# Patient Record
Sex: FEMALE | Race: WHITE | NOT HISPANIC OR LATINO | URBAN - METROPOLITAN AREA
[De-identification: names, ages, dates, MRNs, and addresses within clinical notes are randomized per-mention and may not be internally consistent; named-entity substitution may affect disease eponyms.]

---

## 2017-03-06 ENCOUNTER — APPOINTMENT (RX ONLY)
Dept: URBAN - METROPOLITAN AREA CLINIC 24 | Facility: CLINIC | Age: 67
Setting detail: DERMATOLOGY
End: 2017-03-06

## 2017-03-06 DIAGNOSIS — L91.8 OTHER HYPERTROPHIC DISORDERS OF THE SKIN: ICD-10-CM

## 2017-03-06 DIAGNOSIS — L82.1 OTHER SEBORRHEIC KERATOSIS: ICD-10-CM

## 2017-03-06 DIAGNOSIS — L73.8 OTHER SPECIFIED FOLLICULAR DISORDERS: ICD-10-CM

## 2017-03-06 DIAGNOSIS — L57.8 OTHER SKIN CHANGES DUE TO CHRONIC EXPOSURE TO NONIONIZING RADIATION: ICD-10-CM

## 2017-03-06 DIAGNOSIS — L82.0 INFLAMED SEBORRHEIC KERATOSIS: ICD-10-CM

## 2017-03-06 PROBLEM — J30.1 ALLERGIC RHINITIS DUE TO POLLEN: Status: ACTIVE | Noted: 2017-03-06

## 2017-03-06 PROCEDURE — ? SKIN TAG REMOVAL

## 2017-03-06 PROCEDURE — 17110 DESTRUCTION B9 LES UP TO 14: CPT

## 2017-03-06 PROCEDURE — 99213 OFFICE O/P EST LOW 20 MIN: CPT | Mod: 25

## 2017-03-06 PROCEDURE — ? LIQUID NITROGEN

## 2017-03-06 PROCEDURE — ? COUNSELING

## 2017-03-06 PROCEDURE — 11200 RMVL SKIN TAGS UP TO&INC 15: CPT | Mod: 59

## 2017-03-06 ASSESSMENT — LOCATION SIMPLE DESCRIPTION DERM
LOCATION SIMPLE: LEFT FOREARM
LOCATION SIMPLE: RIGHT FOREARM
LOCATION SIMPLE: RIGHT UPPER LIP
LOCATION SIMPLE: LEFT CHEEK
LOCATION SIMPLE: LEFT LOWER BACK
LOCATION SIMPLE: RIGHT EYELID
LOCATION SIMPLE: RIGHT CHEEK

## 2017-03-06 ASSESSMENT — LOCATION DETAILED DESCRIPTION DERM
LOCATION DETAILED: RIGHT DISTAL DORSAL FOREARM
LOCATION DETAILED: RIGHT SUPERIOR VERMILION BORDER
LOCATION DETAILED: LEFT DISTAL DORSAL FOREARM
LOCATION DETAILED: LEFT MEDIAL MALAR CHEEK
LOCATION DETAILED: RIGHT LATERAL CANTHUS
LOCATION DETAILED: RIGHT INFERIOR CENTRAL MALAR CHEEK
LOCATION DETAILED: LEFT SUPERIOR MEDIAL LOWER BACK

## 2017-03-06 ASSESSMENT — LOCATION ZONE DERM
LOCATION ZONE: ARM
LOCATION ZONE: EYELID
LOCATION ZONE: LIP
LOCATION ZONE: FACE
LOCATION ZONE: TRUNK

## 2017-03-06 NOTE — PROCEDURE: LIQUID NITROGEN
Detail Level: Detailed
Include Z78.9 (Other Specified Conditions Influencing Health Status) As An Associated Diagnosis?: No
Medical Necessity Clause: Treatment was medically necessary because the spot was getting
Consent: The patient's verbal consent was obtained including but not limited to risks of crusting, scabbing, blistering, scarring, darker or lighter pigmentary change, recurrence, incomplete removal and infection.
Medical Necessity Information: It is in your best interest to select a reason for this procedure from the list below. All of these items fulfill various CMS LCD requirements except the new and changing color options.
Post-Care Instructions: I reviewed with the patient in detail post-care instructions. Patient is to wear sunprotection, and avoid picking at any of the treated lesions. Pt may apply Vaseline to crusted or scabbing areas.
Number Of Freeze-Thaw Cycles: 1 freeze-thaw cycle

## 2017-03-06 NOTE — PROCEDURE: SKIN TAG REMOVAL
Consent: Written consent obtained and the risks of skin tag removal was reviewed with the patient including but not limited to bleeding, pigmentary change, infection, pain, and remote possibility of scarring.
Anesthesia Type: 1% lidocaine with 1:500,000 epinephrine and a 1:10 solution of 8.4% sodium bicarbonate
Detail Level: Detailed
Include Z78.9 (Other Specified Conditions Influencing Health Status) As An Associated Diagnosis?: No
Medical Necessity Clause: This procedure was medically necessary because the lesion that was treated was
Medical Necessity Information: It is in your best interest to select a reason for this procedure from the list below. All of these items fulfill various CMS LCD requirements except the new and changing color options.
Hemostasis: Aluminum Chloride
Anesthesia Volume In Cc: 0.3

## 2018-01-03 RX ORDER — CEFAZOLIN SODIUM/WATER 2 G/20 ML
2 SYRINGE (ML) INTRAVENOUS ONCE
Status: CANCELLED | OUTPATIENT
Start: 2018-01-03 | End: 2018-01-03

## 2018-01-08 ENCOUNTER — HOSPITAL ENCOUNTER (OUTPATIENT)
Dept: SURGERY | Age: 68
Discharge: HOME | End: 2018-01-08
Payer: MEDICARE

## 2018-01-08 ENCOUNTER — HOSPITAL ENCOUNTER (OUTPATIENT)
Dept: PHYSICAL THERAPY | Age: 68
Discharge: HOME | End: 2018-01-08
Payer: MEDICARE

## 2018-01-08 VITALS
OXYGEN SATURATION: 96 % | SYSTOLIC BLOOD PRESSURE: 146 MMHG | TEMPERATURE: 97.9 F | DIASTOLIC BLOOD PRESSURE: 70 MMHG | WEIGHT: 262.8 LBS | RESPIRATION RATE: 18 BRPM | HEIGHT: 63 IN | BODY MASS INDEX: 46.56 KG/M2

## 2018-01-08 LAB
ALBUMIN SERPL-MCNC: 3.9 G/DL (ref 3.2–4.6)
ANION GAP SERPL CALC-SCNC: 7 MMOL/L (ref 7–16)
APPEARANCE UR: CLEAR
APTT PPP: 28.6 SEC (ref 23.2–35.3)
BACTERIA SPEC CULT: NORMAL
BACTERIA URNS QL MICRO: 0 /HPF
BASOPHILS # BLD: 0 K/UL (ref 0–0.2)
BASOPHILS NFR BLD: 0 % (ref 0–2)
BILIRUB UR QL: NEGATIVE
BUN SERPL-MCNC: 24 MG/DL (ref 8–23)
CALCIUM SERPL-MCNC: 9.8 MG/DL (ref 8.3–10.4)
CASTS URNS QL MICRO: ABNORMAL /LPF
CHLORIDE SERPL-SCNC: 101 MMOL/L (ref 98–107)
CO2 SERPL-SCNC: 29 MMOL/L (ref 21–32)
COLOR UR: YELLOW
CREAT SERPL-MCNC: 0.91 MG/DL (ref 0.6–1)
DIFFERENTIAL METHOD BLD: NORMAL
EOSINOPHIL # BLD: 0.3 K/UL (ref 0–0.8)
EOSINOPHIL NFR BLD: 4 % (ref 0.5–7.8)
EPI CELLS #/AREA URNS HPF: ABNORMAL /HPF
ERYTHROCYTE [DISTWIDTH] IN BLOOD BY AUTOMATED COUNT: 14 % (ref 11.9–14.6)
EST. AVERAGE GLUCOSE BLD GHB EST-MCNC: 105 MG/DL
GLUCOSE SERPL-MCNC: 99 MG/DL (ref 65–100)
GLUCOSE UR STRIP.AUTO-MCNC: NEGATIVE MG/DL
HBA1C MFR BLD: 5.3 % (ref 4.8–6)
HCT VFR BLD AUTO: 41.7 % (ref 35.8–46.3)
HGB BLD-MCNC: 13.4 G/DL (ref 11.7–15.4)
HGB UR QL STRIP: NEGATIVE
IMM GRANULOCYTES # BLD: 0 K/UL (ref 0–0.5)
IMM GRANULOCYTES NFR BLD AUTO: 0 % (ref 0–5)
INR PPP: 1
KETONES UR QL STRIP.AUTO: NEGATIVE MG/DL
LEUKOCYTE ESTERASE UR QL STRIP.AUTO: ABNORMAL
LYMPHOCYTES # BLD: 2.1 K/UL (ref 0.5–4.6)
LYMPHOCYTES NFR BLD: 31 % (ref 13–44)
MCH RBC QN AUTO: 29.3 PG (ref 26.1–32.9)
MCHC RBC AUTO-ENTMCNC: 32.1 G/DL (ref 31.4–35)
MCV RBC AUTO: 91 FL (ref 79.6–97.8)
MONOCYTES # BLD: 0.6 K/UL (ref 0.1–1.3)
MONOCYTES NFR BLD: 8 % (ref 4–12)
NEUTS SEG # BLD: 4 K/UL (ref 1.7–8.2)
NEUTS SEG NFR BLD: 57 % (ref 43–78)
NITRITE UR QL STRIP.AUTO: NEGATIVE
PH UR STRIP: 6 [PH] (ref 5–9)
PLATELET # BLD AUTO: 249 K/UL (ref 150–450)
PMV BLD AUTO: 11.4 FL (ref 10.8–14.1)
POTASSIUM SERPL-SCNC: 3.6 MMOL/L (ref 3.5–5.1)
PROT UR STRIP-MCNC: NEGATIVE MG/DL
PROTHROMBIN TIME: 12.9 SEC (ref 11.5–14.5)
RBC # BLD AUTO: 4.58 M/UL (ref 4.05–5.25)
RBC #/AREA URNS HPF: ABNORMAL /HPF
SERVICE CMNT-IMP: NORMAL
SODIUM SERPL-SCNC: 137 MMOL/L (ref 136–145)
SP GR UR REFRACTOMETRY: 1.02 (ref 1–1.02)
UROBILINOGEN UR QL STRIP.AUTO: 0.2 EU/DL (ref 0.2–1)
WBC # BLD AUTO: 7 K/UL (ref 4.3–11.1)
WBC URNS QL MICRO: ABNORMAL /HPF

## 2018-01-08 PROCEDURE — 97161 PT EVAL LOW COMPLEX 20 MIN: CPT

## 2018-01-08 PROCEDURE — 85610 PROTHROMBIN TIME: CPT | Performed by: ORTHOPAEDIC SURGERY

## 2018-01-08 PROCEDURE — G8978 MOBILITY CURRENT STATUS: HCPCS

## 2018-01-08 PROCEDURE — 80307 DRUG TEST PRSMV CHEM ANLYZR: CPT | Performed by: ORTHOPAEDIC SURGERY

## 2018-01-08 PROCEDURE — 77030027138 HC INCENT SPIROMETER -A

## 2018-01-08 PROCEDURE — 85730 THROMBOPLASTIN TIME PARTIAL: CPT | Performed by: ORTHOPAEDIC SURGERY

## 2018-01-08 PROCEDURE — 80048 BASIC METABOLIC PNL TOTAL CA: CPT | Performed by: ORTHOPAEDIC SURGERY

## 2018-01-08 PROCEDURE — 82040 ASSAY OF SERUM ALBUMIN: CPT | Performed by: ORTHOPAEDIC SURGERY

## 2018-01-08 PROCEDURE — 36415 COLL VENOUS BLD VENIPUNCTURE: CPT | Performed by: ORTHOPAEDIC SURGERY

## 2018-01-08 PROCEDURE — 81001 URINALYSIS AUTO W/SCOPE: CPT | Performed by: ORTHOPAEDIC SURGERY

## 2018-01-08 PROCEDURE — 87641 MR-STAPH DNA AMP PROBE: CPT | Performed by: ORTHOPAEDIC SURGERY

## 2018-01-08 PROCEDURE — 83036 HEMOGLOBIN GLYCOSYLATED A1C: CPT | Performed by: ORTHOPAEDIC SURGERY

## 2018-01-08 PROCEDURE — G8980 MOBILITY D/C STATUS: HCPCS

## 2018-01-08 PROCEDURE — 85025 COMPLETE CBC W/AUTO DIFF WBC: CPT | Performed by: ORTHOPAEDIC SURGERY

## 2018-01-08 PROCEDURE — G8979 MOBILITY GOAL STATUS: HCPCS

## 2018-01-08 RX ORDER — ACETAMINOPHEN 500 MG
TABLET ORAL AS NEEDED
Status: ON HOLD | COMMUNITY
End: 2019-04-17 | Stop reason: SDUPTHER

## 2018-01-08 NOTE — PERIOP NOTES
Patient verified name, , and surgery as listed in [facility]. Type 3 surgery, joint assessment complete. Labs per surgeon: cbc,bmp,pt,ptt, ua, hgb a1c, albumin; results within limits; mssa, jamilah pending. Labs routed to surgeon and PCP. EKG:dated 17, echo dated 16, cardiologist note dated 17 all printed from  EMR and placed in chart; all within anesthesia guidelines. Hibiclens and instructions to return bottle on DOS given per hospital policy. Nasal Swab collected per MD order and instructions for Mupirocin nasal ointment if required. Patient provided with handouts including Guide to Surgery, Pain Management, Hand Hygiene, Blood Transfusion Education, and [facility] Brochure. Patient answered medical/surgical history questions at their best of ability. All prior to admission medications documented in [facility]. Original medication prescription bottle not visualized during patient appointment. Patient instructed to hold all vitamins 7 days prior to surgery and NSAIDS 5 days prior to surgery. Medications to be held: diclofenac- 5 days. Patient instructed to continue previous medications as prescribed prior to surgery and to take the following medications the day of surgery according to anesthesia guidelines with a small sip of water: use advair diskus; lexapro, flonase, levothyroxine, claritin, montelukast, tylenol if needed. Patient teach back successful and patient demonstrates knowledge of instruction.

## 2018-01-08 NOTE — PROGRESS NOTES
Jade Martins  : [birth year](59 y.o.) [facility] at [address], [place] [address].  [phone]       Physical Therapy Prehab Plan of Treatment and Evaluation Summary:2018    ICD-10: Treatment Diagnosis:   · Pain in Right Knee (M25.561)  · Stiffness of Right Knee, Not elsewhere classified (M25.661)  · Difficulty in walking, Not elsewhere classified (R26.2)  · Other abnormalities of gait and mobility (R26.89)  Precautions/Allergies:   Ceclor [cefaclor] and Symbyax [olanzapine-fluoxetine]  MEDICAL/REFERRING DIAGNOSIS:  Unilateral primary osteoarthritis, right knee [M17.11]  REFERRING PHYSICIAN: Kerry Martinez MD  DATE OF SURGERY: 18   Assessment:   Comments:  Pain in right knee joint with decreased independence with functional mobility. Pt plans to return home following hospital stay. Pt's  present and supportive. PROBLEM LIST (Impacting functional limitations):  Ms. Irma Azar presents with the following right lower extremity(s) problems:  1. Transfers  2. Gait  3. Strength  4. Range of Motion  5. Pain   INTERVENTIONS PLANNED:  1. Home Exercise Program  2. Educational Discussion     TREATMENT PLAN: Effective Dates: 2018 TO 2018. Frequency/Duration: Patient to continue to perform home exercise program at least twice per day up until her surgery. GOALS: (Goals have been discussed and agreed upon with patient.)  Discharge Goals: Time Frame: 1 Day  1. Patient will demonstrate independence with a home exercise program designed to increase strength, range of motion and pain control to minimize functional deficits and optimize patient for total joint replacement. Rehabilitation Potential For Stated Goals: Good  Regarding Orly Jay's therapy, I certify that the treatment plan above will be carried out by a therapist or under their direction.   Thank you for this referral,  Ronit Stewart, PT               HISTORY:   Present Symptoms:  Pain Intensity 1: 8  Pain Location 1: Knee  Pain Orientation 1: Right   History of Present Injury/Illness (Reason for Referral):  Medical/Referring Diagnosis: Unilateral primary osteoarthritis, right knee [M17.11]   Past Medical History/Comorbidities:   Ms. Leopoldo Al  has a past medical history of Allergic rhinitis; Anxiety; Arrhythmia; Arthritis; Depression; Endocarditis (2016); Heart murmur; Hematuria; History of MRSA infection (on left breast); HLD (hyperlipidemia) ( ); Hypertension; Hypothyroid; Hypothyroidism due to acquired atrophy of thyroid (2015); Intertrigo; Irregular heart beat; Mass of colon; Morbid obesity ([facility] [address] Reactive airway disease with status asthmaticus; Scoliosis (2016); Sleep apnea; Unspecified adverse effect of anesthesia; Varicose veins; and VSD (ventricular septal defect) (2016). Ms. Leopoldo Al  has a past surgical history that includes hx lipoma resection (Right); hx appendectomy; hx hernia repair (incisional [id number]); hx colonoscopy (, ); hx heent; hx orthopaedic (due to underbite); hx heart catheterization; hx breast reduction (Bilateral, 2016); hx  section; hx dilation and curettage; and hx colectomy (Right, ).   Social History/Living Environment:   Home Environment: Private residence  # Steps to Enter: 3  One/Two Story Residence: One story  Living Alone: No  Support Systems: Spouse/Significant Other/Partner  Patient Expects to be Discharged to[de-identified] Private residence  Current DME Used/Available at Home: None  Tub or Shower Type: Shower  Work/Activity:  Retired    Dominant Side:  RIGHT  Current Medications:  See Pre-assessment nursing note   Number of Personal Factors/Comorbidities that affect the Plan of Care: 0: LOW COMPLEXITY   EXAMINATION:   ADLs (Current Functional Status):   Ambulation:  [x] Independent  [] Walk Indoors Only  [] Walk Outdoors  [] Use Assistive Device  [] Use Wheelchair Only Dressing:  [x] Independent  Requires Assistance from Someone for:  [] Sock/Shoes  [] Pants  [] Everything   Bathing/Showering:   [x] Independent  [] Requires Assistance from Someone  [] Sponge Bath Only Household Activities:  [] Routine house and yard work  [x] Light Housework Only  [] None   Observation/Orthostatic Postural Assessment:   Within defined limits   ROM/Flexibility:   Gross Assessment: Yes  AROM: Generally decreased, functional                LLE Assessment  LLE Assessment (WDL): Within defined limits      RLE AROM  R Knee Flexion: 97  R Knee Extension: 20   Strength:   Gross Assessment: Yes  Strength: Generally decreased, functional                  Functional Mobility:    Gross Assessment: Yes  Coordination: Generally decreased, functional    Gait Description (WDL): Within defined limits  Stand to Sit: Independent  Sit to Stand: Independent  Distance (ft): 1000 Feet (ft)  Ambulation - Level of Assistance: Independent  Gait Abnormalities: Antalgic          Balance:    Sitting: Intact  Standing: Intact   Body Structures Involved:  1. Bones  2. Joints  3. Muscles Body Functions Affected:  1. Neuromusculoskeletal  2. Movement Related Activities and Participation Affected:  1. Mobility   Number of elements that affect the Plan of Care: 4+: HIGH COMPLEXITY   CLINICAL PRESENTATION:   Presentation: Stable and uncomplicated: LOW COMPLEXITY   CLINICAL DECISION MAKING:   Outcome Measure: Tool Used: Lower Extremity Functional Scale (LEFS)  Score:  Initial: 20/80 Most Recent: X/80 (Date: -- )   Interpretation of Score: 20 questions each scored on a 5 point scale with 0 representing \"extreme difficulty or unable to perform\" and 4 representing \"no difficulty\". The lower the score, the greater the functional disability. 80/80 represents no disability. Minimal detectable change is 9 points. Score 80 79-65 64-49 48-33 32-17 16-1 0   Modifier CH CI CJ CK CL CM CN     ?  Mobility - Walking and Moving Around:     - CURRENT STATUS: CL - 60%-79% impaired, limited or restricted    - GOAL STATUS: CL - 60%-79% impaired, limited or restricted    - D/C STATUS:  CL - 60%-79% impaired, limited or restricted  Medical Necessity:   · Ms. Juliann Krishnan is expected to optimize her lower extremity strength and ROM in preparation for joint replacement surgery. Reason for Services/Other Comments:  · Achieve baseline assesment of musculoskeletal system, functional mobility and home environment. , educate in PT HEP in preparation for surgery, educate in hospital plan of care. Use of outcome tool(s) and clinical judgement create a POC that gives a: Clear prediction of patient's progress: LOW COMPLEXITY   TREATMENT:   Treatment/Session Assessment:  Patient was instructed in PT- HEP to increase strength and ROM in LEs. Answered all questions. · Post session pain:  8  · Compliance with Program/Exercises: compliant most of the time.   Total Treatment Duration:  PT Patient Time In/Time Out  Time In: 1100  Time Out: Cecil Jane PT

## 2018-01-09 LAB
COTININE UR QL SCN: NEGATIVE NG/ML
DRUG SCREEN COMMENT:, 753798: NORMAL

## 2018-01-09 NOTE — PERIOP NOTES
1/8/2018  8:51 PM - [place], Lab [facility] [employer]   Component Results   Component Value Flag Ref Range Units Status   Special Requests: NASAL     Final   Culture result:      Final   SA target not detected.                                 A MRSA NEGATIVE, SA NEGATIVE test result does not preclude MRSA or SA nasal colonization.

## 2018-01-09 NOTE — PROGRESS NOTES
01/08/18 1030   Oxygen Therapy   O2 Sat (%) 97 %   Pulse via Oximetry 67 beats per minute   O2 Device Room air   Pre-Treatment   Breath Sounds Bilateral Clear;Diminished   Pre FEV1 (liters) 1.7 liters   % Predicted 74   Incentive Spirometry Treatment   Actual Volume (ml) 1500 ml     Initial respiratory Assessment completed with pt. Pt was interviewed and evaluated in [facility] prior to surgery. Patient ID:  Sandra James  [id number]  73 y.o.  1950  Surgeon: Renee De Guzman  Date of Surgery: 1/29/2018  Procedure: Total Right Knee Arthroplasty  Primary Care Physician: Susana Valdez -[phone]  Specialists:                                  Pt instructed in the use of Incentive Spirometry. Pt instructed to bring Incentive Spirometer back on date of surgery & to start using Is upon return to pt room. Pt taught proper cough technique      History of smoking:   NONE      Quit date:    Secondhand smoke:PARENTS      Past procedures with Oxygen desaturation:DELAYED AWAKENING    Past Medical History:   Diagnosis Date    Allergic rhinitis     Anxiety     Arrhythmia     hx: [facility] May 28, 2015: sinus rhythm noted on ekg 12-5-17    Arthritis     Depression     under control with med    Endocarditis 1992    hx 1992    Heart murmur     congenital, asymptomatic per cardiologist note ; echo 7-25-16    Hematuria     History of MRSA infection on left breast    5/2016 : treated/ resolved    HLD (hyperlipidemia)      Hypertension     Hypothyroid     medication    Hypothyroidism due to acquired atrophy of thyroid 4/28/2015    Intertrigo     mytrex cream    Irregular heart beat     hx only; current - regular rate/ rhythm    Mass of colon     Morbid obesity ([address]     48.7 bmi    Reactive airway disease with status asthmaticus     hx only    Scoliosis 8/4/2016    Sleep apnea     cpap complaint.     Unspecified adverse effect of anesthesia     slow to awaken in pacu: pt reports prior to using c-pap    Varicose veins     VSD (ventricular septal defect) 2016    per cardiologist note 17: small, restictive, no change                                    ENT:SINUS                                                                                                                      Respiratory history:HX OF PNA ONCE, ASTHMA                                 SOB  ON EXERTION                                  Respiratory meds:  ADVAIR  AND ALBUTEROL MDI PRN                                       FAMILY PRESENT:            SPOUSE,                                           PAST SLEEP STUDY:        YES     HX OF PRAVEEN:                        YES                                       PRAVEEN assessment:     PRAVEEN- CPAP                                          SLEEPS ON SIDE                                                   PHYSICAL EXAM   Body mass index is 46.55 kg/(m^2).    Visit Vitals    /70 (BP 1 Location: Left arm, BP Patient Position: At rest;Sitting)    Temp 97.9 °F (36.6 °C)    Resp 18    Ht 5' 3\" (1.6 m)    Wt 119.2 kg (262 lb 12.8 oz)    SpO2 96%    BMI 46.55 kg/m2     Neck circumference:   42   cm    Loud snoring:YES     Witnessed apnea or wakening gasping or choking:, , APNEA    Awakens with headaches:DENIES    Morning or daytime tiredness/ sleepiness:   TIRED     Dry mouth or sore throat in morning:YES      Velasquez stage:3    SACS score:30    STOP/BAN                              CPAP:HOME CPAP        ALBUTEROL NEB Q6 PRN          SPACER       CONT SAT HS     - PT  OBSTRUCTIVE EPISODES ON SLEEP STUDY SAT 73%     Referrals:    Pt. Phone Number:

## 2018-01-26 RX ORDER — VANCOMYCIN 1.75 GRAM/500 ML IN 0.9 % SODIUM CHLORIDE INTRAVENOUS
1750
Status: COMPLETED | OUTPATIENT
Start: 2018-01-29 | End: 2018-01-29

## 2018-01-28 ENCOUNTER — ANESTHESIA EVENT (OUTPATIENT)
Dept: SURGERY | Age: 68
DRG: 470 | End: 2018-01-28
Payer: MEDICARE

## 2018-01-29 ENCOUNTER — ANESTHESIA (OUTPATIENT)
Dept: SURGERY | Age: 68
DRG: 470 | End: 2018-01-29
Payer: MEDICARE

## 2018-01-29 ENCOUNTER — HOME HEALTH ADMISSION (OUTPATIENT)
Dept: HOME HEALTH SERVICES | Facility: HOME HEALTH | Age: 68
End: 2018-01-29
Payer: MEDICARE

## 2018-01-29 ENCOUNTER — HOSPITAL ENCOUNTER (INPATIENT)
Age: 68
LOS: 2 days | Discharge: HOME HEALTH | DRG: 470 | End: 2018-01-31
Attending: ORTHOPAEDIC SURGERY | Admitting: ORTHOPAEDIC SURGERY
Payer: MEDICARE

## 2018-01-29 DIAGNOSIS — M17.11 PRIMARY OSTEOARTHRITIS OF RIGHT KNEE: Primary | ICD-10-CM

## 2018-01-29 PROBLEM — M17.9 OA (OSTEOARTHRITIS) OF KNEE: Status: ACTIVE | Noted: 2018-01-29

## 2018-01-29 LAB
ABO + RH BLD: NORMAL
BLOOD GROUP ANTIBODIES SERPL: NORMAL
GLUCOSE BLD STRIP.AUTO-MCNC: 112 MG/DL (ref 65–100)
HGB BLD-MCNC: 10.6 G/DL (ref 11.7–15.4)
SPECIMEN EXP DATE BLD: NORMAL

## 2018-01-29 PROCEDURE — 74011250636 HC RX REV CODE- 250/636: Performed by: ORTHOPAEDIC SURGERY

## 2018-01-29 PROCEDURE — 77030007880 HC KT SPN EPDRL BBMI -B: Performed by: ANESTHESIOLOGY

## 2018-01-29 PROCEDURE — 86900 BLOOD TYPING SEROLOGIC ABO: CPT | Performed by: ORTHOPAEDIC SURGERY

## 2018-01-29 PROCEDURE — 77030020782 HC GWN BAIR PAWS FLX 3M -B: Performed by: ANESTHESIOLOGY

## 2018-01-29 PROCEDURE — 76060000035 HC ANESTHESIA 2 TO 2.5 HR: Performed by: ORTHOPAEDIC SURGERY

## 2018-01-29 PROCEDURE — 77030012935 HC DRSG AQUACEL BMS -B: Performed by: ORTHOPAEDIC SURGERY

## 2018-01-29 PROCEDURE — 77030027520 HC SEAL BPLR AQMNTYS MEDT -F: Performed by: ORTHOPAEDIC SURGERY

## 2018-01-29 PROCEDURE — 77030034849: Performed by: ORTHOPAEDIC SURGERY

## 2018-01-29 PROCEDURE — 97165 OT EVAL LOW COMPLEX 30 MIN: CPT

## 2018-01-29 PROCEDURE — 65270000029 HC RM PRIVATE

## 2018-01-29 PROCEDURE — 74011000258 HC RX REV CODE- 258: Performed by: ORTHOPAEDIC SURGERY

## 2018-01-29 PROCEDURE — 77030036688 HC BLNKT CLD THER S2SG -B

## 2018-01-29 PROCEDURE — 76210000016 HC OR PH I REC 1 TO 1.5 HR: Performed by: ORTHOPAEDIC SURGERY

## 2018-01-29 PROCEDURE — 74011000302 HC RX REV CODE- 302: Performed by: ORTHOPAEDIC SURGERY

## 2018-01-29 PROCEDURE — 0SRC0JA REPLACEMENT OF RIGHT KNEE JOINT WITH SYNTHETIC SUBSTITUTE, UNCEMENTED, OPEN APPROACH: ICD-10-PCS | Performed by: ORTHOPAEDIC SURGERY

## 2018-01-29 PROCEDURE — 77010033678 HC OXYGEN DAILY

## 2018-01-29 PROCEDURE — 77030033067 HC SUT PDO STRATFX SPIR J&J -B: Performed by: ORTHOPAEDIC SURGERY

## 2018-01-29 PROCEDURE — 77030003666 HC NDL SPINAL BD -A: Performed by: ORTHOPAEDIC SURGERY

## 2018-01-29 PROCEDURE — 77030008477 HC STYL SATN SLP COVD -A: Performed by: ANESTHESIOLOGY

## 2018-01-29 PROCEDURE — 97161 PT EVAL LOW COMPLEX 20 MIN: CPT

## 2018-01-29 PROCEDURE — 74011250636 HC RX REV CODE- 250/636

## 2018-01-29 PROCEDURE — 77030018836 HC SOL IRR NACL ICUM -A: Performed by: ORTHOPAEDIC SURGERY

## 2018-01-29 PROCEDURE — 82962 GLUCOSE BLOOD TEST: CPT

## 2018-01-29 PROCEDURE — 74011250636 HC RX REV CODE- 250/636: Performed by: ANESTHESIOLOGY

## 2018-01-29 PROCEDURE — 76010010054 HC POST OP PAIN BLOCK: Performed by: ORTHOPAEDIC SURGERY

## 2018-01-29 PROCEDURE — 77030002933 HC SUT MCRYL J&J -A: Performed by: ORTHOPAEDIC SURGERY

## 2018-01-29 PROCEDURE — 74011000250 HC RX REV CODE- 250: Performed by: ORTHOPAEDIC SURGERY

## 2018-01-29 PROCEDURE — 77030003602 HC NDL NRV BLK BBMI -B: Performed by: ANESTHESIOLOGY

## 2018-01-29 PROCEDURE — 86580 TB INTRADERMAL TEST: CPT | Performed by: ORTHOPAEDIC SURGERY

## 2018-01-29 PROCEDURE — 85018 HEMOGLOBIN: CPT | Performed by: ORTHOPAEDIC SURGERY

## 2018-01-29 PROCEDURE — 94760 N-INVAS EAR/PLS OXIMETRY 1: CPT

## 2018-01-29 PROCEDURE — C1776 JOINT DEVICE (IMPLANTABLE): HCPCS | Performed by: ORTHOPAEDIC SURGERY

## 2018-01-29 PROCEDURE — 77030002922 HC SUT FBRWRE ARTH -B: Performed by: ORTHOPAEDIC SURGERY

## 2018-01-29 PROCEDURE — 74011000250 HC RX REV CODE- 250

## 2018-01-29 PROCEDURE — 77030032490 HC SLV COMPR SCD KNE COVD -B

## 2018-01-29 PROCEDURE — 77030013727 HC IRR FAN PULSVC ZIMM -B: Performed by: ORTHOPAEDIC SURGERY

## 2018-01-29 PROCEDURE — 77030025869: Performed by: ORTHOPAEDIC SURGERY

## 2018-01-29 PROCEDURE — 36415 COLL VENOUS BLD VENIPUNCTURE: CPT | Performed by: ORTHOPAEDIC SURGERY

## 2018-01-29 PROCEDURE — 76010000162 HC OR TIME 1.5 TO 2 HR INTENSV-TIER 1: Performed by: ORTHOPAEDIC SURGERY

## 2018-01-29 PROCEDURE — 77030008703 HC TU ET UNCUF COVD -A: Performed by: ANESTHESIOLOGY

## 2018-01-29 PROCEDURE — 76942 ECHO GUIDE FOR BIOPSY: CPT | Performed by: ORTHOPAEDIC SURGERY

## 2018-01-29 PROCEDURE — 77030011640 HC PAD GRND REM COVD -A: Performed by: ORTHOPAEDIC SURGERY

## 2018-01-29 PROCEDURE — 74011250637 HC RX REV CODE- 250/637: Performed by: ORTHOPAEDIC SURGERY

## 2018-01-29 PROCEDURE — 77030003665 HC NDL SPN BBMI -A: Performed by: ANESTHESIOLOGY

## 2018-01-29 DEVICE — CRUCIATE RETAINING FEMORAL
Type: IMPLANTABLE DEVICE | Site: KNEE | Status: FUNCTIONAL
Brand: TRIATHLON

## 2018-01-29 DEVICE — TIBIAL BEARING INSERT
Type: IMPLANTABLE DEVICE | Site: KNEE | Status: FUNCTIONAL
Brand: TRIATHLON

## 2018-01-29 DEVICE — TIBIAL COMPONENT
Type: IMPLANTABLE DEVICE | Site: KNEE | Status: FUNCTIONAL
Brand: TRIATHLON

## 2018-01-29 DEVICE — PATELLA
Type: IMPLANTABLE DEVICE | Site: KNEE | Status: FUNCTIONAL
Brand: TRIATHLON

## 2018-01-29 RX ORDER — NALOXONE HYDROCHLORIDE 0.4 MG/ML
.2-.4 INJECTION, SOLUTION INTRAMUSCULAR; INTRAVENOUS; SUBCUTANEOUS
Status: DISCONTINUED | OUTPATIENT
Start: 2018-01-29 | End: 2018-01-31 | Stop reason: HOSPADM

## 2018-01-29 RX ORDER — HYDROMORPHONE HYDROCHLORIDE 2 MG/ML
0.5 INJECTION, SOLUTION INTRAMUSCULAR; INTRAVENOUS; SUBCUTANEOUS
Status: DISCONTINUED | OUTPATIENT
Start: 2018-01-29 | End: 2018-01-29 | Stop reason: HOSPADM

## 2018-01-29 RX ORDER — SODIUM CHLORIDE, SODIUM LACTATE, POTASSIUM CHLORIDE, CALCIUM CHLORIDE 600; 310; 30; 20 MG/100ML; MG/100ML; MG/100ML; MG/100ML
999 INJECTION, SOLUTION INTRAVENOUS ONCE
Status: COMPLETED | OUTPATIENT
Start: 2018-01-29 | End: 2018-01-29

## 2018-01-29 RX ORDER — MIDAZOLAM HYDROCHLORIDE 1 MG/ML
2 INJECTION, SOLUTION INTRAMUSCULAR; INTRAVENOUS ONCE
Status: COMPLETED | OUTPATIENT
Start: 2018-01-29 | End: 2018-01-29

## 2018-01-29 RX ORDER — TRANEXAMIC ACID 100 MG/ML
INJECTION, SOLUTION INTRAVENOUS AS NEEDED
Status: DISCONTINUED | OUTPATIENT
Start: 2018-01-29 | End: 2018-01-29 | Stop reason: HOSPADM

## 2018-01-29 RX ORDER — SODIUM CHLORIDE 0.9 % (FLUSH) 0.9 %
5-10 SYRINGE (ML) INJECTION EVERY 8 HOURS
Status: DISCONTINUED | OUTPATIENT
Start: 2018-01-29 | End: 2018-01-31 | Stop reason: HOSPADM

## 2018-01-29 RX ORDER — KETOROLAC TROMETHAMINE 15 MG/ML
15 INJECTION, SOLUTION INTRAMUSCULAR; INTRAVENOUS EVERY 8 HOURS
Status: COMPLETED | OUTPATIENT
Start: 2018-01-29 | End: 2018-01-30

## 2018-01-29 RX ORDER — HYDROMORPHONE HYDROCHLORIDE 2 MG/ML
1 INJECTION, SOLUTION INTRAMUSCULAR; INTRAVENOUS; SUBCUTANEOUS
Status: DISCONTINUED | OUTPATIENT
Start: 2018-01-29 | End: 2018-01-31 | Stop reason: HOSPADM

## 2018-01-29 RX ORDER — CELECOXIB 200 MG/1
200 CAPSULE ORAL EVERY 12 HOURS
Qty: 60 CAP | Refills: 2 | Status: SHIPPED | OUTPATIENT
Start: 2018-01-29 | End: 2018-03-29 | Stop reason: SDUPTHER

## 2018-01-29 RX ORDER — LIDOCAINE HYDROCHLORIDE 10 MG/ML
INJECTION INFILTRATION; PERINEURAL AS NEEDED
Status: DISCONTINUED | OUTPATIENT
Start: 2018-01-29 | End: 2018-01-29 | Stop reason: HOSPADM

## 2018-01-29 RX ORDER — MIDAZOLAM HYDROCHLORIDE 1 MG/ML
INJECTION, SOLUTION INTRAMUSCULAR; INTRAVENOUS AS NEEDED
Status: DISCONTINUED | OUTPATIENT
Start: 2018-01-29 | End: 2018-01-29 | Stop reason: HOSPADM

## 2018-01-29 RX ORDER — FLUTICASONE PROPIONATE 50 MCG
2 SPRAY, SUSPENSION (ML) NASAL DAILY
Status: DISCONTINUED | OUTPATIENT
Start: 2018-01-30 | End: 2018-01-31 | Stop reason: HOSPADM

## 2018-01-29 RX ORDER — SODIUM CHLORIDE, SODIUM LACTATE, POTASSIUM CHLORIDE, CALCIUM CHLORIDE 600; 310; 30; 20 MG/100ML; MG/100ML; MG/100ML; MG/100ML
INJECTION, SOLUTION INTRAVENOUS
Status: DISCONTINUED | OUTPATIENT
Start: 2018-01-29 | End: 2018-01-29 | Stop reason: HOSPADM

## 2018-01-29 RX ORDER — SODIUM CHLORIDE 9 MG/ML
INJECTION INTRAMUSCULAR; INTRAVENOUS; SUBCUTANEOUS AS NEEDED
Status: DISCONTINUED | OUTPATIENT
Start: 2018-01-29 | End: 2018-01-29 | Stop reason: HOSPADM

## 2018-01-29 RX ORDER — ZOLPIDEM TARTRATE 5 MG/1
5 TABLET ORAL
Qty: 60 TAB | Refills: 0 | Status: SHIPPED | OUTPATIENT
Start: 2018-01-29 | End: 2018-06-07

## 2018-01-29 RX ORDER — HYDROMORPHONE HYDROCHLORIDE 2 MG/1
2 TABLET ORAL
Status: DISCONTINUED | OUTPATIENT
Start: 2018-01-29 | End: 2018-01-31 | Stop reason: HOSPADM

## 2018-01-29 RX ORDER — ONDANSETRON 8 MG/1
8 TABLET, ORALLY DISINTEGRATING ORAL
Qty: 60 TAB | Refills: 0 | Status: SHIPPED | OUTPATIENT
Start: 2018-01-29 | End: 2018-06-07

## 2018-01-29 RX ORDER — NEOSTIGMINE METHYLSULFATE 1 MG/ML
INJECTION, SOLUTION INTRAVENOUS AS NEEDED
Status: DISCONTINUED | OUTPATIENT
Start: 2018-01-29 | End: 2018-01-29 | Stop reason: HOSPADM

## 2018-01-29 RX ORDER — SUCCINYLCHOLINE CHLORIDE 20 MG/ML
INJECTION INTRAMUSCULAR; INTRAVENOUS AS NEEDED
Status: DISCONTINUED | OUTPATIENT
Start: 2018-01-29 | End: 2018-01-29 | Stop reason: HOSPADM

## 2018-01-29 RX ORDER — CEFAZOLIN SODIUM/WATER 2 G/20 ML
2 SYRINGE (ML) INTRAVENOUS ONCE
Status: DISCONTINUED | OUTPATIENT
Start: 2018-01-29 | End: 2018-01-29 | Stop reason: ALTCHOICE

## 2018-01-29 RX ORDER — ASPIRIN 325 MG
325 TABLET, DELAYED RELEASE (ENTERIC COATED) ORAL EVERY 12 HOURS
Status: DISCONTINUED | OUTPATIENT
Start: 2018-01-29 | End: 2018-01-31 | Stop reason: HOSPADM

## 2018-01-29 RX ORDER — ROPIVACAINE HYDROCHLORIDE 5 MG/ML
INJECTION, SOLUTION EPIDURAL; INFILTRATION; PERINEURAL AS NEEDED
Status: DISCONTINUED | OUTPATIENT
Start: 2018-01-29 | End: 2018-01-29 | Stop reason: HOSPADM

## 2018-01-29 RX ORDER — ROCURONIUM BROMIDE 10 MG/ML
INJECTION, SOLUTION INTRAVENOUS AS NEEDED
Status: DISCONTINUED | OUTPATIENT
Start: 2018-01-29 | End: 2018-01-29 | Stop reason: HOSPADM

## 2018-01-29 RX ORDER — FENTANYL CITRATE 50 UG/ML
INJECTION, SOLUTION INTRAMUSCULAR; INTRAVENOUS AS NEEDED
Status: DISCONTINUED | OUTPATIENT
Start: 2018-01-29 | End: 2018-01-29 | Stop reason: HOSPADM

## 2018-01-29 RX ORDER — ESCITALOPRAM OXALATE 10 MG/1
10 TABLET ORAL DAILY
Status: DISCONTINUED | OUTPATIENT
Start: 2018-01-30 | End: 2018-01-31 | Stop reason: HOSPADM

## 2018-01-29 RX ORDER — HYDROMORPHONE HYDROCHLORIDE 2 MG/1
2 TABLET ORAL
Qty: 90 TAB | Refills: 0 | Status: SHIPPED | OUTPATIENT
Start: 2018-01-29 | End: 2018-06-07

## 2018-01-29 RX ORDER — PROPOFOL 10 MG/ML
INJECTION, EMULSION INTRAVENOUS AS NEEDED
Status: DISCONTINUED | OUTPATIENT
Start: 2018-01-29 | End: 2018-01-29 | Stop reason: HOSPADM

## 2018-01-29 RX ORDER — ACETAMINOPHEN 10 MG/ML
1000 INJECTION, SOLUTION INTRAVENOUS ONCE
Status: COMPLETED | OUTPATIENT
Start: 2018-01-29 | End: 2018-01-29

## 2018-01-29 RX ORDER — NEOMYCIN AND POLYMYXIN B SULFATES 40; 200000 MG/ML; [USP'U]/ML
SOLUTION IRRIGATION AS NEEDED
Status: DISCONTINUED | OUTPATIENT
Start: 2018-01-29 | End: 2018-01-29 | Stop reason: HOSPADM

## 2018-01-29 RX ORDER — SODIUM CHLORIDE 0.9 % (FLUSH) 0.9 %
5-10 SYRINGE (ML) INJECTION AS NEEDED
Status: DISCONTINUED | OUTPATIENT
Start: 2018-01-29 | End: 2018-01-29 | Stop reason: HOSPADM

## 2018-01-29 RX ORDER — OXYCODONE HCL 10 MG/1
10 TABLET, FILM COATED, EXTENDED RELEASE ORAL EVERY 12 HOURS
Status: DISCONTINUED | OUTPATIENT
Start: 2018-01-29 | End: 2018-01-31 | Stop reason: HOSPADM

## 2018-01-29 RX ORDER — ASPIRIN 325 MG
325 TABLET, DELAYED RELEASE (ENTERIC COATED) ORAL EVERY 12 HOURS
Qty: 60 TAB | Refills: 0 | Status: SHIPPED | OUTPATIENT
Start: 2018-01-29 | End: 2018-06-07

## 2018-01-29 RX ORDER — AMOXICILLIN 250 MG
2 CAPSULE ORAL DAILY
Status: DISCONTINUED | OUTPATIENT
Start: 2018-01-30 | End: 2018-01-31 | Stop reason: HOSPADM

## 2018-01-29 RX ORDER — DEXAMETHASONE SODIUM PHOSPHATE 100 MG/10ML
10 INJECTION INTRAMUSCULAR; INTRAVENOUS ONCE
Status: ACTIVE | OUTPATIENT
Start: 2018-01-30 | End: 2018-01-31

## 2018-01-29 RX ORDER — ALBUTEROL SULFATE 90 UG/1
2 AEROSOL, METERED RESPIRATORY (INHALATION)
Status: DISCONTINUED | OUTPATIENT
Start: 2018-01-29 | End: 2018-01-29

## 2018-01-29 RX ORDER — ONDANSETRON 2 MG/ML
INJECTION INTRAMUSCULAR; INTRAVENOUS AS NEEDED
Status: DISCONTINUED | OUTPATIENT
Start: 2018-01-29 | End: 2018-01-29 | Stop reason: HOSPADM

## 2018-01-29 RX ORDER — ALBUTEROL SULFATE 0.83 MG/ML
2.5 SOLUTION RESPIRATORY (INHALATION) AS NEEDED
Status: DISCONTINUED | OUTPATIENT
Start: 2018-01-29 | End: 2018-01-29 | Stop reason: HOSPADM

## 2018-01-29 RX ORDER — AMOXICILLIN 250 MG
2 CAPSULE ORAL DAILY
Qty: 120 TAB | Refills: 0 | Status: SHIPPED | OUTPATIENT
Start: 2018-01-30 | End: 2018-06-07

## 2018-01-29 RX ORDER — KETOROLAC TROMETHAMINE 30 MG/ML
INJECTION, SOLUTION INTRAMUSCULAR; INTRAVENOUS AS NEEDED
Status: DISCONTINUED | OUTPATIENT
Start: 2018-01-29 | End: 2018-01-29 | Stop reason: HOSPADM

## 2018-01-29 RX ORDER — DIPHENHYDRAMINE HCL 25 MG
25 CAPSULE ORAL
Status: DISCONTINUED | OUTPATIENT
Start: 2018-01-29 | End: 2018-01-31 | Stop reason: HOSPADM

## 2018-01-29 RX ORDER — CEFAZOLIN SODIUM/WATER 2 G/20 ML
2 SYRINGE (ML) INTRAVENOUS EVERY 8 HOURS
Status: DISCONTINUED | OUTPATIENT
Start: 2018-01-29 | End: 2018-01-29 | Stop reason: ALTCHOICE

## 2018-01-29 RX ORDER — SODIUM CHLORIDE 9 MG/ML
100 INJECTION, SOLUTION INTRAVENOUS CONTINUOUS
Status: DISPENSED | OUTPATIENT
Start: 2018-01-29 | End: 2018-01-30

## 2018-01-29 RX ORDER — SODIUM CHLORIDE 0.9 % (FLUSH) 0.9 %
5-10 SYRINGE (ML) INJECTION AS NEEDED
Status: DISCONTINUED | OUTPATIENT
Start: 2018-01-29 | End: 2018-01-31 | Stop reason: HOSPADM

## 2018-01-29 RX ORDER — LEVOTHYROXINE SODIUM 75 UG/1
150 TABLET ORAL
Status: DISCONTINUED | OUTPATIENT
Start: 2018-01-23 | End: 2018-01-31 | Stop reason: HOSPADM

## 2018-01-29 RX ORDER — ACETAMINOPHEN 500 MG
1000 TABLET ORAL EVERY 6 HOURS
Status: DISCONTINUED | OUTPATIENT
Start: 2018-01-30 | End: 2018-01-31 | Stop reason: HOSPADM

## 2018-01-29 RX ORDER — ONDANSETRON 2 MG/ML
4 INJECTION INTRAMUSCULAR; INTRAVENOUS
Status: DISCONTINUED | OUTPATIENT
Start: 2018-01-29 | End: 2018-01-29 | Stop reason: HOSPADM

## 2018-01-29 RX ORDER — CELECOXIB 200 MG/1
200 CAPSULE ORAL EVERY 12 HOURS
Status: DISCONTINUED | OUTPATIENT
Start: 2018-01-29 | End: 2018-01-31 | Stop reason: HOSPADM

## 2018-01-29 RX ORDER — ONDANSETRON 8 MG/1
8 TABLET, ORALLY DISINTEGRATING ORAL
Status: DISCONTINUED | OUTPATIENT
Start: 2018-01-29 | End: 2018-01-31 | Stop reason: HOSPADM

## 2018-01-29 RX ORDER — ZOLPIDEM TARTRATE 5 MG/1
5 TABLET ORAL
Status: DISCONTINUED | OUTPATIENT
Start: 2018-01-29 | End: 2018-01-31 | Stop reason: HOSPADM

## 2018-01-29 RX ORDER — MONTELUKAST SODIUM 10 MG/1
10 TABLET ORAL DAILY
Status: DISCONTINUED | OUTPATIENT
Start: 2018-01-30 | End: 2018-01-31 | Stop reason: HOSPADM

## 2018-01-29 RX ORDER — DEXAMETHASONE SODIUM PHOSPHATE 100 MG/10ML
INJECTION INTRAMUSCULAR; INTRAVENOUS AS NEEDED
Status: DISCONTINUED | OUTPATIENT
Start: 2018-01-29 | End: 2018-01-29 | Stop reason: HOSPADM

## 2018-01-29 RX ORDER — GLYCOPYRROLATE 0.2 MG/ML
INJECTION INTRAMUSCULAR; INTRAVENOUS AS NEEDED
Status: DISCONTINUED | OUTPATIENT
Start: 2018-01-29 | End: 2018-01-29 | Stop reason: HOSPADM

## 2018-01-29 RX ORDER — FLUTICASONE PROPIONATE AND SALMETEROL 100; 50 UG/1; UG/1
1 POWDER RESPIRATORY (INHALATION) DAILY
Status: DISCONTINUED | OUTPATIENT
Start: 2018-01-30 | End: 2018-01-31 | Stop reason: HOSPADM

## 2018-01-29 RX ORDER — TRANEXAMIC ACID 650 1/1
1300 TABLET ORAL EVERY 6 HOURS
Status: COMPLETED | OUTPATIENT
Start: 2018-01-29 | End: 2018-01-29

## 2018-01-29 RX ORDER — MORPHINE SULFATE 10 MG/ML
INJECTION, SOLUTION INTRAMUSCULAR; INTRAVENOUS AS NEEDED
Status: DISCONTINUED | OUTPATIENT
Start: 2018-01-29 | End: 2018-01-29 | Stop reason: HOSPADM

## 2018-01-29 RX ORDER — OXYCODONE HYDROCHLORIDE 5 MG/1
10 TABLET ORAL
Status: DISCONTINUED | OUTPATIENT
Start: 2018-01-29 | End: 2018-01-31 | Stop reason: HOSPADM

## 2018-01-29 RX ADMIN — HYDROMORPHONE HYDROCHLORIDE 0.5 MG: 2 INJECTION, SOLUTION INTRAMUSCULAR; INTRAVENOUS; SUBCUTANEOUS at 09:30

## 2018-01-29 RX ADMIN — SODIUM CHLORIDE, SODIUM LACTATE, POTASSIUM CHLORIDE, AND CALCIUM CHLORIDE 999 ML/HR: 600; 310; 30; 20 INJECTION, SOLUTION INTRAVENOUS at 06:10

## 2018-01-29 RX ADMIN — CLINDAMYCIN PHOSPHATE 600 MG: 150 INJECTION, SOLUTION INTRAVENOUS at 22:53

## 2018-01-29 RX ADMIN — PROPOFOL 200 MG: 10 INJECTION, EMULSION INTRAVENOUS at 07:36

## 2018-01-29 RX ADMIN — FENTANYL CITRATE 25 MCG: 50 INJECTION, SOLUTION INTRAMUSCULAR; INTRAVENOUS at 08:15

## 2018-01-29 RX ADMIN — TUBERCULIN PURIFIED PROTEIN DERIVATIVE 5 UNITS: 5 INJECTION, SOLUTION INTRADERMAL at 06:20

## 2018-01-29 RX ADMIN — MIDAZOLAM HYDROCHLORIDE 1 MG: 1 INJECTION, SOLUTION INTRAMUSCULAR; INTRAVENOUS at 07:19

## 2018-01-29 RX ADMIN — ROCURONIUM BROMIDE 10 MG: 10 INJECTION, SOLUTION INTRAVENOUS at 07:36

## 2018-01-29 RX ADMIN — LIDOCAINE HYDROCHLORIDE 100 MG: 10 INJECTION INFILTRATION; PERINEURAL at 07:36

## 2018-01-29 RX ADMIN — HYDROMORPHONE HYDROCHLORIDE 2 MG: 2 TABLET ORAL at 18:26

## 2018-01-29 RX ADMIN — NEOSTIGMINE METHYLSULFATE 3 MG: 1 INJECTION, SOLUTION INTRAVENOUS at 08:48

## 2018-01-29 RX ADMIN — HYDROMORPHONE HYDROCHLORIDE 2 MG: 2 TABLET ORAL at 22:35

## 2018-01-29 RX ADMIN — CELECOXIB 200 MG: 200 CAPSULE ORAL at 20:51

## 2018-01-29 RX ADMIN — Medication 10 ML: at 22:00

## 2018-01-29 RX ADMIN — ROCURONIUM BROMIDE 20 MG: 10 INJECTION, SOLUTION INTRAVENOUS at 08:07

## 2018-01-29 RX ADMIN — FENTANYL CITRATE 25 MCG: 50 INJECTION, SOLUTION INTRAMUSCULAR; INTRAVENOUS at 09:10

## 2018-01-29 RX ADMIN — ONDANSETRON 4 MG: 2 INJECTION INTRAMUSCULAR; INTRAVENOUS at 07:58

## 2018-01-29 RX ADMIN — KETOROLAC TROMETHAMINE 15 MG: 15 INJECTION, SOLUTION INTRAMUSCULAR; INTRAVENOUS at 20:52

## 2018-01-29 RX ADMIN — GLYCOPYRROLATE 0.4 MG: 0.2 INJECTION INTRAMUSCULAR; INTRAVENOUS at 08:48

## 2018-01-29 RX ADMIN — TRANEXAMIC ACID 1 G: 100 INJECTION, SOLUTION INTRAVENOUS at 07:45

## 2018-01-29 RX ADMIN — TRANEXAMIC ACID 1300 MG: 650 TABLET, FILM COATED ORAL at 18:26

## 2018-01-29 RX ADMIN — SODIUM CHLORIDE 100 ML/HR: 900 INJECTION, SOLUTION INTRAVENOUS at 22:53

## 2018-01-29 RX ADMIN — TRANEXAMIC ACID 1300 MG: 650 TABLET, FILM COATED ORAL at 13:25

## 2018-01-29 RX ADMIN — GENTAMICIN SULFATE 400 MG: 40 INJECTION, SOLUTION INTRAMUSCULAR; INTRAVENOUS at 07:14

## 2018-01-29 RX ADMIN — FENTANYL CITRATE 100 MCG: 50 INJECTION, SOLUTION INTRAMUSCULAR; INTRAVENOUS at 07:36

## 2018-01-29 RX ADMIN — ACETAMINOPHEN 1000 MG: 10 INJECTION, SOLUTION INTRAVENOUS at 18:26

## 2018-01-29 RX ADMIN — MIDAZOLAM HYDROCHLORIDE 1 MG: 1 INJECTION, SOLUTION INTRAMUSCULAR; INTRAVENOUS at 07:24

## 2018-01-29 RX ADMIN — MIDAZOLAM HYDROCHLORIDE 2 MG: 1 INJECTION, SOLUTION INTRAMUSCULAR; INTRAVENOUS at 07:00

## 2018-01-29 RX ADMIN — OXYCODONE HYDROCHLORIDE 10 MG: 10 TABLET, FILM COATED, EXTENDED RELEASE ORAL at 20:52

## 2018-01-29 RX ADMIN — ASPIRIN 325 MG: 325 TABLET, DELAYED RELEASE ORAL at 20:51

## 2018-01-29 RX ADMIN — DEXAMETHASONE SODIUM PHOSPHATE 5 MG: 100 INJECTION INTRAMUSCULAR; INTRAVENOUS at 07:47

## 2018-01-29 RX ADMIN — SUCCINYLCHOLINE CHLORIDE 140 MG: 20 INJECTION INTRAMUSCULAR; INTRAVENOUS at 07:36

## 2018-01-29 RX ADMIN — TRANEXAMIC ACID 1 G: 100 INJECTION, SOLUTION INTRAVENOUS at 08:06

## 2018-01-29 RX ADMIN — ACETAMINOPHEN 1000 MG: 500 TABLET, FILM COATED ORAL at 22:54

## 2018-01-29 RX ADMIN — VANCOMYCIN HYDROCHLORIDE 1750 MG: 10 INJECTION, POWDER, LYOPHILIZED, FOR SOLUTION INTRAVENOUS at 06:26

## 2018-01-29 RX ADMIN — KETOROLAC TROMETHAMINE 15 MG: 15 INJECTION, SOLUTION INTRAMUSCULAR; INTRAVENOUS at 13:25

## 2018-01-29 RX ADMIN — FENTANYL CITRATE 25 MCG: 50 INJECTION, SOLUTION INTRAMUSCULAR; INTRAVENOUS at 09:08

## 2018-01-29 RX ADMIN — CLINDAMYCIN PHOSPHATE 600 MG: 150 INJECTION, SOLUTION INTRAVENOUS at 15:21

## 2018-01-29 RX ADMIN — SODIUM CHLORIDE, SODIUM LACTATE, POTASSIUM CHLORIDE, CALCIUM CHLORIDE: 600; 310; 30; 20 INJECTION, SOLUTION INTRAVENOUS at 06:30

## 2018-01-29 RX ADMIN — FENTANYL CITRATE 25 MCG: 50 INJECTION, SOLUTION INTRAMUSCULAR; INTRAVENOUS at 08:11

## 2018-01-29 RX ADMIN — TRANEXAMIC ACID 1 G: 100 INJECTION, SOLUTION INTRAVENOUS at 08:47

## 2018-01-29 NOTE — ANESTHESIA PREPROCEDURE EVALUATION
Anesthetic History               Review of Systems / Medical History  Patient summary reviewed, nursing notes reviewed and pertinent labs reviewed    Pulmonary        Sleep apnea: CPAP           Neuro/Psych         Psychiatric history     Cardiovascular    Hypertension: well controlled        Dysrhythmias (bradycardia with Bigeminy on pre-p EKG)   Complex congenital heart defect (VSD)    Exercise tolerance: >4 METS  Comments: Echo June 2016- normal EF small VSD; aortic sclerosis without stenosis   GI/Hepatic/Renal  Within defined limits              Endo/Other      Hypothyroidism: well controlled  Morbid obesity and arthritis     Other Findings              Physical Exam    Airway  Mallampati: II  TM Distance: 4 - 6 cm  Neck ROM: normal range of motion   Mouth opening: Normal     Cardiovascular    Rhythm: regular  Rate: normal    Murmur: Grade 2, Pulmonic area  Pertinent negatives: No JVD and peripheral edema   Dental  No notable dental hx       Pulmonary  Breath sounds clear to auscultation               Abdominal         Other Findings            Anesthetic Plan    ASA: 3  Anesthesia type: spinal      Post-op pain plan if not by surgeon: peripheral nerve block single    Induction: Intravenous  Anesthetic plan and risks discussed with: Patient

## 2018-01-29 NOTE — PERIOP NOTES
TRANSFER - IN REPORT:    Verbal report received from [facility]) on Jade Martins  being received from joint [facility](unit) for routine progression of care      Report consisted of patients Situation, Background, Assessment and   Recommendations(SBAR). Information from the following report(s) SBAR, Kardex and MAR was reviewed with the receiving nurse. Opportunity for questions and clarification was provided.

## 2018-01-29 NOTE — IP AVS SNAPSHOT
Kodi Mannyshalini 
 
 
 [facility] [address] [place], [place] [address] [facility] [address] 
[phone] Patient: Vadim Rodriguez MRN: [id number] About your hospitalization You were admitted on:  January 29, 2018 You last received care in the:  [facility] Morrison 1 You were discharged on:  January 31, 2018 Why you were hospitalized Your primary diagnosis was:  Not on File Your diagnoses also included:  Oa (Osteoarthritis) Of Knee Follow-up Information Follow up With Details Comments Contact Info Hilaria Pham MD  As needed [address] [address] 
[phone] Roosevelt Bowman MD  As scheduled by office [address] [facility] [address] [place] [address] [address] 
[phone] [address]  Will contact you within 48 hrs [address] [facility] [address] [facility] [address] 
[phone] Your Scheduled Appointments Monday March 05, 2018  2:30 PM EST  
(Arrive by 2:15 PM) Office Visit with Hilaria Pham MD  
[address]) [address] [address] [phone] Discharge Orders None A check wayne indicates which time of day the medication should be taken. My Medications START taking these medications Instructions Each Dose to Equal  
 Morning Noon Evening Bedtime  
 aspirin delayed-release 325 mg tablet Your next dose is: Today Take 1 Tab by mouth every twelve (12) hours every twelve (12) hours. 325 mg  
    
   
   
  
   
  
 celecoxib 200 mg capsule Commonly known as:  CELEBREX Your next dose is: Today Take 1 Cap by mouth every twelve (12) hours every twelve (12) hours for 90 days. 200 mg HYDROmorphone 2 mg tablet Commonly known as:  DILAUDID Your next dose is: Today Take 1 Tab by mouth every four (4) hours as needed. Max Daily Amount: 12 mg.  
 2 mg ondansetron 8 mg disintegrating tablet Commonly known as:  ZOFRAN ODT Your next dose is: Today Take 1 Tab by mouth every eight (8) hours as needed for Nausea. 8 mg  
    
   
   
  
   
  
 senna-docusate 8.6-50 mg per tablet Commonly known as:  Beth [place] Your next dose is:  Tomorrow Take 2 Tabs by mouth daily. 2 Tab  
    
  
   
   
   
  
 zolpidem 5 mg tablet Commonly known as:  AMBIEN Your next dose is: Today Take 1 Tab by mouth nightly as needed for Sleep. Max Daily Amount: 5 mg.  
 5 mg CHANGE how you take these medications Instructions Each Dose to Equal  
 Morning Noon Evening Bedtime * levothyroxine 175 mcg tablet Commonly known as:  SYNTHROID What changed:  additional instructions Your next dose is:  Tomorrow 1 tab po 6 days a week * levothyroxine 150 mcg tablet Commonly known as:  SYNTHROID What changed:  Another medication with the same name was changed. Make sure you understand how and when to take each. Your next dose is:  02/05/2018 Take 1 Tab by mouth every Monday. 1 day a week 150 mcg * Notice: This list has 2 medication(s) that are the same as other medications prescribed for you. Read the directions carefully, and ask your doctor or other care provider to review them with you. CONTINUE taking these medications Instructions Each Dose to Equal  
 Morning Noon Evening Bedtime ADVAIR DISKUS 100-50 mcg/dose diskus inhaler Generic drug:  fluticasone-salmeterol Your next dose is:  Tomorrow Take 1 Puff by inhalation daily. 1 Puff  
    
  
   
   
   
  
 albuterol 90 mcg/actuation inhaler Commonly known as:  PROAIR HFA Your next dose is: Today Take 2 Puffs by inhalation every six (6) hours as needed. 2 Puff  
    
   
   
  
   
  
 cpap machine kit  
   
 by Does Not Apply route. DISABLED PLACARD DMV Commonly known as:  DISABLED PLACARD  
   
 by Does Not Apply route See Admin Instructions. escitalopram oxalate 10 mg tablet Commonly known as:  Lily Skinner Your next dose is:  Tomorrow Take 1 Tab by mouth daily. 10 mg  
    
  
   
   
   
  
 FIBERZYME CONCENTRATE-HP PO Your next dose is: Today Take  by mouth two (2) times a day. fluticasone 50 mcg/actuation nasal spray Commonly known as:  Theadore Kentrell Your next dose is:  Tomorrow TWO SPRAYS INTO EACH NOSTRIL ONCE DAILY  
     
  
   
   
   
  
 loratadine 10 mg tablet Commonly known as:  Tiajuana Bile Your next dose is:  Tomorrow Take 10 mg by mouth daily. 10 mg  
    
  
   
   
   
  
 losartan-hydroCHLOROthiazide 100-25 mg per tablet Commonly known as:  HYZAAR Your next dose is:  Tomorrow Take 1 Tab by mouth daily. 1 Tab  
    
  
   
   
   
  
 montelukast 10 mg tablet Commonly known as:  SINGULAIR Your next dose is:  Tomorrow Take 1 Tab by mouth daily. 10 mg  
    
  
   
   
   
  
 TYLENOL EXTRA STRENGTH 500 mg tablet Generic drug:  acetaminophen Your next dose is: Today Take  by mouth as needed for Pain. Indications: Pain STOP taking these medications   
 diclofenac EC 75 mg EC tablet Commonly known as:  VOLTAREN Where to Get Your Medications Information on where to get these meds will be given to you by the nurse or doctor. ! Ask your nurse or doctor about these medications  
  aspirin delayed-release 325 mg tablet  
 celecoxib 200 mg capsule HYDROmorphone 2 mg tablet  
 ondansetron 8 mg disintegrating tablet  
 senna-docusate 8.6-50 mg per tablet  
 zolpidem 5 mg tablet Discharge Instructions [employer] Patient Discharge Instructions Charles Martin / [id number] : 1950 Admitted 1/29/2018 Discharged: 1/31/2018 IF YOU HAVE ANY PROBLEMS ONCE YOU ARE AT HOME CALL THE FOLLOWING NUMBERS:  
Main office number: [phone] Take Home Medications · It is important that you take the medication exactly as they are prescribed. · Keep your medication in the bottles provided by the pharmacist and keep a list of the medication names, dosages, and times to be taken in your wallet. · Do not take other medications without consulting your doctor. What to do at [facility] Resume your prehospital diet. If you have excessive nausea or vomitting call your doctor's office Home Physical Therapy is arranged. Use rolling walker when walking. Use Ed Hose stockings until we see you in the office for your follow up appointment with Dr. Lana Ferguson. Patients who have had a joint replacement should not drive until you are seen for your follow up appointment by Dr. Lana Ferguson. When to Call - Call if you have a temperature greater then 101 
- Unable to keep food down - Loose control of your bladder or bowel function - Are unable to bear any weight  
- Need a pain medication refill DISCHARGE SUMMARY from Nurse The following personal items collected during your admission are returned to you:  
Dental Appliance: Dental Appliances: None Vision: Visual Aid: Glasses Hearing Aid:   na 
Jewelry: Jewelry: None Clothing:   self Other Valuables: Other Valuables: Cell Phone (with Lencho Kaur) Valuables sent to safe:   na 
 
PATIENT INSTRUCTIONS: 
 
After general anesthesia or intravenous sedation, for 24 hours or while taking prescription Narcotics: · Limit your activities · Do not drive and operate hazardous machinery · Do not make important personal or business decisions · Do  not drink alcoholic beverages · If you have not urinated within 8 hours after discharge, please contact your surgeon on call. Report the following to your surgeon: · Excessive pain, swelling, redness or odor of or around the surgical area · Temperature over 101 · Nausea and vomiting lasting longer than 4 hours or if unable to take medications · Any signs of decreased circulation or nerve impairment to extremity: change in color, persistent  numbness, tingling, coldness or increase pain · Any questions, call office @ [phone] Keep scheduled follow up appointment. If need to change, call office @ [phone]. *  Please give a list of your current medications to your Primary Care Provider. *  Please update this list whenever your medications are discontinued, doses are 
    changed, or new medications (including over-the-counter products) are added. *  Please carry medication information at all times in case of emergency situations. Total Knee Replacement: What to Expect at Home Your Recovery When you leave the hospital, you should be able to move around with a walker or crutches. But you will need someone to help you at home for the next few weeks or until you have more energy and can move around better. If there is no one to help you at home, you may go to a rehabilitation center. You will go home with a bandage and stitches or staples. Change the bandage as your doctor tells you to. Your doctor will remove your stitches or staples 10 to 21 days after your surgery. You may still have some mild pain, and the area may be swollen for 3 to 6 months after surgery. Your knee will continue to improve for 6 to 12 months. You will probably use a walker for 1 to 3 weeks and then use crutches. When you are ready, you can use a cane. You will probably be able to walk on your own in 4 to 8 weeks. You will need to do months of physical rehabilitation (rehab) after a knee replacement. Rehab will help you strengthen the muscles of the knee and help you regain movement.  After you recover, your artificial knee will allow you to do normal daily activities with less pain or no pain at all. You may be able to hike, dance, ride a bike, and play golf. Talk to your doctor about whether you can do more strenuous activities. Always tell your caregivers that you have an artificial knee. How long it will take to walk on your own, return to normal activities, and go back to work depends on your health and how well your rehabilitation (rehab) program goes. The better you do with your rehab exercises, the quicker you will get your strength and movement back. This care sheet gives you a general idea about how long it will take for you to recover. But each person recovers at a different pace. Follow the steps below to get better as quickly as possible. How can you care for yourself at home? Activity ? · Rest when you feel tired. You may take a nap, but do not stay in bed all day. When you sit, use a chair with arms. You can use the arms to help you stand up. ? · Work with your physical therapist to find the best way to exercise. You may be able to take frequent, short walks using crutches or a walker. What you can do as your knee heals will depend on whether your new knee is cemented or uncemented. You may not be able to do certain things for a while if your new knee is uncemented. ? · After your knee has healed enough, you can do more strenuous activities with caution. ¨ You can golf, but use a golf cart, and do not wear shoes with spikes. ¨ You can bike on a flat road or on a stationary bike. Avoid biking up hills. ¨ Your doctor may suggest that you stay away from activities that put stress on your knee. These include tennis or badminton, squash or racquetball, contact sports like football, jumping (such as in basketball), jogging, or running. ¨ Avoid activities where you might fall. These include horseback riding, skiing, and mountain biking. ? · Do not sit for more than 1 hour at a time.  Get up and walk around for a while before you sit again. If you must sit for a long time, prop up your leg with a chair or footstool. This will help you avoid swelling. ? · Ask your doctor when you can shower. You may need to take sponge baths until your stitches or staples have been removed. ? · Ask your doctor when you can drive again. It may take up to 8 weeks after knee replacement surgery before it is safe for you to drive. ? · When you get into a car, sit on the edge of the seat. Then pull in your legs, and turn to face the front. ? · You should be able to do many everyday activities 3 to 6 weeks after your surgery. You will probably need to take 4 to 16 weeks off from work. When you can go back to work depends on the type of work you do and how you feel. ? · Ask your doctor when it is okay for you to have sex. ? · Do not lift anything heavier than 10 pounds and do not lift weights for 12 weeks. Diet ? · By the time you leave the hospital, you should be eating your normal diet. If your stomach is upset, try bland, low-fat foods like plain rice, broiled chicken, toast, and yogurt. Your doctor may suggest that you take iron and vitamin supplements. ? · Drink plenty of fluids (unless your doctor tells you not to). ? · Eat healthy foods, and watch your portion sizes. Try to stay at your ideal weight. Too much weight puts more stress on your new knee. ? · You may notice that your bowel movements are not regular right after your surgery. This is common. Try to avoid constipation and straining with bowel movements. You may want to take a fiber supplement every day. If you have not had a bowel movement after a couple of days, ask your doctor about taking a mild laxative. Medicines ? · Your doctor will tell you if and when you can restart your medicines. He or she will also give you instructions about taking any new medicines.   
? · If you take blood thinners, such as warfarin (Coumadin), clopidogrel (Plavix), or aspirin, be sure to talk to your doctor. He or she will tell you if and when to start taking those medicines again. Make sure that you understand exactly what your doctor wants you to do.  
? · Your doctor may give you a blood-thinning medicine to prevent blood clots. If you take a blood thinner, be sure you get instructions about how to take your medicine safely. Blood thinners can cause serious bleeding problems. This medicine could be in pill form or as a shot (injection). If a shot is necessary, your doctor will tell you how to do this. ? · Be safe with medicines. Take pain medicines exactly as directed. ¨ If the doctor gave you a prescription medicine for pain, take it as prescribed. ¨ If you are not taking a prescription pain medicine, ask your doctor if you can take an over-the-counter medicine. ¨ Plan to take your pain medicine 30 minutes before exercises. It is easier to prevent pain before it starts than to stop it once it has started. ? · If you think your pain medicine is making you sick to your stomach: 
¨ Take your medicine after meals (unless your doctor has told you not to). ¨ Ask your doctor for a different pain medicine. ? · If your doctor prescribed antibiotics, take them as directed. Do not stop taking them just because you feel better. You need to take the full course of antibiotics. Incision care ? · You will have a bandage over the cut (incision) and staples or stitches. Take the bandage off when your doctor says it is okay. ? · Your doctor will remove the staples or stitches 10 days to 3 weeks after the surgery and replace them with strips of tape. Leave the tape on for a week or until it falls off. Exercise ? · Your rehab program will give you a number of exercises to do to help you get back your knee's range of motion and strength. Always do them as your therapist tells you. Ice and elevation ? · For pain and swelling, put ice or a cold pack on the area for 10 to 20 minutes at a time. Put a thin cloth between the ice and your skin. Other instructions ? · Continue to wear your support stockings as your doctor says. These help to prevent blood clots. The length of time that you will have to wear them depends on your activity level and the amount of swelling. ? · You have metal pieces in your knee. These may set off some airport metal detectors. Carry a medical alert card that says you have an artificial joint, just in case. Follow-up care is a key part of your treatment and safety. Be sure to make and go to all appointments, and call your doctor if you are having problems. It's also a good idea to know your test results and keep a list of the medicines you take. When should you call for help? Call 911 anytime you think you may need emergency care. For example, call if: 
? · You passed out (lost consciousness). ? · You have severe trouble breathing. ? · You have sudden chest pain and shortness of breath, or you cough up blood. ?Call your doctor now or seek immediate medical care if: 
? · You have signs of infection, such as: 
¨ Increased pain, swelling, warmth, or redness. ¨ Red streaks leading from the incision. ¨ Pus draining from the incision. ¨ A fever. ? · You have signs of a blood clot, such as: 
¨ Pain in your calf, back of the knee, thigh, or groin. ¨ Redness and swelling in your leg or groin. ? · Your incision comes open and begins to bleed, or the bleeding increases. ? · You have pain that does not get better after you take pain medicine. ? Watch closely for changes in your health, and be sure to contact your doctor if: 
? · You do not have a bowel movement after taking a laxative. Where can you learn more? Go to http://jerson-rashmi.info/. Enter W538 in the search box to learn more about \"Total Knee Replacement: What to Expect at Home. \" 
 Current as of: March 21, 2017 Content Version: 11.4 © 0088-9394 [employer]. Care instructions adapted under license by [employer] (which disclaims liability or warranty for this information). If you have questions about a medical condition or this instruction, always ask your healthcare professional. [address] any warranty or liability for your use of this information. These are general instructions for a healthy lifestyle: No smoking/ No tobacco products/ Avoid exposure to second hand smoke Surgeon General's Warning:  Quitting smoking now greatly reduces serious risk to your health. Obesity, smoking, and sedentary lifestyle greatly increases your risk for illness A healthy diet, regular physical exercise & weight monitoring are important for maintaining a healthy lifestyle You may be retaining fluid if you have a history of heart failure or if you experience any of the following symptoms:  Weight gain of 3 pounds or more overnight or 5 pounds in a week, increased swelling in our hands or feet or shortness of breath while lying flat in bed. Please call your doctor as soon as you notice any of these symptoms; do not wait until your next office visit. Recognize signs and symptoms of STROKE: 
 
F-face looks uneven A-arms unable to move or move even S-speech slurred or non-existent T-time-call 911 as soon as signs and symptoms begin-DO NOT go Back to bed or wait to see if you get better-TIME IS BRAIN. The discharge information has been reviewed with the patient. The patient verbalized understanding. Information obtained by : 
I understand that if any problems occur once I am at home I am to contact my physician. I understand and acknowledge receipt of the instructions indicated above. Physician's or R.N.'s Signature                                                                  Date/Time Patient or Representative Signature                                                          Date/Time Introducing [facility]! Dear Uday Cunningham: 
Thank you for requesting a WatchParty account. Our records indicate that you already have an active WatchParty account. You can access your account anytime at [email or web address] Did you know that you can access your hospital and ER discharge instructions at any time in WatchParty? You can also review all of your test results from your hospital stay or ER visit. Additional Information If you have questions, please visit the Frequently Asked Questions section of the WatchParty website at [email or web address] Remember, WatchParty is NOT to be used for urgent needs. For medical emergencies, dial 911. Now available from your iPhone and Android! Providers Seen During Your Hospitalization Provider Specialty Primary office phone Rissa Ghotra MD Orthopedic Surgery [phone] Immunizations Administered for This Admission Name Date  
 TB Skin Test (PPD) Intradermal  Deferred (),  Deferred (), 1/29/2018 Your Primary Care Physician (PCP) Primary Care Physician Office Phone Office Fax Elyse Apley [phone] You are allergic to the following Allergen Reactions Ceclor (Cefaclor) Rash Symbyax (Olanzapine-Fluoxetine) Other (comments) Causes Severe pain Recent Documentation Height Weight Breastfeeding? BMI OB Status Smoking Status 1.6 m 119.2 kg No 46.55 kg/m2 Postmenopausal Never Smoker Emergency Contacts Name Discharge Info Relation Home Work Mobile Abel Jay DISCHARGE CAREGIVER [3] Spouse [3] [phone] Patient Belongings The following personal items are in your possession at time of discharge: 
  Dental Appliances: None  Visual Aid: Glasses      Home Medications: None   Jewelry: None       Other Valuables: Cell Phone (with Basilio Owusu) Please provide this summary of care documentation to your next provider. Signatures-by signing, you are acknowledging that this After Visit Summary has been reviewed with you and you have received a copy. Patient Signature:  ____________________________________________________________ Date:  ____________________________________________________________  
  
Nicole Blackey Provider Signature:  ____________________________________________________________ Date:  ____________________________________________________________

## 2018-01-29 NOTE — PROGRESS NOTES
C/o pain, in L knee also, assisted to sit on side of bed. Medicated with dilaudid po and ofirmev. Good movement and sensation to feet.

## 2018-01-29 NOTE — H&P
[id number] [facility]  Pre Operative History and Physical Exam    Patient ID:  Nakul Ball  [id number]  89 y.o.  1950    Today: January 29, 2018          CC:  Right  Knee pain    HPI:   The patient has end stage arthritis of the right knee. The patient was evaluated and examined during a consultation prior to today. The patient complains of knee pain with activities, reports pain as mostly occurring along the joint lines, reports stiffness of the knee with prolonged inactivity, and swelling/pain at the end of the day and after increased physical activity. The pain affects the patients activities of daily living and quality of life. The patient has attempted and exhausted conservative treatment. There have been no changes to the patient's orthopedic condition since the last office visit. Past Medical History:  Past Medical History:   Diagnosis Date    Allergic rhinitis     Anxiety     Arrhythmia     hx: [facility] May 28, 2015: sinus rhythm noted on ekg 12-5-17    Arthritis     Depression     under control with med    Endocarditis 1992    hx 1992    Heart murmur     congenital, asymptomatic per cardiologist note ; echo 7-25-16    Hematuria     History of MRSA infection on left breast    5/2016 : treated/ resolved    HLD (hyperlipidemia)      Hypertension     Hypothyroid     medication    Hypothyroidism due to acquired atrophy of thyroid 4/28/2015    Intertrigo     mytrex cream    Irregular heart beat     hx only; current - regular rate/ rhythm    Mass of colon     Morbid obesity ([address]     48.7 bmi    Reactive airway disease with status asthmaticus     hx only    Scoliosis 8/4/2016    Sleep apnea     cpap complaint.     Unspecified adverse effect of anesthesia     slow to awaken in pacu: pt reports prior to using c-pap    Varicose veins     VSD (ventricular septal defect) 07/22/2016    per cardiologist note 8-1-17: small, restictive, no change       Past Surgical History:  Past Surgical History:   Procedure Laterality Date    HX APPENDECTOMY      HX BREAST REDUCTION Bilateral 8/2/2016    BREAST REDUCTION/ bilateral performed by Ceci Chinchilla MD at [address]      x2    HX COLECTOMY Right 2010 8 in colon removed    HX COLONOSCOPY  2010, 2015    HX DILATION AND CURETTAGE      multiple    HX HEART CATHETERIZATION      heart cath age 5   [de-identified] HEENT      jaw    HX HERNIA REPAIR  incisional [id number]    with mesh    HX LIPOMA RESECTION Right     right foot between toes    HX ORTHOPAEDIC  due to underbite    jaw surgery        Medications:     Prior to Admission medications    Medication Sig Start Date End Date Taking? Authorizing Provider   DIGESTIVE ENZYMES, PLANT, (FIBERZYME CONCENTRATE-HP PO) Take  by mouth two (2) times a day. Yes Historical Provider   acetaminophen (TYLENOL EXTRA STRENGTH) 500 mg tablet Take  by mouth as needed for Pain. Indications: Pain   Yes Historical Provider   escitalopram oxalate (LEXAPRO) 10 mg tablet Take 1 Tab by mouth daily. 12/5/17  Yes Dara Mcdonald MD   levothyroxine (SYNTHROID) 150 mcg tablet Take 1 Tab by mouth every Monday. 1 day a week 12/11/17  Yes Dara Mcdonald MD   levothyroxine (SYNTHROID) 175 mcg tablet 1 tab po 6 days a week  Patient taking differently: 1 tab po 6 days a week: Tues - Sun 12/5/17  Yes Dara Mcdonald MD   montelukast (SINGULAIR) 10 mg tablet Take 1 Tab by mouth daily. 12/5/17  Yes Dara Mcdonald MD   diclofenac EC (VOLTAREN) 75 mg EC tablet TAKE 1 TABLET BY MOUTH 2 TIMES A DAY  Patient taking differently: TAKE 1 TABLET BY MOUTH 2 TIMES A DAY: hold 5 days prior to DOS 11/30/17  Yes Dara Mcdonald MD   ADVAIR DISKUS 100-50 mcg/dose diskus inhaler Take 1 Puff by inhalation daily. 11/30/17  Yes Dara Mcdonald MD   losartan-hydroCHLOROthiazide [facility]) 100-25 mg per tablet Take 1 Tab by mouth daily.  9/5/17  Yes Dara Mcdonald MD   fluticasone (FLONASE) 50 mcg/actuation nasal spray TWO SPRAYS INTO EACH NOSTRIL ONCE DAILY 8/28/17  Yes Gerard Ellis MD   loratadine (CLARITIN) 10 mg tablet Take 10 mg by mouth daily. Yes Historical Provider   cpap machine kit by Does Not Apply route. Yes Historical Provider   DISABLED PLACARD (DISABLED PLACARD) DMV by Does Not Apply route See Admin Instructions. 5/25/17   Gerard Ellis MD   albuterol (PROAIR HFA) 90 mcg/actuation inhaler Take 2 Puffs by inhalation every six (6) hours as needed. Patient not taking: Reported on 1/8/2018 4/21/16   Gerard Ellis MD       Family History:     Family History   Problem Relation Age of Onset    Cancer Mother      LYMPHOMA    Hypertension Mother     Breast Cancer Mother     Heart Disease Father      heart attacks---bypass surg    Hypertension Father     Heart Disease Brother     Heart Disease Brother     Colon Cancer Maternal Uncle        Social History:      Social History   Substance Use Topics    Smoking status: Never Smoker    Smokeless tobacco: Never Used    Alcohol use No      Comment:           Allergies: Allergies   Allergen Reactions    Ceclor [Cefaclor] Rash    Symbyax [Olanzapine-Fluoxetine] Other (comments)     Causes Severe pain         Vitals:     Visit Vitals    /67 (BP 1 Location: Right arm, BP Patient Position: At rest)    Pulse 81    Temp 99.4 °F (37.4 °C)    Resp 18    Ht 5' 3\" (1.6 m)    Wt 119.2 kg (262 lb 12.6 oz)    SpO2 94%    BMI 46.55 kg/m2         Objective:         General: No Acute distress                   HEENT: Normocephalic/atramatic                   Lungs:  Breathing non-labored                   Heart:   RRR                    Abdomen: soft       Extremities:  Pain with ROM of the right knee. Trace effusion. Crepitus present. Distally the patient shows no neurologic deficit. Antalgic gait appreciated.      Meds:   Current Facility-Administered Medications   Medication Dose Route Frequency    tuberculin injection 5 Units  5 Units IntraDERMal ONCE    gentamicin (GARAMYCIN) 400 mg in 0.9% sodium chloride 100 mL ivpb  400 mg IntraVENous ONCE    lactated Ringers infusion  999 mL/hr IntraVENous ONCE    vancomycin (VANCOCIN) 1750 mg in  ml infusion  1,750 mg IntraVENous ON CALL       Patient Active Problem List   Diagnosis Code    Essential hypertension I10    Mixed hyperlipidemia E78.2    Hypothyroidism due to acquired atrophy of thyroid E03.4    Allergic rhinitis due to allergen J30.9    Primary osteoarthritis involving multiple joints M15.0    Depression F32.9    Obesity E66.9    PRAVEEN (obstructive sleep apnea) G47.33    Edema R60.9    Morbid obesity (HCC) E66.01    Anxiety F41.9    Osteoarthritis of right knee M17.11    Allergic rhinitis J30.9    Hypothyroid E03.9    Arthritis M19.90    Arrhythmia I49.9    Hypertension I10    VSD (ventricular septal defect) Q21.0    Preoperative clearance Z01.818    Dyspnea on exertion R06.09    Scoliosis M41.9    Endocarditis I38    OA (osteoarthritis) of knee M17.10       Assessment:   1. Arthritis of the Right knee    Plan:   The patient has failed previous conservative treatment for this condition including anti-inflammatories, injections and lifestyle modifications. The necessity for joint replacement is present. Risks, benefits, alternatives and possible complications of right knee arthroplasty have been discussed with the patient including but not limited to potential for infection, bleeding, damage to nerves and/or blood vessels, stiffness of the knee after surgery, knee instability after surgery, patellar maltracking, potential for fracture both intra-op and post-op, polyethylene wear, implant loosening, and risk for revision surgery secondary to but not limited to these discussed risks. Further, we have discussed potential for venous thrombo-embolism including deep vein thrombosis and pulmonary embolism despite being on prophylaxis.  Additionally, we have discussed potential for continued pain after surgery and patient has voiced understanding that I can make no guarantees regarding the pain relief of outcomes after surgery. We have also previously discussed the potential of morbidity and mortality associated with, but not limited to, the actual surgical procedure, anesthesia, prior medical conditions, and/or the administration of medications perioperatively. The patient has been given the opportunity to ask questions all of which have been answered and the patient wishes to proceed. The patient will be admitted the day of surgery for right total knee replacement.         Signed By: Frances Hernandez MD  January 29, 2018

## 2018-01-29 NOTE — PHYSICIAN ADVISORY
Letter of Determination: Inpatient Status Appropriate    This patient was originally hospitalized as  Inpatient Status on 1/29/2018 for scheduled right total knee arthroplasty. This patient is appropriate for Inpatient Admission in accordance with CMS regulation Section 43 .3. Specifically, patient's stay is expected to be more than Two Midnights and was medically necessary. The patient's stay was medically necessary based on age of 74years of age, morbid obesity with Body Mass Index of  46.4, history of MRSA infection in 2016, obstructive sleep apnea, hypertension, reactive airway disease with history of status asthmaticus. Consistent with CMS guidelines, patient meets for inpatient status. It is our recommendation that this patient's hospitalization status should be INPATIENT status.      The final decision regarding the patient's hospitalization status depends on the attending physician's judgement.     Perri Reyes MD, SANDEEP,   Physician [facility] Valdez.

## 2018-01-29 NOTE — PERIOP NOTES
TRANSFER - OUT REPORT:    Verbal report given to Sandy Gonzalez RN on Calvin Lies  being transferred to preop block for routine progression of care       Report consisted of patients Situation, Background, Assessment and   Recommendations(SBAR). Information from the following report(s) SBAR, MAR and Recent Results was reviewed with the receiving nurse. Lines:   Peripheral IV 11/86/91 Left Cephalic (Active)   Site Assessment Clean, dry, & intact 1/29/2018  6:03 AM   Phlebitis Assessment 0 1/29/2018  6:03 AM   Dressing Status Clean, dry, & intact 1/29/2018  6:03 AM   Dressing Type Transparent;Tape 1/29/2018  6:03 AM   Hub Color/Line Status Green; Infusing 1/29/2018  6:03 AM   Action Taken Blood drawn 1/29/2018  6:03 AM        Opportunity for questions and clarification was provided.       Patient transported with:   [employer]

## 2018-01-29 NOTE — ANESTHESIA PROCEDURE NOTES
Peripheral Block    Start time: 1/29/2018 7:00 AM  End time: 1/29/2018 7:07 AM  Performed by: Maude Dobbins  Authorized by: Maude Dobbins       Pre-procedure: Indications: at surgeon's request and post-op pain management    Preanesthetic Checklist: patient identified, risks and benefits discussed, site marked, timeout performed, anesthesia consent given and patient being monitored    Timeout Time: 07:07          Block Type:   Block Type:   Adductor canal  Laterality:  Right  Monitoring:  Continuous pulse ox, frequent vital sign checks, heart rate, oxygen and responsive to questions  Injection Technique:  Single shot  Procedures: ultrasound guided    Patient Position: supine  Prep: chlorhexidine    Location:  Mid thigh  Needle Gauge:  20 G  Needle Localization:  Ultrasound guidance  Medication Injected:  0.2%  ropivacaine  Volume (mL):  20    Assessment:  Number of attempts:  1  Injection Assessment:  Incremental injection every 5 mL, local visualized surrounding nerve on ultrasound, negative aspiration for blood, no intravascular symptoms, no paresthesia and ultrasound image on chart  Patient tolerance:  Patient tolerated the procedure well with no immediate complications

## 2018-01-29 NOTE — PROGRESS NOTES
01/29/18 1401   Oxygen Therapy   O2 Sat (%) 99 %   Pulse via Oximetry 58 beats per minute   O2 Device Nasal cannula   O2 Flow Rate (L/min) 2 l/min  (placed on RA )   Incentive Spirometry Treatment   Actual Volume (ml) 1500 ml   Number of Attempts 3   Joint Camp Notes Reviewed. Pt working on IS. Pt encouraged to do 10 breaths per hour while awake on IS. Good NPC. No respiratory distress noted at this time. No complications noted at this time.  Pt has home cpap

## 2018-01-29 NOTE — PERIOP NOTES
TRANSFER - OUT REPORT:    Verbal report given to LINDA Boyle on Natalie Gudino  being transferred to [facility] for routine progression of care       Report consisted of patients Situation, Background, Assessment and   Recommendations(SBAR). Information from the following report(s) SBAR was reviewed with the receiving nurse. Opportunity for questions and clarification was provided.       Patient transported with:   O2 @ 3 liters

## 2018-01-29 NOTE — PERIOP NOTES
LE-Teach back method used in review of Incentive Spirometer, Hibiclens usage preop, TB screening and pain management goals.

## 2018-01-29 NOTE — ANESTHESIA POSTPROCEDURE EVALUATION
Post-Anesthesia Evaluation and Assessment    Patient: Henry Rudolph MRN: [id number]  SSN: [id number]    YOB: 1950  Age: 74 y.o. Sex: female       Cardiovascular Function/Vital Signs  Visit Vitals    /80 (BP 1 Location: Right arm, BP Patient Position: At rest)    Pulse 72    Temp 37.4 °C (99.4 °F)    Resp 16    Ht 5' 3\" (1.6 m)    Wt 119.2 kg (262 lb 12.6 oz)    SpO2 97%    BMI 46.55 kg/m2       Patient is status post spinal anesthesia for Procedure(s):  RIGHT KNEE ARTHROPLASTY TOTAL. Nausea/Vomiting: None    Postoperative hydration reviewed and adequate. Pain:  Pain Scale 1: Visual (01/29/18 0707)  Pain Intensity 1: 0 (01/29/18 0707)   Managed    Neurological Status:   Neuro (WDL): Exceptions to WDL (rt hand occas numnbess) (01/29/18 0559)   At baseline    Mental Status and Level of Consciousness: Arousable    Pulmonary Status:   O2 Device: Nasal cannula (01/29/18 0707)   Adequate oxygenation and airway patent    Complications related to anesthesia: None    Post-anesthesia assessment completed.  No concerns    Signed By: Loren Ervin MD     January 29, 2018

## 2018-01-29 NOTE — PROGRESS NOTES
TRANSFER - IN REPORT:    Verbal report received from Sharyle Merle, RN on Jose Alfredo Juárez  being received from PACU for routine post - op      Report consisted of patients Situation, Background, Assessment and   Recommendations(SBAR). Information from the following report(s) SBAR, Intake/Output and MAR was reviewed with the receiving nurse. Opportunity for questions and clarification was provided. Assessment completed upon patients arrival to unit and care assumed. Oriented to room, bed controls, and how to order meals. R knee with wound vac and iceman. SCDs to LEs. Moving feet. Instructed to use IS. Yellow gripper socks to feet and instructed not to get up without staff to assist. To call for pain medication when needed.  in room.

## 2018-01-29 NOTE — PROGRESS NOTES
Problem: Self Care Deficits Care Plan (Adult)  Goal: *Acute Goals and Plan of Care (Insert Text)  GOALS:   DISCHARGE GOALS (in preparation for going home/rehab):  3 days  1. Ms. Ashlie Myers will perform one lower body dressing activity with moderate assistance required to demonstrate improved functional mobility and safety. 2.  Ms. Ashlie Myers will perform one lower body bathing activity with moderate assistance required to demonstrate improved functional mobility and safety. 3.  Ms. Ashlie Myers will perform toileting/toilet transfer with minimal assistance to demonstrate improved functional mobility and safety. 4.  Ms. Ashlie Myers will perform shower transfer with minimal assistance to demonstrate improved functional mobility and safety. JOINT CAMP OCCUPATIONAL THERAPY TKA: Initial Assessment and PM 1/29/2018  INPATIENT: Hospital Day: 1  [place] / [place] / Product Type: Medicare /      NAME/AGE/GENDER: Kartik Mandujano is a 74 y.o. female   PRIMARY DIAGNOSIS:  Unilateral primary osteoarthritis, right knee [M17.11]   Procedure(s) and Anesthesia Type:     * RIGHT KNEE ARTHROPLASTY TOTAL - General (Right)  ICD-10: Treatment Diagnosis:    · Pain in Right Knee (M25.561)  · Stiffness of Right Knee, Not elsewhere classified (M25.661)  · Other lack of cordination (R27.8)      ASSESSMENT:     Ms. Ashlie Myers is s/p right TKA with small wound vac and presents with decreased weight bearing on right LE and decreased independence with functional mobility and activities of daily living. Patient would benefit from skilled Occupational Therapy to maximize independence and safety with self-care task and functional mobility. Pt would also benefit from education on adaptive equipment and safety precautions in preparation for going home or for recommendations for post-hospital rehab program.  Patient plans for further rehab at home with home health services and good family support.   OT reviewed therapy schedule and plan of care with patient. Patient was able to transfer and preform self care skills as charted below. Patient instructed to call for assistance when needing to get up from the bed and all needs in reach. Patient verbalized understanding of call light. This section established at most recent assessment   PROBLEM LIST (Impairments causing functional limitations):  1. Decreased Strength  2. Decreased ADL/Functional Activities  3. Decreased Transfer Abilities  4. Increased Pain  5. Increased Fatigue  6. Decreased Flexibility/Joint Mobility  7. Decreased Knowledge of Precautions   INTERVENTIONS PLANNED: (Benefits and precautions of occupational therapy have been discussed with the patient.)  1. Activities of daily living training  2. Adaptive equipment training  3. Balance training  4. Clothing management  5. Donning&doffing training  6. Theraputic activity     TREATMENT PLAN: Frequency/Duration: Follow patient 1 time to address above goals. Rehabilitation Potential For Stated Goals: Good     RECOMMENDED REHABILITATION/EQUIPMENT: (at time of discharge pending progress): Continue Skilled Therapy and Home Health: Physical Therapy. OCCUPATIONAL PROFILE AND HISTORY:   History of Present Injury/Illness (Reason for Referral): Pt presents this date s/p (right) TKA. Past Medical History/Comorbidities:   Ms. Leopoldo Al  has a past medical history of Allergic rhinitis; Anxiety; Arrhythmia; Arthritis; Depression; Endocarditis (1992); Heart murmur; Hematuria; History of MRSA infection (on left breast); HLD (hyperlipidemia) ( ); Hypertension; Hypothyroid; Hypothyroidism due to acquired atrophy of thyroid (4/28/2015); Intertrigo; Irregular heart beat; Mass of colon; Morbid obesity ([facility] [address] Reactive airway disease with status asthmaticus; Scoliosis (8/4/2016); Sleep apnea; Unspecified adverse effect of anesthesia; Varicose veins; and VSD (ventricular septal defect) (07/22/2016).  She also has no past medical history of Adverse effect of anesthesia; Difficult intubation; Malignant hyperthermia due to anesthesia; Nausea & vomiting; or Pseudocholinesterase deficiency. Ms. Raya Arizmendi  has a past surgical history that includes hx lipoma resection (Right); hx appendectomy; [facility] hx colonoscopy (, ); hx heent; hx orthopaedic (due to underbite); hx heart catheterization; hx breast reduction (Bilateral, 2016); hx  section; hx dilation and curettage; and hx colectomy (Right, ). Social History/Living Environment:   Home Environment: Private residence  # Steps to Enter: 3  One/Two Story Residence: One story  Living Alone: No  Support Systems: Spouse/Significant Other/Partner  Patient Expects to be Discharged to[de-identified] Private residence  Current DME Used/Available at Home: None  Tub or Shower Type: Shower  Prior Level of Function/Work/Activity:  Mod I with ADLS used a cane,  assists     Number of Personal Factors/Comorbidities that affect the Plan of Care: Brief history (0):  LOW COMPLEXITY   ASSESSMENT OF OCCUPATIONAL PERFORMANCE[de-identified]   Most Recent Physical Functioning:   Balance  Sitting: Intact; High guard  Standing: Pull to stand; With support       Patient Vitals for the past 6 hrs:   BP SpO2 O2 Flow Rate (L/min) Pulse   18 0921 110/56 94 % 5 l/min 65   18 0926 117/57 94 % 5 l/min 60   18 0931 123/59 94 % 3 l/min 62   18 0936 131/55 93 % 3 l/min 60   18 0941 126/58 93 % 2 l/min (!) 58   18 1003 123/59 93 % 2 l/min (!) 59   18 1011 124/58 93 % 2 l/min 60   18 1012 124/58 93 % 2 l/min 60   18 1038 130/74 92 % - (!) 57                    Coordination  Fine Motor Skills-Upper: Left Intact; Right Intact  Gross Motor Skills-Upper: Left Intact; Right Intact         Mental Status  Neurologic State: Alert; Appropriate for age  Orientation Level: Appropriate for age  Cognition: Appropriate decision making; Appropriate for age attention/concentration; Appropriate safety awareness; Follows commands  Perception: Appears intact  Perseveration: No perseveration noted  Safety/Judgement: Awareness of environment; Fall prevention                Basic ADLs (From Assessment) Complex ADLs (From Assessment)   Basic ADL  Feeding: Setup  Oral Facial Hygiene/Grooming: Supervision  Bathing: Moderate assistance  Upper Body Dressing: Supervision  Lower Body Dressing: Maximum assistance  Toileting: Total assistance (catheter)     Grooming/Bathing/Dressing Activities of Daily Living     Cognitive Retraining  Safety/Judgement: Awareness of environment; Fall prevention                 Functional Transfers  Toilet Transfer : Minimum assistance;Assist x2  Shower Transfer: Minimum assistance;Assist x2     Bed/Mat Mobility  Supine to Sit: Contact guard assistance  Sit to Supine: Contact guard assistance  Sit to Stand: Minimum assistance;Assist x2  Scooting: Contact guard assistance         Physical Skills Involved:  1. Range of Motion  2. Balance  3. Strength  4. Activity Tolerance Cognitive Skills Affected (resulting in the inability to perform in a timely and safe manner): 1. none Psychosocial Skills Affected:  1. none   Number of elements that affect the Plan of Care: 3-5:  MODERATE COMPLEXITY   CLINICAL DECISION MAKIN [facility] [address] [id number] AM-PAC 6 Clicks   Daily Activity Inpatient Short Form  How much help from another person does the patient currently need. .. Total A Lot A Little None   1. Putting on and taking off regular lower body clothing? [] 1   [x] 2   [] 3   [] 4   2. Bathing (including washing, rinsing, drying)? [] 1   [x] 2   [] 3   [] 4   3. Toileting, which includes using toilet, bedpan or urinal?   [x] 1   [] 2   [] 3   [] 4   4. Putting on and taking off regular upper body clothing? [] 1   [] 2   [x] 3   [] 4   5. Taking care of personal grooming such as brushing teeth? [] 1   [] 2   [x] 3   [] 4   6. Eating meals? [] 1   [] 2   [] 3   [x] 4   © 2007, Trustees of [address] [place] [address] [address] [address], under license to [employer]. All rights reserved     Score:  Initial: 15 Most Recent: X (Date: -- )    Interpretation of Tool:  Represents activities that are increasingly more difficult (i.e. Bed mobility, Transfers, Gait). Score 24 23 22-20 19-15 14-10 9-7 6     Modifier CH CI CJ CK CL CM CN      ? Self Care:     - CURRENT STATUS: CK - 40%-59% impaired, limited or restricted    - GOAL STATUS: CJ - 20%-39% impaired, limited or restricted    - D/C STATUS:  ---------------To be determined---------------  [place] / [place] / Product Type: Medicare /      Medical Necessity:     · Patient is expected to demonstrate progress in balance, coordination and functional technique to decrease assistance required with self care and functional mobility and improve safety during self care and functional mobility. Reason for Services/Other Comments:  · Patient continues to require skilled intervention due to decreased self care and functional mobility.    Use of outcome tool(s) and clinical judgement create a POC that gives a: LOW COMPLEXITY            TREATMENT:   (In addition to Assessment/Re-Assessment sessions the following treatments were rendered)     Pre-treatment Symptoms/Complaints:    Pain: Initial:   Pain Intensity 1: 0  Post Session:  0/10 iceman in place     Assessment/Reassessment only, no treatment provided today    Treatment/Session Assessment:     Response to Treatment:  Tolerated well, plans to go home with  to assist..    Education:  [] Home Exercises  [x] Fall Precautions  [] Hip Precautions [] Going Home Video  [x] Knee/Hip Prosthesis Review  [x] Walker Management/Safety [x] Adaptive Equipment as Needed       Interdisciplinary Collaboration:   o Physical Therapist  o Occupational Therapist  o Registered Nurse    After treatment position/precautions:   o Supine in bed  o Bed alarm/tab alert on  o Bed/Chair-wheels locked  o Bed in low position  o Caregiver at bedside  o Call light within reach  o RN notified  o Family at bedside  o Side rails x 3     Compliance with Program/Exercises: compliant all of the time. Recommendations/Intent for next treatment session:  Treatment next visit will focus on increasing Ms. Jay's independence with bed mobility, transfers, self care, functional mobility, modalities for pain, and patient education.       Total Treatment Duration:  OT Patient Time In/Time Out  Time In: 1250  Time Out: Khloe 45, OT

## 2018-01-29 NOTE — OP NOTES
[address] [place] [address]  Total Knee Arthroplasty  Karen Torres   : 1950  Medical Record [id number]    Pre-operative Diagnosis:  Unilateral primary osteoarthritis, right knee [M17.11]    Post-operative Diagnosis: Unilateral primary osteoarthritis, right knee     [facility]    Date of Procedure: 2018    Surgeon: Nelly Cunha MD    Assistant: NONE    Anesthesia: Spinal and adductor nerve block    Procedure:  Right Total Knee Arthroplasty    Tourniquet Time: 0 minutes    EBL: [id number]    Complications: None    Patient Condition upon Completion of Procedure: Stable    Implants:   Implant Name Type Inv. Item Serial No.  Lot No. LRB No. Used   BASEPLT TIB PC TRITNM SZ 3 -- TRIATHLON - [id number]  BASEPLT TIB PC TRITNM SZ 3 -- TRIATHLON [id number] [employer] [facility] [id number] Right 1   COMPNT FEM CR TRIATHLN 4 R PA --  - [id number]  COMPNT FEM CR TRIATHLN 4 R PA --  [id number] [employer] [facility] [id number] Right 1   PAT ASYM MTL-BK 10MM SZ A32 -- TRIATHLON - [id number]  PAT ASYM MTL-BK 10MM SZ A32 -- TRIATHLON [id number] [employer] [facility] [id number] Right 1   INSERT TIB ARTC BRNG SZ3 11MM -- TRIATHLON - [id number]   INSERT TIB ARTC BRNG SZ3 11MM -- TRIATHLON [id number] [employer] [facility] [id number] Right 1       Intra-Operative Findings: Pre-operatively we assess the motion of the patients knee and noted that the knee lacked approximately 0 degrees of extension. Intra-operatively we noted that the articular surfaces were arthritic with cartilage loss of both the medial and lateral compartments. The patella was examined and found to be in poor condition. The patella was  resurfaced. With trial components in place we assessed knee motion and found that the knee was able to fully extend. Flexion was felt to be 130 degrees. Description of Procedure:    Fazal Ley had undergone adductor canal block in the preoperative holding area.  Fazal Ley was brought to the operating room, positioned on the operating room table, and after appropriate identification underwent spinal anesthesia. A colmenares catheter was then placed. IV antibiotics were administered per proticol. The right leg and was then prepped and draped in the usual sterile manner. Timeout was taken identifying the patient, procedure ,operative side and surgeon. Prior to incision one gram of IV transexamic acid was administered intravenously. An anterior longitudinal incision was made just medial to the tibial tubercle and extending proximal.  A subvastas capsular incision was performed. The medial capsular flap was elevated around to the midcoronal plane. The patella was subluxed laterally and patellar fat pat partially excised. The knee was flexed and externally rotated. The articular surface revealed cartilage loss with exposed bone and bone spurs throughout all three compartments. The anterior cruciate ligament was resected. We first addressed our distal femoral resection. Using intramedullary instrumentation we resected 8mm of distal femur using a 5 degree valgus cut angle. Medial and lateral condylar cuts were verified to be level using the capture of the distal femoral cutting jig. We next addressed our proximal tibia. Using extramedullary intrstrumentation we resected 2mm of bone as measured from the more involved compartment. Our cut was verified to be perpendicular to the meachanical axis of the tibia as referenced from the tibial tubercle, the long axis of the tibia, the center of the ankle, and the patients 2nd toe. Our slope was cut with the goal of 0-3 degrees posterior slope. At this point a spacer block was used to verify that the knee was able to obtain full extension and was balanced in extension.  We did  not have to resect additional bone to achieve this goal. Once the knee was felt to fully extend and showed good balance in extension the distal femoral pins were removed and we moved on to finishing our distal femoral preparation. Prior to sizing our distal femur we marked [place]'s Line and our transepicondylar axis. Using sizing instrumentation the femur was sized to a #4 component. The anterior and posterior cuts along with the anterior and posterior chamfer cuts were then made using the 4-in-1 cutting block. Osteophytes were removed from the tibial and femoral surfaces. The PCL was assessed and felt to be in good condition and be very stable. Medial and lateral meniscus' were removed along with any redundant tissue. Any posterior osteophytes were removed. We then returned our attention to the tibial side. The tibia was then sized to a #3 component. The tibial component was assessed in terms of position and rotation. Once we felt that we had obtained symmetric coverage of the proximal tibia and had rotated the tibial tray to a point where the midline of the component was bisecting the middle and medial 3rd of the tibial tubercle we marked the proximal tibia with bovie cautery to wayne rotation and also pinned the tibial tray into place. We then performed a trial of the knee with trial components in place. We felt that with a 11 mm polyethylene trial in place the knee had acceptable varus and valgus stability throughout arc of motion, was able to fully extend, was not too tight in flexion, and had acceptable stability with anterior  Drawer testing. No additional surgical releases were required. Again we assessed our PCL and felt it was stable and in good condition. The patella was then everted and examined. The patella was felt to be in poor condition and the decision was made to  resurface the patella. Bone was resected to accomodate a size 32mm patella button. A trial reduction revealed appropriate tracking through the patellofemoral groove with no lateral retinacular release required.  Again patellar tracking was assessed and it was felt that the patella was tracking appropriately within the femoral trochlear groove. At this point the patella was irrigated of any debride and the final patellar component was inserted which was a tritanium-backed cementless component. At this point the trial polyethylene component and trial femoral component were removed. We again assessed our tibial tray and verified that we were satisfied with its position. We did not have to adjust its position. The proximal tibia was punched and drilled per protocol for the system. We irrigated and debrided all bony surfaces of residual tissue and bone. We then inserted the tibial and femoral components and noted them to be well seated. We then inserted our final polyethylene component which was a 11mm thick. Tasia Jay's knee was placed through a range of motion and noted to be stable as mentioned above with the trial components. In additional we checked patellar tracking one last time which was felt to be appropriate. A lavage of diluted betadine solution of  17.5 ml Betadine in 500 of 0.9% normal saline was allowed to soak in the wound for 3 minutes after implanting of the prosthesis. The wound was irrigated with normal saline again before closure. We then carefully injected periarticular cocktail about and capsule and subcutaneous tissue. In addition, one additional gram of transexemic acid was given at the end of the case for a total dose of 2 grams. No drain was placed. The capsular layer was closed using a combination of 0-Stratafix bidirectional barbed suture and #2 Fiberwire. The subcutaneous layer was closed using a 2-0 Monocril interrupted suture. The skin was closed using staples. A sterile dressing was applied. A cryo pad was applied on the operative leg. The sponge count and needle counts were correct. Patient was transferred to the hospital stretcher and transported to recovery in stable condition.     Signed By: Kris Gaming MD

## 2018-01-30 LAB
ANION GAP SERPL CALC-SCNC: 9 MMOL/L (ref 7–16)
BUN SERPL-MCNC: 15 MG/DL (ref 8–23)
CALCIUM SERPL-MCNC: 8 MG/DL (ref 8.3–10.4)
CHLORIDE SERPL-SCNC: 101 MMOL/L (ref 98–107)
CO2 SERPL-SCNC: 27 MMOL/L (ref 21–32)
CREAT SERPL-MCNC: 0.77 MG/DL (ref 0.6–1)
GLUCOSE SERPL-MCNC: 114 MG/DL (ref 65–100)
HGB BLD-MCNC: 10.1 G/DL (ref 11.7–15.4)
POTASSIUM SERPL-SCNC: 4 MMOL/L (ref 3.5–5.1)
SODIUM SERPL-SCNC: 137 MMOL/L (ref 136–145)

## 2018-01-30 PROCEDURE — 94760 N-INVAS EAR/PLS OXIMETRY 1: CPT

## 2018-01-30 PROCEDURE — 97535 SELF CARE MNGMENT TRAINING: CPT

## 2018-01-30 PROCEDURE — 80048 BASIC METABOLIC PNL TOTAL CA: CPT | Performed by: ORTHOPAEDIC SURGERY

## 2018-01-30 PROCEDURE — 97150 GROUP THERAPEUTIC PROCEDURES: CPT

## 2018-01-30 PROCEDURE — 65270000029 HC RM PRIVATE

## 2018-01-30 PROCEDURE — 97116 GAIT TRAINING THERAPY: CPT

## 2018-01-30 PROCEDURE — 85018 HEMOGLOBIN: CPT | Performed by: ORTHOPAEDIC SURGERY

## 2018-01-30 PROCEDURE — 36415 COLL VENOUS BLD VENIPUNCTURE: CPT | Performed by: ORTHOPAEDIC SURGERY

## 2018-01-30 PROCEDURE — 74011250636 HC RX REV CODE- 250/636: Performed by: ORTHOPAEDIC SURGERY

## 2018-01-30 PROCEDURE — 97110 THERAPEUTIC EXERCISES: CPT

## 2018-01-30 PROCEDURE — 74011250637 HC RX REV CODE- 250/637: Performed by: ORTHOPAEDIC SURGERY

## 2018-01-30 RX ADMIN — OXYCODONE HYDROCHLORIDE 10 MG: 10 TABLET, FILM COATED, EXTENDED RELEASE ORAL at 08:29

## 2018-01-30 RX ADMIN — ACETAMINOPHEN 1000 MG: 500 TABLET, FILM COATED ORAL at 23:09

## 2018-01-30 RX ADMIN — FLUTICASONE PROPIONATE AND SALMETEROL 1 PUFF: 100; 50 POWDER RESPIRATORY (INHALATION) at 09:17

## 2018-01-30 RX ADMIN — SENNOSIDES AND DOCUSATE SODIUM 2 TABLET: 8.6; 5 TABLET ORAL at 08:29

## 2018-01-30 RX ADMIN — ASPIRIN 325 MG: 325 TABLET, DELAYED RELEASE ORAL at 20:57

## 2018-01-30 RX ADMIN — ACETAMINOPHEN 1000 MG: 500 TABLET, FILM COATED ORAL at 11:21

## 2018-01-30 RX ADMIN — ESCITALOPRAM OXALATE 10 MG: 10 TABLET ORAL at 08:29

## 2018-01-30 RX ADMIN — ASPIRIN 325 MG: 325 TABLET, DELAYED RELEASE ORAL at 08:29

## 2018-01-30 RX ADMIN — DIPHENHYDRAMINE HYDROCHLORIDE 25 MG: 25 CAPSULE ORAL at 02:36

## 2018-01-30 RX ADMIN — OXYCODONE HYDROCHLORIDE 10 MG: 10 TABLET, FILM COATED, EXTENDED RELEASE ORAL at 20:58

## 2018-01-30 RX ADMIN — KETOROLAC TROMETHAMINE 15 MG: 15 INJECTION, SOLUTION INTRAMUSCULAR; INTRAVENOUS at 04:52

## 2018-01-30 RX ADMIN — MONTELUKAST SODIUM 10 MG: 10 TABLET, FILM COATED ORAL at 08:29

## 2018-01-30 RX ADMIN — HYDROMORPHONE HYDROCHLORIDE 2 MG: 2 TABLET ORAL at 16:41

## 2018-01-30 RX ADMIN — Medication 10 ML: at 21:00

## 2018-01-30 RX ADMIN — Medication 10 ML: at 21:03

## 2018-01-30 RX ADMIN — Medication 10 ML: at 04:53

## 2018-01-30 RX ADMIN — HYDROCHLOROTHIAZIDE: 25 TABLET ORAL at 08:29

## 2018-01-30 RX ADMIN — ACETAMINOPHEN 1000 MG: 500 TABLET, FILM COATED ORAL at 06:50

## 2018-01-30 RX ADMIN — CELECOXIB 200 MG: 200 CAPSULE ORAL at 08:29

## 2018-01-30 RX ADMIN — HYDROMORPHONE HYDROCHLORIDE 2 MG: 2 TABLET ORAL at 20:58

## 2018-01-30 RX ADMIN — CELECOXIB 200 MG: 200 CAPSULE ORAL at 20:58

## 2018-01-30 RX ADMIN — ACETAMINOPHEN 1000 MG: 500 TABLET, FILM COATED ORAL at 16:41

## 2018-01-30 RX ADMIN — HYDROMORPHONE HYDROCHLORIDE 2 MG: 2 TABLET ORAL at 02:55

## 2018-01-30 RX ADMIN — HYDROMORPHONE HYDROCHLORIDE 2 MG: 2 TABLET ORAL at 06:50

## 2018-01-30 RX ADMIN — KETOROLAC TROMETHAMINE 15 MG: 15 INJECTION, SOLUTION INTRAMUSCULAR; INTRAVENOUS at 11:21

## 2018-01-30 NOTE — PROGRESS NOTES
Care Management Interventions  Mode of Transport at Discharge: Self  Transition of Care Consult (CM Consult): [address]: Yes  Discharge Durable Medical Equipment: No  Physical Therapy Consult: Yes  Occupational Therapy Consult: Yes  Current Support Network: Lives with Spouse  Confirm Follow Up Transport: Family  Plan discussed with Pt/Family/Caregiver: Yes  Freedom of Choice Offered: Yes  Discharge Location  Discharge Placement: Home with home health    Patient is a 74y.o. year old female admitted for Right TKA . Patient lives with Her spouse and plans to return home on discharge. Order received to arrange home health. Patient without preference towards agency. Referral sent [facility]. Patient requesting we arrange a walker and bedside commode. [facility]. Will follow until discharge.   Wicho Hogan

## 2018-01-30 NOTE — PROGRESS NOTES
Problem: Mobility Impaired (Adult and Pediatric)  Goal: *Acute Goals and Plan of Care (Insert Text)  GOALS (1-4 days):  (1.)Ms. Ninette Severs will move from supine to sit and sit to supine  in bed with SUPERVISION. (2.)Ms. Ninette Severs will transfer from bed to chair and chair to bed with STAND BY ASSIST using the least restrictive device. (3.)Ms. Ninette Severs will ambulate with STAND BY ASSIST for 200 feet with the least restrictive device. (4.)Ms. Ninette Severs will ambulate up/down 3 steps with left railing with CONTACT GUARD ASSIST with no device. (5.)Ms. Ninette Severs will increase right knee ROM to 5°-80°.  ________________________________________________________________________________________________    PHYSICAL THERAPY Joint camp tKa: Daily Note, PM 1/30/2018  INPATIENT: Hospital Day: 2  [place] / Product Type: Medicare /      NAME/AGE/GENDER: Pastor Hernandez is a 74 y.o. female   PRIMARY DIAGNOSIS:  Unilateral primary osteoarthritis, right knee [M17.11]   Procedure(s) and Anesthesia Type:     * RIGHT KNEE ARTHROPLASTY TOTAL - General (Right)  ICD-10: Treatment Diagnosis:    · Pain in Right Knee (M25.561)  · Stiffness of Right Knee, Not elsewhere classified (M25.661)  · Difficulty in walking, Not elsewhere classified (R26.2)      ASSESSMENT:     Ms. Ninette Severs presents with impaired strength & mobility s/p right TKA. Pt also had decreased stability during out of bed activity. Pt will benefit from follow up therapy to help restore safe function prior to returning home with cargiver. She increase her distance using RW with CGA. She performs exercises in the gym with some help. She will continue to work toward her goals. This section established at most recent assessment   PROBLEM LIST (Impairments causing functional limitations):  1. Decreased Strength  2. Decreased ADL/Functional Activities  3. Decreased Transfer Abilities  4.  Decreased Ambulation Ability/Technique  5. Decreased Balance  6. Increased Pain  7. Decreased Activity Tolerance  8. Decreased Flexibility/Joint Mobility  9. Decreased [place] with Home Exercise Program   INTERVENTIONS PLANNED: (Benefits and precautions of physical therapy have been discussed with the patient.)  1. Bed Mobility  2. Gait Training  3. Home Exercise Program (HEP)  4. Therapeutic Exercise/Strengthening  5. Transfer Training  6. Range of Motion: active/assisted/passive  7. Therapeutic Activities  8. Group Therapy     TREATMENT PLAN: Frequency/Duration: Follow patient BID   to address above goals. Rehabilitation Potential For Stated Goals: Good     RECOMMENDED REHABILITATION/EQUIPMENT: (at time of discharge pending progress): Continue Skilled Therapy and Home Health: Physical Therapy. HISTORY:   History of Present Injury/Illness (Reason for Referral): The patient has end stage arthritis of the right knee. The patient was evaluated and examined during a consultation prior to today. The patient complains of knee pain with activities, reports pain as mostly occurring along the joint lines, reports stiffness of the knee with prolonged inactivity, and swelling/pain at the end of the day and after increased physical activity. The pain affects the patients activities of daily living and quality of life. The patient has attempted and exhausted conservative treatment. There have been no changes to the patient's orthopedic condition since the last office visit. Past Medical History/Comorbidities:   Ms. Sienna Rodriguez  has a past medical history of Allergic rhinitis; Anxiety; Arrhythmia; Arthritis; Depression; Endocarditis (1992); Heart murmur; Hematuria; History of MRSA infection (on left breast); HLD (hyperlipidemia) ( ); Hypertension; Hypothyroid; Hypothyroidism due to acquired atrophy of thyroid (4/28/2015); Intertrigo; Irregular heart beat; Mass of colon; Morbid obesity ([address]  Reactive airway disease with status asthmaticus; Scoliosis (2016); Sleep apnea; Unspecified adverse effect of anesthesia; Varicose veins; and VSD (ventricular septal defect) (2016). She also has no past medical history of Adverse effect of anesthesia; Difficult intubation; Malignant hyperthermia due to anesthesia; Nausea & vomiting; or Pseudocholinesterase deficiency. Ms. Leopoldo Al  has a past surgical history that includes hx lipoma resection (Right); hx appendectomy; hx hernia repair (incisional [id number]); hx colonoscopy (, ); hx heent; hx orthopaedic (due to underbite); hx heart catheterization; hx breast reduction (Bilateral, 2016); hx  section; hx dilation and curettage; and hx colectomy (Right, ). Social History/Living Environment:   Home Environment: Private residence  # Steps to Enter: 3  Rails to Enter: Yes  Hand Rails : Left  One/Two Story Residence: One story  Living Alone: No  Support Systems: Spouse/Significant Other/Partner  Patient Expects to be Discharged to[de-identified] Private residence  Current DME Used/Available at Home: None  Tub or Shower Type: Shower  Prior Level of Function/Work/Activity:  Pt was functioning in the home without an assistive device & out of the home with HHA prior to this admission   Number of Personal Factors/Comorbidities that affect the Plan of Care: 3+: HIGH COMPLEXITY   EXAMINATION:   Most Recent Physical Functioning:               RLE AROM  R Knee Flexion: 87  R Knee Extension: 18            Bed Mobility  Supine to Sit: Contact guard assistance  Sit to Supine:  (left up in chair)  Scooting: Contact guard assistance    Transfers  Sit to Stand: Contact guard assistance  Stand to Sit: Contact guard assistance    Balance  Sitting: Intact  Standing: Pull to stand; With support              Weight Bearing Status  Right Side Weight Bearing: As tolerated  Distance (ft): 80 Feet (ft)  Ambulation - Level of Assistance: Minimal assistance  Assistive Device: Walker, rolling  Speed/Luzma: Shuffled; Slow  Stance: Right decreased  Gait Abnormalities: Antalgic;Decreased step clearance        Braces/Orthotics: none    Right Knee Cold  Type: Cryocuff      Body Structures Involved:  1. Joints  2. Muscles Body Functions Affected:  1. Sensory/Pain  2. Movement Related Activities and Participation Affected:  1. General Tasks and Demands  2. Mobility   Number of elements that affect the Plan of Care: 4+: HIGH COMPLEXITY   CLINICAL PRESENTATION:   Presentation: Stable and uncomplicated: LOW COMPLEXITY   CLINICAL DECISION MAKIN[address] [place] [address] AM-PAC 6 Clicks   Basic Mobility Inpatient Short Form  How much difficulty does the patient currently have. .. Unable A Lot A Little None   1. Turning over in bed (including adjusting bedclothes, sheets and blankets)? [] 1   [] 2   [x] 3   [] 4   2. Sitting down on and standing up from a chair with arms ( e.g., wheelchair, bedside commode, etc.)   [] 1   [] 2   [x] 3   [] 4   3. Moving from lying on back to sitting on the side of the bed? [] 1   [] 2   [x] 3   [] 4   How much help from another person does the patient currently need. .. Total A Lot A Little None   4. Moving to and from a bed to a chair (including a wheelchair)? [] 1   [] 2   [x] 3   [] 4   5. Need to walk in hospital room? [] 1   [] 2   [x] 3   [] 4   6. Climbing 3-5 steps with a railing? [x] 1   [] 2   [] 3   [] 4   © , Trustees of [address] [place] [address], under license to [employer]. All rights reserved        Score:  Initial: 16 Most Recent: X (Date: -- )    Interpretation of Tool:  Represents activities that are increasingly more difficult (i.e. Bed mobility, Transfers, Gait). Score 24 23 22-20 19-15 14-10 9-7 6     Modifier CH CI CJ CK CL CM CN      ?  Mobility - Walking and Moving Around:     - CURRENT STATUS: CK - 40%-59% impaired, limited or restricted    - GOAL STATUS: CJ - 20%-39% impaired, limited or restricted    - D/C STATUS:  ---------------To be determined---------------  [place] / Product Type: Medicare /      Medical Necessity:     · Patient is expected to demonstrate progress in strength, range of motion, balance, coordination and functional technique to decrease assistance required with bed mobility, transfers & gait. Reason for Services/Other Comments:  · Patient continues to require skilled intervention due to unsafe with functional mobility. Use of outcome tool(s) and clinical judgement create a POC that gives a: Clear prediction of patient's progress: LOW COMPLEXITY            TREATMENT:   (In addition to Assessment/Re-Assessment sessions the following treatments were rendered)     Pre-treatment Symptoms/Complaints:  none  Pain: Initial: visual scale  Pain Intensity 1: 0 (0/10 after therapy)  Post Session:      Gait Training (15 Minutes):  Gait training to improve and/or restore physical functioning as related to mobility. Ambulated 80 Feet (ft) with Minimal assistance using a Walker, rolling and minimal   related to their knee position and motion to promote proper body alignment. Therapeutic Exercise: (45 Minutes (group therapy)):  Exercises per grid below to improve strength. Required minimal verbal cues to promote proper body alignment. Progressed range as indicated.           Date:  1/30   Date:   Date:     ACTIVITY/EXERCISE AM PM AM PM AM PM   GROUP THERAPY  []  [x]  []  []  []  []   Ankle Pumps 15 15       Quad Sets 15 15       Gluteal Sets 15 15       Hip ABd/ADduction 15 15       Straight Leg Raises 15 15       Knee Slides 15 15       Short Arc Quads 15 15       Long Arc Quads         Chair Slides  15                B = bilateral; AA = active assistive; A = active; P = passive      Treatment/Session Assessment:     Response to Treatment:  Tolerated group therapy well    Education:  [] Home Exercises  [x] Fall Precautions  [] Hip Precautions [] D/C Instruction Review  [] Knee/Hip Prosthesis Review  [x] Walker Management/Safety [] Adaptive Equipment as Needed       Interdisciplinary Collaboration:   o Registered Nurse    After treatment position/precautions:   o Up in chair  o Bed/Chair-wheels locked  o Bed in low position  o Caregiver at bedside  o Call light within reach  o RN notified  o Family at bedside    Compliance with Program/Exercises: Will assess as treatment progresses. Recommendations/Intent for next treatment session:  Treatment next visit will focus on increasing Ms. Jay's independence with bed mobility, transfers, gait training, strength/ROM exercises, modalities for pain, and patient education.       Total Treatment Duration:  PT Patient Time In/Time Out  Time In: 1300  Time Out: 920 Mary Thornton, PTA

## 2018-01-30 NOTE — PROGRESS NOTES
01/30/18 0850   Oxygen Therapy   O2 Sat (%) 94 %   Pulse via Oximetry 74 beats per minute   O2 Device Room air   Patient achieved   1500   Ml/sec on IS. Patient encouraged to do every hour while awake-patient agreed and demonstrated. No shortness of breath or distress noted. BS are clear b/l.

## 2018-01-30 NOTE — PROGRESS NOTES
Problem: Mobility Impaired (Adult and Pediatric)  Goal: *Acute Goals and Plan of Care (Insert Text)  GOALS (1-4 days):  (1.)Ms. Hernandez Mooney will move from supine to sit and sit to supine  in bed with SUPERVISION. (2.)Ms. Hernandez Mooney will transfer from bed to chair and chair to bed with STAND BY ASSIST using the least restrictive device. (3.)Ms. Hernandez Mooney will ambulate with STAND BY ASSIST for 200 feet with the least restrictive device. (4.)Ms. Hernandez Mooney will ambulate up/down 3 steps with left railing with CONTACT GUARD ASSIST with no device. (5.)Ms. Hernandez Mooney will increase right knee ROM to 5°-80°.  ________________________________________________________________________________________________    PHYSICAL THERAPY Joint camp tKa: Daily Note, AM 1/30/2018  INPATIENT: Hospital Day: 2  [place] / Product Type: Medicare /      NAME/AGE/GENDER: Fazal Ley is a 74 y.o. female   PRIMARY DIAGNOSIS:  Unilateral primary osteoarthritis, right knee [M17.11]   Procedure(s) and Anesthesia Type:     * RIGHT KNEE ARTHROPLASTY TOTAL - General (Right)  ICD-10: Treatment Diagnosis:    · Pain in Right Knee (M25.561)  · Stiffness of Right Knee, Not elsewhere classified (M25.661)  · Difficulty in walking, Not elsewhere classified (R26.2)      ASSESSMENT:     Ms. Hernandez Mooney presents with impaired strength & mobility s/p right TKA. Pt also had decreased stability during out of bed activity. Pt will benefit from follow up therapy to help restore safe function prior to returning home with cargiver. She is supine upon arrival, but needs to go to the restroom. She went from sit to stand and took 5 steps and said she was dizzy, so we sat back down x 2. Pt went to [facility] and then to the chair. She performs exercises in the chair without any problems. She will sit up for awhile and then come to group.     This section established at most recent assessment   PROBLEM LIST (Impairments causing functional limitations):  1. Decreased Strength  2. Decreased ADL/Functional Activities  3. Decreased Transfer Abilities  4. Decreased Ambulation Ability/Technique  5. Decreased Balance  6. Increased Pain  7. Decreased Activity Tolerance  8. Decreased Flexibility/Joint Mobility  9. Decreased [place] with Home Exercise Program   INTERVENTIONS PLANNED: (Benefits and precautions of physical therapy have been discussed with the patient.)  1. Bed Mobility  2. Gait Training  3. Home Exercise Program (HEP)  4. Therapeutic Exercise/Strengthening  5. Transfer Training  6. Range of Motion: active/assisted/passive  7. Therapeutic Activities  8. Group Therapy     TREATMENT PLAN: Frequency/Duration: Follow patient BID   to address above goals. Rehabilitation Potential For Stated Goals: Good     RECOMMENDED REHABILITATION/EQUIPMENT: (at time of discharge pending progress): Continue Skilled Therapy and Home Health: Physical Therapy. HISTORY:   History of Present Injury/Illness (Reason for Referral): The patient has end stage arthritis of the right knee. The patient was evaluated and examined during a consultation prior to today. The patient complains of knee pain with activities, reports pain as mostly occurring along the joint lines, reports stiffness of the knee with prolonged inactivity, and swelling/pain at the end of the day and after increased physical activity. The pain affects the patients activities of daily living and quality of life. The patient has attempted and exhausted conservative treatment. There have been no changes to the patient's orthopedic condition since the last office visit. Past Medical History/Comorbidities:   Ms. Paulo Eason  has a past medical history of Allergic rhinitis; Anxiety; Arrhythmia; Arthritis; Depression; Endocarditis (1992); Heart murmur; Hematuria; History of MRSA infection (on left breast); HLD (hyperlipidemia) ( ); Hypertension; Hypothyroid;  Hypothyroidism due to acquired atrophy of thyroid (2015); Intertrigo; Irregular heart beat; Mass of colon; Morbid obesity ([facility] [address] Reactive airway disease with status asthmaticus; Scoliosis (2016); Sleep apnea; Unspecified adverse effect of anesthesia; Varicose veins; and VSD (ventricular septal defect) (2016). She also has no past medical history of Adverse effect of anesthesia; Difficult intubation; Malignant hyperthermia due to anesthesia; Nausea & vomiting; or Pseudocholinesterase deficiency. Ms. Edilberto Bee  has a past surgical history that includes hx lipoma resection (Right); hx appendectomy; hx hernia repair (incisional [facility]0966); hx colonoscopy (, ); hx heent; hx orthopaedic (due to underbite); hx heart catheterization; hx breast reduction (Bilateral, 2016); hx  section; hx dilation and curettage; and hx colectomy (Right, ).   Social History/Living Environment:   Home Environment: Private residence  # Steps to Enter: 3  Rails to Enter: Yes  Hand Rails : Left  One/Two Story Residence: One story  Living Alone: No  Support Systems: Spouse/Significant Other/Partner  Patient Expects to be Discharged to[de-identified] Private residence  Current DME Used/Available at Home: None  Tub or Shower Type: Shower  Prior Level of Function/Work/Activity:  Pt was functioning in the home without an assistive device & out of the home with HHA prior to this admission   Number of Personal Factors/Comorbidities that affect the Plan of Care: 3+: HIGH COMPLEXITY   EXAMINATION:   Most Recent Physical Functioning:                            Bed Mobility  Supine to Sit: Contact guard assistance  Sit to Supine:  (left up in chair)  Scooting: Contact guard assistance    Transfers  Sit to Stand: Minimum assistance;Assist x2  Stand to Sit: Minimum assistance;Assist x2                   Weight Bearing Status  Right Side Weight Bearing: As tolerated  Distance (ft): 6 Feet (ft) (became light headed)  Ambulation - Level of Assistance: Minimal assistance;Assist x2  Assistive Device: Walker, rolling  Speed/Luzma: Shuffled; Slow  Stance: Right decreased  Gait Abnormalities: Antalgic;Decreased step clearance        Braces/Orthotics: none    Right Knee Cold  Type: Cryocuff      Body Structures Involved:  1. Joints  2. Muscles Body Functions Affected:  1. Sensory/Pain  2. Movement Related Activities and Participation Affected:  1. General Tasks and Demands  2. Mobility   Number of elements that affect the Plan of Care: 4+: HIGH COMPLEXITY   CLINICAL PRESENTATION:   Presentation: Stable and uncomplicated: LOW COMPLEXITY   CLINICAL DECISION MAKIN[address] [place] [address] AM-PAC 6 Clicks   Basic Mobility Inpatient Short Form  How much difficulty does the patient currently have. .. Unable A Lot A Little None   1. Turning over in bed (including adjusting bedclothes, sheets and blankets)? [] 1   [] 2   [x] 3   [] 4   2. Sitting down on and standing up from a chair with arms ( e.g., wheelchair, bedside commode, etc.)   [] 1   [] 2   [x] 3   [] 4   3. Moving from lying on back to sitting on the side of the bed? [] 1   [] 2   [x] 3   [] 4   How much help from another person does the patient currently need. .. Total A Lot A Little None   4. Moving to and from a bed to a chair (including a wheelchair)? [] 1   [] 2   [x] 3   [] 4   5. Need to walk in hospital room? [] 1   [] 2   [x] 3   [] 4   6. Climbing 3-5 steps with a railing? [x] 1   [] 2   [] 3   [] 4   © , Trustees of [address] [place] [address], under license to [employer] All rights reserved        Score:  Initial: 16 Most Recent: X (Date: -- )    Interpretation of Tool:  Represents activities that are increasingly more difficult (i.e. Bed mobility, Transfers, Gait). Score 24 23 22-20 19-15 14-10 9-7 6     Modifier CH CI CJ CK CL CM CN      ?  Mobility - Walking and Moving Around:     - CURRENT STATUS: CK - 40%-59% impaired, limited or restricted    - GOAL STATUS: CJ - 20%-39% impaired, limited or restricted    - D/C STATUS:  ---------------To be determined---------------  [place] / Product Type: Medicare /      Medical Necessity:     · Patient is expected to demonstrate progress in strength, range of motion, balance, coordination and functional technique to decrease assistance required with bed mobility, transfers & gait. Reason for Services/Other Comments:  · Patient continues to require skilled intervention due to unsafe with functional mobility. Use of outcome tool(s) and clinical judgement create a POC that gives a: Clear prediction of patient's progress: LOW COMPLEXITY            TREATMENT:   (In addition to Assessment/Re-Assessment sessions the following treatments were rendered)     Pre-treatment Symptoms/Complaints:  none  Pain: Initial: visual scale  Pain Intensity 1: 3 (about the same after therapy)  Post Session:      Gait Training (15 Minutes):  Gait training to improve and/or restore physical functioning as related to mobility. Ambulated 6 Feet (ft) (became light headed) with Minimal assistance;Assist x2 using a Walker, rolling and minimal   related to their knee position and motion to promote proper body alignment. Therapeutic Exercise: (15 Minutes):  Exercises per grid below to improve strength. Required minimal verbal cues to promote proper body alignment. Progressed range as indicated.           Date:  1/30   Date:   Date:     ACTIVITY/EXERCISE AM PM AM PM AM PM   GROUP THERAPY  []  []  []  []  []  []   Ankle Pumps 15        Quad Sets 15        Gluteal Sets 15        Hip ABd/ADduction 15        Straight Leg Raises 15        Knee Slides 15        Short Arc Quads 15        Long Arc Quads         Chair Slides                  B = bilateral; AA = active assistive; A = active; P = passive      Treatment/Session Assessment:     Response to Treatment:  Tolerated session well    Education:  [] Home Exercises  [x] Fall Precautions  [] Hip Precautions [] D/C Instruction Review  [] Knee/Hip Prosthesis Review  [x] Walker Management/Safety [] Adaptive Equipment as Needed       Interdisciplinary Collaboration:   o Registered Nurse    After treatment position/precautions:   o Up in chair  o Bed/Chair-wheels locked  o Bed in low position  o Caregiver at bedside  o Call light within reach  o RN notified  o Family at bedside    Compliance with Program/Exercises: Will assess as treatment progresses. Recommendations/Intent for next treatment session:  Treatment next visit will focus on increasing Ms. Jay's independence with bed mobility, transfers, gait training, strength/ROM exercises, modalities for pain, and patient education.       Total Treatment Duration:  PT Patient Time In/Time Out  Time In: 0800  Time Out: 0830    Kaitlin Thornton, PTA

## 2018-01-30 NOTE — PROGRESS NOTES
2018         Post Op day: 1 Day Post-Op     Admit Date: 2018  Admit Diagnosis: Unilateral primary osteoarthritis, right knee [M17.11]        Subjective: Patient stable. No acute events. Objective:   Visit Vitals    /72 (BP 1 Location: Right arm, BP Patient Position: At rest)    Pulse 69    Temp 99.1 °F (37.3 °C)    Resp 16    Ht 5' 3\" (1.6 m)    Wt 119.2 kg (262 lb 12.6 oz)    SpO2 94%    Breastfeeding No    BMI 46.55 kg/m2    Temp (24hrs), Av.7 °F (37.1 °C), Min:97.9 °F (36.6 °C), Max:99.3 °F (37.4 °C)    Lab Results   Component Value Date/Time    HGB 10.1 2018 04:36 AM     Extremity Exam  Dressing clean and dry   Tibialis Anterior and Gastroc-Soleus functioning normally right lower extremity  Sensation intact to light touch on operative limb  Extremity perfused  TEDS/SCDS in place  No sign of DVT     Assessment / Plan :  WBAT RLE  Continue PT/OT  Continue current DVT prophylaxis in house.  Discharge on ASA BID  DIspo-HH  Patient Active Problem List   Diagnosis Code    Essential hypertension I10    Mixed hyperlipidemia E78.2    Hypothyroidism due to acquired atrophy of thyroid E03.4    Allergic rhinitis due to allergen J30.9    Primary osteoarthritis involving multiple joints M15.0    Depression F32.9    Obesity E66.9    PRAVEEN (obstructive sleep apnea) G47.33    Edema R60.9    Morbid obesity (HCC) E66.01    Anxiety F41.9    Osteoarthritis of right knee M17.11    Allergic rhinitis J30.9    Hypothyroid E03.9    Arthritis M19.90    Arrhythmia I49.9    Hypertension I10    VSD (ventricular septal defect) Q21.0    Preoperative clearance Z01.818    Dyspnea on exertion R06.09    Scoliosis M41.9    Endocarditis I38    OA (osteoarthritis) of knee M17.10          Signed By: Jenifer Anderson MD     I have reviewed the patients controlled substance prescription history, as maintained in the [place] prescription monitoring program, so that the prescription(s) for a  controlled substance can be given.

## 2018-01-30 NOTE — PROGRESS NOTES
Problem: Self Care Deficits Care Plan (Adult)  Goal: *Acute Goals and Plan of Care (Insert Text)  GOALS:   DISCHARGE GOALS (in preparation for going home/rehab):  3 days  1. Ms. Yelena Graham will perform one lower body dressing activity with moderate assistance required to demonstrate improved functional mobility and safety. GOAL MET 1/30/2018    2. Ms. Yelena Graham will perform one lower body bathing activity with moderate assistance required to demonstrate improved functional mobility and safety. GOAL MET 1/30/2018    3. Ms. Yelena Graham will perform toileting/toilet transfer with minimal assistance to demonstrate improved functional mobility and safety. GOAL MET 1/30/2018    4. Ms. Yelena Graham will perform shower transfer with minimal assistance to demonstrate improved functional mobility and safety. GOAL MET 1/30/2018             JOINT CAMP OCCUPATIONAL THERAPY TKA: Daily Note and AM 1/30/2018  INPATIENT: Hospital Day: 2  [place] / [place] / Product Type: Medicare /      NAME/AGE/GENDER: Bulmaro Mccallum is a 74 y.o. female   PRIMARY DIAGNOSIS:  Unilateral primary osteoarthritis, right knee [M17.11]   Procedure(s) and Anesthesia Type:     * RIGHT KNEE ARTHROPLASTY TOTAL - General (Right)  ICD-10: Treatment Diagnosis:    · Pain in Right Knee (M25.561)  · Stiffness of Right Knee, Not elsewhere classified (M25.661)  · Other lack of cordination (R27.8)      ASSESSMENT:       Ms. Yelena Graham is s/p right TKA with small wound vac and presents with decreased weight bearing on right LE and decreased independence with functional mobility and activities of daily living. Patient completed shower and dressing as charter below in ADL grid and is ambulating with rolling walker and CGA to min assist.  Patient has met 4/4 goals and plans to return home with good family support. Family able to provide patient with appropriate level of assistance at this time.   OT reviewed safety precautions throughout session and therapy schedule for the remainder of today. Patient instructed to call for assistance when needing to get up from recliner and all needs in reach. Patient verbalized understanding of call light. This section established at most recent assessment   PROBLEM LIST (Impairments causing functional limitations):  1. Decreased Strength  2. Decreased ADL/Functional Activities  3. Decreased Transfer Abilities  4. Increased Pain  5. Increased Fatigue  6. Decreased Flexibility/Joint Mobility  7. Decreased Knowledge of Precautions   INTERVENTIONS PLANNED: (Benefits and precautions of occupational therapy have been discussed with the patient.)  1. Activities of daily living training  2. Adaptive equipment training  3. Balance training  4. Clothing management  5. Donning&doffing training  6. Theraputic activity     TREATMENT PLAN: Frequency/Duration: Follow patient 1 time to address above goals. Rehabilitation Potential For Stated Goals: Good     RECOMMENDED REHABILITATION/EQUIPMENT: (at time of discharge pending progress): Continue Skilled Therapy and Home Health: Physical Therapy. OCCUPATIONAL PROFILE AND HISTORY:   History of Present Injury/Illness (Reason for Referral): Pt presents this date s/p (right) TKA. Past Medical History/Comorbidities:   Ms. Leopoldo Al  has a past medical history of Allergic rhinitis; Anxiety; Arrhythmia; Arthritis; Depression; Endocarditis (1992); Heart murmur; Hematuria; History of MRSA infection (on left breast); HLD (hyperlipidemia) ( ); Hypertension; Hypothyroid; Hypothyroidism due to acquired atrophy of thyroid (4/28/2015); Intertrigo; Irregular heart beat; Mass of colon; Morbid obesity ([facility] [address] Reactive airway disease with status asthmaticus; Scoliosis (8/4/2016); Sleep apnea; Unspecified adverse effect of anesthesia; Varicose veins; and VSD (ventricular septal defect) (07/22/2016).  She also has no past medical history of Adverse effect of anesthesia; Difficult intubation; Malignant hyperthermia due to anesthesia; Nausea & vomiting; or Pseudocholinesterase deficiency. Ms. Rony Yin  has a past surgical history that includes hx lipoma resection (Right); hx appendectomy; hx hernia repair (incisional [id number]); hx colonoscopy (2010, ); hx heent; hx orthopaedic (due to underbite); hx heart catheterization; hx breast reduction (Bilateral, 2016); hx  section; hx dilation and curettage; and hx colectomy (Right, ). Social History/Living Environment:   Home Environment: Private residence  # Steps to Enter: 3  Rails to Enter: Yes  Hand Rails : Left  One/Two Story Residence: One story  Living Alone: No  Support Systems: Spouse/Significant Other/Partner  Patient Expects to be Discharged to[de-identified] Private residence  Current DME Used/Available at Home: None  Tub or Shower Type: Shower  Prior Level of Function/Work/Activity:  Mod I with ADLS used a cane,  assists     Number of Personal Factors/Comorbidities that affect the Plan of Care: Brief history (0):  LOW COMPLEXITY   ASSESSMENT OF OCCUPATIONAL PERFORMANCE[de-identified]   Most Recent Physical Functioning:   Balance  Sitting: Intact  Standing: Pull to stand; With support       Patient Vitals for the past 6 hrs:   BP SpO2 Pulse   18 0803 144/82 95 % 63   18 0850 - 94 % -   18 0917 - 91 % -   18 1121 142/74 94 % 64                              Mental Status  Neurologic State: Alert; Appropriate for age  Orientation Level: Appropriate for age  Cognition: Appropriate decision making; Appropriate for age attention/concentration; Appropriate safety awareness; Follows commands  Perception: Appears intact  Perseveration: No perseveration noted  Safety/Judgement: Awareness of environment; Fall prevention                Basic ADLs (From Assessment) Complex ADLs (From Assessment)   Basic ADL  Feeding: Setup  Oral Facial Hygiene/Grooming: Supervision  Bathing:  Moderate assistance  Upper Body Dressing: Supervision  Lower Body Dressing: Maximum assistance  Toileting: Total assistance (catheter)     Grooming/Bathing/Dressing Activities of Daily Living   Grooming  Grooming Assistance: Supervision/set up  Washing Face: Supervision/set-up  Washing Hands: Supervision/set-up Cognitive Retraining  Safety/Judgement: Awareness of environment; Fall prevention   Upper Body Bathing  Bathing Assistance: Supervision/set-up  Position Performed: Seated in chair  Adaptive Equipment: Grab bar;Tub bench     Lower Body Bathing  Bathing Assistance: Minimum assistance  Perineal  : Contact guard assistance  Position Performed: Standing  Adaptive Equipment: Grab bar  Lower Body : Minimum assistance  Position Performed: Seated in chair;Standing  Adaptive Equipment: Grab bar;Tub bench     Upper Body Dressing Assistance  Dressing Assistance: [address] Gown: Supervision/ set-up  Pullover Shirt: Supervision/set-up (pullover gown only) Functional Transfers  Bathroom Mobility: Minimum assistance;Contact guard assistance  Toilet Transfer : Contact guard assistance  Shower Transfer: Contact guard assistance   Lower Body Dressing Assistance  Dressing Assistance: Minimum assistance  Underpants: Minimum assistance  Socks: Minimum assistance Bed/Mat Mobility  Supine to Sit: Contact guard assistance  Sit to Supine:  (left up in chair)  Sit to Stand: Minimum assistance;Assist x2  Scooting: Contact guard assistance         Physical Skills Involved:  1. Range of Motion  2. Balance  3. Strength  4. Activity Tolerance Cognitive Skills Affected (resulting in the inability to perform in a timely and safe manner): 1. none Psychosocial Skills Affected:  1. none   Number of elements that affect the Plan of Care: 3-5:  MODERATE COMPLEXITY   CLINICAL DECISION MAKIN [facility] [address] [address] AM-PAC 6 Clicks   Daily Activity Inpatient Short Form  How much help from another person does the patient currently need. .. Total A Lot A Little None   1. Putting on and taking off regular lower body clothing? [] 1   [x] 2   [] 3   [] 4   2. Bathing (including washing, rinsing, drying)? [] 1   [x] 2   [] 3   [] 4   3. Toileting, which includes using toilet, bedpan or urinal?   [x] 1   [] 2   [] 3   [] 4   4. Putting on and taking off regular upper body clothing? [] 1   [] 2   [x] 3   [] 4   5. Taking care of personal grooming such as brushing teeth? [] 1   [] 2   [x] 3   [] 4   6. Eating meals? [] 1   [] 2   [] 3   [x] 4   © 2007, Trustees of [address] [place] [address] [address] [address], under license to [employer]. All rights reserved     Score:  Initial: 15 Most Recent: X (Date: -- )    Interpretation of Tool:  Represents activities that are increasingly more difficult (i.e. Bed mobility, Transfers, Gait). Score 24 23 22-20 19-15 14-10 9-7 6     Modifier CH CI CJ CK CL CM CN      ? Self Care:     - CURRENT STATUS: CK - 40%-59% impaired, limited or restricted    - GOAL STATUS: CJ - 20%-39% impaired, limited or restricted    - D/C STATUS:  ---------------To be determined---------------  [place] / Product Type: Medicare /      Medical Necessity:     · Patient is expected to demonstrate progress in balance, coordination and functional technique to decrease assistance required with self care and functional mobility and improve safety during self care and functional mobility. Reason for Services/Other Comments:  · Patient continues to require skilled intervention due to decreased self care and functional mobility.    Use of outcome tool(s) and clinical judgement create a POC that gives a: LOW COMPLEXITY            TREATMENT:   (In addition to Assessment/Re-Assessment sessions the following treatments were rendered)     Pre-treatment Symptoms/Complaints:    Pain: Initial:   Pain Intensity 1: 0 shower Post Session:  0/10 iceman in place, rest     Self Care: (32): Procedure(s) (per grid) utilized to improve and/or restore self-care/home management as related to dressing, bathing, toileting and grooming. Required minimal visual, verbal and tactile cueing to facilitate activities of daily living skills and compensatory activities. Treatment/Session Assessment:     Response to Treatment:  Tolerated well, plans to go home with  to assist tomorrow    Education:  [] Home Exercises  [x] Fall Precautions  [] Hip Precautions [] Going Home Video  [x] Knee/Hip Prosthesis Review  [x] Walker Management/Safety [x] Adaptive Equipment as Needed       Interdisciplinary Collaboration:   o Occupational Therapist  o Registered Nurse    After treatment position/precautions:   o Up in chair  o Bed/Chair-wheels locked  o Bed in low position  o Call light within reach  o RN notified     Compliance with Program/Exercises: compliant all of the time. Recommendations/Intent for next treatment session:  Treatment next visit will focus on increasing Ms. Kernss independence with bed mobility, transfers, self care, functional mobility, modalities for pain, and patient education.       Total Treatment Duration:32 minutes  OT Patient Time In/Time Out  Time In: 1004  Time Out: [facility],

## 2018-01-30 NOTE — PROGRESS NOTES
Shift assessment complete. Pt resting in bed. Call light within reach. Bed low to ground and wheels locked. Pt able to dosi/plantar flex bilaterally with +2 pedal pulses. Dressing is dry and intact. Ambulates to bathroom with walker. Voiding yellow clear urine. IS at bedside. No needs at this time.

## 2018-01-30 NOTE — PROGRESS NOTES
Shift assessment completed. Pt is alert & oriented x4. Able to verbalize needs. Resting quietly with no distress noted. Dressing to right knee is clean, dry and intact. [place] Seat in place. Neurovascular and peripheral vascular checks WNL. Patient is able to dorsi/planter flex bilaterally with +2 pedal pulses. [place] draining clear yellow urine to bag. Incentive spirometry at bedside. Patient encouraged to use hourly x 10 repetitions. Pain is being managed with Dilaudid, patient tolerating well. Bed locked and lowered. Call light within reach. Patient instructed to call for assistance. Pt verbalizes understanding. Will monitor.

## 2018-01-30 NOTE — PROGRESS NOTES
[address]  Face to Face Encounter    Patients Name: Ted Harris    YOB: 1950    Ordering Physician:  Deedee Merritt    Primary Diagnosis: Unilateral primary osteoarthritis, right knee [M17.11]  S/p right TKA    Date of Face to Face:   1-29-18                               Face to Face Encounter findings are related to primary reason for home care:   yes. 1. I certify that the patient needs intermittent care as follows: physical therapy: gait/stair training    2. I certify that this patient is homebound, that is: 1) patient requires the use of a walker device, special transportation, or assistance of another to leave the home; or 2) patient's condition makes leaving the home medically contraindicated; and 3) patient has a normal inability to leave the home and leaving the home requires considerable and taxing effort. Patient may leave the home for infrequent and short duration for medical reasons, and occasional absences for non-medical reasons. Homebound status is due to the following functional limitations: Patient's ambulation limited secondary to severe pain and requires the use of an assistive device and the assistance of a caregiver for safe completion. Patient with strength and ROM deficits limiting ambulation endurance requiring the use of an assistive device and the assistance of a caregiver. Patient deemed temporarily homebound secondary to increased risk for infection when leaving home and going out into the community. 3. I certify that this patient is under my care and that I, or a nurse practitioner or  [id number], or clinical nurse specialist, or certified nurse midwife, working with me, had a Face-to-Face Encounter that meets the physician Face-to-Face Encounter requirements.   The following are the clinical findings from the [address] [place] [address] encounter that support the need for skilled services and is a summary of the encounter: see hospital chart      Allencassi Verarles Issac, [address]  1/30/2018      THE FOLLOWING TO BE COMPLETED BY THE COMMUNITY PHYSICIAN:    I concur with the findings described above from the F2F encounter that this patient is homebound and in need of a skilled service.     Certifying Physician: _____________________________________      Printed Certifying Physician Name: _____________________________________    Date: _________________

## 2018-01-31 VITALS
TEMPERATURE: 98.3 F | HEART RATE: 72 BPM | HEIGHT: 63 IN | SYSTOLIC BLOOD PRESSURE: 161 MMHG | WEIGHT: 262.79 LBS | OXYGEN SATURATION: 96 % | BODY MASS INDEX: 46.56 KG/M2 | DIASTOLIC BLOOD PRESSURE: 91 MMHG | RESPIRATION RATE: 20 BRPM

## 2018-01-31 LAB — HGB BLD-MCNC: 10.4 G/DL (ref 11.7–15.4)

## 2018-01-31 PROCEDURE — 94760 N-INVAS EAR/PLS OXIMETRY 1: CPT

## 2018-01-31 PROCEDURE — 36415 COLL VENOUS BLD VENIPUNCTURE: CPT | Performed by: ORTHOPAEDIC SURGERY

## 2018-01-31 PROCEDURE — 85018 HEMOGLOBIN: CPT | Performed by: ORTHOPAEDIC SURGERY

## 2018-01-31 PROCEDURE — 97116 GAIT TRAINING THERAPY: CPT

## 2018-01-31 PROCEDURE — 74011250637 HC RX REV CODE- 250/637: Performed by: ORTHOPAEDIC SURGERY

## 2018-01-31 PROCEDURE — 97150 GROUP THERAPEUTIC PROCEDURES: CPT

## 2018-01-31 RX ADMIN — OXYCODONE HYDROCHLORIDE 10 MG: 10 TABLET, FILM COATED, EXTENDED RELEASE ORAL at 08:30

## 2018-01-31 RX ADMIN — ASPIRIN 325 MG: 325 TABLET, DELAYED RELEASE ORAL at 08:30

## 2018-01-31 RX ADMIN — SENNOSIDES AND DOCUSATE SODIUM 2 TABLET: 8.6; 5 TABLET ORAL at 08:30

## 2018-01-31 RX ADMIN — LEVOTHYROXINE SODIUM 175 MCG: 75 TABLET ORAL at 08:36

## 2018-01-31 RX ADMIN — HYDROMORPHONE HYDROCHLORIDE 2 MG: 2 TABLET ORAL at 01:53

## 2018-01-31 RX ADMIN — HYDROMORPHONE HYDROCHLORIDE 2 MG: 2 TABLET ORAL at 06:16

## 2018-01-31 RX ADMIN — ESCITALOPRAM OXALATE 10 MG: 10 TABLET ORAL at 08:30

## 2018-01-31 RX ADMIN — MONTELUKAST SODIUM 10 MG: 10 TABLET, FILM COATED ORAL at 08:30

## 2018-01-31 RX ADMIN — FLUTICASONE PROPIONATE AND SALMETEROL 1 PUFF: 100; 50 POWDER RESPIRATORY (INHALATION) at 09:31

## 2018-01-31 RX ADMIN — HYDROCHLOROTHIAZIDE: 25 TABLET ORAL at 08:30

## 2018-01-31 RX ADMIN — ACETAMINOPHEN 1000 MG: 500 TABLET, FILM COATED ORAL at 06:16

## 2018-01-31 RX ADMIN — CELECOXIB 200 MG: 200 CAPSULE ORAL at 08:30

## 2018-01-31 RX ADMIN — HYDROMORPHONE HYDROCHLORIDE 2 MG: 2 TABLET ORAL at 11:43

## 2018-01-31 NOTE — PROGRESS NOTES
Shift assessment completed. Pt is alert & oriented x4. Able to verbalize needs. Resting quietly with no distress noted. Dressing to right knee is clean, dry and intact. Neurovascular and peripheral vascular checks WNL. Patient is able to dorsi/planter flex bilaterally with +2 pedal pulses. Incentive spirometry at bedside. Patient encouraged to use hourly x 10 repetitions. Pain is being managed with Dilaudid, patient tolerating well. Bed locked and lowered. Call light within reach. Patient instructed to call for assistance. Pt verbalizes understanding. Will monitor.

## 2018-01-31 NOTE — PROGRESS NOTES
Shift Assessment Complete. Pt is post op day 2 from [facility]. Pt is A&Ox 3.  +2 pedal pulses with purposeful movement in all four extremities. Dressing is clean, dry and intact. PIV capped. .   Pt denies any pain or need at this time. Bed low and locked. Side rails x3. Call light with in reach. Pt verbalizes understanding of call light.

## 2018-01-31 NOTE — DISCHARGE INSTRUCTIONS
[id number] [facility]   Patient Discharge Instructions    Pastor Hernandez / [id number] : 1950    Admitted 2018 Discharged: 2018     IF YOU HAVE ANY PROBLEMS ONCE YOU ARE AT HOME CALL THE FOLLOWING NUMBERS:   Main office number: [phone]    Take Home Medications       · It is important that you take the medication exactly as they are prescribed. · Keep your medication in the bottles provided by the pharmacist and keep a list of the medication names, dosages, and times to be taken in your wallet. · Do not take other medications without consulting your doctor. What to do at [address] your prehospital diet. If you have excessive nausea or vomitting call your doctor's office     Home Physical Therapy is arranged. Use rolling walker when walking. Use Ed Hose stockings until we see you in the office for your follow up appointment with Dr. Dewey Gutiérrez. Patients who have had a joint replacement should not drive until you are seen for your follow up appointment by Dr. Dewey Gutiérrez. When to Call    - Call if you have a temperature greater then 101  - Unable to keep food down  - Loose control of your bladder or bowel function  - Are unable to bear any weight   - Need a pain medication refill       DISCHARGE SUMMARY from Nurse    The following personal items collected during your admission are returned to you:   Dental Appliance: Dental Appliances: None  Vision: Visual Aid: Glasses  Hearing Aid:   na  Jewelry: Jewelry: None  Clothing:   self  Other Valuables:  Other Valuables: Cell Phone (with Xtalic)  Valuables sent to safe:   na    PATIENT INSTRUCTIONS:    After general anesthesia or intravenous sedation, for 24 hours or while taking prescription Narcotics:  · Limit your activities  · Do not drive and operate hazardous machinery  · Do not make important personal or business decisions  · Do  not drink alcoholic beverages  · If you have not urinated within 8 hours after discharge, please contact your surgeon on call. Report the following to your surgeon:  · Excessive pain, swelling, redness or odor of or around the surgical area  · Temperature over 101  · Nausea and vomiting lasting longer than 4 hours or if unable to take medications  · Any signs of decreased circulation or nerve impairment to extremity: change in color, persistent  numbness, tingling, coldness or increase pain  · Any questions, call office @ [phone]      Keep scheduled follow up appointment. If need to change, call office @ [phone]. *  Please give a list of your current medications to your Primary Care Provider. *  Please update this list whenever your medications are discontinued, doses are      changed, or new medications (including over-the-counter products) are added. *  Please carry medication information at all times in case of emergency situations. Total Knee Replacement: What to Expect at [address] [place] [address]    When you leave the hospital, you should be able to move around with a walker or crutches. But you will need someone to help you at home for the next few weeks or until you have more energy and can move around better. If there is no one to help you at home, you may go to a rehabilitation center. You will go home with a bandage and stitches or staples. Change the bandage as your doctor tells you to. Your doctor will remove your stitches or staples 10 to 21 days after your surgery. You may still have some mild pain, and the area may be swollen for 3 to 6 months after surgery. Your knee will continue to improve for 6 to 12 months. You will probably use a walker for 1 to 3 weeks and then use crutches. When you are ready, you can use a cane. You will probably be able to walk on your own in 4 to 8 weeks. You will need to do months of physical rehabilitation (rehab) after a knee replacement. Rehab will help you strengthen the muscles of the knee and help you regain movement.  After you recover, your artificial knee will allow you to do normal daily activities with less pain or no pain at all. You may be able to hike, dance, ride a bike, and play golf. Talk to your doctor about whether you can do more strenuous activities. Always tell your caregivers that you have an artificial knee. How long it will take to walk on your own, return to normal activities, and go back to work depends on your health and how well your rehabilitation (rehab) program goes. The better you do with your rehab exercises, the quicker you will get your strength and movement back. This care sheet gives you a general idea about how long it will take for you to recover. But each person recovers at a different pace. Follow the steps below to get better as quickly as possible. How can you care for yourself at home? Activity  ? · Rest when you feel tired. You may take a nap, but do not stay in bed all day. When you sit, use a chair with arms. You can use the arms to help you stand up. ? · Work with your physical therapist to find the best way to exercise. You may be able to take frequent, short walks using crutches or a walker. What you can do as your knee heals will depend on whether your new knee is cemented or uncemented. You may not be able to do certain things for a while if your new knee is uncemented. ? · After your knee has healed enough, you can do more strenuous activities with caution. ¨ You can golf, but use a golf cart, and do not wear shoes with spikes. ¨ You can bike on a flat road or on a stationary bike. Avoid biking up hills. ¨ Your doctor may suggest that you stay away from activities that put stress on your knee. These include tennis or badminton, squash or racquetball, contact sports like football, jumping (such as in basketball), jogging, or running. ¨ Avoid activities where you might fall. These include horseback riding, skiing, and mountain biking. ? · Do not sit for more than 1 hour at a time.  Get up and walk around for a while before you sit again. If you must sit for a long time, prop up your leg with a chair or footstool. This will help you avoid swelling. ? · Ask your doctor when you can shower. You may need to take sponge baths until your stitches or staples have been removed. ? · Ask your doctor when you can drive again. It may take up to 8 weeks after knee replacement surgery before it is safe for you to drive. ? · When you get into a car, sit on the edge of the seat. Then pull in your legs, and turn to face the front. ? · You should be able to do many everyday activities 3 to 6 weeks after your surgery. You will probably need to take 4 to 16 weeks off from work. When you can go back to work depends on the type of work you do and how you feel. ? · Ask your doctor when it is okay for you to have sex. ? · Do not lift anything heavier than 10 pounds and do not lift weights for 12 weeks. Diet  ? · By the time you leave the hospital, you should be eating your normal diet. If your stomach is upset, try bland, low-fat foods like plain rice, broiled chicken, toast, and yogurt. Your doctor may suggest that you take iron and vitamin supplements. ? · Drink plenty of fluids (unless your doctor tells you not to). ? · Eat healthy foods, and watch your portion sizes. Try to stay at your ideal weight. Too much weight puts more stress on your new knee. ? · You may notice that your bowel movements are not regular right after your surgery. This is common. Try to avoid constipation and straining with bowel movements. You may want to take a fiber supplement every day. If you have not had a bowel movement after a couple of days, ask your doctor about taking a mild laxative. Medicines  ? · Your doctor will tell you if and when you can restart your medicines. He or she will also give you instructions about taking any new medicines.    ? · If you take blood thinners, such as warfarin (Coumadin), clopidogrel (Plavix), or aspirin, be sure to talk to your doctor. He or she will tell you if and when to start taking those medicines again. Make sure that you understand exactly what your doctor wants you to do.   ? · Your doctor may give you a blood-thinning medicine to prevent blood clots. If you take a blood thinner, be sure you get instructions about how to take your medicine safely. Blood thinners can cause serious bleeding problems. This medicine could be in pill form or as a shot (injection). If a shot is necessary, your doctor will tell you how to do this. ? · Be safe with medicines. Take pain medicines exactly as directed. ¨ If the doctor gave you a prescription medicine for pain, take it as prescribed. ¨ If you are not taking a prescription pain medicine, ask your doctor if you can take an over-the-counter medicine. ¨ Plan to take your pain medicine 30 minutes before exercises. It is easier to prevent pain before it starts than to stop it once it has started. ? · If you think your pain medicine is making you sick to your stomach:  ¨ Take your medicine after meals (unless your doctor has told you not to). ¨ Ask your doctor for a different pain medicine. ? · If your doctor prescribed antibiotics, take them as directed. Do not stop taking them just because you feel better. You need to take the full course of antibiotics. Incision care  ? · You will have a bandage over the cut (incision) and staples or stitches. Take the bandage off when your doctor says it is okay. ? · Your doctor will remove the staples or stitches 10 days to 3 weeks after the surgery and replace them with strips of tape. Leave the tape on for a week or until it falls off. Exercise  ? · Your rehab program will give you a number of exercises to do to help you get back your knee's range of motion and strength. Always do them as your therapist tells you. Ice and elevation  ?  · For pain and swelling, put ice or a cold pack on the area for 10 to 20 minutes at a time. Put a thin cloth between the ice and your skin. Other instructions  ? · Continue to wear your support stockings as your doctor says. These help to prevent blood clots. The length of time that you will have to wear them depends on your activity level and the amount of swelling. ? · You have metal pieces in your knee. These may set off some airport metal detectors. Carry a medical alert card that says you have an artificial joint, just in case. Follow-up care is a key part of your treatment and safety. Be sure to make and go to all appointments, and call your doctor if you are having problems. It's also a good idea to know your test results and keep a list of the medicines you take. When should you call for help? Call 911 anytime you think you may need emergency care. For example, call if:  ? · You passed out (lost consciousness). ? · You have severe trouble breathing. ? · You have sudden chest pain and shortness of breath, or you cough up blood. ?Call your doctor now or seek immediate medical care if:  ? · You have signs of infection, such as:  ¨ Increased pain, swelling, warmth, or redness. ¨ Red streaks leading from the incision. ¨ Pus draining from the incision. ¨ A fever. ? · You have signs of a blood clot, such as:  ¨ Pain in your calf, back of the knee, thigh, or groin. ¨ Redness and swelling in your leg or groin. ? · Your incision comes open and begins to bleed, or the bleeding increases. ? · You have pain that does not get better after you take pain medicine. ? Watch closely for changes in your health, and be sure to contact your doctor if:  ? · You do not have a bowel movement after taking a laxative. Where can you learn more? Go to http://jerson-rashmi.info/. Enter Z202 in the search box to learn more about \"Total Knee Replacement: What to Expect at Home. \"  Current as of: March 21, 2017  Content Version: 11.4  © 6415-1730 [employer]. Care instructions adapted under license by [employer] (which disclaims liability or warranty for this information). If you have questions about a medical condition or this instruction, always ask your healthcare professional. [address] any warranty or liability for your use of this information. These are general instructions for a healthy lifestyle:    No smoking/ No tobacco products/ Avoid exposure to second hand smoke    Surgeon General's Warning:  Quitting smoking now greatly reduces serious risk to your health. Obesity, smoking, and sedentary lifestyle greatly increases your risk for illness    A healthy diet, regular physical exercise & weight monitoring are important for maintaining a healthy lifestyle    You may be retaining fluid if you have a history of heart failure or if you experience any of the following symptoms:  Weight gain of 3 pounds or more overnight or 5 pounds in a week, increased swelling in our hands or feet or shortness of breath while lying flat in bed. Please call your doctor as soon as you notice any of these symptoms; do not wait until your next office visit. Recognize signs and symptoms of STROKE:    F-face looks uneven    A-arms unable to move or move even    S-speech slurred or non-existent    T-time-call 911 as soon as signs and symptoms begin-DO NOT go       Back to bed or wait to see if you get better-TIME IS BRAIN. The discharge information has been reviewed with the patient. The patient verbalized understanding. Information obtained by :  I understand that if any problems occur once I am at home I am to contact my physician. I understand and acknowledge receipt of the instructions indicated above.                                                                                                                                            Physician's or R.N.'s Signature Date/Time                                                                                                                                              Patient or Representative Signature                                                          Date/Time

## 2018-01-31 NOTE — PROGRESS NOTES
01/31/18 0932   Oxygen Therapy   O2 Sat (%) 96 %   Pulse via Oximetry 70 beats per minute   O2 Device Room air   Patient achieved 1250 Ml/sec on IS. Patient encouraged to do every hour while awake-patient agreed and demonstrated. No shortness of breath or distress noted. BS are clear b/l.

## 2018-01-31 NOTE — PROGRESS NOTES
Discharge instructions given to pt and . Voiced understanding.  No questions or concerns at thsi time

## 2018-01-31 NOTE — PROGRESS NOTES
Problem: Mobility Impaired (Adult and Pediatric)  Goal: *Acute Goals and Plan of Care (Insert Text)  GOALS (1-4 days):  (1.)Ms. Fazal Lucas will move from supine to sit and sit to supine  in bed with SUPERVISION. (2.)Ms. Fazal Lucas will transfer from bed to chair and chair to bed with STAND BY ASSIST using the least restrictive device. (3.)Ms. Fazal Lucas will ambulate with STAND BY ASSIST for 200 feet with the least restrictive device. (4.)Ms. Fazal Lucas will ambulate up/down 3 steps with left railing with CONTACT GUARD ASSIST with no device. 1/31  (5.)Ms. Fazal Lucas will increase right knee ROM to 5°-80°. Flex 1/31  ________________________________________________________________________________________________    PHYSICAL THERAPY [facility]: Daily Note, AM 1/31/2018  INPATIENT: Hospital Day: 3  [place] / [place] / Product Type: Medicare /      NAME/AGE/GENDER: Margret Champion is a 74 y.o. female   PRIMARY DIAGNOSIS:  Unilateral primary osteoarthritis, right knee [M17.11]   Procedure(s) and Anesthesia Type:     * RIGHT KNEE ARTHROPLASTY TOTAL - General (Right)  ICD-10: Treatment Diagnosis:    · Pain in Right Knee (M25.561)  · Stiffness of Right Knee, Not elsewhere classified (M25.661)  · Difficulty in walking, Not elsewhere classified (R26.2)      ASSESSMENT:     Ms. Fazal Lucas presents with impaired strength & mobility s/p right TKA. Pt also had decreased stability during out of bed activity. Pt will benefit from follow up therapy to help restore safe function prior to returning home with cargiver. She has made good progress with gait and TK exercises. She is ready for D/C. This section established at most recent assessment   PROBLEM LIST (Impairments causing functional limitations):  1. Decreased Strength  2. Decreased ADL/Functional Activities  3. Decreased Transfer Abilities  4. Decreased Ambulation Ability/Technique  5. Decreased Balance  6. Increased Pain  7.  Decreased Activity Tolerance  8. Decreased Flexibility/Joint Mobility  9. Decreased [place] with Home Exercise Program   INTERVENTIONS PLANNED: (Benefits and precautions of physical therapy have been discussed with the patient.)  1. Bed Mobility  2. Gait Training  3. Home Exercise Program (HEP)  4. Therapeutic Exercise/Strengthening  5. Transfer Training  6. Range of Motion: active/assisted/passive  7. Therapeutic Activities  8. Group Therapy     TREATMENT PLAN: Frequency/Duration: Follow patient BID   to address above goals. Rehabilitation Potential For Stated Goals: Good     RECOMMENDED REHABILITATION/EQUIPMENT: (at time of discharge pending progress): Continue Skilled Therapy and Home Health: Physical Therapy. HISTORY:   History of Present Injury/Illness (Reason for Referral): The patient has end stage arthritis of the right knee. The patient was evaluated and examined during a consultation prior to today. The patient complains of knee pain with activities, reports pain as mostly occurring along the joint lines, reports stiffness of the knee with prolonged inactivity, and swelling/pain at the end of the day and after increased physical activity. The pain affects the patients activities of daily living and quality of life. The patient has attempted and exhausted conservative treatment. There have been no changes to the patient's orthopedic condition since the last office visit. Past Medical History/Comorbidities:   Ms. Fazal Lucas  has a past medical history of Allergic rhinitis; Anxiety; Arrhythmia; Arthritis; Depression; Endocarditis (1992); Heart murmur; Hematuria; History of MRSA infection (on left breast); HLD (hyperlipidemia) ( ); Hypertension; Hypothyroid; Hypothyroidism due to acquired atrophy of thyroid (4/28/2015); Intertrigo; Irregular heart beat; Mass of colon; Morbid obesity ([address] Reactive airway disease with status asthmaticus; Scoliosis (8/4/2016); Sleep apnea;  Unspecified adverse effect of anesthesia; Varicose veins; and VSD (ventricular septal defect) (2016). She also has no past medical history of Adverse effect of anesthesia; Difficult intubation; Malignant hyperthermia due to anesthesia; Nausea & vomiting; or Pseudocholinesterase deficiency. Ms. Alyce Villegas  has a past surgical history that includes hx lipoma resection (Right); hx appendectomy; hx hernia repair (incisional [id number]); hx colonoscopy (, ); hx heent; hx orthopaedic (due to underbite); hx heart catheterization; hx breast reduction (Bilateral, 2016); hx  section; hx dilation and curettage; and hx colectomy (Right, ). Social History/Living Environment:   Home Environment: Private residence  # Steps to Enter: 3  Rails to Enter: Yes  Hand Rails : Left  One/Two Story Residence: One story  Living Alone: No  Support Systems: Spouse/Significant Other/Partner  Patient Expects to be Discharged to[de-identified] Private residence  Current DME Used/Available at Home: None  Tub or Shower Type: Shower  Prior Level of Function/Work/Activity:  Pt was functioning in the home without an assistive device & out of the home with HHA prior to this admission   Number of Personal Factors/Comorbidities that affect the Plan of Care: 3+: HIGH COMPLEXITY   EXAMINATION:   Most Recent Physical Functioning:               RLE AROM  R Knee Flexion: 85  R Knee Extension: 12                 Transfers  Sit to Stand: Stand-by asssistance  Stand to Sit: Stand-by asssistance                   Weight Bearing Status  Right Side Weight Bearing: As tolerated  Distance (ft): 100 Feet (ft)  Ambulation - Level of Assistance: Stand-by asssistance  Assistive Device: Walker, rolling  Speed/Luzma: Shuffled; Slow  Stance: Right decreased  Gait Abnormalities: Antalgic;Decreased step clearance        Braces/Orthotics: none    Right Knee Cold  Type: Cryocuff      Body Structures Involved:  1. Joints  2. Muscles Body Functions Affected:  1. Sensory/Pain  2.  Movement Related Activities and Participation Affected:  1. General Tasks and Demands  2. Mobility   Number of elements that affect the Plan of Care: 4+: HIGH COMPLEXITY   CLINICAL PRESENTATION:   Presentation: Stable and uncomplicated: LOW COMPLEXITY   CLINICAL DECISION MAKIN [facility] [address] [id number] AM-PAC 6 Clicks   Basic Mobility Inpatient Short Form  How much difficulty does the patient currently have. .. Unable A Lot A Little None   1. Turning over in bed (including adjusting bedclothes, sheets and blankets)? [] 1   [] 2   [x] 3   [] 4   2. Sitting down on and standing up from a chair with arms ( e.g., wheelchair, bedside commode, etc.)   [] 1   [] 2   [x] 3   [] 4   3. Moving from lying on back to sitting on the side of the bed? [] 1   [] 2   [x] 3   [] 4   How much help from another person does the patient currently need. .. Total A Lot A Little None   4. Moving to and from a bed to a chair (including a wheelchair)? [] 1   [] 2   [x] 3   [] 4   5. Need to walk in hospital room? [] 1   [] 2   [x] 3   [] 4   6. Climbing 3-5 steps with a railing? [x] 1   [] 2   [] 3   [] 4   © , Trustees of [address] [place] [address] [address] [id number], under license to [employer]. All rights reserved        Score:  Initial: 16 Most Recent: X (Date: -- )    Interpretation of Tool:  Represents activities that are increasingly more difficult (i.e. Bed mobility, Transfers, Gait). Score 24 23 22-20 19-15 14-10 9-7 6     Modifier CH CI CJ CK CL CM CN      ?  Mobility - Walking and Moving Around:     - CURRENT STATUS: CK - 40%-59% impaired, limited or restricted    - GOAL STATUS: CJ - 20%-39% impaired, limited or restricted    - D/C STATUS:  ---------------To be determined---------------  [place] / [place] / Product Type: Medicare /      Medical Necessity:     · Patient is expected to demonstrate progress in strength, range of motion, balance, coordination and functional technique to decrease assistance required with bed mobility, transfers & gait. Reason for Services/Other Comments:  · Patient continues to require skilled intervention due to unsafe with functional mobility. Use of outcome tool(s) and clinical judgement create a POC that gives a: Clear prediction of patient's progress: LOW COMPLEXITY            TREATMENT:   (In addition to Assessment/Re-Assessment sessions the following treatments were rendered)     Pre-treatment Symptoms/Complaints:  none  Pain: Initial: visual scale  Pain Intensity 1: 0 (0/10 after therapy)  Post Session:      Gait Training (15 Minutes):  Gait training to improve and/or restore physical functioning as related to mobility. Ambulated 100 Feet (ft) with Stand-by asssistance using a Walker, rolling and minimal   related to their knee position and motion to promote proper body alignment. Therapeutic Exercise: (45 Minutes (group therapy)):  Exercises per grid below to improve strength. Required minimal verbal cues to promote proper body alignment. Progressed range as indicated. Date:  1/30   Date:  1/31   Date:     ACTIVITY/EXERCISE AM PM AM PM AM PM   GROUP THERAPY  []  [x]  [x]  []  []  []   Ankle Pumps 15 15 20      Quad Sets 15 15 20      Gluteal Sets 15 15 20      Hip ABd/ADduction 15 15 20      Straight Leg Raises 15 15 20      Knee Slides 15 15 20      Short Arc Quads 15 15 20      Long Arc Quads         Chair Slides  15 20               B = bilateral; AA = active assistive; A = active; P = passive      Treatment/Session Assessment:     Response to Treatment:  Tolerated therapy well. Ready for D/C.     Education:  [] Home Exercises  [x] Fall Precautions  [] Hip Precautions [] D/C Instruction Review  [] Knee/Hip Prosthesis Review  [x] Walker Management/Safety [] Adaptive Equipment as Needed       Interdisciplinary Collaboration:   o Registered Nurse    After treatment position/precautions:   o Up in chair  o Bed/Chair-wheels locked  o Bed in low position  o Caregiver at bedside  o Call light within reach  o RN notified  o Family at bedside    Compliance with Program/Exercises: Will assess as treatment progresses. Recommendations/Intent for next treatment session:  Treatment next visit will focus on increasing Ms. Jay's independence with bed mobility, transfers, gait training, strength/ROM exercises, modalities for pain, and patient education.       Total Treatment Duration:  PT Patient Time In/Time Out  Time In: 1015  Time Out: [address] [place], PTA

## 2018-01-31 NOTE — PROGRESS NOTES
2018         Post Op day: 2 Days Post-Op     Admit Date: 2018  Admit Diagnosis: Unilateral primary osteoarthritis, right knee [M17.11]        Subjective: Patient stable. No acute events. Objective:   Visit Vitals    /78    Pulse 76    Temp 98.4 °F (36.9 °C)    Resp 16    Ht 5' 3\" (1.6 m)    Wt 119.2 kg (262 lb 12.6 oz)    SpO2 93%    Breastfeeding No    BMI 46.55 kg/m2    Temp (24hrs), Av.2 °F (36.8 °C), Min:97 °F (36.1 °C), Max:99.2 °F (37.3 °C)    Lab Results   Component Value Date/Time    HGB 10.4 2018 05:42 AM     Extremity Exam  Dressing clean and dry   Tibialis Anterior and Gastroc-Soleus functioning normally right lower extremity  Sensation intact to light touch on operative limb  Extremity perfused  TEDS/SCDS in place  No sign of DVT     Assessment / Plan :  WBAT RLE  Continue PT/OT  Continue current DVT prophylaxis in house. Discharge on ASA BID  DIspo-HH  Patient Active Problem List   Diagnosis Code    Essential hypertension I10    Mixed hyperlipidemia E78.2    Hypothyroidism due to acquired atrophy of thyroid E03.4    Allergic rhinitis due to allergen J30.9    Primary osteoarthritis involving multiple joints M15.0    Depression F32.9    Obesity E66.9    PRAVEEN (obstructive sleep apnea) G47.33    Edema R60.9    Morbid obesity (HCC) E66.01    Anxiety F41.9    Osteoarthritis of right knee M17.11    Allergic rhinitis J30.9    Hypothyroid E03.9    Arthritis M19.90    Arrhythmia I49.9    Hypertension I10    VSD (ventricular septal defect) Q21.0    Preoperative clearance Z01.818    Dyspnea on exertion R06.09    Scoliosis M41.9    Endocarditis I38    OA (osteoarthritis) of knee M17.10          Signed By: Rissa Ghotra MD     I have reviewed the patients controlled substance prescription history, as maintained in the [place] prescription monitoring program, so that the prescription(s) for a  controlled substance can be given.

## 2018-02-01 ENCOUNTER — HOME CARE VISIT (OUTPATIENT)
Dept: SCHEDULING | Facility: HOME HEALTH | Age: 68
End: 2018-02-01
Payer: MEDICARE

## 2018-02-01 VITALS
TEMPERATURE: 98.1 F | OXYGEN SATURATION: 97 % | HEART RATE: 98 BPM | RESPIRATION RATE: 20 BRPM | DIASTOLIC BLOOD PRESSURE: 80 MMHG | SYSTOLIC BLOOD PRESSURE: 122 MMHG

## 2018-02-01 VITALS
HEIGHT: 63 IN | BODY MASS INDEX: 45.89 KG/M2 | TEMPERATURE: 99.1 F | WEIGHT: 259 LBS | RESPIRATION RATE: 18 BRPM | HEART RATE: 72 BPM | SYSTOLIC BLOOD PRESSURE: 129 MMHG | DIASTOLIC BLOOD PRESSURE: 71 MMHG

## 2018-02-01 PROCEDURE — G0299 HHS/HOSPICE OF RN EA 15 MIN: HCPCS

## 2018-02-01 PROCEDURE — 400013 HH SOC

## 2018-02-01 PROCEDURE — 3331090002 HH PPS REVENUE DEBIT

## 2018-02-01 PROCEDURE — 3331090001 HH PPS REVENUE CREDIT

## 2018-02-01 PROCEDURE — G0151 HHCP-SERV OF PT,EA 15 MIN: HCPCS

## 2018-02-02 PROCEDURE — 3331090002 HH PPS REVENUE DEBIT

## 2018-02-02 PROCEDURE — 3331090001 HH PPS REVENUE CREDIT

## 2018-02-03 PROCEDURE — 3331090002 HH PPS REVENUE DEBIT

## 2018-02-03 PROCEDURE — 3331090001 HH PPS REVENUE CREDIT

## 2018-02-04 PROCEDURE — 3331090001 HH PPS REVENUE CREDIT

## 2018-02-04 PROCEDURE — 3331090002 HH PPS REVENUE DEBIT

## 2018-02-05 ENCOUNTER — HOME CARE VISIT (OUTPATIENT)
Dept: SCHEDULING | Facility: HOME HEALTH | Age: 68
End: 2018-02-05
Payer: MEDICARE

## 2018-02-05 VITALS
DIASTOLIC BLOOD PRESSURE: 70 MMHG | OXYGEN SATURATION: 93 % | HEART RATE: 76 BPM | SYSTOLIC BLOOD PRESSURE: 132 MMHG | RESPIRATION RATE: 16 BRPM | TEMPERATURE: 98.1 F

## 2018-02-05 VITALS
HEART RATE: 76 BPM | RESPIRATION RATE: 16 BRPM | TEMPERATURE: 98.1 F | DIASTOLIC BLOOD PRESSURE: 70 MMHG | SYSTOLIC BLOOD PRESSURE: 132 MMHG | OXYGEN SATURATION: 93 %

## 2018-02-05 PROCEDURE — G0151 HHCP-SERV OF PT,EA 15 MIN: HCPCS

## 2018-02-05 PROCEDURE — A6403 STERILE GAUZE>16 <= 48 SQ IN: HCPCS

## 2018-02-05 PROCEDURE — 3331090002 HH PPS REVENUE DEBIT

## 2018-02-05 PROCEDURE — 3331090001 HH PPS REVENUE CREDIT

## 2018-02-05 PROCEDURE — A6258 TRANSPARENT FILM >16<=48 IN: HCPCS

## 2018-02-05 PROCEDURE — G0299 HHS/HOSPICE OF RN EA 15 MIN: HCPCS

## 2018-02-06 ENCOUNTER — HOME CARE VISIT (OUTPATIENT)
Dept: HOME HEALTH SERVICES | Facility: HOME HEALTH | Age: 68
End: 2018-02-06
Payer: MEDICARE

## 2018-02-06 PROCEDURE — 3331090001 HH PPS REVENUE CREDIT

## 2018-02-06 PROCEDURE — 3331090002 HH PPS REVENUE DEBIT

## 2018-02-07 ENCOUNTER — HOME CARE VISIT (OUTPATIENT)
Dept: SCHEDULING | Facility: HOME HEALTH | Age: 68
End: 2018-02-07
Payer: MEDICARE

## 2018-02-07 VITALS
SYSTOLIC BLOOD PRESSURE: 142 MMHG | TEMPERATURE: 98.2 F | HEART RATE: 78 BPM | RESPIRATION RATE: 18 BRPM | DIASTOLIC BLOOD PRESSURE: 80 MMHG

## 2018-02-07 PROCEDURE — 3331090002 HH PPS REVENUE DEBIT

## 2018-02-07 PROCEDURE — 3331090001 HH PPS REVENUE CREDIT

## 2018-02-07 PROCEDURE — G0157 HHC PT ASSISTANT EA 15: HCPCS

## 2018-02-08 ENCOUNTER — HOME CARE VISIT (OUTPATIENT)
Dept: SCHEDULING | Facility: HOME HEALTH | Age: 68
End: 2018-02-08
Payer: MEDICARE

## 2018-02-08 VITALS
RESPIRATION RATE: 16 BRPM | HEART RATE: 70 BPM | OXYGEN SATURATION: 91 % | DIASTOLIC BLOOD PRESSURE: 88 MMHG | TEMPERATURE: 98.4 F | SYSTOLIC BLOOD PRESSURE: 140 MMHG

## 2018-02-08 PROCEDURE — 3331090001 HH PPS REVENUE CREDIT

## 2018-02-08 PROCEDURE — G0299 HHS/HOSPICE OF RN EA 15 MIN: HCPCS

## 2018-02-08 PROCEDURE — 3331090002 HH PPS REVENUE DEBIT

## 2018-02-09 ENCOUNTER — HOME CARE VISIT (OUTPATIENT)
Dept: SCHEDULING | Facility: HOME HEALTH | Age: 68
End: 2018-02-09
Payer: MEDICARE

## 2018-02-09 ENCOUNTER — HOME CARE VISIT (OUTPATIENT)
Dept: HOME HEALTH SERVICES | Facility: HOME HEALTH | Age: 68
End: 2018-02-09
Payer: MEDICARE

## 2018-02-09 VITALS
TEMPERATURE: 99.1 F | DIASTOLIC BLOOD PRESSURE: 82 MMHG | RESPIRATION RATE: 18 BRPM | HEART RATE: 78 BPM | SYSTOLIC BLOOD PRESSURE: 142 MMHG

## 2018-02-09 PROCEDURE — 3331090001 HH PPS REVENUE CREDIT

## 2018-02-09 PROCEDURE — 3331090002 HH PPS REVENUE DEBIT

## 2018-02-09 PROCEDURE — G0157 HHC PT ASSISTANT EA 15: HCPCS

## 2018-02-10 PROCEDURE — 3331090001 HH PPS REVENUE CREDIT

## 2018-02-10 PROCEDURE — 3331090002 HH PPS REVENUE DEBIT

## 2018-02-11 PROCEDURE — 3331090002 HH PPS REVENUE DEBIT

## 2018-02-11 PROCEDURE — 3331090001 HH PPS REVENUE CREDIT

## 2018-02-12 ENCOUNTER — HOME CARE VISIT (OUTPATIENT)
Dept: SCHEDULING | Facility: HOME HEALTH | Age: 68
End: 2018-02-12
Payer: MEDICARE

## 2018-02-12 VITALS
HEART RATE: 78 BPM | TEMPERATURE: 99.6 F | RESPIRATION RATE: 18 BRPM | DIASTOLIC BLOOD PRESSURE: 76 MMHG | SYSTOLIC BLOOD PRESSURE: 146 MMHG

## 2018-02-12 PROCEDURE — 3331090002 HH PPS REVENUE DEBIT

## 2018-02-12 PROCEDURE — G0157 HHC PT ASSISTANT EA 15: HCPCS

## 2018-02-12 PROCEDURE — 3331090001 HH PPS REVENUE CREDIT

## 2018-02-13 PROCEDURE — 3331090002 HH PPS REVENUE DEBIT

## 2018-02-13 PROCEDURE — 3331090001 HH PPS REVENUE CREDIT

## 2018-02-14 ENCOUNTER — HOME CARE VISIT (OUTPATIENT)
Dept: SCHEDULING | Facility: HOME HEALTH | Age: 68
End: 2018-02-14
Payer: MEDICARE

## 2018-02-14 VITALS
OXYGEN SATURATION: 94 % | SYSTOLIC BLOOD PRESSURE: 150 MMHG | RESPIRATION RATE: 16 BRPM | HEART RATE: 64 BPM | TEMPERATURE: 98.5 F | DIASTOLIC BLOOD PRESSURE: 90 MMHG

## 2018-02-14 PROCEDURE — 3331090002 HH PPS REVENUE DEBIT

## 2018-02-14 PROCEDURE — 3331090001 HH PPS REVENUE CREDIT

## 2018-02-14 PROCEDURE — G0299 HHS/HOSPICE OF RN EA 15 MIN: HCPCS

## 2018-02-15 PROCEDURE — 3331090001 HH PPS REVENUE CREDIT

## 2018-02-15 PROCEDURE — 3331090002 HH PPS REVENUE DEBIT

## 2018-02-16 ENCOUNTER — HOME CARE VISIT (OUTPATIENT)
Dept: SCHEDULING | Facility: HOME HEALTH | Age: 68
End: 2018-02-16
Payer: MEDICARE

## 2018-02-16 VITALS
SYSTOLIC BLOOD PRESSURE: 146 MMHG | TEMPERATURE: 99.2 F | RESPIRATION RATE: 18 BRPM | DIASTOLIC BLOOD PRESSURE: 78 MMHG | HEART RATE: 84 BPM

## 2018-02-16 PROCEDURE — 3331090002 HH PPS REVENUE DEBIT

## 2018-02-16 PROCEDURE — 3331090001 HH PPS REVENUE CREDIT

## 2018-02-16 PROCEDURE — G0157 HHC PT ASSISTANT EA 15: HCPCS

## 2018-02-17 PROCEDURE — 3331090001 HH PPS REVENUE CREDIT

## 2018-02-17 PROCEDURE — 3331090002 HH PPS REVENUE DEBIT

## 2018-02-18 PROCEDURE — 3331090001 HH PPS REVENUE CREDIT

## 2018-02-18 PROCEDURE — 3331090002 HH PPS REVENUE DEBIT

## 2018-02-19 ENCOUNTER — HOME CARE VISIT (OUTPATIENT)
Dept: SCHEDULING | Facility: HOME HEALTH | Age: 68
End: 2018-02-19
Payer: MEDICARE

## 2018-02-19 VITALS
SYSTOLIC BLOOD PRESSURE: 144 MMHG | DIASTOLIC BLOOD PRESSURE: 82 MMHG | HEART RATE: 78 BPM | TEMPERATURE: 98.4 F | RESPIRATION RATE: 18 BRPM

## 2018-02-19 PROCEDURE — 3331090002 HH PPS REVENUE DEBIT

## 2018-02-19 PROCEDURE — 3331090001 HH PPS REVENUE CREDIT

## 2018-02-19 PROCEDURE — G0157 HHC PT ASSISTANT EA 15: HCPCS

## 2018-02-20 ENCOUNTER — HOME CARE VISIT (OUTPATIENT)
Dept: SCHEDULING | Facility: HOME HEALTH | Age: 68
End: 2018-02-20
Payer: MEDICARE

## 2018-02-20 VITALS
SYSTOLIC BLOOD PRESSURE: 135 MMHG | DIASTOLIC BLOOD PRESSURE: 95 MMHG | RESPIRATION RATE: 16 BRPM | OXYGEN SATURATION: 97 % | HEART RATE: 70 BPM | TEMPERATURE: 96.7 F

## 2018-02-20 PROCEDURE — 3331090003 HH PPS REVENUE ADJ

## 2018-02-20 PROCEDURE — 3331090002 HH PPS REVENUE DEBIT

## 2018-02-20 PROCEDURE — 3331090001 HH PPS REVENUE CREDIT

## 2018-02-20 PROCEDURE — G0151 HHCP-SERV OF PT,EA 15 MIN: HCPCS

## 2018-02-21 PROCEDURE — 3331090002 HH PPS REVENUE DEBIT

## 2018-02-21 PROCEDURE — 3331090001 HH PPS REVENUE CREDIT

## 2018-02-22 PROCEDURE — 3331090002 HH PPS REVENUE DEBIT

## 2018-02-22 PROCEDURE — 3331090001 HH PPS REVENUE CREDIT

## 2018-02-23 PROCEDURE — 3331090002 HH PPS REVENUE DEBIT

## 2018-02-23 PROCEDURE — 3331090001 HH PPS REVENUE CREDIT

## 2018-02-24 PROCEDURE — 3331090001 HH PPS REVENUE CREDIT

## 2018-02-24 PROCEDURE — 3331090002 HH PPS REVENUE DEBIT

## 2018-02-25 PROCEDURE — 3331090002 HH PPS REVENUE DEBIT

## 2018-02-25 PROCEDURE — 3331090001 HH PPS REVENUE CREDIT

## 2018-02-26 ENCOUNTER — HOSPITAL ENCOUNTER (OUTPATIENT)
Dept: PHYSICAL THERAPY | Age: 68
Discharge: HOME | End: 2018-02-26
Payer: MEDICARE

## 2018-02-26 PROCEDURE — 97161 PT EVAL LOW COMPLEX 20 MIN: CPT

## 2018-02-26 PROCEDURE — G8978 MOBILITY CURRENT STATUS: HCPCS

## 2018-02-26 PROCEDURE — G8979 MOBILITY GOAL STATUS: HCPCS

## 2018-02-26 PROCEDURE — 97110 THERAPEUTIC EXERCISES: CPT

## 2018-02-27 NOTE — PROGRESS NOTES
Ambulatory/Rehab Services H2 Model Falls Risk Assessment    Risk Factor Pts. ·   Confusion/Disorientation/Impulsivity  []    4 ·   Symptomatic Depression  []   2 ·   Altered Elimination  []   1 ·   Dizziness/Vertigo  []   1 ·   Gender (Male)  []   1 ·   Any administered antiepileptics (anticonvulsants):  []   2 ·   Any administered benzodiazepines:  []   1 ·   Visual Impairment (specify):  []   1 ·   Portable Oxygen Use  []   1 ·   Orthostatic ? BP  []   1 ·   History of Recent Falls (within 3 mos.)  []   5     Ability to Rise from Chair (choose one) Pts. ·   Ability to rise in a single movement  []   0 ·   Pushes up, successful in one attempt  [x]   1 ·   Multiple attempts, but successful  []   3 ·   Unable to rise without assistance  []   4   Total: (5 or greater = High Risk) 1     Falls Prevention Plan:   []                Physical Limitations to Exercise (specify):   []                Mobility Assistance Device (type):   []                Exercise/Equipment Adaptation (specify):    ©2010 [facility] [address] Patent #[id number].  Federal Law prohibits the replication, distribution or use without written permission from [facility]

## 2018-02-27 NOTE — THERAPY EVALUATION
Manasa Kinsey  : 1950  [place]  Secondary: [address]  [phone]   [phone]          OUTPATIENT PHYSICAL THERAPY:Initial Assessment and Daily Note 2018    ICD-10: Treatment Diagnosis: pain in joint; right knee M25.561  ICD-10: Treatment Diagnosis: difficulty walking R26.2  ICD-10: Treatment Diagnosis: muscle weakness generalized M62.81    Precautions/Allergies:   Ceclor [cefaclor] and Symbyax [olanzapine-fluoxetine]   Fall Risk Score: 1 (? 5 = High Risk)  MD Orders: eval and treat MEDICAL/REFERRING DIAGNOSIS:  Presence of right artificial knee joint [Z96.651]   DATE OF ONSET: surgical 18  REFERRING PHYSICIAN: Kit Mcgrath MD  RETURN PHYSICIAN APPOINTMENT: 3/13/18     INITIAL ASSESSMENT:  Ms. Darius Lyn is a 74 y.o. female who presents to physical therapy post-op right TKA on 18. She was released from home health therapy with improved ROM in right knee, however continues to present with antalgic gait, LE and core weakness and challenges with daily tasks including sit to stand transfers from various heights, prolonged weight bearing and ambulation. She would benefit from skilled physical therapy to improve overall mobility, gait and function. PROBLEM LIST (Impacting functional limitations):  1. Decreased Strength  2. Decreased ADL/Functional Activities  3. Decreased Transfer Abilities  4. Decreased Ambulation Ability/Technique  5. Decreased Balance  6. Increased Pain  7. Decreased Activity Tolerance  8. Increased Fatigue  9. Decreased Flexibility/Joint Mobility INTERVENTIONS PLANNED:  1. Balance Exercise  2. Bed Mobility  3. Gait Training  4. Home Exercise Program (HEP)  5. Manual Therapy  6. Neuromuscular Re-education/Strengthening  7. Range of Motion (ROM)  8. Therapeutic Activites  9.  Therapeutic Exercise/Strengthening   TREATMENT PLAN:  Effective Dates: 2/26/2018 TO 5/27/2018. Frequency/Duration: 2 times a week for 90 Days  GOALS: (Goals have been discussed and agreed upon with patient.)    Discharge Goals: Time Frame: 90 days  1. Patient demonstrates independence with her HEP without verbal cueing from therapist.  2. Patient demonstrates knee ROM to 0-125 degrees of right knee to perform ADLs  3. Patient able to ambulate with upright posture for 20 minutes without onset of right knee and low back pain. 4. Patient able to ascend/descend flight of stairs without difficulty  5. Improve LEFS outcome measure score from 18/80 to 60/80 to perform ADLs  Rehabilitation Potential For Stated Goals: Excellent  Regarding Yodit Jay's therapy, I certify that the treatment plan above will be carried out by a therapist or under their direction. Thank you for this referral,  Kathleen Archuleta PT                 The information in this section was collected on 2/26/18 (except where otherwise noted). HISTORY:   History of Present Injury/Illness (Reason for Referral):  Chronic low back pain and bilateral knee pain and arthritis for the past several years, which has created increased forward stoop with ambulation. Challenged with all weight bearing activities, ambulation, sit to stand transfers, and balance. She notes she did well with home health physical therapy and improved overall right knee ROM, however continues to present with deficits with strength and weight bearing. She notes she worked out with a  4 months prior to surgery. Past Medical History/Comorbidities:   Ms. Edilberto Bee  has a past medical history of Allergic rhinitis; Anxiety; Arrhythmia; Arthritis; Depression; Endocarditis (1992); Heart murmur; Hematuria; History of MRSA infection (on left breast); HLD (hyperlipidemia) ( ); Hypertension; Hypothyroid; Hypothyroidism due to acquired atrophy of thyroid (4/28/2015); Intertrigo; Irregular heart beat; Mass of colon;  Morbid obesity ([facility] [address] Reactive airway disease with status asthmaticus; Scoliosis (2016); Sleep apnea; Unspecified adverse effect of anesthesia; Varicose veins; and VSD (ventricular septal defect) (2016). She also has no past medical history of Adverse effect of anesthesia; Difficult intubation; Malignant hyperthermia due to anesthesia; Nausea & vomiting; or Pseudocholinesterase deficiency. Ms. Ninette Severs  has a past surgical history that includes hx lipoma resection (Right); hx appendectomy; hx hernia repair (incisional [id number]); hx colonoscopy (, ); hx heent; hx orthopaedic (due to underbite); hx heart catheterization; hx breast reduction (Bilateral, 2016); hx  section; hx dilation and curettage; and hx colectomy (Right, ). Social History/Living Environment:     lives in a private home with her , Has a one story home and walk in shower. Prior Level of Function/Work/Activity:  Retired . Worked out 4 months prior to surgery to prepare. Dominant Side:         RIGHT  Current Medications:       Current Outpatient Prescriptions:     aspirin delayed-release 325 mg tablet, Take 1 Tab by mouth every twelve (12) hours every twelve (12) hours. , Disp: 60 Tab, Rfl: 0    celecoxib (CELEBREX) 200 mg capsule, Take 1 Cap by mouth every twelve (12) hours every twelve (12) hours for 90 days. , Disp: 60 Cap, Rfl: 2    HYDROmorphone (DILAUDID) 2 mg tablet, Take 1 Tab by mouth every four (4) hours as needed. Max Daily Amount: 12 mg., Disp: 90 Tab, Rfl: 0    ondansetron (ZOFRAN ODT) 8 mg disintegrating tablet, Take 1 Tab by mouth every eight (8) hours as needed for Nausea., Disp: 60 Tab, Rfl: 0    senna-docusate (PERICOLACE) 8.6-50 mg per tablet, Take 2 Tabs by mouth daily. , Disp: 120 Tab, Rfl: 0    zolpidem (AMBIEN) 5 mg tablet, Take 1 Tab by mouth nightly as needed for Sleep.  Max Daily Amount: 5 mg., Disp: 60 Tab, Rfl: 0    DIGESTIVE ENZYMES, PLANT, (FIBERZYME CONCENTRATE-HP PO), Take  by mouth two (2) times a day., Disp: , Rfl:     acetaminophen (TYLENOL EXTRA STRENGTH) 500 mg tablet, Take  by mouth as needed for Pain. Indications: Pain, Disp: , Rfl:     escitalopram oxalate (LEXAPRO) 10 mg tablet, Take 1 Tab by mouth daily. , Disp: 30 Tab, Rfl: 5    levothyroxine (SYNTHROID) 150 mcg tablet, Take 1 Tab by mouth every Monday. 1 day a week, Disp: 90 Tab, Rfl: 1    levothyroxine (SYNTHROID) 175 mcg tablet, 1 tab po 6 days a week (Patient taking differently: 1 tab po 6 days a week: Tues - Sun), Disp: 90 Tab, Rfl: 1    montelukast (SINGULAIR) 10 mg tablet, Take 1 Tab by mouth daily. , Disp: 90 Tab, Rfl: 1    ADVAIR DISKUS 100-50 mcg/dose diskus inhaler, Take 1 Puff by inhalation daily. , Disp: 3 Inhaler, Rfl: 3    losartan-hydroCHLOROthiazide (HYZAAR) 100-25 mg per tablet, Take 1 Tab by mouth daily. , Disp: 90 Tab, Rfl: 1    fluticasone (FLONASE) 50 mcg/actuation nasal spray, TWO SPRAYS INTO EACH NOSTRIL ONCE DAILY, Disp: 16 g, Rfl: 11    loratadine (CLARITIN) 10 mg tablet, Take 10 mg by mouth daily. , Disp: , Rfl:     DISABLED PLACARD (DISABLED PLACARD) DMV, by Does Not Apply route See Admin Instructions. , Disp: 1 Each, Rfl: 0    albuterol (PROAIR HFA) 90 mcg/actuation inhaler, Take 2 Puffs by inhalation every six (6) hours as needed. (Patient not taking: Reported on 1/8/2018), Disp: 1 Inhaler, Rfl: 11    cpap machine kit, by Does Not Apply route., Disp: , Rfl:    Date Last Reviewed:  2/26/2018   Number of Personal Factors/Comorbidities that affect the Plan of Care: 0: LOW COMPLEXITY   EXAMINATION:   Observation/Orthostatic Postural Assessment:          Lower extremity weight bearing is slight increased left. Observation of gait indicates significant forward hinge with gait, decreased innominate extension bilaterally. Patient exhibits a decreased lumbar lordosis and increased thoracic kyphosis. Palpation of lower quadrant bony landmarks are left iliac crest elevated.  Knee alignment is left genu recurvatum, limited right knee extension. Soft tissue observation indicates anterior right quadriceps, hamstrings and iliopsoas . Palpation: Patient exhibits tenderness to palpation right anterior knee, with scar tissue restriction along incision line. Hamstring flexibility tested supine with straight leg raise is restricted bilaterally, left 50 degrees, right 45 degrees. Piriformis flexibility is restricted bilaterally. Quadriceps flexibility tested heel to buttock is restricted right greater than left. Muscle length of gastrocnemius is restricted right greater than left. Muscle length of soleus is restricted right greater than left. ROM:     AROM(PROM) Right Left   Knee flexion 100 (114) 0-120   Knee extension Lacks 8 degrees 0   Hip flexion 85 85   Hip extension 0 5     Strength:     Manual Muscle Test (*/5) Right Left   Knee extension 4- 4   Knee flexion 4 4   Hip flexion 4- 4   Hip ER 4- 4   Hip IR 4- 4-   Hip extension 3+ 3+   Hip abduction 3+ 3+   Hip adduction 4 4   Ankle DF 4 4   Ankle PF 4 4   Core stability 3+/5     Functional strength with standing heel raise is 2 inch lift with forward hinge position. Special Tests:  None performed    Neurological Screen:  Myotomes: Key muscle strength testing for bilateral LE is intact. Dermatomes: Sensation testing through bilateral LE for light touch is intact. Reflexes: Patellar (L4) and achilles (S1) are not tested. Functional Mobility:  Challenged with sit to stand, standing techniques, ambulation secondary to forward position.   '   Body Structures Involved:  1. Bones  2. Joints  3. Muscles Body Functions Affected:  1. Sensory/Pain  2. Neuromusculoskeletal  3. Movement Related Activities and Participation Affected:  1. General Tasks and Demands  2. Mobility  3. Self Care  4.  Community, Social and [place]   Number of elements (examined above) that affect the Plan of Care: 3: MODERATE COMPLEXITY   CLINICAL PRESENTATION: Presentation: Evolving clinical presentation with changing clinical characteristics: MODERATE COMPLEXITY   CLINICAL DECISION MAKING:   Outcome Measure: Tool Used: Lower Extremity Functional Scale (LEFS)  Score:  Initial: 18/80 Most Recent: X/80 (Date: -- )   Interpretation of Score: 20 questions each scored on a 5 point scale with 0 representing \"extreme difficulty or unable to perform\" and 4 representing \"no difficulty\". The lower the score, the greater the functional disability. 80/80 represents no disability. Minimal detectable change is 9 points. Score 80 79-63 62-48 47-32 31-16 15-1 0   Modifier CH CI CJ CK CL CM CN     ? Mobility - Walking and Moving Around:     - CURRENT STATUS: CL - 60%-79% impaired, limited or restricted    - GOAL STATUS: CJ - 20%-39% impaired, limited or restricted    - D/C STATUS:  ---------------To be determined---------------    Medical Necessity:   · Patient is expected to demonstrate progress in strength, range of motion, balance, coordination and functional technique to increase independence with ADLs, ambulation and improved endurance with weight bearing. Reason for Services/Other Comments:  · Patient continues to require skilled intervention due to demonstrates significant forward head/hip hinge position with weight bearing and ambulation and demonstrates weakness in LE and core stability. Use of outcome tool(s) and clinical judgement create a POC that gives a: Questionable prediction of patient's progress: MODERATE COMPLEXITY            TREATMENT:   (In addition to Assessment/Re-Assessment sessions the following treatments were rendered)  Pre-treatment Symptoms/Complaints:  Patient pleased with ROM in right knee, most challenged with standing and ambulation. Continues to fatigue easily with weight bearing.    Pain: Initial:   Pain Intensity 1: 5  Pain Location 1: Knee  Pain Orientation 1: Right  Post Session:  4/10     Therapeutic Exercise: (15 Minutes): Exercises per grid below to improve mobility, strength, balance and coordination. Required moderate verbal and manual cues to promote proper body alignment, promote proper body posture, promote proper body mechanics and promote proper body breathing techniques. Progressed resistance, range, repetitions and complexity of movement as indicated. Date:  2/26/2018   Activity/Exercise Parameters   Patient education X 5 minutes HEP   Supine straight leg raise X 10 reps, focus on core activation and quad activation   Supine hip abduction X 10 reps, focus on core activation and quad activation    Prone knee straightening X 3 minutes   Prone knee quadriceps/knee extension X 10 reps, 5 sec holds   Sit to stand weight shift/hip hinge X 10 reps, 5 sec holds  X 10 reps sit to stand         Manual Therapy (    Soft Tissue Mobilization Duration  Duration: 5 Minutes): Manual techniques to facilitate improved motion and decreased pain. (Used abbreviations: MET - muscle energy technique; PNF - proprioceptive neuromuscular facilitation; NMR - neuromuscular re-education; a/p - anterior to posterior; p/a - posterior to anterior)   · Supine right knee PROM into flexion and extension  · Supine right knee patella mobilization in all directions   · Prone soft tissue mobilization and strumming to hamstrings. [employer] Portal  Treatment/Session Assessment:    · Response to Treatment:  Improved awareness of correct techniques of sit to stand with less right knee pain  · Compliance with Program/Exercises: compliant all of the time. · Recommendations/Intent for next treatment session: \"Next visit will focus on advancements to more challenging activities\". Total Treatment Duration: 55 minutes: 35 minutes evaluation, 20 minutes treatment.    PT Patient Time In/Time Out  Time In: 2735  Time Out: 235 [facility] Andreia Padilla PT

## 2018-02-28 ENCOUNTER — APPOINTMENT (OUTPATIENT)
Dept: PHYSICAL THERAPY | Age: 68
End: 2018-02-28
Payer: MEDICARE

## 2018-03-01 ENCOUNTER — HOSPITAL ENCOUNTER (OUTPATIENT)
Dept: PHYSICAL THERAPY | Age: 68
Discharge: HOME | End: 2018-03-01
Payer: MEDICARE

## 2018-03-01 PROCEDURE — 97140 MANUAL THERAPY 1/> REGIONS: CPT

## 2018-03-01 PROCEDURE — 97110 THERAPEUTIC EXERCISES: CPT

## 2018-03-01 NOTE — PROGRESS NOTES
Manasa Kinsey  : 1950  [place]  [place] [address] [place] at [address] [place] [address]  [phone]   [phone]          OUTPATIENT PHYSICAL THERAPY:Daily Note 3/1/2018    ICD-10: Treatment Diagnosis: pain in joint; right knee M25.561  ICD-10: Treatment Diagnosis: difficulty walking R26.2  ICD-10: Treatment Diagnosis: muscle weakness generalized M62.81    Precautions/Allergies:   Ceclor [cefaclor] and Symbyax [olanzapine-fluoxetine]   Fall Risk Score: 1 (? 5 = High Risk)  MD Orders: eval and treat MEDICAL/REFERRING DIAGNOSIS:  Pain in joint [M25.50]   DATE OF ONSET: surgical 18  REFERRING PHYSICIAN: Kit Mcgrath MD  RETURN PHYSICIAN APPOINTMENT: 3/13/18     INITIAL ASSESSMENT:  Ms. Darius Lyn is a 74 y.o. female who presents to physical therapy post-op right TKA on 18. She was released from home health therapy with improved ROM in right knee, however continues to present with antalgic gait, LE and core weakness and challenges with daily tasks including sit to stand transfers from various heights, prolonged weight bearing and ambulation. She would benefit from skilled physical therapy to improve overall mobility, gait and function. PROBLEM LIST (Impacting functional limitations):  1. Decreased Strength  2. Decreased ADL/Functional Activities  3. Decreased Transfer Abilities  4. Decreased Ambulation Ability/Technique  5. Decreased Balance  6. Increased Pain  7. Decreased Activity Tolerance  8. Increased Fatigue  9. Decreased Flexibility/Joint Mobility INTERVENTIONS PLANNED:  1. Balance Exercise  2. Bed Mobility  3. Gait Training  4. Home Exercise Program (HEP)  5. Manual Therapy  6. Neuromuscular Re-education/Strengthening  7. Range of Motion (ROM)  8. Therapeutic Activites  9. Therapeutic Exercise/Strengthening   TREATMENT PLAN:  Effective Dates: 2018 TO 2018. Frequency/Duration: 2 times a week for 90 Days  GOALS: (Goals have been discussed and agreed upon with patient.)    Discharge Goals: Time Frame: 90 days  1. Patient demonstrates independence with her HEP without verbal cueing from therapist.  2. Patient demonstrates knee ROM to 0-125 degrees of right knee to perform ADLs  3. Patient able to ambulate with upright posture for 20 minutes without onset of right knee and low back pain. 4. Patient able to ascend/descend flight of stairs without difficulty  5. Improve LEFS outcome measure score from 18/80 to 60/80 to perform ADLs  Rehabilitation Potential For Stated Goals: Excellent  Regarding Angie Jay's therapy, I certify that the treatment plan above will be carried out by a therapist or under their direction. Thank you for this referral,  Landy Goodwin PT                 The information in this section was collected on 2/26/18 (except where otherwise noted). HISTORY:   History of Present Injury/Illness (Reason for Referral):  Chronic low back pain and bilateral knee pain and arthritis for the past several years, which has created increased forward stoop with ambulation. Challenged with all weight bearing activities, ambulation, sit to stand transfers, and balance. She notes she did well with home health physical therapy and improved overall right knee ROM, however continues to present with deficits with strength and weight bearing. She notes she worked out with a  4 months prior to surgery. Past Medical History/Comorbidities:   Ms. Raya Arizmendi  has a past medical history of Allergic rhinitis; Anxiety; Arrhythmia; Arthritis; Depression; Endocarditis (1992); Heart murmur; Hematuria; History of MRSA infection (on left breast); HLD (hyperlipidemia) ( ); Hypertension; Hypothyroid; Hypothyroidism due to acquired atrophy of thyroid (4/28/2015); Intertrigo; Irregular heart beat; Mass of colon; Morbid obesity ([facility] [address]  Reactive airway disease with status asthmaticus; Scoliosis (2016); Sleep apnea; Unspecified adverse effect of anesthesia; Varicose veins; and VSD (ventricular septal defect) (2016). She also has no past medical history of Adverse effect of anesthesia; Difficult intubation; Malignant hyperthermia due to anesthesia; Nausea & vomiting; or Pseudocholinesterase deficiency. Ms. Remington Small  has a past surgical history that includes hx lipoma resection (Right); hx appendectomy; [id number] hx colonoscopy (, ); hx heent; hx orthopaedic (due to underbite); hx heart catheterization; hx breast reduction (Bilateral, 2016); hx  section; hx dilation and curettage; and hx colectomy (Right, ). Social History/Living Environment:     lives in a private home with her , Has a one story home and walk in shower. Prior Level of Function/Work/Activity:  Retired . Worked out 4 months prior to surgery to prepare. Dominant Side:         RIGHT  Current Medications:       Current Outpatient Prescriptions:     aspirin delayed-release 325 mg tablet, Take 1 Tab by mouth every twelve (12) hours every twelve (12) hours. , Disp: 60 Tab, Rfl: 0    celecoxib (CELEBREX) 200 mg capsule, Take 1 Cap by mouth every twelve (12) hours every twelve (12) hours for 90 days. , Disp: 60 Cap, Rfl: 2    HYDROmorphone (DILAUDID) 2 mg tablet, Take 1 Tab by mouth every four (4) hours as needed. Max Daily Amount: 12 mg., Disp: 90 Tab, Rfl: 0    ondansetron (ZOFRAN ODT) 8 mg disintegrating tablet, Take 1 Tab by mouth every eight (8) hours as needed for Nausea., Disp: 60 Tab, Rfl: 0    senna-docusate (PERICOLACE) 8.6-50 mg per tablet, Take 2 Tabs by mouth daily. , Disp: 120 Tab, Rfl: 0    zolpidem (AMBIEN) 5 mg tablet, Take 1 Tab by mouth nightly as needed for Sleep.  Max Daily Amount: 5 mg., Disp: 60 Tab, Rfl: 0    DIGESTIVE ENZYMES, PLANT, (FIBERZYME CONCENTRATE-HP PO), Take  by mouth two (2) times a day., Disp: , Rfl:     acetaminophen (TYLENOL EXTRA STRENGTH) 500 mg tablet, Take  by mouth as needed for Pain. Indications: Pain, Disp: , Rfl:     escitalopram oxalate (LEXAPRO) 10 mg tablet, Take 1 Tab by mouth daily. , Disp: 30 Tab, Rfl: 5    levothyroxine (SYNTHROID) 150 mcg tablet, Take 1 Tab by mouth every Monday. 1 day a week, Disp: 90 Tab, Rfl: 1    levothyroxine (SYNTHROID) 175 mcg tablet, 1 tab po 6 days a week (Patient taking differently: 1 tab po 6 days a week: Tues - Sun), Disp: 90 Tab, Rfl: 1    montelukast (SINGULAIR) 10 mg tablet, Take 1 Tab by mouth daily. , Disp: 90 Tab, Rfl: 1    ADVAIR DISKUS 100-50 mcg/dose diskus inhaler, Take 1 Puff by inhalation daily. , Disp: 3 Inhaler, Rfl: 3    losartan-hydroCHLOROthiazide (HYZAAR) 100-25 mg per tablet, Take 1 Tab by mouth daily. , Disp: 90 Tab, Rfl: 1    fluticasone (FLONASE) 50 mcg/actuation nasal spray, TWO SPRAYS INTO EACH NOSTRIL ONCE DAILY, Disp: 16 g, Rfl: 11    loratadine (CLARITIN) 10 mg tablet, Take 10 mg by mouth daily. , Disp: , Rfl:     DISABLED PLACARD (DISABLED PLACARD) DMV, by Does Not Apply route See Admin Instructions. , Disp: 1 Each, Rfl: 0    albuterol (PROAIR HFA) 90 mcg/actuation inhaler, Take 2 Puffs by inhalation every six (6) hours as needed. (Patient not taking: Reported on 1/8/2018), Disp: 1 Inhaler, Rfl: 11    cpap machine kit, by Does Not Apply route., Disp: , Rfl:    Date Last Reviewed:  3/1/2018   Number of Personal Factors/Comorbidities that affect the Plan of Care: 0: LOW COMPLEXITY   EXAMINATION:   Observation/Orthostatic Postural Assessment:          Lower extremity weight bearing is slight increased left. Observation of gait indicates significant forward hinge with gait, decreased innominate extension bilaterally. Patient exhibits a decreased lumbar lordosis and increased thoracic kyphosis. Palpation of lower quadrant bony landmarks are left iliac crest elevated.  Knee alignment is left genu recurvatum, limited right knee extension. Soft tissue observation indicates anterior right quadriceps, hamstrings and iliopsoas . Palpation: Patient exhibits tenderness to palpation right anterior knee, with scar tissue restriction along incision line. Hamstring flexibility tested supine with straight leg raise is restricted bilaterally, left 50 degrees, right 45 degrees. Piriformis flexibility is restricted bilaterally. Quadriceps flexibility tested heel to buttock is restricted right greater than left. Muscle length of gastrocnemius is restricted right greater than left. Muscle length of soleus is restricted right greater than left. ROM:     AROM(PROM) Right Left   Knee flexion 100 (114) 0-120   Knee extension Lacks 8 degrees 0   Hip flexion 85 85   Hip extension 0 5     Strength:     Manual Muscle Test (*/5) Right Left   Knee extension 4- 4   Knee flexion 4 4   Hip flexion 4- 4   Hip ER 4- 4   Hip IR 4- 4-   Hip extension 3+ 3+   Hip abduction 3+ 3+   Hip adduction 4 4   Ankle DF 4 4   Ankle PF 4 4   Core stability 3+/5     Functional strength with standing heel raise is 2 inch lift with forward hinge position. Special Tests:  None performed    Neurological Screen:  Myotomes: Key muscle strength testing for bilateral LE is intact. Dermatomes: Sensation testing through bilateral LE for light touch is intact. Reflexes: Patellar (L4) and achilles (S1) are not tested. Functional Mobility:  Challenged with sit to stand, standing techniques, ambulation secondary to forward position.   '   Body Structures Involved:  1. Bones  2. Joints  3. Muscles Body Functions Affected:  1. Sensory/Pain  2. Neuromusculoskeletal  3. Movement Related Activities and Participation Affected:  1. General Tasks and Demands  2. Mobility  3. Self Care  4.  Community, Social and [place]   Number of elements (examined above) that affect the Plan of Care: 3: MODERATE COMPLEXITY   CLINICAL PRESENTATION:   Presentation: Evolving clinical presentation with changing clinical characteristics: MODERATE COMPLEXITY   CLINICAL DECISION MAKING:   Outcome Measure: Tool Used: Lower Extremity Functional Scale (LEFS)  Score:  Initial: 18/80 Most Recent: X/80 (Date: -- )   Interpretation of Score: 20 questions each scored on a 5 point scale with 0 representing \"extreme difficulty or unable to perform\" and 4 representing \"no difficulty\". The lower the score, the greater the functional disability. 80/80 represents no disability. Minimal detectable change is 9 points. Score 80 79-63 62-48 47-32 31-16 15-1 0   Modifier CH CI CJ CK CL CM CN     ? Mobility - Walking and Moving Around:     - CURRENT STATUS: CL - 60%-79% impaired, limited or restricted    - GOAL STATUS: CJ - 20%-39% impaired, limited or restricted    - D/C STATUS:  ---------------To be determined---------------    Medical Necessity:   · Patient is expected to demonstrate progress in strength, range of motion, balance, coordination and functional technique to increase independence with ADLs, ambulation and improved endurance with weight bearing. Reason for Services/Other Comments:  · Patient continues to require skilled intervention due to demonstrates significant forward head/hip hinge position with weight bearing and ambulation and demonstrates weakness in LE and core stability. Use of outcome tool(s) and clinical judgement create a POC that gives a: Questionable prediction of patient's progress: MODERATE COMPLEXITY            TREATMENT:   (In addition to Assessment/Re-Assessment sessions the following treatments were rendered)  Pre-treatment Symptoms/Complaints:  Patient notes she was sore after last PT session, brought her walker to use today secondary to distance in/out and around clinic.    Pain: Initial:   Pain Intensity 1: 4  Pain Location 1: Knee  Pain Orientation 1: Right  Post Session:  3/10     Therapeutic Exercise: (30 Minutes):  Exercises per grid below to improve mobility, strength, balance and coordination. Required moderate verbal and manual cues to promote proper body alignment, promote proper body posture, promote proper body mechanics and promote proper body breathing techniques. Progressed resistance, range, repetitions and complexity of movement as indicated. Date:  3/1/2018   Activity/Exercise Parameters   Patient education X 5 minutes HEP   Supine straight leg raise X 10 reps, focus on core activation and quad activation   Supine hip abduction X 10 reps, focus on core activation and quad activation    Prone knee straightening X 3 minutes   Prone knee quadriceps/knee extension X 10 reps, 5 sec holds   Sit to stand weight shift/hip hinge X 10 reps, 5 sec holds  X 10 reps sit to stand   Side lying clamshells X 15 reps RLE   Prone knee bed/quadriceps stretch X 5 reps, 20 sec holds   Modified kwame stretch X 5 reps, BLE, right greater than left secondary to restrictions             Manual Therapy (    Soft Tissue Mobilization Duration  Duration: 30 Minutes): Manual techniques to facilitate improved motion and decreased pain. (Used abbreviations: MET - muscle energy technique; PNF - proprioceptive neuromuscular facilitation; NMR - neuromuscular re-education; a/p - anterior to posterior; p/a - posterior to anterior)   · Supine right knee PROM into flexion and extension  · Supine right knee patella mobilization in all directions  · Supine soft tissue mobilization through right anterior thigh and iliopsoas. · Prone soft tissue mobilization and strumming to hamstrings. Med[employer] Portal  Treatment/Session Assessment:    · Response to Treatment:  Significant restrictions in right iliopsoas, improved upright posture at end of session with use of walker. · Compliance with Program/Exercises: compliant all of the time. · Recommendations/Intent for next treatment session: \"Next visit will focus on advancements to more challenging activities\".   Total Treatment Duration: 60 minutes   PT Patient Time In/Time Out  Time In: 1130  Time Out: [address] [facility] Mira Alexis, PT

## 2018-03-05 ENCOUNTER — HOSPITAL ENCOUNTER (OUTPATIENT)
Dept: PHYSICAL THERAPY | Age: 68
Discharge: HOME | End: 2018-03-05
Payer: MEDICARE

## 2018-03-05 PROCEDURE — 97110 THERAPEUTIC EXERCISES: CPT

## 2018-03-05 PROCEDURE — 97140 MANUAL THERAPY 1/> REGIONS: CPT

## 2018-03-06 NOTE — PROGRESS NOTES
Henry Rudolph  : 1950  [place]  [place] [address] [place] [address], [address] [facility]  [phone]   [phone]          OUTPATIENT PHYSICAL THERAPY:Daily Note 3/5/2018    ICD-10: Treatment Diagnosis: pain in joint; right knee M25.561  ICD-10: Treatment Diagnosis: difficulty walking R26.2  ICD-10: Treatment Diagnosis: muscle weakness generalized M62.81    Precautions/Allergies:   Ceclor [cefaclor] and Symbyax [olanzapine-fluoxetine]   Fall Risk Score: 1 (? 5 = High Risk)  MD Orders: eval and treat MEDICAL/REFERRING DIAGNOSIS:  There are no admission diagnoses documented for this encounter. DATE OF ONSET: surgical 18  REFERRING PHYSICIAN: Malik Meek MD  RETURN PHYSICIAN APPOINTMENT: 3/13/18     INITIAL ASSESSMENT:  Ms. Reena Younger is a 74 y.o. female who presents to physical therapy post-op right TKA on 18. She was released from home health therapy with improved ROM in right knee, however continues to present with antalgic gait, LE and core weakness and challenges with daily tasks including sit to stand transfers from various heights, prolonged weight bearing and ambulation. She would benefit from skilled physical therapy to improve overall mobility, gait and function. PROBLEM LIST (Impacting functional limitations):  1. Decreased Strength  2. Decreased ADL/Functional Activities  3. Decreased Transfer Abilities  4. Decreased Ambulation Ability/Technique  5. Decreased Balance  6. Increased Pain  7. Decreased Activity Tolerance  8. Increased Fatigue  9. Decreased Flexibility/Joint Mobility INTERVENTIONS PLANNED:  1. Balance Exercise  2. Bed Mobility  3. Gait Training  4. Home Exercise Program (HEP)  5. Manual Therapy  6. Neuromuscular Re-education/Strengthening  7. Range of Motion (ROM)  8. Therapeutic Activites  9.  Therapeutic Exercise/Strengthening   TREATMENT PLAN:  Effective Dates: 2/26/2018 TO 5/27/2018. Frequency/Duration: 2 times a week for 90 Days  GOALS: (Goals have been discussed and agreed upon with patient.)    Discharge Goals: Time Frame: 90 days  1. Patient demonstrates independence with her HEP without verbal cueing from therapist.  2. Patient demonstrates knee ROM to 0-125 degrees of right knee to perform ADLs  3. Patient able to ambulate with upright posture for 20 minutes without onset of right knee and low back pain. 4. Patient able to ascend/descend flight of stairs without difficulty  5. Improve LEFS outcome measure score from 18/80 to 60/80 to perform ADLs  Rehabilitation Potential For Stated Goals: Excellent  Regarding Jessica Jay's therapy, I certify that the treatment plan above will be carried out by a therapist or under their direction. Thank you for this referral,  Derik Sidhu, PT                 The information in this section was collected on 2/26/18 (except where otherwise noted). HISTORY:   History of Present Injury/Illness (Reason for Referral):  Chronic low back pain and bilateral knee pain and arthritis for the past several years, which has created increased forward stoop with ambulation. Challenged with all weight bearing activities, ambulation, sit to stand transfers, and balance. She notes she did well with home health physical therapy and improved overall right knee ROM, however continues to present with deficits with strength and weight bearing. She notes she worked out with a  4 months prior to surgery. Past Medical History/Comorbidities:   Ms. Reena Younger  has a past medical history of Allergic rhinitis; Anxiety; Arrhythmia; Arthritis; Depression; Endocarditis (1992); Heart murmur; Hematuria; History of MRSA infection (on left breast); HLD (hyperlipidemia) ( ); Hypertension; Hypothyroid; Hypothyroidism due to acquired atrophy of thyroid (4/28/2015); Intertrigo; Irregular heart beat; Mass of colon; Morbid obesity ([address]  Reactive airway disease with status asthmaticus; Scoliosis (2016); Sleep apnea; Unspecified adverse effect of anesthesia; Varicose veins; and VSD (ventricular septal defect) (2016). She also has no past medical history of Adverse effect of anesthesia; Difficult intubation; Malignant hyperthermia due to anesthesia; Nausea & vomiting; or Pseudocholinesterase deficiency. Ms. Lakia Guzman  has a past surgical history that includes hx lipoma resection (Right); hx appendectomy; hx hernia repair (incisional [facility]-[id number]); hx colonoscopy (, ); hx heent; hx orthopaedic (due to underbite); hx heart catheterization; hx breast reduction (Bilateral, 2016); hx  section; hx dilation and curettage; and hx colectomy (Right, ). Social History/Living Environment:     lives in a private home with her , Has a one story home and walk in shower. Prior Level of Function/Work/Activity:  Retired . Worked out 4 months prior to surgery to prepare. Dominant Side:         RIGHT  Current Medications:       Current Outpatient Prescriptions:     diclofenac (VOLTAREN) 1 % gel, Apply 2 g to affected area four (4) times daily. , Disp: 100 g, Rfl: 1    mometasone (NASONEX) 50 mcg/actuation nasal spray, 2 Sprays by Both Nostrils route daily. , Disp: , Rfl: 10    losartan-hydroCHLOROthiazide (HYZAAR) 100-25 mg per tablet, Take 1 Tab by mouth daily. , Disp: 90 Tab, Rfl: 1    aspirin delayed-release 325 mg tablet, Take 1 Tab by mouth every twelve (12) hours every twelve (12) hours. , Disp: 60 Tab, Rfl: 0    celecoxib (CELEBREX) 200 mg capsule, Take 1 Cap by mouth every twelve (12) hours every twelve (12) hours for 90 days. , Disp: 60 Cap, Rfl: 2    HYDROmorphone (DILAUDID) 2 mg tablet, Take 1 Tab by mouth every four (4) hours as needed.  Max Daily Amount: 12 mg., Disp: 90 Tab, Rfl: 0    ondansetron (ZOFRAN ODT) 8 mg disintegrating tablet, Take 1 Tab by mouth every eight (8) hours as needed for Nausea., Disp: 60 Tab, Rfl: 0    senna-docusate (PERICOLACE) 8.6-50 mg per tablet, Take 2 Tabs by mouth daily. , Disp: 120 Tab, Rfl: 0    zolpidem (AMBIEN) 5 mg tablet, Take 1 Tab by mouth nightly as needed for Sleep. Max Daily Amount: 5 mg., Disp: 60 Tab, Rfl: 0    DIGESTIVE ENZYMES, PLANT, (FIBERZYME CONCENTRATE-HP PO), Take  by mouth two (2) times a day., Disp: , Rfl:     acetaminophen (TYLENOL EXTRA STRENGTH) 500 mg tablet, Take  by mouth as needed for Pain. Indications: Pain, Disp: , Rfl:     escitalopram oxalate (LEXAPRO) 10 mg tablet, Take 1 Tab by mouth daily. , Disp: 30 Tab, Rfl: 5    levothyroxine (SYNTHROID) 150 mcg tablet, Take 1 Tab by mouth every Monday. 1 day a week, Disp: 90 Tab, Rfl: 1    levothyroxine (SYNTHROID) 175 mcg tablet, 1 tab po 6 days a week (Patient taking differently: 1 tab po 6 days a week: Tues - Sun), Disp: 90 Tab, Rfl: 1    montelukast (SINGULAIR) 10 mg tablet, Take 1 Tab by mouth daily. , Disp: 90 Tab, Rfl: 1    ADVAIR DISKUS 100-50 mcg/dose diskus inhaler, Take 1 Puff by inhalation daily. , Disp: 3 Inhaler, Rfl: 3    loratadine (CLARITIN) 10 mg tablet, Take 10 mg by mouth daily. , Disp: , Rfl:     DISABLED PLACARD (DISABLED PLACARD) DMV, by Does Not Apply route See Admin Instructions. , Disp: 1 Each, Rfl: 0    albuterol (PROAIR HFA) 90 mcg/actuation inhaler, Take 2 Puffs by inhalation every six (6) hours as needed. , Disp: 1 Inhaler, Rfl: 11    cpap machine kit, by Does Not Apply route., Disp: , Rfl:    Date Last Reviewed:  3/5/2018   Number of Personal Factors/Comorbidities that affect the Plan of Care: 0: LOW COMPLEXITY   EXAMINATION:   Observation/Orthostatic Postural Assessment:          Lower extremity weight bearing is slight increased left. Observation of gait indicates significant forward hinge with gait, decreased innominate extension bilaterally. Patient exhibits a decreased lumbar lordosis and increased thoracic kyphosis.  Palpation of lower quadrant bony landmarks are left iliac crest elevated. Knee alignment is left genu recurvatum, limited right knee extension. Soft tissue observation indicates anterior right quadriceps, hamstrings and iliopsoas . Palpation: Patient exhibits tenderness to palpation right anterior knee, with scar tissue restriction along incision line. Hamstring flexibility tested supine with straight leg raise is restricted bilaterally, left 50 degrees, right 45 degrees. Piriformis flexibility is restricted bilaterally. Quadriceps flexibility tested heel to buttock is restricted right greater than left. Muscle length of gastrocnemius is restricted right greater than left. Muscle length of soleus is restricted right greater than left. ROM:     AROM(PROM) Right Left   Knee flexion 100 (114) 0-120   Knee extension Lacks 8 degrees 0   Hip flexion 85 85   Hip extension 0 5     Strength:     Manual Muscle Test (*/5) Right Left   Knee extension 4- 4   Knee flexion 4 4   Hip flexion 4- 4   Hip ER 4- 4   Hip IR 4- 4-   Hip extension 3+ 3+   Hip abduction 3+ 3+   Hip adduction 4 4   Ankle DF 4 4   Ankle PF 4 4   Core stability 3+/5     Functional strength with standing heel raise is 2 inch lift with forward hinge position. Special Tests:  None performed    Neurological Screen:  Myotomes: Key muscle strength testing for bilateral LE is intact. Dermatomes: Sensation testing through bilateral LE for light touch is intact. Reflexes: Patellar (L4) and achilles (S1) are not tested. Functional Mobility:  Challenged with sit to stand, standing techniques, ambulation secondary to forward position.   '   Body Structures Involved:  1. Bones  2. Joints  3. Muscles Body Functions Affected:  1. Sensory/Pain  2. Neuromusculoskeletal  3. Movement Related Activities and Participation Affected:  1. General Tasks and Demands  2. Mobility  3. Self Care  4.  Community, Social and [place]   Number of elements (examined above) that affect the Plan of Care: 3: MODERATE COMPLEXITY CLINICAL PRESENTATION:   Presentation: Evolving clinical presentation with changing clinical characteristics: MODERATE COMPLEXITY   CLINICAL DECISION MAKING:   Outcome Measure: Tool Used: Lower Extremity Functional Scale (LEFS)  Score:  Initial: 18/80 Most Recent: X/80 (Date: -- )   Interpretation of Score: 20 questions each scored on a 5 point scale with 0 representing \"extreme difficulty or unable to perform\" and 4 representing \"no difficulty\". The lower the score, the greater the functional disability. 80/80 represents no disability. Minimal detectable change is 9 points. Score 80 79-63 62-48 47-32 31-16 15-1 0   Modifier CH CI CJ CK CL CM CN     ? Mobility - Walking and Moving Around:     - CURRENT STATUS: CL - 60%-79% impaired, limited or restricted    - GOAL STATUS: CJ - 20%-39% impaired, limited or restricted    - D/C STATUS:  ---------------To be determined---------------    Medical Necessity:   · Patient is expected to demonstrate progress in strength, range of motion, balance, coordination and functional technique to increase independence with ADLs, ambulation and improved endurance with weight bearing. Reason for Services/Other Comments:  · Patient continues to require skilled intervention due to demonstrates significant forward head/hip hinge position with weight bearing and ambulation and demonstrates weakness in LE and core stability. Use of outcome tool(s) and clinical judgement create a POC that gives a: Questionable prediction of patient's progress: MODERATE COMPLEXITY            TREATMENT:   (In addition to Assessment/Re-Assessment sessions the following treatments were rendered)  Pre-treatment Symptoms/Complaints:  Patient notes improving ability to perform sit to stands from various surfaces, improving overall endurance and charity with gait.     Pain: Initial:   Pain Intensity 1: 3  Pain Location 1: Knee  Pain Orientation 1: Right  Post Session:  3/10     Therapeutic Exercise: (10 Minutes):  Exercises per grid below to improve mobility, strength, balance and coordination. Required moderate verbal and manual cues to promote proper body alignment, promote proper body posture, promote proper body mechanics and promote proper body breathing techniques. Progressed resistance, range, repetitions and complexity of movement as indicated. Date:  3/5/2018   Activity/Exercise Parameters   Patient education X 5 minutes HEP   Supine straight leg raise    Supine hip abduction    Prone knee straightening    Prone knee quadriceps/knee extension    Sit to stand weight shift/hip hinge X 10 reps, 5 sec holds  X 10 reps sit to stand   Side lying clamshells X 15 reps RLE   Prone knee bed/quadriceps stretch    Modified kwame stretch X 5 reps, BLE, right greater than left secondary to restrictions             Manual Therapy (    Soft Tissue Mobilization Duration  Duration: 20 Minutes): Manual techniques to facilitate improved motion and decreased pain. (Used abbreviations: MET - muscle energy technique; PNF - proprioceptive neuromuscular facilitation; NMR - neuromuscular re-education; a/p - anterior to posterior; p/a - posterior to anterior)   · Supine right knee PROM into flexion and extension  · Supine right knee patella mobilization in all directions  · Supine soft tissue mobilization through right anterior thigh and iliopsoas. · Side lying left for right hip extension manual stretches with contract/relax techniques     [facility]  Treatment/Session Assessment:    · Response to Treatment: improving right hip flexor mobility and improved techniques with sit to stand transfers. · Compliance with Program/Exercises: compliant all of the time. · Recommendations/Intent for next treatment session: \"Next visit will focus on advancements to more challenging activities\".   Total Treatment Duration: 30 minutes   PT Patient Time In/Time Out  Time In: 1600  Time Out: [id number] [address] Popeye Connelly PT

## 2018-03-08 ENCOUNTER — HOSPITAL ENCOUNTER (OUTPATIENT)
Dept: PHYSICAL THERAPY | Age: 68
Discharge: HOME | End: 2018-03-08
Payer: MEDICARE

## 2018-03-08 PROCEDURE — 97110 THERAPEUTIC EXERCISES: CPT

## 2018-03-08 PROCEDURE — 97140 MANUAL THERAPY 1/> REGIONS: CPT

## 2018-03-09 NOTE — PROGRESS NOTES
Sandra James  : 1950  [place]  [place] [address] [place] [address]  [phone]   [phone]          OUTPATIENT PHYSICAL THERAPY:Daily Note 3/8/2018    ICD-10: Treatment Diagnosis: pain in joint; right knee M25.561  ICD-10: Treatment Diagnosis: difficulty walking R26.2  ICD-10: Treatment Diagnosis: muscle weakness generalized M62.81    Precautions/Allergies:   Ceclor [cefaclor] and Symbyax [olanzapine-fluoxetine]   Fall Risk Score: 1 (? 5 = High Risk)  MD Orders: eval and treat MEDICAL/REFERRING DIAGNOSIS:  Presence of right artificial knee joint [Z96.651]   DATE OF ONSET: surgical 18  REFERRING PHYSICIAN: Larry Maldonado MD  RETURN PHYSICIAN APPOINTMENT: 3/13/18     INITIAL ASSESSMENT:  Ms. Barbie Parish is a 74 y.o. female who presents to physical therapy post-op right TKA on 18. She was released from home health therapy with improved ROM in right knee, however continues to present with antalgic gait, LE and core weakness and challenges with daily tasks including sit to stand transfers from various heights, prolonged weight bearing and ambulation. She would benefit from skilled physical therapy to improve overall mobility, gait and function. PROBLEM LIST (Impacting functional limitations):  1. Decreased Strength  2. Decreased ADL/Functional Activities  3. Decreased Transfer Abilities  4. Decreased Ambulation Ability/Technique  5. Decreased Balance  6. Increased Pain  7. Decreased Activity Tolerance  8. Increased Fatigue  9. Decreased Flexibility/Joint Mobility INTERVENTIONS PLANNED:  1. Balance Exercise  2. Bed Mobility  3. Gait Training  4. Home Exercise Program (HEP)  5. Manual Therapy  6. Neuromuscular Re-education/Strengthening  7. Range of Motion (ROM)  8. Therapeutic Activites  9.  Therapeutic Exercise/Strengthening   TREATMENT PLAN:  Effective Dates: 2018 TO 5/27/2018. Frequency/Duration: 2 times a week for 90 Days  GOALS: (Goals have been discussed and agreed upon with patient.)    Discharge Goals: Time Frame: 90 days  1. Patient demonstrates independence with her HEP without verbal cueing from therapist.  2. Patient demonstrates knee ROM to 0-125 degrees of right knee to perform ADLs  3. Patient able to ambulate with upright posture for 20 minutes without onset of right knee and low back pain. 4. Patient able to ascend/descend flight of stairs without difficulty  5. Improve LEFS outcome measure score from 18/80 to 60/80 to perform ADLs  Rehabilitation Potential For Stated Goals: Excellent  Regarding Daniel Jay's therapy, I certify that the treatment plan above will be carried out by a therapist or under their direction. Thank you for this referral,  Zeny Hawk PT                 The information in this section was collected on 2/26/18 (except where otherwise noted). HISTORY:   History of Present Injury/Illness (Reason for Referral):  Chronic low back pain and bilateral knee pain and arthritis for the past several years, which has created increased forward stoop with ambulation. Challenged with all weight bearing activities, ambulation, sit to stand transfers, and balance. She notes she did well with home health physical therapy and improved overall right knee ROM, however continues to present with deficits with strength and weight bearing. She notes she worked out with a  4 months prior to surgery. Past Medical History/Comorbidities:   Ms. Hernandze Mooney  has a past medical history of Allergic rhinitis; Anxiety; Arrhythmia; Arthritis; Depression; Endocarditis (1992); Heart murmur; Hematuria; History of MRSA infection (on left breast); HLD (hyperlipidemia) ( ); Hypertension; Hypothyroid; Hypothyroidism due to acquired atrophy of thyroid (4/28/2015); Intertrigo; Irregular heart beat; Mass of colon; Morbid obesity ([facility] [address]  Reactive airway disease with status asthmaticus; Scoliosis (2016); Sleep apnea; Unspecified adverse effect of anesthesia; Varicose veins; and VSD (ventricular septal defect) (2016). She also has no past medical history of Adverse effect of anesthesia; Difficult intubation; Malignant hyperthermia due to anesthesia; Nausea & vomiting; or Pseudocholinesterase deficiency. Ms. Ashlie Myers  has a past surgical history that includes hx lipoma resection (Right); hx appendectomy; hx hernia repair (incisional [id number]); hx colonoscopy (, ); hx heent; hx orthopaedic (due to underbite); hx heart catheterization; hx breast reduction (Bilateral, 2016); hx  section; hx dilation and curettage; and hx colectomy (Right, ). Social History/Living Environment:     lives in a private home with her , Has a one story home and walk in shower. Prior Level of Function/Work/Activity:  Retired . Worked out 4 months prior to surgery to prepare. Dominant Side:         RIGHT  Current Medications:       Current Outpatient Prescriptions:     diclofenac (VOLTAREN) 1 % gel, Apply 2 g to affected area four (4) times daily. , Disp: 100 g, Rfl: 1    mometasone (NASONEX) 50 mcg/actuation nasal spray, 2 Sprays by Both Nostrils route daily. , Disp: , Rfl: 10    losartan-hydroCHLOROthiazide (HYZAAR) 100-25 mg per tablet, Take 1 Tab by mouth daily. , Disp: 90 Tab, Rfl: 1    aspirin delayed-release 325 mg tablet, Take 1 Tab by mouth every twelve (12) hours every twelve (12) hours. , Disp: 60 Tab, Rfl: 0    celecoxib (CELEBREX) 200 mg capsule, Take 1 Cap by mouth every twelve (12) hours every twelve (12) hours for 90 days. , Disp: 60 Cap, Rfl: 2    HYDROmorphone (DILAUDID) 2 mg tablet, Take 1 Tab by mouth every four (4) hours as needed.  Max Daily Amount: 12 mg., Disp: 90 Tab, Rfl: 0    ondansetron (ZOFRAN ODT) 8 mg disintegrating tablet, Take 1 Tab by mouth every eight (8) hours as needed for Nausea., Disp: 60 Tab, Rfl: 0    senna-docusate (PERICOLACE) 8.6-50 mg per tablet, Take 2 Tabs by mouth daily. , Disp: 120 Tab, Rfl: 0    zolpidem (AMBIEN) 5 mg tablet, Take 1 Tab by mouth nightly as needed for Sleep. Max Daily Amount: 5 mg., Disp: 60 Tab, Rfl: 0    DIGESTIVE ENZYMES, PLANT, (FIBERZYME CONCENTRATE-HP PO), Take  by mouth two (2) times a day., Disp: , Rfl:     acetaminophen (TYLENOL EXTRA STRENGTH) 500 mg tablet, Take  by mouth as needed for Pain. Indications: Pain, Disp: , Rfl:     escitalopram oxalate (LEXAPRO) 10 mg tablet, Take 1 Tab by mouth daily. , Disp: 30 Tab, Rfl: 5    levothyroxine (SYNTHROID) 150 mcg tablet, Take 1 Tab by mouth every Monday. 1 day a week, Disp: 90 Tab, Rfl: 1    levothyroxine (SYNTHROID) 175 mcg tablet, 1 tab po 6 days a week (Patient taking differently: 1 tab po 6 days a week: Tues - Sun), Disp: 90 Tab, Rfl: 1    montelukast (SINGULAIR) 10 mg tablet, Take 1 Tab by mouth daily. , Disp: 90 Tab, Rfl: 1    ADVAIR DISKUS 100-50 mcg/dose diskus inhaler, Take 1 Puff by inhalation daily. , Disp: 3 Inhaler, Rfl: 3    loratadine (CLARITIN) 10 mg tablet, Take 10 mg by mouth daily. , Disp: , Rfl:     DISABLED PLACARD (DISABLED PLACARD) DMV, by Does Not Apply route See Admin Instructions. , Disp: 1 Each, Rfl: 0    albuterol (PROAIR HFA) 90 mcg/actuation inhaler, Take 2 Puffs by inhalation every six (6) hours as needed. , Disp: 1 Inhaler, Rfl: 11    cpap machine kit, by Does Not Apply route., Disp: , Rfl:    Date Last Reviewed:  3/8/2018   Number of Personal Factors/Comorbidities that affect the Plan of Care: 0: LOW COMPLEXITY   EXAMINATION:   Observation/Orthostatic Postural Assessment:          Lower extremity weight bearing is slight increased left. Observation of gait indicates significant forward hinge with gait, decreased innominate extension bilaterally. Patient exhibits a decreased lumbar lordosis and increased thoracic kyphosis.  Palpation of lower quadrant bony landmarks are left iliac crest elevated. Knee alignment is left genu recurvatum, limited right knee extension. Soft tissue observation indicates anterior right quadriceps, hamstrings and iliopsoas . Palpation: Patient exhibits tenderness to palpation right anterior knee, with scar tissue restriction along incision line. Hamstring flexibility tested supine with straight leg raise is restricted bilaterally, left 50 degrees, right 45 degrees. Piriformis flexibility is restricted bilaterally. Quadriceps flexibility tested heel to buttock is restricted right greater than left. Muscle length of gastrocnemius is restricted right greater than left. Muscle length of soleus is restricted right greater than left. ROM:     AROM(PROM) Right Left   Knee flexion 100 (114) 0-120   Knee extension Lacks 8 degrees 0   Hip flexion 85 85   Hip extension 0 5     Strength:     Manual Muscle Test (*/5) Right Left   Knee extension 4- 4   Knee flexion 4 4   Hip flexion 4- 4   Hip ER 4- 4   Hip IR 4- 4-   Hip extension 3+ 3+   Hip abduction 3+ 3+   Hip adduction 4 4   Ankle DF 4 4   Ankle PF 4 4   Core stability 3+/5     Functional strength with standing heel raise is 2 inch lift with forward hinge position. Special Tests:  None performed    Neurological Screen:  Myotomes: Key muscle strength testing for bilateral LE is intact. Dermatomes: Sensation testing through bilateral LE for light touch is intact. Reflexes: Patellar (L4) and achilles (S1) are not tested. Functional Mobility:  Challenged with sit to stand, standing techniques, ambulation secondary to forward position.   '   Body Structures Involved:  1. Bones  2. Joints  3. Muscles Body Functions Affected:  1. Sensory/Pain  2. Neuromusculoskeletal  3. Movement Related Activities and Participation Affected:  1. General Tasks and Demands  2. Mobility  3. Self Care  4.  Community, Social and [place]   Number of elements (examined above) that affect the Plan of Care: 3: MODERATE COMPLEXITY   CLINICAL PRESENTATION:   Presentation: Evolving clinical presentation with changing clinical characteristics: MODERATE COMPLEXITY   CLINICAL DECISION MAKING:   Outcome Measure: Tool Used: Lower Extremity Functional Scale (LEFS)  Score:  Initial: 18/80 Most Recent: X/80 (Date: -- )   Interpretation of Score: 20 questions each scored on a 5 point scale with 0 representing \"extreme difficulty or unable to perform\" and 4 representing \"no difficulty\". The lower the score, the greater the functional disability. 80/80 represents no disability. Minimal detectable change is 9 points. Score 80 79-63 62-48 47-32 31-16 15-1 0   Modifier CH CI CJ CK CL CM CN     ? Mobility - Walking and Moving Around:     - CURRENT STATUS: CL - 60%-79% impaired, limited or restricted    - GOAL STATUS: CJ - 20%-39% impaired, limited or restricted    - D/C STATUS:  ---------------To be determined---------------    Medical Necessity:   · Patient is expected to demonstrate progress in strength, range of motion, balance, coordination and functional technique to increase independence with ADLs, ambulation and improved endurance with weight bearing. Reason for Services/Other Comments:  · Patient continues to require skilled intervention due to demonstrates significant forward head/hip hinge position with weight bearing and ambulation and demonstrates weakness in LE and core stability. Use of outcome tool(s) and clinical judgement create a POC that gives a: Questionable prediction of patient's progress: MODERATE COMPLEXITY            TREATMENT:   (In addition to Assessment/Re-Assessment sessions the following treatments were rendered)  Pre-treatment Symptoms/Complaints:  Patient notes continues to fatigue with gait and needs to use her walker for extra support.     Pain: Initial:   Pain Intensity 1: 3  Pain Location 1: Knee  Pain Orientation 1: Right  Post Session:  2/10     Therapeutic Exercise: (30 Minutes):  Exercises per grid below to improve mobility, strength, balance and coordination. Required moderate verbal and manual cues to promote proper body alignment, promote proper body posture, promote proper body mechanics and promote proper body breathing techniques. Progressed resistance, range, repetitions and complexity of movement as indicated. Date:  3/8/2018   Activity/Exercise Parameters   Patient education X 5 minutes HEP   Supine straight leg raise    Supine hip abduction    Prone knee straightening    Prone knee quadriceps/knee extension    Sit to stand weight shift/hip hinge X 10 reps, 5 sec holds  X 10 reps sit to stand   Side lying clamshells X 15 reps RLE   Prone knee bed/quadriceps stretch    Modified kwame stretch X 5 reps, BLE, right greater than left secondary to restrictions   Standing weight shift/stance leg x15 reps BLE   Standing weight shift with opposite hip flexion X 15 reps BLE   Standing hip extension (1/2 prone) X 15 reps, BLE     Manual Therapy (    Soft Tissue Mobilization Duration  Duration: 30 Minutes): Manual techniques to facilitate improved motion and decreased pain. (Used abbreviations: MET - muscle energy technique; PNF - proprioceptive neuromuscular facilitation; NMR - neuromuscular re-education; a/p - anterior to posterior; p/a - posterior to anterior)   · Supine right knee PROM into flexion and extension  · Supine right knee patella mobilization in all directions  · Supine soft tissue mobilization through right anterior thigh and iliopsoas. · Side lying left for right hip extension manual stretches with contract/relax techniques     [facility]  Treatment/Session Assessment:    · Response to Treatment: improving right knee ROM in flexion and extension. Fatigues easily with weight bearing exercises. · Compliance with Program/Exercises: compliant all of the time. · Recommendations/Intent for next treatment session: \"Next visit will focus on advancements to more challenging activities\".   Total Treatment Duration: 60 minutes   PT Patient Time In/Time Out  Time In: 3048  Time Out: 1065 [address] Andreia Padilla, PT

## 2018-03-12 ENCOUNTER — HOSPITAL ENCOUNTER (OUTPATIENT)
Dept: PHYSICAL THERAPY | Age: 68
Discharge: HOME | End: 2018-03-12
Payer: MEDICARE

## 2018-03-12 PROCEDURE — 97140 MANUAL THERAPY 1/> REGIONS: CPT

## 2018-03-12 PROCEDURE — 97110 THERAPEUTIC EXERCISES: CPT

## 2018-03-12 NOTE — PROGRESS NOTES
Gio Aguirre  : 1950  [place]  [place] [address] [place] [address]  [phone]   [phone]          OUTPATIENT PHYSICAL THERAPY:Daily Note 3/12/2018    ICD-10: Treatment Diagnosis: pain in joint; right knee M25.561  ICD-10: Treatment Diagnosis: difficulty walking R26.2  ICD-10: Treatment Diagnosis: muscle weakness generalized M62.81    Precautions/Allergies:   Ceclor [cefaclor] and Symbyax [olanzapine-fluoxetine]   Fall Risk Score: 1 (? 5 = High Risk)  MD Orders: eval and treat MEDICAL/REFERRING DIAGNOSIS:  Presence of right artificial knee joint [Z96.651]   DATE OF ONSET: surgical 18  REFERRING PHYSICIAN: Amarilis Scott MD  RETURN PHYSICIAN APPOINTMENT: 3/13/18     INITIAL ASSESSMENT:  Ms. Lydia oTbin is a 74 y.o. female who presents to physical therapy post-op right TKA on 18. She was released from home health therapy with improved ROM in right knee, however continues to present with antalgic gait, LE and core weakness and challenges with daily tasks including sit to stand transfers from various heights, prolonged weight bearing and ambulation. She would benefit from skilled physical therapy to improve overall mobility, gait and function. PROBLEM LIST (Impacting functional limitations):  1. Decreased Strength  2. Decreased ADL/Functional Activities  3. Decreased Transfer Abilities  4. Decreased Ambulation Ability/Technique  5. Decreased Balance  6. Increased Pain  7. Decreased Activity Tolerance  8. Increased Fatigue  9. Decreased Flexibility/Joint Mobility INTERVENTIONS PLANNED:  1. Balance Exercise  2. Bed Mobility  3. Gait Training  4. Home Exercise Program (HEP)  5. Manual Therapy  6. Neuromuscular Re-education/Strengthening  7. Range of Motion (ROM)  8. Therapeutic Activites  9.  Therapeutic Exercise/Strengthening   TREATMENT PLAN:  Effective Dates: 2018 TO 5/27/2018. Frequency/Duration: 2 times a week for 90 Days  GOALS: (Goals have been discussed and agreed upon with patient.)    Discharge Goals: Time Frame: 90 days  1. Patient demonstrates independence with her HEP without verbal cueing from therapist.  2. Patient demonstrates knee ROM to 0-125 degrees of right knee to perform ADLs  3. Patient able to ambulate with upright posture for 20 minutes without onset of right knee and low back pain. 4. Patient able to ascend/descend flight of stairs without difficulty  5. Improve LEFS outcome measure score from 18/80 to 60/80 to perform ADLs  Rehabilitation Potential For Stated Goals: Excellent  Regarding [facility] therapy, I certify that the treatment plan above will be carried out by a therapist or under their direction. Thank you for this referral,  Mylinda Goldberg, PT                 The information in this section was collected on 2/26/18 (except where otherwise noted). HISTORY:   History of Present Injury/Illness (Reason for Referral):  Chronic low back pain and bilateral knee pain and arthritis for the past several years, which has created increased forward stoop with ambulation. Challenged with all weight bearing activities, ambulation, sit to stand transfers, and balance. She notes she did well with home health physical therapy and improved overall right knee ROM, however continues to present with deficits with strength and weight bearing. She notes she worked out with a  4 months prior to surgery. Past Medical History/Comorbidities:   Ms. Renetta Jamil  has a past medical history of Allergic rhinitis; Anxiety; Arrhythmia; Arthritis; Depression; Endocarditis (1992); Heart murmur; Hematuria; History of MRSA infection (on left breast); HLD (hyperlipidemia) ( ); Hypertension; Hypothyroid; Hypothyroidism due to acquired atrophy of thyroid (4/28/2015); Intertrigo; Irregular heart beat; Mass of colon; Morbid obesity ([facility] [address]  Reactive airway disease with status asthmaticus; Scoliosis (2016); Sleep apnea; Unspecified adverse effect of anesthesia; Varicose veins; and VSD (ventricular septal defect) (2016). She also has no past medical history of Adverse effect of anesthesia; Difficult intubation; Malignant hyperthermia due to anesthesia; Nausea & vomiting; or Pseudocholinesterase deficiency. Ms. Lisette Hicks  has a past surgical history that includes hx lipoma resection (Right); hx appendectomy; hx hernia repair (incisional [id number]); hx colonoscopy (, ); hx heent; hx orthopaedic (due to underbite); hx heart catheterization; hx breast reduction (Bilateral, 2016); hx  section; hx dilation and curettage; and hx colectomy (Right, ). Social History/Living Environment:     lives in a private home with her , Has a one story home and walk in shower. Prior Level of Function/Work/Activity:  Retired . Worked out 4 months prior to surgery to prepare. Dominant Side:         RIGHT  Current Medications:       Current Outpatient Prescriptions:     diclofenac (VOLTAREN) 1 % gel, Apply 2 g to affected area four (4) times daily. , Disp: 100 g, Rfl: 1    mometasone (NASONEX) 50 mcg/actuation nasal spray, 2 Sprays by Both Nostrils route daily. , Disp: , Rfl: 10    losartan-hydroCHLOROthiazide (HYZAAR) 100-25 mg per tablet, Take 1 Tab by mouth daily. , Disp: 90 Tab, Rfl: 1    aspirin delayed-release 325 mg tablet, Take 1 Tab by mouth every twelve (12) hours every twelve (12) hours. , Disp: 60 Tab, Rfl: 0    celecoxib (CELEBREX) 200 mg capsule, Take 1 Cap by mouth every twelve (12) hours every twelve (12) hours for 90 days. , Disp: 60 Cap, Rfl: 2    HYDROmorphone (DILAUDID) 2 mg tablet, Take 1 Tab by mouth every four (4) hours as needed.  Max Daily Amount: 12 mg., Disp: 90 Tab, Rfl: 0    ondansetron (ZOFRAN ODT) 8 mg disintegrating tablet, Take 1 Tab by mouth every eight (8) hours as needed for Nausea., Disp: 60 Tab, Rfl: 0    senna-docusate (PERICOLACE) 8.6-50 mg per tablet, Take 2 Tabs by mouth daily. , Disp: 120 Tab, Rfl: 0    zolpidem (AMBIEN) 5 mg tablet, Take 1 Tab by mouth nightly as needed for Sleep. Max Daily Amount: 5 mg., Disp: 60 Tab, Rfl: 0    DIGESTIVE ENZYMES, PLANT, (FIBERZYME CONCENTRATE-HP PO), Take  by mouth two (2) times a day., Disp: , Rfl:     acetaminophen (TYLENOL EXTRA STRENGTH) 500 mg tablet, Take  by mouth as needed for Pain. Indications: Pain, Disp: , Rfl:     escitalopram oxalate (LEXAPRO) 10 mg tablet, Take 1 Tab by mouth daily. , Disp: 30 Tab, Rfl: 5    levothyroxine (SYNTHROID) 150 mcg tablet, Take 1 Tab by mouth every Monday. 1 day a week, Disp: 90 Tab, Rfl: 1    levothyroxine (SYNTHROID) 175 mcg tablet, 1 tab po 6 days a week (Patient taking differently: 1 tab po 6 days a week: Tues - Sun), Disp: 90 Tab, Rfl: 1    montelukast (SINGULAIR) 10 mg tablet, Take 1 Tab by mouth daily. , Disp: 90 Tab, Rfl: 1    ADVAIR DISKUS 100-50 mcg/dose diskus inhaler, Take 1 Puff by inhalation daily. , Disp: 3 Inhaler, Rfl: 3    loratadine (CLARITIN) 10 mg tablet, Take 10 mg by mouth daily. , Disp: , Rfl:     DISABLED PLACARD (DISABLED PLACARD) DMV, by Does Not Apply route See Admin Instructions. , Disp: 1 Each, Rfl: 0    albuterol (PROAIR HFA) 90 mcg/actuation inhaler, Take 2 Puffs by inhalation every six (6) hours as needed. , Disp: 1 Inhaler, Rfl: 11    cpap machine kit, by Does Not Apply route., Disp: , Rfl:    Date Last Reviewed:  3/12/2018   Number of Personal Factors/Comorbidities that affect the Plan of Care: 0: LOW COMPLEXITY   EXAMINATION:   Observation/Orthostatic Postural Assessment:          Lower extremity weight bearing is slight increased left. Observation of gait indicates significant forward hinge with gait, decreased innominate extension bilaterally. Patient exhibits a decreased lumbar lordosis and increased thoracic kyphosis.  Palpation of lower quadrant bony landmarks are left iliac crest elevated. Knee alignment is left genu recurvatum, limited right knee extension. Soft tissue observation indicates anterior right quadriceps, hamstrings and iliopsoas . Palpation: Patient exhibits tenderness to palpation right anterior knee, with scar tissue restriction along incision line. Hamstring flexibility tested supine with straight leg raise is restricted bilaterally, left 50 degrees, right 45 degrees. Piriformis flexibility is restricted bilaterally. Quadriceps flexibility tested heel to buttock is restricted right greater than left. Muscle length of gastrocnemius is restricted right greater than left. Muscle length of soleus is restricted right greater than left. ROM:     AROM(PROM) Right Left   Knee flexion 100 (114) 0-120   Knee extension Lacks 8 degrees 0   Hip flexion 85 85   Hip extension 0 5     Strength:     Manual Muscle Test (*/5) Right Left   Knee extension 4- 4   Knee flexion 4 4   Hip flexion 4- 4   Hip ER 4- 4   Hip IR 4- 4-   Hip extension 3+ 3+   Hip abduction 3+ 3+   Hip adduction 4 4   Ankle DF 4 4   Ankle PF 4 4   Core stability 3+/5     Functional strength with standing heel raise is 2 inch lift with forward hinge position. Special Tests:  None performed    Neurological Screen:  Myotomes: Key muscle strength testing for bilateral LE is intact. Dermatomes: Sensation testing through bilateral LE for light touch is intact. Reflexes: Patellar (L4) and achilles (S1) are not tested. Functional Mobility:  Challenged with sit to stand, standing techniques, ambulation secondary to forward position.   '   Body Structures Involved:  1. Bones  2. Joints  3. Muscles Body Functions Affected:  1. Sensory/Pain  2. Neuromusculoskeletal  3. Movement Related Activities and Participation Affected:  1. General Tasks and Demands  2. Mobility  3. Self Care  4.  Community, Social and [place]   Number of elements (examined above) that affect the Plan of Care: 3: MODERATE COMPLEXITY   CLINICAL PRESENTATION:   Presentation: Evolving clinical presentation with changing clinical characteristics: MODERATE COMPLEXITY   CLINICAL DECISION MAKING:   Outcome Measure: Tool Used: Lower Extremity Functional Scale (LEFS)  Score:  Initial: 18/80 Most Recent: X/80 (Date: -- )   Interpretation of Score: 20 questions each scored on a 5 point scale with 0 representing \"extreme difficulty or unable to perform\" and 4 representing \"no difficulty\". The lower the score, the greater the functional disability. 80/80 represents no disability. Minimal detectable change is 9 points. Score 80 79-63 62-48 47-32 31-16 15-1 0   Modifier CH CI CJ CK CL CM CN     ? Mobility - Walking and Moving Around:     - CURRENT STATUS: CL - 60%-79% impaired, limited or restricted    - GOAL STATUS: CJ - 20%-39% impaired, limited or restricted    - D/C STATUS:  ---------------To be determined---------------    Medical Necessity:   · Patient is expected to demonstrate progress in strength, range of motion, balance, coordination and functional technique to increase independence with ADLs, ambulation and improved endurance with weight bearing. Reason for Services/Other Comments:  · Patient continues to require skilled intervention due to demonstrates significant forward head/hip hinge position with weight bearing and ambulation and demonstrates weakness in LE and core stability. Use of outcome tool(s) and clinical judgement create a POC that gives a: Questionable prediction of patient's progress: MODERATE COMPLEXITY            TREATMENT:   (In addition to Assessment/Re-Assessment sessions the following treatments were rendered)  Pre-treatment Symptoms/Complaints:  Patient notes walked more with her cane this weekend and noted she was able to improve upright posture. However noted fatigue by end of ambulation and forward flexed her upper body.    Pain: Initial:   Pain Intensity 1: 3  Pain Location 1: Knee  Pain Orientation 1: Right Post Session:  2/10     Therapeutic Exercise: (30 Minutes):  Exercises per grid below to improve mobility, strength, balance and coordination. Required moderate verbal and manual cues to promote proper body alignment, promote proper body posture, promote proper body mechanics and promote proper body breathing techniques. Progressed resistance, range, repetitions and complexity of movement as indicated. Date:  3/12/2018   Activity/Exercise Parameters   Patient education X 5 minutes HEP   Supine straight leg raise    Supine hip abduction    Prone knee straightening    Prone knee quadriceps/knee extension    Sit to stand weight shift/hip hinge X 10 reps, 5 sec holds  X 10 reps sit to stand   Side lying clamshells X 15 reps RLE   Prone knee bed/quadriceps stretch    Modified kwame stretch X 5 reps, BLE, right greater than left secondary to restrictions   Standing weight shift/stance leg x15 reps BLE   Standing weight shift with opposite hip flexion X 15 reps BLE   Standing hip extension (1/2 prone) X 15 reps, BLE   Standing lateral steps X 5 reps, 5' distance  X 5 reps with green band 5' distance     Manual Therapy (    Soft Tissue Mobilization Duration  Duration: 30 Minutes): Manual techniques to facilitate improved motion and decreased pain. (Used abbreviations: MET - muscle energy technique; PNF - proprioceptive neuromuscular facilitation; NMR - neuromuscular re-education; a/p - anterior to posterior; p/a - posterior to anterior)   · Supine right knee PROM into flexion and extension  · Supine right knee patella mobilization in all directions  · Supine soft tissue mobilization through right anterior thigh and iliopsoas. · Side lying left for right hip extension manual stretches with contract/relax techniques     [facility] Portal  Treatment/Session Assessment:    · Response to Treatment: continues to improve ROM in flexion and extension of right knee.  Challenged with weight bearing exercises and endurance levels, fatigues easily. · Compliance with Program/Exercises: compliant all of the time. · Recommendations/Intent for next treatment session: \"Next visit will focus on advancements to more challenging activities\".   Total Treatment Duration: 60 minutes   PT Patient Time In/Time Out  Time In: 0830  Time Out: One [facility] Kelechi De Anda PT

## 2018-03-14 ENCOUNTER — HOSPITAL ENCOUNTER (OUTPATIENT)
Dept: PHYSICAL THERAPY | Age: 68
Discharge: HOME | End: 2018-03-14
Payer: MEDICARE

## 2018-03-14 PROCEDURE — 97140 MANUAL THERAPY 1/> REGIONS: CPT

## 2018-03-14 PROCEDURE — 97110 THERAPEUTIC EXERCISES: CPT

## 2018-03-14 NOTE — PROGRESS NOTES
Braeden Salmeron  : 1950  [place]  [place] [address] at [address]  [phone]   [phone]          OUTPATIENT PHYSICAL THERAPY:Daily Note 3/14/2018    ICD-10: Treatment Diagnosis: pain in joint; right knee M25.561  ICD-10: Treatment Diagnosis: difficulty walking R26.2  ICD-10: Treatment Diagnosis: muscle weakness generalized M62.81    Precautions/Allergies:   Ceclor [cefaclor] and Symbyax [olanzapine-fluoxetine]   Fall Risk Score: 1 (? 5 = High Risk)  MD Orders: eval and treat MEDICAL/REFERRING DIAGNOSIS:  Presence of right artificial knee joint [Z96.651]   DATE OF ONSET: surgical 18  REFERRING PHYSICIAN: Amber Fuller MD  RETURN PHYSICIAN APPOINTMENT: 3/13/18     INITIAL ASSESSMENT:  Ms. Renetta Jamil is a 74 y.o. female who presents to physical therapy post-op right TKA on 18. She was released from home health therapy with improved ROM in right knee, however continues to present with antalgic gait, LE and core weakness and challenges with daily tasks including sit to stand transfers from various heights, prolonged weight bearing and ambulation. She would benefit from skilled physical therapy to improve overall mobility, gait and function. PROBLEM LIST (Impacting functional limitations):  1. Decreased Strength  2. Decreased ADL/Functional Activities  3. Decreased Transfer Abilities  4. Decreased Ambulation Ability/Technique  5. Decreased Balance  6. Increased Pain  7. Decreased Activity Tolerance  8. Increased Fatigue  9. Decreased Flexibility/Joint Mobility INTERVENTIONS PLANNED:  1. Balance Exercise  2. Bed Mobility  3. Gait Training  4. Home Exercise Program (HEP)  5. Manual Therapy  6. Neuromuscular Re-education/Strengthening  7. Range of Motion (ROM)  8. Therapeutic Activites  9.  Therapeutic Exercise/Strengthening   TREATMENT PLAN:  Effective Dates: 2018 TO 5/27/2018. Frequency/Duration: 2 times a week for 90 Days  GOALS: (Goals have been discussed and agreed upon with patient.)    Discharge Goals: Time Frame: 90 days  1. Patient demonstrates independence with her HEP without verbal cueing from therapist.  2. Patient demonstrates knee ROM to 0-125 degrees of right knee to perform ADLs  3. Patient able to ambulate with upright posture for 20 minutes without onset of right knee and low back pain. 4. Patient able to ascend/descend flight of stairs without difficulty  5. Improve LEFS outcome measure score from 18/80 to 60/80 to perform ADLs  Rehabilitation Potential For Stated Goals: Excellent  Regarding Lieutenant Rajesh Jay's therapy, I certify that the treatment plan above will be carried out by a therapist or under their direction. Thank you for this referral,  Janna Bello PT                 The information in this section was collected on 2/26/18 (except where otherwise noted). HISTORY:   History of Present Injury/Illness (Reason for Referral):  Chronic low back pain and bilateral knee pain and arthritis for the past several years, which has created increased forward stoop with ambulation. Challenged with all weight bearing activities, ambulation, sit to stand transfers, and balance. She notes she did well with home health physical therapy and improved overall right knee ROM, however continues to present with deficits with strength and weight bearing. She notes she worked out with a  4 months prior to surgery. Past Medical History/Comorbidities:   Ms. Yanna Valladares  has a past medical history of Allergic rhinitis; Anxiety; Arrhythmia; Arthritis; Depression; Endocarditis (1992); Heart murmur; Hematuria; History of MRSA infection (on left breast); HLD (hyperlipidemia) ( ); Hypertension; Hypothyroid; Hypothyroidism due to acquired atrophy of thyroid (4/28/2015); Intertrigo; Irregular heart beat; Mass of colon; Morbid obesity ([facility] [address]  Reactive airway disease with status asthmaticus; Scoliosis (2016); Sleep apnea; Unspecified adverse effect of anesthesia; Varicose veins; and VSD (ventricular septal defect) (2016). She also has no past medical history of Adverse effect of anesthesia; Difficult intubation; Malignant hyperthermia due to anesthesia; Nausea & vomiting; or Pseudocholinesterase deficiency. Ms. Barbie Parish  has a past surgical history that includes hx lipoma resection (Right); hx appendectomy; [id number] hx colonoscopy (, ); hx heent; hx orthopaedic (due to underbite); hx heart catheterization; hx breast reduction (Bilateral, 2016); hx  section; hx dilation and curettage; and hx colectomy (Right, ). Social History/Living Environment:     lives in a private home with her , Has a one story home and walk in shower. Prior Level of Function/Work/Activity:  Retired . Worked out 4 months prior to surgery to prepare. Dominant Side:         RIGHT  Current Medications:       Current Outpatient Prescriptions:     diclofenac (VOLTAREN) 1 % gel, Apply 2 g to affected area four (4) times daily. , Disp: 100 g, Rfl: 1    mometasone (NASONEX) 50 mcg/actuation nasal spray, 2 Sprays by Both Nostrils route daily. , Disp: , Rfl: 10    losartan-hydroCHLOROthiazide (HYZAAR) 100-25 mg per tablet, Take 1 Tab by mouth daily. , Disp: 90 Tab, Rfl: 1    aspirin delayed-release 325 mg tablet, Take 1 Tab by mouth every twelve (12) hours every twelve (12) hours. , Disp: 60 Tab, Rfl: 0    celecoxib (CELEBREX) 200 mg capsule, Take 1 Cap by mouth every twelve (12) hours every twelve (12) hours for 90 days. , Disp: 60 Cap, Rfl: 2    HYDROmorphone (DILAUDID) 2 mg tablet, Take 1 Tab by mouth every four (4) hours as needed.  Max Daily Amount: 12 mg., Disp: 90 Tab, Rfl: 0    ondansetron (ZOFRAN ODT) 8 mg disintegrating tablet, Take 1 Tab by mouth every eight (8) hours as needed for Nausea., Disp: 60 Tab, Rfl: 0    senna-docusate (PERICOLACE) 8.6-50 mg per tablet, Take 2 Tabs by mouth daily. , Disp: 120 Tab, Rfl: 0    zolpidem (AMBIEN) 5 mg tablet, Take 1 Tab by mouth nightly as needed for Sleep. Max Daily Amount: 5 mg., Disp: 60 Tab, Rfl: 0    DIGESTIVE ENZYMES, PLANT, (FIBERZYME CONCENTRATE-HP PO), Take  by mouth two (2) times a day., Disp: , Rfl:     acetaminophen (TYLENOL EXTRA STRENGTH) 500 mg tablet, Take  by mouth as needed for Pain. Indications: Pain, Disp: , Rfl:     escitalopram oxalate (LEXAPRO) 10 mg tablet, Take 1 Tab by mouth daily. , Disp: 30 Tab, Rfl: 5    levothyroxine (SYNTHROID) 150 mcg tablet, Take 1 Tab by mouth every Monday. 1 day a week, Disp: 90 Tab, Rfl: 1    levothyroxine (SYNTHROID) 175 mcg tablet, 1 tab po 6 days a week (Patient taking differently: 1 tab po 6 days a week: Tues - Sun), Disp: 90 Tab, Rfl: 1    montelukast (SINGULAIR) 10 mg tablet, Take 1 Tab by mouth daily. , Disp: 90 Tab, Rfl: 1    ADVAIR DISKUS 100-50 mcg/dose diskus inhaler, Take 1 Puff by inhalation daily. , Disp: 3 Inhaler, Rfl: 3    loratadine (CLARITIN) 10 mg tablet, Take 10 mg by mouth daily. , Disp: , Rfl:     DISABLED PLACARD (DISABLED PLACARD) DMV, by Does Not Apply route See Admin Instructions. , Disp: 1 Each, Rfl: 0    albuterol (PROAIR HFA) 90 mcg/actuation inhaler, Take 2 Puffs by inhalation every six (6) hours as needed. , Disp: 1 Inhaler, Rfl: 11    cpap machine kit, by Does Not Apply route., Disp: , Rfl:    Date Last Reviewed:  3/14/2018   Number of Personal Factors/Comorbidities that affect the Plan of Care: 0: LOW COMPLEXITY   EXAMINATION:   Observation/Orthostatic Postural Assessment:          Lower extremity weight bearing is slight increased left. Observation of gait indicates significant forward hinge with gait, decreased innominate extension bilaterally. Patient exhibits a decreased lumbar lordosis and increased thoracic kyphosis.  Palpation of lower quadrant bony landmarks are left iliac crest elevated. Knee alignment is left genu recurvatum, limited right knee extension. Soft tissue observation indicates anterior right quadriceps, hamstrings and iliopsoas . Palpation: Patient exhibits tenderness to palpation right anterior knee, with scar tissue restriction along incision line. Hamstring flexibility tested supine with straight leg raise is restricted bilaterally, left 50 degrees, right 45 degrees. Piriformis flexibility is restricted bilaterally. Quadriceps flexibility tested heel to buttock is restricted right greater than left. Muscle length of gastrocnemius is restricted right greater than left. Muscle length of soleus is restricted right greater than left. ROM:     AROM(PROM) Right Left   Knee flexion 100 (114) 0-120   Knee extension Lacks 8 degrees 0   Hip flexion 85 85   Hip extension 0 5     Strength:     Manual Muscle Test (*/5) Right Left   Knee extension 4- 4   Knee flexion 4 4   Hip flexion 4- 4   Hip ER 4- 4   Hip IR 4- 4-   Hip extension 3+ 3+   Hip abduction 3+ 3+   Hip adduction 4 4   Ankle DF 4 4   Ankle PF 4 4   Core stability 3+/5     Functional strength with standing heel raise is 2 inch lift with forward hinge position. Special Tests:  None performed    Neurological Screen:  Myotomes: Key muscle strength testing for bilateral LE is intact. Dermatomes: Sensation testing through bilateral LE for light touch is intact. Reflexes: Patellar (L4) and achilles (S1) are not tested. Functional Mobility:  Challenged with sit to stand, standing techniques, ambulation secondary to forward position.   '   Body Structures Involved:  1. Bones  2. Joints  3. Muscles Body Functions Affected:  1. Sensory/Pain  2. Neuromusculoskeletal  3. Movement Related Activities and Participation Affected:  1. General Tasks and Demands  2. Mobility  3. Self Care  4.  Community, Social and [place]   Number of elements (examined above) that affect the Plan of Care: 3: MODERATE COMPLEXITY   CLINICAL PRESENTATION:   Presentation: Evolving clinical presentation with changing clinical characteristics: MODERATE COMPLEXITY   CLINICAL DECISION MAKING:   Outcome Measure: Tool Used: Lower Extremity Functional Scale (LEFS)  Score:  Initial: 18/80 Most Recent: X/80 (Date: -- )   Interpretation of Score: 20 questions each scored on a 5 point scale with 0 representing \"extreme difficulty or unable to perform\" and 4 representing \"no difficulty\". The lower the score, the greater the functional disability. 80/80 represents no disability. Minimal detectable change is 9 points. Score 80 79-63 62-48 47-32 31-16 15-1 0   Modifier CH CI CJ CK CL CM CN     ? Mobility - Walking and Moving Around:     - CURRENT STATUS: CL - 60%-79% impaired, limited or restricted    - GOAL STATUS: CJ - 20%-39% impaired, limited or restricted    - D/C STATUS:  ---------------To be determined---------------    Medical Necessity:   · Patient is expected to demonstrate progress in strength, range of motion, balance, coordination and functional technique to increase independence with ADLs, ambulation and improved endurance with weight bearing. Reason for Services/Other Comments:  · Patient continues to require skilled intervention due to demonstrates significant forward head/hip hinge position with weight bearing and ambulation and demonstrates weakness in LE and core stability. Use of outcome tool(s) and clinical judgement create a POC that gives a: Questionable prediction of patient's progress: MODERATE COMPLEXITY            TREATMENT:   (In addition to Assessment/Re-Assessment sessions the following treatments were rendered)  Pre-treatment Symptoms/Complaints:  Patient had a follow up with Dr. Helen Busby yesterday, pleased with progression and ROM. Continues to note stiffness in right knee with prolonged sitting. Overall walking better, continues to use her cane.    Pain: Initial:   Pain Intensity 1: 2  Pain Location 1: Knee  Pain Orientation 1: Right  Post Session:  1/10     Therapeutic Exercise: (30 Minutes):  Exercises per grid below to improve mobility, strength, balance and coordination. Required moderate verbal and manual cues to promote proper body alignment, promote proper body posture, promote proper body mechanics and promote proper body breathing techniques. Progressed resistance, range, repetitions and complexity of movement as indicated. Date:  3/14/2018   Activity/Exercise Parameters   Patient education X 5 minutes HEP   Supine straight leg raise    Supine hip abduction    Prone knee straightening    Prone knee quadriceps/knee extension    Sit to stand weight shift/hip hinge X 10 reps, 5 sec holds  X 10 reps sit to stand   Side lying clamshells X 15 reps RLE   Prone knee bed/quadriceps stretch    Modified kwame stretch X 5 reps, BLE, right greater than left secondary to restrictions   Standing weight shift/stance leg x15 reps BLE   Standing weight shift with opposite hip flexion X 15 reps BLE   Standing hip extension (1/2 prone) X 15 reps, BLE   Standing lateral steps X 5 reps, 5' distance  X 5 reps with green band 5' distance   Standing functional squat X 15 reps   Standing terminal knee extension X 15 reps, green band     Manual Therapy (    Soft Tissue Mobilization Duration  Duration: 25 Minutes): Manual techniques to facilitate improved motion and decreased pain. (Used abbreviations: MET - muscle energy technique; PNF - proprioceptive neuromuscular facilitation; NMR - neuromuscular re-education; a/p - anterior to posterior; p/a - posterior to anterior)   · Supine right knee PROM into flexion and extension  · Supine right knee patella mobilization in all directions  · Supine soft tissue mobilization through right anterior thigh and iliopsoas.     · Side lying left for right hip extension manual stretches with contract/relax techniques     [facility] Portal  Treatment/Session Assessment:    · Response to Treatment: improving charity and upright posture with gait. Continues to need rest breaks between exercises, however improvements noted with endurance. · Compliance with Program/Exercises: compliant all of the time. · Recommendations/Intent for next treatment session: \"Next visit will focus on advancements to more challenging activities\".   Total Treatment Duration: 55 minutes   PT Patient Time In/Time Out  Time In: 1140  Time Out: [id number] [facility] Alfie Alex, MANUEL

## 2018-03-19 ENCOUNTER — HOSPITAL ENCOUNTER (OUTPATIENT)
Dept: PHYSICAL THERAPY | Age: 68
Discharge: HOME | End: 2018-03-19
Payer: MEDICARE

## 2018-03-19 NOTE — PROGRESS NOTES
Ivonne Glover   (LRM:[id number]) [address]  at  [address], [address] [place] [address]  [phone]  [phone]             DATE: 3/19/2018    Patient canceled for appointment today due to out of town. Will plan to follow up on next scheduled visit.     Yaya Banks PT, DPT, CFMT

## 2018-03-21 ENCOUNTER — HOSPITAL ENCOUNTER (OUTPATIENT)
Dept: PHYSICAL THERAPY | Age: 68
Discharge: HOME | End: 2018-03-21
Payer: MEDICARE

## 2018-03-21 PROCEDURE — 97110 THERAPEUTIC EXERCISES: CPT

## 2018-03-21 PROCEDURE — 97140 MANUAL THERAPY 1/> REGIONS: CPT

## 2018-03-21 NOTE — PROGRESS NOTES
Sandra James  : 1950  [place]  [place] [address] [place] [address]  [phone]   [phone]          OUTPATIENT PHYSICAL THERAPY:Daily Note 3/21/2018    ICD-10: Treatment Diagnosis: pain in joint; right knee M25.561  ICD-10: Treatment Diagnosis: difficulty walking R26.2  ICD-10: Treatment Diagnosis: muscle weakness generalized M62.81    Precautions/Allergies:   Ceclor [cefaclor] and Symbyax [olanzapine-fluoxetine]   Fall Risk Score: 1 (? 5 = High Risk)  MD Orders: eval and treat MEDICAL/REFERRING DIAGNOSIS:  Presence of right artificial knee joint [Z96.651]   DATE OF ONSET: surgical 18  REFERRING PHYSICIAN: Larry Maldonado MD  RETURN PHYSICIAN APPOINTMENT: 3/13/18     INITIAL ASSESSMENT:  Ms. Barbie Parish is a 74 y.o. female who presents to physical therapy post-op right TKA on 18. She was released from home health therapy with improved ROM in right knee, however continues to present with antalgic gait, LE and core weakness and challenges with daily tasks including sit to stand transfers from various heights, prolonged weight bearing and ambulation. She would benefit from skilled physical therapy to improve overall mobility, gait and function. PROBLEM LIST (Impacting functional limitations):  1. Decreased Strength  2. Decreased ADL/Functional Activities  3. Decreased Transfer Abilities  4. Decreased Ambulation Ability/Technique  5. Decreased Balance  6. Increased Pain  7. Decreased Activity Tolerance  8. Increased Fatigue  9. Decreased Flexibility/Joint Mobility INTERVENTIONS PLANNED:  1. Balance Exercise  2. Bed Mobility  3. Gait Training  4. Home Exercise Program (HEP)  5. Manual Therapy  6. Neuromuscular Re-education/Strengthening  7. Range of Motion (ROM)  8. Therapeutic Activites  9.  Therapeutic Exercise/Strengthening   TREATMENT PLAN:  Effective Dates: 2018 TO 5/27/2018. Frequency/Duration: 2 times a week for 90 Days  GOALS: (Goals have been discussed and agreed upon with patient.)    Discharge Goals: Time Frame: 90 days  1. Patient demonstrates independence with her HEP without verbal cueing from therapist.  2. Patient demonstrates knee ROM to 0-125 degrees of right knee to perform ADLs  3. Patient able to ambulate with upright posture for 20 minutes without onset of right knee and low back pain. 4. Patient able to ascend/descend flight of stairs without difficulty  5. Improve LEFS outcome measure score from 18/80 to 60/80 to perform ADLs  Rehabilitation Potential For Stated Goals: Excellent  Regarding [facility] therapy, I certify that the treatment plan above will be carried out by a therapist or under their direction. Thank you for this referral,  Mylinda Goldberg, PT                 The information in this section was collected on 2/26/18 (except where otherwise noted). HISTORY:   History of Present Injury/Illness (Reason for Referral):  Chronic low back pain and bilateral knee pain and arthritis for the past several years, which has created increased forward stoop with ambulation. Challenged with all weight bearing activities, ambulation, sit to stand transfers, and balance. She notes she did well with home health physical therapy and improved overall right knee ROM, however continues to present with deficits with strength and weight bearing. She notes she worked out with a  4 months prior to surgery. Past Medical History/Comorbidities:   Ms. Renetta Jamil  has a past medical history of Allergic rhinitis; Anxiety; Arrhythmia; Arthritis; Depression; Endocarditis (1992); Heart murmur; Hematuria; History of MRSA infection (on left breast); HLD (hyperlipidemia) ( ); Hypertension; Hypothyroid; Hypothyroidism due to acquired atrophy of thyroid (4/28/2015); Intertrigo; Irregular heart beat; Mass of colon; Morbid obesity ([facility] [address]  Reactive airway disease with status asthmaticus; Scoliosis (2016); Sleep apnea; Unspecified adverse effect of anesthesia; Varicose veins; and VSD (ventricular septal defect) (2016). She also has no past medical history of Adverse effect of anesthesia; Difficult intubation; Malignant hyperthermia due to anesthesia; Nausea & vomiting; or Pseudocholinesterase deficiency. Ms. Edilberto Bee  has a past surgical history that includes hx lipoma resection (Right); hx appendectomy; hx hernia repair (incisional [id number]); hx colonoscopy (, ); hx heent; hx orthopaedic (due to underbite); hx heart catheterization; hx breast reduction (Bilateral, 2016); hx  section; hx dilation and curettage; and hx colectomy (Right, ). Social History/Living Environment:     lives in a private home with her , Has a one story home and walk in shower. Prior Level of Function/Work/Activity:  Retired . Worked out 4 months prior to surgery to prepare. Dominant Side:         RIGHT  Current Medications:       Current Outpatient Prescriptions:     diclofenac (VOLTAREN) 1 % gel, Apply 2 g to affected area four (4) times daily. , Disp: 100 g, Rfl: 1    mometasone (NASONEX) 50 mcg/actuation nasal spray, 2 Sprays by Both Nostrils route daily. , Disp: , Rfl: 10    losartan-hydroCHLOROthiazide (HYZAAR) 100-25 mg per tablet, Take 1 Tab by mouth daily. , Disp: 90 Tab, Rfl: 1    aspirin delayed-release 325 mg tablet, Take 1 Tab by mouth every twelve (12) hours every twelve (12) hours. , Disp: 60 Tab, Rfl: 0    celecoxib (CELEBREX) 200 mg capsule, Take 1 Cap by mouth every twelve (12) hours every twelve (12) hours for 90 days. , Disp: 60 Cap, Rfl: 2    HYDROmorphone (DILAUDID) 2 mg tablet, Take 1 Tab by mouth every four (4) hours as needed.  Max Daily Amount: 12 mg., Disp: 90 Tab, Rfl: 0    ondansetron (ZOFRAN ODT) 8 mg disintegrating tablet, Take 1 Tab by mouth every eight (8) hours as needed for Nausea., Disp: 60 Tab, Rfl: 0    senna-docusate (PERICOLACE) 8.6-50 mg per tablet, Take 2 Tabs by mouth daily. , Disp: 120 Tab, Rfl: 0    zolpidem (AMBIEN) 5 mg tablet, Take 1 Tab by mouth nightly as needed for Sleep. Max Daily Amount: 5 mg., Disp: 60 Tab, Rfl: 0    DIGESTIVE ENZYMES, PLANT, (FIBERZYME CONCENTRATE-HP PO), Take  by mouth two (2) times a day., Disp: , Rfl:     acetaminophen (TYLENOL EXTRA STRENGTH) 500 mg tablet, Take  by mouth as needed for Pain. Indications: Pain, Disp: , Rfl:     escitalopram oxalate (LEXAPRO) 10 mg tablet, Take 1 Tab by mouth daily. , Disp: 30 Tab, Rfl: 5    levothyroxine (SYNTHROID) 150 mcg tablet, Take 1 Tab by mouth every Monday. 1 day a week, Disp: 90 Tab, Rfl: 1    levothyroxine (SYNTHROID) 175 mcg tablet, 1 tab po 6 days a week (Patient taking differently: 1 tab po 6 days a week: Tues - Sun), Disp: 90 Tab, Rfl: 1    montelukast (SINGULAIR) 10 mg tablet, Take 1 Tab by mouth daily. , Disp: 90 Tab, Rfl: 1    ADVAIR DISKUS 100-50 mcg/dose diskus inhaler, Take 1 Puff by inhalation daily. , Disp: 3 Inhaler, Rfl: 3    loratadine (CLARITIN) 10 mg tablet, Take 10 mg by mouth daily. , Disp: , Rfl:     DISABLED PLACARD (DISABLED PLACARD) DMV, by Does Not Apply route See Admin Instructions. , Disp: 1 Each, Rfl: 0    albuterol (PROAIR HFA) 90 mcg/actuation inhaler, Take 2 Puffs by inhalation every six (6) hours as needed. , Disp: 1 Inhaler, Rfl: 11    cpap machine kit, by Does Not Apply route., Disp: , Rfl:    Date Last Reviewed:  3/21/2018   Number of Personal Factors/Comorbidities that affect the Plan of Care: 0: LOW COMPLEXITY   EXAMINATION:   Observation/Orthostatic Postural Assessment:          Lower extremity weight bearing is slight increased left. Observation of gait indicates significant forward hinge with gait, decreased innominate extension bilaterally. Patient exhibits a decreased lumbar lordosis and increased thoracic kyphosis.  Palpation of lower quadrant bony landmarks are left iliac crest elevated. Knee alignment is left genu recurvatum, limited right knee extension. Soft tissue observation indicates anterior right quadriceps, hamstrings and iliopsoas . Palpation: Patient exhibits tenderness to palpation right anterior knee, with scar tissue restriction along incision line. Hamstring flexibility tested supine with straight leg raise is restricted bilaterally, left 50 degrees, right 45 degrees. Piriformis flexibility is restricted bilaterally. Quadriceps flexibility tested heel to buttock is restricted right greater than left. Muscle length of gastrocnemius is restricted right greater than left. Muscle length of soleus is restricted right greater than left. ROM:     AROM(PROM) Right Left   Knee flexion 100 (114) 0-120   Knee extension Lacks 8 degrees 0   Hip flexion 85 85   Hip extension 0 5     Strength:     Manual Muscle Test (*/5) Right Left   Knee extension 4- 4   Knee flexion 4 4   Hip flexion 4- 4   Hip ER 4- 4   Hip IR 4- 4-   Hip extension 3+ 3+   Hip abduction 3+ 3+   Hip adduction 4 4   Ankle DF 4 4   Ankle PF 4 4   Core stability 3+/5     Functional strength with standing heel raise is 2 inch lift with forward hinge position. Special Tests:  None performed    Neurological Screen:  Myotomes: Key muscle strength testing for bilateral LE is intact. Dermatomes: Sensation testing through bilateral LE for light touch is intact. Reflexes: Patellar (L4) and achilles (S1) are not tested. Functional Mobility:  Challenged with sit to stand, standing techniques, ambulation secondary to forward position.   '   Body Structures Involved:  1. Bones  2. Joints  3. Muscles Body Functions Affected:  1. Sensory/Pain  2. Neuromusculoskeletal  3. Movement Related Activities and Participation Affected:  1. General Tasks and Demands  2. Mobility  3. Self Care  4.  Community, Social and [place]   Number of elements (examined above) that affect the Plan of Care: 3: MODERATE COMPLEXITY   CLINICAL PRESENTATION:   Presentation: Evolving clinical presentation with changing clinical characteristics: MODERATE COMPLEXITY   CLINICAL DECISION MAKING:   Outcome Measure: Tool Used: Lower Extremity Functional Scale (LEFS)  Score:  Initial: 18/80 Most Recent: X/80 (Date: -- )   Interpretation of Score: 20 questions each scored on a 5 point scale with 0 representing \"extreme difficulty or unable to perform\" and 4 representing \"no difficulty\". The lower the score, the greater the functional disability. 80/80 represents no disability. Minimal detectable change is 9 points. Score 80 79-63 62-48 47-32 31-16 15-1 0   Modifier CH CI CJ CK CL CM CN     ? Mobility - Walking and Moving Around:     - CURRENT STATUS: CL - 60%-79% impaired, limited or restricted    - GOAL STATUS: CJ - 20%-39% impaired, limited or restricted    - D/C STATUS:  ---------------To be determined---------------    Medical Necessity:   · Patient is expected to demonstrate progress in strength, range of motion, balance, coordination and functional technique to increase independence with ADLs, ambulation and improved endurance with weight bearing. Reason for Services/Other Comments:  · Patient continues to require skilled intervention due to demonstrates significant forward head/hip hinge position with weight bearing and ambulation and demonstrates weakness in LE and core stability. Use of outcome tool(s) and clinical judgement create a POC that gives a: Questionable prediction of patient's progress: MODERATE COMPLEXITY            TREATMENT:   (In addition to Assessment/Re-Assessment sessions the following treatments were rendered)  Pre-treatment Symptoms/Complaints:  Patient notes traveled to see her grandchildren and did increased amount of walking to a baseball game, slept in different bed, sat in lawn chair at games and noted an increased twinge into right knee and calf since Monday.  Symptoms improved, however continues to note tightness in calf. No swelling, redness or cramping noted. Pain: Initial:   Pain Intensity 1: 4  Pain Location 1: Knee  Pain Orientation 1: Right  Post Session:  2/10     Therapeutic Exercise: (30 Minutes):  Exercises per grid below to improve mobility, strength, balance and coordination. Required moderate verbal and manual cues to promote proper body alignment, promote proper body posture, promote proper body mechanics and promote proper body breathing techniques. Progressed resistance, range, repetitions and complexity of movement as indicated. Date:  3/21/2018   Activity/Exercise Parameters   Patient education X 5 minutes HEP   Supine straight leg raise    Supine ROM knee flexion X 2 minutes   Prone knee straightening    Prone knee quadriceps/knee extension    Sit to stand weight shift/hip hinge X 10 reps, 5 sec holds  X 10 reps sit to stand   Side lying clamshells X 15 reps RLE   Prone knee bed/quadriceps stretch    Modified kwame stretch X 5 reps, BLE, right greater than left secondary to restrictions   Standing weight shift/stance leg x15 reps BLE   Standing weight shift with opposite hip flexion X 15 reps BLE   Standing hip extension (1/2 prone) X 15 reps, BLE   Standing lateral steps X 5 reps, 5' distance  X 5 reps with green band 5' distance   Standing functional squat X 15 reps   Standing terminal knee extension X 15 reps, green band   Standing calf stretch X 5 reps 20 sec holds     Manual Therapy (    Soft Tissue Mobilization Duration  Duration: 30 Minutes): Manual techniques to facilitate improved motion and decreased pain.  (Used abbreviations: MET - muscle energy technique; PNF - proprioceptive neuromuscular facilitation; NMR - neuromuscular re-education; a/p - anterior to posterior; p/a - posterior to anterior)   · Supine right knee PROM into flexion and extension  · Supine right knee patella mobilization in all directions  · Supine soft tissue mobilization through right anterior thigh and iliopsoas. · Side lying left for right hip extension manual stretches with contract/relax techniques     [facility] Portal  Treatment/Session Assessment:    · Response to Treatment: improved flexibility of right calf and knee noted by end of session, improved tolerance to weight bearing with exercises. Educated patient to continue to stretch RLE, if any swelling, redness or cramping to contact her MD   · Compliance with Program/Exercises: compliant all of the time. · Recommendations/Intent for next treatment session: \"Next visit will focus on advancements to more challenging activities\".   Total Treatment Duration: 60 minutes   PT Patient Time In/Time Out  Time In: 0930  Time Out: [address] [place], [place]

## 2018-03-26 ENCOUNTER — HOSPITAL ENCOUNTER (OUTPATIENT)
Dept: PHYSICAL THERAPY | Age: 68
Discharge: HOME | End: 2018-03-26
Payer: MEDICARE

## 2018-03-26 PROCEDURE — 97110 THERAPEUTIC EXERCISES: CPT

## 2018-03-26 PROCEDURE — 97140 MANUAL THERAPY 1/> REGIONS: CPT

## 2018-03-26 NOTE — PROGRESS NOTES
Alice Rubio  : 1950  [place]  [place] [address] at [address]  [phone]   [phone]          OUTPATIENT PHYSICAL THERAPY:Daily Note 3/26/2018    ICD-10: Treatment Diagnosis: pain in joint; right knee M25.561  ICD-10: Treatment Diagnosis: difficulty walking R26.2  ICD-10: Treatment Diagnosis: muscle weakness generalized M62.81    Precautions/Allergies:   Ceclor [cefaclor] and Symbyax [olanzapine-fluoxetine]   Fall Risk Score: 1 (? 5 = High Risk)  MD Orders: eval and treat MEDICAL/REFERRING DIAGNOSIS:  Presence of right artificial knee joint [Z96.651]   DATE OF ONSET: surgical 18  REFERRING PHYSICIAN: Mg Carpenter MD  RETURN PHYSICIAN APPOINTMENT: 3/13/18     INITIAL ASSESSMENT:  Ms. Kat Boudreaux is a 74 y.o. female who presents to physical therapy post-op right TKA on 18. She was released from home health therapy with improved ROM in right knee, however continues to present with antalgic gait, LE and core weakness and challenges with daily tasks including sit to stand transfers from various heights, prolonged weight bearing and ambulation. She would benefit from skilled physical therapy to improve overall mobility, gait and function. PROBLEM LIST (Impacting functional limitations):  1. Decreased Strength  2. Decreased ADL/Functional Activities  3. Decreased Transfer Abilities  4. Decreased Ambulation Ability/Technique  5. Decreased Balance  6. Increased Pain  7. Decreased Activity Tolerance  8. Increased Fatigue  9. Decreased Flexibility/Joint Mobility INTERVENTIONS PLANNED:  1. Balance Exercise  2. Bed Mobility  3. Gait Training  4. Home Exercise Program (HEP)  5. Manual Therapy  6. Neuromuscular Re-education/Strengthening  7. Range of Motion (ROM)  8. Therapeutic Activites  9.  Therapeutic Exercise/Strengthening   TREATMENT PLAN:  Effective Dates: 2018 TO 5/27/2018. Frequency/Duration: 2 times a week for 90 Days  GOALS: (Goals have been discussed and agreed upon with patient.)    Discharge Goals: Time Frame: 90 days  1. Patient demonstrates independence with her HEP without verbal cueing from therapist.  2. Patient demonstrates knee ROM to 0-125 degrees of right knee to perform ADLs  3. Patient able to ambulate with upright posture for 20 minutes without onset of right knee and low back pain. 4. Patient able to ascend/descend flight of stairs without difficulty  5. Improve LEFS outcome measure score from 18/80 to 60/80 to perform ADLs  Rehabilitation Potential For Stated Goals: Excellent  Regarding Clarissa Jay's therapy, I certify that the treatment plan above will be carried out by a therapist or under their direction. Thank you for this referral,  Mauro Gant PT                 The information in this section was collected on 2/26/18 (except where otherwise noted). HISTORY:   History of Present Injury/Illness (Reason for Referral):  Chronic low back pain and bilateral knee pain and arthritis for the past several years, which has created increased forward stoop with ambulation. Challenged with all weight bearing activities, ambulation, sit to stand transfers, and balance. She notes she did well with home health physical therapy and improved overall right knee ROM, however continues to present with deficits with strength and weight bearing. She notes she worked out with a  4 months prior to surgery. Past Medical History/Comorbidities:   Ms. Rony Yin  has a past medical history of Allergic rhinitis; Anxiety; Arrhythmia; Arthritis; Depression; Endocarditis (1992); Heart murmur; Hematuria; History of MRSA infection (on left breast); HLD (hyperlipidemia) ( ); Hypertension; Hypothyroid; Hypothyroidism due to acquired atrophy of thyroid (4/28/2015); Intertrigo; Irregular heart beat; Mass of colon; Morbid obesity ([address]  Reactive airway disease with status asthmaticus; Scoliosis (2016); Sleep apnea; Unspecified adverse effect of anesthesia; Varicose veins; and VSD (ventricular septal defect) (2016). She also has no past medical history of Adverse effect of anesthesia; Difficult intubation; Malignant hyperthermia due to anesthesia; Nausea & vomiting; or Pseudocholinesterase deficiency. Ms. Adam Cross  has a past surgical history that includes hx lipoma resection (Right); hx appendectomy; hx hernia repair (incisional [id number]); hx colonoscopy (, ); hx heent; hx orthopaedic (due to underbite); hx heart catheterization; hx breast reduction (Bilateral, 2016); hx  section; hx dilation and curettage; and hx colectomy (Right, ). Social History/Living Environment:     lives in a private home with her , Has a one story home and walk in shower. Prior Level of Function/Work/Activity:  Retired . Worked out 4 months prior to surgery to prepare. Dominant Side:         RIGHT  Current Medications:       Current Outpatient Prescriptions:     diclofenac (VOLTAREN) 1 % gel, Apply 2 g to affected area four (4) times daily. , Disp: 100 g, Rfl: 1    mometasone (NASONEX) 50 mcg/actuation nasal spray, 2 Sprays by Both Nostrils route daily. , Disp: , Rfl: 10    losartan-hydroCHLOROthiazide (HYZAAR) 100-25 mg per tablet, Take 1 Tab by mouth daily. , Disp: 90 Tab, Rfl: 1    aspirin delayed-release 325 mg tablet, Take 1 Tab by mouth every twelve (12) hours every twelve (12) hours. , Disp: 60 Tab, Rfl: 0    celecoxib (CELEBREX) 200 mg capsule, Take 1 Cap by mouth every twelve (12) hours every twelve (12) hours for 90 days. , Disp: 60 Cap, Rfl: 2    HYDROmorphone (DILAUDID) 2 mg tablet, Take 1 Tab by mouth every four (4) hours as needed.  Max Daily Amount: 12 mg., Disp: 90 Tab, Rfl: 0    ondansetron (ZOFRAN ODT) 8 mg disintegrating tablet, Take 1 Tab by mouth every eight (8) hours as needed for Nausea., Disp: 60 Tab, Rfl: 0    senna-docusate (PERICOLACE) 8.6-50 mg per tablet, Take 2 Tabs by mouth daily. , Disp: 120 Tab, Rfl: 0    zolpidem (AMBIEN) 5 mg tablet, Take 1 Tab by mouth nightly as needed for Sleep. Max Daily Amount: 5 mg., Disp: 60 Tab, Rfl: 0    DIGESTIVE ENZYMES, PLANT, (FIBERZYME CONCENTRATE-HP PO), Take  by mouth two (2) times a day., Disp: , Rfl:     acetaminophen (TYLENOL EXTRA STRENGTH) 500 mg tablet, Take  by mouth as needed for Pain. Indications: Pain, Disp: , Rfl:     escitalopram oxalate (LEXAPRO) 10 mg tablet, Take 1 Tab by mouth daily. , Disp: 30 Tab, Rfl: 5    levothyroxine (SYNTHROID) 150 mcg tablet, Take 1 Tab by mouth every Monday. 1 day a week, Disp: 90 Tab, Rfl: 1    levothyroxine (SYNTHROID) 175 mcg tablet, 1 tab po 6 days a week (Patient taking differently: 1 tab po 6 days a week: Tues - Sun), Disp: 90 Tab, Rfl: 1    montelukast (SINGULAIR) 10 mg tablet, Take 1 Tab by mouth daily. , Disp: 90 Tab, Rfl: 1    ADVAIR DISKUS 100-50 mcg/dose diskus inhaler, Take 1 Puff by inhalation daily. , Disp: 3 Inhaler, Rfl: 3    loratadine (CLARITIN) 10 mg tablet, Take 10 mg by mouth daily. , Disp: , Rfl:     DISABLED PLACARD (DISABLED PLACARD) DMV, by Does Not Apply route See Admin Instructions. , Disp: 1 Each, Rfl: 0    albuterol (PROAIR HFA) 90 mcg/actuation inhaler, Take 2 Puffs by inhalation every six (6) hours as needed. , Disp: 1 Inhaler, Rfl: 11    cpap machine kit, by Does Not Apply route., Disp: , Rfl:    Date Last Reviewed:  3/26/2018   Number of Personal Factors/Comorbidities that affect the Plan of Care: 0: LOW COMPLEXITY   EXAMINATION:   Observation/Orthostatic Postural Assessment:          Lower extremity weight bearing is slight increased left. Observation of gait indicates significant forward hinge with gait, decreased innominate extension bilaterally. Patient exhibits a decreased lumbar lordosis and increased thoracic kyphosis.  Palpation of lower quadrant bony landmarks are left iliac crest elevated. Knee alignment is left genu recurvatum, limited right knee extension. Soft tissue observation indicates anterior right quadriceps, hamstrings and iliopsoas . Palpation: Patient exhibits tenderness to palpation right anterior knee, with scar tissue restriction along incision line. Hamstring flexibility tested supine with straight leg raise is restricted bilaterally, left 50 degrees, right 45 degrees. Piriformis flexibility is restricted bilaterally. Quadriceps flexibility tested heel to buttock is restricted right greater than left. Muscle length of gastrocnemius is restricted right greater than left. Muscle length of soleus is restricted right greater than left. ROM:     AROM(PROM) Right Left   Knee flexion 100 (114) 0-120   Knee extension Lacks 8 degrees 0   Hip flexion 85 85   Hip extension 0 5     Strength:     Manual Muscle Test (*/5) Right Left   Knee extension 4- 4   Knee flexion 4 4   Hip flexion 4- 4   Hip ER 4- 4   Hip IR 4- 4-   Hip extension 3+ 3+   Hip abduction 3+ 3+   Hip adduction 4 4   Ankle DF 4 4   Ankle PF 4 4   Core stability 3+/5     Functional strength with standing heel raise is 2 inch lift with forward hinge position. Special Tests:  None performed    Neurological Screen:  Myotomes: Key muscle strength testing for bilateral LE is intact. Dermatomes: Sensation testing through bilateral LE for light touch is intact. Reflexes: Patellar (L4) and achilles (S1) are not tested. Functional Mobility:  Challenged with sit to stand, standing techniques, ambulation secondary to forward position.   '   Body Structures Involved:  1. Bones  2. Joints  3. Muscles Body Functions Affected:  1. Sensory/Pain  2. Neuromusculoskeletal  3. Movement Related Activities and Participation Affected:  1. General Tasks and Demands  2. Mobility  3. Self Care  4.  Community, Social and [place]   Number of elements (examined above) that affect the Plan of Care: 3: MODERATE COMPLEXITY   CLINICAL PRESENTATION:   Presentation: Evolving clinical presentation with changing clinical characteristics: MODERATE COMPLEXITY   CLINICAL DECISION MAKING:   Outcome Measure: Tool Used: Lower Extremity Functional Scale (LEFS)  Score:  Initial: 18/80 Most Recent: X/80 (Date: -- )   Interpretation of Score: 20 questions each scored on a 5 point scale with 0 representing \"extreme difficulty or unable to perform\" and 4 representing \"no difficulty\". The lower the score, the greater the functional disability. 80/80 represents no disability. Minimal detectable change is 9 points. Score 80 79-63 62-48 47-32 31-16 15-1 0   Modifier CH CI CJ CK CL CM CN     ? Mobility - Walking and Moving Around:     - CURRENT STATUS: CL - 60%-79% impaired, limited or restricted    - GOAL STATUS: CJ - 20%-39% impaired, limited or restricted    - D/C STATUS:  ---------------To be determined---------------    Medical Necessity:   · Patient is expected to demonstrate progress in strength, range of motion, balance, coordination and functional technique to increase independence with ADLs, ambulation and improved endurance with weight bearing. Reason for Services/Other Comments:  · Patient continues to require skilled intervention due to demonstrates significant forward head/hip hinge position with weight bearing and ambulation and demonstrates weakness in LE and core stability. Use of outcome tool(s) and clinical judgement create a POC that gives a: Questionable prediction of patient's progress: MODERATE COMPLEXITY            TREATMENT:   (In addition to Assessment/Re-Assessment sessions the following treatments were rendered)  Pre-treatment Symptoms/Complaints:  Patient notes RLE improved compared to last week, less tension noted in right calf, has been walking this past weekend without the use of her cane.    Pain: Initial:   Pain Intensity 1: 2  Pain Location 1: Knee  Pain Orientation 1: Right  Post Session:  1/10     Therapeutic Exercise: (30 Minutes):  Exercises per grid below to improve mobility, strength, balance and coordination. Required moderate verbal and manual cues to promote proper body alignment, promote proper body posture, promote proper body mechanics and promote proper body breathing techniques. Progressed resistance, range, repetitions and complexity of movement as indicated. Date:  3/26/2018   Activity/Exercise Parameters   Patient education X 5 minutes HEP   Supine straight leg raise    Supine ROM knee flexion X 2 minutes   Prone knee straightening    Prone knee quadriceps/knee extension X 10 reps 5 sec holds   Sit to stand weight shift/hip hinge X 10 reps, 5 sec holds  X 10 reps sit to stand   Side lying clamshells X 15 reps RLE   Prone knee bed/quadriceps stretch    Modified kwame stretch X 5 reps, BLE, right greater than left secondary to restrictions   Standing weight shift/stance leg x15 reps BLE   Standing weight shift with opposite hip flexion X 15 reps BLE   Standing hip extension (1/2 prone) X 15 reps, BLE   Standing lateral steps X 5 reps, 5' distance  X 5 reps with green band 5' distance   Standing functional squat X 15 reps   Standing terminal knee extension X 15 reps, blue band   Standing calf stretch X 5 reps 20 sec holds   steps X 10 reps BLE lateral step down  X 10 reps toe taps, focus on control and LE balance. Manual Therapy (    Soft Tissue Mobilization Duration  Duration: 30 Minutes): Manual techniques to facilitate improved motion and decreased pain. (Used abbreviations: MET - muscle energy technique; PNF - proprioceptive neuromuscular facilitation; NMR - neuromuscular re-education; a/p - anterior to posterior; p/a - posterior to anterior)   · Supine right knee PROM into flexion and extension  · Supine right knee patella mobilization in all directions  · Supine soft tissue mobilization through right anterior thigh and iliopsoas.     · Prone soft tissue mobilization to right calf and strumming to right hamstrings    [employer] Portal  Treatment/Session Assessment:    · Response to Treatment: improved right knee extension and strength in LE at terminal knee extension. · Compliance with Program/Exercises: compliant all of the time. · Recommendations/Intent for next treatment session: \"Next visit will focus on advancements to more challenging activities\".   Total Treatment Duration: 60 minutes   PT Patient Time In/Time Out  Time In: 1030  Time Out: [address] Murillo, PT

## 2018-03-28 ENCOUNTER — HOSPITAL ENCOUNTER (OUTPATIENT)
Dept: PHYSICAL THERAPY | Age: 68
Discharge: HOME | End: 2018-03-28
Payer: MEDICARE

## 2018-03-28 NOTE — PROGRESS NOTES
[facility]   (LPN:[id number]) Therapy Center at  [address]  [phone]  [phone]             DATE: 3/28/2018    Patient cancelled for appointment today due to  in hospital.  Will plan to follow up on next scheduled visit.     Dae Hernandez PT, DPT, CFMT

## 2018-04-03 ENCOUNTER — HOSPITAL ENCOUNTER (OUTPATIENT)
Dept: PHYSICAL THERAPY | Age: 68
Discharge: HOME | End: 2018-04-03
Payer: MEDICARE

## 2018-04-03 PROCEDURE — 97140 MANUAL THERAPY 1/> REGIONS: CPT

## 2018-04-03 PROCEDURE — 97110 THERAPEUTIC EXERCISES: CPT

## 2018-04-03 NOTE — PROGRESS NOTES
Nicole Grover  : 1950  [place]  [place] [address] at [address] [place] [address]  [phone]   [phone]          OUTPATIENT PHYSICAL THERAPY:Daily Note 4/3/2018    ICD-10: Treatment Diagnosis: pain in joint; right knee M25.561  ICD-10: Treatment Diagnosis: difficulty walking R26.2  ICD-10: Treatment Diagnosis: muscle weakness generalized M62.81    Precautions/Allergies:   Ceclor [cefaclor] and Symbyax [olanzapine-fluoxetine]   Fall Risk Score: 1 (? 5 = High Risk)  MD Orders: eval and treat MEDICAL/REFERRING DIAGNOSIS:  Presence of right artificial knee joint [Z96.651]   DATE OF ONSET: surgical 18  REFERRING PHYSICIAN: Yasir Perez MD  RETURN PHYSICIAN APPOINTMENT: 3/13/18     INITIAL ASSESSMENT:  Ms. Dusty Ormond is a 74 y.o. female who presents to physical therapy post-op right TKA on 18. She was released from home health therapy with improved ROM in right knee, however continues to present with antalgic gait, LE and core weakness and challenges with daily tasks including sit to stand transfers from various heights, prolonged weight bearing and ambulation. She would benefit from skilled physical therapy to improve overall mobility, gait and function. PROBLEM LIST (Impacting functional limitations):  1. Decreased Strength  2. Decreased ADL/Functional Activities  3. Decreased Transfer Abilities  4. Decreased Ambulation Ability/Technique  5. Decreased Balance  6. Increased Pain  7. Decreased Activity Tolerance  8. Increased Fatigue  9. Decreased Flexibility/Joint Mobility INTERVENTIONS PLANNED:  1. Balance Exercise  2. Bed Mobility  3. Gait Training  4. Home Exercise Program (HEP)  5. Manual Therapy  6. Neuromuscular Re-education/Strengthening  7. Range of Motion (ROM)  8. Therapeutic Activites  9.  Therapeutic Exercise/Strengthening   TREATMENT PLAN:  Effective Dates: 2018 TO 5/27/2018. Frequency/Duration: 2 times a week for 90 Days  GOALS: (Goals have been discussed and agreed upon with patient.)    Discharge Goals: Time Frame: 90 days  1. Patient demonstrates independence with her HEP without verbal cueing from therapist.  2. Patient demonstrates knee ROM to 0-125 degrees of right knee to perform ADLs  3. Patient able to ambulate with upright posture for 20 minutes without onset of right knee and low back pain. 4. Patient able to ascend/descend flight of stairs without difficulty  5. Improve LEFS outcome measure score from 18/80 to 60/80 to perform ADLs  Rehabilitation Potential For Stated Goals: Excellent  Regarding Jered Jay's therapy, I certify that the treatment plan above will be carried out by a therapist or under their direction. Thank you for this referral,  Yaya Banks PT                 The information in this section was collected on 2/26/18 (except where otherwise noted). HISTORY:   History of Present Injury/Illness (Reason for Referral):  Chronic low back pain and bilateral knee pain and arthritis for the past several years, which has created increased forward stoop with ambulation. Challenged with all weight bearing activities, ambulation, sit to stand transfers, and balance. She notes she did well with home health physical therapy and improved overall right knee ROM, however continues to present with deficits with strength and weight bearing. She notes she worked out with a  4 months prior to surgery. Past Medical History/Comorbidities:   Ms. Cresencio Bowles  has a past medical history of Allergic rhinitis; Anxiety; Arrhythmia; Arthritis; Depression; Endocarditis (1992); Heart murmur; Hematuria; History of MRSA infection (on left breast); HLD (hyperlipidemia) ( ); Hypertension; Hypothyroid; Hypothyroidism due to acquired atrophy of thyroid (4/28/2015); Intertrigo; Irregular heart beat; Mass of colon; Morbid obesity ([address]  Reactive airway disease with status asthmaticus; Scoliosis (2016); Sleep apnea; Unspecified adverse effect of anesthesia; Varicose veins; and VSD (ventricular septal defect) (2016). She also has no past medical history of Adverse effect of anesthesia; Difficult intubation; Malignant hyperthermia due to anesthesia; Nausea & vomiting; or Pseudocholinesterase deficiency. Ms. Amirah Alvarez  has a past surgical history that includes hx lipoma resection (Right); hx appendectomy; hx hernia repair (incisional [id number]); hx colonoscopy (, ); hx heent; hx orthopaedic (due to underbite); hx heart catheterization; hx breast reduction (Bilateral, 2016); hx  section; hx dilation and curettage; and hx colectomy (Right, ). Social History/Living Environment:     lives in a private home with her , Has a one story home and walk in shower. Prior Level of Function/Work/Activity:  Retired . Worked out 4 months prior to surgery to prepare. Dominant Side:         RIGHT  Current Medications:       Current Outpatient Prescriptions:     celecoxib (CELEBREX) 200 mg capsule, Take 1 Cap by mouth every twelve (12) hours every twelve (12) hours for 90 days. , Disp: 60 Cap, Rfl: 5    diclofenac (VOLTAREN) 1 % gel, Apply 2 g to affected area four (4) times daily. , Disp: 100 g, Rfl: 1    mometasone (NASONEX) 50 mcg/actuation nasal spray, 2 Sprays by Both Nostrils route daily. , Disp: , Rfl: 10    losartan-hydroCHLOROthiazide (HYZAAR) 100-25 mg per tablet, Take 1 Tab by mouth daily. , Disp: 90 Tab, Rfl: 1    aspirin delayed-release 325 mg tablet, Take 1 Tab by mouth every twelve (12) hours every twelve (12) hours. , Disp: 60 Tab, Rfl: 0    HYDROmorphone (DILAUDID) 2 mg tablet, Take 1 Tab by mouth every four (4) hours as needed.  Max Daily Amount: 12 mg., Disp: 90 Tab, Rfl: 0    ondansetron (ZOFRAN ODT) 8 mg disintegrating tablet, Take 1 Tab by mouth every eight (8) hours as needed for Nausea., Disp: 60 Tab, Rfl: 0    senna-docusate (PERICOLACE) 8.6-50 mg per tablet, Take 2 Tabs by mouth daily. , Disp: 120 Tab, Rfl: 0    zolpidem (AMBIEN) 5 mg tablet, Take 1 Tab by mouth nightly as needed for Sleep. Max Daily Amount: 5 mg., Disp: 60 Tab, Rfl: 0    DIGESTIVE ENZYMES, PLANT, (FIBERZYME CONCENTRATE-HP PO), Take  by mouth two (2) times a day., Disp: , Rfl:     acetaminophen (TYLENOL EXTRA STRENGTH) 500 mg tablet, Take  by mouth as needed for Pain. Indications: Pain, Disp: , Rfl:     escitalopram oxalate (LEXAPRO) 10 mg tablet, Take 1 Tab by mouth daily. , Disp: 30 Tab, Rfl: 5    levothyroxine (SYNTHROID) 150 mcg tablet, Take 1 Tab by mouth every Monday. 1 day a week, Disp: 90 Tab, Rfl: 1    levothyroxine (SYNTHROID) 175 mcg tablet, 1 tab po 6 days a week (Patient taking differently: 1 tab po 6 days a week: Tues - Sun), Disp: 90 Tab, Rfl: 1    montelukast (SINGULAIR) 10 mg tablet, Take 1 Tab by mouth daily. , Disp: 90 Tab, Rfl: 1    ADVAIR DISKUS 100-50 mcg/dose diskus inhaler, Take 1 Puff by inhalation daily. , Disp: 3 Inhaler, Rfl: 3    loratadine (CLARITIN) 10 mg tablet, Take 10 mg by mouth daily. , Disp: , Rfl:     DISABLED PLACARD (DISABLED PLACARD) DMV, by Does Not Apply route See Admin Instructions. , Disp: 1 Each, Rfl: 0    albuterol (PROAIR HFA) 90 mcg/actuation inhaler, Take 2 Puffs by inhalation every six (6) hours as needed. , Disp: 1 Inhaler, Rfl: 11    cpap machine kit, by Does Not Apply route., Disp: , Rfl:    Date Last Reviewed:  4/3/2018   Number of Personal Factors/Comorbidities that affect the Plan of Care: 0: LOW COMPLEXITY   EXAMINATION:   Observation/Orthostatic Postural Assessment:          Lower extremity weight bearing is slight increased left. Observation of gait indicates significant forward hinge with gait, decreased innominate extension bilaterally. Patient exhibits a decreased lumbar lordosis and increased thoracic kyphosis.  Palpation of lower quadrant bony landmarks are left iliac crest elevated. Knee alignment is left genu recurvatum, limited right knee extension. Soft tissue observation indicates anterior right quadriceps, hamstrings and iliopsoas . Palpation: Patient exhibits tenderness to palpation right anterior knee, with scar tissue restriction along incision line. Hamstring flexibility tested supine with straight leg raise is restricted bilaterally, left 50 degrees, right 45 degrees. Piriformis flexibility is restricted bilaterally. Quadriceps flexibility tested heel to buttock is restricted right greater than left. Muscle length of gastrocnemius is restricted right greater than left. Muscle length of soleus is restricted right greater than left. ROM:     AROM(PROM) Right Left   Knee flexion 110 (120) 0-120   Knee extension Lacks 8 degrees 0   Hip flexion 85 85   Hip extension 0 5     Strength:     Manual Muscle Test (*/5) Right Left   Knee extension 4- 4   Knee flexion 4 4   Hip flexion 4- 4   Hip ER 4- 4   Hip IR 4- 4-   Hip extension 3+ 3+   Hip abduction 3+ 3+   Hip adduction 4 4   Ankle DF 4 4   Ankle PF 4 4   Core stability 3+/5     Functional strength with standing heel raise is 2 inch lift with forward hinge position. Special Tests:  None performed    Neurological Screen:  Myotomes: Key muscle strength testing for bilateral LE is intact. Dermatomes: Sensation testing through bilateral LE for light touch is intact. Reflexes: Patellar (L4) and achilles (S1) are not tested. Functional Mobility:  Challenged with sit to stand, standing techniques, ambulation secondary to forward position.   '   Body Structures Involved:  1. Bones  2. Joints  3. Muscles Body Functions Affected:  1. Sensory/Pain  2. Neuromusculoskeletal  3. Movement Related Activities and Participation Affected:  1. General Tasks and Demands  2. Mobility  3. Self Care  4.  Community, Social and [facility]   Number of elements (examined above) that affect the Plan of Care: 3: MODERATE COMPLEXITY   CLINICAL PRESENTATION:   Presentation: Evolving clinical presentation with changing clinical characteristics: MODERATE COMPLEXITY   CLINICAL DECISION MAKING:   Outcome Measure: Tool Used: Lower Extremity Functional Scale (LEFS)  Score:  Initial: 18/80 Most Recent: X/80 (Date: -- )   Interpretation of Score: 20 questions each scored on a 5 point scale with 0 representing \"extreme difficulty or unable to perform\" and 4 representing \"no difficulty\". The lower the score, the greater the functional disability. 80/80 represents no disability. Minimal detectable change is 9 points. Score 80 79-63 62-48 47-32 31-16 15-1 0   Modifier CH CI CJ CK CL CM CN     ? Mobility - Walking and Moving Around:     - CURRENT STATUS: CL - 60%-79% impaired, limited or restricted    - GOAL STATUS: CJ - 20%-39% impaired, limited or restricted    - D/C STATUS:  ---------------To be determined---------------    Medical Necessity:   · Patient is expected to demonstrate progress in strength, range of motion, balance, coordination and functional technique to increase independence with ADLs, ambulation and improved endurance with weight bearing. Reason for Services/Other Comments:  · Patient continues to require skilled intervention due to demonstrates significant forward head/hip hinge position with weight bearing and ambulation and demonstrates weakness in LE and core stability. Use of outcome tool(s) and clinical judgement create a POC that gives a: Questionable prediction of patient's progress: MODERATE COMPLEXITY            TREATMENT:   (In addition to Assessment/Re-Assessment sessions the following treatments were rendered)  Pre-treatment Symptoms/Complaints:  Patient notes improved ambulation noted for short distances, notes left knee irritated more with prolonged standing and walking now. Started back to her work out routine and also being treated by a .     Pain: Initial:   Pain Intensity 1: 2  Pain Location 1: Knee  Pain Orientation 1: Right  Post Session:  1/10     Therapeutic Exercise: (30 Minutes):  Exercises per grid below to improve mobility, strength, balance and coordination. Required moderate verbal and manual cues to promote proper body alignment, promote proper body posture, promote proper body mechanics and promote proper body breathing techniques. Progressed resistance, range, repetitions and complexity of movement as indicated. Date:  4/3/2018   Activity/Exercise Parameters   Patient education X 5 minutes HEP   Supine straight leg raise    Supine ROM knee flexion X 2 minutes   Prone knee straightening    Prone knee quadriceps/knee extension    Sit to stand weight shift/hip hinge    Side lying clamshells    Prone knee bed/quadriceps stretch    Modified kwame stretch X 5 reps, BLE, right greater than left secondary to restrictions   Standing weight shift/stance leg x15 reps BLE   Standing weight shift with opposite hip flexion X 15 reps BLE   Standing hip extension (1/2 prone) X 15 reps, BLE   Standing lateral steps X 5 reps, 5' distance  X 5 reps with green band 5' distance   Standing functional squat X 15 reps   Standing terminal knee extension X 15 reps, blue band   Standing calf stretch X 5 reps 20 sec holds   steps X 10 reps BLE lateral step down  X 10 reps toe taps, focus on control and LE balance. Manual Therapy (    Soft Tissue Mobilization Duration  Duration: 15 Minutes): Manual techniques to facilitate improved motion and decreased pain. (Used abbreviations: MET - muscle energy technique; PNF - proprioceptive neuromuscular facilitation; NMR - neuromuscular re-education; a/p - anterior to posterior; p/a - posterior to anterior)   · Supine right knee PROM into flexion and extension  · Supine right knee patella mobilization in all directions  · Supine soft tissue mobilization through right anterior thigh and iliopsoas.       [facility] Portal  Treatment/Session Assessment:    · Response to Treatment: continues to need to take rest breaks between all weight bearing exercises, however improving overall tolerance and strength    · Compliance with Program/Exercises: compliant all of the time. · Recommendations/Intent for next treatment session: \"Next visit will focus on advancements to more challenging activities\".   Total Treatment Duration: 45 minutes   PT Patient Time In/Time Out  Time In: 1445  Time Out: [address] PT

## 2018-04-10 ENCOUNTER — HOSPITAL ENCOUNTER (OUTPATIENT)
Dept: PHYSICAL THERAPY | Age: 68
Discharge: HOME | End: 2018-04-10
Payer: MEDICARE

## 2018-04-10 PROCEDURE — G8979 MOBILITY GOAL STATUS: HCPCS

## 2018-04-10 PROCEDURE — 97110 THERAPEUTIC EXERCISES: CPT

## 2018-04-10 PROCEDURE — 97140 MANUAL THERAPY 1/> REGIONS: CPT

## 2018-04-10 PROCEDURE — G8978 MOBILITY CURRENT STATUS: HCPCS

## 2018-04-11 NOTE — PROGRESS NOTES
Jose Enrique Torres  : 1950  [place]  [place] [address] at [address]  [phone]   [phone]          OUTPATIENT PHYSICAL THERAPY:Daily Note and Progress Report 4/10/2018    ICD-10: Treatment Diagnosis: pain in joint; right knee M25.561  ICD-10: Treatment Diagnosis: difficulty walking R26.2  ICD-10: Treatment Diagnosis: muscle weakness generalized M62.81    Precautions/Allergies:   Ceclor [cefaclor] and Symbyax [olanzapine-fluoxetine]   Fall Risk Score: 1 (? 5 = High Risk)  MD Orders: eval and treat MEDICAL/REFERRING DIAGNOSIS:  Presence of right artificial knee joint [Z96.651]   DATE OF ONSET: surgical 18  REFERRING PHYSICIAN: Bebo Alberto MD  RETURN PHYSICIAN APPOINTMENT: 3/13/18     INITIAL ASSESSMENT:  Ms. Graciela Polanco is a 74 y.o. female who presents to physical therapy post-op right TKA on 18. She was released from home health therapy with improved ROM in right knee, however continues to present with antalgic gait, LE and core weakness and challenges with daily tasks including sit to stand transfers from various heights, prolonged weight bearing and ambulation. She would benefit from skilled physical therapy to improve overall mobility, gait and function. PROBLEM LIST (Impacting functional limitations):  1. Decreased Strength  2. Decreased ADL/Functional Activities  3. Decreased Transfer Abilities  4. Decreased Ambulation Ability/Technique  5. Decreased Balance  6. Increased Pain  7. Decreased Activity Tolerance  8. Increased Fatigue  9. Decreased Flexibility/Joint Mobility INTERVENTIONS PLANNED:  1. Balance Exercise  2. Bed Mobility  3. Gait Training  4. Home Exercise Program (HEP)  5. Manual Therapy  6. Neuromuscular Re-education/Strengthening  7. Range of Motion (ROM)  8. Therapeutic Activites  9.  Therapeutic Exercise/Strengthening   TREATMENT PLAN:  Effective Dates: 2/26/2018 TO 5/27/2018. Frequency/Duration: 2 times a week for 90 Days  GOALS: (Goals have been discussed and agreed upon with patient.)    Discharge Goals: Time Frame: 90 days  1. Patient demonstrates independence with her HEP without verbal cueing from therapist. GOAL MET  2. Patient demonstrates knee ROM to 0-125 degrees of right knee to perform ADLs ALMOST MET  3. Patient able to ambulate with upright posture for 20 minutes without onset of right knee and low back pain. - ONGOING  4. Patient able to ascend/descend flight of stairs without difficulty - ONGOING  5. Improve LEFS outcome measure score from 18/80 to 60/80 to perform ADLs - ONGOING  Rehabilitation Potential For Stated Goals: Excellent  Regarding Ashley Jay's therapy, I certify that the treatment plan above will be carried out by a therapist or under their direction. Thank you for this referral,  Ashley Nicole PT                 The information in this section was collected on 2/26/18 (except where otherwise noted). HISTORY:   History of Present Injury/Illness (Reason for Referral):  Chronic low back pain and bilateral knee pain and arthritis for the past several years, which has created increased forward stoop with ambulation. Challenged with all weight bearing activities, ambulation, sit to stand transfers, and balance. She notes she did well with home health physical therapy and improved overall right knee ROM, however continues to present with deficits with strength and weight bearing. She notes she worked out with a  4 months prior to surgery. Past Medical History/Comorbidities:   Ms. John Hopkins  has a past medical history of Allergic rhinitis; Anxiety; Arrhythmia; Arthritis; Depression; Endocarditis (1992); Heart murmur; Hematuria; History of MRSA infection (on left breast); HLD (hyperlipidemia) ( ); Hypertension; Hypothyroid; Hypothyroidism due to acquired atrophy of thyroid (4/28/2015);  Intertrigo; Irregular heart beat; Mass of colon; Morbid obesity ([facility] [address] Reactive airway disease with status asthmaticus; Scoliosis (2016); Sleep apnea; Unspecified adverse effect of anesthesia; Varicose veins; and VSD (ventricular septal defect) (2016). She also has no past medical history of Adverse effect of anesthesia; Difficult intubation; Malignant hyperthermia due to anesthesia; Nausea & vomiting; or Pseudocholinesterase deficiency. Ms. Debra Burton  has a past surgical history that includes hx lipoma resection (Right); hx appendectomy; hx hernia repair (incisional [id number]); hx colonoscopy (, ); hx heent; hx orthopaedic (due to underbite); hx heart catheterization; hx breast reduction (Bilateral, 2016); hx  section; hx dilation and curettage; and hx colectomy (Right, ). Social History/Living Environment:     lives in a private home with her , Has a one story home and walk in shower. Prior Level of Function/Work/Activity:  Retired . Worked out 4 months prior to surgery to prepare. Dominant Side:         RIGHT  Current Medications:       Current Outpatient Prescriptions:     celecoxib (CELEBREX) 200 mg capsule, Take 1 Cap by mouth every twelve (12) hours every twelve (12) hours for 90 days. , Disp: 60 Cap, Rfl: 5    diclofenac (VOLTAREN) 1 % gel, Apply 2 g to affected area four (4) times daily. , Disp: 100 g, Rfl: 1    mometasone (NASONEX) 50 mcg/actuation nasal spray, 2 Sprays by Both Nostrils route daily. , Disp: , Rfl: 10    losartan-hydroCHLOROthiazide (HYZAAR) 100-25 mg per tablet, Take 1 Tab by mouth daily. , Disp: 90 Tab, Rfl: 1    aspirin delayed-release 325 mg tablet, Take 1 Tab by mouth every twelve (12) hours every twelve (12) hours. , Disp: 60 Tab, Rfl: 0    HYDROmorphone (DILAUDID) 2 mg tablet, Take 1 Tab by mouth every four (4) hours as needed.  Max Daily Amount: 12 mg., Disp: 90 Tab, Rfl: 0    ondansetron (ZOFRAN ODT) 8 mg disintegrating tablet, Take 1 Tab by mouth every eight (8) hours as needed for Nausea., Disp: 60 Tab, Rfl: 0    senna-docusate (PERICOLACE) 8.6-50 mg per tablet, Take 2 Tabs by mouth daily. , Disp: 120 Tab, Rfl: 0    zolpidem (AMBIEN) 5 mg tablet, Take 1 Tab by mouth nightly as needed for Sleep. Max Daily Amount: 5 mg., Disp: 60 Tab, Rfl: 0    DIGESTIVE ENZYMES, PLANT, (FIBERZYME CONCENTRATE-HP PO), Take  by mouth two (2) times a day., Disp: , Rfl:     acetaminophen (TYLENOL EXTRA STRENGTH) 500 mg tablet, Take  by mouth as needed for Pain. Indications: Pain, Disp: , Rfl:     escitalopram oxalate (LEXAPRO) 10 mg tablet, Take 1 Tab by mouth daily. , Disp: 30 Tab, Rfl: 5    levothyroxine (SYNTHROID) 150 mcg tablet, Take 1 Tab by mouth every Monday. 1 day a week, Disp: 90 Tab, Rfl: 1    levothyroxine (SYNTHROID) 175 mcg tablet, 1 tab po 6 days a week (Patient taking differently: 1 tab po 6 days a week: Tues - Sun), Disp: 90 Tab, Rfl: 1    montelukast (SINGULAIR) 10 mg tablet, Take 1 Tab by mouth daily. , Disp: 90 Tab, Rfl: 1    ADVAIR DISKUS 100-50 mcg/dose diskus inhaler, Take 1 Puff by inhalation daily. , Disp: 3 Inhaler, Rfl: 3    loratadine (CLARITIN) 10 mg tablet, Take 10 mg by mouth daily. , Disp: , Rfl:     DISABLED PLACARD (DISABLED PLACARD) DMV, by Does Not Apply route See Admin Instructions. , Disp: 1 Each, Rfl: 0    albuterol (PROAIR HFA) 90 mcg/actuation inhaler, Take 2 Puffs by inhalation every six (6) hours as needed. , Disp: 1 Inhaler, Rfl: 11    cpap machine kit, by Does Not Apply route., Disp: , Rfl:    Date Last Reviewed:  4/10/2018   Number of Personal Factors/Comorbidities that affect the Plan of Care: 0: LOW COMPLEXITY   EXAMINATION:   Observation/Orthostatic Postural Assessment:          Lower extremity weight bearing is slight increased left. Observation of gait indicates significant forward hinge with gait, decreased innominate extension bilaterally. Patient exhibits a decreased lumbar lordosis and increased thoracic kyphosis. Palpation of lower quadrant bony landmarks are left iliac crest elevated. Knee alignment is left genu recurvatum, limited right knee extension. Soft tissue observation indicates anterior right quadriceps, hamstrings and iliopsoas . Improving 4/10/18    Palpation: Patient exhibits tenderness to palpation right anterior knee, with scar tissue restriction along incision line. Hamstring flexibility tested supine with straight leg raise is restricted bilaterally, left 60 degrees, right 55 degrees. Piriformis flexibility is restricted bilaterally. Quadriceps flexibility tested heel to buttock is restricted right greater than left improving 4/10/18. Muscle length of gastrocnemius is restricted right greater than left. Muscle length of soleus is restricted right greater than left. ROM:     AROM(PROM) Right Left   Knee flexion 120 (125) 0-120   Knee extension Lacks 3 degrees 0   Hip flexion 90 85   Hip extension 5 5     Strength:     Manual Muscle Test (*/5) Right Left   Knee extension 4 4   Knee flexion 4 4   Hip flexion 4 4   Hip ER 4 4   Hip IR 4 4   Hip extension 4- 4-   Hip abduction 4- 4-   Hip adduction 4 4   Ankle DF 4 4   Ankle PF 4 4   Core stability 3+/5     Functional strength with standing heel raise is 2 inch lift with forward hinge position. Special Tests:  None performed    Neurological Screen:  Myotomes: Key muscle strength testing for bilateral LE is intact. Dermatomes: Sensation testing through bilateral LE for light touch is intact. Reflexes: Patellar (L4) and achilles (S1) are not tested. Functional Mobility:  Challenged with sit to stand, standing techniques, ambulation secondary to forward position.   '   Body Structures Involved:  1. Bones  2. Joints  3. Muscles Body Functions Affected:  1. Sensory/Pain  2. Neuromusculoskeletal  3. Movement Related Activities and Participation Affected:  1. General Tasks and Demands  2. Mobility  3. Self Care  4.  Community, Social and [employer] [employer]   Number of elements (examined above) that affect the Plan of Care: 3: MODERATE COMPLEXITY   CLINICAL PRESENTATION:   Presentation: Evolving clinical presentation with changing clinical characteristics: MODERATE COMPLEXITY   CLINICAL DECISION MAKING:   Outcome Measure: Tool Used: Lower Extremity Functional Scale (LEFS)  Score:  Initial: 18/80 Most Recent: 32/80 (Date: 4-10-18 )   Interpretation of Score: 20 questions each scored on a 5 point scale with 0 representing \"extreme difficulty or unable to perform\" and 4 representing \"no difficulty\". The lower the score, the greater the functional disability. 80/80 represents no disability. Minimal detectable change is 9 points. Score 80 79-63 62-48 47-32 31-16 15-1 0   Modifier CH CI CJ CK CL CM CN     ? Mobility - Walking and Moving Around:     - CURRENT STATUS: CK - 40%-59% impaired, limited or restricted    - GOAL STATUS: CJ - 20%-39% impaired, limited or restricted    - D/C STATUS:  ---------------To be determined---------------    Medical Necessity:   · Patient is expected to demonstrate progress in strength, range of motion, balance, coordination and functional technique to increase independence with ADLs, ambulation and improved endurance with weight bearing. Reason for Services/Other Comments:  · Patient continues to require skilled intervention due to demonstrates significant forward head/hip hinge position with weight bearing and ambulation and demonstrates weakness in LE and core stability. Use of outcome tool(s) and clinical judgement create a POC that gives a: Questionable prediction of patient's progress: MODERATE COMPLEXITY            TREATMENT:   (In addition to Assessment/Re-Assessment sessions the following treatments were rendered)  Pre-treatment Symptoms/Complaints:  Patient notes overall walking better with less knee pain, however notes she is challenged with her endurance levels and fatigues easily.    Pain: Initial:   Pain Intensity 1: 2  Pain Location 1: Knee  Pain Orientation 1: Right  Post Session:  1/10     Therapeutic Exercise: (35 Minutes):  Exercises per grid below to improve mobility, strength, balance and coordination. Required moderate verbal and manual cues to promote proper body alignment, promote proper body posture, promote proper body mechanics and promote proper body breathing techniques. Progressed resistance, range, repetitions and complexity of movement as indicated. Date:  4/10/2018   Activity/Exercise Parameters   Patient education X 5 minutes HEP   Supine straight leg raise    Supine ROM knee flexion X 2 minutes   Prone knee straightening    Prone knee quadriceps/knee extension    Sit to stand weight shift/hip hinge    Side lying clamshells    Prone knee bed/quadriceps stretch    Modified kwame stretch X 5 reps, BLE, right greater than left secondary to restrictions   Standing weight shift/stance leg x15 reps BLE   Standing weight shift with opposite hip flexion X 15 reps BLE   Standing hip extension (1/2 prone) X 15 reps, BLE   Standing lateral steps X 5 reps, 5' distance  X 5 reps with green band 5' distance   Standing functional squat X 15 reps   Standing terminal knee extension X 15 reps, blue band   Standing calf stretch X 5 reps 20 sec holds   steps X 10 reps BLE lateral step down  X 10 reps toe taps, focus on control and LE balance. X 15 step up BLE     Manual Therapy (    Soft Tissue Mobilization Duration  Duration: 10 Minutes): Manual techniques to facilitate improved motion and decreased pain. (Used abbreviations: MET - muscle energy technique; PNF - proprioceptive neuromuscular facilitation; NMR - neuromuscular re-education; a/p - anterior to posterior; p/a - posterior to anterior)   · Supine right knee PROM into flexion and extension  · Supine right knee patella mobilization in all directions  · Supine soft tissue mobilization through right anterior thigh and iliopsoas.       [facility]  Treatment/Session Assessment:    · Response to Treatment: improving weight bearing exercises, continues to fatigue easily and need rest breaks between exercises. · Compliance with Program/Exercises: compliant all of the time. · Recommendations/Intent for next treatment session: \"Next visit will focus on advancements to more challenging activities\".   Total Treatment Duration: 45 minutes   PT Patient Time In/Time Out  Time In: 1445  Time Out: [address]  Janell Larger, PT

## 2018-04-17 ENCOUNTER — HOSPITAL ENCOUNTER (OUTPATIENT)
Dept: PHYSICAL THERAPY | Age: 68
Discharge: HOME | End: 2018-04-17
Payer: MEDICARE

## 2018-04-17 PROCEDURE — 97110 THERAPEUTIC EXERCISES: CPT

## 2018-04-17 PROCEDURE — 97140 MANUAL THERAPY 1/> REGIONS: CPT

## 2018-04-18 NOTE — PROGRESS NOTES
[place]  : 1950  [place]  [place] [address] [place] [address]  [phone]   [phone]          OUTPATIENT PHYSICAL THERAPY:Daily Note 2018    ICD-10: Treatment Diagnosis: pain in joint; right knee M25.561  ICD-10: Treatment Diagnosis: difficulty walking R26.2  ICD-10: Treatment Diagnosis: muscle weakness generalized M62.81    Precautions/Allergies:   Ceclor [cefaclor] and Symbyax [olanzapine-fluoxetine]   Fall Risk Score: 1 (? 5 = High Risk)  MD Orders: eval and treat MEDICAL/REFERRING DIAGNOSIS:  Presence of right artificial knee joint [Z96.651]   DATE OF ONSET: surgical 18  REFERRING PHYSICIAN: Nicho Davis MD  RETURN PHYSICIAN APPOINTMENT: 3/13/18     INITIAL ASSESSMENT:  Ms. Cathie Michael is a 74 y.o. female who presents to physical therapy post-op right TKA on 18. She was released from home health therapy with improved ROM in right knee, however continues to present with antalgic gait, LE and core weakness and challenges with daily tasks including sit to stand transfers from various heights, prolonged weight bearing and ambulation. She would benefit from skilled physical therapy to improve overall mobility, gait and function. PROBLEM LIST (Impacting functional limitations):  1. Decreased Strength  2. Decreased ADL/Functional Activities  3. Decreased Transfer Abilities  4. Decreased Ambulation Ability/Technique  5. Decreased Balance  6. Increased Pain  7. Decreased Activity Tolerance  8. Increased Fatigue  9. Decreased Flexibility/Joint Mobility INTERVENTIONS PLANNED:  1. Balance Exercise  2. Bed Mobility  3. Gait Training  4. Home Exercise Program (HEP)  5. Manual Therapy  6. Neuromuscular Re-education/Strengthening  7. Range of Motion (ROM)  8. Therapeutic Activites  9.  Therapeutic Exercise/Strengthening   TREATMENT PLAN:  Effective Dates: 2018 TO 5/27/2018. Frequency/Duration: 2 times a week for 90 Days  GOALS: (Goals have been discussed and agreed upon with patient.)    Discharge Goals: Time Frame: 90 days  1. Patient demonstrates independence with her HEP without verbal cueing from therapist. GOAL MET  2. Patient demonstrates knee ROM to 0-125 degrees of right knee to perform ADLs ALMOST MET  3. Patient able to ambulate with upright posture for 20 minutes without onset of right knee and low back pain. - ONGOING  4. Patient able to ascend/descend flight of stairs without difficulty - ONGOING  5. Improve LEFS outcome measure score from 18/80 to 60/80 to perform ADLs - ONGOING  Rehabilitation Potential For Stated Goals: Excellent  Regarding [facility] therapy, I certify that the treatment plan above will be carried out by a therapist or under their direction. Thank you for this referral,  Porter Rascon PT                 The information in this section was collected on 2/26/18 (except where otherwise noted). HISTORY:   History of Present Injury/Illness (Reason for Referral):  Chronic low back pain and bilateral knee pain and arthritis for the past several years, which has created increased forward stoop with ambulation. Challenged with all weight bearing activities, ambulation, sit to stand transfers, and balance. She notes she did well with home health physical therapy and improved overall right knee ROM, however continues to present with deficits with strength and weight bearing. She notes she worked out with a  4 months prior to surgery. Past Medical History/Comorbidities:   Ms. Mae Shannon  has a past medical history of Allergic rhinitis; Anxiety; Arrhythmia; Arthritis; Depression; Endocarditis (1992); Heart murmur; Hematuria; History of MRSA infection (on left breast); HLD (hyperlipidemia) ( ); Hypertension; Hypothyroid; Hypothyroidism due to acquired atrophy of thyroid (4/28/2015); Intertrigo; Irregular heart beat;  Mass of colon; Morbid obesity ([facility] [address] Reactive airway disease with status asthmaticus; Scoliosis (2016); Sleep apnea; Unspecified adverse effect of anesthesia; Varicose veins; and VSD (ventricular septal defect) (2016). She also has no past medical history of Adverse effect of anesthesia; Difficult intubation; Malignant hyperthermia due to anesthesia; Nausea & vomiting; or Pseudocholinesterase deficiency. Ms. Durant Current  has a past surgical history that includes hx lipoma resection (Right); hx appendectomy; hx hernia repair (incisional [id number]); hx colonoscopy (, ); hx heent; hx orthopaedic (due to underbite); hx heart catheterization; hx breast reduction (Bilateral, 2016); hx  section; hx dilation and curettage; hx colectomy (Right, ); and hx knee replacement (Right, 2018). Social History/Living Environment:     lives in a private home with her , Has a one story home and walk in shower. Prior Level of Function/Work/Activity:  Retired . Worked out 4 months prior to surgery to prepare. Dominant Side:         RIGHT  Current Medications:       Current Outpatient Prescriptions:     celecoxib (CELEBREX) 200 mg capsule, Take 1 Cap by mouth every twelve (12) hours every twelve (12) hours for 90 days. , Disp: 60 Cap, Rfl: 5    diclofenac (VOLTAREN) 1 % gel, Apply 2 g to affected area four (4) times daily. , Disp: 100 g, Rfl: 1    mometasone (NASONEX) 50 mcg/actuation nasal spray, 2 Sprays by Both Nostrils route daily. , Disp: , Rfl: 10    losartan-hydroCHLOROthiazide (HYZAAR) 100-25 mg per tablet, Take 1 Tab by mouth daily. , Disp: 90 Tab, Rfl: 1    aspirin delayed-release 325 mg tablet, Take 1 Tab by mouth every twelve (12) hours every twelve (12) hours. , Disp: 60 Tab, Rfl: 0    HYDROmorphone (DILAUDID) 2 mg tablet, Take 1 Tab by mouth every four (4) hours as needed.  Max Daily Amount: 12 mg., Disp: 90 Tab, Rfl: 0    ondansetron (ZOFRAN ODT) 8 mg disintegrating tablet, Take 1 Tab by mouth every eight (8) hours as needed for Nausea., Disp: 60 Tab, Rfl: 0    senna-docusate (PERICOLACE) 8.6-50 mg per tablet, Take 2 Tabs by mouth daily. , Disp: 120 Tab, Rfl: 0    zolpidem (AMBIEN) 5 mg tablet, Take 1 Tab by mouth nightly as needed for Sleep. Max Daily Amount: 5 mg., Disp: 60 Tab, Rfl: 0    DIGESTIVE ENZYMES, PLANT, (FIBERZYME CONCENTRATE-HP PO), Take  by mouth two (2) times a day., Disp: , Rfl:     acetaminophen (TYLENOL EXTRA STRENGTH) 500 mg tablet, Take  by mouth as needed for Pain. Indications: Pain, Disp: , Rfl:     escitalopram oxalate (LEXAPRO) 10 mg tablet, Take 1 Tab by mouth daily. , Disp: 30 Tab, Rfl: 5    levothyroxine (SYNTHROID) 150 mcg tablet, Take 1 Tab by mouth every Monday. 1 day a week, Disp: 90 Tab, Rfl: 1    levothyroxine (SYNTHROID) 175 mcg tablet, 1 tab po 6 days a week (Patient taking differently: 1 tab po 6 days a week: Tues - Sun), Disp: 90 Tab, Rfl: 1    montelukast (SINGULAIR) 10 mg tablet, Take 1 Tab by mouth daily. , Disp: 90 Tab, Rfl: 1    ADVAIR DISKUS 100-50 mcg/dose diskus inhaler, Take 1 Puff by inhalation daily. , Disp: 3 Inhaler, Rfl: 3    loratadine (CLARITIN) 10 mg tablet, Take 10 mg by mouth daily. , Disp: , Rfl:     DISABLED PLACARD (DISABLED PLACARD) DMV, by Does Not Apply route See Admin Instructions. , Disp: 1 Each, Rfl: 0    albuterol (PROAIR HFA) 90 mcg/actuation inhaler, Take 2 Puffs by inhalation every six (6) hours as needed. , Disp: 1 Inhaler, Rfl: 11    cpap machine kit, by Does Not Apply route., Disp: , Rfl:    Date Last Reviewed:  4/17/2018   Number of Personal Factors/Comorbidities that affect the Plan of Care: 0: LOW COMPLEXITY   EXAMINATION:   Observation/Orthostatic Postural Assessment:          Lower extremity weight bearing is slight increased left. Observation of gait indicates significant forward hinge with gait, decreased innominate extension bilaterally.  Patient exhibits a decreased lumbar lordosis and increased thoracic kyphosis. Palpation of lower quadrant bony landmarks are left iliac crest elevated. Knee alignment is left genu recurvatum, limited right knee extension. Soft tissue observation indicates anterior right quadriceps, hamstrings and iliopsoas . Improving 4/10/18    Palpation: Patient exhibits tenderness to palpation right anterior knee, with scar tissue restriction along incision line. Hamstring flexibility tested supine with straight leg raise is restricted bilaterally, left 60 degrees, right 55 degrees. Piriformis flexibility is restricted bilaterally. Quadriceps flexibility tested heel to buttock is restricted right greater than left improving 4/10/18. Muscle length of gastrocnemius is restricted right greater than left. Muscle length of soleus is restricted right greater than left. ROM:     AROM(PROM) Right Left   Knee flexion 120 (125) 0-120   Knee extension Lacks 3 degrees 0   Hip flexion 90 85   Hip extension 5 5     Strength:     Manual Muscle Test (*/5) Right Left   Knee extension 4 4   Knee flexion 4 4   Hip flexion 4 4   Hip ER 4 4   Hip IR 4 4   Hip extension 4- 4-   Hip abduction 4- 4-   Hip adduction 4 4   Ankle DF 4 4   Ankle PF 4 4   Core stability 3+/5     Functional strength with standing heel raise is 2 inch lift with forward hinge position. Special Tests:  None performed    Neurological Screen:  Myotomes: Key muscle strength testing for bilateral LE is intact. Dermatomes: Sensation testing through bilateral LE for light touch is intact. Reflexes: Patellar (L4) and achilles (S1) are not tested. Functional Mobility:  Challenged with sit to stand, standing techniques, ambulation secondary to forward position.   '   Body Structures Involved:  1. Bones  2. Joints  3. Muscles Body Functions Affected:  1. Sensory/Pain  2. Neuromusculoskeletal  3. Movement Related Activities and Participation Affected:  1. General Tasks and Demands  2. Mobility  3. Self Care  4.  Community, Social and [employer] Life   Number of elements (examined above) that affect the Plan of Care: 3: MODERATE COMPLEXITY   CLINICAL PRESENTATION:   Presentation: Evolving clinical presentation with changing clinical characteristics: MODERATE COMPLEXITY   CLINICAL DECISION MAKING:   Outcome Measure: Tool Used: Lower Extremity Functional Scale (LEFS)  Score:  Initial: 18/80 Most Recent: 32/80 (Date: 4-10-18 )   Interpretation of Score: 20 questions each scored on a 5 point scale with 0 representing \"extreme difficulty or unable to perform\" and 4 representing \"no difficulty\". The lower the score, the greater the functional disability. 80/80 represents no disability. Minimal detectable change is 9 points. Score 80 79-63 62-48 47-32 31-16 15-1 0   Modifier CH CI CJ CK CL CM CN     ? Mobility - Walking and Moving Around:     - CURRENT STATUS: CK - 40%-59% impaired, limited or restricted    - GOAL STATUS: CJ - 20%-39% impaired, limited or restricted    - D/C STATUS:  ---------------To be determined---------------    Medical Necessity:   · Patient is expected to demonstrate progress in strength, range of motion, balance, coordination and functional technique to increase independence with ADLs, ambulation and improved endurance with weight bearing. Reason for Services/Other Comments:  · Patient continues to require skilled intervention due to demonstrates significant forward head/hip hinge position with weight bearing and ambulation and demonstrates weakness in LE and core stability. Use of outcome tool(s) and clinical judgement create a POC that gives a: Questionable prediction of patient's progress: MODERATE COMPLEXITY            TREATMENT:   (In addition to Assessment/Re-Assessment sessions the following treatments were rendered)  Pre-treatment Symptoms/Complaints:  Patient notes increasing amount of time with cardio on recumbent bike, up to 30 minutes, also noting improved strength with Curves.  Continues to be challenged with ambulation and balance activities. Pain: Initial:   Pain Intensity 1: 2  Pain Location 1: Knee  Pain Orientation 1: Right  Post Session:  1/10     Therapeutic Exercise: (40 Minutes):  Exercises per grid below to improve mobility, strength, balance and coordination. Required moderate verbal and manual cues to promote proper body alignment, promote proper body posture, promote proper body mechanics and promote proper body breathing techniques. Progressed resistance, range, repetitions and complexity of movement as indicated. Date:  4/17/2018   Activity/Exercise Parameters   Patient education X 5 minutes HEP   Supine straight leg raise    Supine ROM knee flexion X 2 minutes   Prone knee straightening    Prone knee quadriceps/knee extension    Sit to stand weight shift/hip hinge    Side lying clamshells    Prone knee bed/quadriceps stretch    Modified kwame stretch X 5 reps, BLE, right greater than left secondary to restrictions   Standing weight shift/stance leg x15 reps BLE   Standing weight shift with opposite hip flexion X 15 reps BLE   Standing hip extension (1/2 prone) X 15 reps, BLE with blue t-band   Standing lateral steps X 5 reps, 5' distance  X 5 reps with green band 5' distance   Standing functional squat X 15 reps on airex   Standing terminal knee extension X 15 reps, blue band   Standing calf stretch X 5 reps 20 sec holds   steps X 10 reps BLE lateral step down  X 10 reps toe taps, focus on control and LE balance. (without hand hold)  X 15 step up BLE     Manual Therapy (    Soft Tissue Mobilization Duration  Duration: 20 Minutes): Manual techniques to facilitate improved motion and decreased pain.  (Used abbreviations: MET - muscle energy technique; PNF - proprioceptive neuromuscular facilitation; NMR - neuromuscular re-education; a/p - anterior to posterior; p/a - posterior to anterior)   · Supine right knee PROM into flexion and extension  · Supine right greater than left knee patella mobilization in all directions,   · Supine soft tissue mobilization through right anterior thigh and iliopsoas. [employer] Portal  Treatment/Session Assessment:    · Response to Treatment: improving overall mobility noted in LE, continues to be challenged with balance exercises, however improvements noted. · Compliance with Program/Exercises: compliant all of the time. · Recommendations/Intent for next treatment session: \"Next visit will focus on advancements to more challenging activities\".   Total Treatment Duration: 60 minutes   PT Patient Time In/Time Out  Time In: 1430  Time Out: [address] [facility], PT

## 2018-04-24 ENCOUNTER — HOSPITAL ENCOUNTER (OUTPATIENT)
Dept: PHYSICAL THERAPY | Age: 68
Discharge: HOME | End: 2018-04-24
Payer: MEDICARE

## 2018-04-24 PROCEDURE — 97140 MANUAL THERAPY 1/> REGIONS: CPT

## 2018-04-24 PROCEDURE — 97110 THERAPEUTIC EXERCISES: CPT

## 2018-04-25 NOTE — PROGRESS NOTES
Nicole Grover  : 1950  [place]  [place] [address] [place] [address]  [phone]   [phone]          OUTPATIENT PHYSICAL THERAPY:Daily Note 2018    ICD-10: Treatment Diagnosis: pain in joint; right knee M25.561  ICD-10: Treatment Diagnosis: difficulty walking R26.2  ICD-10: Treatment Diagnosis: muscle weakness generalized M62.81    Precautions/Allergies:   Ceclor [cefaclor] and Symbyax [olanzapine-fluoxetine]   Fall Risk Score: 1 (? 5 = High Risk)  MD Orders: eval and treat MEDICAL/REFERRING DIAGNOSIS:  Presence of right artificial knee joint [Z96.651]   DATE OF ONSET: surgical 18  REFERRING PHYSICIAN: Yasir Perez MD  RETURN PHYSICIAN APPOINTMENT: 3/13/18     INITIAL ASSESSMENT:  Ms. Dusty Ormond is a 74 y.o. female who presents to physical therapy post-op right TKA on 18. She was released from home health therapy with improved ROM in right knee, however continues to present with antalgic gait, LE and core weakness and challenges with daily tasks including sit to stand transfers from various heights, prolonged weight bearing and ambulation. She would benefit from skilled physical therapy to improve overall mobility, gait and function. PROBLEM LIST (Impacting functional limitations):  1. Decreased Strength  2. Decreased ADL/Functional Activities  3. Decreased Transfer Abilities  4. Decreased Ambulation Ability/Technique  5. Decreased Balance  6. Increased Pain  7. Decreased Activity Tolerance  8. Increased Fatigue  9. Decreased Flexibility/Joint Mobility INTERVENTIONS PLANNED:  1. Balance Exercise  2. Bed Mobility  3. Gait Training  4. Home Exercise Program (HEP)  5. Manual Therapy  6. Neuromuscular Re-education/Strengthening  7. Range of Motion (ROM)  8. Therapeutic Activites  9.  Therapeutic Exercise/Strengthening   TREATMENT PLAN:  Effective Dates: 2018 TO 5/27/2018. Frequency/Duration: 2 times a week for 90 Days  GOALS: (Goals have been discussed and agreed upon with patient.)    Discharge Goals: Time Frame: 90 days  1. Patient demonstrates independence with her HEP without verbal cueing from therapist. GOAL MET  2. Patient demonstrates knee ROM to 0-125 degrees of right knee to perform ADLs ALMOST MET  3. Patient able to ambulate with upright posture for 20 minutes without onset of right knee and low back pain. - ONGOING  4. Patient able to ascend/descend flight of stairs without difficulty - ONGOING  5. Improve LEFS outcome measure score from 18/80 to 60/80 to perform ADLs - ONGOING  Rehabilitation Potential For Stated Goals: Excellent  Regarding Adeline Jay's therapy, I certify that the treatment plan above will be carried out by a therapist or under their direction. Thank you for this referral,  Shelia Rogers, PT                 The information in this section was collected on 2/26/18 (except where otherwise noted). HISTORY:   History of Present Injury/Illness (Reason for Referral):  Chronic low back pain and bilateral knee pain and arthritis for the past several years, which has created increased forward stoop with ambulation. Challenged with all weight bearing activities, ambulation, sit to stand transfers, and balance. She notes she did well with home health physical therapy and improved overall right knee ROM, however continues to present with deficits with strength and weight bearing. She notes she worked out with a  4 months prior to surgery. Past Medical History/Comorbidities:   Ms. Lj Au  has a past medical history of Allergic rhinitis; Anxiety; Arrhythmia; Arthritis; Depression; Endocarditis (1992); Heart murmur; Hematuria; History of MRSA infection (on left breast); HLD (hyperlipidemia) ( ); Hypertension; Hypothyroid; Hypothyroidism due to acquired atrophy of thyroid (4/28/2015); Intertrigo; Irregular heart beat;  Mass of colon; Morbid obesity ([facility] [address] Reactive airway disease with status asthmaticus; Scoliosis (2016); Sleep apnea; Unspecified adverse effect of anesthesia; Varicose veins; and VSD (ventricular septal defect) (2016). She also has no past medical history of Adverse effect of anesthesia; Difficult intubation; Malignant hyperthermia due to anesthesia; Nausea & vomiting; or Pseudocholinesterase deficiency. Ms. Dahlia Stock  has a past surgical history that includes hx lipoma resection (Right); hx appendectomy; hx hernia repair (incisional [id number]); hx colonoscopy (, ); hx heent; hx orthopaedic (due to underbite); hx heart catheterization; hx breast reduction (Bilateral, 2016); hx  section; hx dilation and curettage; hx colectomy (Right, ); and hx knee replacement (Right, 2018). Social History/Living Environment:     lives in a private home with her , Has a one story home and walk in shower. Prior Level of Function/Work/Activity:  Retired . Worked out 4 months prior to surgery to prepare. Dominant Side:         RIGHT  Current Medications:       Current Outpatient Prescriptions:     celecoxib (CELEBREX) 200 mg capsule, Take 1 Cap by mouth every twelve (12) hours every twelve (12) hours for 90 days. , Disp: 60 Cap, Rfl: 5    diclofenac (VOLTAREN) 1 % gel, Apply 2 g to affected area four (4) times daily. , Disp: 100 g, Rfl: 1    mometasone (NASONEX) 50 mcg/actuation nasal spray, 2 Sprays by Both Nostrils route daily. , Disp: , Rfl: 10    losartan-hydroCHLOROthiazide (HYZAAR) 100-25 mg per tablet, Take 1 Tab by mouth daily. , Disp: 90 Tab, Rfl: 1    aspirin delayed-release 325 mg tablet, Take 1 Tab by mouth every twelve (12) hours every twelve (12) hours. , Disp: 60 Tab, Rfl: 0    HYDROmorphone (DILAUDID) 2 mg tablet, Take 1 Tab by mouth every four (4) hours as needed.  Max Daily Amount: 12 mg., Disp: 90 Tab, Rfl: 0    ondansetron (ZOFRAN ODT) 8 mg disintegrating tablet, Take 1 Tab by mouth every eight (8) hours as needed for Nausea., Disp: 60 Tab, Rfl: 0    senna-docusate (PERICOLACE) 8.6-50 mg per tablet, Take 2 Tabs by mouth daily. , Disp: 120 Tab, Rfl: 0    zolpidem (AMBIEN) 5 mg tablet, Take 1 Tab by mouth nightly as needed for Sleep. Max Daily Amount: 5 mg., Disp: 60 Tab, Rfl: 0    DIGESTIVE ENZYMES, PLANT, (FIBERZYME CONCENTRATE-HP PO), Take  by mouth two (2) times a day., Disp: , Rfl:     acetaminophen (TYLENOL EXTRA STRENGTH) 500 mg tablet, Take  by mouth as needed for Pain. Indications: Pain, Disp: , Rfl:     escitalopram oxalate (LEXAPRO) 10 mg tablet, Take 1 Tab by mouth daily. , Disp: 30 Tab, Rfl: 5    levothyroxine (SYNTHROID) 150 mcg tablet, Take 1 Tab by mouth every Monday. 1 day a week, Disp: 90 Tab, Rfl: 1    levothyroxine (SYNTHROID) 175 mcg tablet, 1 tab po 6 days a week (Patient taking differently: 1 tab po 6 days a week: Tues - Sun), Disp: 90 Tab, Rfl: 1    montelukast (SINGULAIR) 10 mg tablet, Take 1 Tab by mouth daily. , Disp: 90 Tab, Rfl: 1    ADVAIR DISKUS 100-50 mcg/dose diskus inhaler, Take 1 Puff by inhalation daily. , Disp: 3 Inhaler, Rfl: 3    loratadine (CLARITIN) 10 mg tablet, Take 10 mg by mouth daily. , Disp: , Rfl:     DISABLED PLACARD (DISABLED PLACARD) DMV, by Does Not Apply route See Admin Instructions. , Disp: 1 Each, Rfl: 0    albuterol (PROAIR HFA) 90 mcg/actuation inhaler, Take 2 Puffs by inhalation every six (6) hours as needed. , Disp: 1 Inhaler, Rfl: 11    cpap machine kit, by Does Not Apply route., Disp: , Rfl:    Date Last Reviewed:  4/24/2018   Number of Personal Factors/Comorbidities that affect the Plan of Care: 0: LOW COMPLEXITY   EXAMINATION:   Observation/Orthostatic Postural Assessment:          Lower extremity weight bearing is slight increased left. Observation of gait indicates significant forward hinge with gait, decreased innominate extension bilaterally.  Patient exhibits a decreased lumbar lordosis and increased thoracic kyphosis. Palpation of lower quadrant bony landmarks are left iliac crest elevated. Knee alignment is left genu recurvatum, limited right knee extension. Soft tissue observation indicates anterior right quadriceps, hamstrings and iliopsoas . Improving 4/10/18    Palpation: Patient exhibits tenderness to palpation right anterior knee, with scar tissue restriction along incision line. Hamstring flexibility tested supine with straight leg raise is restricted bilaterally, left 60 degrees, right 55 degrees. Piriformis flexibility is restricted bilaterally. Quadriceps flexibility tested heel to buttock is restricted right greater than left improving 4/10/18. Muscle length of gastrocnemius is restricted right greater than left. Muscle length of soleus is restricted right greater than left. ROM:     AROM(PROM) Right Left   Knee flexion 120 (125) 0-120   Knee extension Lacks 3 degrees 0   Hip flexion 90 85   Hip extension 5 5     Strength:     Manual Muscle Test (*/5) Right Left   Knee extension 4 4   Knee flexion 4 4   Hip flexion 4 4   Hip ER 4 4   Hip IR 4 4   Hip extension 4- 4-   Hip abduction 4- 4-   Hip adduction 4 4   Ankle DF 4 4   Ankle PF 4 4   Core stability 3+/5     Functional strength with standing heel raise is 2 inch lift with forward hinge position. Special Tests:  None performed    Neurological Screen:  Myotomes: Key muscle strength testing for bilateral LE is intact. Dermatomes: Sensation testing through bilateral LE for light touch is intact. Reflexes: Patellar (L4) and achilles (S1) are not tested. Functional Mobility:  Challenged with sit to stand, standing techniques, ambulation secondary to forward position.   '   Body Structures Involved:  1. Bones  2. Joints  3. Muscles Body Functions Affected:  1. Sensory/Pain  2. Neuromusculoskeletal  3. Movement Related Activities and Participation Affected:  1. General Tasks and Demands  2. Mobility  3. Self Care  4.  Community, Social and [employer]   Number of elements (examined above) that affect the Plan of Care: 3: MODERATE COMPLEXITY   CLINICAL PRESENTATION:   Presentation: Evolving clinical presentation with changing clinical characteristics: MODERATE COMPLEXITY   CLINICAL DECISION MAKING:   Outcome Measure: Tool Used: Lower Extremity Functional Scale (LEFS)  Score:  Initial: 18/80 Most Recent: 32/80 (Date: 4-10-18 )   Interpretation of Score: 20 questions each scored on a 5 point scale with 0 representing \"extreme difficulty or unable to perform\" and 4 representing \"no difficulty\". The lower the score, the greater the functional disability. 80/80 represents no disability. Minimal detectable change is 9 points. Score 80 79-63 62-48 47-32 31-16 15-1 0   Modifier CH CI CJ CK CL CM CN     ? Mobility - Walking and Moving Around:     - CURRENT STATUS: CK - 40%-59% impaired, limited or restricted    - GOAL STATUS: CJ - 20%-39% impaired, limited or restricted    - D/C STATUS:  ---------------To be determined---------------    Medical Necessity:   · Patient is expected to demonstrate progress in strength, range of motion, balance, coordination and functional technique to increase independence with ADLs, ambulation and improved endurance with weight bearing. Reason for Services/Other Comments:  · Patient continues to require skilled intervention due to demonstrates significant forward head/hip hinge position with weight bearing and ambulation and demonstrates weakness in LE and core stability. Use of outcome tool(s) and clinical judgement create a POC that gives a: Questionable prediction of patient's progress: MODERATE COMPLEXITY            TREATMENT:   (In addition to Assessment/Re-Assessment sessions the following treatments were rendered)  Pre-treatment Symptoms/Complaints:  Patient notes attending [facility] weekly and [employer] for cardio a few times a week.  Feeling overall improvements in right knee, continues to be challenged with endurance and mild restrictions in right knee with prolonged sitting. Pain: Initial:   Pain Intensity 1: 1  Pain Location 1: Knee  Pain Orientation 1: Right  Post Session:  0/10     Therapeutic Exercise: (35 Minutes):  Exercises per grid below to improve mobility, strength, balance and coordination. Required moderate verbal and manual cues to promote proper body alignment, promote proper body posture, promote proper body mechanics and promote proper body breathing techniques. Progressed resistance, range, repetitions and complexity of movement as indicated. Date:  4/24/2018   Activity/Exercise Parameters   Patient education X 5 minutes HEP   Supine straight leg raise    Supine ROM knee flexion X 2 minutes   Prone knee straightening    Prone knee quadriceps/knee extension    Sit to stand weight shift/hip hinge X 10 reps sit to stand   Side lying clamshells    Prone knee bed/quadriceps stretch    Modified kwame stretch X 5 reps, BLE, right greater than left secondary to restrictions   Standing weight shift/stance leg x15 reps BLE   Standing weight shift with opposite hip flexion X 15 reps BLE   Standing hip extension (1/2 prone) X 15 reps, BLE with blue t-band   Standing lateral steps X 5 reps, 5' distance  X 5 reps with green band 5' distance   Standing functional squat X 15 reps on airex   Standing terminal knee extension X 15 reps, blue band   Standing calf stretch X 5 reps 20 sec holds   steps X 10 reps BLE lateral step down  X 10 reps toe taps, focus on control and LE balance. (without hand hold)  X 15 step up BLE     Manual Therapy (    Soft Tissue Mobilization Duration  Duration: 10 Minutes): Manual techniques to facilitate improved motion and decreased pain.  (Used abbreviations: MET - muscle energy technique; PNF - proprioceptive neuromuscular facilitation; NMR - neuromuscular re-education; a/p - anterior to posterior; p/a - posterior to anterior)   · Supine right knee PROM into flexion and extension  · Supine right greater than left knee patella mobilization in all directions,   · Supine soft tissue mobilization through right anterior thigh and iliopsoas. Med[employer] Portal  Treatment/Session Assessment:    · Response to Treatment: progressing towards independent HEP, addressed 6 exercises to focus on at home for LE strengthening. · Compliance with Program/Exercises: compliant all of the time. · Recommendations/Intent for next treatment session: \"Next visit will focus on advancements to more challenging activities\".   Total Treatment Duration: 60 minutes   PT Patient Time In/Time Out  Time In: 1445  Time Out: [address] [facility] Quint[facility] PT

## 2018-05-01 ENCOUNTER — HOSPITAL ENCOUNTER (OUTPATIENT)
Dept: PHYSICAL THERAPY | Age: 68
Discharge: HOME | End: 2018-05-01
Payer: MEDICARE

## 2018-05-01 PROCEDURE — 97140 MANUAL THERAPY 1/> REGIONS: CPT

## 2018-05-01 PROCEDURE — G8980 MOBILITY D/C STATUS: HCPCS

## 2018-05-01 PROCEDURE — 97110 THERAPEUTIC EXERCISES: CPT

## 2018-05-01 PROCEDURE — G8979 MOBILITY GOAL STATUS: HCPCS

## 2018-05-01 NOTE — THERAPY DISCHARGE
Valentino Soles  : 1950  [place]  [place] [address] [place] [address]  [phone]   [phone]          OUTPATIENT PHYSICAL THERAPY:Daily Note and Discharge 2018    ICD-10: Treatment Diagnosis: pain in joint; right knee M25.561  ICD-10: Treatment Diagnosis: difficulty walking R26.2  ICD-10: Treatment Diagnosis: muscle weakness generalized M62.81    Precautions/Allergies:   Ceclor [cefaclor] and Symbyax [olanzapine-fluoxetine]   Fall Risk Score: 1 (? 5 = High Risk)  MD Orders: eval and treat MEDICAL/REFERRING DIAGNOSIS:  Presence of right artificial knee joint [Z96.651]   DATE OF ONSET: surgical 18  REFERRING PHYSICIAN: Zaid Coulter MD  RETURN PHYSICIAN APPOINTMENT: 3/13/18   DISCHARGE NOTE: 18: Ms. Carlitos Patino attended 13 of 15 scheduled physical therapy sessions. She has demonstrated improvements in overall ROM in right knee, strength and endurance. She has also improved her overall balance. She has some remaining deficits in her strength and endurance that she will continue to work on independently with her HEP, curves/[employer] and walking program,. She will be discharged from therapy at this time. INITIAL ASSESSMENT:  Ms. Carlitos Patino is a 74 y.o. female who presents to physical therapy post-op right TKA on 18. She was released from home health therapy with improved ROM in right knee, however continues to present with antalgic gait, LE and core weakness and challenges with daily tasks including sit to stand transfers from various heights, prolonged weight bearing and ambulation. She would benefit from skilled physical therapy to improve overall mobility, gait and function. TREATMENT PLAN:  Effective Dates: 2018 TO 2018.   Frequency/Duration: 2 times a week for 90 Days  GOALS: (Goals have been discussed and agreed upon with patient.)    Discharge Goals: Time Frame: 90 days  1. Patient demonstrates independence with her HEP without verbal cueing from therapist. GOAL MET  2. Patient demonstrates knee ROM to 0-125 degrees of right knee to perform ADLs ALMOST MET 2-126 degrees  3. Patient able to ambulate with upright posture for 20 minutes without onset of right knee and low back pain. - GOAL MET  4. Patient able to ascend/descend flight of stairs without difficulty - GOAL MET  5. Improve LEFS outcome measure score from 18/80 to 60/80 to perform ADLs - ALMOST MET  Rehabilitation Potential For Stated Goals: Excellent  Regarding Adeline Jay's therapy, I certify that the treatment plan above will be carried out by a therapist or under their direction. Thank you for this referral,  Shelia Rogers, PT                 The information in this section was collected on 2/26/18 (except where otherwise noted). HISTORY:   History of Present Injury/Illness (Reason for Referral):  Chronic low back pain and bilateral knee pain and arthritis for the past several years, which has created increased forward stoop with ambulation. Challenged with all weight bearing activities, ambulation, sit to stand transfers, and balance. She notes she did well with home health physical therapy and improved overall right knee ROM, however continues to present with deficits with strength and weight bearing. She notes she worked out with a  4 months prior to surgery. Past Medical History/Comorbidities:   Ms. Lj Au  has a past medical history of Allergic rhinitis; Anxiety; Arrhythmia; Arthritis; Depression; Endocarditis (1992); Heart murmur; Hematuria; History of MRSA infection (on left breast); HLD (hyperlipidemia) ( ); Hypertension; Hypothyroid; Hypothyroidism due to acquired atrophy of thyroid (4/28/2015); Intertrigo; Irregular heart beat; Mass of colon; Morbid obesity ([facility] [address] Reactive airway disease with status asthmaticus; Scoliosis (8/4/2016); Sleep apnea;  Unspecified adverse effect of anesthesia; Varicose veins; and VSD (ventricular septal defect) (2016). She also has no past medical history of Adverse effect of anesthesia; Difficult intubation; Malignant hyperthermia due to anesthesia; Nausea & vomiting; or Pseudocholinesterase deficiency. Ms. Danilo Jordan  has a past surgical history that includes hx lipoma resection (Right); hx appendectomy; hx hernia repair (incisional [id number]); hx colonoscopy (, ); hx heent; hx orthopaedic (due to underbite); hx heart catheterization; hx breast reduction (Bilateral, 2016); hx  section; hx dilation and curettage; hx colectomy (Right, ); and hx knee replacement (Right, 2018). Social History/Living Environment:     lives in a private home with her , Has a one story home and walk in shower. Prior Level of Function/Work/Activity:  Retired . Worked out 4 months prior to surgery to prepare. Dominant Side:         RIGHT  Current Medications:       Current Outpatient Prescriptions:     celecoxib (CELEBREX) 200 mg capsule, Take 1 Cap by mouth every twelve (12) hours every twelve (12) hours for 90 days. , Disp: 60 Cap, Rfl: 5    diclofenac (VOLTAREN) 1 % gel, Apply 2 g to affected area four (4) times daily. , Disp: 100 g, Rfl: 1    mometasone (NASONEX) 50 mcg/actuation nasal spray, 2 Sprays by Both Nostrils route daily. , Disp: , Rfl: 10    losartan-hydroCHLOROthiazide (HYZAAR) 100-25 mg per tablet, Take 1 Tab by mouth daily. , Disp: 90 Tab, Rfl: 1    aspirin delayed-release 325 mg tablet, Take 1 Tab by mouth every twelve (12) hours every twelve (12) hours. , Disp: 60 Tab, Rfl: 0    HYDROmorphone (DILAUDID) 2 mg tablet, Take 1 Tab by mouth every four (4) hours as needed.  Max Daily Amount: 12 mg., Disp: 90 Tab, Rfl: 0    ondansetron (ZOFRAN ODT) 8 mg disintegrating tablet, Take 1 Tab by mouth every eight (8) hours as needed for Nausea., Disp: 60 Tab, Rfl: 0    senna-docusate (PERICOLACE) 8.6-50 mg per tablet, Take 2 Tabs by mouth daily. , Disp: 120 Tab, Rfl: 0    zolpidem (AMBIEN) 5 mg tablet, Take 1 Tab by mouth nightly as needed for Sleep. Max Daily Amount: 5 mg., Disp: 60 Tab, Rfl: 0    DIGESTIVE ENZYMES, PLANT, (FIBERZYME CONCENTRATE-HP PO), Take  by mouth two (2) times a day., Disp: , Rfl:     acetaminophen (TYLENOL EXTRA STRENGTH) 500 mg tablet, Take  by mouth as needed for Pain. Indications: Pain, Disp: , Rfl:     escitalopram oxalate (LEXAPRO) 10 mg tablet, Take 1 Tab by mouth daily. , Disp: 30 Tab, Rfl: 5    levothyroxine (SYNTHROID) 150 mcg tablet, Take 1 Tab by mouth every Monday. 1 day a week, Disp: 90 Tab, Rfl: 1    levothyroxine (SYNTHROID) 175 mcg tablet, 1 tab po 6 days a week (Patient taking differently: 1 tab po 6 days a week: Tues - Sun), Disp: 90 Tab, Rfl: 1    montelukast (SINGULAIR) 10 mg tablet, Take 1 Tab by mouth daily. , Disp: 90 Tab, Rfl: 1    ADVAIR DISKUS 100-50 mcg/dose diskus inhaler, Take 1 Puff by inhalation daily. , Disp: 3 Inhaler, Rfl: 3    loratadine (CLARITIN) 10 mg tablet, Take 10 mg by mouth daily. , Disp: , Rfl:     DISABLED PLACARD (DISABLED PLACARD) DMV, by Does Not Apply route See Admin Instructions. , Disp: 1 Each, Rfl: 0    albuterol (PROAIR HFA) 90 mcg/actuation inhaler, Take 2 Puffs by inhalation every six (6) hours as needed. , Disp: 1 Inhaler, Rfl: 11    cpap machine kit, by Does Not Apply route., Disp: , Rfl:    Date Last Reviewed:  5/1/2018   Number of Personal Factors/Comorbidities that affect the Plan of Care: 0: LOW COMPLEXITY   EXAMINATION:   Observation/Orthostatic Postural Assessment:          Lower extremity weight bearing is slight increased left. Observation of gait indicates significant forward hinge with gait, decreased innominate extension bilaterally. Patient exhibits a decreased lumbar lordosis and increased thoracic kyphosis. Palpation of lower quadrant bony landmarks are left iliac crest elevated.  Knee alignment is left genu recurvatum, limited right knee extension. Soft tissue observation indicates anterior right quadriceps, hamstrings and iliopsoas . Improving 4/10/18    Palpation: Patient exhibits tenderness to palpation right anterior knee, with scar tissue restriction along incision line. Hamstring flexibility tested supine with straight leg raise is restricted bilaterally, left 60 degrees, right 55 degrees. Piriformis flexibility is restricted bilaterally. Quadriceps flexibility tested heel to buttock is restricted right greater than left improving 4/10/18. Muscle length of gastrocnemius is restricted right greater than left. Muscle length of soleus is restricted right greater than left. ROM:     AROM(PROM) Right Left   Knee flexion 126 (130) 0-120   Knee extension Lacks 2 degrees 0   Hip flexion 90 90   Hip extension 8 8     Strength:     Manual Muscle Test (*/5) Right Left   Knee extension 4+ 4+   Knee flexion 4+ 4+   Hip flexion 4+ 4+   Hip ER 4+ 4+   Hip IR 4+ 4+   Hip extension 4 4   Hip abduction 4 4   Hip adduction 4+ 4+   Ankle DF 4+ 4+   Ankle PF 4+ 4+   Core stability 4-/5     Functional strength with standing heel raise is 2 inch lift with forward hinge position. Special Tests:  None performed    Neurological Screen:  Myotomes: Key muscle strength testing for bilateral LE is intact. Dermatomes: Sensation testing through bilateral LE for light touch is intact. Reflexes: Patellar (L4) and achilles (S1) are not tested. Functional Mobility:  Challenged with sit to stand, standing techniques, ambulation secondary to forward position.   '   Body Structures Involved:  1. Bones  2. Joints  3. Muscles Body Functions Affected:  1. Sensory/Pain  2. Neuromusculoskeletal  3. Movement Related Activities and Participation Affected:  1. General Tasks and Demands  2. Mobility  3. Self Care  4. Community, Social and Civic Life              CLINICAL DECISION MAKING:   Outcome Measure:    Tool Used: Lower Extremity Functional Scale (LEFS)  Score:  Initial: 18/80 Most Recent: 32/80 (Date: 4-10-18 )                        52/80 (Date: 5-1-18)   Interpretation of Score: 20 questions each scored on a 5 point scale with 0 representing \"extreme difficulty or unable to perform\" and 4 representing \"no difficulty\". The lower the score, the greater the functional disability. 80/80 represents no disability. Minimal detectable change is 9 points. Score 80 79-63 62-48 47-32 31-16 15-1 0   Modifier CH CI CJ CK CL CM CN     ? Mobility - Walking and Moving Around:     - CURRENT STATUS: CJ - 20%-39% impaired, limited or restricted    - GOAL STATUS: CJ - 20%-39% impaired, limited or restricted    - D/C STATUS:  CJ - 20%-39% impaired, limited or restricted    Medical Necessity:   · Patient is expected to demonstrate progress in strength, range of motion, balance, coordination and functional technique to increase independence with ADLs, ambulation and improved endurance with weight bearing. Reason for Services/Other Comments:  · Patient continues to require skilled intervention due to demonstrates significant forward head/hip hinge position with weight bearing and ambulation and demonstrates weakness in LE and core stability. Use of outcome tool(s) and clinical judgement create a POC that gives a: Questionable prediction of patient's progress: MODERATE COMPLEXITY            TREATMENT:   (In addition to Assessment/Re-Assessment sessions the following treatments were rendered)  Pre-treatment Symptoms/Complaints:  Patient notes has been consistent with her curves and [employer] exercises and keeping up with her HEP from therapy. Overall right knee feels better, however after extended amount of time in weight bearing increased ache noted.    Pain: Initial:   Pain Intensity 1: 1  Pain Location 1: Knee  Pain Orientation 1: Right  Post Session:  0/10     Therapeutic Exercise: (35 Minutes):  Exercises per grid below to improve mobility, strength, balance and coordination. Required moderate verbal and manual cues to promote proper body alignment, promote proper body posture, promote proper body mechanics and promote proper body breathing techniques. Progressed resistance, range, repetitions and complexity of movement as indicated. Date:  5/1/2018   Activity/Exercise Parameters   Patient education X 5 minutes HEP  X 10 minute review of core stability:  Bracing with breathing, bracing with heel slide and march, bracing with UE/LE, isometric hip flexion bracing   Supine straight leg raise    Supine ROM knee flexion X 2 minutes   Prone knee straightening    Prone knee quadriceps/knee extension    Sit to stand weight shift/hip hinge X 10 reps sit to stand   Side lying clamshells    Prone knee bed/quadriceps stretch    Modified kwame stretch X 5 reps, BLE, right greater than left secondary to restrictions   Standing weight shift/stance leg x15 reps BLE   Standing weight shift with opposite hip flexion X 15 reps BLE   Standing hip extension (1/2 prone) X 15 reps, BLE with blue t-band   Standing lateral steps X 5 reps, 5' distance  X 5 reps with green band 5' distance   Standing functional squat X 15 reps on airex   Standing terminal knee extension X 15 reps, blue band   Standing calf stretch X 5 reps 20 sec holds   steps X 10 reps BLE lateral step down  X 10 reps toe taps, focus on control and LE balance. (without hand hold)  X 15 step up BLE     Manual Therapy (    Soft Tissue Mobilization Duration  Duration: 10 Minutes): Manual techniques to facilitate improved motion and decreased pain.  (Used abbreviations: MET - muscle energy technique; PNF - proprioceptive neuromuscular facilitation; NMR - neuromuscular re-education; a/p - anterior to posterior; p/a - posterior to anterior)   · Supine right knee PROM into flexion and extension  · Supine right greater than left knee patella mobilization in all directions,   · Supine soft tissue mobilization through right anterior thigh and iliopsoas. [facility] Portal  Treatment/Session Assessment:    · Response to Treatment: overall improved gait, will benefit from continued strengthening, cardio and endurance exercises that she will work on independently. · Compliance with Program/Exercises: compliant all of the time.   Total Treatment Duration: 45 minutes   PT Patient Time In/Time Out  Time In: 1430  Time Out: [id number] [facility] Leila Gupta, PT

## 2018-07-05 PROBLEM — I48.19 PERSISTENT ATRIAL FIBRILLATION (HCC): Status: ACTIVE | Noted: 2018-07-05

## 2018-07-09 PROBLEM — E03.9 HYPOTHYROIDISM: Status: ACTIVE | Noted: 2018-07-09

## 2018-07-09 PROBLEM — B00.1 COLD SORE: Status: ACTIVE | Noted: 2018-07-09

## 2018-07-09 PROBLEM — E66.01 MORBID OBESITY WITH BMI OF 45.0-49.9, ADULT (HCC): Status: ACTIVE | Noted: 2018-07-09

## 2018-09-17 NOTE — PROGRESS NOTES
Patient pre-assessment complete for Cardioversion with Dr Rico Overall scheduled for 18 at 2pm, arrival time 1pm. Patient verified using . Patient instructed to bring all home medications in labeled bottles on the day of procedure. NPO status reinforced. Patient instructed to HOLD losartan/HCTZ in am. Instructed they can take all other medications excluding vitamins & supplements. Patient verbalizes understanding of all instructions & denies any questions at this time.

## 2018-09-18 ENCOUNTER — HOSPITAL ENCOUNTER (OUTPATIENT)
Dept: CARDIAC CATH/INVASIVE PROCEDURES | Age: 68
Discharge: HOME | End: 2018-09-18
Attending: INTERNAL MEDICINE | Admitting: INTERNAL MEDICINE
Payer: MEDICARE

## 2018-09-18 VITALS
HEART RATE: 76 BPM | SYSTOLIC BLOOD PRESSURE: 157 MMHG | TEMPERATURE: 98 F | DIASTOLIC BLOOD PRESSURE: 90 MMHG | HEIGHT: 63 IN | RESPIRATION RATE: 19 BRPM | WEIGHT: 274 LBS | OXYGEN SATURATION: 92 % | BODY MASS INDEX: 48.55 KG/M2

## 2018-09-18 LAB
ANION GAP SERPL CALC-SCNC: 10 MMOL/L (ref 7–16)
ATRIAL RATE: 122 BPM
BUN SERPL-MCNC: 15 MG/DL (ref 8–23)
CALCIUM SERPL-MCNC: 9 MG/DL (ref 8.3–10.4)
CALCULATED T AXIS, ECG11: 95 DEGREES
CHLORIDE SERPL-SCNC: 106 MMOL/L (ref 98–107)
CO2 SERPL-SCNC: 25 MMOL/L (ref 21–32)
CREAT SERPL-MCNC: 0.88 MG/DL (ref 0.6–1)
DIAGNOSIS, 93000: NORMAL
ERYTHROCYTE [DISTWIDTH] IN BLOOD BY AUTOMATED COUNT: 15.6 %
GLUCOSE SERPL-MCNC: 89 MG/DL (ref 65–100)
HCT VFR BLD AUTO: 40.6 % (ref 35.8–46.3)
HGB BLD-MCNC: 13 G/DL (ref 11.7–15.4)
INR PPP: 1.2
MAGNESIUM SERPL-MCNC: 1.9 MG/DL (ref 1.8–2.4)
MCH RBC QN AUTO: 28.8 PG (ref 26.1–32.9)
MCHC RBC AUTO-ENTMCNC: 32 G/DL (ref 31.4–35)
MCV RBC AUTO: 90 FL (ref 79.6–97.8)
NRBC # BLD: 0 K/UL (ref 0–0.2)
PLATELET # BLD AUTO: 274 K/UL (ref 150–450)
PMV BLD AUTO: 10.7 FL (ref 9.4–12.3)
POTASSIUM SERPL-SCNC: 3.8 MMOL/L (ref 3.5–5.1)
PROTHROMBIN TIME: 14.5 SEC (ref 11.5–14.5)
Q-T INTERVAL, ECG07: 400 MS
QRS DURATION, ECG06: 104 MS
QTC CALCULATION (BEZET), ECG08: 484 MS
RBC # BLD AUTO: 4.51 M/UL (ref 4.05–5.2)
SODIUM SERPL-SCNC: 141 MMOL/L (ref 136–145)
VENTRICULAR RATE, ECG03: 88 BPM
WBC # BLD AUTO: 6 K/UL (ref 4.3–11.1)

## 2018-09-18 PROCEDURE — 93005 ELECTROCARDIOGRAM TRACING: CPT | Performed by: INTERNAL MEDICINE

## 2018-09-18 PROCEDURE — 74011250636 HC RX REV CODE- 250/636: Performed by: INTERNAL MEDICINE

## 2018-09-18 PROCEDURE — 80048 BASIC METABOLIC PNL TOTAL CA: CPT

## 2018-09-18 PROCEDURE — 83735 ASSAY OF MAGNESIUM: CPT

## 2018-09-18 PROCEDURE — 74011250636 HC RX REV CODE- 250/636

## 2018-09-18 PROCEDURE — 85027 COMPLETE CBC AUTOMATED: CPT

## 2018-09-18 PROCEDURE — 85610 PROTHROMBIN TIME: CPT

## 2018-09-18 PROCEDURE — 92960 CARDIOVERSION ELECTRIC EXT: CPT

## 2018-09-18 PROCEDURE — 99152 MOD SED SAME PHYS/QHP 5/>YRS: CPT

## 2018-09-18 RX ORDER — AMIODARONE HYDROCHLORIDE 150 MG/3ML
150 INJECTION, SOLUTION INTRAVENOUS
Status: DISCONTINUED | OUTPATIENT
Start: 2018-09-18 | End: 2018-09-18

## 2018-09-18 RX ORDER — SODIUM CHLORIDE 9 MG/ML
75 INJECTION, SOLUTION INTRAVENOUS CONTINUOUS
Status: DISCONTINUED | OUTPATIENT
Start: 2018-09-18 | End: 2018-09-18 | Stop reason: HOSPADM

## 2018-09-18 RX ORDER — MIDAZOLAM HYDROCHLORIDE 1 MG/ML
.5-2 INJECTION, SOLUTION INTRAMUSCULAR; INTRAVENOUS
Status: DISCONTINUED | OUTPATIENT
Start: 2018-09-18 | End: 2018-09-18

## 2018-09-18 RX ORDER — FENTANYL CITRATE 50 UG/ML
25-100 INJECTION, SOLUTION INTRAMUSCULAR; INTRAVENOUS
Status: DISCONTINUED | OUTPATIENT
Start: 2018-09-18 | End: 2018-09-18

## 2018-09-18 RX ADMIN — FENTANYL CITRATE 50 MCG: 50 INJECTION, SOLUTION INTRAMUSCULAR; INTRAVENOUS at 14:34

## 2018-09-18 RX ADMIN — MIDAZOLAM HYDROCHLORIDE 2 MG: 1 INJECTION, SOLUTION INTRAMUSCULAR; INTRAVENOUS at 14:33

## 2018-09-18 RX ADMIN — SODIUM CHLORIDE 75 ML/HR: 900 INJECTION, SOLUTION INTRAVENOUS at 13:52

## 2018-09-18 RX ADMIN — MIDAZOLAM HYDROCHLORIDE 2 MG: 1 INJECTION, SOLUTION INTRAMUSCULAR; INTRAVENOUS at 14:36

## 2018-09-18 RX ADMIN — AMIODARONE HYDROCHLORIDE 150 MG: 50 INJECTION, SOLUTION INTRAVENOUS at 14:41

## 2018-09-18 RX ADMIN — MIDAZOLAM HYDROCHLORIDE 2 MG: 1 INJECTION, SOLUTION INTRAMUSCULAR; INTRAVENOUS at 14:45

## 2018-09-18 RX ADMIN — MIDAZOLAM HYDROCHLORIDE 2 MG: 1 INJECTION, SOLUTION INTRAMUSCULAR; INTRAVENOUS at 14:30

## 2018-09-18 RX ADMIN — MIDAZOLAM HYDROCHLORIDE 2 MG: 1 INJECTION, SOLUTION INTRAMUSCULAR; INTRAVENOUS at 14:31

## 2018-09-18 RX ADMIN — FENTANYL CITRATE 50 MCG: 50 INJECTION, SOLUTION INTRAMUSCULAR; INTRAVENOUS at 14:30

## 2018-09-18 NOTE — PROGRESS NOTES
Report received from Rocky Tate. Procedural findings communicated. Intra procedural  medication administration reviewed. Progression of care discussed. Patient received into [address] post procedure. Routine post procedural vital signs  yes

## 2018-09-18 NOTE — PROGRESS NOTES
Patient up to bedside, vital signs stable. Patient ambulated to bathroom without difficulty. Patient voided without difficulty. Discharge instructions and home medications reviewed with patient. Time allowed for questions and answers. Peripheral IV site dc'd without difficulty with tip intact. 1613 Patient discharged to home with family.

## 2018-09-18 NOTE — PROGRESS NOTES
Patient received to [address] [place] [address] room # 8  Ambulatory from [facility]. Patient scheduled for CVN today with Dr Cara Garcia. Procedure reviewed & questions answered, voiced good understanding consent obtained & placed on chart. All medications and medical history reviewed. Will prep patient per orders. Patient & family updated on plan of care. The patient has a fraility score of 3-MANAGING WELL, based on independent of ADLs/ambulation. Increased shortness of breath with exertion.

## 2018-09-18 NOTE — DISCHARGE INSTRUCTIONS
Follow-up appointment with Dr. Anya Baum on October 5th at 1 pm at the [place] office located on [facility]. If you need to reschedule your appointment, please call [facility] at [phone]. Electrical Cardioversion: Care Instructions  Your Care Instructions    Electrical cardioversion is a treatment for an abnormal heartbeat. For example, it may be used to treat atrial fibrillation. In cardioversion, a brief electric shock is given to the heart to reset its rhythm. The shock comes through patches that are put on the outside of your chest. Cardioversion most often restores the heartbeat to normal.  After the procedure, you may have redness where the patches were. (This may look like a sunburn.) Do not drive until the day after a cardioversion. You can eat and drink when you feel ready to. Your doctor may have you take medicines daily to help the heart beat in a normal way and to prevent blood clots. Your doctor may give you medicine before and after cardioversion. This is to help keep your heart in a normal rhythm after the procedure. Instead of electric cardioversion, your doctor may try to change your heartbeat to a normal rhythm by giving you medicine. This is most often done in a clinic or hospital.  You may have had a sedative to help you relax. You may be unsteady after having sedation. It can take a few hours for the medicine's effects to wear off. Common side effects of sedation include nausea, vomiting, and feeling sleepy or tired. The doctor has checked you carefully, but problems can develop later. If you notice any problems or new symptoms, get medical treatment right away. Follow-up care is a key part of your treatment and safety. Be sure to make and go to all appointments, and call your doctor if you are having problems. It's also a good idea to know your test results and keep a list of the medicines you take. How can you care for yourself at home?   Medicines    · If the doctor gave you a sedative:  ¨ For 24 hours, don't do anything that requires attention to detail. It takes time for the medicine's effects to completely wear off. ¨ For your safety, do not drive or operate any machinery that could be dangerous. Wait until the medicine wears off and you can think clearly and react easily.     · Take your medicines exactly as prescribed. Call your doctor if you think you are having a problem with your medicine. You may take some of the following medicines:  ¨ Antiarrhythmic medicines such as amiodarone (Cordarone). ¨ Beta-blockers such as propranolol (Inderal), metoprolol (Lopressor), or carvedilol (Coreg). ¨ Calcium channel blockers such as diltiazem (Cardizem) or verapamil (Calan). ¨ Digoxin (Lanoxin). You will get more details on the specific medicines your doctor prescribes.     · If you take a blood thinner, be sure you get instructions about how to take your medicine safely. Blood thinners can cause serious bleeding problems.     · Do not take any vitamins, over-the-counter medicines, or herbal products without talking to your doctor first.    Lifestyle changes    · Do not smoke. Smoking increases your risk of stroke and heart attack. If you need help quitting, talk to your doctor about stop-smoking programs and medicines. These can increase your chances of quitting for good.     · Eat heart-healthy foods.     · Limit alcohol to 2 drinks a day for men and 1 drink a day for women. Activity    · If your doctor recommends it, get more exercise. Walking is a good choice. Bit by bit, increase the amount you walk every day. Try for 30 minutes on most days of the week. You also may want to swim, bike, or do other activities.     · When you exercise, watch for signs that your heart is working too hard. You are pushing too hard if you cannot talk while you are exercising.  If you become short of breath or dizzy or have chest pain, sit down and rest immediately.     · Check your pulse regularly. Place two fingers on the artery at the palm side of your wrist in line with your thumb. If your heartbeat seems uneven or fast, talk to your doctor. When should you call for help? Call 911 anytime you think you may need emergency care. For example, call if:    · You have trouble breathing.     · You passed out (lost consciousness).     · You cough up pink, foamy mucus and you have trouble breathing.     · You have symptoms of a heart attack. These may include:  ¨ Chest pain or pressure, or a strange feeling in the chest.  ¨ Sweating. ¨ Shortness of breath. ¨ Nausea or vomiting. ¨ Pain, pressure, or a strange feeling in the back, neck, jaw, or upper belly or in one or both shoulders or arms. ¨ Lightheadedness or sudden weakness. ¨ A fast or irregular heartbeat. After you call 911, the  may tell you to chew 1 adult-strength or 2 to 4 low-dose aspirin. Wait for an ambulance. Do not try to drive yourself.     · You have symptoms of a stroke. These may include:  ¨ Sudden numbness, tingling, weakness, or loss of movement in your face, arm, or leg, especially on only one side of your body. ¨ Sudden vision changes. ¨ Sudden trouble speaking. ¨ Sudden confusion or trouble understanding simple statements. ¨ Sudden problems with walking or balance. ¨ A sudden, severe headache that is different from past headaches.    Call your doctor now or seek immediate medical care if:    · You have new or worse nausea or vomiting.     · You have new or increased shortness of breath.     · You are dizzy or lightheaded, or you feel like you may faint.     · You have sudden weight gain, such as more than 2 to 3 pounds in a day or 5 pounds in a week.     · You have increased swelling in your legs, ankles, or feet.    Watch closely for changes in your health, and be sure to contact your doctor if you have any problems. Where can you learn more? Go to http://lesvia.info/.   Enter H994 in the search box to learn more about \"Electrical Cardioversion: Care Instructions. \"  Current as of: December 6, 2017  Content Version: 11.7  © 5611-2063 [employer]. Care instructions adapted under license by [employer] (which disclaims liability or warranty for this information). If you have questions about a medical condition or this instruction, always ask your healthcare professional. [place] [address] any warranty or liability for your use of this information.

## 2018-09-18 NOTE — PROCEDURES
Brief Cardiac Procedure Note    Patient: Massimo Lu MRN: [id number]  SSN: [id number]    YOB: 1950  Age: 79 y.o. Sex: female      Date of Procedure: 9/18/2018     Pre-procedure Diagnosis: Atrial Fibrillation/Atrial Flutter    Post-procedure Diagnosis: atrial fibrillation     Procedure: Cardioversion    Brief Description of Procedure: cardioverted 200 joules x 2     sinus with frequent PACs     Performed By: Ericka Steel MD     Assistants: none    Anesthesia: Moderate Sedation    Estimated Blood Loss: Less than 10 mL      Specimens: None    Implants: None    Findings: atrial fibrillation to   sinus with frequent PACs     Complications: none    Recommendations: will continue amiodarone  bid until f/u .     Signed By: Ericka Steel MD     September 18, 2018

## 2018-09-18 NOTE — IP AVS SNAPSHOT
[address] [id number] [address] 
[phone] Patient: De Lentz MRN: [id number] YDP:[id number] Discharge Summary 9/18/2018 De Lentz MRN[de-identified]  [id number] Admission Information Provider Pager Service Admission Date Expected D/C Date Reema Liang MD  CARDIAC CATH LAB 9/18/2018 9/18/2018 Actual LOS Patient Class 0 days OUTPATIENT Follow-up Information Follow up With Details Comments Contact Info Clau Villanueva MD   [facility] [address] 
[phone] My Medications TAKE these medications as instructed Instructions Each Dose to Equal  
 Morning Noon Evening Bedtime ADVAIR DISKUS 100-50 mcg/dose diskus inhaler Generic drug:  fluticasone-salmeterol Your last dose was: Your next dose is: Take 1 Puff by inhalation daily. 1 Puff  
    
   
   
   
  
 albuterol 90 mcg/actuation inhaler Commonly known as:  PROAIR HFA Your last dose was: Your next dose is: Take 2 Puffs by inhalation every six (6) hours as needed. 2 Puff  
    
   
   
   
  
 amiodarone 200 mg tablet Commonly known as:  CORDARONE Your last dose was: Your next dose is: Take 1 Tab by mouth two (2) times a day. 200 mg  
    
   
   
   
  
 apixaban 5 mg tablet Commonly known as:  [place] Your last dose was: Your next dose is: Take 1 Tab by mouth two (2) times a day. 5 mg  
    
   
   
   
  
 buPROPion 75 mg tablet Commonly known as:  [facility] Your last dose was: Your next dose is: Take 1 Tab by mouth two (2) times a day. 75 mg  
    
   
   
   
  
 cpap machine kit Your last dose was: Your next dose is:    
   
   
 by Does Not Apply route. dilTIAZem  mg ER capsule Commonly known as:  CARDIZEM CD Your last dose was: Your next dose is: Take 1 Cap by mouth daily. 240 mg  
    
   
   
   
  
 losartan-hydroCHLOROthiazide 100-25 mg per tablet Commonly known as:  HYZAAR Your last dose was: Your next dose is: Take 1 Tab by mouth daily. 1 Tab  
    
   
   
   
  
 mometasone 50 mcg/actuation nasal spray Commonly known as:  Gearld Reardon Your last dose was: Your next dose is: 2 Sprays by Both Nostrils route daily. 2 Spray  
    
   
   
   
  
 montelukast 10 mg tablet Commonly known as:  SINGULAIR Your last dose was: Your next dose is: Take 1 Tab by mouth daily. 10 mg  
    
   
   
   
  
 nystatin-triamcinolone topical cream  
Commonly known as:  MYCOLOG II Your last dose was: Your next dose is:    
   
   
 Apply  to affected area two (2) times a day. SYNTHROID 137 mcg tablet Generic drug:  levothyroxine Your last dose was: Your next dose is: Take 137 mcg by mouth Daily (before breakfast). 137 mcg TYLENOL EXTRA STRENGTH 500 mg tablet Generic drug:  acetaminophen Your last dose was: Your next dose is: Take  by mouth as needed for Pain. Indications: Pain General Information Please provide this summary of care documentation to your next provider. Allergies Unspecified:  Ceclor [Cefaclor]; Symbyax [Olanzapine-fluoxetine] Current Immunizations  Reviewed on 12/5/2017 Name Date Influenza High Dose Vaccine PF 9/5/2017 Influenza Vaccine 10/1/2016 Influenza Vaccine (Quad) Mdck Pf 9/5/2017 Influenza Vaccine PF 9/24/2015 Pneumococcal Conjugate (PCV-13) 9/13/2018 Pneumococcal Polysaccharide (PPSV-23) 11/15/2011 TB Skin Test (PPD) Intradermal 1/29/2018 Tdap 10/1/2017 Zoster Vaccine, Live 10/1/2017 Discharge Instructions Discharge Instructions Follow-up appointment with Dr. Donny Sow on October 5th at 1 pm at the [place] office located on [facility]. If you need to reschedule your appointment, please call [facility] at [phone]. Electrical Cardioversion: Care Instructions Your Care Instructions Electrical cardioversion is a treatment for an abnormal heartbeat. For example, it may be used to treat atrial fibrillation. In cardioversion, a brief electric shock is given to the heart to reset its rhythm. The shock comes through patches that are put on the outside of your chest. Cardioversion most often restores the heartbeat to normal. 
After the procedure, you may have redness where the patches were. (This may look like a sunburn.) Do not drive until the day after a cardioversion. You can eat and drink when you feel ready to. Your doctor may have you take medicines daily to help the heart beat in a normal way and to prevent blood clots. Your doctor may give you medicine before and after cardioversion. This is to help keep your heart in a normal rhythm after the procedure. Instead of electric cardioversion, your doctor may try to change your heartbeat to a normal rhythm by giving you medicine. This is most often done in a clinic or hospital. 
You may have had a sedative to help you relax. You may be unsteady after having sedation. It can take a few hours for the medicine's effects to wear off. Common side effects of sedation include nausea, vomiting, and feeling sleepy or tired. The doctor has checked you carefully, but problems can develop later. If you notice any problems or new symptoms, get medical treatment right away. Follow-up care is a key part of your treatment and safety.  Be sure to make and go to all appointments, and call your doctor if you are having problems. It's also a good idea to know your test results and keep a list of the medicines you take. How can you care for yourself at home? Medicines 
  · If the doctor gave you a sedative: ¨ For 24 hours, don't do anything that requires attention to detail. It takes time for the medicine's effects to completely wear off. ¨ For your safety, do not drive or operate any machinery that could be dangerous. Wait until the medicine wears off and you can think clearly and react easily.  
  · Take your medicines exactly as prescribed. Call your doctor if you think you are having a problem with your medicine. You may take some of the following medicines: ¨ Antiarrhythmic medicines such as amiodarone (Cordarone). ¨ Beta-blockers such as propranolol (Inderal), metoprolol (Lopressor), or carvedilol (Coreg). ¨ Calcium channel blockers such as diltiazem (Cardizem) or verapamil (Calan). ¨ Digoxin (Lanoxin). You will get more details on the specific medicines your doctor prescribes.  
  · If you take a blood thinner, be sure you get instructions about how to take your medicine safely. Blood thinners can cause serious bleeding problems.  
  · Do not take any vitamins, over-the-counter medicines, or herbal products without talking to your doctor first.  
 Lifestyle changes 
  · Do not smoke. Smoking increases your risk of stroke and heart attack. If you need help quitting, talk to your doctor about stop-smoking programs and medicines. These can increase your chances of quitting for good.  
  · Eat heart-healthy foods.  
  · Limit alcohol to 2 drinks a day for men and 1 drink a day for women. Activity 
  · If your doctor recommends it, get more exercise. Walking is a good choice. Bit by bit, increase the amount you walk every day. Try for 30 minutes on most days of the week.  You also may want to swim, bike, or do other activities.  
  · When you exercise, watch for signs that your heart is working too hard. You are pushing too hard if you cannot talk while you are exercising. If you become short of breath or dizzy or have chest pain, sit down and rest immediately.  
  · Check your pulse regularly. Place two fingers on the artery at the palm side of your wrist in line with your thumb. If your heartbeat seems uneven or fast, talk to your doctor. When should you call for help? Call 911 anytime you think you may need emergency care. For example, call if: 
  · You have trouble breathing.  
  · You passed out (lost consciousness).  
  · You cough up pink, foamy mucus and you have trouble breathing.  
  · You have symptoms of a heart attack. These may include: ¨ Chest pain or pressure, or a strange feeling in the chest. 
¨ Sweating. ¨ Shortness of breath. ¨ Nausea or vomiting. ¨ Pain, pressure, or a strange feeling in the back, neck, jaw, or upper belly or in one or both shoulders or arms. ¨ Lightheadedness or sudden weakness. ¨ A fast or irregular heartbeat. After you call 911, the  may tell you to chew 1 adult-strength or 2 to 4 low-dose aspirin. Wait for an ambulance. Do not try to drive yourself.  
  · You have symptoms of a stroke. These may include: 
¨ Sudden numbness, tingling, weakness, or loss of movement in your face, arm, or leg, especially on only one side of your body. ¨ Sudden vision changes. ¨ Sudden trouble speaking. ¨ Sudden confusion or trouble understanding simple statements. ¨ Sudden problems with walking or balance. ¨ A sudden, severe headache that is different from past headaches.  
 Call your doctor now or seek immediate medical care if: 
  · You have new or worse nausea or vomiting.  
  · You have new or increased shortness of breath.  
  · You are dizzy or lightheaded, or you feel like you may faint.  
  · You have sudden weight gain, such as more than 2 to 3 pounds in a day or 5 pounds in a week.  
  · You have increased swelling in your legs, ankles, or feet.  Watch closely for changes in your health, and be sure to contact your doctor if you have any problems. Where can you learn more? Go to http://jerson-rashmi.info/. Enter P687 in the search box to learn more about \"Electrical Cardioversion: Care Instructions. \" Current as of: December 6, 2017 Content Version: 11.7 © 5457-4459 [facility]. Care instructions adapted under license by [employer] (which disclaims liability or warranty for this information). If you have questions about a medical condition or this instruction, always ask your healthcare professional. [address] any warranty or liability for your use of this information. Discharge Orders None  
  
` Patient Signature:  ____________________________________________________________ Date:  ____________________________________________________________  
  
 [facility] Provider Signature:  ____________________________________________________________ Date:  ____________________________________________________________

## 2018-09-18 NOTE — PROGRESS NOTES
TRANSFER - OUT REPORT: 
 
Verbal report given to Marvel Rodriguez RN (name) on De Lentz  being transferred to CPRU (unit) for routine progression of care Report consisted of patients Situation, Background, Assessment and  
Recommendations(SBAR). Information from the following report(s) SBAR was reviewed with the receiving nurse. Lines:  
Peripheral IV 09/18/18 Right Wrist (Active) Opportunity for questions and clarification was provided. Pt had CVN with 2 synchronized biphasic defibrillations delivered and successful conversion to NSR with PAC's. Pt tolerated well. Received Versed 10mg IV, Fentanyl 100mcg IV, and Amiodarone 150mg IV.

## 2018-09-19 ENCOUNTER — HOSPITAL ENCOUNTER (OUTPATIENT)
Dept: GENERAL RADIOLOGY | Age: 68
Discharge: HOME | End: 2018-09-19
Payer: MEDICARE

## 2018-09-19 DIAGNOSIS — R06.02 SOB (SHORTNESS OF BREATH): ICD-10-CM

## 2018-09-19 LAB
ATRIAL RATE: 74 BPM
CALCULATED P AXIS, ECG09: 25 DEGREES
CALCULATED R AXIS, ECG10: -9 DEGREES
CALCULATED T AXIS, ECG11: 65 DEGREES
DIAGNOSIS, 93000: NORMAL
P-R INTERVAL, ECG05: 198 MS
Q-T INTERVAL, ECG07: 448 MS
QRS DURATION, ECG06: 102 MS
QTC CALCULATION (BEZET), ECG08: 497 MS
VENTRICULAR RATE, ECG03: 74 BPM

## 2018-09-19 PROCEDURE — 71046 X-RAY EXAM CHEST 2 VIEWS: CPT

## 2018-09-19 NOTE — PROCEDURES
Natalia Roberts [address] PROCEDURE NOTE Dre Brian 
MR#: [id number] : 1950 ACCOUNT #: [de-identified] DATE OF SERVICE: 2018 CLINICAL INDICATIONS:  A 46-year-old female with atrial fibrillation relatively new onset as well as thyroid disease. She has maintained atrial fibrillation with a controlled rate and has had her thyroid regulated by her primary care physician, Harish Larose. An attempt at cardioversion was recommended. Risk and options discussed. DESCRIPTION OF PROCEDURE:  The patient was brought to the cath prep area fasting except for medications. She was given Versed and fentanyl for conscious sedation by Alisa Regan RN. Oxygen saturation, rhythm and blood pressure are monitored and remained stable. She was cardioverted with biphasic energy 200 joules x2. First attempt was unsuccessful while frequent PACs. Second attempt after 150 mg of intravenous amiodarone on top of her oral amiodarone with 200 joules and some external pressure with her large body habitus and lordosis converted 200 joules synchronized biphasic AP energy to a sinus rhythm and PACs. There were no immediate complications. At the conclusion, the patient was monitored, saturations, vital signs and sedation. She will be monitored until she is stable for discharge. IMPRESSION:  Successful cardioversion of atrial fibrillation to sinus rhythm. ADDENDUM:  There will be no change in her medications at discharge and follow up will be in 2 weeks. Halina Dandy, MD 
  
 
J[facility] / LN 
D: 2018 15:02    
T: 2018 15:56 JOB #: [id number] CC: Sylvie Mancilla MD 
[address], [address] CC: Reina Kimbrough MD

## 2018-09-19 NOTE — PROCEDURES
[address]  MR#: [id number]  : 1950  ACCOUNT #: [de-identified]   DATE OF SERVICE: 2018    CLINICAL INDICATIONS:  A 70-year-old female with atrial fibrillation relatively new onset as well as thyroid disease. She has maintained atrial fibrillation with a controlled rate and has had her thyroid regulated by her primary care physician, Maggy Sharma. An attempt at cardioversion was recommended. Risk and options discussed. DESCRIPTION OF PROCEDURE:  The patient was brought to the cath prep area fasting except for medications. She was given Versed and fentanyl for conscious sedation by Jacques Lucero RN. Oxygen saturation, rhythm and blood pressure are monitored and remained stable. She was cardioverted with biphasic energy 200 joules x2. First attempt was unsuccessful while frequent PACs. Second attempt after 150 mg of intravenous amiodarone on top of her oral amiodarone with 200 joules and some external pressure with her large body habitus and lordosis converted 200 joules synchronized biphasic AP energy to a sinus rhythm and PACs. There were no immediate complications. At the conclusion, the patient was monitored, saturations, vital signs and sedation. She will be monitored until she is stable for discharge. IMPRESSION:  Successful cardioversion of atrial fibrillation to sinus rhythm. ADDENDUM:  There will be no change in her medications at discharge and follow up will be in 2 weeks.       Abbie Miranda MD       JEC / LN  D: 2018 15:02     T: 2018 15:56  JOB #: [id number]  CC: Leslie Constantino MD  [address], [address]  CC: Radha Solomon MD

## 2018-11-19 ENCOUNTER — APPOINTMENT (RX ONLY)
Dept: URBAN - METROPOLITAN AREA CLINIC 24 | Facility: CLINIC | Age: 68
Setting detail: DERMATOLOGY
End: 2018-11-19

## 2018-11-19 ENCOUNTER — HOSPITAL ENCOUNTER (OUTPATIENT)
Dept: LAB | Age: 68
Discharge: HOME | End: 2018-11-19
Payer: MEDICARE

## 2018-11-19 DIAGNOSIS — L91.0 HYPERTROPHIC SCAR: ICD-10-CM

## 2018-11-19 DIAGNOSIS — B07.8 OTHER VIRAL WARTS: ICD-10-CM

## 2018-11-19 DIAGNOSIS — L73.8 OTHER SPECIFIED FOLLICULAR DISORDERS: ICD-10-CM

## 2018-11-19 DIAGNOSIS — L57.8 OTHER SKIN CHANGES DUE TO CHRONIC EXPOSURE TO NONIONIZING RADIATION: ICD-10-CM

## 2018-11-19 DIAGNOSIS — I48.19 PERSISTENT ATRIAL FIBRILLATION (HCC): ICD-10-CM

## 2018-11-19 DIAGNOSIS — L82.0 INFLAMED SEBORRHEIC KERATOSIS: ICD-10-CM

## 2018-11-19 LAB
ANION GAP SERPL CALC-SCNC: 8 MMOL/L
BASOPHILS # BLD: 0 K/UL (ref 0–0.2)
BASOPHILS NFR BLD: 1 % (ref 0–2)
BUN SERPL-MCNC: 18 MG/DL (ref 8–23)
CALCIUM SERPL-MCNC: 8.6 MG/DL (ref 8.3–10.4)
CHLORIDE SERPL-SCNC: 103 MMOL/L (ref 98–107)
CO2 SERPL-SCNC: 27 MMOL/L (ref 21–32)
CREAT SERPL-MCNC: 1 MG/DL (ref 0.6–1)
DIFFERENTIAL METHOD BLD: NORMAL
EOSINOPHIL # BLD: 0.2 K/UL (ref 0–0.8)
EOSINOPHIL NFR BLD: 3 % (ref 0.5–7.8)
ERYTHROCYTE [DISTWIDTH] IN BLOOD BY AUTOMATED COUNT: 14.7 %
GLUCOSE SERPL-MCNC: 86 MG/DL (ref 65–100)
HCT VFR BLD AUTO: 42.6 % (ref 35.8–46.3)
HGB BLD-MCNC: 13.9 G/DL (ref 11.7–15.4)
IMM GRANULOCYTES # BLD: 0 K/UL (ref 0–0.5)
IMM GRANULOCYTES NFR BLD AUTO: 0 % (ref 0–5)
LYMPHOCYTES # BLD: 1.2 K/UL (ref 0.5–4.6)
LYMPHOCYTES NFR BLD: 19 % (ref 13–44)
MAGNESIUM SERPL-MCNC: 1.7 MG/DL (ref 1.8–2.4)
MCH RBC QN AUTO: 29.8 PG (ref 26.1–32.9)
MCHC RBC AUTO-ENTMCNC: 32.6 G/DL (ref 31.4–35)
MCV RBC AUTO: 91.2 FL (ref 79.6–97.8)
MONOCYTES # BLD: 0.7 K/UL (ref 0.1–1.3)
MONOCYTES NFR BLD: 11 % (ref 4–12)
NEUTS SEG # BLD: 4.3 K/UL (ref 1.7–8.2)
NEUTS SEG NFR BLD: 67 % (ref 43–78)
NRBC # BLD: 0 K/UL (ref 0–0.2)
PLATELET # BLD AUTO: 238 K/UL (ref 150–450)
PMV BLD AUTO: 10.5 FL (ref 9.4–12.3)
POTASSIUM SERPL-SCNC: 3.5 MMOL/L (ref 3.5–5.1)
RBC # BLD AUTO: 4.67 M/UL (ref 4.05–5.2)
SODIUM SERPL-SCNC: 138 MMOL/L (ref 136–145)
WBC # BLD AUTO: 6.4 K/UL (ref 4.3–11.1)

## 2018-11-19 PROCEDURE — 85025 COMPLETE CBC W/AUTO DIFF WBC: CPT

## 2018-11-19 PROCEDURE — 17110 DESTRUCTION B9 LES UP TO 14: CPT

## 2018-11-19 PROCEDURE — 99213 OFFICE O/P EST LOW 20 MIN: CPT | Mod: 25

## 2018-11-19 PROCEDURE — 80048 BASIC METABOLIC PNL TOTAL CA: CPT

## 2018-11-19 PROCEDURE — ? LIQUID NITROGEN

## 2018-11-19 PROCEDURE — ? PRESCRIPTION

## 2018-11-19 PROCEDURE — 36415 COLL VENOUS BLD VENIPUNCTURE: CPT

## 2018-11-19 PROCEDURE — 83735 ASSAY OF MAGNESIUM: CPT

## 2018-11-19 PROCEDURE — ? OTHER

## 2018-11-19 PROCEDURE — ? COUNSELING

## 2018-11-19 RX ORDER — IMIQUIMOD 50 MG/G
CREAM TOPICAL
Qty: 1 | Refills: 1 | Status: ERX | COMMUNITY
Start: 2018-11-19

## 2018-11-19 RX ADMIN — IMIQUIMOD: 50 CREAM TOPICAL at 00:00

## 2018-11-19 ASSESSMENT — LOCATION DETAILED DESCRIPTION DERM
LOCATION DETAILED: RIGHT PERIAREOLAR BREAST 10-11:00 REGION
LOCATION DETAILED: RIGHT DISTAL RADIAL DORSAL RING FINGER
LOCATION DETAILED: RIGHT DISTAL DORSAL FOREARM
LOCATION DETAILED: RIGHT PERIAREOLAR BREAST 1-2:00 REGION
LOCATION DETAILED: LEFT PROXIMAL DORSAL FOREARM
LOCATION DETAILED: LEFT DISTAL DORSAL FOREARM

## 2018-11-19 ASSESSMENT — LOCATION ZONE DERM
LOCATION ZONE: ARM
LOCATION ZONE: FINGER
LOCATION ZONE: TRUNK

## 2018-11-19 ASSESSMENT — LOCATION SIMPLE DESCRIPTION DERM
LOCATION SIMPLE: RIGHT RING FINGER
LOCATION SIMPLE: LEFT FOREARM
LOCATION SIMPLE: RIGHT FOREARM
LOCATION SIMPLE: RIGHT BREAST

## 2018-11-19 NOTE — PROCEDURE: LIQUID NITROGEN
Add 52 Modifier (Optional): no
Post-Care Instructions: I reviewed with the patient in detail post-care instructions. Patient is to wear sunprotection, and avoid picking at any of the treated lesions. Pt may apply Vaseline to crusted or scabbing areas.
Consent: The patient's verbal consent was obtained including but not limited to risks of crusting, scabbing, blistering, scarring, darker or lighter pigmentary change, recurrence, incomplete removal and infection.
Medical Necessity Information: It is in your best interest to select a reason for this procedure from the list below. All of these items fulfill various CMS LCD requirements except the new and changing color options.
Medical Necessity Clause: Treatment was medically necessary because the spot was
Number Of Freeze-Thaw Cycles: 1 freeze-thaw cycle
Detail Level: Detailed

## 2018-11-19 NOTE — PROCEDURE: OTHER
Other (Free Text): Quoted $150 for ED&C
Note Text (......Xxx Chief Complaint.): This diagnosis correlates with the
Detail Level: Simple
Other (Free Text): She had breast reduction in 2016. She has some keloids. We talked about ILK can help and she can discuss this with her surgeon.

## 2018-11-23 NOTE — PROGRESS NOTES
Patient pre-assessment complete for Atrial fib ablation with DR Jordan Waggoner scheduled for 28 at 10:30am, arrival time 8:30am. Patient verified using . Patient instructed to bring all home medications in labeled bottles on the day of procedure. NPO status reinforced. Patient instructed to HOLD eliquis on  & also hold hyzaar on Monday. Instructed they can take all other medications excluding vitamins & supplements. Patient verbalizes understanding of all instructions & denies any questions at this time.

## 2018-11-24 ENCOUNTER — ANESTHESIA EVENT (OUTPATIENT)
Dept: SURGERY | Age: 68
End: 2018-11-24
Payer: MEDICARE

## 2018-11-26 ENCOUNTER — APPOINTMENT (OUTPATIENT)
Dept: CARDIAC CATH/INVASIVE PROCEDURES | Age: 68
End: 2018-11-26
Payer: MEDICARE

## 2018-11-26 ENCOUNTER — HOSPITAL ENCOUNTER (OUTPATIENT)
Age: 68
Setting detail: OBSERVATION
Discharge: HOME | End: 2018-11-27
Attending: INTERNAL MEDICINE | Admitting: INTERNAL MEDICINE
Payer: MEDICARE

## 2018-11-26 ENCOUNTER — APPOINTMENT (OUTPATIENT)
Dept: GENERAL RADIOLOGY | Age: 68
End: 2018-11-26
Attending: INTERNAL MEDICINE
Payer: MEDICARE

## 2018-11-26 ENCOUNTER — ANESTHESIA (OUTPATIENT)
Dept: SURGERY | Age: 68
End: 2018-11-26
Payer: MEDICARE

## 2018-11-26 PROBLEM — I48.91 A-FIB (HCC): Status: ACTIVE | Noted: 2018-11-26

## 2018-11-26 LAB
ACT BLD: 313 SECS (ref 70–128)
ACT BLD: 351 SECS (ref 70–128)
ANION GAP SERPL CALC-SCNC: 8 MMOL/L (ref 7–16)
ATRIAL RATE: 375 BPM
ATRIAL RATE: 79 BPM
BUN SERPL-MCNC: 16 MG/DL (ref 8–23)
CALCIUM SERPL-MCNC: 9.2 MG/DL (ref 8.3–10.4)
CALCULATED R AXIS, ECG10: 6 DEGREES
CALCULATED T AXIS, ECG11: 108 DEGREES
CALCULATED T AXIS, ECG11: 89 DEGREES
CHLORIDE SERPL-SCNC: 107 MMOL/L (ref 98–107)
CO2 SERPL-SCNC: 25 MMOL/L (ref 21–32)
CREAT SERPL-MCNC: 0.98 MG/DL (ref 0.6–1)
DIAGNOSIS, 93000: NORMAL
DIAGNOSIS, 93000: NORMAL
ERYTHROCYTE [DISTWIDTH] IN BLOOD BY AUTOMATED COUNT: 14.6 %
GLUCOSE SERPL-MCNC: 99 MG/DL (ref 65–100)
HCT VFR BLD AUTO: 42.1 % (ref 35.8–46.3)
HGB BLD-MCNC: 13.8 G/DL (ref 11.7–15.4)
INR PPP: 1
MAGNESIUM SERPL-MCNC: 1.9 MG/DL (ref 1.8–2.4)
MCH RBC QN AUTO: 30.1 PG (ref 26.1–32.9)
MCHC RBC AUTO-ENTMCNC: 32.8 G/DL (ref 31.4–35)
MCV RBC AUTO: 91.7 FL (ref 79.6–97.8)
NRBC # BLD: 0 K/UL (ref 0–0.2)
P-R INTERVAL, ECG05: 200 MS
PLATELET # BLD AUTO: 244 K/UL (ref 150–450)
PMV BLD AUTO: 11 FL (ref 9.4–12.3)
POTASSIUM SERPL-SCNC: 3.6 MMOL/L (ref 3.5–5.1)
PROTHROMBIN TIME: 12.9 SEC (ref 11.5–14.5)
Q-T INTERVAL, ECG07: 340 MS
Q-T INTERVAL, ECG07: 364 MS
QRS DURATION, ECG06: 100 MS
QRS DURATION, ECG06: 102 MS
QTC CALCULATION (BEZET), ECG08: 389 MS
QTC CALCULATION (BEZET), ECG08: 450 MS
RBC # BLD AUTO: 4.59 M/UL (ref 4.05–5.2)
SODIUM SERPL-SCNC: 140 MMOL/L (ref 136–145)
VENTRICULAR RATE, ECG03: 79 BPM
VENTRICULAR RATE, ECG03: 92 BPM
WBC # BLD AUTO: 5.7 K/UL (ref 4.3–11.1)

## 2018-11-26 PROCEDURE — 74011250636 HC RX REV CODE- 250/636: Performed by: ANESTHESIOLOGY

## 2018-11-26 PROCEDURE — 77030027107 HC PTCH EXT REF CARTO3 J&J -F

## 2018-11-26 PROCEDURE — 76060000035 HC ANESTHESIA 2 TO 2.5 HR: Performed by: INTERNAL MEDICINE

## 2018-11-26 PROCEDURE — 93613 INTRACARDIAC EPHYS 3D MAPG: CPT

## 2018-11-26 PROCEDURE — 77030039425 HC BLD LARYNG TRULITE DISP TELE -A: Performed by: ANESTHESIOLOGY

## 2018-11-26 PROCEDURE — 94760 N-INVAS EAR/PLS OXIMETRY 1: CPT

## 2018-11-26 PROCEDURE — 99218 HC RM OBSERVATION: CPT

## 2018-11-26 PROCEDURE — 74011000250 HC RX REV CODE- 250

## 2018-11-26 PROCEDURE — 85347 COAGULATION TIME ACTIVATED: CPT

## 2018-11-26 PROCEDURE — 77030013687 HC GD NDL BARD -B

## 2018-11-26 PROCEDURE — 71045 X-RAY EXAM CHEST 1 VIEW: CPT

## 2018-11-26 PROCEDURE — 74011000250 HC RX REV CODE- 250: Performed by: INTERNAL MEDICINE

## 2018-11-26 PROCEDURE — 80048 BASIC METABOLIC PNL TOTAL CA: CPT

## 2018-11-26 PROCEDURE — 93623 PRGRMD STIMJ&PACG IV RX NFS: CPT

## 2018-11-26 PROCEDURE — C1732 CATH, EP, DIAG/ABL, 3D/VECT: HCPCS

## 2018-11-26 PROCEDURE — 77030035291 HC TBNG PMP SMARTABLATE J&J -B

## 2018-11-26 PROCEDURE — C1759 CATH, INTRA ECHOCARDIOGRAPHY: HCPCS

## 2018-11-26 PROCEDURE — 85027 COMPLETE CBC AUTOMATED: CPT

## 2018-11-26 PROCEDURE — 93662 INTRACARDIAC ECG (ICE): CPT

## 2018-11-26 PROCEDURE — 85610 PROTHROMBIN TIME: CPT

## 2018-11-26 PROCEDURE — 83735 ASSAY OF MAGNESIUM: CPT

## 2018-11-26 PROCEDURE — 93005 ELECTROCARDIOGRAM TRACING: CPT | Performed by: INTERNAL MEDICINE

## 2018-11-26 PROCEDURE — 77030019908 HC STETH ESOPH SIMS -A: Performed by: ANESTHESIOLOGY

## 2018-11-26 PROCEDURE — 74011250636 HC RX REV CODE- 250/636

## 2018-11-26 PROCEDURE — 93312 ECHO TRANSESOPHAGEAL: CPT

## 2018-11-26 PROCEDURE — 77030013794 HC KT TRNSDUC BLD EDWD -B

## 2018-11-26 PROCEDURE — 76210000006 HC OR PH I REC 0.5 TO 1 HR: Performed by: INTERNAL MEDICINE

## 2018-11-26 PROCEDURE — 93655 ICAR CATH ABLTJ DSCRT ARRHYT: CPT

## 2018-11-26 PROCEDURE — 77030037088 HC TUBE ENDOTRACH ORAL NSL COVD-A: Performed by: ANESTHESIOLOGY

## 2018-11-26 PROCEDURE — C1893 INTRO/SHEATH, FIXED,NON-PEEL: HCPCS

## 2018-11-26 PROCEDURE — C1733 CATH, EP, OTHR THAN COOL-TIP: HCPCS

## 2018-11-26 PROCEDURE — 93622 COMP EP EVAL L VENTR PAC&REC: CPT

## 2018-11-26 PROCEDURE — 77030020506 HC NDL TRNSPTL NRG BAYL -F

## 2018-11-26 PROCEDURE — C1894 INTRO/SHEATH, NON-LASER: HCPCS

## 2018-11-26 PROCEDURE — 93656 COMPRE EP EVAL ABLTJ ATR FIB: CPT

## 2018-11-26 PROCEDURE — 94640 AIRWAY INHALATION TREATMENT: CPT

## 2018-11-26 PROCEDURE — 76937 US GUIDE VASCULAR ACCESS: CPT

## 2018-11-26 PROCEDURE — 74011250637 HC RX REV CODE- 250/637: Performed by: INTERNAL MEDICINE

## 2018-11-26 RX ORDER — GLYCOPYRROLATE 0.2 MG/ML
INJECTION INTRAMUSCULAR; INTRAVENOUS AS NEEDED
Status: DISCONTINUED | OUTPATIENT
Start: 2018-11-26 | End: 2018-11-26 | Stop reason: HOSPADM

## 2018-11-26 RX ORDER — SUCRALFATE 1 G/1
1 TABLET ORAL
Status: DISCONTINUED | OUTPATIENT
Start: 2018-11-26 | End: 2018-11-27 | Stop reason: HOSPADM

## 2018-11-26 RX ORDER — ROCURONIUM BROMIDE 10 MG/ML
INJECTION, SOLUTION INTRAVENOUS AS NEEDED
Status: DISCONTINUED | OUTPATIENT
Start: 2018-11-26 | End: 2018-11-26 | Stop reason: HOSPADM

## 2018-11-26 RX ORDER — HYDROCODONE BITARTRATE AND ACETAMINOPHEN 5; 325 MG/1; MG/1
1 TABLET ORAL AS NEEDED
Status: DISCONTINUED | OUTPATIENT
Start: 2018-11-26 | End: 2018-11-26 | Stop reason: HOSPADM

## 2018-11-26 RX ORDER — HYDROCODONE BITARTRATE AND ACETAMINOPHEN 5; 325 MG/1; MG/1
1 TABLET ORAL
Status: DISCONTINUED | OUTPATIENT
Start: 2018-11-26 | End: 2018-11-27 | Stop reason: HOSPADM

## 2018-11-26 RX ORDER — SODIUM CHLORIDE 0.9 % (FLUSH) 0.9 %
5-10 SYRINGE (ML) INJECTION AS NEEDED
Status: DISCONTINUED | OUTPATIENT
Start: 2018-11-26 | End: 2018-11-26 | Stop reason: HOSPADM

## 2018-11-26 RX ORDER — ALBUTEROL SULFATE 0.83 MG/ML
2.5 SOLUTION RESPIRATORY (INHALATION)
Status: DISCONTINUED | OUTPATIENT
Start: 2018-11-26 | End: 2018-11-27 | Stop reason: HOSPADM

## 2018-11-26 RX ORDER — SUCRALFATE 1 G/1
1 TABLET ORAL
Qty: 120 TAB | Refills: 0 | Status: SHIPPED | OUTPATIENT
Start: 2018-11-27 | End: 2019-01-04

## 2018-11-26 RX ORDER — ONDANSETRON 2 MG/ML
INJECTION INTRAMUSCULAR; INTRAVENOUS AS NEEDED
Status: DISCONTINUED | OUTPATIENT
Start: 2018-11-26 | End: 2018-11-26 | Stop reason: HOSPADM

## 2018-11-26 RX ORDER — SODIUM CHLORIDE, SODIUM LACTATE, POTASSIUM CHLORIDE, CALCIUM CHLORIDE 600; 310; 30; 20 MG/100ML; MG/100ML; MG/100ML; MG/100ML
150 INJECTION, SOLUTION INTRAVENOUS CONTINUOUS
Status: DISCONTINUED | OUTPATIENT
Start: 2018-11-26 | End: 2018-11-26 | Stop reason: HOSPADM

## 2018-11-26 RX ORDER — NALOXONE HYDROCHLORIDE 0.4 MG/ML
0.2 INJECTION, SOLUTION INTRAMUSCULAR; INTRAVENOUS; SUBCUTANEOUS AS NEEDED
Status: DISCONTINUED | OUTPATIENT
Start: 2018-11-26 | End: 2018-11-26 | Stop reason: HOSPADM

## 2018-11-26 RX ORDER — FENTANYL CITRATE 50 UG/ML
INJECTION, SOLUTION INTRAMUSCULAR; INTRAVENOUS AS NEEDED
Status: DISCONTINUED | OUTPATIENT
Start: 2018-11-26 | End: 2018-11-26 | Stop reason: HOSPADM

## 2018-11-26 RX ORDER — HYDROMORPHONE HYDROCHLORIDE 2 MG/ML
0.5 INJECTION, SOLUTION INTRAMUSCULAR; INTRAVENOUS; SUBCUTANEOUS
Status: DISCONTINUED | OUTPATIENT
Start: 2018-11-26 | End: 2018-11-26 | Stop reason: HOSPADM

## 2018-11-26 RX ORDER — HEPARIN SODIUM 1000 [USP'U]/ML
INJECTION, SOLUTION INTRAVENOUS; SUBCUTANEOUS AS NEEDED
Status: DISCONTINUED | OUTPATIENT
Start: 2018-11-26 | End: 2018-11-26 | Stop reason: HOSPADM

## 2018-11-26 RX ORDER — FENTANYL CITRATE 50 UG/ML
100 INJECTION, SOLUTION INTRAMUSCULAR; INTRAVENOUS ONCE
Status: DISCONTINUED | OUTPATIENT
Start: 2018-11-26 | End: 2018-11-26 | Stop reason: HOSPADM

## 2018-11-26 RX ORDER — SODIUM CHLORIDE 0.9 % (FLUSH) 0.9 %
5-10 SYRINGE (ML) INJECTION EVERY 8 HOURS
Status: DISCONTINUED | OUTPATIENT
Start: 2018-11-26 | End: 2018-11-26 | Stop reason: HOSPADM

## 2018-11-26 RX ORDER — FAMOTIDINE 20 MG/1
20 TABLET, FILM COATED ORAL ONCE
Status: DISCONTINUED | OUTPATIENT
Start: 2018-11-26 | End: 2018-11-26 | Stop reason: HOSPADM

## 2018-11-26 RX ORDER — LIDOCAINE HYDROCHLORIDE 10 MG/ML
0.1 INJECTION INFILTRATION; PERINEURAL AS NEEDED
Status: DISCONTINUED | OUTPATIENT
Start: 2018-11-26 | End: 2018-11-26 | Stop reason: HOSPADM

## 2018-11-26 RX ORDER — SODIUM CHLORIDE 0.9 % (FLUSH) 0.9 %
5-10 SYRINGE (ML) INJECTION EVERY 8 HOURS
Status: DISCONTINUED | OUTPATIENT
Start: 2018-11-26 | End: 2018-11-27 | Stop reason: HOSPADM

## 2018-11-26 RX ORDER — ACETAMINOPHEN 500 MG
1000 TABLET ORAL
Status: DISCONTINUED | OUTPATIENT
Start: 2018-11-26 | End: 2018-11-26 | Stop reason: HOSPADM

## 2018-11-26 RX ORDER — OXYCODONE HYDROCHLORIDE 5 MG/1
5 TABLET ORAL
Status: DISCONTINUED | OUTPATIENT
Start: 2018-11-26 | End: 2018-11-26 | Stop reason: HOSPADM

## 2018-11-26 RX ORDER — BUDESONIDE 0.5 MG/2ML
500 INHALANT ORAL
Status: DISCONTINUED | OUTPATIENT
Start: 2018-11-26 | End: 2018-11-27 | Stop reason: HOSPADM

## 2018-11-26 RX ORDER — MIDAZOLAM HYDROCHLORIDE 1 MG/ML
2 INJECTION, SOLUTION INTRAMUSCULAR; INTRAVENOUS
Status: DISCONTINUED | OUTPATIENT
Start: 2018-11-26 | End: 2018-11-26 | Stop reason: HOSPADM

## 2018-11-26 RX ORDER — SODIUM CHLORIDE 0.9 % (FLUSH) 0.9 %
5-10 SYRINGE (ML) INJECTION AS NEEDED
Status: DISCONTINUED | OUTPATIENT
Start: 2018-11-26 | End: 2018-11-27 | Stop reason: HOSPADM

## 2018-11-26 RX ORDER — LIDOCAINE HYDROCHLORIDE 20 MG/ML
INJECTION, SOLUTION EPIDURAL; INFILTRATION; INTRACAUDAL; PERINEURAL AS NEEDED
Status: DISCONTINUED | OUTPATIENT
Start: 2018-11-26 | End: 2018-11-26 | Stop reason: HOSPADM

## 2018-11-26 RX ORDER — PROTAMINE SULFATE 10 MG/ML
INJECTION, SOLUTION INTRAVENOUS AS NEEDED
Status: DISCONTINUED | OUTPATIENT
Start: 2018-11-26 | End: 2018-11-26 | Stop reason: HOSPADM

## 2018-11-26 RX ORDER — ACETAMINOPHEN 325 MG/1
650 TABLET ORAL
Status: DISCONTINUED | OUTPATIENT
Start: 2018-11-26 | End: 2018-11-27 | Stop reason: HOSPADM

## 2018-11-26 RX ORDER — MONTELUKAST SODIUM 10 MG/1
10 TABLET ORAL DAILY
Status: DISCONTINUED | OUTPATIENT
Start: 2018-11-27 | End: 2018-11-27 | Stop reason: HOSPADM

## 2018-11-26 RX ORDER — SODIUM CHLORIDE 9 MG/ML
50 INJECTION, SOLUTION INTRAVENOUS CONTINUOUS
Status: DISCONTINUED | OUTPATIENT
Start: 2018-11-26 | End: 2018-11-26 | Stop reason: HOSPADM

## 2018-11-26 RX ORDER — PANTOPRAZOLE SODIUM 40 MG/1
40 TABLET, DELAYED RELEASE ORAL EVERY 12 HOURS
Qty: 60 TAB | Refills: 0 | Status: SHIPPED | OUTPATIENT
Start: 2018-11-27 | End: 2019-01-04

## 2018-11-26 RX ORDER — DILTIAZEM HYDROCHLORIDE 240 MG/1
240 CAPSULE, COATED, EXTENDED RELEASE ORAL DAILY
Status: DISCONTINUED | OUTPATIENT
Start: 2018-11-26 | End: 2018-11-27 | Stop reason: HOSPADM

## 2018-11-26 RX ORDER — NEOSTIGMINE METHYLSULFATE 1 MG/ML
INJECTION INTRAVENOUS AS NEEDED
Status: DISCONTINUED | OUTPATIENT
Start: 2018-11-26 | End: 2018-11-26 | Stop reason: HOSPADM

## 2018-11-26 RX ORDER — BUPROPION HYDROCHLORIDE 75 MG/1
150 TABLET ORAL 2 TIMES DAILY
Status: DISCONTINUED | OUTPATIENT
Start: 2018-11-26 | End: 2018-11-27 | Stop reason: HOSPADM

## 2018-11-26 RX ORDER — PANTOPRAZOLE SODIUM 40 MG/1
40 TABLET, DELAYED RELEASE ORAL EVERY 12 HOURS
Status: DISCONTINUED | OUTPATIENT
Start: 2018-11-26 | End: 2018-11-27 | Stop reason: HOSPADM

## 2018-11-26 RX ORDER — SODIUM CHLORIDE, SODIUM LACTATE, POTASSIUM CHLORIDE, CALCIUM CHLORIDE 600; 310; 30; 20 MG/100ML; MG/100ML; MG/100ML; MG/100ML
75 INJECTION, SOLUTION INTRAVENOUS CONTINUOUS
Status: DISCONTINUED | OUTPATIENT
Start: 2018-11-26 | End: 2018-11-26 | Stop reason: HOSPADM

## 2018-11-26 RX ORDER — HEPARIN SODIUM 5000 [USP'U]/100ML
INJECTION, SOLUTION INTRAVENOUS
Status: DISCONTINUED | OUTPATIENT
Start: 2018-11-26 | End: 2018-11-26 | Stop reason: HOSPADM

## 2018-11-26 RX ORDER — PROPOFOL 10 MG/ML
INJECTION, EMULSION INTRAVENOUS AS NEEDED
Status: DISCONTINUED | OUTPATIENT
Start: 2018-11-26 | End: 2018-11-26 | Stop reason: HOSPADM

## 2018-11-26 RX ORDER — SODIUM CHLORIDE, SODIUM LACTATE, POTASSIUM CHLORIDE, CALCIUM CHLORIDE 600; 310; 30; 20 MG/100ML; MG/100ML; MG/100ML; MG/100ML
25 INJECTION, SOLUTION INTRAVENOUS CONTINUOUS
Status: DISCONTINUED | OUTPATIENT
Start: 2018-11-26 | End: 2018-11-26 | Stop reason: HOSPADM

## 2018-11-26 RX ADMIN — LIDOCAINE HYDROCHLORIDE 80 MG: 20 INJECTION, SOLUTION EPIDURAL; INFILTRATION; INTRACAUDAL; PERINEURAL at 10:55

## 2018-11-26 RX ADMIN — PROTAMINE SULFATE 100 MG: 10 INJECTION, SOLUTION INTRAVENOUS at 12:40

## 2018-11-26 RX ADMIN — APIXABAN 5 MG: 5 TABLET, FILM COATED ORAL at 17:47

## 2018-11-26 RX ADMIN — PANTOPRAZOLE SODIUM 40 MG: 40 TABLET, DELAYED RELEASE ORAL at 21:14

## 2018-11-26 RX ADMIN — HEPARIN SODIUM 40 UNITS/KG/HR: 5000 INJECTION, SOLUTION INTRAVENOUS at 11:41

## 2018-11-26 RX ADMIN — HEPARIN SODIUM 10000 UNITS: 1000 INJECTION, SOLUTION INTRAVENOUS; SUBCUTANEOUS at 11:25

## 2018-11-26 RX ADMIN — SODIUM CHLORIDE, SODIUM LACTATE, POTASSIUM CHLORIDE, AND CALCIUM CHLORIDE: 600; 310; 30; 20 INJECTION, SOLUTION INTRAVENOUS at 10:41

## 2018-11-26 RX ADMIN — HEPARIN SODIUM 11000 UNITS: 1000 INJECTION, SOLUTION INTRAVENOUS; SUBCUTANEOUS at 11:41

## 2018-11-26 RX ADMIN — PANTOPRAZOLE SODIUM 40 MG: 40 TABLET, DELAYED RELEASE ORAL at 16:23

## 2018-11-26 RX ADMIN — ONDANSETRON 4 MG: 2 INJECTION INTRAMUSCULAR; INTRAVENOUS at 12:25

## 2018-11-26 RX ADMIN — ACETAMINOPHEN 650 MG: 325 TABLET, FILM COATED ORAL at 21:15

## 2018-11-26 RX ADMIN — ROCURONIUM BROMIDE 50 MG: 10 INJECTION, SOLUTION INTRAVENOUS at 10:55

## 2018-11-26 RX ADMIN — PROPOFOL 180 MG: 10 INJECTION, EMULSION INTRAVENOUS at 10:55

## 2018-11-26 RX ADMIN — GLYCOPYRROLATE 0.4 MG: 0.2 INJECTION INTRAMUSCULAR; INTRAVENOUS at 12:41

## 2018-11-26 RX ADMIN — Medication 5 ML: at 21:15

## 2018-11-26 RX ADMIN — SUCRALFATE 1 G: 1 TABLET ORAL at 16:23

## 2018-11-26 RX ADMIN — BUDESONIDE 500 MCG: 0.5 INHALANT RESPIRATORY (INHALATION) at 20:15

## 2018-11-26 RX ADMIN — SUCRALFATE 1 G: 1 TABLET ORAL at 21:14

## 2018-11-26 RX ADMIN — NEOSTIGMINE METHYLSULFATE 3 MG: 1 INJECTION INTRAVENOUS at 12:41

## 2018-11-26 RX ADMIN — BUPROPION HYDROCHLORIDE 150 MG: 75 TABLET, FILM COATED ORAL at 17:47

## 2018-11-26 RX ADMIN — FENTANYL CITRATE 100 MCG: 50 INJECTION, SOLUTION INTRAMUSCULAR; INTRAVENOUS at 10:55

## 2018-11-26 RX ADMIN — DILTIAZEM HYDROCHLORIDE 240 MG: 240 CAPSULE, COATED, EXTENDED RELEASE ORAL at 21:14

## 2018-11-26 RX ADMIN — ALBUTEROL SULFATE 2.5 MG: 2.5 SOLUTION RESPIRATORY (INHALATION) at 20:15

## 2018-11-26 NOTE — ANESTHESIA PREPROCEDURE EVALUATION
Anesthetic History               Review of Systems / Medical History  Patient summary reviewed and pertinent labs reviewed    Pulmonary        Sleep apnea: CPAP    Asthma : well controlled       Neuro/Psych         Psychiatric history (Depression)     Cardiovascular    Hypertension: well controlled  Valvular problems/murmurs: mitral insufficiency      Dysrhythmias : atrial fibrillation  Hyperlipidemia    Exercise tolerance: >4 METS  Comments: 2018 ECHO: VSD (L->R);  Preserved EF  Denies CP    GI/Hepatic/Renal  Within defined limits              Endo/Other      Hypothyroidism: well controlled  Morbid obesity     Other Findings              Physical Exam    Airway  Mallampati: II  TM Distance: 4 - 6 cm  Neck ROM: normal range of motion   Mouth opening: Normal     Cardiovascular    Rhythm: regular  Rate: normal         Dental    Dentition: Caps/crowns     Pulmonary  Breath sounds clear to auscultation               Abdominal  GI exam deferred       Other Findings            Anesthetic Plan    ASA: 3  Anesthesia type: general          Induction: Intravenous  Anesthetic plan and risks discussed with: Spouse and Patient

## 2018-11-26 NOTE — PROGRESS NOTES
Patient received to [address] [place] [address] room # 11  Ambulatory from [facility]. Patient scheduled for Afib Ablation today with Dr Tyler Penn. Procedure reviewed & questions answered, voiced good understanding consent obtained & placed on chart. All medications and medical history reviewed. Will prep patient per orders. Patient & family updated on plan of care. The patient has a fraility score of 3-MANAGING WELL, based on patient A&Ox3, patient able to ambulate to room without difficulty, patient able to perform ADL's independently.

## 2018-11-26 NOTE — PROGRESS NOTES
Pt admitted to 3rd floor tele for A fib. CM met with pt to discuss CM needs & DCP. Pt is A&Ox4. Pt is indep at home with all ADLS. Pt lives with spouse. Pt has walker, bsc;no further DME needs. Pt has no difficulty with obtaining medications or transport. DCP home with spouse. No further needs noted. CM to continue to monitor. Care Management Interventions PCP Verified by CM: Yes Last Visit to PCP: 09/26/18 Mode of Transport at Discharge: Other (see comment)(Spouse Chon Mccall [phone]) Transition of Care Consult (CM Consult): Discharge Planning Physical Therapy Consult: No 
Occupational Therapy Consult: No 
Current Support Network: Lives with Spouse Confirm Follow Up Transport: Family Plan discussed with Pt/Family/Caregiver: Yes Freedom of Choice Offered: Yes Discharge Location Discharge Placement: Home

## 2018-11-26 NOTE — H&P
[facility] Cardiology/Electrophyiology Consult Date of  Admission: 11/26/2018  8:37 AM  
 
 
CC/Reason for admission: afib ablation José Luis Mcgill is a 79 y.o. female admitted for Paroxysmal atrial fibrillation ([address] [I48.0]. 79 y.o. female with a past medical and cardiac history significant for atrial fibrillation and presents today for scheduled afib ablation. Pt reports she was noted to have irregular heart rates while seeing her PCP and was found to be in afib. Pt was also diagnosed with thyroid problems at that time. She was treated for her thyroid and ultimately underwent DCCV on amiodarone and NSR lasted only about 1 week. She reports feeling tired, low energy, and shortness of breath with her afib. Pt has symptomatic persistent atrial fibrillation which is a newly recognized problem, uncontrolled, no aggravating or alleviating factors, with symptoms as noted above. Cardiac PMH: (Old records have been reviewed and summarized below) TTE normal EF, dilated LA Patient Active Problem List  
Diagnosis Code  Essential hypertension I10  
 Mixed hyperlipidemia E78.2  Allergic rhinitis due to allergen J30.9  Primary osteoarthritis involving multiple joints M15.0  Depression F32.9  Obesity E66.9  
 PRAVEEN (obstructive sleep apnea) G47.33  
 Edema R60.9  Morbid obesity (HCC) E66.01  
 Anxiety F41.9  Osteoarthritis of right knee M17.11  
 Allergic rhinitis J30.9  Hypothyroid E03.9  Arthritis M19.90  Arrhythmia I49.9  Hypertension I10  
 VSD (ventricular septal defect) Q21.0  Preoperative clearance Z01.818  Dyspnea on exertion R06.09  
 Scoliosis M41.9  Endocarditis I38  
 OA (osteoarthritis) of knee M17.10  Persistent atrial fibrillation (HCC) I48.1  Cold sore B00.1  Morbid obesity with BMI of 45.0-49.9, adult (HCC) E66.01, Z68.42  
 Hypothyroidism E03.9  Persistent asthma [id number] Past Medical History:  
Diagnosis Date  Allergic rhinitis  Anxiety  Arrhythmia   
 hx: [address] Card[address] May 28, 2015: sinus rhythm noted on ekg 17  Arthritis  Asthma  Depression   
 under control with med  Endocarditis   
 hx   
 Heart murmur   
 congenital, asymptomatic per cardiologist note ; echo 16  Hematuria  History of MRSA infection on left breast  
 2016 : treated/ resolved  HLD (hyperlipidemia)  Hypertension  Hypothyroid   
 medication  Hypothyroidism due to acquired atrophy of thyroid 2015  Intertrigo   
 mytrex cream  
 Irregular heart beat   
 hx only; current - regular rate/ rhythm  Mass of colon  Morbid obesity ([facility] [address] 48.7 bmi  Reactive airway disease with status asthmaticus   
 hx only  Scoliosis 2016  Sleep apnea   
 cpap complaint.  Unspecified adverse effect of anesthesia   
 slow to awaken in pacu: pt reports prior to using c-pap  Varicose veins  VSD (ventricular septal defect) 2016  
 per cardiologist note 17: small, restictive, no change Past Surgical History:  
Procedure Laterality Date  CARDIAC SURG PROCEDURE UNLIST    
 cardioversion  HX APPENDECTOMY  HX  SECTION    
 x2  
 HX COLECTOMY Right 2010 in colon removed  HX COLONOSCOPY  ,   HX DILATION AND CURETTAGE    
 multiple  HX HEART CATHETERIZATION    
 heart cath age 5  
 HX HEENT    
 jaw  HX HEENT    
 lasik  HX HERNIA REPAIR  incisional [id number]  
 with mesh  HX KNEE REPLACEMENT Right 2018  HX LIPOMA RESECTION Right   
 right foot between toes  HX ORTHOPAEDIC  due to underbite  
 jaw surgery Allergies Allergen Reactions  Ceclor [Cefaclor] Rash  Symbyax [Olanzapine-Fluoxetine] Other (comments) Causes Severe pain Family History Problem Relation Age of Onset  Cancer Mother LYMPHOMA  Hypertension Mother  Breast Cancer Mother  Heart Disease Father   
     heart attacks---bypass surg  Hypertension Father  Heart Disease Brother  Heart Disease Brother  Colon Cancer Maternal Uncle Current Facility-Administered Medications Medication Dose Route Frequency  lidocaine (XYLOCAINE) 10 mg/mL (1 %) injection 0.1 mL  0.1 mL SubCUTAneous PRN  
 lactated Ringers infusion  150 mL/hr IntraVENous CONTINUOUS  
 sodium chloride (NS) flush 5-10 mL  5-10 mL IntraVENous Q8H  
 sodium chloride (NS) flush 5-10 mL  5-10 mL IntraVENous PRN  
 famotidine (PEPCID) tablet 20 mg  20 mg Oral ONCE  
 fentaNYL citrate (PF) injection 100 mcg  100 mcg IntraVENous ONCE  
 midazolam (VERSED) injection 2 mg  2 mg IntraVENous ONCE PRN  
 lidocaine (XYLOCAINE) 10 mg/mL (1 %) injection 0.1 mL  0.1 mL SubCUTAneous PRN  
 lactated Ringers infusion  25 mL/hr IntraVENous CONTINUOUS  
 sodium chloride (NS) flush 5-10 mL  5-10 mL IntraVENous Q8H  
 sodium chloride (NS) flush 5-10 mL  5-10 mL IntraVENous PRN Review of Symptoms: A comprehensive ROS was performed with the pertinent positives and negatives mentioned in the HPI, all other systems reviewed and are negative Physical Exam 
Vitals:  
 11/26/18 6743 11/26/18 0919 BP:  128/82 Pulse:  86 Resp:  18 SpO2:  92% Weight: 122.5 kg (270 lb) Height: 5' 3\" (1.6 m) Physical Exam: 
  
Gen: well appearing, well developed, NAD Eyes: Pupils equal, EOMI 
ENT: oropharynx clear, no oral lesions, normal dentition CV: irreg irreg, normal rate, no M/R/G, PMI not palpable, normal JVD, no carotid bruits, normal distal pulses, no FIDEL Pulm: CTAB, no accessory muscle uses, no wheezes, crackles GI: soft, NT, ND Cardiographics Telemetry:  
ECG (Indpendently visualized and interpreted): 
Echocardiogram:  
 
Labs:  
Recent Labs  
  11/26/18 
0914   
K 3.6 MG 1.9 BUN 16  
CREA 0.98  
GLU 99 WBC 5.7 HGB 13.8 HCT 42.1  INR 1.0 Assessment: Active Problems: 
  Persistent atrial fibrillation ([facility] [address] (7/5/2018) Plan: 1. Persistent atrial fibrillation, uncontrolled: Lauren Mcadams is a 79 y.o. female with symptomatic persistent atrial fibrillation failing amiodarone therapy and is an excellent candidate for a continued rhythm control strategy and presents for scheduled afib ablation. Answered all questions and concerns and patient wishes to proceed. Day Ramires MD, MS 
Cardiology/Electrophysiology

## 2018-11-26 NOTE — PERIOP NOTES
TRANSFER - OUT REPORT: 
 
Verbal report given to [facility], RN(name) on Helen Peterson  being transferred to [facility](unit) for routine post - op Report consisted of patients Situation, Background, Assessment and  
Recommendations(SBAR). Information from the following report(s) SBAR, OR Summary, Procedure Summary, Intake/Output, MAR and Cardiac Rhythm NSR with PACs was reviewed with the receiving nurse. Lines:  
Peripheral IV 11/26/18 Anterior;Distal;Inferior;Left;Lower Arm (Active) Site Assessment Clean, dry, & intact 11/26/2018  1:01 PM  
Phlebitis Assessment 0 11/26/2018  1:01 PM  
Infiltration Assessment 0 11/26/2018  1:01 PM  
Dressing Status Clean, dry, & intact 11/26/2018  1:01 PM  
Dressing Type Tape;Transparent 11/26/2018  1:01 PM  
Hub Color/Line Status Infusing 11/26/2018  1:01 PM  
Alcohol Cap Used No 11/26/2018  1:01 PM  
   
Peripheral IV 11/26/18 Anterior; Inferior; Lower;Proximal;Right Arm (Active) Site Assessment Clean, dry, & intact 11/26/2018  1:01 PM  
Phlebitis Assessment 0 11/26/2018  1:01 PM  
Infiltration Assessment 0 11/26/2018  1:01 PM  
Dressing Status Clean, dry, & intact 11/26/2018  1:01 PM  
Dressing Type Tape;Transparent 11/26/2018  1:01 PM  
Hub Color/Line Status Capped 11/26/2018  1:01 PM  
Alcohol Cap Used No 11/26/2018  1:01 PM  
  
 
Opportunity for questions and clarification was provided. Patient transported with: 
 Monitor O2 @ 3 liters Registered Nurse VTE prophylaxis orders have been written for Helen Peterson. Patient and family given floor number and nurses name.

## 2018-11-26 NOTE — PROCEDURES
Pre-Electrophysiology Diagnosis  1. Atrial fibrillation failing medical therapy     Procedure Performed  1. EPS afib ablation/pulmonary vein isolation  2. Left atrial pacing recording from the coronary sinus. 3. 3-D Electroanatomical mapping  4. Intracardiac echo  5. Ablation of second arrhythmia  6. LV pacing and recording  7. Transesophageal echo  8. Drug infusion  9. Cardioversion    Anesthesia: General     Estimated Blood Loss: Less than 10 mL     Specimens: * No specimens in log *    Procedure in Detail:  The patient was brought to the electrophysiology lab in the fasting state. The patient was intubated by anesthesiology and an esophageal temperature probe inserted and advanced to a location directly posterior to the LA at the level of the pulmonary veins. The esophageal temperature probe was repositioned throughout the case to a location as close the the ablation catheter as possible. If the esophageal temperature increased 0.5 degrees Celsius ablation was stopped until the temperature returned to baseline. A tranesophageal echocardiogram was performed directly prior to the procedure and was negative for a ARMAND thrombus (see full report in chart). A Ref-Star CARTO patch was placed, the patient was then prepped and draped in sterile fashion. Venous access was obtained under ultrasound guidance x4 using modified Seldinger technique, with placement of one 8Fr short sidearm sheath and two 8.5 Fr SL-O 63cm long braided sheaths in the right femoral vein and placement of a 10Fr sheath into the left femoral vein. The patient presented to the EP lab in atrial fibrillation and underwent DCCV with pads across the anterior and posterior chest.  An intra-cardiac echo probe was prepped, and then inserted into an 10 Fr short sheath and used to localize the fossa ovalis and create LA and pulmonary vein anatomy [facility].   A [employer] Recster Pentaray catheter was then inserted into an 8.5 SLO Fr sheath and used to create an electroanatomical map of the right atrium, including attempts at His localization and the os of the CS. Then a Smart Touch porous irrigated BW 3.5mm ablation catheter was used to map out the body of the CS. A decapolar Biosense Leach CS catheter was inserted via an 8Fr sheath and positioned in the mid coronary sinus. Next, a trans-septal needle was inserted into the SLO and was used to perform a trans-septal puncture with assistance from ICE (intra-cardiac echo) as well as the [address] [place] [address],[address] map and the right atrial impedence map. The SLO sheath was advanced into the LA. Total weight based heparin bolus was administered just after transeptal access, and systemic blood pressure monitored invasively. The patient was then placed on our heparin weight based nomogram for targeted ACT of 300-350 during the ablation procedure. A second trans-septal access was obtained with the ablation catheter. The Pentaray catheter was then inserted into the LA via the SL-O sheath and was used to create a detail electroanatomical voltage map of the left atrium including the pulmonary veins to identify the pulmonary vein sleeves and further guide additional ablation as pictured below. The Pentaray was used to map pulmonary vein potentials and target ablation. An 8 Fr, 3.5mm tip saline irrigated bidirectional D/F curve Thermo-cool SmartTouch catheter was used for RF ablation and ablation was performed at 40W unless ablation was in the proximity of the esophagus where ablation was performed at 25W. Antral pulmonary vein isolation was then performed for all 4 pulmonary veins. Entrance and exit block was demonstrated by left atrial pacing and within the pulmonary veins. Lack of any conducted atrial EGMs into the pulmonary veins was documented.   Prior to ablation of the right antral pulmonary veins, the ablation catheter was used to pace at high output to try to localize the right phrenic nerve and map its location prior to ablation. Adenosine was injected (6-12 mg) to demonstrate the lack of early reconnection with the Pentaray in the PVs. There was no early reconnection with adenosine. The ablation catheter was inserted into the LV, documented LV electrograms and was used to pace the LV for the EP study and for rescue pacing during adenosine infusion. Post PVI, the Pentaray was used to perform a second LA voltage map post ablation to demonstrate pulmonary vein isolation and post ablation voltage as pictured below. Baseline LA Voltage Map      Post Ablation LA Voltage Map      Cavotricuspid Isthmus ablation (right atrial flutter)  Linear ablation across the cavo-tricuspid isthmus was performed starting with 1:2 A:V EGMs along the isthmus at 6pm Belarusian. The local electrogram activation sequence, differential pacing maneuvers and electrogram timing was used to demonstrate bidirectional block along the cavotricuspid isthmus. Post-Ablation trans-isthmus time: 189 msec  Local double potentials: 132 msec    Next, baseline recordings and a diagnostic EP study was performed. The coronary sinus multipolar catheter was used to pace the left atrium during the EP study. The LA CS electrograms were documented and interpreted during the procedure. A comprehensive EP study was performed with 1:1 AV decremental pacing, atrial extrastimuli and ventricular pacing to assess retrograde conduction. The patient did not have sustained slow pathway conduction or evidence of an accessory pathway. Ventricular pacing revealed retrograde AV conduction which was concentric and decremental.    At the completion of the ablation and EPS, all catheters were removed, 100mg Protamine was administered and sheaths were pulled after placing a figure of 8 stitch. The patient tolerated the procedure well with no acute complications recognized. The patient left the EP lab in stable condition, in normal sinus rhythm.  Just prior to pulling christiano, the ICE catheter was used to obtain ultrasound images and revealed no evidence of pericardial effusion. Baseline Intervals    QRS duration: 94 msec  MA interval: 215 msec  RR interval: 788 msec  AH interval: 41 msec  HV interval: 170 msec    EP Study    AV [place]: 410 msec  AV lissette ERP: 310 msec  VA [place]: 440 msec    Plan of care: The patient will be placed in observation on telemetry, 4 hour flat time, followed by ambulation as tolerated and will continue anticoagulation as prescribed pre-procedure. Complications: None    Summary:   1. Successful pulmonary vein isolation x4.  2. Creation of a line of bidirectional block at the cavotricuspid isthmus. 3.         Comprehensive EP study. 4. Pt tolerated the procedure well. 5. Family updated. Carmen Ramires MD, MS  Clinical Cardiac Electrophysiology  11/26/2018  12:42 PM

## 2018-11-26 NOTE — ANESTHESIA POSTPROCEDURE EVALUATION
Procedure(s):  AFIB ABLATION. Anesthesia Post Evaluation      Multimodal analgesia: multimodal analgesia used between 6 hours prior to anesthesia start to PACU discharge  Patient location during evaluation: bedside  Patient participation: complete - patient participated  Level of consciousness: awake and alert  Pain score: 2  Pain management: adequate  Airway patency: patent  Anesthetic complications: no  Cardiovascular status: acceptable  Respiratory status: acceptable  Hydration status: acceptable  Comments: Patient doing well. Continue care on floor. Post anesthesia nausea and vomiting:  none      Visit Vitals  /57   Pulse 78   Temp 36.9 °C (98.4 °F)   Resp 14   Ht 5' 3\" (1.6 m)   Wt 122.5 kg (270 lb)   SpO2 95%   Breastfeeding?  No   BMI 47.83 kg/m²

## 2018-11-26 NOTE — PROGRESS NOTES
Initial visit to assess pt's spiritual needs. Ministry of presence & prayer to demonstrate caring & concern, convey emotional & spiritual support.  
 
Chaplain Ada Gill MDiv,ThM,PhD

## 2018-11-26 NOTE — PROGRESS NOTES
TRANSFER - IN REPORT: 
 
Verbal report received from LINDA Kendrick on Nydia Malik being received from PACU for routine progression of care. Report consisted of patients Situation, Background, Assessment and Recommendations(SBAR). Information from the following report(s) SBAR, Procedure Summary and MAR was reviewed. Opportunity for questions and clarification was provided. Assessment completed upon patients arrival to unit and care assumed. Patient received to room 325. Patient connected to monitor and assessment completed. Plan of care reviewed. Patient oriented to room and call light. Patient aware to use call light to communicate any chest pain or needs. Admission skin assessment completed with second RN and reveals the following: BL groin sites, skin otherwise intact.

## 2018-11-27 VITALS
HEART RATE: 89 BPM | WEIGHT: 277.3 LBS | BODY MASS INDEX: 49.13 KG/M2 | SYSTOLIC BLOOD PRESSURE: 119 MMHG | OXYGEN SATURATION: 91 % | HEIGHT: 63 IN | TEMPERATURE: 98.7 F | DIASTOLIC BLOOD PRESSURE: 77 MMHG | RESPIRATION RATE: 17 BRPM

## 2018-11-27 LAB
ANION GAP SERPL CALC-SCNC: 7 MMOL/L (ref 7–16)
ATRIAL RATE: 83 BPM
BUN SERPL-MCNC: 13 MG/DL (ref 8–23)
CALCIUM SERPL-MCNC: 8.2 MG/DL (ref 8.3–10.4)
CALCULATED R AXIS, ECG10: 29 DEGREES
CALCULATED T AXIS, ECG11: 82 DEGREES
CHLORIDE SERPL-SCNC: 103 MMOL/L (ref 98–107)
CO2 SERPL-SCNC: 29 MMOL/L (ref 21–32)
CREAT SERPL-MCNC: 0.84 MG/DL (ref 0.6–1)
DIAGNOSIS, 93000: NORMAL
GLUCOSE SERPL-MCNC: 99 MG/DL (ref 65–100)
MAGNESIUM SERPL-MCNC: 1.8 MG/DL (ref 1.8–2.4)
POTASSIUM SERPL-SCNC: 3.4 MMOL/L (ref 3.5–5.1)
Q-T INTERVAL, ECG07: 370 MS
QRS DURATION, ECG06: 94 MS
QTC CALCULATION (BEZET), ECG08: 437 MS
SODIUM SERPL-SCNC: 139 MMOL/L (ref 136–145)
VENTRICULAR RATE, ECG03: 84 BPM

## 2018-11-27 PROCEDURE — 99218 HC RM OBSERVATION: CPT

## 2018-11-27 PROCEDURE — 80048 BASIC METABOLIC PNL TOTAL CA: CPT

## 2018-11-27 PROCEDURE — 74011000250 HC RX REV CODE- 250: Performed by: INTERNAL MEDICINE

## 2018-11-27 PROCEDURE — 36415 COLL VENOUS BLD VENIPUNCTURE: CPT

## 2018-11-27 PROCEDURE — 93005 ELECTROCARDIOGRAM TRACING: CPT | Performed by: INTERNAL MEDICINE

## 2018-11-27 PROCEDURE — 74011250637 HC RX REV CODE- 250/637: Performed by: NURSE PRACTITIONER

## 2018-11-27 PROCEDURE — 94760 N-INVAS EAR/PLS OXIMETRY 1: CPT

## 2018-11-27 PROCEDURE — 74011250637 HC RX REV CODE- 250/637: Performed by: INTERNAL MEDICINE

## 2018-11-27 PROCEDURE — 94640 AIRWAY INHALATION TREATMENT: CPT

## 2018-11-27 PROCEDURE — 83735 ASSAY OF MAGNESIUM: CPT

## 2018-11-27 RX ORDER — DIPHENHYDRAMINE HCL 25 MG
25 CAPSULE ORAL
Status: DISCONTINUED | OUTPATIENT
Start: 2018-11-27 | End: 2018-11-27 | Stop reason: HOSPADM

## 2018-11-27 RX ADMIN — MONTELUKAST SODIUM 10 MG: 10 TABLET ORAL at 08:42

## 2018-11-27 RX ADMIN — APIXABAN 5 MG: 5 TABLET, FILM COATED ORAL at 08:42

## 2018-11-27 RX ADMIN — SUCRALFATE 1 G: 1 TABLET ORAL at 05:52

## 2018-11-27 RX ADMIN — BUDESONIDE 500 MCG: 0.5 INHALANT RESPIRATORY (INHALATION) at 07:50

## 2018-11-27 RX ADMIN — Medication 10 ML: at 05:52

## 2018-11-27 RX ADMIN — DIPHENHYDRAMINE HYDROCHLORIDE 25 MG: 25 CAPSULE ORAL at 01:04

## 2018-11-27 RX ADMIN — PANTOPRAZOLE SODIUM 40 MG: 40 TABLET, DELAYED RELEASE ORAL at 08:42

## 2018-11-27 RX ADMIN — BUPROPION HYDROCHLORIDE 150 MG: 75 TABLET, FILM COATED ORAL at 08:42

## 2018-11-27 RX ADMIN — ALBUTEROL SULFATE 2.5 MG: 2.5 SOLUTION RESPIRATORY (INHALATION) at 07:51

## 2018-11-27 NOTE — PROGRESS NOTES
Bilateral groin 2 suture (s) were removed by  Nurse without difficulty. Wound edges were well approximated. There was not evidence of infection. Patient did tolerate well.

## 2018-11-27 NOTE — PROGRESS NOTES
Discharge instructions were reviewed with patient. An opportunity was given for questions. All medications were reviewed, and information was given on the new medications - protonix and carafate. Patient verbalized understanding, and has no questions at this time. IV and telemetry monitor removed by primary RN.

## 2018-11-27 NOTE — DISCHARGE SUMMARY
[facility] Cardiology Discharge Summary     Patient ID:  Bebo Staton  [id number]  10 y.o.  1950    Admit date: 11/26/2018    Discharge date:  11/27/2018    Admitting Physician: Amparo Atwood MD     Discharge Physician: Candice Teresa NP/Dr. Gerlene Aschoff    Admission Diagnoses: Paroxysmal atrial fibrillation [facility]) [I48.0]    Discharge Diagnoses:    Diagnosis    [facility]fib ([facility]    Morbid obesity with BMI of 45.0-49.9, adult ([facility]    Hypothyroidism    Persistent asthma    Persistent atrial fibrillation ([facility]    Essential hypertension    Mixed hyperlipidemia       Cardiology Procedures this admission:  AFIB ablation  Consults: None    Hospital Course: Patient was seen at the office of [facility] Cardiology by Dr. Lee Marie for management of AFIB and was subsequently scheduled for an AM Admission ablation at [facility] on 11/26/18. Patient underwent ablation by Dr. Lee Marie. Patient tolerated the procedure well and was taken to telemetry for recovery. The morning of discharge, patient was up feeling well without any complaints of chest pain or shortness of breath. Patient's bilateral cath sites were clean, dry and intact without hematoma or bruit. Patient's labs were WNL. Patient was seen and examined by Dr. Gerlene Aschoff and determined stable and ready for discharge. Patient was instructed on the importance of medication compliance including taking carafate, PPI and Eliquis everyday without missing a dose. The patient will follow up with [facility] Cardiology -- Dr. Lee Marie in 2-4 weeks      DISPOSITION: The patient is being discharged home in stable condition on a low saturated fat, low cholesterol and low salt diet. The patient is instructed to advance activities as tolerated to the limit of fatigue or shortness of breath. The patient is instructed to avoid all heavy lifting, straining, stooping or squatting for 3-5 days.  The patient is instructed to watch the cath site for bleeding/oozing; if seen, the patient is instructed to apply firm pressure with a clean cloth and call [address] Cardiology at [phone]. The patient is instructed to watch for signs of infection which include: increasing area of redness, fever/hot to touch or purulent drainage at the catheterization site. The patient is instructed not to soak in a bathtub for 7-10 days, but is cleared to shower. Discharge Exam:Patient has been seen by Dr. Alexander Roblero: see his progress note for exam details. Visit Vitals  /64 (BP 1 Location: Left arm, BP Patient Position: At rest)   Pulse 90   Temp 98.3 °F (36.8 °C)   Resp 16   Ht 5' 3\" (1.6 m)   Wt 122.5 kg (270 lb)   SpO2 93%   Breastfeeding?  No   BMI 47.83 kg/m²         Recent Results (from the past 24 hour(s))   CBC W/O DIFF    Collection Time: 11/26/18  9:14 AM   Result Value Ref Range    WBC 5.7 4.3 - 11.1 K/uL    RBC 4.59 4.05 - 5.2 M/uL    HGB 13.8 11.7 - 15.4 g/dL    HCT 42.1 35.8 - 46.3 %    MCV 91.7 79.6 - 97.8 FL    MCH 30.1 26.1 - 32.9 PG    MCHC 32.8 31.4 - 35.0 g/dL    RDW 14.6 %    PLATELET 467 540 - 335 K/uL    MPV 11.0 9.4 - 12.3 FL    ABSOLUTE NRBC 0.00 0.0 - 0.2 K/uL   MAGNESIUM    Collection Time: 11/26/18  9:14 AM   Result Value Ref Range    Magnesium 1.9 1.8 - 2.4 mg/dL   METABOLIC PANEL, BASIC    Collection Time: 11/26/18  9:14 AM   Result Value Ref Range    Sodium 140 136 - 145 mmol/L    Potassium 3.6 3.5 - 5.1 mmol/L    Chloride 107 98 - 107 mmol/L    CO2 25 21 - 32 mmol/L    Anion gap 8 7 - 16 mmol/L    Glucose 99 65 - 100 mg/dL    BUN 16 8 - 23 MG/DL    Creatinine 0.98 0.6 - 1.0 MG/DL    GFR est AA >60 >60 ml/min/1.73m2    GFR est non-AA >60 >60 ml/min/1.73m2    Calcium 9.2 8.3 - 10.4 MG/DL   PROTHROMBIN TIME + INR    Collection Time: 11/26/18  9:14 AM   Result Value Ref Range    Prothrombin time 12.9 11.5 - 14.5 sec    INR 1.0     EKG, 12 LEAD, INITIAL    Collection Time: 11/26/18  9:30 AM   Result Value Ref Range    Ventricular Rate 92 BPM Atrial Rate 375 BPM    QRS Duration 100 ms    Q-T Interval 364 ms    QTC Calculation (Bezet) 450 ms    Calculated T Axis 89 degrees    Diagnosis       Atrial fibrillation  Nonspecific T wave abnormality  Abnormal ECG  When compared with ECG of 18-SEP-2018 15:51,  Atrial fibrillation has replaced Sinus rhythm  Confirmed by SHANNON DYER (), Jesus Garcia ([id number]) on 11/26/2018 10:00:46 AM     POC ACTIVATED CLOTTING TIME    Collection Time: 11/26/18 11:52 AM   Result Value Ref Range    Activated Clotting Time (POC) 351 (H) 70 - 128 SECS   POC ACTIVATED CLOTTING TIME    Collection Time: 11/26/18 12:22 PM   Result Value Ref Range    Activated Clotting Time (POC) 313 (H) 70 - 128 SECS   EKG, 12 LEAD, INITIAL    Collection Time: 11/26/18  1:09 PM   Result Value Ref Range    Ventricular Rate 79 BPM    Atrial Rate 79 BPM    P-R Interval 200 ms    QRS Duration 102 ms    Q-T Interval 340 ms    QTC Calculation (Bezet) 389 ms    Calculated R Axis 6 degrees    Calculated T Axis 108 degrees    Diagnosis       Sinus rhythm with Premature atrial complexes  Nonspecific T wave abnormality  Abnormal ECG  When compared with ECG of 26-NOV-2018 09:30,  Sinus rhythm has replaced Atrial fibrillation  Nonspecific T wave abnormality now evident in Anterior leads  QT has shortened  Confirmed by [facility]  MD ()JOSÉ ([id number]) on 11/26/2018 1:55:06 PM           Patient Instructions:   Current Discharge Medication List      START taking these medications    Details   sucralfate (CARAFATE) 1 gram tablet Take 1 Tab by mouth Before breakfast, lunch, dinner and at bedtime. Qty: 120 Tab, Refills: 0      pantoprazole (PROTONIX) 40 mg tablet Take 1 Tab by mouth every twelve (12) hours. Qty: 60 Tab, Refills: 0         CONTINUE these medications which have NOT CHANGED    Details   Cetirizine (ZYRTEC) 10 mg cap Take  by mouth daily. buPROPion (WELLBUTRIN) 75 mg tablet Take 2 Tabs by mouth two (2) times a day.   Qty: 360 Tab, Refills: 1    Associated Diagnoses: Depression, unspecified depression type      fluticasone-salmeterol (ADVAIR DISKUS) 250-50 mcg/dose diskus inhaler Take 1 Puff by inhalation two (2) times a day. RINSE MOUTH WELL AFTER USE  Qty: 1 Inhaler, Refills: 11      losartan-hydroCHLOROthiazide (HYZAAR) 100-25 mg per tablet Take 1 Tab by mouth daily. Qty: 90 Tab, Refills: 1    Associated Diagnoses: Essential hypertension      montelukast (SINGULAIR) 10 mg tablet Take 1 Tab by mouth daily. Qty: 90 Tab, Refills: 1      apixaban (ELIQUIS) 5 mg tablet Take 1 Tab by mouth two (2) times a day. Qty: 60 Tab, Refills: 5    Associated Diagnoses: Atrial fibrillation, unspecified type (HCC)      dilTIAZem CD (CARDIZEM CD) 240 mg ER capsule Take 1 Cap by mouth daily. Qty: 30 Cap, Refills: 11    Associated Diagnoses: Atrial fibrillation, unspecified type (HCC)      SYNTHROID 137 mcg tablet Take 137 mcg by mouth Daily (before breakfast). Qty: 90 Tab, Refills: 1    Associated Diagnoses: Hypothyroidism due to acquired atrophy of thyroid      acetaminophen (TYLENOL EXTRA STRENGTH) 500 mg tablet Take  by mouth as needed for Pain. Indications: Pain      cpap machine kit by Does Not Apply route.                Signed:  Marta Longoria NP  11/26/2018  11:58 PM

## 2018-11-27 NOTE — PROGRESS NOTES
Verbal bedside report received from [place], [facility]. Assumed care of patient. Bilateral groin sites assessed at bedside with outgoing RN.

## 2018-11-27 NOTE — PROGRESS NOTES
Pt admitted at Observation status. Pt instructed on home medication policy; pt voices understanding. Pt provided copies of the following: Admission pamphlet with Observation insert and Medicare FAQ's. Home meds ordered per MD, verified with [facility] and supplied by patient. No narcotic meds. Medications verified and labeled per pharmacy and placed in locked box in patient's room. The medications received from the patient include eliquis,losartan/hctz,bupropion,montelukast, and synthroid.

## 2018-11-27 NOTE — PROGRESS NOTES
Problem: Breathing Pattern - Ineffective Goal: *Use of effective breathing techniques Outcome: Progressing Towards Goal 
Patient wearing CPAP at night consistently. BS are clear on RA.

## 2018-11-27 NOTE — PROGRESS NOTES
Bedside and Verbal shift change report given to Harish Haq RN (oncoming nurse) by self (offgoing nurse). Report included the following information SBAR, Kardex, Intake/Output, MAR, Recent Results and Med Rec Status. Bilateral groin sites assessed at bedside with oncoming RN.

## 2018-12-17 ENCOUNTER — HOSPITAL ENCOUNTER (OUTPATIENT)
Age: 68
Setting detail: OUTPATIENT SURGERY
End: 2018-12-17
Attending: INTERNAL MEDICINE | Admitting: INTERNAL MEDICINE
Payer: MEDICARE

## 2018-12-18 ENCOUNTER — HOSPITAL ENCOUNTER (OUTPATIENT)
Dept: CARDIAC CATH/INVASIVE PROCEDURES | Age: 68
Discharge: HOME | End: 2018-12-18
Attending: INTERNAL MEDICINE | Admitting: INTERNAL MEDICINE
Payer: MEDICARE

## 2018-12-18 VITALS
OXYGEN SATURATION: 95 % | BODY MASS INDEX: 47.66 KG/M2 | TEMPERATURE: 98.1 F | RESPIRATION RATE: 14 BRPM | HEIGHT: 63 IN | WEIGHT: 269 LBS | DIASTOLIC BLOOD PRESSURE: 59 MMHG | SYSTOLIC BLOOD PRESSURE: 124 MMHG | HEART RATE: 83 BPM

## 2018-12-18 LAB
ANION GAP SERPL CALC-SCNC: 8 MMOL/L (ref 7–16)
ATRIAL RATE: 416 BPM
ATRIAL RATE: 441 BPM
BUN SERPL-MCNC: 17 MG/DL (ref 8–23)
CALCIUM SERPL-MCNC: 9.1 MG/DL (ref 8.3–10.4)
CALCULATED R AXIS, ECG10: 0 DEGREES
CALCULATED R AXIS, ECG10: 8 DEGREES
CALCULATED T AXIS, ECG11: 121 DEGREES
CALCULATED T AXIS, ECG11: 138 DEGREES
CHLORIDE SERPL-SCNC: 104 MMOL/L (ref 98–107)
CO2 SERPL-SCNC: 27 MMOL/L (ref 21–32)
CREAT SERPL-MCNC: 1.11 MG/DL (ref 0.6–1)
DIAGNOSIS, 93000: NORMAL
DIAGNOSIS, 93000: NORMAL
ERYTHROCYTE [DISTWIDTH] IN BLOOD BY AUTOMATED COUNT: 13.5 % (ref 11.9–14.6)
GLUCOSE SERPL-MCNC: 107 MG/DL (ref 65–100)
HCT VFR BLD AUTO: 40.1 % (ref 35.8–46.3)
HGB BLD-MCNC: 13.2 G/DL (ref 11.7–15.4)
INR PPP: 1.2
MAGNESIUM SERPL-MCNC: 1.8 MG/DL (ref 1.8–2.4)
MCH RBC QN AUTO: 30.1 PG (ref 26.1–32.9)
MCHC RBC AUTO-ENTMCNC: 32.9 G/DL (ref 31.4–35)
MCV RBC AUTO: 91.3 FL (ref 79.6–97.8)
NRBC # BLD: 0 K/UL (ref 0–0.2)
PLATELET # BLD AUTO: 265 K/UL (ref 150–450)
PMV BLD AUTO: 10.5 FL (ref 9.4–12.3)
POTASSIUM SERPL-SCNC: 3.1 MMOL/L (ref 3.5–5.1)
PROTHROMBIN TIME: 14.7 SEC (ref 11.7–14.5)
Q-T INTERVAL, ECG07: 368 MS
Q-T INTERVAL, ECG07: 430 MS
QRS DURATION, ECG06: 104 MS
QRS DURATION, ECG06: 112 MS
QTC CALCULATION (BEZET), ECG08: 462 MS
QTC CALCULATION (BEZET), ECG08: 523 MS
RBC # BLD AUTO: 4.39 M/UL (ref 4.05–5.2)
SODIUM SERPL-SCNC: 139 MMOL/L (ref 136–145)
VENTRICULAR RATE, ECG03: 89 BPM
VENTRICULAR RATE, ECG03: 95 BPM
WBC # BLD AUTO: 5.3 K/UL (ref 4.3–11.1)

## 2018-12-18 PROCEDURE — 85610 PROTHROMBIN TIME: CPT

## 2018-12-18 PROCEDURE — 99152 MOD SED SAME PHYS/QHP 5/>YRS: CPT

## 2018-12-18 PROCEDURE — 74011250636 HC RX REV CODE- 250/636

## 2018-12-18 PROCEDURE — 74011250636 HC RX REV CODE- 250/636: Performed by: INTERNAL MEDICINE

## 2018-12-18 PROCEDURE — 85027 COMPLETE CBC AUTOMATED: CPT

## 2018-12-18 PROCEDURE — 92960 CARDIOVERSION ELECTRIC EXT: CPT

## 2018-12-18 PROCEDURE — 77030009397 HC ELECTRD ECG PACE ZOLL -B

## 2018-12-18 PROCEDURE — 83735 ASSAY OF MAGNESIUM: CPT

## 2018-12-18 PROCEDURE — 93005 ELECTROCARDIOGRAM TRACING: CPT | Performed by: INTERNAL MEDICINE

## 2018-12-18 PROCEDURE — 80048 BASIC METABOLIC PNL TOTAL CA: CPT

## 2018-12-18 RX ORDER — FENTANYL CITRATE 50 UG/ML
25-100 INJECTION, SOLUTION INTRAMUSCULAR; INTRAVENOUS
Status: DISCONTINUED | OUTPATIENT
Start: 2018-12-18 | End: 2018-12-18

## 2018-12-18 RX ORDER — SODIUM CHLORIDE 9 MG/ML
75 INJECTION, SOLUTION INTRAVENOUS CONTINUOUS
Status: DISCONTINUED | OUTPATIENT
Start: 2018-12-18 | End: 2018-12-18 | Stop reason: HOSPADM

## 2018-12-18 RX ORDER — MIDAZOLAM HYDROCHLORIDE 1 MG/ML
.5-2 INJECTION, SOLUTION INTRAMUSCULAR; INTRAVENOUS
Status: DISCONTINUED | OUTPATIENT
Start: 2018-12-18 | End: 2018-12-18

## 2018-12-18 RX ADMIN — SODIUM CHLORIDE 75 ML/HR: 900 INJECTION, SOLUTION INTRAVENOUS at 10:43

## 2018-12-18 RX ADMIN — MIDAZOLAM HYDROCHLORIDE 1 MG: 1 INJECTION, SOLUTION INTRAMUSCULAR; INTRAVENOUS at 10:51

## 2018-12-18 RX ADMIN — FENTANYL CITRATE 50 MCG: 50 INJECTION, SOLUTION INTRAMUSCULAR; INTRAVENOUS at 10:46

## 2018-12-18 RX ADMIN — MIDAZOLAM HYDROCHLORIDE 2 MG: 1 INJECTION, SOLUTION INTRAMUSCULAR; INTRAVENOUS at 10:46

## 2018-12-18 RX ADMIN — MIDAZOLAM HYDROCHLORIDE 2 MG: 1 INJECTION, SOLUTION INTRAMUSCULAR; INTRAVENOUS at 10:50

## 2018-12-18 NOTE — PROGRESS NOTES
Patient received to [address] [place] [address] room # 9  Ambulatory from [facility]. Patient scheduled for CVN today with Dr Dustin Patel. Procedure reviewed & questions answered, voiced good understanding consent obtained & placed on chart. All medications and medical history reviewed. Will prep patient per orders. Patient & family updated on plan of care.       The patient has a fraility score of 3-MANAGING WELL, based on reports recent A-fib

## 2018-12-18 NOTE — PROGRESS NOTES
TRANSFER - IN REPORT:    Verbal report received from [address] RN(name) on Lesa Hernandez  being received from cath lab(unit) for routine progression of care      Report consisted of patients Situation, Background, Assessment and   Recommendations(SBAR). Information from the following report(s) Procedure Summary was reviewed with the receiving nurse. Opportunity for questions and clarification was provided. Assessment completed upon patients arrival to unit and care assumed.

## 2018-12-18 NOTE — PROGRESS NOTES
TRANSFER - OUT REPORT:    Verbal report given to Wan Rodriguez RN (name) on Tereso Harris  being transferred to CPRU (unit) for routine progression of care       Report consisted of patients Situation, Background, Assessment and   Recommendations(SBAR). Information from the following report(s) SBAR was reviewed with the receiving nurse. Lines:   Peripheral IV 12/18/18 Anterior; Left Antecubital (Active)        Opportunity for questions and clarification was provided. Pt had CVN with 2 synchronized biphasic defibrillations @ 200J. Pt briefly converted to NSR with PAC's but is now in A fib. Pt tolerated well. Pt received Versed 5mg IV and Fentanyl 50mcg IV.

## 2018-12-18 NOTE — PROGRESS NOTES
Patient pre-assessment complete for Cardioversion with Dr Hossein Dong scheduled for 18 at 11am, arrival time 9am. Patient verified using . Patient instructed to bring all home medications in labeled bottles on the day of procedure. NPO status reinforced. Patient instructed to HOLD losartan/HCTZ in am. Instructed they can take all other medications excluding vitamins & supplements. Patient verbalizes understanding of all instructions & denies any questions at this time.

## 2018-12-18 NOTE — PROCEDURES
Pre-Procedure Diagnosis  1. Atrial fibrilllation     Procedure Performed  1. Direct Current Cardioversion    Estimated Blood Loss: None, not applicable    Procedural Description: The patient was brought to the procedure room in a fasting, nonsedated state. The risks, benefits and alternatives of the procedure were reviewed with the patient, and final questions answered. Informed consent was confirmed. A procedural timeout was called and completed per institutional policy. Once appropriate monitors were applied, fentanyl and versed were given in increments of 1-2mg versed and 25 mcg fentanyl to obtain an appropriate level of conscious sedation with continuous oxygen saturation measurement and blood pressure monitoring at minimal increments of every 5 minutes. Once an appropriate level of sedation was achieved, the patient was converted to sinus rhythm with pads across the anterior and posterior chest wall. The patient awoke from his procedure without overt complications. Post Procedure Diagnosis: Normal sinus rhythm    Deng Ramires MD, MS  Clinical Cardiac Electrophysiology  12/18/2018  12:07 PM

## 2018-12-18 NOTE — DISCHARGE INSTRUCTIONS
Electrical Cardioversion: What to Expect at Home  Your Recovery    Electrical cardioversion is a treatment for an abnormal heartbeat, such as atrial fibrillation, supraventricular tachycardia, or ventricular tachycardia (VT). It uses a brief electrical shock to reset your heart's rhythm. After cardioversion, you may have redness, like a sunburn, where the patches were. The medicines you got to make you sleepy may make you feel drowsy for the rest of the day. Your doctor may have you take medicines to help the heart beat normally and to prevent blood clots. This care sheet gives you a general idea about how long it will take for you to recover. But each person recovers at a different pace. Follow the steps below to feel better as quickly as possible. How can you care for yourself at home? Medicines    · Be safe with medicines. Take your medicines exactly as prescribed. Call your doctor if you think you are having a problem with your medicine. You may take one or more of the following medicines:  ? Rate-control medicines to slow the heart rate. These include beta-blockers, calcium channel blockers, and digoxin. ? Rhythm control medicines that help the heart keep a normal rhythm. ? Blood thinners, also called anticoagulants, which help prevent blood clots. You will get more details on the specific medicines your doctor prescribes. Be sure you know how to take your medicines safely.     · Do not take any vitamins, over-the-counter medicines, or herbal products without talking to your doctor first.   Exercise    · Start light exercise if your doctor says that it is okay. Even a small amount will help you get stronger, have more energy, and manage your stress. Walking is an easy way to get exercise. Start out by walking a little more than you did in the hospital. Bit by bit, increase the amount you walk.     · When you exercise, watch for signs that your heart is working too hard.  You are pushing too hard if you cannot talk while you are exercising. If you become short of breath or dizzy or have chest pain, sit down and rest right away.     · Check your pulse regularly. Place two fingers on the artery at the palm side of your wrist in line with your thumb. If your heartbeat seems uneven or fast, talk to your doctor. Other instructions    · Ask your doctor when you can drive again.     · Do not smoke. If you need help quitting, talk to your doctor about stop-smoking programs and medicines. These can increase your chances of quitting for good.     · Limit alcohol. Follow-up care is a key part of your treatment and safety. Be sure to make and go to all appointments, and call your doctor if you are having problems. It's also a good idea to know your test results and keep a list of the medicines you take. When should you call for help? Call 911 anytime you think you may need emergency care. For example, call if:    · You passed out (lost consciousness).     · You have chest pain or pressure. This may occur with:  ? Sweating. ? Shortness of breath. ? Nausea or vomiting. ? Pain that spreads from the chest to the neck, jaw, or one or both shoulders or arms. ? A fast or uneven pulse. After calling 911, the  may tell you to chew 1 adult-strength or 2 to 4 low-dose aspirin. Wait for an ambulance. Do not try to drive yourself.     · You have symptoms of a stroke. These may include:  ? Sudden numbness, tingling, weakness, or loss of movement in your face, arm, or leg, especially on only one side of your body. ? Sudden vision changes. ? Sudden trouble speaking. ? Sudden confusion or trouble understanding simple statements. ? Sudden problems with walking or balance.   ? A sudden, severe headache that is different from past headaches.    Call your doctor now or seek immediate medical care if:    · You feel dizzy or lightheaded, or you feel like you may faint.     · You have a fast or irregular heartbeat.    Watch closely for any changes in your health, and be sure to contact your doctor if you have any problems. Where can you learn more? Go to http://jerson-rashmi.info/. Enter A617 in the search box to learn more about \"Electrical Cardioversion: What to Expect at Home. \"  Current as of: December 6, 2017  Content Version: 11.8  © 5515-7751 [employer]. Care instructions adapted under license by [employer] (which disclaims liability or warranty for this information). If you have questions about a medical condition or this instruction, always ask your healthcare professional. [place] [address] any warranty or liability for your use of this information.

## 2019-02-14 ENCOUNTER — HOSPITAL ENCOUNTER (OUTPATIENT)
Dept: LAB | Age: 69
Discharge: HOME | End: 2019-02-14
Payer: MEDICARE

## 2019-02-14 DIAGNOSIS — I48.19 PERSISTENT ATRIAL FIBRILLATION (HCC): ICD-10-CM

## 2019-02-14 LAB
ANION GAP SERPL CALC-SCNC: 7 MMOL/L
BASOPHILS # BLD: 0 K/UL (ref 0–0.2)
BASOPHILS NFR BLD: 0 % (ref 0–2)
BUN SERPL-MCNC: 21 MG/DL (ref 8–23)
CALCIUM SERPL-MCNC: 9 MG/DL (ref 8.3–10.4)
CHLORIDE SERPL-SCNC: 103 MMOL/L (ref 98–107)
CO2 SERPL-SCNC: 28 MMOL/L (ref 21–32)
CREAT SERPL-MCNC: 1 MG/DL (ref 0.6–1)
DIFFERENTIAL METHOD BLD: NORMAL
EOSINOPHIL # BLD: 0.1 K/UL (ref 0–0.8)
EOSINOPHIL NFR BLD: 2 % (ref 0.5–7.8)
ERYTHROCYTE [DISTWIDTH] IN BLOOD BY AUTOMATED COUNT: 13.9 % (ref 11.9–14.6)
GLUCOSE SERPL-MCNC: 94 MG/DL (ref 65–100)
HCT VFR BLD AUTO: 44.4 % (ref 35.8–46.3)
HGB BLD-MCNC: 14.4 G/DL (ref 11.7–15.4)
IMM GRANULOCYTES # BLD AUTO: 0 K/UL (ref 0–0.5)
IMM GRANULOCYTES NFR BLD AUTO: 0 % (ref 0–5)
LYMPHOCYTES # BLD: 1.4 K/UL (ref 0.5–4.6)
LYMPHOCYTES NFR BLD: 18 % (ref 13–44)
MAGNESIUM SERPL-MCNC: 2 MG/DL (ref 1.8–2.4)
MCH RBC QN AUTO: 30.3 PG (ref 26.1–32.9)
MCHC RBC AUTO-ENTMCNC: 32.4 G/DL (ref 31.4–35)
MCV RBC AUTO: 93.3 FL (ref 79.6–97.8)
MONOCYTES # BLD: 0.6 K/UL (ref 0.1–1.3)
MONOCYTES NFR BLD: 8 % (ref 4–12)
NEUTS SEG # BLD: 5.4 K/UL (ref 1.7–8.2)
NEUTS SEG NFR BLD: 72 % (ref 43–78)
NRBC # BLD: 0 K/UL (ref 0–0.2)
PLATELET # BLD AUTO: 207 K/UL (ref 150–450)
PMV BLD AUTO: 10.2 FL (ref 9.4–12.3)
POTASSIUM SERPL-SCNC: 3.4 MMOL/L (ref 3.5–5.1)
RBC # BLD AUTO: 4.76 M/UL (ref 4.05–5.2)
SODIUM SERPL-SCNC: 138 MMOL/L (ref 136–145)
WBC # BLD AUTO: 7.5 K/UL (ref 4.3–11.1)

## 2019-02-14 PROCEDURE — 85025 COMPLETE CBC W/AUTO DIFF WBC: CPT

## 2019-02-14 PROCEDURE — 80048 BASIC METABOLIC PNL TOTAL CA: CPT

## 2019-02-14 PROCEDURE — 83735 ASSAY OF MAGNESIUM: CPT

## 2019-02-14 PROCEDURE — 36415 COLL VENOUS BLD VENIPUNCTURE: CPT

## 2019-02-17 ENCOUNTER — ANESTHESIA EVENT (OUTPATIENT)
Dept: SURGERY | Age: 69
DRG: 274 | End: 2019-02-17
Payer: MEDICARE

## 2019-02-18 ENCOUNTER — ANESTHESIA (OUTPATIENT)
Dept: SURGERY | Age: 69
DRG: 274 | End: 2019-02-18
Payer: MEDICARE

## 2019-02-18 ENCOUNTER — HOSPITAL ENCOUNTER (INPATIENT)
Age: 69
LOS: 3 days | Discharge: HOME | DRG: 274 | End: 2019-02-21
Attending: INTERNAL MEDICINE | Admitting: INTERNAL MEDICINE
Payer: MEDICARE

## 2019-02-18 ENCOUNTER — APPOINTMENT (OUTPATIENT)
Dept: CARDIAC CATH/INVASIVE PROCEDURES | Age: 69
DRG: 274 | End: 2019-02-18
Payer: MEDICARE

## 2019-02-18 ENCOUNTER — APPOINTMENT (OUTPATIENT)
Dept: GENERAL RADIOLOGY | Age: 69
DRG: 274 | End: 2019-02-18
Attending: INTERNAL MEDICINE
Payer: MEDICARE

## 2019-02-18 LAB
ACT BLD: 323 SECS (ref 70–128)
ACT BLD: 373 SECS (ref 70–128)
ACT BLD: 389 SECS (ref 70–128)
ANION GAP SERPL CALC-SCNC: 8 MMOL/L (ref 7–16)
ATRIAL RATE: 241 BPM
ATRIAL RATE: 87 BPM
BUN SERPL-MCNC: 22 MG/DL (ref 8–23)
CALCIUM SERPL-MCNC: 9 MG/DL (ref 8.3–10.4)
CALCULATED P AXIS, ECG09: 58 DEGREES
CALCULATED R AXIS, ECG10: 0 DEGREES
CALCULATED R AXIS, ECG10: 9 DEGREES
CALCULATED T AXIS, ECG11: 79 DEGREES
CALCULATED T AXIS, ECG11: 93 DEGREES
CHLORIDE SERPL-SCNC: 106 MMOL/L (ref 98–107)
CO2 SERPL-SCNC: 25 MMOL/L (ref 21–32)
CREAT SERPL-MCNC: 1.12 MG/DL (ref 0.6–1)
DIAGNOSIS, 93000: NORMAL
DIAGNOSIS, 93000: NORMAL
ERYTHROCYTE [DISTWIDTH] IN BLOOD BY AUTOMATED COUNT: 14.2 % (ref 11.9–14.6)
GLUCOSE SERPL-MCNC: 102 MG/DL (ref 65–100)
HCT VFR BLD AUTO: 43.2 % (ref 35.8–46.3)
HGB BLD-MCNC: 14.2 G/DL (ref 11.7–15.4)
INR PPP: 1
MAGNESIUM SERPL-MCNC: 1.9 MG/DL (ref 1.8–2.4)
MCH RBC QN AUTO: 30.3 PG (ref 26.1–32.9)
MCHC RBC AUTO-ENTMCNC: 32.9 G/DL (ref 31.4–35)
MCV RBC AUTO: 92.1 FL (ref 79.6–97.8)
NRBC # BLD: 0 K/UL (ref 0–0.2)
P-R INTERVAL, ECG05: 226 MS
PLATELET # BLD AUTO: 222 K/UL (ref 150–450)
PMV BLD AUTO: 10.5 FL (ref 9.4–12.3)
POTASSIUM SERPL-SCNC: 3.4 MMOL/L (ref 3.5–5.1)
PROTHROMBIN TIME: 12.8 SEC (ref 11.7–14.5)
Q-T INTERVAL, ECG07: 352 MS
Q-T INTERVAL, ECG07: 388 MS
QRS DURATION, ECG06: 100 MS
QRS DURATION, ECG06: 104 MS
QTC CALCULATION (BEZET), ECG08: 458 MS
QTC CALCULATION (BEZET), ECG08: 466 MS
RBC # BLD AUTO: 4.69 M/UL (ref 4.05–5.2)
SODIUM SERPL-SCNC: 139 MMOL/L (ref 136–145)
VENTRICULAR RATE, ECG03: 102 BPM
VENTRICULAR RATE, ECG03: 87 BPM
WBC # BLD AUTO: 6.8 K/UL (ref 4.3–11.1)

## 2019-02-18 PROCEDURE — 94640 AIRWAY INHALATION TREATMENT: CPT

## 2019-02-18 PROCEDURE — 93657 TX L/R ATRIAL FIB ADDL: CPT

## 2019-02-18 PROCEDURE — C1894 INTRO/SHEATH, NON-LASER: HCPCS

## 2019-02-18 PROCEDURE — 77030037088 HC TUBE ENDOTRACH ORAL NSL COVD-A: Performed by: ANESTHESIOLOGY

## 2019-02-18 PROCEDURE — 94761 N-INVAS EAR/PLS OXIMETRY MLT: CPT

## 2019-02-18 PROCEDURE — 77030020506 HC NDL TRNSPTL NRG BAYL -F

## 2019-02-18 PROCEDURE — C1732 CATH, EP, DIAG/ABL, 3D/VECT: HCPCS

## 2019-02-18 PROCEDURE — 77030035291 HC TBNG PMP SMARTABLATE J&J -B

## 2019-02-18 PROCEDURE — 5A2204Z RESTORATION OF CARDIAC RHYTHM, SINGLE: ICD-10-PCS | Performed by: INTERNAL MEDICINE

## 2019-02-18 PROCEDURE — 02K83ZZ MAP CONDUCTION MECHANISM, PERCUTANEOUS APPROACH: ICD-10-PCS | Performed by: INTERNAL MEDICINE

## 2019-02-18 PROCEDURE — 93613 INTRACARDIAC EPHYS 3D MAPG: CPT

## 2019-02-18 PROCEDURE — 93655 ICAR CATH ABLTJ DSCRT ARRHYT: CPT

## 2019-02-18 PROCEDURE — 74011250636 HC RX REV CODE- 250/636: Performed by: ANESTHESIOLOGY

## 2019-02-18 PROCEDURE — 76210000063 HC OR PH I REC FIRST 0.5 HR: Performed by: INTERNAL MEDICINE

## 2019-02-18 PROCEDURE — 65660000000 HC RM CCU STEPDOWN

## 2019-02-18 PROCEDURE — 4A023FZ MEASUREMENT OF CARDIAC RHYTHM, PERCUTANEOUS APPROACH: ICD-10-PCS | Performed by: INTERNAL MEDICINE

## 2019-02-18 PROCEDURE — 85027 COMPLETE CBC AUTOMATED: CPT

## 2019-02-18 PROCEDURE — 93005 ELECTROCARDIOGRAM TRACING: CPT | Performed by: INTERNAL MEDICINE

## 2019-02-18 PROCEDURE — 76060000036 HC ANESTHESIA 2.5 TO 3 HR: Performed by: INTERNAL MEDICINE

## 2019-02-18 PROCEDURE — B24BZZ4 ULTRASONOGRAPHY OF HEART WITH AORTA, TRANSESOPHAGEAL: ICD-10-PCS | Performed by: INTERNAL MEDICINE

## 2019-02-18 PROCEDURE — 02583ZZ DESTRUCTION OF CONDUCTION MECHANISM, PERCUTANEOUS APPROACH: ICD-10-PCS | Performed by: INTERNAL MEDICINE

## 2019-02-18 PROCEDURE — 83735 ASSAY OF MAGNESIUM: CPT

## 2019-02-18 PROCEDURE — 74011000250 HC RX REV CODE- 250

## 2019-02-18 PROCEDURE — 93656 COMPRE EP EVAL ABLTJ ATR FIB: CPT

## 2019-02-18 PROCEDURE — 77030019908 HC STETH ESOPH SIMS -A: Performed by: ANESTHESIOLOGY

## 2019-02-18 PROCEDURE — 92960 CARDIOVERSION ELECTRIC EXT: CPT

## 2019-02-18 PROCEDURE — 74011250637 HC RX REV CODE- 250/637: Performed by: INTERNAL MEDICINE

## 2019-02-18 PROCEDURE — 93622 COMP EP EVAL L VENTR PAC&REC: CPT

## 2019-02-18 PROCEDURE — 80048 BASIC METABOLIC PNL TOTAL CA: CPT

## 2019-02-18 PROCEDURE — 74011250636 HC RX REV CODE- 250/636

## 2019-02-18 PROCEDURE — 71045 X-RAY EXAM CHEST 1 VIEW: CPT

## 2019-02-18 PROCEDURE — 74011000250 HC RX REV CODE- 250: Performed by: INTERNAL MEDICINE

## 2019-02-18 PROCEDURE — 77010033678 HC OXYGEN DAILY

## 2019-02-18 PROCEDURE — C1733 CATH, EP, OTHR THAN COOL-TIP: HCPCS

## 2019-02-18 PROCEDURE — C1893 INTRO/SHEATH, FIXED,NON-PEEL: HCPCS

## 2019-02-18 PROCEDURE — 93662 INTRACARDIAC ECG (ICE): CPT

## 2019-02-18 PROCEDURE — 85347 COAGULATION TIME ACTIVATED: CPT

## 2019-02-18 PROCEDURE — 77030027107 HC PTCH EXT REF CARTO3 J&J -F

## 2019-02-18 PROCEDURE — 77030039425 HC BLD LARYNG TRULITE DISP TELE -A: Performed by: ANESTHESIOLOGY

## 2019-02-18 PROCEDURE — 93312 ECHO TRANSESOPHAGEAL: CPT

## 2019-02-18 PROCEDURE — 85610 PROTHROMBIN TIME: CPT

## 2019-02-18 PROCEDURE — C1759 CATH, INTRA ECHOCARDIOGRAPHY: HCPCS

## 2019-02-18 RX ORDER — LEVOTHYROXINE SODIUM 150 UG/1
150 TABLET ORAL
Status: DISCONTINUED | OUTPATIENT
Start: 2019-02-19 | End: 2019-02-21 | Stop reason: HOSPADM

## 2019-02-18 RX ORDER — SODIUM CHLORIDE 0.9 % (FLUSH) 0.9 %
5-40 SYRINGE (ML) INJECTION EVERY 8 HOURS
Status: DISCONTINUED | OUTPATIENT
Start: 2019-02-18 | End: 2019-02-21 | Stop reason: HOSPADM

## 2019-02-18 RX ORDER — POTASSIUM CHLORIDE 20 MEQ/1
40 TABLET, EXTENDED RELEASE ORAL
Status: COMPLETED | OUTPATIENT
Start: 2019-02-18 | End: 2019-02-18

## 2019-02-18 RX ORDER — ONDANSETRON 2 MG/ML
INJECTION INTRAMUSCULAR; INTRAVENOUS AS NEEDED
Status: DISCONTINUED | OUTPATIENT
Start: 2019-02-18 | End: 2019-02-18 | Stop reason: HOSPADM

## 2019-02-18 RX ORDER — PROTAMINE SULFATE 10 MG/ML
INJECTION, SOLUTION INTRAVENOUS AS NEEDED
Status: DISCONTINUED | OUTPATIENT
Start: 2019-02-18 | End: 2019-02-18 | Stop reason: HOSPADM

## 2019-02-18 RX ORDER — NEOSTIGMINE METHYLSULFATE 1 MG/ML
INJECTION INTRAVENOUS AS NEEDED
Status: DISCONTINUED | OUTPATIENT
Start: 2019-02-18 | End: 2019-02-18 | Stop reason: HOSPADM

## 2019-02-18 RX ORDER — HYDROCODONE BITARTRATE AND ACETAMINOPHEN 5; 325 MG/1; MG/1
1 TABLET ORAL
Status: DISCONTINUED | OUTPATIENT
Start: 2019-02-18 | End: 2019-02-21 | Stop reason: HOSPADM

## 2019-02-18 RX ORDER — DOFETILIDE 0.5 MG/1
500 CAPSULE ORAL EVERY 12 HOURS
Status: DISCONTINUED | OUTPATIENT
Start: 2019-02-18 | End: 2019-02-21 | Stop reason: HOSPADM

## 2019-02-18 RX ORDER — SUCRALFATE 1 G/1
1 TABLET ORAL
Status: DISCONTINUED | OUTPATIENT
Start: 2019-02-18 | End: 2019-02-21 | Stop reason: HOSPADM

## 2019-02-18 RX ORDER — MIDAZOLAM HYDROCHLORIDE 1 MG/ML
2 INJECTION, SOLUTION INTRAMUSCULAR; INTRAVENOUS
Status: DISCONTINUED | OUTPATIENT
Start: 2019-02-18 | End: 2019-02-18 | Stop reason: HOSPADM

## 2019-02-18 RX ORDER — LOSARTAN POTASSIUM 50 MG/1
100 TABLET ORAL DAILY
Status: DISCONTINUED | OUTPATIENT
Start: 2019-02-19 | End: 2019-02-21 | Stop reason: HOSPADM

## 2019-02-18 RX ORDER — BUPROPION HYDROCHLORIDE 75 MG/1
150 TABLET ORAL 2 TIMES DAILY
Status: DISCONTINUED | OUTPATIENT
Start: 2019-02-18 | End: 2019-02-21 | Stop reason: HOSPADM

## 2019-02-18 RX ORDER — ACETAMINOPHEN 500 MG
500 TABLET ORAL ONCE
Status: DISCONTINUED | OUTPATIENT
Start: 2019-02-18 | End: 2019-02-18 | Stop reason: HOSPADM

## 2019-02-18 RX ORDER — SODIUM CHLORIDE 0.9 % (FLUSH) 0.9 %
5-40 SYRINGE (ML) INJECTION AS NEEDED
Status: DISCONTINUED | OUTPATIENT
Start: 2019-02-18 | End: 2019-02-21 | Stop reason: HOSPADM

## 2019-02-18 RX ORDER — LORATADINE 10 MG/1
10 TABLET ORAL DAILY
Status: DISCONTINUED | OUTPATIENT
Start: 2019-02-19 | End: 2019-02-21 | Stop reason: HOSPADM

## 2019-02-18 RX ORDER — MONTELUKAST SODIUM 10 MG/1
10 TABLET ORAL DAILY
Status: DISCONTINUED | OUTPATIENT
Start: 2019-02-18 | End: 2019-02-21 | Stop reason: HOSPADM

## 2019-02-18 RX ORDER — HEPARIN SODIUM 5000 [USP'U]/100ML
INJECTION, SOLUTION INTRAVENOUS
Status: DISCONTINUED | OUTPATIENT
Start: 2019-02-18 | End: 2019-02-18 | Stop reason: HOSPADM

## 2019-02-18 RX ORDER — SODIUM CHLORIDE, SODIUM LACTATE, POTASSIUM CHLORIDE, CALCIUM CHLORIDE 600; 310; 30; 20 MG/100ML; MG/100ML; MG/100ML; MG/100ML
75 INJECTION, SOLUTION INTRAVENOUS CONTINUOUS
Status: DISCONTINUED | OUTPATIENT
Start: 2019-02-18 | End: 2019-02-18 | Stop reason: HOSPADM

## 2019-02-18 RX ORDER — HYDROMORPHONE HYDROCHLORIDE 2 MG/ML
0.5 INJECTION, SOLUTION INTRAMUSCULAR; INTRAVENOUS; SUBCUTANEOUS
Status: DISCONTINUED | OUTPATIENT
Start: 2019-02-18 | End: 2019-02-18 | Stop reason: HOSPADM

## 2019-02-18 RX ORDER — DIPHENHYDRAMINE HCL 25 MG
25 CAPSULE ORAL
Status: DISCONTINUED | OUTPATIENT
Start: 2019-02-18 | End: 2019-02-21 | Stop reason: HOSPADM

## 2019-02-18 RX ORDER — BUDESONIDE 0.5 MG/2ML
500 INHALANT ORAL
Status: DISCONTINUED | OUTPATIENT
Start: 2019-02-18 | End: 2019-02-21 | Stop reason: HOSPADM

## 2019-02-18 RX ORDER — HEPARIN SODIUM 1000 [USP'U]/ML
INJECTION, SOLUTION INTRAVENOUS; SUBCUTANEOUS AS NEEDED
Status: DISCONTINUED | OUTPATIENT
Start: 2019-02-18 | End: 2019-02-18 | Stop reason: HOSPADM

## 2019-02-18 RX ORDER — DIPHENHYDRAMINE HYDROCHLORIDE 50 MG/ML
12.5 INJECTION, SOLUTION INTRAMUSCULAR; INTRAVENOUS ONCE
Status: DISCONTINUED | OUTPATIENT
Start: 2019-02-18 | End: 2019-02-18 | Stop reason: HOSPADM

## 2019-02-18 RX ORDER — LIDOCAINE HYDROCHLORIDE 10 MG/ML
0.1 INJECTION INFILTRATION; PERINEURAL AS NEEDED
Status: DISCONTINUED | OUTPATIENT
Start: 2019-02-18 | End: 2019-02-18 | Stop reason: HOSPADM

## 2019-02-18 RX ORDER — PANTOPRAZOLE SODIUM 40 MG/1
40 TABLET, DELAYED RELEASE ORAL EVERY 12 HOURS
Status: DISCONTINUED | OUTPATIENT
Start: 2019-02-18 | End: 2019-02-21 | Stop reason: HOSPADM

## 2019-02-18 RX ORDER — PROPOFOL 10 MG/ML
INJECTION, EMULSION INTRAVENOUS AS NEEDED
Status: DISCONTINUED | OUTPATIENT
Start: 2019-02-18 | End: 2019-02-18 | Stop reason: HOSPADM

## 2019-02-18 RX ORDER — OXYCODONE HYDROCHLORIDE 5 MG/1
10 TABLET ORAL
Status: DISCONTINUED | OUTPATIENT
Start: 2019-02-18 | End: 2019-02-18 | Stop reason: HOSPADM

## 2019-02-18 RX ORDER — DEXAMETHASONE SODIUM PHOSPHATE 4 MG/ML
INJECTION, SOLUTION INTRA-ARTICULAR; INTRALESIONAL; INTRAMUSCULAR; INTRAVENOUS; SOFT TISSUE AS NEEDED
Status: DISCONTINUED | OUTPATIENT
Start: 2019-02-18 | End: 2019-02-18 | Stop reason: HOSPADM

## 2019-02-18 RX ORDER — NALOXONE HYDROCHLORIDE 0.4 MG/ML
0.1 INJECTION, SOLUTION INTRAMUSCULAR; INTRAVENOUS; SUBCUTANEOUS AS NEEDED
Status: DISCONTINUED | OUTPATIENT
Start: 2019-02-18 | End: 2019-02-18 | Stop reason: HOSPADM

## 2019-02-18 RX ORDER — SODIUM CHLORIDE, SODIUM LACTATE, POTASSIUM CHLORIDE, CALCIUM CHLORIDE 600; 310; 30; 20 MG/100ML; MG/100ML; MG/100ML; MG/100ML
1000 INJECTION, SOLUTION INTRAVENOUS CONTINUOUS
Status: DISCONTINUED | OUTPATIENT
Start: 2019-02-18 | End: 2019-02-18 | Stop reason: HOSPADM

## 2019-02-18 RX ORDER — GLYCOPYRROLATE 0.2 MG/ML
INJECTION INTRAMUSCULAR; INTRAVENOUS AS NEEDED
Status: DISCONTINUED | OUTPATIENT
Start: 2019-02-18 | End: 2019-02-18 | Stop reason: HOSPADM

## 2019-02-18 RX ORDER — ONDANSETRON 2 MG/ML
4 INJECTION INTRAMUSCULAR; INTRAVENOUS ONCE
Status: DISCONTINUED | OUTPATIENT
Start: 2019-02-18 | End: 2019-02-18 | Stop reason: HOSPADM

## 2019-02-18 RX ORDER — ALBUTEROL SULFATE 0.83 MG/ML
2.5 SOLUTION RESPIRATORY (INHALATION)
Status: DISCONTINUED | OUTPATIENT
Start: 2019-02-18 | End: 2019-02-21 | Stop reason: HOSPADM

## 2019-02-18 RX ORDER — FENTANYL CITRATE 50 UG/ML
INJECTION, SOLUTION INTRAMUSCULAR; INTRAVENOUS AS NEEDED
Status: DISCONTINUED | OUTPATIENT
Start: 2019-02-18 | End: 2019-02-18 | Stop reason: HOSPADM

## 2019-02-18 RX ORDER — ACETAMINOPHEN 325 MG/1
650 TABLET ORAL
Status: DISCONTINUED | OUTPATIENT
Start: 2019-02-18 | End: 2019-02-21 | Stop reason: HOSPADM

## 2019-02-18 RX ORDER — ROCURONIUM BROMIDE 10 MG/ML
INJECTION, SOLUTION INTRAVENOUS AS NEEDED
Status: DISCONTINUED | OUTPATIENT
Start: 2019-02-18 | End: 2019-02-18 | Stop reason: HOSPADM

## 2019-02-18 RX ORDER — FENTANYL CITRATE 50 UG/ML
100 INJECTION, SOLUTION INTRAMUSCULAR; INTRAVENOUS AS NEEDED
Status: DISCONTINUED | OUTPATIENT
Start: 2019-02-18 | End: 2019-02-18 | Stop reason: HOSPADM

## 2019-02-18 RX ORDER — OXYCODONE HYDROCHLORIDE 5 MG/1
5 TABLET ORAL
Status: DISCONTINUED | OUTPATIENT
Start: 2019-02-18 | End: 2019-02-18 | Stop reason: HOSPADM

## 2019-02-18 RX ORDER — FLUTICASONE PROPIONATE 50 MCG
2 SPRAY, SUSPENSION (ML) NASAL DAILY
Status: DISCONTINUED | OUTPATIENT
Start: 2019-02-18 | End: 2019-02-21 | Stop reason: HOSPADM

## 2019-02-18 RX ORDER — LANOLIN ALCOHOL/MO/W.PET/CERES
400 CREAM (GRAM) TOPICAL ONCE
Status: COMPLETED | OUTPATIENT
Start: 2019-02-18 | End: 2019-02-18

## 2019-02-18 RX ORDER — LIDOCAINE HYDROCHLORIDE 20 MG/ML
INJECTION, SOLUTION EPIDURAL; INFILTRATION; INTRACAUDAL; PERINEURAL AS NEEDED
Status: DISCONTINUED | OUTPATIENT
Start: 2019-02-18 | End: 2019-02-18 | Stop reason: HOSPADM

## 2019-02-18 RX ADMIN — PANTOPRAZOLE SODIUM 40 MG: 40 TABLET, DELAYED RELEASE ORAL at 20:01

## 2019-02-18 RX ADMIN — BUPROPION HYDROCHLORIDE 150 MG: 75 TABLET, FILM COATED ORAL at 17:25

## 2019-02-18 RX ADMIN — Medication 400 MG: at 16:09

## 2019-02-18 RX ADMIN — ROCURONIUM BROMIDE 50 MG: 10 INJECTION, SOLUTION INTRAVENOUS at 07:43

## 2019-02-18 RX ADMIN — SUCRALFATE 1 G: 1 TABLET ORAL at 16:09

## 2019-02-18 RX ADMIN — HEPARIN SODIUM 11000 UNITS: 1000 INJECTION, SOLUTION INTRAVENOUS; SUBCUTANEOUS at 08:31

## 2019-02-18 RX ADMIN — SODIUM CHLORIDE, SODIUM LACTATE, POTASSIUM CHLORIDE, AND CALCIUM CHLORIDE: 600; 310; 30; 20 INJECTION, SOLUTION INTRAVENOUS at 07:33

## 2019-02-18 RX ADMIN — FENTANYL CITRATE 50 MCG: 50 INJECTION, SOLUTION INTRAMUSCULAR; INTRAVENOUS at 07:43

## 2019-02-18 RX ADMIN — LIDOCAINE HYDROCHLORIDE 80 MG: 20 INJECTION, SOLUTION EPIDURAL; INFILTRATION; INTRACAUDAL; PERINEURAL at 07:43

## 2019-02-18 RX ADMIN — ONDANSETRON 4 MG: 2 INJECTION INTRAMUSCULAR; INTRAVENOUS at 10:06

## 2019-02-18 RX ADMIN — APIXABAN 5 MG: 5 TABLET, FILM COATED ORAL at 17:25

## 2019-02-18 RX ADMIN — HEPARIN SODIUM 40 UNITS/KG/HR: 5000 INJECTION, SOLUTION INTRAVENOUS at 08:39

## 2019-02-18 RX ADMIN — SUCRALFATE 1 G: 1 TABLET ORAL at 20:01

## 2019-02-18 RX ADMIN — ALBUTEROL SULFATE 2.5 MG: 2.5 SOLUTION RESPIRATORY (INHALATION) at 20:59

## 2019-02-18 RX ADMIN — DOFETILIDE 500 MCG: 0.5 CAPSULE ORAL at 20:01

## 2019-02-18 RX ADMIN — DEXAMETHASONE SODIUM PHOSPHATE 4 MG: 4 INJECTION, SOLUTION INTRA-ARTICULAR; INTRALESIONAL; INTRAMUSCULAR; INTRAVENOUS; SOFT TISSUE at 10:06

## 2019-02-18 RX ADMIN — GLYCOPYRROLATE 0.4 MG: 0.2 INJECTION INTRAMUSCULAR; INTRAVENOUS at 10:10

## 2019-02-18 RX ADMIN — BUDESONIDE 500 MCG: 0.5 INHALANT RESPIRATORY (INHALATION) at 20:59

## 2019-02-18 RX ADMIN — Medication 10 ML: at 20:03

## 2019-02-18 RX ADMIN — HEPARIN SODIUM 10000 UNITS: 1000 INJECTION, SOLUTION INTRAVENOUS; SUBCUTANEOUS at 08:18

## 2019-02-18 RX ADMIN — PROTAMINE SULFATE 100 MG: 10 INJECTION, SOLUTION INTRAVENOUS at 10:05

## 2019-02-18 RX ADMIN — PANTOPRAZOLE SODIUM 40 MG: 40 TABLET, DELAYED RELEASE ORAL at 12:29

## 2019-02-18 RX ADMIN — POTASSIUM CHLORIDE 40 MEQ: 20 TABLET, EXTENDED RELEASE ORAL at 16:09

## 2019-02-18 RX ADMIN — NEOSTIGMINE METHYLSULFATE 3 MG: 1 INJECTION INTRAVENOUS at 10:10

## 2019-02-18 RX ADMIN — Medication 10 ML: at 13:33

## 2019-02-18 RX ADMIN — FENTANYL CITRATE 50 MCG: 50 INJECTION, SOLUTION INTRAMUSCULAR; INTRAVENOUS at 08:21

## 2019-02-18 RX ADMIN — ALBUTEROL SULFATE 2.5 MG: 2.5 SOLUTION RESPIRATORY (INHALATION) at 15:21

## 2019-02-18 RX ADMIN — PROPOFOL 150 MG: 10 INJECTION, EMULSION INTRAVENOUS at 07:43

## 2019-02-18 NOTE — PROGRESS NOTES
Problem: Afib Pathway: Day 1 Goal: Activity/Safety Outcome: Resolved/Met Date Met: 02/18/19 Pt tolerated 4 hrs bedrest well, bilateral groin sites WDL, no bleeding/no hematoma Pt assisted OOB, tolerated well - instructed to call for assistance, call bell within reach!

## 2019-02-18 NOTE — H&P
[facility] Cardiology/Electrophyiology Consult Date of  Admission: 2/18/2019  6:04 AM  
 
CC/Reason for admission: afib ablation Arsalan Pennr is a 79 y.o. female admitted for Paroxysmal atrial fibrillation ([facility] [I48.0]. 79 y.o. female with a past medical and cardiac history significant for atrial fibrillation s/p ablation and failed DCCV and amiodarone presents today for afib ablation. Pt reports she was noted to have irregular heart rates while seeing her PCP and was found to be in afib. Pt was also diagnosed with thyroid problems at that time. She was treated for her thyroid and ultimately underwent DCCV on amiodarone and NSR lasted only about 1 week. She reports feeling tired, low energy, and shortness of breath with her afib. Pt has symptomatic persistent atrial fibrillation which is a newly recognized problem, uncontrolled, no aggravating or alleviating factors, with symptoms as noted above.  
  
Pt presented to the office s/p afib ablation and recurrent AF s/p failed DCCV on amiodarone. Pt feels the same, cont fatigue and some shortness of breath. Cardiac PMH: (Old records have been reviewed and summarized below) TTE normal EF, dilated LA Patient Active Problem List  
Diagnosis Code  Essential hypertension I10  
 Mixed hyperlipidemia E78.2  Allergic rhinitis due to allergen J30.9  Primary osteoarthritis involving multiple joints M15.0  Depression F32.9  Obesity E66.9  
 PRAVEEN (obstructive sleep apnea) G47.33  
 Edema R60.9  Morbid obesity (HCC) E66.01  
 Anxiety F41.9  Osteoarthritis of right knee M17.11  
 Allergic rhinitis J30.9  Hypothyroid E03.9  Arthritis M19.90  Arrhythmia I49.9  Hypertension I10  
 VSD (ventricular septal defect) Q21.0  Preoperative clearance Z01.818  Dyspnea on exertion R06.09  
 Scoliosis M41.9  Endocarditis I38  
 OA (osteoarthritis) of knee M17.10  Persistent atrial fibrillation (HCC) I48.1  Cold sore B00.1  Morbid obesity with BMI of 45.0-49.9, adult ([facility]) E66.01, Z68.42  
 Hypothyroidism E03.9  Persistent asthma [id number]  A-fib ([facility] I48.91 Past Medical History:  
Diagnosis Date  Allergic rhinitis  Anxiety  Arrhythmia   
 hx: [facility] May 28, 2015: sinus rhythm noted on ekg 12-5-17  Arthritis  Asthma  Depression   
 under control with med  Endocarditis 1992  
 hx 1992  
 Heart murmur   
 congenital, asymptomatic per cardiologist note ; echo 7-25-16  Hematuria  History of MRSA infection on left breast  
 5/2016 : treated/ resolved  HLD (hyperlipidemia)  Hypertension  Hypothyroid   
 medication  Hypothyroidism due to acquired atrophy of thyroid 4/28/2015  Intertrigo   
 mytrex cream  
 Irregular heart beat   
 hx only; current - regular rate/ rhythm  Mass of colon  Morbid obesity ([facility] 48.7 bmi  Reactive airway disease with status asthmaticus   
 hx only  Scoliosis 8/4/2016  Sleep apnea   
 cpap complaint.  Unspecified adverse effect of anesthesia   
 slow to awaken in pacu: pt reports prior to using c-pap  Varicose veins  VSD (ventricular septal defect) 07/22/2016  
 per cardiologist note 8-1-17: small, restictive, no change Past Surgical History:  
Procedure Laterality Date  CARDIAC SURG PROCEDURE UNLIST    
 cardioversion / ablation  HX APPENDECTOMY  HX BREAST REDUCTION Bilateral 8/2/2016 BREAST REDUCTION/ bilateral performed by Smith Alberto MD at [address]    
 x2  
 HX COLECTOMY Right 2010 8 in colon removed  HX COLONOSCOPY  2010, 2015  HX DILATION AND CURETTAGE    
 multiple  HX HEART CATHETERIZATION    
 heart cath age 5  
 HX HEENT    
 jaw  HX HEENT    
 lasik  HX HERNIA REPAIR  incisional [id number]  
 with mesh  HX IMPLANTABLE CARDIOVERTER DEFIBRILLATOR  12/18/2018  HX KNEE REPLACEMENT Right 01/29/2018  HX LIPOMA RESECTION Right   
 right foot between toes  HX ORTHOPAEDIC  due to underbite  
 jaw surgery Allergies Allergen Reactions  Adhesive Tape-Silicones Rash Adhesives for gel pads used for cardioversion/ablation caused redness & itching  Ceclor [Cefaclor] Rash  Symbyax [Olanzapine-Fluoxetine] Other (comments) Causes Severe pain Family History Problem Relation Age of Onset  Cancer Mother LYMPHOMA  Hypertension Mother  Breast Cancer Mother  Heart Disease Father   
     heart attacks---bypass surg  Hypertension Father  Heart Disease Brother  Heart Disease Brother  Colon Cancer Maternal Uncle Current Facility-Administered Medications Medication Dose Route Frequency  lidocaine (XYLOCAINE) 10 mg/mL (1 %) injection 0.1 mL  0.1 mL SubCUTAneous PRN  
 lactated Ringers infusion 1,000 mL  1,000 mL IntraVENous CONTINUOUS  
 lactated ringers bolus infusion 500 mL  500 mL IntraVENous ONCE  
 fentaNYL citrate (PF) injection 100 mcg  100 mcg IntraVENous PRN  
 midazolam (VERSED) injection 2 mg  2 mg IntraVENous ONCE PRN Review of Symptoms: A comprehensive ROS was performed with the pertinent positives and negatives mentioned in the HPI, all other systems reviewed and are negative Physical Exam 
There were no vitals filed for this visit. Physical Exam: 
  
Gen: well appearing, well developed, NAD Eyes: Pupils equal, EOMI 
ENT: oropharynx clear, no oral lesions, normal dentition CV: irreg irreg, normal rate, no M/R/G, normal JVD, normal distal pulses, no FIDEL Pulm: CTAB, no accessory muscle uses, no wheezes, crackles GI: soft, NT, ND Cardiographics Telemetry:  
ECG (Indpendently visualized and interpreted): 
Echocardiogram:  
 
Labs: No results for input(s): NA, K, MG, BUN, CREA, GLU, WBC, HGB, HCT, PLT, INR, TRIGL, LDL, HDL, HGBEXT, HCTEXT, PLTEXT, HGBEXT, HCTEXT, PLTEXT in the last 72 hours. No lab exists for component: TROPI, TCHOL, INREXT, INREXT Assessment:  
  
Active Problems: 
  A-fib ([address] (11/26/2018) Plan: 1. Persistent atrial fibrillation, uncontrolled: Dave Kaur is a 79 y.o. female with symptomatic persistent atrial fibrillation failing amiodarone therapy, now failing afib ablation with PVI on amiodarone and failing DCCV post procedure and presents with continued persistient atrial fibrillation for afib ablation. Answered all questions and concerns and patient wishes to proceed. --afib ablation 
     --stop amiodarone 
     --plan for tikosyn loading post procedure Kathy Ramires MD, MS 
Cardiology/Electrophysiology

## 2019-02-18 NOTE — PROGRESS NOTES
Patient received to [address] [place] [address] room # 9  Ambulatory from [facility]. Patient scheduled for Fib Ablation/DOMINGA today with Dr Kelli Austin. Procedure reviewed & questions answered, voiced good understanding consent obtained & placed on chart. All medications and medical history reviewed. Will prep patient per orders. Patient & family updated on plan of care. The patient has a fraility score of 3-Managing Well based on independent of Ambulation/ADLs/increased symptoms with exertion.

## 2019-02-18 NOTE — PROGRESS NOTES
Care Management Interventions PCP Verified by CM: Yes Mode of Transport at Discharge: Other (see comment)(family) Transition of Care Consult (CM Consult): Discharge Planning Current Support Network: Own Home, Lives with Spouse Confirm Follow Up Transport: Family Plan discussed with Pt/Family/Caregiver: Yes Freedom of Choice Offered: Yes Discharge Location Discharge Placement: Home This CM met with pt at bedside this day. Pt verified her PCP, insurance, emergency contact, and home address. Pt reports no difficulty obtaining her medication in the community. Pt confirms that she lives at home with spouse in 1 story home with no steps to enter. Her home DME includes a cane, walker, BSC, and oxygen and CPAP. Pts oxygen and CPAP is ordered through [address] [place] [address]. She reports that at baseline she is independent with her ADLs including bathing, dressing, cooking, and driving. She has hx of home services including [facility] and outpatient therapy at [facility]. Discharge plan at this time per pt is to return home with spouse. No additional needs at this time. Will continue to follow.

## 2019-02-18 NOTE — PROGRESS NOTES
TRANSFER - IN REPORT: 
 
Verbal report received from LINDA Brar on Dave Kaur being received from PACU for routine progression of care. Report consisted of patients Situation, Background, Assessment and Recommendations(SBAR). Information from the following reports was reviewed: Kardex, Procedure Summary, MAR and Recent Results. Opportunity for questions and clarification was provided. Patient received to room [phone] and connected to telemetry monitor. Assessment completed and plan of care reviewed. bilateral venous groin with gauze and tegaderm with suture, bilateral sites dry and intact without hematoma. BP cycling every 15 minutes. Patient oriented to room and call light. Patient voiced understanding of bedrest. Patient voiced understanding to use call light to communicate needs. Admission skin assessment completed with second RN and reveals the following: sacrum visualized, skin intact, no breakdown noted. Small rash appearance to BLE below knee - extends from shin to feet, pt unaware of \"rash\" denies pain or discomfort. Bilateral groin sites WDL - pt educated on bedrest s/p ablation.

## 2019-02-18 NOTE — ANESTHESIA POSTPROCEDURE EVALUATION
Procedure(s):  AFIB  ABLATION.     Anesthesia Post Evaluation        Patient location during evaluation: bedside  Patient participation: complete - patient participated  Level of consciousness: responsive to verbal stimuli  Pain management: satisfactory to patient  Airway patency: patent  Anesthetic complications: no  Cardiovascular status: hemodynamically stable  Respiratory status: spontaneous ventilation  Hydration status: stable        Visit Vitals  BP (P) 129/71 (BP 1 Location: Right arm, BP Patient Position: At rest)   Pulse 85   Temp 36.3 °C (97.4 °F)   Resp 12   Ht 5' 3\" (1.6 m)   Wt 122.5 kg (270 lb)   SpO2 95%   BMI 47.83 kg/m²

## 2019-02-18 NOTE — PROCEDURES
Pre-Electrophysiology Diagnosis  1. Persistent atrial fibrillation failing antiarrhythmic therapy  2. Atrial flutter failing antiarrhythmic therapy     Procedure Performed  1. EPS afib ablation/pulmonary vein isolation  2. Left atrial pacing recording from the coronary sinus. 3. 3-D Electroanatomical mapping  4. Intracardiac echo  5. Ablation of second arrhythmia  6. LV pacing and recording  7. Transesophageal echo  8. Drug infusion  9. Cardioversion  10. Ablation of additional linear lines x 2    Anesthesia: General     Estimated Blood Loss: Less than 10 mL     Specimens: * No specimens in log *    Procedure in Detail:  The patient was brought to the electrophysiology lab in the fasting state. The patient was intubated by anesthesiology and an esophageal temperature probe inserted and advanced to a location directly posterior to the LA at the level of the pulmonary veins. The esophageal temperature probe was repositioned throughout the case to a location as close the the ablation catheter as possible. If the esophageal temperature increased 0.5 degrees Celsius ablation was stopped until the temperature returned to baseline. A tranesophageal echocardiogram was performed directly prior to the procedure and was negative for a ARMAND thrombus (see full report in chart). A Ref-Star CARTO patch was placed, the patient was then prepped and draped in sterile fashion. Venous access was obtained under ultrasound guidance x4 using modified Seldinger technique, with placement of one 8Fr short sidearm sheath and two 8.5 Fr SL-O 63cm long braided sheaths in the right femoral vein and placement of a 10Fr sheath into the left femoral vein. The patient presented to the EP lab in atrial fibrillation and underwent DCCV with pads across the anterior and posterior chest, however, immediately reverted back into afib (several attempts made).  An intra-cardiac echo probe was prepped, and then inserted into an 10 Fr short sheath and used to localize the fossa ovalis and create LA and pulmonary vein anatomy utilizing CARTO Sound technology. A Biosense Leach Pentaray catheter was then inserted into an 8.5 SLO Fr sheath and used to create an electroanatomical map of the right atrium, including attempts at His localization and the os of the CS. Then a Smart Touch porous irrigated BW 3.5mm ablation catheter was used to map out the body of the CS. A decapolar Biosense Leach CS catheter was inserted via an 8Fr sheath and positioned in the mid coronary sinus. Next, a trans-septal needle was inserted into the SLO and was used to perform a trans-septal puncture with assistance from ICE (intra-cardiac echo) as well as the [address] [place] [address],[address] map and the right atrial impedence map. The SLO sheath was advanced into the LA. Total weight based heparin bolus was administered just after transeptal access, and systemic blood pressure monitored invasively. The patient was then placed on our heparin weight based nomogram for targeted ACT of 300-350 during the ablation procedure. A second trans-septal access was obtained with the ablation catheter. The Pentaray catheter was then inserted into the LA via the SL-O sheath and was used to create a detail electroanatomical voltage map of the left atrium including the pulmonary veins to identify the pulmonary vein sleeves and further guide additional ablation. The Pentaray was used to map pulmonary vein potentials and target ablation. An 8 Fr, 3.5mm tip saline irrigated bidirectional D/F curve Thermo-cool SmartTouch catheter was used for RF ablation and ablation was performed at 40W unless ablation was in the proximity of the esophagus where ablation was performed at 25W. Antral pulmonary vein isolation was then confirmed for all 4 pulmonary veins. Additional posterior wall isolation was performed to extend each antral pulmonary vein isolation.  Entrance and exit block was demonstrated by left atrial pacing and within the pulmonary veins. Lack of any conducted atrial EGMs into the pulmonary veins was documented. The patient remained in atrial fibrillation. Additional linear ablation was performed along the LA roof and floor to obtain posterior wall isolation. The patient had extensive ectopic activity within the posterior wall after isolation. The ablation catheter was inserted into the LV, documented LV electrograms and was used to pace the LV for the EP study. Post PVI and posterior wall islation, the Pentaray was used to perform a second LA voltage map post ablation to demonstrate pulmonary vein isolation and post ablation voltage as pictured below. The patient was having incessant PACs from the CS ostium which were mapped and additional ablation was performed at the CS ostium. Post Ablation LA Voltage Map      Cavotricuspid Isthmus ablation (right atrial flutter)  There was evidence of CTI reconnection. Linear ablation across the cavo-tricuspid isthmus was performed starting with 1:2 A:V EGMs along the isthmus at 6pm Maur[place]. The local electrogram activation sequence, differential pacing maneuvers and electrogram timing was used to demonstrate bidirectional block along the cavotricuspid isthmus. Post-Ablation trans-isthmus time: 181 msec    Next, baseline recordings and a diagnostic EP study was performed. The coronary sinus multipolar catheter was used to pace the left atrium during the EP study. The LA CS electrograms were documented and interpreted during the procedure. A comprehensive EP study was performed with 1:1 AV decremental pacing, atrial extrastimuli and ventricular pacing to assess retrograde conduction. The patient did not have sustained slow pathway conduction or evidence of an accessory pathway.  Ventricular pacing revealed retrograde AV conduction which was concentric and decremental.    At the completion of the ablation and EPS, all catheters were removed, 100mg Protamine was administered and sheaths were pulled after placing a figure of 8 stitch. The patient tolerated the procedure well with no acute complications recognized. The patient left the EP lab in stable condition, in normal sinus rhythm. Just prior to pulling shealths, the ICE catheter was used to obtain ultrasound images and revealed no evidence of pericardial effusion. Baseline Intervals    QRS duration: 91 msec  OK interval: 200 msec  RR interval: 907 msec  AH interval: 155 msec  HV interval: 45 msec    EP Study    AV Wenchebach: 410 msec  AV lissette ERP: < or equal to 320 msec  VA Wenchebach: 751 msec    Complications: None    Summary:   1. Successful pulmonary vein isolation x4.  2. Creation of a line of bidirectional block at the cavotricuspid isthmus. 3. LA posterior wall isolation. 4. Ablation of AT at the CS ostium. 5.         Comprehensive EP study. 6. Pt tolerated the procedure well. 7. Family updated. Yassine Ramires MD, MS  Clinical Cardiac Electrophysiology  2/18/2019  10:13 AM

## 2019-02-18 NOTE — PROGRESS NOTES
baseline prior to Tikosyn load Verified with Dr. Jj Cadet - ok to admin medication as ordered Reviewed recent labwork with MD including:  
K+ 3.4 Mag 1.9 - telephone orders received for replacement, see MAR for administration Will continue to monitor

## 2019-02-18 NOTE — PERIOP NOTES
TRANSFER - OUT REPORT: 
 
Verbal report given to [address] [place] [address] [employer] (name) on Ros Vela  being transferred to [phone] (unit) for routine post - op Report consisted of patients Situation, Background, Assessment and  
Recommendations(SBAR). Information from the following report(s) SBAR, OR Summary and Procedure Summary was reviewed with the receiving nurse. Lines:  
Peripheral IV 02/18/19 Left Wrist (Active) Site Assessment Clean, dry, & intact 2/18/2019 10:26 AM  
Phlebitis Assessment 0 2/18/2019 10:26 AM  
Infiltration Assessment 0 2/18/2019 10:26 AM  
Dressing Status Clean, dry, & intact 2/18/2019 10:26 AM  
Dressing Type Tape;Transparent 2/18/2019 10:26 AM  
Hub Color/Line Status Pink; Infusing 2/18/2019 10:26 AM  
   
Peripheral IV 02/18/19 Posterior;Right Hand (Active) Site Assessment Clean, dry, & intact 2/18/2019 10:26 AM  
Phlebitis Assessment 0 2/18/2019 10:26 AM  
Infiltration Assessment 0 2/18/2019 10:26 AM  
Dressing Status Clean, dry, & intact 2/18/2019 10:26 AM  
Dressing Type Tape;Transparent 2/18/2019 10:26 AM  
Hub Color/Line Status Pink;Capped 2/18/2019 10:26 AM  
  
 
Opportunity for questions and clarification was provided. Patient transported with: 
 O2 @ 4 liters, O2 at 4L, Monitor, RN 
 
VTE prophylaxis orders have been written for Ros Vela. Patient and family given floor number and nurses name. Family updated re: pt status after security code verified.

## 2019-02-19 LAB
ANION GAP SERPL CALC-SCNC: 5 MMOL/L (ref 7–16)
ATRIAL RATE: 81 BPM
ATRIAL RATE: 86 BPM
BUN SERPL-MCNC: 14 MG/DL (ref 8–23)
CALCIUM SERPL-MCNC: 8.5 MG/DL (ref 8.3–10.4)
CALCULATED P AXIS, ECG09: -18 DEGREES
CALCULATED P AXIS, ECG09: 25 DEGREES
CALCULATED R AXIS, ECG10: -4 DEGREES
CALCULATED R AXIS, ECG10: -5 DEGREES
CALCULATED T AXIS, ECG11: 31 DEGREES
CALCULATED T AXIS, ECG11: 58 DEGREES
CHLORIDE SERPL-SCNC: 106 MMOL/L (ref 98–107)
CO2 SERPL-SCNC: 28 MMOL/L (ref 21–32)
CREAT SERPL-MCNC: 0.82 MG/DL (ref 0.6–1)
DIAGNOSIS, 93000: NORMAL
DIAGNOSIS, 93000: NORMAL
GLUCOSE SERPL-MCNC: 113 MG/DL (ref 65–100)
MAGNESIUM SERPL-MCNC: 2.1 MG/DL (ref 1.8–2.4)
P-R INTERVAL, ECG05: 114 MS
P-R INTERVAL, ECG05: 206 MS
POTASSIUM SERPL-SCNC: 4.8 MMOL/L (ref 3.5–5.1)
Q-T INTERVAL, ECG07: 370 MS
Q-T INTERVAL, ECG07: 388 MS
QRS DURATION, ECG06: 102 MS
QRS DURATION, ECG06: 106 MS
QTC CALCULATION (BEZET), ECG08: 442 MS
QTC CALCULATION (BEZET), ECG08: 450 MS
SODIUM SERPL-SCNC: 139 MMOL/L (ref 136–145)
VENTRICULAR RATE, ECG03: 81 BPM
VENTRICULAR RATE, ECG03: 86 BPM

## 2019-02-19 PROCEDURE — 93005 ELECTROCARDIOGRAM TRACING: CPT | Performed by: INTERNAL MEDICINE

## 2019-02-19 PROCEDURE — 94640 AIRWAY INHALATION TREATMENT: CPT

## 2019-02-19 PROCEDURE — 74011250637 HC RX REV CODE- 250/637: Performed by: INTERNAL MEDICINE

## 2019-02-19 PROCEDURE — 83735 ASSAY OF MAGNESIUM: CPT

## 2019-02-19 PROCEDURE — 74011000250 HC RX REV CODE- 250: Performed by: INTERNAL MEDICINE

## 2019-02-19 PROCEDURE — 36415 COLL VENOUS BLD VENIPUNCTURE: CPT

## 2019-02-19 PROCEDURE — 65660000000 HC RM CCU STEPDOWN

## 2019-02-19 PROCEDURE — 94760 N-INVAS EAR/PLS OXIMETRY 1: CPT

## 2019-02-19 PROCEDURE — 80048 BASIC METABOLIC PNL TOTAL CA: CPT

## 2019-02-19 RX ADMIN — SUCRALFATE 1 G: 1 TABLET ORAL at 20:00

## 2019-02-19 RX ADMIN — BUDESONIDE 500 MCG: 0.5 INHALANT RESPIRATORY (INHALATION) at 07:13

## 2019-02-19 RX ADMIN — ALBUTEROL SULFATE 2.5 MG: 2.5 SOLUTION RESPIRATORY (INHALATION) at 07:13

## 2019-02-19 RX ADMIN — Medication 10 ML: at 20:02

## 2019-02-19 RX ADMIN — Medication 10 ML: at 05:41

## 2019-02-19 RX ADMIN — DOFETILIDE 500 MCG: 0.5 CAPSULE ORAL at 20:00

## 2019-02-19 RX ADMIN — BUPROPION HYDROCHLORIDE 150 MG: 75 TABLET, FILM COATED ORAL at 07:56

## 2019-02-19 RX ADMIN — LOSARTAN POTASSIUM 100 MG: 50 TABLET ORAL at 07:57

## 2019-02-19 RX ADMIN — ALBUTEROL SULFATE 2.5 MG: 2.5 SOLUTION RESPIRATORY (INHALATION) at 13:46

## 2019-02-19 RX ADMIN — LORATADINE 10 MG: 10 TABLET ORAL at 07:56

## 2019-02-19 RX ADMIN — SUCRALFATE 1 G: 1 TABLET ORAL at 11:11

## 2019-02-19 RX ADMIN — PANTOPRAZOLE SODIUM 40 MG: 40 TABLET, DELAYED RELEASE ORAL at 20:00

## 2019-02-19 RX ADMIN — BUPROPION HYDROCHLORIDE 150 MG: 75 TABLET, FILM COATED ORAL at 17:13

## 2019-02-19 RX ADMIN — DOFETILIDE 500 MCG: 0.5 CAPSULE ORAL at 07:55

## 2019-02-19 RX ADMIN — SUCRALFATE 1 G: 1 TABLET ORAL at 07:14

## 2019-02-19 RX ADMIN — PANTOPRAZOLE SODIUM 40 MG: 40 TABLET, DELAYED RELEASE ORAL at 07:57

## 2019-02-19 RX ADMIN — MONTELUKAST SODIUM 10 MG: 10 TABLET, FILM COATED ORAL at 07:56

## 2019-02-19 RX ADMIN — SUCRALFATE 1 G: 1 TABLET ORAL at 15:41

## 2019-02-19 RX ADMIN — ALBUTEROL SULFATE 2.5 MG: 2.5 SOLUTION RESPIRATORY (INHALATION) at 19:35

## 2019-02-19 RX ADMIN — BUDESONIDE 500 MCG: 0.5 INHALANT RESPIRATORY (INHALATION) at 19:35

## 2019-02-19 RX ADMIN — APIXABAN 5 MG: 5 TABLET, FILM COATED ORAL at 07:57

## 2019-02-19 RX ADMIN — Medication 10 ML: at 14:56

## 2019-02-19 RX ADMIN — APIXABAN 5 MG: 5 TABLET, FILM COATED ORAL at 17:13

## 2019-02-19 RX ADMIN — LEVOTHYROXINE SODIUM 150 MCG: 150 TABLET ORAL at 05:42

## 2019-02-19 NOTE — PROGRESS NOTES
Problem: Falls - Risk of 
Goal: *Absence of Falls Document Therese Verdugo Fall Risk and appropriate interventions in the flowsheet. Outcome: Progressing Towards Goal 
Fall Risk Interventions: 
  
 
  
 
Medication Interventions: Evaluate medications/consider consulting pharmacy, Patient to call before getting OOB Elimination Interventions: Bed/chair exit alarm, Patient to call for help with toileting needs

## 2019-02-19 NOTE — PROGRESS NOTES
Bedside and Verbal shift change report given to self (oncoming nurse) by Laurie Vargas (offgoing nurse). Report included the following information SBAR, Kardex, Intake/Output, MAR and Recent Results.

## 2019-02-19 NOTE — PROGRESS NOTES
02/19/19 9056 Oxygen Therapy O2 Sat (%) 91 % Pulse via Oximetry 75 beats per minute O2 Device Room air Pre-Treatment Breathing Pattern Regular Cough Non-productive Breath Sounds Bilateral Clear;Diminished Pulse 77 SpO2 91 % Respirations 16 Post-Treatment Breathing Pattern Regular Cough Non-productive Breath Sounds Bilateral Clear;Diminished Pulse 79 SpO2 96 % Respirations 16 Treatment Tolerance Well Procedures  
$$ Subsequent Procedure Aerosol Delivery Source Breath Actuated Nebulizer Aerosolized Medications Albuterol;Pulmicort Patient is clear with slow deep RR on Room Air

## 2019-02-19 NOTE — PROGRESS NOTES
Bedside and Verbal shift change report given to Vince Lockett (oncoming nurse) by self (offgoing nurse). Report included the following information SBAR, Kardex, Intake/Output, MAR and Recent Results.

## 2019-02-19 NOTE — PROGRESS NOTES
Bilateral groin sutures removed per protocol, sites cleaned with CHG wipes, covered with gauze and Tegaderm. Clean, dry, and intact. Soft to touch. No bleeding or hematoma noted.

## 2019-02-19 NOTE — PROGRESS NOTES
[facility] CARDIOLOGY PROGRESS NOTE 
      
 
2/19/2019 6:54 AM 
 
Admit Date: 2/18/2019 Admit Diagnosis: Paroxysmal atrial fibrillation ([facility] [I48.0]; A-fib ([facility] [I48.91] Subjective: No complaints this AM, no chest pain or shortness of breath Interval History: (History of pertinent interval events obtained from nursing staff) S/p cardiac ablation yesterday without immediate complications ROS: 
GEN:  No fever or chills Cardiovascular:  As noted above Pulmonary:  As noted above Neuro:  No new focal motor or sensory loss Objective:  
 
Vitals:  
 02/18/19 2230 02/18/19 2303 02/19/19 0330 02/19/19 0409 BP: 136/83   134/65 Pulse: 88   80 Resp: 18   18 Temp: 97.7 °F (36.5 °C)   97.8 °F (36.6 °C) SpO2: 94% 92%  92% Weight:   126.4 kg (278 lb 9.6 oz) Height:   5' 3\" (1.6 m) Physical Exam: 
Hildegard Hope, Well Nourished, No Acute Distress, Alert & Oriented x 3, appropriate mood. Neck- supple, no JVD 
CV- regular rate and rhythm no MRG Lung- clear bilaterally Abd- soft, nontender, nondistended Ext- no edema bilaterally, B/L femoral access sites without hematoma or bruits Skin- warm and dry Current Facility-Administered Medications Medication Dose Route Frequency  albuterol (PROVENTIL VENTOLIN) nebulizer solution 2.5 mg  2.5 mg Nebulization Q6H PRN  
 apixaban (ELIQUIS) tablet 5 mg  5 mg Oral BID  buPROPion [facility]) tablet 150 mg  150 mg Oral BID  loratadine (CLARITIN) tablet 10 mg  10 mg Oral DAILY  levothyroxine (SYNTHROID) tablet 150 mcg  150 mcg Oral ACB  fluticasone (FLONASE) 50 mcg/actuation nasal spray 2 Spray  2 Spray Both Nostrils DAILY  montelukast (SINGULAIR) tablet 10 mg  10 mg Oral DAILY  sodium chloride (NS) flush 5-40 mL  5-40 mL IntraVENous Q8H  
 sodium chloride (NS) flush 5-40 mL  5-40 mL IntraVENous PRN  
 acetaminophen (TYLENOL) tablet 650 mg  650 mg Oral Q4H PRN  
  HYDROcodone-acetaminophen (NORCO) 5-325 mg per tablet 1 Tab  1 Tab Oral Q4H PRN  pantoprazole (PROTONIX) tablet 40 mg  40 mg Oral Q12H  
 sucralfate (CARAFATE) tablet 1 g  1 g Oral AC&HS  diphenhydrAMINE (BENADRYL) capsule 25 mg  25 mg Oral Q6H PRN  
 budesonide (PULMICORT) 500 mcg/2 ml nebulizer suspension  500 mcg Nebulization BID RT And  
 albuterol (PROVENTIL VENTOLIN) nebulizer solution 2.5 mg  2.5 mg Nebulization Q6HWA RT  
 dofetilide (TIKOSYN) capsule 500 mcg  500 mcg Oral Q12H  
 losartan (COZAAR) tablet 100 mg  100 mg Oral DAILY Data Review:  
Recent Results (from the past 24 hour(s)) EKG, 12 LEAD, INITIAL Collection Time: 02/18/19  7:08 AM  
Result Value Ref Range Ventricular Rate 102 BPM  
 Atrial Rate 241 BPM  
 QRS Duration 104 ms Q-T Interval 352 ms QTC Calculation (Bezet) 458 ms Calculated R Axis 0 degrees Calculated T Axis 93 degrees Diagnosis Atrial fibrillation with rapid ventricular response Nonspecific T wave abnormality Abnormal ECG When compared with ECG of 18-DEC-2018 11:02, Nonspecific T wave abnormality no longer evident in Anterior leads QT has shortened Confirmed by Cristhian Luna MD (), Fatoumata Galvan ([id number]) on 2/18/2019 7:35:28 AM 
  
POC ACTIVATED CLOTTING TIME Collection Time: 02/18/19  8:50 AM  
Result Value Ref Range Activated Clotting Time (POC) 389 (H) 70 - 128 SECS  
POC ACTIVATED CLOTTING TIME Collection Time: 02/18/19  9:19 AM  
Result Value Ref Range Activated Clotting Time (POC) 373 (H) 70 - 128 SECS  
POC ACTIVATED CLOTTING TIME Collection Time: 02/18/19  9:56 AM  
Result Value Ref Range Activated Clotting Time (POC) 323 (H) 70 - 128 SECS  
EKG, 12 LEAD, INITIAL Collection Time: 02/18/19 10:44 AM  
Result Value Ref Range Ventricular Rate 87 BPM  
 Atrial Rate 87 BPM  
 P-R Interval 226 ms  
 QRS Duration 100 ms Q-T Interval 388 ms QTC Calculation (Bezet) 466 ms Calculated P Axis 58 degrees Calculated R Axis 9 degrees Calculated T Axis 79 degrees Diagnosis Sinus rhythm with 1st degree A-V block with Premature atrial complexes Septal infarct , age undetermined Abnormal ECG When compared with ECG of 18-FEB-2019 07:08, Sinus rhythm has replaced Atrial fibrillation Septal infarct is now Present Confirmed by Marisol Rudd MD (), Devonna Collet ([phone]) on 2/18/2019 11:09:59 AM 
  
EKG, 12 LEAD, INITIAL Collection Time: 02/18/19  9:55 PM  
Result Value Ref Range Ventricular Rate 86 BPM  
 Atrial Rate 86 BPM  
 P-R Interval 206 ms QRS Duration 102 ms Q-T Interval 370 ms QTC Calculation (Bezet) 442 ms Calculated P Axis 25 degrees Calculated R Axis -4 degrees Calculated T Axis 58 degrees Diagnosis Normal sinus rhythm Nonspecific T wave abnormality Abnormal ECG When compared with ECG of 18-FEB-2019 10:44, 
Premature atrial complexes are no longer Present Criteria for Septal infarct are no longer Present Confirmed by Nissa Hutchinson ([id number]) on 2/19/2019 1:26:54 AM 
  
METABOLIC PANEL, BASIC Collection Time: 02/19/19  4:15 AM  
Result Value Ref Range Sodium 139 136 - 145 mmol/L Potassium 4.8 3.5 - 5.1 mmol/L Chloride 106 98 - 107 mmol/L  
 CO2 28 21 - 32 mmol/L Anion gap 5 (L) 7 - 16 mmol/L Glucose 113 (H) 65 - 100 mg/dL BUN 14 8 - 23 MG/DL Creatinine 0.82 0.6 - 1.0 MG/DL  
 GFR est AA >60 >60 ml/min/1.73m2 GFR est non-AA >60 >60 ml/min/1.73m2 Calcium 8.5 8.3 - 10.4 MG/DL MAGNESIUM Collection Time: 02/19/19  4:15 AM  
Result Value Ref Range Magnesium 2.1 1.8 - 2.4 mg/dL EKG:  (EKG has been independently visualized by me with interpretation below) NSR, NSST, QTc 442 msec Assessment:  
 
Active Problems: 
  A-fib ([address] (11/26/2018) Plan: 1. afib:  Pt underwent cardiac ablation for afib yesterday without immediate complication, no questions or concerns this AM, planned admission post procedure for tikosyn loading 2. Tikosyn: QTc acceptable, cont telemetry and post dose ECGs per protocol 3. CVA protection: grayson Ramires MD 
Cardiology/Electrophysiology

## 2019-02-19 NOTE — PROGRESS NOTES
Bedside shift report given to Jennifer Gutiérrez RN (oncoming nurse) by Julio C Malhotra RN (offgoing nurse). Bedside shift report included the following information: SBAR, Kardex, ED Summary, MAR, and Recent Results.

## 2019-02-20 LAB
ANION GAP SERPL CALC-SCNC: 6 MMOL/L (ref 7–16)
ATRIAL RATE: 74 BPM
ATRIAL RATE: 82 BPM
BUN SERPL-MCNC: 16 MG/DL (ref 8–23)
CALCIUM SERPL-MCNC: 8.3 MG/DL (ref 8.3–10.4)
CALCULATED P AXIS, ECG09: 83 DEGREES
CALCULATED R AXIS, ECG10: 11 DEGREES
CALCULATED R AXIS, ECG10: 3 DEGREES
CALCULATED T AXIS, ECG11: 52 DEGREES
CALCULATED T AXIS, ECG11: 75 DEGREES
CHLORIDE SERPL-SCNC: 108 MMOL/L (ref 98–107)
CO2 SERPL-SCNC: 28 MMOL/L (ref 21–32)
CREAT SERPL-MCNC: 0.85 MG/DL (ref 0.6–1)
DIAGNOSIS, 93000: NORMAL
DIAGNOSIS, 93000: NORMAL
GLUCOSE SERPL-MCNC: 115 MG/DL (ref 65–100)
MAGNESIUM SERPL-MCNC: 2 MG/DL (ref 1.8–2.4)
P-R INTERVAL, ECG05: 106 MS
P-R INTERVAL, ECG05: 182 MS
POTASSIUM SERPL-SCNC: 3.8 MMOL/L (ref 3.5–5.1)
Q-T INTERVAL, ECG07: 388 MS
Q-T INTERVAL, ECG07: 410 MS
QRS DURATION, ECG06: 110 MS
QRS DURATION, ECG06: 112 MS
QTC CALCULATION (BEZET), ECG08: 430 MS
QTC CALCULATION (BEZET), ECG08: 479 MS
SODIUM SERPL-SCNC: 142 MMOL/L (ref 136–145)
VENTRICULAR RATE, ECG03: 74 BPM
VENTRICULAR RATE, ECG03: 82 BPM

## 2019-02-20 PROCEDURE — 65660000000 HC RM CCU STEPDOWN

## 2019-02-20 PROCEDURE — 93005 ELECTROCARDIOGRAM TRACING: CPT | Performed by: INTERNAL MEDICINE

## 2019-02-20 PROCEDURE — 74011000250 HC RX REV CODE- 250: Performed by: INTERNAL MEDICINE

## 2019-02-20 PROCEDURE — 74011250637 HC RX REV CODE- 250/637: Performed by: INTERNAL MEDICINE

## 2019-02-20 PROCEDURE — 94640 AIRWAY INHALATION TREATMENT: CPT

## 2019-02-20 PROCEDURE — 94760 N-INVAS EAR/PLS OXIMETRY 1: CPT

## 2019-02-20 PROCEDURE — 80048 BASIC METABOLIC PNL TOTAL CA: CPT

## 2019-02-20 PROCEDURE — 36415 COLL VENOUS BLD VENIPUNCTURE: CPT

## 2019-02-20 PROCEDURE — 83735 ASSAY OF MAGNESIUM: CPT

## 2019-02-20 RX ADMIN — DOFETILIDE 500 MCG: 0.5 CAPSULE ORAL at 08:00

## 2019-02-20 RX ADMIN — SUCRALFATE 1 G: 1 TABLET ORAL at 07:18

## 2019-02-20 RX ADMIN — BUPROPION HYDROCHLORIDE 150 MG: 75 TABLET, FILM COATED ORAL at 08:01

## 2019-02-20 RX ADMIN — LEVOTHYROXINE SODIUM 150 MCG: 150 TABLET ORAL at 06:01

## 2019-02-20 RX ADMIN — PANTOPRAZOLE SODIUM 40 MG: 40 TABLET, DELAYED RELEASE ORAL at 08:01

## 2019-02-20 RX ADMIN — Medication 10 ML: at 22:00

## 2019-02-20 RX ADMIN — BUDESONIDE 500 MCG: 0.5 INHALANT RESPIRATORY (INHALATION) at 08:36

## 2019-02-20 RX ADMIN — ALBUTEROL SULFATE 2.5 MG: 2.5 SOLUTION RESPIRATORY (INHALATION) at 21:52

## 2019-02-20 RX ADMIN — Medication 10 ML: at 06:01

## 2019-02-20 RX ADMIN — SUCRALFATE 1 G: 1 TABLET ORAL at 19:54

## 2019-02-20 RX ADMIN — ALBUTEROL SULFATE 2.5 MG: 2.5 SOLUTION RESPIRATORY (INHALATION) at 13:54

## 2019-02-20 RX ADMIN — LORATADINE 10 MG: 10 TABLET ORAL at 08:01

## 2019-02-20 RX ADMIN — SUCRALFATE 1 G: 1 TABLET ORAL at 15:35

## 2019-02-20 RX ADMIN — MONTELUKAST SODIUM 10 MG: 10 TABLET, FILM COATED ORAL at 08:06

## 2019-02-20 RX ADMIN — PANTOPRAZOLE SODIUM 40 MG: 40 TABLET, DELAYED RELEASE ORAL at 19:54

## 2019-02-20 RX ADMIN — BUPROPION HYDROCHLORIDE 150 MG: 75 TABLET, FILM COATED ORAL at 17:12

## 2019-02-20 RX ADMIN — SUCRALFATE 1 G: 1 TABLET ORAL at 11:25

## 2019-02-20 RX ADMIN — BUDESONIDE 500 MCG: 0.5 INHALANT RESPIRATORY (INHALATION) at 21:52

## 2019-02-20 RX ADMIN — DOFETILIDE 500 MCG: 0.5 CAPSULE ORAL at 19:54

## 2019-02-20 RX ADMIN — ALBUTEROL SULFATE 2.5 MG: 2.5 SOLUTION RESPIRATORY (INHALATION) at 08:36

## 2019-02-20 RX ADMIN — APIXABAN 5 MG: 5 TABLET, FILM COATED ORAL at 08:01

## 2019-02-20 RX ADMIN — LOSARTAN POTASSIUM 100 MG: 50 TABLET ORAL at 08:01

## 2019-02-20 RX ADMIN — APIXABAN 5 MG: 5 TABLET, FILM COATED ORAL at 17:12

## 2019-02-20 NOTE — PROGRESS NOTES
[facility] CARDIOLOGY PROGRESS NOTE 
      
 
2/20/2019 10:01 AM 
 
Admit Date: 2/18/2019 Admit Diagnosis: Paroxysmal atrial fibrillation ([facility] [address] [I48.0]; A-fib ([facility] [address] [I48.91] Subjective: No complaints this AM, no chest pain or shortness of breath Interval History: (History of pertinent interval events obtained from nursing staff) No events overnight ROS: 
GEN:  No fever or chills Cardiovascular:  As noted above Pulmonary:  As noted above Neuro:  No new focal motor or sensory loss Objective:  
 
Vitals:  
 02/20/19 9114 02/20/19 0421 02/20/19 0719 02/20/19 2776 BP:  112/53 118/67 Pulse:  82 98 Resp:  18 18 Temp:  97.8 °F (36.6 °C) 98.1 °F (36.7 °C) SpO2:  95% 94% 94% Weight: 128.5 kg (283 lb 4.8 oz) Height: 5' 3\" (1.6 m) Physical Exam: 
[place], Well Nourished, No Acute Distress, Alert & Oriented x 3, appropriate mood. Neck- supple, no JVD 
CV- regular rate and rhythm no MRG Lung- clear bilaterally Abd- soft, nontender, nondistended Ext- no edema bilaterally. Skin- warm and dry Current Facility-Administered Medications Medication Dose Route Frequency  albuterol (PROVENTIL VENTOLIN) nebulizer solution 2.5 mg  2.5 mg Nebulization Q6H PRN  
 apixaban (ELIQUIS) tablet 5 mg  5 mg Oral BID  buPROPion [facility]) tablet 150 mg  150 mg Oral BID  loratadine (CLARITIN) tablet 10 mg  10 mg Oral DAILY  levothyroxine (SYNTHROID) tablet 150 mcg  150 mcg Oral ACB  fluticasone (FLONASE) 50 mcg/actuation nasal spray 2 Spray  2 Spray Both Nostrils DAILY  montelukast (SINGULAIR) tablet 10 mg  10 mg Oral DAILY  sodium chloride (NS) flush 5-40 mL  5-40 mL IntraVENous Q8H  
 sodium chloride (NS) flush 5-40 mL  5-40 mL IntraVENous PRN  
 acetaminophen (TYLENOL) tablet 650 mg  650 mg Oral Q4H PRN  
 HYDROcodone-acetaminophen (NORCO) 5-325 mg per tablet 1 Tab  1 Tab Oral Q4H PRN  
  pantoprazole (PROTONIX) tablet 40 mg  40 mg Oral Q12H  
 sucralfate (CARAFATE) tablet 1 g  1 g Oral AC&HS  diphenhydrAMINE (BENADRYL) capsule 25 mg  25 mg Oral Q6H PRN  
 budesonide (PULMICORT) 500 mcg/2 ml nebulizer suspension  500 mcg Nebulization BID RT And  
 albuterol (PROVENTIL VENTOLIN) nebulizer solution 2.5 mg  2.5 mg Nebulization Q6HWA RT  
 dofetilide (TIKOSYN) capsule 500 mcg  500 mcg Oral Q12H  
 losartan (COZAAR) tablet 100 mg  100 mg Oral DAILY Data Review:  
Recent Results (from the past 24 hour(s)) EKG, 12 LEAD, INITIAL Collection Time: 02/19/19 10:14 AM  
Result Value Ref Range Ventricular Rate 81 BPM  
 Atrial Rate 81 BPM  
 P-R Interval 114 ms QRS Duration 106 ms  
 Q-T Interval 388 ms QTC Calculation (Bezet) 450 ms Calculated P Axis -18 degrees Calculated R Axis -5 degrees Calculated T Axis 31 degrees Diagnosis Normal sinus rhythm Minimal voltage criteria for LVH, may be normal variant Borderline ECG When compared with ECG of 18-FEB-2019 21:55, No significant change was found Confirmed by ST SARAH BENITO MD (), MEME FROST ([id number]) on 2/19/2019 10:42:38 AM 
  
EKG, 12 LEAD, INITIAL Collection Time: 02/19/19 10:04 PM  
Result Value Ref Range Ventricular Rate 82 BPM  
 Atrial Rate 82 BPM  
 P-R Interval 182 ms QRS Duration 112 ms  
 Q-T Interval 410 ms QTC Calculation (Bezet) 479 ms Calculated P Axis 83 degrees Calculated R Axis 11 degrees Calculated T Axis 75 degrees Diagnosis Normal sinus rhythm Normal ECG When compared with ECG of 19-FEB-2019 10:14, No significant change was found Confirmed by Waymond Severin ([id number]) on 2/20/2019 7:08:09 AM 
Also confirmed by Severiano Russell MD (), Oh Rouse ([id number])  on 2/20/2019 3:05:56 AM 
  
METABOLIC PANEL, BASIC Collection Time: 02/20/19  4:47 AM  
Result Value Ref Range Sodium 142 136 - 145 mmol/L Potassium 3.8 3.5 - 5.1 mmol/L  Chloride 108 (H) 98 - 107 mmol/L  
 CO2 28 21 - 32 mmol/L Anion gap 6 (L) 7 - 16 mmol/L Glucose 115 (H) 65 - 100 mg/dL BUN 16 8 - 23 MG/DL Creatinine 0.85 0.6 - 1.0 MG/DL  
 GFR est AA >60 >60 ml/min/1.73m2 GFR est non-AA >60 >60 ml/min/1.73m2 Calcium 8.3 8.3 - 10.4 MG/DL MAGNESIUM Collection Time: 02/20/19  4:47 AM  
Result Value Ref Range Magnesium 2.0 1.8 - 2.4 mg/dL EKG:  (EKG has been independently visualized by me with interpretation below) NSR, QTc 479 msec Assessment:  
 
Active Problems: 
  A-fib ([address] (11/26/2018) Plan: 1. afib:  Pt underwent cardiac ablation for afib this admission without immediate complication, no questions or concerns this AM, planned admission post procedure for tikosyn loading 2. Tikosyn: QTc acceptable, cont telemetry and post dose ECGs per protocol 3. CVA protection: grayson Ramires MD 
Cardiology/Electrophysiology

## 2019-02-20 NOTE — PROGRESS NOTES
Bedside and Verbal shift change report given to Larisa Goins (oncoming nurse) by self (offgoing nurse). Report included the following information SBAR, Kardex, Intake/Output, MAR and Recent Results.

## 2019-02-20 NOTE — PROGRESS NOTES
Bedside shift report received from Cindy Jefferson RN (offgoing nurse). Report given to Neptali Quinones RN. Vital signs stable. No complaints present. Patient in stable condition.

## 2019-02-20 NOTE — PROGRESS NOTES
Problem: Falls - Risk of 
Goal: *Absence of Falls Document Sarahi Back Fall Risk and appropriate interventions in the flowsheet. Outcome: Progressing Towards Goal 
Fall Risk Interventions: 
  
 
  
 
Medication Interventions: Patient to call before getting OOB Elimination Interventions: Call light in reach, Patient to call for help with toileting needs

## 2019-02-20 NOTE — PROGRESS NOTES
Bedside shift report given to Danelle Merritt RN (oncoming nurse) by Franco Gaines RN (offgoing nurse). Bedside shift report included the following information: SBAR, Kardex, ED Summary, MAR, and Recent Results.

## 2019-02-20 NOTE — PROGRESS NOTES
Bedside and Verbal shift change report given to self (oncoming nurse) by Larisa Mancilla (offgoing nurse). Report included the following information SBAR, Kardex, Intake/Output, MAR and Recent Results.

## 2019-02-21 VITALS
HEIGHT: 63 IN | TEMPERATURE: 98.1 F | SYSTOLIC BLOOD PRESSURE: 152 MMHG | WEIGHT: 286.9 LBS | RESPIRATION RATE: 18 BRPM | HEART RATE: 75 BPM | DIASTOLIC BLOOD PRESSURE: 76 MMHG | BODY MASS INDEX: 50.84 KG/M2 | OXYGEN SATURATION: 96 %

## 2019-02-21 LAB
ANION GAP SERPL CALC-SCNC: 7 MMOL/L (ref 7–16)
ATRIAL RATE: 77 BPM
ATRIAL RATE: 81 BPM
BUN SERPL-MCNC: 15 MG/DL (ref 8–23)
CALCIUM SERPL-MCNC: 8.3 MG/DL (ref 8.3–10.4)
CALCULATED P AXIS, ECG09: 46 DEGREES
CALCULATED P AXIS, ECG09: 48 DEGREES
CALCULATED R AXIS, ECG10: 16 DEGREES
CALCULATED R AXIS, ECG10: 4 DEGREES
CALCULATED T AXIS, ECG11: 68 DEGREES
CALCULATED T AXIS, ECG11: 70 DEGREES
CHLORIDE SERPL-SCNC: 108 MMOL/L (ref 98–107)
CO2 SERPL-SCNC: 27 MMOL/L (ref 21–32)
CREAT SERPL-MCNC: 0.84 MG/DL (ref 0.6–1)
DIAGNOSIS, 93000: NORMAL
DIAGNOSIS, 93000: NORMAL
GLUCOSE SERPL-MCNC: 88 MG/DL (ref 65–100)
MAGNESIUM SERPL-MCNC: 2 MG/DL (ref 1.8–2.4)
P-R INTERVAL, ECG05: 176 MS
P-R INTERVAL, ECG05: 182 MS
POTASSIUM SERPL-SCNC: 3.7 MMOL/L (ref 3.5–5.1)
Q-T INTERVAL, ECG07: 360 MS
Q-T INTERVAL, ECG07: 424 MS
QRS DURATION, ECG06: 104 MS
QRS DURATION, ECG06: 104 MS
QTC CALCULATION (BEZET), ECG08: 418 MS
QTC CALCULATION (BEZET), ECG08: 479 MS
SODIUM SERPL-SCNC: 142 MMOL/L (ref 136–145)
VENTRICULAR RATE, ECG03: 77 BPM
VENTRICULAR RATE, ECG03: 81 BPM

## 2019-02-21 PROCEDURE — 83735 ASSAY OF MAGNESIUM: CPT

## 2019-02-21 PROCEDURE — 74011000250 HC RX REV CODE- 250: Performed by: INTERNAL MEDICINE

## 2019-02-21 PROCEDURE — 93005 ELECTROCARDIOGRAM TRACING: CPT | Performed by: INTERNAL MEDICINE

## 2019-02-21 PROCEDURE — 94640 AIRWAY INHALATION TREATMENT: CPT

## 2019-02-21 PROCEDURE — 36415 COLL VENOUS BLD VENIPUNCTURE: CPT

## 2019-02-21 PROCEDURE — 94760 N-INVAS EAR/PLS OXIMETRY 1: CPT

## 2019-02-21 PROCEDURE — 80048 BASIC METABOLIC PNL TOTAL CA: CPT

## 2019-02-21 PROCEDURE — 74011250637 HC RX REV CODE- 250/637: Performed by: INTERNAL MEDICINE

## 2019-02-21 RX ORDER — LOSARTAN POTASSIUM 100 MG/1
100 TABLET ORAL DAILY
Qty: 30 TAB | Refills: 11 | Status: SHIPPED | OUTPATIENT
Start: 2019-02-22 | End: 2020-02-14 | Stop reason: SDUPTHER

## 2019-02-21 RX ORDER — DOFETILIDE 0.5 MG/1
500 CAPSULE ORAL EVERY 12 HOURS
Qty: 60 CAP | Refills: 11 | Status: SHIPPED | OUTPATIENT
Start: 2019-02-21 | End: 2020-02-14 | Stop reason: SDUPTHER

## 2019-02-21 RX ORDER — PANTOPRAZOLE SODIUM 40 MG/1
40 TABLET, DELAYED RELEASE ORAL EVERY 12 HOURS
Qty: 60 TAB | Refills: 0 | Status: SHIPPED | OUTPATIENT
Start: 2019-02-21 | End: 2019-04-02

## 2019-02-21 RX ORDER — SUCRALFATE 1 G/1
1 TABLET ORAL
Qty: 120 TAB | Refills: 0 | Status: SHIPPED | OUTPATIENT
Start: 2019-02-21 | End: 2019-04-02

## 2019-02-21 RX ADMIN — Medication 10 ML: at 06:00

## 2019-02-21 RX ADMIN — SUCRALFATE 1 G: 1 TABLET ORAL at 08:01

## 2019-02-21 RX ADMIN — ALBUTEROL SULFATE 2.5 MG: 2.5 SOLUTION RESPIRATORY (INHALATION) at 09:11

## 2019-02-21 RX ADMIN — LOSARTAN POTASSIUM 100 MG: 50 TABLET ORAL at 08:00

## 2019-02-21 RX ADMIN — PANTOPRAZOLE SODIUM 40 MG: 40 TABLET, DELAYED RELEASE ORAL at 08:00

## 2019-02-21 RX ADMIN — DOFETILIDE 500 MCG: 0.5 CAPSULE ORAL at 08:01

## 2019-02-21 RX ADMIN — LEVOTHYROXINE SODIUM 150 MCG: 150 TABLET ORAL at 06:09

## 2019-02-21 RX ADMIN — SUCRALFATE 1 G: 1 TABLET ORAL at 12:38

## 2019-02-21 RX ADMIN — BUPROPION HYDROCHLORIDE 150 MG: 75 TABLET, FILM COATED ORAL at 08:01

## 2019-02-21 RX ADMIN — LORATADINE 10 MG: 10 TABLET ORAL at 08:01

## 2019-02-21 RX ADMIN — APIXABAN 5 MG: 5 TABLET, FILM COATED ORAL at 08:01

## 2019-02-21 RX ADMIN — MONTELUKAST SODIUM 10 MG: 10 TABLET, FILM COATED ORAL at 08:01

## 2019-02-21 RX ADMIN — BUDESONIDE 500 MCG: 0.5 INHALANT RESPIRATORY (INHALATION) at 09:11

## 2019-02-21 NOTE — PROGRESS NOTES
[facility] CARDIOLOGY PROGRESS NOTE 
      
 
2/21/2019 10:01 AM 
 
Admit Date: 2/18/2019 Admit Diagnosis: Paroxysmal atrial fibrillation ([facility] [address] [I48.0]; A-fib ([facility] [address] [I48.91] Subjective: No complaints this AM, no chest pain or shortness of breath Interval History: (History of pertinent interval events obtained from nursing staff) No events overnight ROS: 
GEN:  No fever or chills Cardiovascular:  As noted above Pulmonary:  As noted above Neuro:  No new focal motor or sensory loss Objective:  
 
Vitals:  
 02/21/19 1513 02/21/19 5963 02/21/19 6116 02/21/19 4644 BP: 137/59  151/70 Pulse: 79  75 Resp: 16  17 Temp: 97.9 °F (36.6 °C)  98.8 °F (37.1 °C) SpO2:   94% 97% Weight:  286 lb 14.4 oz (130.1 kg) Height:      
 
 
Physical Exam: 
[place], Well Nourished, No Acute Distress, Alert & Oriented x 3, appropriate mood. Neck- supple, no JVD 
CV- regular rate and rhythm no MRG Lung- clear bilaterally Abd- soft, nontender, nondistended Ext- no edema bilaterally. Skin- warm and dry Current Facility-Administered Medications Medication Dose Route Frequency  albuterol (PROVENTIL VENTOLIN) nebulizer solution 2.5 mg  2.5 mg Nebulization Q6H PRN  
 apixaban (ELIQUIS) tablet 5 mg  5 mg Oral BID  buPROPion [facility]) tablet 150 mg  150 mg Oral BID  loratadine (CLARITIN) tablet 10 mg  10 mg Oral DAILY  levothyroxine (SYNTHROID) tablet 150 mcg  150 mcg Oral ACB  fluticasone (FLONASE) 50 mcg/actuation nasal spray 2 Spray  2 Spray Both Nostrils DAILY  montelukast (SINGULAIR) tablet 10 mg  10 mg Oral DAILY  sodium chloride (NS) flush 5-40 mL  5-40 mL IntraVENous Q8H  
 sodium chloride (NS) flush 5-40 mL  5-40 mL IntraVENous PRN  
 acetaminophen (TYLENOL) tablet 650 mg  650 mg Oral Q4H PRN  
 HYDROcodone-acetaminophen (NORCO) 5-325 mg per tablet 1 Tab  1 Tab Oral Q4H PRN  
  pantoprazole (PROTONIX) tablet 40 mg  40 mg Oral Q12H  
 sucralfate (CARAFATE) tablet 1 g  1 g Oral AC&HS  diphenhydrAMINE (BENADRYL) capsule 25 mg  25 mg Oral Q6H PRN  
 budesonide (PULMICORT) 500 mcg/2 ml nebulizer suspension  500 mcg Nebulization BID RT And  
 albuterol (PROVENTIL VENTOLIN) nebulizer solution 2.5 mg  2.5 mg Nebulization Q6HWA RT  
 dofetilide (TIKOSYN) capsule 500 mcg  500 mcg Oral Q12H  
 losartan (COZAAR) tablet 100 mg  100 mg Oral DAILY Data Review:  
Recent Results (from the past 24 hour(s)) EKG, 12 LEAD, INITIAL Collection Time: 02/20/19 10:01 PM  
Result Value Ref Range Ventricular Rate 77 BPM  
 Atrial Rate 77 BPM  
 P-R Interval 182 ms QRS Duration 104 ms Q-T Interval 424 ms QTC Calculation (Bezet) 479 ms Calculated P Axis 46 degrees Calculated R Axis 16 degrees Calculated T Axis 68 degrees Diagnosis Normal sinus rhythm Nonspecific T wave abnormality Abnormal ECG When compared with ECG of 20-FEB-2019 10:11, 
[place] interval has increased Confirmed by Evern Pallas ([id number]) on 2/21/2019 38:68:71 AM 
  
METABOLIC PANEL, BASIC Collection Time: 02/21/19  3:25 AM  
Result Value Ref Range Sodium 142 136 - 145 mmol/L Potassium 3.7 3.5 - 5.1 mmol/L Chloride 108 (H) 98 - 107 mmol/L  
 CO2 27 21 - 32 mmol/L Anion gap 7 7 - 16 mmol/L Glucose 88 65 - 100 mg/dL BUN 15 8 - 23 MG/DL Creatinine 0.84 0.6 - 1.0 MG/DL  
 GFR est AA >60 >60 ml/min/1.73m2 GFR est non-AA >60 >60 ml/min/1.73m2 Calcium 8.3 8.3 - 10.4 MG/DL MAGNESIUM Collection Time: 02/21/19  3:25 AM  
Result Value Ref Range Magnesium 2.0 1.8 - 2.4 mg/dL EKG, 12 LEAD, INITIAL Collection Time: 02/21/19 10:01 AM  
Result Value Ref Range Ventricular Rate 81 BPM  
 Atrial Rate 81 BPM  
 P-R Interval 176 ms QRS Duration 104 ms Q-T Interval 360 ms QTC Calculation (Bezet) 418 ms Calculated P Axis 48 degrees Calculated R Axis 4 degrees Calculated T Axis 70 degrees Diagnosis Normal sinus rhythm Normal ECG When compared with ECG of 20-FEB-2019 22:01, 
QT has shortened EKG:  (EKG has been independently visualized by me with interpretation below) NSR, QTc 418 msec Assessment:  
 
Active Problems: 
  A-fib ([facility] [address] (11/26/2018) Plan: 1. afib:  Pt underwent cardiac ablation for afib this admission without immediate complication, no questions or concerns this AM, planned admission post procedure for tikosyn loading. 2. Tikosyn: QTc acceptable, cont telemetry and post dose ECGs per protocol 3. CVA protection: Eliquis 4. Dispo: Anticipate d/c tomorrow. Kat Walker. Brandie Kimbrough MD, MS Clinical Cardiac Electrophysiology [place] [facility]

## 2019-02-21 NOTE — PROGRESS NOTES
Pt to discharge home this day with no discharge needs voiced. Milestones met. Care Management Interventions PCP Verified by CM: Yes Mode of Transport at Discharge: Other (see comment)(family) Transition of Care Consult (CM Consult): Discharge Planning Current Support Network: Own Home, Lives with Spouse Confirm Follow Up Transport: Family Plan discussed with Pt/Family/Caregiver: Yes Freedom of Choice Offered: Yes Discharge Location Discharge Placement: Home UPDATE: Pt being discharged home this day on Tikosyn. Pt's pharmacy, [facility] in [facility] does not have it and neither does the [facility] near their home. This CM contacted [address] informed this CM that they have dosage and amount in stock. This CM informed pt and spouse and they are agreeable to  medication at that location. This CM faxed appropriate PW to [address] for medication. No additional needs voiced at this time.

## 2019-02-21 NOTE — DISCHARGE SUMMARY
[facility] Cardiology Discharge Summary     Patient ID:  Ricardo Dawson  [id number]  51 y.o.  1950    Admit date: 2/18/2019    Discharge date:  02/21/2019    Admitting Physician: Javier Senior MD     Discharge Physician: Kalina Gutierrez NP/Dr. Courtney Tello    Admission Diagnoses: Paroxysmal atrial fibrillation ([facility] [address] [I48.0]  A-fib [facility]) [I48.91]    Discharge Diagnoses:    Diagnosis    A-fib ([facility] [address]    Morbid obesity with BMI of 45.0-49.9, adult ([facility] [address]    Hypothyroidism    Persistent atrial fibrillation ([facility] [address]    Arrhythmia    Hypertension       Cardiology Procedures this admission:  a fib ablation, Tikosyn loading  Consults: None    Hospital Course: Patient was seen at the office of [facility] by Dr. Gordon Gottron for management of paroxysmal atrial fibrillation with shortness of breath and fatigue,  and was subsequently scheduled for an AM Admission ablation at [facility] on 02/18/19. Patient underwent ablation by Dr. Gordon Gottron. Patient tolerated the procedure well and [facility] Patient was monitored on telemetry for 6 doses of Tikosyn with EKGs showing acceptable Qtc intervals. Patient was up feeling well without any complaints of CP, SOB or palpitations. Patient's labs were WNL. Patient was seen and examined by Dr. Courtney Tello and determined stable and ready for discharge. Patient was instructed on the importance of taking Tikosyn and Eliquis without missing a dose. The afternoon of discharge, patient was up feeling well without any complaints of chest pain or shortness of breath. Patient's bilateral cath sites were clean, dry and intact without hematoma or bruit. Patient's labs were WNL. Patient was seen and examined by Dr. Courtney Tello and determined stable and ready for discharge. Patient was instructed on the importance of medication compliance including taking carafate, PPI and Eliquis everyday without missing a dose.  The patient will follow up with [facility] -- Dr. Sherry Magana in 2-4 weeks. DISPOSITION: The patient is being discharged home in stable condition on a low saturated fat, low cholesterol and low salt diet. The patient is instructed to advance activities as tolerated to the limit of fatigue or shortness of breath. The patient is instructed to avoid all heavy lifting, straining, stooping or squatting for 3-5 days. The patient is instructed to watch the cath site for bleeding/oozing; if seen, the patient is instructed to apply firm pressure with a clean cloth and call [facility] at [phone]. The patient is instructed to watch for signs of infection which include: increasing area of redness, fever/hot to touch or purulent drainage at the catheterization site. The patient is instructed not to soak in a bathtub for 7-10 days, but is cleared to shower. Discharge Exam:   Visit Vitals  /76   Pulse 75   Temp 98.1 °F (36.7 °C)   Resp 18   Ht 5' 3\" (1.6 m)   Wt 130.1 kg (286 lb 14.4 oz)   SpO2 96%   BMI 50.82 kg/m²     Patient has been seen by Dr. Allyssa Bernstein: see his progress note for exam details.     Recent Results (from the past 24 hour(s))   EKG, 12 LEAD, INITIAL    Collection Time: 02/20/19 10:01 PM   Result Value Ref Range    Ventricular Rate 77 BPM    Atrial Rate 77 BPM    P-R Interval 182 ms    QRS Duration 104 ms    Q-T Interval 424 ms    QTC Calculation (Bezet) 479 ms    Calculated P Axis 46 degrees    Calculated R Axis 16 degrees    Calculated T Axis 68 degrees    Diagnosis       Normal sinus rhythm  Nonspecific T wave abnormality  Abnormal ECG  When compared with ECG of 20-FEB-2019 10:11,  [place] interval has increased  Confirmed by Shannan Castrejon ([id number]) on 2/21/2019 60:91:44 AM     METABOLIC PANEL, BASIC    Collection Time: 02/21/19  3:25 AM   Result Value Ref Range    Sodium 142 136 - 145 mmol/L    Potassium 3.7 3.5 - 5.1 mmol/L    Chloride 108 (H) 98 - 107 mmol/L    CO2 27 21 - 32 mmol/L    Anion gap 7 7 - 16 mmol/L    Glucose 88 65 - 100 mg/dL    BUN 15 8 - 23 MG/DL    Creatinine 0.84 0.6 - 1.0 MG/DL    GFR est AA >60 >60 ml/min/1.73m2    GFR est non-AA >60 >60 ml/min/1.73m2    Calcium 8.3 8.3 - 10.4 MG/DL   MAGNESIUM    Collection Time: 02/21/19  3:25 AM   Result Value Ref Range    Magnesium 2.0 1.8 - 2.4 mg/dL   EKG, 12 LEAD, INITIAL    Collection Time: 02/21/19 10:01 AM   Result Value Ref Range    Ventricular Rate 81 BPM    Atrial Rate 81 BPM    P-R Interval 176 ms    QRS Duration 104 ms    Q-T Interval 360 ms    QTC Calculation (Bezet) 418 ms    Calculated P Axis 48 degrees    Calculated R Axis 4 degrees    Calculated T Axis 70 degrees    Diagnosis       Normal sinus rhythm  Normal ECG  When compared with ECG of 20-FEB-2019 22:01,  QT has shortened           Patient Instructions:     Current Discharge Medication List      START taking these medications    Details   sucralfate (CARAFATE) 1 gram tablet Take 1 Tab by mouth Before breakfast, lunch, dinner and at bedtime. Qty: 120 Tab, Refills: 0      dofetilide (TIKOSYN) 500 mcg capsule Take 1 Cap by mouth every twelve (12) hours every twelve (12) hours. Qty: 60 Cap, Refills: 11      losartan (COZAAR) 100 mg tablet Take 1 Tab by mouth daily. Qty: 30 Tab, Refills: 11      pantoprazole (PROTONIX) 40 mg tablet Take 1 Tab by mouth every twelve (12) hours. Qty: 60 Tab, Refills: 0         CONTINUE these medications which have NOT CHANGED    Details   levothyroxine (SYNTHROID) 150 mcg tablet Take 1 Tab by mouth Daily (before breakfast). Qty: 30 Tab, Refills: 3      digestive enzymes, plant, (FIBERZYME CONCENTRATE-HP PO) Take  by mouth daily. mometasone (NASONEX) 50 mcg/actuation nasal spray 2 Sprays by Both Nostrils route daily. Qty: 1 Container, Refills: 11      Cetirizine (ZYRTEC) 10 mg cap Take  by mouth daily. buPROPion (WELLBUTRIN) 75 mg tablet Take 2 Tabs by mouth two (2) times a day.   Qty: 360 Tab, Refills: 1    Associated Diagnoses: Depression, unspecified depression type      fluticasone-salmeterol (ADVAIR DISKUS) 250-50 mcg/dose diskus inhaler Take 1 Puff by inhalation two (2) times a day. RINSE MOUTH WELL AFTER USE  Qty: 1 Inhaler, Refills: 11      montelukast (SINGULAIR) 10 mg tablet Take 1 Tab by mouth daily. Qty: 90 Tab, Refills: 1      apixaban (ELIQUIS) 5 mg tablet Take 1 Tab by mouth two (2) times a day. Qty: 60 Tab, Refills: 5    Associated Diagnoses: Atrial fibrillation, unspecified type (HCC)      acetaminophen (TYLENOL EXTRA STRENGTH) 500 mg tablet Take  by mouth as needed for Pain. Indications: Pain      cpap machine kit by Does Not Apply route. albuterol (VENTOLIN HFA) 90 mcg/actuation inhaler Take 2 Puffs by inhalation every six (6) hours as needed for Wheezing.   Qty: 1 Inhaler, Refills: 11         STOP taking these medications       dilTIAZem CD (CARDIZEM CD) 240 mg ER capsule Comments:   Reason for Stopping:         losartan-hydroCHLOROthiazide (HYZAAR) 100-25 mg per tablet Comments:   Reason for Stopping:               Signed:  Adrain Osgood, NP  2/21/2019  12:10 PM

## 2019-02-21 NOTE — PROGRESS NOTES
Discharge instructions, prescriptions, and follow up appt information given to and reviewed with patient. Opportunity for questions provided, pt voiced understanding. Pt stable at time of discharge All pt belongings taken with pt from room. Pt transported to discharge area via wheelchair, family member driving pt home.

## 2019-02-22 ENCOUNTER — PATIENT OUTREACH (OUTPATIENT)
Dept: CASE MANAGEMENT | Age: 69
End: 2019-02-22

## 2019-02-22 NOTE — PROGRESS NOTES
This note will not be viewable in [address] Date/Time of Call: 
 02/22/19 
 1131am  
What was the patient hospitalized for? AFIB Consent for CHRISTOPHER RODAS Call Does the patient understand his/her diagnosis and/or treatment and what happened during the hospitalization? Patient agrees to call Yes she understands the hospitalization Did the patient receive discharge instructions? Yes   
CM Assessed Risk for Readmission:  
 
 
 
Patient stated Risk for Readmission:  patient is a moderate risk for readmission due to diagnoses; she is scheduled for an admission  April 3, 2019 
 
has a scheduled admission coming up Review any discharge instructions (see discharge instructions/AVS in [facility]). Ask patient if they understand these. Do they have any questions? Reviewed DC instructions Were home services ordered (nursing, PT, OT, ST, etc.)? No  
  
If so, has the first visit occurred? If not, why? (Assist with coordination of services if necessary. ) 
 NA Was any DME ordered? No  
If so, has it been received? If not, why?  (Assist patient in obtaining DME orders &/or equipment if necessary.) NAhas walker, cane, ASCENCION, CPAP in home Complete a review of all medications (new, continued and discontinued meds per the D/C instructions and medication tab in [facility]). Medications reviewed START taking: 
dofetilide 500 mcg capsule (TIKOSYN) 
losartan 100 mg tablet (COZAAR) Start taking on: 2/22/2019 
pantoprazole 40 mg tablet (PROTONIX) 
sucralfate 1 gram tablet (CARAFATE) STOP taking: 
dilTIAZem  mg ER capsule (CARDIZEM CD) losartan-hydroCHLOROthiazide 100-25 mg per tablet (HYZAAR) Were all new prescriptions filled? If not, why?  (Assist patient in obtaining medications if necessary  escalate for CCM &/or SW if ongoing issues are verbalized by pt or anticipated) Yes Does the patient understand the purpose and dosing instructions for all medications? (If patient has questions, provide explanation and education.) 
 patient verbalizes understanding of her medications Does the patient have any problems in performing ADLs? (If patient is unable to perform ADLs  what is the limiting factor(s)? Do they have a support system that can assist? If no support system is present, discuss possible assistance that they may be able to obtain. Escalate for CCM/SW if ongoing issues are verbalized by pt or anticipated) 
 patient is independent with ADLs Does the patient have all follow-up appointments scheduled? 7 day f/up with PCP?  
(f/up with PCP may be w/in 14 days if patient has a f/up with their specialist w/in 7 days) 7-14 day f/up with specialist?  
(or per discharge instructions) If f/up has not been made  what actions has the care coordinator made to accomplish this? Has transportation been arranged? yes  
 
 
03/19/19 at 220pm 
 
 
 
Cardiology 03/13/19 at 215pm 
 
 
Reviewed appointment information with patient Patient will drive self Any other questions or concerns expressed by the patient? No questions or concerns are voiced at this time. Care Coordinator contact information provided should any needs arise Schedule next appointment with CHRISTOPHER RODAS Coordinator or refer to RN Case Manager/ per the workflow guidelines. When is care coordinators next follow-up call scheduled? If referred for CCM  what RN care manager was the referral assigned? Within 30 days Within 30 days NA  
MELANI Call Completed By: Dane Langston CMA Care Coordinator

## 2019-03-13 PROBLEM — Z79.899 HIGH RISK MEDICATIONS (NOT ANTICOAGULANTS) LONG-TERM USE: Status: ACTIVE | Noted: 2019-03-13

## 2019-03-15 ENCOUNTER — PATIENT OUTREACH (OUTPATIENT)
Dept: CASE MANAGEMENT | Age: 69
End: 2019-03-15

## 2019-03-15 NOTE — PROGRESS NOTES
This note will not be viewable in [address] Transitions of Care  Follow up Outreach Note   Outreach type Phone call: Spoke with patients spouse Erik Sniderkarley   Date/Time of Outreach: 03/15/19 222pm     Has patient attended PCP or specialist follow-up appointments since last contact? What was outcome of appointment? When is next follow-up scheduled? Patient has completed FU with Cardiology and is scheduled with PCP on Tuesday 03/19/19. Erik Khlaida states that the patient is doing very well    Review medications. Any medication changes since last outreach? Does patient have any questions or issues related to their medications? Patient has medications. No significant changes, no questions or concerns. Home health active? If yes  any issue? Progress? NA     Referrals needed?  (CM, SW, HH, etc.)   NA   Other issues/Miscellaneous? (Transportation, access to meals, ability to perform ADLs, adequate caregiver support, etc.) Patients spouse denies any questions or concerns at this time. States patient has medications and transportation to appointments. Patient is independent with ADLs. Next Outreach Scheduled?     Graduation from program?   N/A    Yes     Next Steps/Goals (if applicable):   NA     Outreach completed by:   Hal Israel Coordinator

## 2019-03-19 PROBLEM — B00.1 COLD SORE: Status: RESOLVED | Noted: 2018-07-09 | Resolved: 2019-03-19

## 2019-03-19 PROBLEM — J45.20 MILD INTERMITTENT ASTHMA WITHOUT COMPLICATION: Status: ACTIVE | Noted: 2018-07-09

## 2019-03-19 PROBLEM — I48.91 A-FIB (HCC): Status: RESOLVED | Noted: 2018-11-26 | Resolved: 2019-03-19

## 2019-03-19 PROBLEM — M17.9 OA (OSTEOARTHRITIS) OF KNEE: Status: RESOLVED | Noted: 2018-01-29 | Resolved: 2019-03-19

## 2019-04-02 ENCOUNTER — HOSPITAL ENCOUNTER (OUTPATIENT)
Dept: SURGERY | Age: 69
Discharge: HOME | End: 2019-04-02
Payer: MEDICARE

## 2019-04-02 ENCOUNTER — HOME HEALTH ADMISSION (OUTPATIENT)
Dept: HOME HEALTH SERVICES | Facility: HOME HEALTH | Age: 69
End: 2019-04-02
Payer: MEDICARE

## 2019-04-02 ENCOUNTER — HOSPITAL ENCOUNTER (OUTPATIENT)
Dept: PHYSICAL THERAPY | Age: 69
Discharge: HOME | End: 2019-04-02
Payer: MEDICARE

## 2019-04-02 VITALS
RESPIRATION RATE: 18 BRPM | TEMPERATURE: 97.5 F | DIASTOLIC BLOOD PRESSURE: 90 MMHG | BODY MASS INDEX: 47.13 KG/M2 | OXYGEN SATURATION: 96 % | HEART RATE: 79 BPM | WEIGHT: 266 LBS | SYSTOLIC BLOOD PRESSURE: 165 MMHG | HEIGHT: 63 IN

## 2019-04-02 LAB
ALBUMIN SERPL-MCNC: 3.7 G/DL (ref 3.2–4.6)
ANION GAP SERPL CALC-SCNC: 7 MMOL/L
APPEARANCE UR: CLEAR
APTT PPP: 32.5 SEC (ref 24.7–39.8)
BACTERIA SPEC CULT: NORMAL
BASOPHILS # BLD: 0 K/UL (ref 0–0.2)
BASOPHILS NFR BLD: 1 % (ref 0–2)
BILIRUB UR QL: NEGATIVE
BUN SERPL-MCNC: 14 MG/DL (ref 8–23)
CALCIUM SERPL-MCNC: 9.2 MG/DL (ref 8.3–10.4)
CHLORIDE SERPL-SCNC: 109 MMOL/L (ref 98–107)
CO2 SERPL-SCNC: 25 MMOL/L (ref 21–32)
COLOR UR: YELLOW
CREAT SERPL-MCNC: 0.79 MG/DL (ref 0.6–1)
DIFFERENTIAL METHOD BLD: NORMAL
EOSINOPHIL # BLD: 0.2 K/UL (ref 0–0.8)
EOSINOPHIL NFR BLD: 3 % (ref 0.5–7.8)
ERYTHROCYTE [DISTWIDTH] IN BLOOD BY AUTOMATED COUNT: 14 % (ref 11.9–14.6)
EST. AVERAGE GLUCOSE BLD GHB EST-MCNC: 105 MG/DL
GLUCOSE SERPL-MCNC: 85 MG/DL (ref 65–100)
GLUCOSE UR STRIP.AUTO-MCNC: NEGATIVE MG/DL
HBA1C MFR BLD: 5.3 %
HCT VFR BLD AUTO: 42.5 % (ref 35.8–46.3)
HGB BLD-MCNC: 13.6 G/DL (ref 11.7–15.4)
HGB UR QL STRIP: NEGATIVE
IMM GRANULOCYTES # BLD AUTO: 0 K/UL (ref 0–0.5)
IMM GRANULOCYTES NFR BLD AUTO: 0 % (ref 0–5)
INR PPP: 1.1
KETONES UR QL STRIP.AUTO: NEGATIVE MG/DL
LEUKOCYTE ESTERASE UR QL STRIP.AUTO: NEGATIVE
LYMPHOCYTES # BLD: 1.3 K/UL (ref 0.5–4.6)
LYMPHOCYTES NFR BLD: 25 % (ref 13–44)
MCH RBC QN AUTO: 29.6 PG (ref 26.1–32.9)
MCHC RBC AUTO-ENTMCNC: 32 G/DL (ref 31.4–35)
MCV RBC AUTO: 92.6 FL (ref 79.6–97.8)
MONOCYTES # BLD: 0.6 K/UL (ref 0.1–1.3)
MONOCYTES NFR BLD: 12 % (ref 4–12)
NEUTS SEG # BLD: 3 K/UL (ref 1.7–8.2)
NEUTS SEG NFR BLD: 59 % (ref 43–78)
NITRITE UR QL STRIP.AUTO: NEGATIVE
NRBC # BLD: 0 K/UL (ref 0–0.2)
PH UR STRIP: 7 [PH] (ref 5–9)
PLATELET # BLD AUTO: 227 K/UL (ref 150–450)
PMV BLD AUTO: 11 FL (ref 9.4–12.3)
POTASSIUM SERPL-SCNC: 3.8 MMOL/L (ref 3.5–5.1)
PROT UR STRIP-MCNC: NEGATIVE MG/DL
PROTHROMBIN TIME: 14.5 SEC (ref 11.7–14.5)
RBC # BLD AUTO: 4.59 M/UL (ref 4.05–5.2)
SERVICE CMNT-IMP: NORMAL
SODIUM SERPL-SCNC: 141 MMOL/L (ref 136–145)
SP GR UR REFRACTOMETRY: 1.01 (ref 1–1.02)
UROBILINOGEN UR QL STRIP.AUTO: 0.2 EU/DL (ref 0.2–1)
WBC # BLD AUTO: 5 K/UL (ref 4.3–11.1)

## 2019-04-02 PROCEDURE — 85730 THROMBOPLASTIN TIME PARTIAL: CPT

## 2019-04-02 PROCEDURE — 85610 PROTHROMBIN TIME: CPT

## 2019-04-02 PROCEDURE — 80307 DRUG TEST PRSMV CHEM ANLYZR: CPT

## 2019-04-02 PROCEDURE — 83036 HEMOGLOBIN GLYCOSYLATED A1C: CPT

## 2019-04-02 PROCEDURE — 82040 ASSAY OF SERUM ALBUMIN: CPT

## 2019-04-02 PROCEDURE — 36415 COLL VENOUS BLD VENIPUNCTURE: CPT

## 2019-04-02 PROCEDURE — 80048 BASIC METABOLIC PNL TOTAL CA: CPT

## 2019-04-02 PROCEDURE — 81003 URINALYSIS AUTO W/O SCOPE: CPT

## 2019-04-02 PROCEDURE — 87641 MR-STAPH DNA AMP PROBE: CPT

## 2019-04-02 PROCEDURE — 77030027138 HC INCENT SPIROMETER -A

## 2019-04-02 PROCEDURE — 97161 PT EVAL LOW COMPLEX 20 MIN: CPT

## 2019-04-02 PROCEDURE — 85025 COMPLETE CBC W/AUTO DIFF WBC: CPT

## 2019-04-02 RX ORDER — ALBUTEROL SULFATE 0.83 MG/ML
2.5 SOLUTION RESPIRATORY (INHALATION)
Status: CANCELLED | OUTPATIENT
Start: 2019-04-02

## 2019-04-02 RX ORDER — ASPIRIN 81 MG/1
TABLET ORAL DAILY
COMMUNITY
End: 2019-05-15 | Stop reason: ALTCHOICE

## 2019-04-02 NOTE — PROGRESS NOTES
Leigh Ervin : 1950(68 y.o.) [facility] at [address], [address] [place] [address] [phone] Physical Therapy Prehab Plan of Treatment and Evaluation Summary:2019 ICD-10: Treatment Diagnosis:  
· Pain in Left Knee (M25.562) · Stiffness of Left Knee, Not elsewhere classified (M25.662) · Difficulty in walking, Not elsewhere classified (R26.2) Precautions/Allergies:  
Adhesive tape-silicones; Ceclor [cefaclor]; and Symbyax [olanzapine-fluoxetine]  MEDICAL/REFERRING DIAGNOSIS: 
Unilateral primary osteoarthritis, left knee [M17.12] REFERRING PHYSICIAN: Escobar Segura MD 
DATE OF SURGERY: 19 Assessment:  
Comments:  Patient presents prior to L TKA. She had a R TKA last year and reports she did very well. She, however, has recently \"stressed\" the R knee causing more pain and the need for a cane or walker. She is scheduled for an x-ray 4/3/19 of the R knee but denies any fall/trauma. Patient also had a recent surgery for a-fib/heart ablation. Patient will return home at d/c with assist from her spouse. PROBLEM LIST (Impacting functional limitations): Ms. Arlene Rico presents with the following left lower extremity(s) problems: 1. Transfers 2. Gait 3. Strength 4. Range of Motion 5. Balance 6. Home Exercise Program 
7. Pain INTERVENTIONS PLANNED: 
1. Home Exercise Program 
2. Educational Discussion TREATMENT PLAN: Effective Dates: 2019 TO 2019. Frequency/Duration: Patient to continue to perform home exercise program at least twice per day up until her surgery. GOALS: (Goals have been discussed and agreed upon with patient.) Discharge Goals: Time Frame: 1 Day 1. Patient will demonstrate independence with a home exercise program designed to increase strength, range of motion, balance, coordination and pain control to minimize functional deficits and optimize patient for total joint replacement. Rehabilitation Potential For Stated Goals: Good Regarding Jesse Jay's therapy, I certify that the treatment plan above will be carried out by a therapist or under their direction. Thank you for this referral, 
Mike Gee, PT     
    
 
 
HISTORY:  
Present Symptoms: 
Pain Intensity 1: 6 History of Present Injury/Illness (Reason for Referral): 
Medical/Referring Diagnosis: Unilateral primary osteoarthritis, left knee [M17.12] Past Medical History/Comorbidities: Ms. Aubrey Craft  has a past medical history of Allergic rhinitis, Anxiety, Arrhythmia, Arthritis, Asthma, Depression, Endocarditis (), Heart murmur, Hematuria, History of MRSA infection (on left breast), HLD (hyperlipidemia) ( ), Hypertension, Hypothyroid, Hypothyroidism due to acquired atrophy of thyroid (2015), Intertrigo, Irregular heart beat, Mass of colon, Morbid obesity ([address] Reactive airway disease with status asthmaticus, Scoliosis (2016), Sleep apnea, Unspecified adverse effect of anesthesia, Varicose veins, and VSD (ventricular septal defect) (2016). She also has no past medical history of Autoimmune disease ([address] CAD (coronary artery disease), Cancer ([address] Chronic kidney disease, Chronic obstructive pulmonary disease ([address] Diabetes ([address] Difficult intubation, GERD (gastroesophageal reflux disease), Heart failure ([address] Liver disease, Malignant hyperthermia due to anesthesia, Nausea & vomiting, Pseudocholinesterase deficiency, PUD (peptic ulcer disease), Seizures ([address] Stroke ([address] or Thromboembolus ([address]   Ms. Aubrey Craft  has a past surgical history that includes hx lipoma resection (Right); hx appendectomy; hx hernia repair (incisional [id number]); hx colonoscopy (, ); hx orthopaedic (due to underbite); hx breast reduction (Bilateral, 2016); hx  section; hx dilation and curettage; hx colectomy (Right, ); hx knee replacement (Right, 01/29/2018); hx heent; hx heent; hx implantable cardioverter defibrillator (12/18/2018); hx heart catheterization; and pr cardiac surg procedure unlist. 
Social History/Living Environment:  
Home Environment: Private residence # Steps to Enter: 3 Hand Rails : Bilateral 
One/Two Story Residence: One story Living Alone: No 
Support Systems: Spouse/Significant Other/Partner Patient Expects to be Discharged to[de-identified] Private residence Current DME Used/Available at Home: Cane, straight, Commode, bedside, CPAP, Walker, rolling Tub or Shower Type: Shower Work/Activity: 
Patient is retired. Currently ambulating with a cane or walker. Dominant Side: 
RIGHT Current Medications:  See Pre-assessment nursing note Number of Personal Factors/Comorbidities that affect the Plan of Care: 1-2: MODERATE COMPLEXITY EXAMINATION:  
ADLs (Current Functional Status):  
Ambulation: 
[] Independent 
[] Walk Indoors Only 
[] Walk Outdoors [x] Use Assistive Device [] Use Wheelchair Only Dressing: 
[x] Independent Requires Assistance from Someone for: 
[] Sock/Shoes 
[] Pants 
[] Everything Bathing/Showering:  
[x] Independent 
[] Requires Assistance from Someone 
[] Sponge Bath Only Household Activities: 
[] Routine house and yard work [x] Light Housework Only 
[] None Observation/Orthostatic Postural Assessment:  
Exceptions to WDLGenu valgus left ROM/Flexibility:  
Gross Assessment: Yes AROM: Within functional limits(R LE) LLE Assessment LLE Assessment (WDL): Exception to WDL 
LLE AROM 
L Knee Flexion: 105 L Knee Extension: 10 Strength:  
Gross Assessment: Yes 
Strength: Within functional limits(R LE 4+/5) LLE Strength L Hip Flexion: 4 L Knee Flexion: 4 L Knee Extension: 4 Functional Mobility:   
Gross Assessment: Yes Coordination: Within functional limits Tone: Normal 
 
Gait Description (WDL): Exceptions to [facility] Stand to Sit: Modified independent Sit to Stand: Modified independent Distance (ft): 500 Feet (ft) Ambulation - Level of Assistance: Modified independent Assistive Device: Cane, straight Base of Support: Center of gravity altered Speed/Luzma: Pace decreased (<100 feet/min) Step Length: Left shortened;Right shortened Stance: Left decreased Gait Abnormalities: Antalgic Balance:   
Sitting: Intact Standing - Static: Good Standing - Dynamic : Fair Body Structures Involved: 1. Joints 2. Muscles Body Functions Affected: 1. Movement Related Activities and Participation Affected: 1. Mobility Number of elements that affect the Plan of Care: 4+: HIGH COMPLEXITY CLINICAL PRESENTATION:  
Presentation: Stable and uncomplicated: LOW COMPLEXITY CLINICAL DECISION MAKING:  
Outcome Measure: Tool Used: Lower Extremity Functional Scale (LEFS) Score:  Initial: 17/80 Most Recent: X/80 (Date: -- ) Interpretation of Score: 20 questions each scored on a 5 point scale with 0 representing \"extreme difficulty or unable to perform\" and 4 representing \"no difficulty\". The lower the score, the greater the functional disability. 80/80 represents no disability. Minimal detectable change is 9 points. Medical Necessity:  
· Ms. Luz Maria Pete is expected to optimize her lower extremity strength and ROM in preparation for joint replacement surgery. Reason for Services/Other Comments: · Achieve baseline assesment of musculoskeletal system, functional mobility and home environment. , educate in PT HEP in preparation for surgery, educate in hospital plan of care. Use of outcome tool(s) and clinical judgement create a POC that gives a: Clear prediction of patient's progress: LOW COMPLEXITY  
TREATMENT:  
Treatment/Session Assessment:  Patient was instructed in PT- HEP to increase strength and ROM in LEs. Answered all questions. · Post session pain:  6/10 · Compliance with Program/Exercises: Will assess as treatment progresses. Total Treatment Duration: PT Patient Time In/Time Out Time In: 1215 Time Out: 1230 Floydene Ramp, PT

## 2019-04-02 NOTE — PERIOP NOTES
Recent Results (from the past 12 hour(s)) CBC WITH AUTOMATED DIFF Collection Time: 04/02/19 12:19 PM  
Result Value Ref Range WBC 5.0 4.3 - 11.1 K/uL  
 RBC 4.59 4.05 - 5.2 M/uL  
 HGB 13.6 11.7 - 15.4 g/dL HCT 42.5 35.8 - 46.3 % MCV 92.6 79.6 - 97.8 FL  
 MCH 29.6 26.1 - 32.9 PG  
 MCHC 32.0 31.4 - 35.0 g/dL  
 RDW 14.0 11.9 - 14.6 % PLATELET 594 483 - 236 K/uL MPV 11.0 9.4 - 12.3 FL ABSOLUTE NRBC 0.00 0.0 - 0.2 K/uL  
 DF AUTOMATED NEUTROPHILS 59 43 - 78 % LYMPHOCYTES 25 13 - 44 % MONOCYTES 12 4.0 - 12.0 % EOSINOPHILS 3 0.5 - 7.8 % BASOPHILS 1 0.0 - 2.0 % IMMATURE GRANULOCYTES 0 0.0 - 5.0 %  
 ABS. NEUTROPHILS 3.0 1.7 - 8.2 K/UL  
 ABS. LYMPHOCYTES 1.3 0.5 - 4.6 K/UL  
 ABS. MONOCYTES 0.6 0.1 - 1.3 K/UL  
 ABS. EOSINOPHILS 0.2 0.0 - 0.8 K/UL  
 ABS. BASOPHILS 0.0 0.0 - 0.2 K/UL  
 ABS. IMM. GRANS. 0.0 0.0 - 0.5 K/UL PROTHROMBIN TIME + INR Collection Time: 04/02/19 12:19 PM  
Result Value Ref Range Prothrombin time 14.5 11.7 - 14.5 sec INR 1.1 PTT Collection Time: 04/02/19 12:19 PM  
Result Value Ref Range aPTT 32.5 24.7 - 39.8 SEC METABOLIC PANEL, BASIC Collection Time: 04/02/19 12:19 PM  
Result Value Ref Range Sodium 141 136 - 145 mmol/L Potassium 3.8 3.5 - 5.1 mmol/L Chloride 109 (H) 98 - 107 mmol/L  
 CO2 25 21 - 32 mmol/L Anion gap 7 mmol/L Glucose 85 65 - 100 mg/dL BUN 14 8 - 23 MG/DL Creatinine 0.79 0.6 - 1.0 MG/DL  
 GFR est AA >60 >60 ml/min/1.73m2 GFR est non-AA >60 ml/min/1.73m2 Calcium 9.2 8.3 - 10.4 MG/DL URINALYSIS W/ RFLX MICROSCOPIC Collection Time: 04/02/19 12:19 PM  
Result Value Ref Range Color YELLOW Appearance CLEAR Specific gravity 1.010 1.001 - 1.023    
 pH (UA) 7.0 5.0 - 9.0 Protein NEGATIVE  NEG mg/dL Glucose NEGATIVE  mg/dL Ketone NEGATIVE  NEG mg/dL Bilirubin NEGATIVE  NEG Blood NEGATIVE  NEG Urobilinogen 0.2 0.2 - 1.0 EU/dL Nitrites NEGATIVE  NEG Leukocyte Esterase NEGATIVE  NEG    
ALBUMIN Collection Time: 04/02/19 12:19 PM  
Result Value Ref Range Albumin 3.7 3.2 - 4.6 g/dL HEMOGLOBIN A1C WITH EAG Collection Time: 04/02/19 12:19 PM  
Result Value Ref Range Hemoglobin A1c 5.3 % Est. average glucose 105 mg/dL

## 2019-04-02 NOTE — ADVANCED PRACTICE NURSE
Total Joint Surgery Preoperative Chart Review Patient ID: 
Ravin Jenkins [id number] 
79 y.o. 
1950 Surgeon: Dr. Kaitlin Montoya Date of Surgery: 4/17/2019 Procedure: Total Left Knee Arthroplasty Primary Care Physician: Kennedi Lara [phone] Specialty Physician(s):   
 
Subjective:  
Ravin Jenkins is a 79 y.o. WHITE OR  female who presents for preoperative evaluation for Total Left Knee arthroplasty. This is a preoperative chart review note based on data collected by the nurse at the surgical Pre-Assessment visit. Past Medical History:  
Diagnosis Date  Adverse effect of anesthesia   
 delayed awakening in the past, patient states improved after using CPAP--patient's  states better for patient to wake up on side, not back.  Allergic rhinitis  Anxiety  Arthritis OA  Asthma Followed by Dr. Omar Gray, controlled with daily inhaler and PRN Ventolin  Depression   
 under control with med  Endocarditis 1992  
 hx 1992  
 Heart murmur   
 congenital, asymptomatic--Echo completed 2/18/19  Hematuria  History of MRSA infection on left breast  
 5/2016 : treated/ resolved  HLD (hyperlipidemia)  Hypertension   
 controlled with medication  Hypothyroid   
 controlled with medication  Hypothyroidism due to acquired atrophy of thyroid 4/28/2015  Intertrigo  Irregular heart beat  Mass of colon  Morbid obesity ([address] 48.7 bmi  Persistent atrial fibrillation ([address]   
 s/p ablation (2/2019), Tikosyn loading---EKG dated 3/13/19 shows \"sinus rhythm, rate 70. \" Patient is followed by [facility].  Reactive airway disease with status asthmaticus   
 hx only  Scoliosis 8/4/2016  Sleep apnea   
 cpap complaint.  Varicose veins  VSD (ventricular septal defect) 07/22/2016  
 per echo (2/18/19) \"small PFO in the baseline state. \" Patient is followed by [facility]iology. Past Surgical History: Procedure Laterality Date  HX AFIB ABLATION  2018 & 02/2019  
 cardioversion / ablation  HX APPENDECTOMY  HX BREAST REDUCTION Bilateral 8/2/2016 BREAST REDUCTION/ bilateral performed by Codie Ross MD at [address]    
 x2  
 HX COLECTOMY Right 2010 8 in colon removed  HX COLONOSCOPY  2010, 2015  HX DILATION AND CURETTAGE    
 multiple  HX HEART CATHETERIZATION    
 heart cath age 5  
 HX HEENT  1969  
 jaw surgery due to underbite  HX HERNIA REPAIR  incisional [id number]  
 with mesh  HX IMPLANTABLE CARDIOVERTER DEFIBRILLATOR  12/18/2018  HX KNEE REPLACEMENT Right 01/29/2018  HX LIPOMA RESECTION Right   
 right foot between toes  HX REFRACTIVE SURGERY Family History Problem Relation Age of Onset  Cancer Mother LYMPHOMA  Hypertension Mother  Breast Cancer Mother  Heart Disease Father   
     heart attacks---bypass surg  Hypertension Father  Heart Disease Brother  Heart Disease Brother  Colon Cancer Maternal Uncle Social History Tobacco Use  Smoking status: Never Smoker  Smokeless tobacco: Never Used Substance Use Topics  Alcohol use: No  
  Comment:    
   
Prior to Admission medications Medication Sig Start Date End Date Taking? Authorizing Provider  
aspirin delayed-release 81 mg tablet Take  by mouth daily. Instructed to take DOS, with a small sip of water, per anesthesia protocol. Instructed to begin taking 81 mg ASA once Eliquis is held per anesthesia protocol. Yes Provider, Historical  
buPROPion (WELLBUTRIN) 75 mg tablet Take 2 Tabs by mouth two (2) times a day. Patient taking differently: Take 150 mg by mouth two (2) times a day. Instructed to take DOS, with a small sip of water, per anesthesia protocol. 3/19/19  Yes Keesha Swanson MD  
[facility]) 500 mcg capsule Take 1 Cap by mouth every twelve (12) hours every twelve (12) hours. Patient taking differently: Take 500 mcg by mouth every twelve (12) hours every twelve (12) hours. Patient takes 1 tablet at 0800 and 1 tablet at 2000 Instructed to take DOS, with a small sip of water, per anesthesia protocol. 2/21/19  Yes Nik NERI NP  
losartan (COZAAR) 100 mg tablet Take 1 Tab by mouth daily. 2/22/19  Yes Nik NERI NP  
levothyroxine (SYNTHROID) 150 mcg tablet Take 1 Tab by mouth Daily (before breakfast). Patient taking differently: Take 150 mcg by mouth Daily (before breakfast). Instructed to take DOS, with a small sip of water, per anesthesia protocol. 12/19/18  Yes Cindy Negro MD  
digestive enzymes, plant, (FIBERZYME CONCENTRATE-HP PO) Take  by mouth daily. Yes Provider, Historical  
albuterol (VENTOLIN HFA) 90 mcg/actuation inhaler Take 2 Puffs by inhalation every six (6) hours as needed for Wheezing. Patient taking differently: Take 2 Puffs by inhalation every six (6) hours as needed for Wheezing. Use DOS PRN per anesthesia protocol. Instructed to bring inhaler to the hospital on the DOS per anesthesia protocol. 12/17/18  Yes Cindy Negro MD  
mometasone (NASONEX) 50 mcg/actuation nasal spray 2 Sprays by Both Nostrils route daily. Patient taking differently: 2 Sprays by Both Nostrils route daily. Instructed to use DOS per anesthesia protocol. Non-formulary medication. Patient instructed to bring medication to the hospital on the DOS, in the original bottle, and give to nurse. 12/17/18  Yes Cindy Negro MD  
Cetirizine (ZYRTEC) 10 mg cap Take  by mouth daily. Instructed to take DOS, with a small sip of water, per anesthesia protocol. Non-formulary medication. Patient instructed to bring medication to the hospital on the DOS, in the original bottle, and give to nurse. Yes Provider, Historical  
fluticasone-salmeterol (ADVAIR DISKUS) 250-50 mcg/dose diskus inhaler Take 1 Puff by inhalation two (2) times a day.  RINSE MOUTH WELL AFTER USE 
 Patient taking differently: Take 1 Puff by inhalation two (2) times a day. RINSE MOUTH WELL AFTER USE Instructed to use DOS per anesthesia protocol. Instructed to bring inhaler to the hospital on the DOS per anesthesia protocol. 9/19/18  Yes MD kimberly Alvalukast (SINGULAIR) 10 mg tablet Take 1 Tab by mouth daily. Patient taking differently: Take 10 mg by mouth daily. Instructed to take DOS, with a small sip of water, per anesthesia protocol. 9/13/18  Yes Karin Cortez MD  
apixaban (ELIQUIS) 5 mg tablet Take 1 Tab by mouth two (2) times a day. 9/12/18  Yes Julito Blandon MD  
acetaminophen (TYLENOL EXTRA STRENGTH) 500 mg tablet Take  by mouth as needed for Pain. Instructed to take DOS PRN, with a small sip of water, per anesthesia protocol. Indications: Pain   Yes Provider, Historical  
cpap machine kit by Does Not Apply route. Yes Provider, Historical  
 
Allergies Allergen Reactions  Adhesive Tape-Silicones Rash Adhesives for gel pads used for cardioversion/ablation caused redness & itching  Ceclor [Cefaclor] Rash  Symbyax [Olanzapine-Fluoxetine] Other (comments) Causes Severe pain Objective:  
 
Physical Exam:  
Patient Vitals for the past 24 hrs: 
 Temp Pulse Resp BP SpO2  
04/02/19 1347 97.5 °F (36.4 °C) 79 18 165/90 96 % ECG:   
EKG Results Procedure 720 Value Units Date/Time EKG, 12 LEAD, INITIAL [[id number]] Order Status:  Canceled Data Review:  
Labs:  
Recent Results (from the past 24 hour(s)) CBC WITH AUTOMATED DIFF Collection Time: 04/02/19 12:19 PM  
Result Value Ref Range WBC 5.0 4.3 - 11.1 K/uL  
 RBC 4.59 4.05 - 5.2 M/uL  
 HGB 13.6 11.7 - 15.4 g/dL HCT 42.5 35.8 - 46.3 % MCV 92.6 79.6 - 97.8 FL  
 MCH 29.6 26.1 - 32.9 PG  
 MCHC 32.0 31.4 - 35.0 g/dL  
 RDW 14.0 11.9 - 14.6 % PLATELET 969 050 - 356 K/uL MPV 11.0 9.4 - 12.3 FL ABSOLUTE NRBC 0.00 0.0 - 0.2 K/uL  
 DF AUTOMATED NEUTROPHILS 59 43 - 78 % LYMPHOCYTES 25 13 - 44 % MONOCYTES 12 4.0 - 12.0 % EOSINOPHILS 3 0.5 - 7.8 % BASOPHILS 1 0.0 - 2.0 % IMMATURE GRANULOCYTES 0 0.0 - 5.0 %  
 ABS. NEUTROPHILS 3.0 1.7 - 8.2 K/UL  
 ABS. LYMPHOCYTES 1.3 0.5 - 4.6 K/UL  
 ABS. MONOCYTES 0.6 0.1 - 1.3 K/UL  
 ABS. EOSINOPHILS 0.2 0.0 - 0.8 K/UL  
 ABS. BASOPHILS 0.0 0.0 - 0.2 K/UL  
 ABS. IMM. GRANS. 0.0 0.0 - 0.5 K/UL PROTHROMBIN TIME + INR Collection Time: 04/02/19 12:19 PM  
Result Value Ref Range Prothrombin time 14.5 11.7 - 14.5 sec INR 1.1 PTT Collection Time: 04/02/19 12:19 PM  
Result Value Ref Range aPTT 32.5 24.7 - 39.8 SEC METABOLIC PANEL, BASIC Collection Time: 04/02/19 12:19 PM  
Result Value Ref Range Sodium 141 136 - 145 mmol/L Potassium 3.8 3.5 - 5.1 mmol/L Chloride 109 (H) 98 - 107 mmol/L  
 CO2 25 21 - 32 mmol/L Anion gap 7 mmol/L Glucose 85 65 - 100 mg/dL BUN 14 8 - 23 MG/DL Creatinine 0.79 0.6 - 1.0 MG/DL  
 GFR est AA >60 >60 ml/min/1.73m2 GFR est non-AA >60 ml/min/1.73m2 Calcium 9.2 8.3 - 10.4 MG/DL URINALYSIS W/ RFLX MICROSCOPIC Collection Time: 04/02/19 12:19 PM  
Result Value Ref Range Color YELLOW Appearance CLEAR Specific gravity 1.010 1.001 - 1.023    
 pH (UA) 7.0 5.0 - 9.0 Protein NEGATIVE  NEG mg/dL Glucose NEGATIVE  mg/dL Ketone NEGATIVE  NEG mg/dL Bilirubin NEGATIVE  NEG Blood NEGATIVE  NEG Urobilinogen 0.2 0.2 - 1.0 EU/dL Nitrites NEGATIVE  NEG Leukocyte Esterase NEGATIVE  NEG    
ALBUMIN Collection Time: 04/02/19 12:19 PM  
Result Value Ref Range Albumin 3.7 3.2 - 4.6 g/dL HEMOGLOBIN A1C WITH EAG Collection Time: 04/02/19 12:19 PM  
Result Value Ref Range Hemoglobin A1c 5.3 % Est. average glucose 105 mg/dL Problem List: 
) Patient Active Problem List  
Diagnosis Code  Essential hypertension I10  
 Mixed hyperlipidemia E78.2  Major depressive disorder with single episode, in full remission ([facility] F32.5  PRAVEEN (obstructive sleep apnea) G47.33  
 Anxiety F41.9  VSD (ventricular septal defect) Q21.0  Persistent atrial fibrillation (HCC) I48.1  Morbid obesity with BMI of 45.0-49.9, adult (HCC) E66.01, Z68.42  
 Acquired hypothyroidism E03.9  Mild intermittent asthma without complication A62.79  
 High risk medications (not anticoagulants) long-term use Z79.899 Total Joint Surgery Pre-Assessment Recommendations:          
Patient is to wear home CPAP during hospitalization. PEP therapy BID Albuterol every 6 hours as need during hospitalization. Signed By: Lalitha Huerta NP-C April 2, 2019

## 2019-04-02 NOTE — PERIOP NOTES
Patient verified name and . Order for consent found in EHR and matches case posting; patient verified. Type 3 surgery, PAT joint assessment complete. Labs per surgeon: Hemoglobin A1C, Albumin, UA, BMP, ptt, pt/inr, cbc; results within anesthesia limits. NICOTINE/CONTININE results pending--charge nurse to follow up. T&S DOS; order signed and held in EHR. Labs per anesthesia protocol: All prior to surgery labs included in surgeon's orders. BMP DOS and MG DOS due to patient being on Tikosyn; orders signed and held in EHR. EKG: completed 3/13/19; results within anesthesia limits. Tracing placed on chart if needed for anesthesia reference. Repeat EKG needed DOS due to patient being on Tikosyn; EKG order signed and held in EHR. Pulmonary note (3/26/19) located in EHR if needed for anesthesia reference. Patient had afib ablation with Tikosyn loading on 19. Patient f/u with cardiology on 3/13/19. EKG completed during office visit and showed sinus rhythm. Patient's HR regular during PAT appointment with a rate of 79 bpm. Patient denies SOB, fatigue, CP, and dizziness. Patient reports she feels \"much better\" since ablation. Clearance to hold Eliquis 3 days prior to surgery requested from [facility]--charge nurse to follow up. Cardiology note (3/13/19) and Echo (19) placed on chart if needed for anesthesia reference. MRSA/MSSA swab collected; pharmacy to review and dose antibiotic as appropriate. Hibiclens and instructions to return bottle on DOS given per hospital policy. Patient provided with handouts including Guide to Surgery, Pain Management, Hand Hygiene, Blood Transfusion Education, and [facility] Brochure. Patient answered medical/surgical history questions at their best of ability. All prior to admission medications documented in [facility]. Original medication prescription bottle NOT visualized during patient appointment. Patient instructed to hold all vitamins 7 days prior to surgery and NSAIDS 5 days prior to surgery. Prescription medications to hold: Eliquis 3 days prior to surgery. PATIENT INSTRUCTED TO BEGIN TAKING AN 81 MG ASA ONCE ELIQUIS IS STOPPED. PATIENT AND PATIENT'S  VERBALIZED UNDERSTANDING. Patient instructed to continue previous medications as prescribed prior to surgery and to take the following medications the day of surgery according to anesthesia guidelines with a small sip of water: Wellbutrin, Tikosyn, ProAir PRN, Advair, Levothyroxine, Nasonex, Zyrtec, montelukast, Tylenol PRN, 81 mg ASA. Patient instructed to bring CPAP,ProAir, and Advair to the hospital on the DOS per anesthesia protocol. Patient instructed to bring non-formulary Nasonex and Zyrtec to the hospital on the DOS, in the original bottles, and give to nurse. Patient teach back successful and patient demonstrates knowledge of instruction.

## 2019-04-02 NOTE — PROGRESS NOTES
SW met with pt in Prehab to discuss Left TKA scheduled for 4/17/19. Pt plans to return home with spouse and HHPT. Pt resides in [facility] and is agreeable to [facility]. [facility] referral completed. Pt had Right TKA in Jan 2018. Pt has cane, SIVAKUMAR and BSC. Pt aware dc plans are complete and SW will not see her postop unless needs arise and pt requests to see SW. No additional needs or questions identified at this time. Analia Martin

## 2019-04-02 NOTE — PROGRESS NOTES
04/02/19 1200 Oxygen Therapy O2 Sat (%) 95 % Pulse via Oximetry 81 beats per minute O2 Device Room air Pre-Treatment Breath Sounds Bilateral Clear Pre FEV1 (liters) 1.7 liters % Predicted 73 Incentive Spirometry Treatment Actual Volume (ml) 1500 ml Initial respiratory Assessment completed with pt. Pt was interviewed and evaluated in [facility] prior to surgery. Patient ID: 
Fabrice Moreno [id number] 
79 y.o. 
1950 Surgeon: Dr. Declan Hsu Date of Surgery: 4/17/2019 Procedure: Total Left Knee Arthroplasty Primary Care Physician: Zan Mcburney, [phone] Specialists: Kristina Curtis Pt instructed in the use of Incentive Spirometry. Pt instructed to bring Incentive Spirometer back on date of surgery & to start using Is upon return to pt room. Pt taught proper cough technique History of smoking:   NONE Quit date:        
 
Secondhand smoke:PARENTS Past procedures with Oxygen desaturation:DELAYED AWAKENING Past Medical History:  
Diagnosis Date  Adverse effect of anesthesia   
 delayed awakening in the past, patient states improved after using CPAP--patient's  states better for patient to wake up on side, not back.  Allergic rhinitis  Anxiety  Arthritis OA  Asthma Followed by Dr. Giovanny Jackson, controlled with daily inhaler and PRN Ventolin  Depression   
 under control with med  Endocarditis 1992  
 hx 1992  
 Heart murmur   
 congenital, asymptomatic--Echo completed 2/18/19  Hematuria  History of MRSA infection on left breast  
 5/2016 : treated/ resolved  HLD (hyperlipidemia)  Hypertension   
 controlled with medication  Hypothyroid   
 controlled with medication  Hypothyroidism due to acquired atrophy of thyroid 4/28/2015  Intertrigo  Irregular heart beat  Mass of colon  Morbid obesity ([facility] [address] 48.7 bmi  Persistent atrial fibrillation ([address]   
 s/p ablation (2/2019), Tikosyn loading---EKG dated 3/13/19 shows \"sinus rhythm, rate 70. \" Patient is followed by [facility].  Reactive airway disease with status asthmaticus   
 hx only  Scoliosis 8/4/2016  Sleep apnea   
 cpap complaint.  Varicose veins  VSD (ventricular septal defect) 07/22/2016  
 per echo (2/18/19) \"small PFO in the baseline state. \" Patient is followed by [facility]. HX OF MULTIPLE ABLATIONS FOR A-FIB HX OF MILD ASTHMA Respiratory history:DENIES SOB Respiratory meds:  ADVAIR BID 
 
 
                                
FAMILY PRESENT:               
                                            NO 
 
                                   
PAST SLEEP STUDY:        YES                    
HX OF PRAVEEN:                        YES                     
                                
 
PRAVEEN assessment: PHYSICAL EXAM Body mass index is 47.12 kg/m². Visit Vitals /90 (BP 1 Location: Left arm, BP Patient Position: Sitting) Pulse 79 Temp 97.5 °F (36.4 °C) Resp 18 Ht 5' 3\" (1.6 m) Wt 120.7 kg (266 lb) SpO2 96% BMI 47.12 kg/m² Neck circumference:  41.5    cm Loud snoring:        YES Witnessed apnea or wakening gasping or choking:,              
                                                                                              APNEA Awakens with headaches:                                                  DENIES Morning or daytime tiredness/ sleepiness:                     
                                                                                     TIRED Dry mouth or sore throat in morning:                YES Velasquez stage:  3 SACS score:30 STOP/BAN 
 
                         
CPAP:                   
                                  HOME CPAP ALBUTEROL NEB Q6 PRN          SPACER Referrals: 
 
Pt. Phone Number:

## 2019-04-02 NOTE — PERIOP NOTES
Labs dated 4/2/19 routed via [facility] to patients PCP, Dr. Amy Garnica, per Dr. Elba Vila request.

## 2019-04-03 ENCOUNTER — HOSPITAL ENCOUNTER (OUTPATIENT)
Dept: LAB | Age: 69
Discharge: HOME | End: 2019-04-03
Attending: ORTHOPAEDIC SURGERY
Payer: MEDICARE

## 2019-04-03 LAB
COTININE UR QL SCN: NEGATIVE NG/ML
CRP SERPL-MCNC: 1.1 MG/DL (ref 0–0.9)
DRUG SCREEN COMMENT:, 753798: NORMAL
ERYTHROCYTE [SEDIMENTATION RATE] IN BLOOD: 14 MM/HR (ref 0–30)

## 2019-04-03 PROCEDURE — 86140 C-REACTIVE PROTEIN: CPT

## 2019-04-03 PROCEDURE — 85652 RBC SED RATE AUTOMATED: CPT

## 2019-04-03 PROCEDURE — 36415 COLL VENOUS BLD VENIPUNCTURE: CPT

## 2019-04-04 NOTE — PERIOP NOTES
Nicotine result reviewed- no further action required. Date: 4/2/2019 Department: [facility] Pre Assessment Released By: Rossana Quintanilla (auto-released) Authorizing: Carter Bullock MD  
Component Value Flag Ref Range Units Status Cotinine Screen, urine NEGATIVE    Vylgqi=026 ng/mL Final  
Drug Screen Comment: Comment

## 2019-04-08 NOTE — PERIOP NOTES
2nd request to [facility] for ok to hold Eliquis x 3 day prior to surgery.

## 2019-04-09 NOTE — PERIOP NOTES
Eliquis hold not in EMR dated 4/8/19 from Dr. Josefina Bay that reads: 
 
\" 
MD Chirag Rhodes RN Caller: Unspecified (Today,  9:30 AM)  
   
   
  
OK to hold as requested

## 2019-04-16 ENCOUNTER — ANESTHESIA EVENT (OUTPATIENT)
Dept: SURGERY | Age: 69
DRG: 470 | End: 2019-04-16
Payer: MEDICARE

## 2019-04-17 ENCOUNTER — ANESTHESIA (OUTPATIENT)
Dept: SURGERY | Age: 69
DRG: 470 | End: 2019-04-17
Payer: MEDICARE

## 2019-04-17 ENCOUNTER — HOSPITAL ENCOUNTER (INPATIENT)
Age: 69
LOS: 2 days | Discharge: HOME HEALTH | DRG: 470 | End: 2019-04-19
Attending: ORTHOPAEDIC SURGERY | Admitting: ORTHOPAEDIC SURGERY
Payer: MEDICARE

## 2019-04-17 DIAGNOSIS — M17.12 PRIMARY OSTEOARTHRITIS OF LEFT KNEE: Primary | ICD-10-CM

## 2019-04-17 PROBLEM — M17.9 OA (OSTEOARTHRITIS) OF KNEE: Status: ACTIVE | Noted: 2019-04-17

## 2019-04-17 LAB
ABO + RH BLD: NORMAL
ANION GAP SERPL CALC-SCNC: 4 MMOL/L
BLOOD GROUP ANTIBODIES SERPL: NORMAL
BUN SERPL-MCNC: 16 MG/DL (ref 8–23)
CALCIUM SERPL-MCNC: 9.6 MG/DL (ref 8.3–10.4)
CHLORIDE SERPL-SCNC: 107 MMOL/L (ref 98–107)
CO2 SERPL-SCNC: 28 MMOL/L (ref 21–32)
CREAT SERPL-MCNC: 0.9 MG/DL (ref 0.6–1)
GLUCOSE BLD STRIP.AUTO-MCNC: 91 MG/DL (ref 65–100)
GLUCOSE SERPL-MCNC: 86 MG/DL (ref 65–100)
HGB BLD-MCNC: 11.9 G/DL (ref 11.7–15.4)
MAGNESIUM SERPL-MCNC: 1.7 MG/DL (ref 1.8–2.4)
POTASSIUM SERPL-SCNC: 3.5 MMOL/L (ref 3.5–5.1)
SODIUM SERPL-SCNC: 139 MMOL/L (ref 136–145)
SPECIMEN EXP DATE BLD: NORMAL

## 2019-04-17 PROCEDURE — 77030034849: Performed by: ORTHOPAEDIC SURGERY

## 2019-04-17 PROCEDURE — 74011000250 HC RX REV CODE- 250: Performed by: ORTHOPAEDIC SURGERY

## 2019-04-17 PROCEDURE — 77030018836 HC SOL IRR NACL ICUM -A: Performed by: ORTHOPAEDIC SURGERY

## 2019-04-17 PROCEDURE — 76942 ECHO GUIDE FOR BIOPSY: CPT | Performed by: ORTHOPAEDIC SURGERY

## 2019-04-17 PROCEDURE — 74011000302 HC RX REV CODE- 302: Performed by: ORTHOPAEDIC SURGERY

## 2019-04-17 PROCEDURE — 77030013708 HC HNDPC SUC IRR PULS STRY –B: Performed by: ORTHOPAEDIC SURGERY

## 2019-04-17 PROCEDURE — 74011250636 HC RX REV CODE- 250/636: Performed by: ORTHOPAEDIC SURGERY

## 2019-04-17 PROCEDURE — 85018 HEMOGLOBIN: CPT

## 2019-04-17 PROCEDURE — 77030020782 HC GWN BAIR PAWS FLX 3M -B: Performed by: ANESTHESIOLOGY

## 2019-04-17 PROCEDURE — 74011250636 HC RX REV CODE- 250/636: Performed by: ANESTHESIOLOGY

## 2019-04-17 PROCEDURE — 94760 N-INVAS EAR/PLS OXIMETRY 1: CPT

## 2019-04-17 PROCEDURE — 77030006835 HC BLD SAW SAG STRY -B: Performed by: ORTHOPAEDIC SURGERY

## 2019-04-17 PROCEDURE — 77030002922 HC SUT FBRWRE ARTH -B: Performed by: ORTHOPAEDIC SURGERY

## 2019-04-17 PROCEDURE — 76210000006 HC OR PH I REC 0.5 TO 1 HR: Performed by: ORTHOPAEDIC SURGERY

## 2019-04-17 PROCEDURE — 76060000035 HC ANESTHESIA 2 TO 2.5 HR: Performed by: ORTHOPAEDIC SURGERY

## 2019-04-17 PROCEDURE — 0SRD0JA REPLACEMENT OF LEFT KNEE JOINT WITH SYNTHETIC SUBSTITUTE, UNCEMENTED, OPEN APPROACH: ICD-10-PCS | Performed by: ORTHOPAEDIC SURGERY

## 2019-04-17 PROCEDURE — 77030003665 HC NDL SPN BBMI -A: Performed by: ANESTHESIOLOGY

## 2019-04-17 PROCEDURE — 74011250636 HC RX REV CODE- 250/636

## 2019-04-17 PROCEDURE — 77030025869: Performed by: ORTHOPAEDIC SURGERY

## 2019-04-17 PROCEDURE — C1776 JOINT DEVICE (IMPLANTABLE): HCPCS | Performed by: ORTHOPAEDIC SURGERY

## 2019-04-17 PROCEDURE — 74011000258 HC RX REV CODE- 258: Performed by: ORTHOPAEDIC SURGERY

## 2019-04-17 PROCEDURE — 86580 TB INTRADERMAL TEST: CPT | Performed by: ORTHOPAEDIC SURGERY

## 2019-04-17 PROCEDURE — 77030033067 HC SUT PDO STRATFX SPIR J&J -B: Performed by: ORTHOPAEDIC SURGERY

## 2019-04-17 PROCEDURE — 77030002933 HC SUT MCRYL J&J -A: Performed by: ORTHOPAEDIC SURGERY

## 2019-04-17 PROCEDURE — 77030003602 HC NDL NRV BLK BBMI -B: Performed by: ANESTHESIOLOGY

## 2019-04-17 PROCEDURE — 76010000171 HC OR TIME 2 TO 2.5 HR INTENSV-TIER 1: Performed by: ORTHOPAEDIC SURGERY

## 2019-04-17 PROCEDURE — 74011000250 HC RX REV CODE- 250

## 2019-04-17 PROCEDURE — 65270000029 HC RM PRIVATE

## 2019-04-17 PROCEDURE — 77030036688 HC BLNKT CLD THER S2SG -B

## 2019-04-17 PROCEDURE — 82962 GLUCOSE BLOOD TEST: CPT

## 2019-04-17 PROCEDURE — 77030012935 HC DRSG AQUACEL BMS -B: Performed by: ORTHOPAEDIC SURGERY

## 2019-04-17 PROCEDURE — 86900 BLOOD TYPING SEROLOGIC ABO: CPT

## 2019-04-17 PROCEDURE — 83735 ASSAY OF MAGNESIUM: CPT

## 2019-04-17 PROCEDURE — 74011250637 HC RX REV CODE- 250/637: Performed by: ORTHOPAEDIC SURGERY

## 2019-04-17 PROCEDURE — 77030020263 HC SOL INJ SOD CL0.9% LFCR 1000ML

## 2019-04-17 PROCEDURE — 80048 BASIC METABOLIC PNL TOTAL CA: CPT

## 2019-04-17 PROCEDURE — 77030007880 HC KT SPN EPDRL BBMI -B: Performed by: ANESTHESIOLOGY

## 2019-04-17 PROCEDURE — 77030032490 HC SLV COMPR SCD KNE COVD -B

## 2019-04-17 PROCEDURE — 77030027520 HC SEAL BPLR AQMNTYS MEDT -F: Performed by: ORTHOPAEDIC SURGERY

## 2019-04-17 PROCEDURE — 76010010054 HC POST OP PAIN BLOCK: Performed by: ORTHOPAEDIC SURGERY

## 2019-04-17 PROCEDURE — 74011250637 HC RX REV CODE- 250/637: Performed by: ANESTHESIOLOGY

## 2019-04-17 DEVICE — COMPONENT PAT DIA35MM THK10MM SUPERIOR/INFERIOR KNEE: Type: IMPLANTABLE DEVICE | Site: KNEE | Status: FUNCTIONAL

## 2019-04-17 DEVICE — INSERT TIB SZ 3 THK11MM UNIV KNEE POLYETH CNDYL STBL PRI: Type: IMPLANTABLE DEVICE | Site: KNEE | Status: FUNCTIONAL

## 2019-04-17 DEVICE — COMPNT FEM CR TRIATHLN 4 L PA --: Type: IMPLANTABLE DEVICE | Site: KNEE | Status: FUNCTIONAL

## 2019-04-17 DEVICE — BASEPLATE TIB SZ 3 AP44MM ML67MM KNEE TRITANIUM 4 CRUCFRM: Type: IMPLANTABLE DEVICE | Site: KNEE | Status: FUNCTIONAL

## 2019-04-17 RX ORDER — VANCOMYCIN 1.75 GRAM/500 ML IN 0.9 % SODIUM CHLORIDE INTRAVENOUS
1750 ONCE
Status: COMPLETED | OUTPATIENT
Start: 2019-04-17 | End: 2019-04-17

## 2019-04-17 RX ORDER — KETOROLAC TROMETHAMINE 30 MG/ML
INJECTION, SOLUTION INTRAMUSCULAR; INTRAVENOUS AS NEEDED
Status: DISCONTINUED | OUTPATIENT
Start: 2019-04-17 | End: 2019-04-17 | Stop reason: HOSPADM

## 2019-04-17 RX ORDER — PROPOFOL 10 MG/ML
INJECTION, EMULSION INTRAVENOUS
Status: DISCONTINUED | OUTPATIENT
Start: 2019-04-17 | End: 2019-04-17 | Stop reason: HOSPADM

## 2019-04-17 RX ORDER — ONDANSETRON 8 MG/1
8 TABLET, ORALLY DISINTEGRATING ORAL
Status: DISCONTINUED | OUTPATIENT
Start: 2019-04-17 | End: 2019-04-19 | Stop reason: HOSPADM

## 2019-04-17 RX ORDER — MIDAZOLAM HYDROCHLORIDE 1 MG/ML
INJECTION, SOLUTION INTRAMUSCULAR; INTRAVENOUS AS NEEDED
Status: DISCONTINUED | OUTPATIENT
Start: 2019-04-17 | End: 2019-04-17 | Stop reason: HOSPADM

## 2019-04-17 RX ORDER — CELECOXIB 200 MG/1
200 CAPSULE ORAL
Status: COMPLETED | OUTPATIENT
Start: 2019-04-17 | End: 2019-04-17

## 2019-04-17 RX ORDER — NALOXONE HYDROCHLORIDE 0.4 MG/ML
.2-.4 INJECTION, SOLUTION INTRAMUSCULAR; INTRAVENOUS; SUBCUTANEOUS
Status: DISCONTINUED | OUTPATIENT
Start: 2019-04-17 | End: 2019-04-19 | Stop reason: HOSPADM

## 2019-04-17 RX ORDER — TRANEXAMIC ACID 650 1/1
1300 TABLET ORAL
Status: COMPLETED | OUTPATIENT
Start: 2019-04-17 | End: 2019-04-17

## 2019-04-17 RX ORDER — CYCLOBENZAPRINE HCL 10 MG
5 TABLET ORAL 3 TIMES DAILY
Status: DISCONTINUED | OUTPATIENT
Start: 2019-04-17 | End: 2019-04-19 | Stop reason: HOSPADM

## 2019-04-17 RX ORDER — MIDAZOLAM HYDROCHLORIDE 1 MG/ML
2 INJECTION, SOLUTION INTRAMUSCULAR; INTRAVENOUS ONCE
Status: COMPLETED | OUTPATIENT
Start: 2019-04-17 | End: 2019-04-17

## 2019-04-17 RX ORDER — OXYCODONE HCL 10 MG/1
10 TABLET, FILM COATED, EXTENDED RELEASE ORAL EVERY 12 HOURS
Status: DISCONTINUED | OUTPATIENT
Start: 2019-04-17 | End: 2019-04-19 | Stop reason: HOSPADM

## 2019-04-17 RX ORDER — CYCLOBENZAPRINE HCL 5 MG
5 TABLET ORAL
Qty: 30 TAB | Refills: 0 | Status: SHIPPED | OUTPATIENT
Start: 2019-04-17 | End: 2019-06-19 | Stop reason: ALTCHOICE

## 2019-04-17 RX ORDER — DEXAMETHASONE SODIUM PHOSPHATE 4 MG/ML
INJECTION, SOLUTION INTRA-ARTICULAR; INTRALESIONAL; INTRAMUSCULAR; INTRAVENOUS; SOFT TISSUE AS NEEDED
Status: DISCONTINUED | OUTPATIENT
Start: 2019-04-17 | End: 2019-04-17 | Stop reason: HOSPADM

## 2019-04-17 RX ORDER — ZOLPIDEM TARTRATE 5 MG/1
5 TABLET ORAL
Status: DISCONTINUED | OUTPATIENT
Start: 2019-04-17 | End: 2019-04-19 | Stop reason: HOSPADM

## 2019-04-17 RX ORDER — HYDROMORPHONE HYDROCHLORIDE 2 MG/1
2 TABLET ORAL
Qty: 42 TAB | Refills: 0 | Status: SHIPPED | OUTPATIENT
Start: 2019-04-17 | End: 2019-04-24

## 2019-04-17 RX ORDER — FENTANYL CITRATE 50 UG/ML
100 INJECTION, SOLUTION INTRAMUSCULAR; INTRAVENOUS ONCE
Status: COMPLETED | OUTPATIENT
Start: 2019-04-17 | End: 2019-04-17

## 2019-04-17 RX ORDER — ACETAMINOPHEN 500 MG
1000 TABLET ORAL EVERY 6 HOURS
Status: DISCONTINUED | OUTPATIENT
Start: 2019-04-18 | End: 2019-04-19 | Stop reason: HOSPADM

## 2019-04-17 RX ORDER — HYDROMORPHONE HYDROCHLORIDE 2 MG/ML
1 INJECTION, SOLUTION INTRAMUSCULAR; INTRAVENOUS; SUBCUTANEOUS
Status: DISCONTINUED | OUTPATIENT
Start: 2019-04-17 | End: 2019-04-19 | Stop reason: HOSPADM

## 2019-04-17 RX ORDER — CEFAZOLIN SODIUM/WATER 2 G/20 ML
2 SYRINGE (ML) INTRAVENOUS EVERY 8 HOURS
Status: COMPLETED | OUTPATIENT
Start: 2019-04-17 | End: 2019-04-18

## 2019-04-17 RX ORDER — OXYCODONE HYDROCHLORIDE 5 MG/1
10 TABLET ORAL
Status: DISCONTINUED | OUTPATIENT
Start: 2019-04-17 | End: 2019-04-17 | Stop reason: HOSPADM

## 2019-04-17 RX ORDER — MONTELUKAST SODIUM 10 MG/1
10 TABLET ORAL DAILY
Status: DISCONTINUED | OUTPATIENT
Start: 2019-04-18 | End: 2019-04-19 | Stop reason: HOSPADM

## 2019-04-17 RX ORDER — AMOXICILLIN 250 MG
2 CAPSULE ORAL DAILY
Status: DISCONTINUED | OUTPATIENT
Start: 2019-04-18 | End: 2019-04-19 | Stop reason: HOSPADM

## 2019-04-17 RX ORDER — ACETAMINOPHEN 500 MG
1000 TABLET ORAL AS NEEDED
Qty: 120 TAB | Refills: 0 | Status: SHIPPED | OUTPATIENT
Start: 2019-04-17 | End: 2019-06-19

## 2019-04-17 RX ORDER — GABAPENTIN 100 MG/1
100 CAPSULE ORAL 2 TIMES DAILY
Status: DISCONTINUED | OUTPATIENT
Start: 2019-04-17 | End: 2019-04-19 | Stop reason: HOSPADM

## 2019-04-17 RX ORDER — BUPIVACAINE HYDROCHLORIDE 7.5 MG/ML
INJECTION, SOLUTION INTRASPINAL AS NEEDED
Status: DISCONTINUED | OUTPATIENT
Start: 2019-04-17 | End: 2019-04-17 | Stop reason: HOSPADM

## 2019-04-17 RX ORDER — SODIUM CHLORIDE, SODIUM LACTATE, POTASSIUM CHLORIDE, CALCIUM CHLORIDE 600; 310; 30; 20 MG/100ML; MG/100ML; MG/100ML; MG/100ML
75 INJECTION, SOLUTION INTRAVENOUS CONTINUOUS
Status: DISCONTINUED | OUTPATIENT
Start: 2019-04-17 | End: 2019-04-17 | Stop reason: HOSPADM

## 2019-04-17 RX ORDER — MAGNESIUM SULFATE HEPTAHYDRATE 40 MG/ML
2 INJECTION, SOLUTION INTRAVENOUS ONCE
Status: COMPLETED | OUTPATIENT
Start: 2019-04-17 | End: 2019-04-17

## 2019-04-17 RX ORDER — SODIUM CHLORIDE 0.9 % (FLUSH) 0.9 %
5-40 SYRINGE (ML) INJECTION AS NEEDED
Status: DISCONTINUED | OUTPATIENT
Start: 2019-04-17 | End: 2019-04-19 | Stop reason: HOSPADM

## 2019-04-17 RX ORDER — ASPIRIN 81 MG/1
81 TABLET ORAL DAILY
Status: DISCONTINUED | OUTPATIENT
Start: 2019-04-18 | End: 2019-04-19 | Stop reason: HOSPADM

## 2019-04-17 RX ORDER — FAMOTIDINE 20 MG/1
20 TABLET, FILM COATED ORAL ONCE
Status: COMPLETED | OUTPATIENT
Start: 2019-04-17 | End: 2019-04-17

## 2019-04-17 RX ORDER — DIPHENHYDRAMINE HCL 25 MG
25 CAPSULE ORAL
Status: DISCONTINUED | OUTPATIENT
Start: 2019-04-17 | End: 2019-04-19 | Stop reason: HOSPADM

## 2019-04-17 RX ORDER — DEXAMETHASONE SODIUM PHOSPHATE 100 MG/10ML
10 INJECTION INTRAMUSCULAR; INTRAVENOUS ONCE
Status: ACTIVE | OUTPATIENT
Start: 2019-04-18 | End: 2019-04-19

## 2019-04-17 RX ORDER — LABETALOL HYDROCHLORIDE 5 MG/ML
INJECTION, SOLUTION INTRAVENOUS AS NEEDED
Status: DISCONTINUED | OUTPATIENT
Start: 2019-04-17 | End: 2019-04-17 | Stop reason: HOSPADM

## 2019-04-17 RX ORDER — CELECOXIB 200 MG/1
200 CAPSULE ORAL EVERY 12 HOURS
Status: DISCONTINUED | OUTPATIENT
Start: 2019-04-17 | End: 2019-04-17

## 2019-04-17 RX ORDER — SODIUM CHLORIDE 0.9 % (FLUSH) 0.9 %
5-40 SYRINGE (ML) INJECTION EVERY 8 HOURS
Status: DISCONTINUED | OUTPATIENT
Start: 2019-04-17 | End: 2019-04-19 | Stop reason: HOSPADM

## 2019-04-17 RX ORDER — HYDROMORPHONE HYDROCHLORIDE 2 MG/1
2 TABLET ORAL
Status: DISCONTINUED | OUTPATIENT
Start: 2019-04-17 | End: 2019-04-19 | Stop reason: HOSPADM

## 2019-04-17 RX ORDER — FENTANYL CITRATE 50 UG/ML
INJECTION, SOLUTION INTRAMUSCULAR; INTRAVENOUS AS NEEDED
Status: DISCONTINUED | OUTPATIENT
Start: 2019-04-17 | End: 2019-04-17 | Stop reason: HOSPADM

## 2019-04-17 RX ORDER — BUPROPION HYDROCHLORIDE 75 MG/1
150 TABLET ORAL 2 TIMES DAILY
Status: DISCONTINUED | OUTPATIENT
Start: 2019-04-18 | End: 2019-04-19 | Stop reason: HOSPADM

## 2019-04-17 RX ORDER — LIDOCAINE HYDROCHLORIDE 10 MG/ML
0.1 INJECTION INFILTRATION; PERINEURAL AS NEEDED
Status: DISCONTINUED | OUTPATIENT
Start: 2019-04-17 | End: 2019-04-17 | Stop reason: HOSPADM

## 2019-04-17 RX ORDER — ALBUTEROL SULFATE 90 UG/1
2 AEROSOL, METERED RESPIRATORY (INHALATION)
Status: DISCONTINUED | OUTPATIENT
Start: 2019-04-17 | End: 2019-04-17 | Stop reason: SDUPTHER

## 2019-04-17 RX ORDER — GABAPENTIN 100 MG/1
100 CAPSULE ORAL 3 TIMES DAILY
Qty: 30 CAP | Refills: 0 | Status: SHIPPED | OUTPATIENT
Start: 2019-04-17 | End: 2019-05-15 | Stop reason: ALTCHOICE

## 2019-04-17 RX ORDER — BACITRACIN 50000 [IU]/1
INJECTION, POWDER, FOR SOLUTION INTRAMUSCULAR AS NEEDED
Status: DISCONTINUED | OUTPATIENT
Start: 2019-04-17 | End: 2019-04-17 | Stop reason: HOSPADM

## 2019-04-17 RX ORDER — OXYCODONE HYDROCHLORIDE 5 MG/1
5 TABLET ORAL
Status: DISCONTINUED | OUTPATIENT
Start: 2019-04-17 | End: 2019-04-17 | Stop reason: HOSPADM

## 2019-04-17 RX ORDER — LOSARTAN POTASSIUM 50 MG/1
50 TABLET ORAL DAILY
Status: DISCONTINUED | OUTPATIENT
Start: 2019-04-18 | End: 2019-04-19 | Stop reason: HOSPADM

## 2019-04-17 RX ORDER — EPHEDRINE SULFATE 50 MG/ML
INJECTION, SOLUTION INTRAVENOUS AS NEEDED
Status: DISCONTINUED | OUTPATIENT
Start: 2019-04-17 | End: 2019-04-17 | Stop reason: HOSPADM

## 2019-04-17 RX ORDER — ACETAMINOPHEN 10 MG/ML
1000 INJECTION, SOLUTION INTRAVENOUS ONCE
Status: COMPLETED | OUTPATIENT
Start: 2019-04-17 | End: 2019-04-17

## 2019-04-17 RX ORDER — FLUTICASONE PROPIONATE AND SALMETEROL 250; 50 UG/1; UG/1
1 POWDER RESPIRATORY (INHALATION)
Status: DISCONTINUED | OUTPATIENT
Start: 2019-04-17 | End: 2019-04-19 | Stop reason: HOSPADM

## 2019-04-17 RX ORDER — FLUTICASONE PROPIONATE 50 MCG
2 SPRAY, SUSPENSION (ML) NASAL DAILY
Status: DISCONTINUED | OUTPATIENT
Start: 2019-04-18 | End: 2019-04-19 | Stop reason: HOSPADM

## 2019-04-17 RX ORDER — ONDANSETRON 8 MG/1
8 TABLET, ORALLY DISINTEGRATING ORAL
Qty: 30 TAB | Refills: 0 | Status: SHIPPED | OUTPATIENT
Start: 2019-04-17 | End: 2019-05-15 | Stop reason: ALTCHOICE

## 2019-04-17 RX ORDER — SODIUM CHLORIDE 9 MG/ML
100 INJECTION, SOLUTION INTRAVENOUS CONTINUOUS
Status: DISPENSED | OUTPATIENT
Start: 2019-04-17 | End: 2019-04-19

## 2019-04-17 RX ORDER — ROPIVACAINE HYDROCHLORIDE 2 MG/ML
INJECTION, SOLUTION EPIDURAL; INFILTRATION; PERINEURAL AS NEEDED
Status: DISCONTINUED | OUTPATIENT
Start: 2019-04-17 | End: 2019-04-17 | Stop reason: HOSPADM

## 2019-04-17 RX ORDER — LEVOTHYROXINE SODIUM 100 UG/1
150 TABLET ORAL
Status: DISCONTINUED | OUTPATIENT
Start: 2019-04-18 | End: 2019-04-19 | Stop reason: HOSPADM

## 2019-04-17 RX ORDER — ALBUTEROL SULFATE 0.83 MG/ML
2.5 SOLUTION RESPIRATORY (INHALATION)
Status: DISCONTINUED | OUTPATIENT
Start: 2019-04-17 | End: 2019-04-19 | Stop reason: HOSPADM

## 2019-04-17 RX ORDER — MIDAZOLAM HYDROCHLORIDE 1 MG/ML
2 INJECTION, SOLUTION INTRAMUSCULAR; INTRAVENOUS ONCE
Status: DISCONTINUED | OUTPATIENT
Start: 2019-04-17 | End: 2019-04-17 | Stop reason: HOSPADM

## 2019-04-17 RX ORDER — HYDROMORPHONE HYDROCHLORIDE 2 MG/ML
0.5 INJECTION, SOLUTION INTRAMUSCULAR; INTRAVENOUS; SUBCUTANEOUS
Status: DISCONTINUED | OUTPATIENT
Start: 2019-04-17 | End: 2019-04-17 | Stop reason: HOSPADM

## 2019-04-17 RX ORDER — TRANEXAMIC ACID 100 MG/ML
INJECTION, SOLUTION INTRAVENOUS AS NEEDED
Status: DISCONTINUED | OUTPATIENT
Start: 2019-04-17 | End: 2019-04-17 | Stop reason: HOSPADM

## 2019-04-17 RX ORDER — ROPIVACAINE HYDROCHLORIDE 2 MG/ML
INJECTION, SOLUTION EPIDURAL; INFILTRATION; PERINEURAL
Status: COMPLETED | OUTPATIENT
Start: 2019-04-17 | End: 2019-04-17

## 2019-04-17 RX ADMIN — DEXAMETHASONE SODIUM PHOSPHATE 10 MG: 4 INJECTION, SOLUTION INTRA-ARTICULAR; INTRALESIONAL; INTRAMUSCULAR; INTRAVENOUS; SOFT TISSUE at 13:52

## 2019-04-17 RX ADMIN — Medication 1 AMPULE: at 20:59

## 2019-04-17 RX ADMIN — CELECOXIB 200 MG: 200 CAPSULE ORAL at 11:00

## 2019-04-17 RX ADMIN — Medication 3 AMPULE: at 11:26

## 2019-04-17 RX ADMIN — CYCLOBENZAPRINE HYDROCHLORIDE 5 MG: 10 TABLET, FILM COATED ORAL at 21:01

## 2019-04-17 RX ADMIN — FENTANYL CITRATE 25 MCG: 50 INJECTION, SOLUTION INTRAMUSCULAR; INTRAVENOUS at 13:54

## 2019-04-17 RX ADMIN — MIDAZOLAM HYDROCHLORIDE 1 MG: 1 INJECTION, SOLUTION INTRAMUSCULAR; INTRAVENOUS at 13:16

## 2019-04-17 RX ADMIN — ROPIVACAINE HYDROCHLORIDE 60 MG: 2 INJECTION, SOLUTION EPIDURAL; INFILTRATION; PERINEURAL at 12:42

## 2019-04-17 RX ADMIN — LABETALOL HYDROCHLORIDE 10 MG: 5 INJECTION, SOLUTION INTRAVENOUS at 13:59

## 2019-04-17 RX ADMIN — ACETAMINOPHEN 1000 MG: 10 INJECTION, SOLUTION INTRAVENOUS at 18:00

## 2019-04-17 RX ADMIN — FENTANYL CITRATE 100 MCG: 50 INJECTION INTRAMUSCULAR; INTRAVENOUS at 12:35

## 2019-04-17 RX ADMIN — BUPIVACAINE HYDROCHLORIDE 2 ML: 7.5 INJECTION, SOLUTION INTRASPINAL at 13:27

## 2019-04-17 RX ADMIN — OXYCODONE HYDROCHLORIDE 10 MG: 10 TABLET, FILM COATED, EXTENDED RELEASE ORAL at 17:45

## 2019-04-17 RX ADMIN — Medication 5 ML: at 21:03

## 2019-04-17 RX ADMIN — ACETAMINOPHEN 1000 MG: 500 TABLET, FILM COATED ORAL at 23:06

## 2019-04-17 RX ADMIN — SODIUM CHLORIDE, SODIUM LACTATE, POTASSIUM CHLORIDE, AND CALCIUM CHLORIDE: 600; 310; 30; 20 INJECTION, SOLUTION INTRAVENOUS at 13:27

## 2019-04-17 RX ADMIN — MAGNESIUM SULFATE IN WATER 2 G: 40 INJECTION, SOLUTION INTRAVENOUS at 12:13

## 2019-04-17 RX ADMIN — GENTAMICIN SULFATE 400 MG: 40 INJECTION, SOLUTION INTRAMUSCULAR; INTRAVENOUS at 11:54

## 2019-04-17 RX ADMIN — PROPOFOL 75 MCG/KG/MIN: 10 INJECTION, EMULSION INTRAVENOUS at 13:30

## 2019-04-17 RX ADMIN — EPHEDRINE SULFATE 20 MG: 50 INJECTION, SOLUTION INTRAVENOUS at 14:26

## 2019-04-17 RX ADMIN — SODIUM CHLORIDE, SODIUM LACTATE, POTASSIUM CHLORIDE, AND CALCIUM CHLORIDE 75 ML/HR: 600; 310; 30; 20 INJECTION, SOLUTION INTRAVENOUS at 11:52

## 2019-04-17 RX ADMIN — TRANEXAMIC ACID 1000 MG: 100 INJECTION, SOLUTION INTRAVENOUS at 13:52

## 2019-04-17 RX ADMIN — TRANEXAMIC ACID 1000 MG: 100 INJECTION, SOLUTION INTRAVENOUS at 14:42

## 2019-04-17 RX ADMIN — MIDAZOLAM HYDROCHLORIDE 2 MG: 1 INJECTION, SOLUTION INTRAMUSCULAR; INTRAVENOUS at 12:35

## 2019-04-17 RX ADMIN — GABAPENTIN 100 MG: 100 CAPSULE ORAL at 17:45

## 2019-04-17 RX ADMIN — MIDAZOLAM HYDROCHLORIDE 1 MG: 1 INJECTION, SOLUTION INTRAMUSCULAR; INTRAVENOUS at 13:27

## 2019-04-17 RX ADMIN — TUBERCULIN PURIFIED PROTEIN DERIVATIVE 5 UNITS: 5 INJECTION, SOLUTION INTRADERMAL at 11:30

## 2019-04-17 RX ADMIN — FLUTICASONE PROPIONATE AND SALMETEROL 1 PUFF: 250; 50 POWDER RESPIRATORY (INHALATION) at 21:00

## 2019-04-17 RX ADMIN — EPHEDRINE SULFATE 10 MG: 50 INJECTION, SOLUTION INTRAVENOUS at 14:18

## 2019-04-17 RX ADMIN — CYCLOBENZAPRINE HYDROCHLORIDE 5 MG: 10 TABLET, FILM COATED ORAL at 17:45

## 2019-04-17 RX ADMIN — Medication 2 G: at 17:53

## 2019-04-17 RX ADMIN — FENTANYL CITRATE 50 MCG: 50 INJECTION, SOLUTION INTRAMUSCULAR; INTRAVENOUS at 13:16

## 2019-04-17 RX ADMIN — TRANEXAMIC ACID 1300 MG: 650 TABLET ORAL at 17:44

## 2019-04-17 RX ADMIN — SODIUM CHLORIDE, SODIUM LACTATE, POTASSIUM CHLORIDE, AND CALCIUM CHLORIDE: 600; 310; 30; 20 INJECTION, SOLUTION INTRAVENOUS at 13:14

## 2019-04-17 RX ADMIN — EPHEDRINE SULFATE 20 MG: 50 INJECTION, SOLUTION INTRAVENOUS at 14:20

## 2019-04-17 RX ADMIN — VANCOMYCIN HYDROCHLORIDE 1750 MG: 10 INJECTION, POWDER, LYOPHILIZED, FOR SOLUTION INTRAVENOUS at 13:09

## 2019-04-17 RX ADMIN — FAMOTIDINE 20 MG: 20 TABLET ORAL at 11:26

## 2019-04-17 RX ADMIN — FENTANYL CITRATE 25 MCG: 50 INJECTION, SOLUTION INTRAMUSCULAR; INTRAVENOUS at 13:49

## 2019-04-17 RX ADMIN — TRANEXAMIC ACID 1300 MG: 650 TABLET ORAL at 21:01

## 2019-04-17 RX ADMIN — LABETALOL HYDROCHLORIDE 5 MG: 5 INJECTION, SOLUTION INTRAVENOUS at 14:10

## 2019-04-17 NOTE — ANESTHESIA PREPROCEDURE EVALUATION
Anesthetic History               Review of Systems / Medical History  Patient summary reviewed and pertinent labs reviewed    Pulmonary        Sleep apnea: CPAP    Asthma : well controlled       Neuro/Psych         Psychiatric history (Depression)     Cardiovascular    Hypertension: well controlled  Valvular problems/murmurs: mitral insufficiency      Dysrhythmias : atrial fibrillation  Hyperlipidemia    Exercise tolerance: >4 METS  Comments: 2018 ECHO: VSD (L->R);  Preserved EF  Denies CP    GI/Hepatic/Renal  Within defined limits              Endo/Other      Hypothyroidism: well controlled  Morbid obesity     Other Findings              Physical Exam    Airway  Mallampati: II  TM Distance: 4 - 6 cm  Neck ROM: normal range of motion   Mouth opening: Normal     Cardiovascular    Rhythm: regular  Rate: normal         Dental    Dentition: Caps/crowns     Pulmonary  Breath sounds clear to auscultation               Abdominal  GI exam deferred       Other Findings            Anesthetic Plan    ASA: 3  Anesthesia type: spinal      Post-op pain plan if not by surgeon: peripheral nerve block single    Induction: Intravenous  Anesthetic plan and risks discussed with: Spouse and Patient

## 2019-04-17 NOTE — ANESTHESIA PROCEDURE NOTES
Spinal Block    Performed by: Raquel Dickens MD  Authorized by: Raquel Dickens MD     Pre-procedure:   Indications: primary anesthetic  Preanesthetic Checklist: patient identified, risks and benefits discussed, anesthesia consent, patient being monitored and timeout performed    Timeout Time: 13:19          Spinal Block:   Patient Position:  Seated  Prep Region:  Lumbar  Prep: Betadine      Location:  L3-4  Technique:  Single shot    Local Dose (mL):  3    Needle:   Needle Type:  Quincke  Needle Gauge:  22 G  Attempts:  1      Events: CSF confirmed, no blood with aspiration and no paresthesia        Assessment:  Insertion:  Uncomplicated  Patient tolerance:  Patient tolerated the procedure well with no immediate complications

## 2019-04-17 NOTE — PROGRESS NOTES
[facility] Face to Face Encounter Patients Name: Ivonne Larsen    YOB: 1950 Ordering Physician:  Lily Ken Primary Diagnosis: Unilateral primary osteoarthritis, left knee [M17.12] OA (osteoarthritis) of knee [M17.10]  S/p left TKA Date of Face to Face:   4/17/2019 Face to Face Encounter findings are related to primary reason for home care:   yes. 1. I certify that the patient needs intermittent care as follows: physical therapy: gait/stair training 2. I certify that this patient is homebound, that is: 1) patient requires the use of a walker device, special transportation, or assistance of another to leave the home; or 2) patient's condition makes leaving the home medically contraindicated; and 3) patient has a normal inability to leave the home and leaving the home requires considerable and taxing effort. Patient may leave the home for infrequent and short duration for medical reasons, and occasional absences for non-medical reasons. Homebound status is due to the following functional limitations: Patient's ambulation limited secondary to severe pain and requires the use of an assistive device and the assistance of a caregiver for safe completion. Patient with strength and ROM deficits limiting ambulation endurance requiring the use of an assistive device and the assistance of a caregiver. Patient deemed temporarily homebound secondary to increased risk for infection when leaving home and going out into the community. 3. I certify that this patient is under my care and that I, or a nurse practitioner or  [id number], or clinical nurse specialist, or certified nurse midwife, working with me, had a Face-to-Face Encounter that meets the physician Face-to-Face Encounter requirements.   The following are the clinical findings from the [address] [place] encounter that support the need for skilled services and is a summary of the encounter: see hospital chart James Alexanders, BSW 
4/17/2019 THE FOLLOWING TO BE COMPLETED BY THE COMMUNITY PHYSICIAN: 
 
I concur with the findings described above from the F2F encounter that this patient is homebound and in need of a skilled service. Certifying Physician: _____________________________________ Printed Certifying Physician Name: _____________________________________ Date: _________________

## 2019-04-17 NOTE — PROGRESS NOTES
04/17/19 1626 Oxygen Therapy O2 Sat (%) 98 % Pulse via Oximetry 54 beats per minute O2 Device Room air O2 Flow Rate (L/min) 0 l/min Incentive Spirometry Treatment Actual Volume (ml) 1500 ml Number of Attempts 2 Patient achieved  1500    Ml/sec on IS. Patient encouraged to do every hour while awake-patient agreed and demonstrated. No shortness of breath or distress noted. BS are clear b/l. [facility] notes reviewed- Pt brought home CPAP, home medications verified by pharmacist and spacer given.

## 2019-04-17 NOTE — H&P
[facility] Pre Operative History and Physical Exam 
 
Patient ID: 
Kallie Daley [id number] 
79 y.o. 
1950 Today: April 17, 2019 CC:  Left  Knee pain HPI:   The patient has end stage arthritis of the left knee. The patient was evaluated and examined during consultation prior to today. The patient complains of knee pain with activities, reports pain as mostly occurring along the joint lines, reports stiffness of the knee after inactivity, and swelling/pain at the end of the day and after increased physical activity. The pain affects the patients activities of daily living and quality of life. The patient has attempted and exhausted conservative treatment. There have been no changes to the patient's orthopedic condition since the last office visit. Past Medical History: 
Past Medical History:  
Diagnosis Date  Adverse effect of anesthesia   
 delayed awakening in the past, patient states improved after using CPAP--patient's  states better for patient to wake up on side, not back.  Allergic rhinitis  Anxiety  Arthritis OA  Asthma Followed by Dr. Randee Burnett, controlled with daily inhaler and PRN Ventolin  Depression   
 under control with med  Endocarditis 1992  
 hx 1992  
 Heart murmur   
 congenital, asymptomatic--Echo completed 2/18/19  Hematuria  History of MRSA infection on left breast  
 5/2016 : treated/ resolved  HLD (hyperlipidemia)  Hypertension   
 controlled with medication  Hypothyroid   
 controlled with medication  Hypothyroidism due to acquired atrophy of thyroid 4/28/2015  Intertrigo  Irregular heart beat  Mass of colon  Morbid obesity ([address] 48.7 bmi  Persistent atrial fibrillation ([address]   
 s/p ablation (2/2019), Tikosyn loading---EKG dated 3/13/19 shows \"sinus rhythm, rate 70. \" Patient is followed by [address] Cardiology.  Reactive airway disease with status asthmaticus hx only  Scoliosis 8/4/2016  Sleep apnea   
 cpap complaint.  Varicose veins  VSD (ventricular septal defect) 07/22/2016  
 per echo (2/18/19) \"small PFO in the baseline state. \" Patient is followed by [facility]. Past Surgical History: 
Past Surgical History:  
Procedure Laterality Date  HX AFIB ABLATION  2018 & 02/2019  
 cardioversion / ablation  HX APPENDECTOMY  HX BREAST REDUCTION Bilateral 8/2/2016 BREAST REDUCTION/ bilateral performed by Mike Kline MD at [address]    
 x2  
 HX COLECTOMY Right 2010  
 8 in colon removed  HX COLONOSCOPY  2010, 2015  HX DILATION AND CURETTAGE    
 multiple  HX HEART CATHETERIZATION    
 heart cath age 5  
 HX HEENT  1969  
 jaw surgery due to underbite  HX HERNIA REPAIR  incisional [id number]  
 with mesh  HX IMPLANTABLE CARDIOVERTER DEFIBRILLATOR  12/18/2018  HX KNEE REPLACEMENT Right 01/29/2018  HX LIPOMA RESECTION Right   
 right foot between toes  HX REFRACTIVE SURGERY Medications: 
  
Prior to Admission medications Medication Sig Start Date End Date Taking? Authorizing Provider  
furosemide (LASIX) 40 mg tablet Take 1 Tab by mouth daily. 4/15/19   Emery Abarca MD  
aspirin delayed-release 81 mg tablet Take  by mouth daily. Instructed to take DOS, with a small sip of water, per anesthesia protocol. Instructed to begin taking 81 mg ASA once Eliquis is held per anesthesia protocol. Provider, Historical  
buPROPion (WELLBUTRIN) 75 mg tablet Take 2 Tabs by mouth two (2) times a day. Patient taking differently: Take 150 mg by mouth two (2) times a day. Instructed to take DOS, with a small sip of water, per anesthesia protocol. 3/19/19   Ana Finch MD  
[facility]) 500 mcg capsule Take 1 Cap by mouth every twelve (12) hours every twelve (12) hours.  
Patient taking differently: Take 500 mcg by mouth every twelve (12) hours every twelve (12) hours. Patient takes 1 tablet at 0800 and 1 tablet at 2000 Instructed to take DOS, with a small sip of water, per anesthesia protocol. 2/21/19   Doyle Gosselin D, NP  
losartan (COZAAR) 100 mg tablet Take 1 Tab by mouth daily. 2/22/19   Doyle Gosselin D, NP  
levothyroxine (SYNTHROID) 150 mcg tablet Take 1 Tab by mouth Daily (before breakfast). Patient taking differently: Take 150 mcg by mouth Daily (before breakfast). Instructed to take DOS, with a small sip of water, per anesthesia protocol. 12/19/18   Taryn aGrdner MD  
digestive enzymes, plant, (FIBERZYME CONCENTRATE-HP PO) Take  by mouth daily. Provider, Historical  
albuterol (VENTOLIN HFA) 90 mcg/actuation inhaler Take 2 Puffs by inhalation every six (6) hours as needed for Wheezing. Patient taking differently: Take 2 Puffs by inhalation every six (6) hours as needed for Wheezing. Use DOS PRN per anesthesia protocol. Instructed to bring inhaler to the hospital on the DOS per anesthesia protocol. 12/17/18   Taryn Gardner MD  
mometasone (NASONEX) 50 mcg/actuation nasal spray 2 Sprays by Both Nostrils route daily. Patient taking differently: 2 Sprays by Both Nostrils route daily. Instructed to use DOS per anesthesia protocol. Non-formulary medication. Patient instructed to bring medication to the hospital on the DOS, in the original bottle, and give to nurse. 12/17/18   Taryn Gardner MD  
Cetirizine (ZYRTEC) 10 mg cap Take  by mouth daily. Instructed to take DOS, with a small sip of water, per anesthesia protocol. Non-formulary medication. Patient instructed to bring medication to the hospital on the DOS, in the original bottle, and give to nurse. Provider, Historical  
fluticasone-salmeterol (ADVAIR DISKUS) 250-50 mcg/dose diskus inhaler Take 1 Puff by inhalation two (2) times a day. RINSE MOUTH WELL AFTER USE Patient taking differently: Take 1 Puff by inhalation two (2) times a day.  RINSE MOUTH WELL AFTER USE 
 
 Instructed to use DOS per anesthesia protocol. Instructed to bring inhaler to the hospital on the DOS per anesthesia protocol. 9/19/18   Calvin Ingram MD  
montelukast (SINGULAIR) 10 mg tablet Take 1 Tab by mouth daily. Patient taking differently: Take 10 mg by mouth daily. Instructed to take DOS, with a small sip of water, per anesthesia protocol. 9/13/18   Olga Xavier MD  
apixaban (ELIQUIS) 5 mg tablet Take 1 Tab by mouth two (2) times a day. 9/12/18   Neri Hidalgo MD  
acetaminophen (TYLENOL EXTRA STRENGTH) 500 mg tablet Take  by mouth as needed for Pain. Instructed to take DOS PRN, with a small sip of water, per anesthesia protocol. Indications: Pain    Provider, Historical  
cpap machine kit by Does Not Apply route. Provider, Historical  
 
 
Family History: 
  
Family History Problem Relation Age of Onset  Cancer Mother LYMPHOMA  Hypertension Mother  Breast Cancer Mother  Heart Disease Father   
     heart attacks---bypass surg  Hypertension Father  Heart Disease Brother  Heart Disease Brother  Colon Cancer Maternal Uncle Social History:  
  
Social History Tobacco Use  Smoking status: Never Smoker  Smokeless tobacco: Never Used Substance Use Topics  Alcohol use: No  
  Comment:    
   
 
Allergies: Allergies Allergen Reactions  Adhesive Tape-Silicones Rash Adhesives for gel pads used for cardioversion/ablation caused redness & itching  Ceclor [Cefaclor] Rash  Symbyax [Olanzapine-Fluoxetine] Other (comments) Causes Severe pain Vitals: 
  
Visit Vitals Ht 5' 3\" (1.6 m) Wt 120.7 kg (266 lb) BMI 47.12 kg/m² Objective:  
      General: No Acute distress HEENT: Normocephalic/atramatic Lungs:  Breathing non-labored Heart:   RRR Abdomen: soft Extremities:  Pain with ROM of the left knee. Trace effusion. Crepitus present. Distally the patient shows no neurologic deficit. Antalgic gait appreciated. Meds:  
No current facility-administered medications for this encounter. Current Outpatient Medications Medication Sig  furosemide (LASIX) 40 mg tablet Take 1 Tab by mouth daily.  aspirin delayed-release 81 mg tablet Take  by mouth daily. Instructed to take DOS, with a small sip of water, per anesthesia protocol. Instructed to begin taking 81 mg ASA once Eliquis is held per anesthesia protocol.  buPROPion (WELLBUTRIN) 75 mg tablet Take 2 Tabs by mouth two (2) times a day. (Patient taking differently: Take 150 mg by mouth two (2) times a day. Instructed to take DOS, with a small sip of water, per anesthesia protocol.)  dofetilide (TIKOSYN) 500 mcg capsule Take 1 Cap by mouth every twelve (12) hours every twelve (12) hours. (Patient taking differently: Take 500 mcg by mouth every twelve (12) hours every twelve (12) hours. Patient takes 1 tablet at 0800 and 1 tablet at 2000 Instructed to take DOS, with a small sip of water, per anesthesia protocol.)  losartan (COZAAR) 100 mg tablet Take 1 Tab by mouth daily.  levothyroxine (SYNTHROID) 150 mcg tablet Take 1 Tab by mouth Daily (before breakfast). (Patient taking differently: Take 150 mcg by mouth Daily (before breakfast). Instructed to take DOS, with a small sip of water, per anesthesia protocol.)  digestive enzymes, plant, (FIBERZYME CONCENTRATE-HP PO) Take  by mouth daily.  albuterol (VENTOLIN HFA) 90 mcg/actuation inhaler Take 2 Puffs by inhalation every six (6) hours as needed for Wheezing. (Patient taking differently: Take 2 Puffs by inhalation every six (6) hours as needed for Wheezing. Use DOS PRN per anesthesia protocol. Instructed to bring inhaler to the hospital on the DOS per anesthesia protocol.)  mometasone (NASONEX) 50 mcg/actuation nasal spray 2 Sprays by Both Nostrils route daily. (Patient taking differently: 2 Sprays by Both Nostrils route daily. Instructed to use DOS per anesthesia protocol. Non-formulary medication. Patient instructed to bring medication to the hospital on the DOS, in the original bottle, and give to nurse.)  Cetirizine (ZYRTEC) 10 mg cap Take  by mouth daily. Instructed to take DOS, with a small sip of water, per anesthesia protocol. Non-formulary medication. Patient instructed to bring medication to the hospital on the DOS, in the original bottle, and give to nurse.  fluticasone-salmeterol (ADVAIR DISKUS) 250-50 mcg/dose diskus inhaler Take 1 Puff by inhalation two (2) times a day. RINSE MOUTH WELL AFTER USE (Patient taking differently: Take 1 Puff by inhalation two (2) times a day. RINSE MOUTH WELL AFTER USE Instructed to use DOS per anesthesia protocol. Instructed to bring inhaler to the hospital on the DOS per anesthesia protocol.)  montelukast (SINGULAIR) 10 mg tablet Take 1 Tab by mouth daily. (Patient taking differently: Take 10 mg by mouth daily. Instructed to take DOS, with a small sip of water, per anesthesia protocol.)  apixaban (ELIQUIS) 5 mg tablet Take 1 Tab by mouth two (2) times a day.  acetaminophen (TYLENOL EXTRA STRENGTH) 500 mg tablet Take  by mouth as needed for Pain. Instructed to take DOS PRN, with a small sip of water, per anesthesia protocol. Indications: Pain  cpap machine kit by Does Not Apply route. Patient Active Problem List  
Diagnosis Code  Essential hypertension I10  
 Mixed hyperlipidemia E78.2  Major depressive disorder with single episode, in full remission ([facility] [address] F32.5  PRAVEEN (obstructive sleep apnea) G47.33  
 Anxiety F41.9  VSD (ventricular septal defect) Q21.0  Persistent atrial fibrillation (HCC) I48.1  Morbid obesity with BMI of 45.0-49.9, adult (HCC) E66.01, Z68.42  
 Acquired hypothyroidism E03.9  Mild intermittent asthma without complication P71.60  High risk medications (not anticoagulants) long-term use Z79.899 Assessment: 1. Arthritis of the Left knee Plan: The patient has failed previous conservative treatment for this condition including anti-inflammatories, injections and lifestyle modifications. The necessity for joint replacement is present. Risks, benefits, alternatives and possible complications of left knee arthroplasty have been discussed with the patient including but not limited to potential for infection, bleeding, damage to nerves and/or blood vessels, stiffness of the knee after surgery, knee instability after surgery, patellar maltracking, potential for fracture both intra-op and post-op, polyethylene wear, implant loosening, and risk for revision surgery secondary to but not limited to these discussed risks. Further, we have discussed potential for venous thrombo-embolism including deep vein thrombosis and pulmonary embolism despite being on prophylaxis. Additionally, we have discussed potential for continued pain after surgery and patient has voiced understanding that I can make no guarantees regarding the pain relief of outcomes after surgery. We have also previously discussed the potential of morbidity and mortality associated with, but not limited to, the actual surgical procedure, anesthesia, prior medical conditions, and/or the administration of medications perioperatively. The patient has been given the opportunity to ask questions all of which have been answered and the patient wishes to proceed. The patient will be admitted the day of surgery for left total knee replacement. Signed By: Luis Daniel Fernandes MD 
April 17, 2019

## 2019-04-17 NOTE — PROGRESS NOTES
Care Management Interventions Mode of Transport at Discharge: Self Transition of Care Consult (CM Consult): Home Health [address]: Yes Discharge Durable Medical Equipment: No 
Physical Therapy Consult: Yes Current Support Network: Lives with Spouse Confirm Follow Up Transport: Family Plan discussed with Pt/Family/Caregiver: Yes Freedom of Choice Offered: Yes Discharge Location Discharge Placement: Home with home health Patient screened in Prehab. Patient is a 79y.o. year old female admitted for Left TKA . Patient lives with Her spouse and plans to return home on discharge. Order received to arrange home health. Patient without preference towards agency. Referral sent to [facility]. Patient denies any equipment needs as he has a walker and raised toilet seat. Will follow until discharge.

## 2019-04-17 NOTE — ANESTHESIA POSTPROCEDURE EVALUATION
Procedure(s):  LEFT KNEE ARTHROPLASTY.     spinal    Anesthesia Post Evaluation        Patient location during evaluation: PACU  Patient participation: complete - patient participated  Level of consciousness: awake  Pain management: adequate  Airway patency: patent  Anesthetic complications: no  Cardiovascular status: acceptable  Respiratory status: spontaneous ventilation and acceptable  Hydration status: acceptable  Post anesthesia nausea and vomiting:  none      Vitals Value Taken Time   /65 4/17/2019  4:08 PM   Temp 36.4 °C (97.6 °F) 4/17/2019  3:23 PM   Pulse 68 4/17/2019  4:08 PM   Resp 16 4/17/2019  4:08 PM   SpO2 98 % 4/17/2019  4:08 PM

## 2019-04-17 NOTE — ANESTHESIA PROCEDURE NOTES
Peripheral Block    Start time: 4/17/2019 12:35 PM  End time: 4/17/2019 12:38 PM  Performed by: Bassem Scales MD  Authorized by: Bassem Scales MD       Pre-procedure: Indications: at surgeon's request, post-op pain management and procedure for pain    Preanesthetic Checklist: patient identified, risks and benefits discussed, site marked, timeout performed, anesthesia consent given and patient being monitored    Timeout Time: 12:35          Block Type:   Block Type:   Adductor canal  Laterality:  Left  Monitoring:  Standard ASA monitoring, continuous pulse ox, frequent vital sign checks, heart rate, responsive to questions and oxygen  Injection Technique:  Single shot  Procedures: ultrasound guided    Patient Position: supine  Prep: chlorhexidine    Location:  Mid thigh  Needle Type:  Stimuplex  Needle Gauge:  21 G  Needle Localization:  Ultrasound guidance and anatomical landmarks    Assessment:  Number of attempts:  1  Injection Assessment:  Incremental injection every 5 mL, local visualized surrounding nerve on ultrasound, negative aspiration for blood, no paresthesia, no intravascular symptoms and ultrasound image on chart  Patient tolerance:  Patient tolerated the procedure well with no immediate complications

## 2019-04-17 NOTE — PERIOP NOTES
Teach back method used in review of Hibiclens usage preop/postop, TB screening, pain management goals, falls precautions and use of Nozin for prevention of staph infections. Incentive spirometer reviewed and located in the car.

## 2019-04-17 NOTE — ROUTINE PROCESS
TRANSFER - OUT REPORT: 
 
Verbal report given to brianna(name) on [facility]  being transferred to ortho(unit) for routine post - op Report consisted of patients Situation, Background, Assessment and  
Recommendations(SBAR). Information from the following report(s) SBAR, Kardex, OR Summary, Procedure Summary, Intake/Output, MAR, Recent Results and Cardiac Rhythm sinus rhythm, afib was reviewed with the receiving nurse. Lines:  
Peripheral IV 04/17/19 Anterior; Left Wrist (Active) Site Assessment Clean, dry, & intact 4/17/2019  3:23 PM  
Phlebitis Assessment 0 4/17/2019  3:23 PM  
Infiltration Assessment 0 4/17/2019  3:23 PM  
Dressing Status Clean, dry, & intact 4/17/2019  3:23 PM  
Dressing Type Transparent;Tape 4/17/2019  3:23 PM  
Hub Color/Line Status Pink;Patent; Infusing 4/17/2019  3:23 PM  
  
 
Opportunity for questions and clarification was provided. Patient transported with: 
 O2 @ 2 liters

## 2019-04-18 LAB
ANION GAP SERPL CALC-SCNC: 6 MMOL/L
BUN SERPL-MCNC: 13 MG/DL (ref 8–23)
CALCIUM SERPL-MCNC: 8.4 MG/DL (ref 8.3–10.4)
CHLORIDE SERPL-SCNC: 106 MMOL/L (ref 98–107)
CO2 SERPL-SCNC: 26 MMOL/L (ref 21–32)
CREAT SERPL-MCNC: 0.84 MG/DL (ref 0.6–1)
GLUCOSE SERPL-MCNC: 146 MG/DL (ref 65–100)
HGB BLD-MCNC: 11.5 G/DL (ref 11.7–15.4)
POTASSIUM SERPL-SCNC: 3.9 MMOL/L (ref 3.5–5.1)
SODIUM SERPL-SCNC: 138 MMOL/L (ref 136–145)

## 2019-04-18 PROCEDURE — 97110 THERAPEUTIC EXERCISES: CPT

## 2019-04-18 PROCEDURE — 97165 OT EVAL LOW COMPLEX 30 MIN: CPT

## 2019-04-18 PROCEDURE — 97535 SELF CARE MNGMENT TRAINING: CPT

## 2019-04-18 PROCEDURE — 94640 AIRWAY INHALATION TREATMENT: CPT

## 2019-04-18 PROCEDURE — 80048 BASIC METABOLIC PNL TOTAL CA: CPT

## 2019-04-18 PROCEDURE — 74011250636 HC RX REV CODE- 250/636: Performed by: ORTHOPAEDIC SURGERY

## 2019-04-18 PROCEDURE — 97116 GAIT TRAINING THERAPY: CPT

## 2019-04-18 PROCEDURE — 97161 PT EVAL LOW COMPLEX 20 MIN: CPT

## 2019-04-18 PROCEDURE — 65270000029 HC RM PRIVATE

## 2019-04-18 PROCEDURE — 85018 HEMOGLOBIN: CPT

## 2019-04-18 PROCEDURE — 74011250637 HC RX REV CODE- 250/637: Performed by: ORTHOPAEDIC SURGERY

## 2019-04-18 PROCEDURE — 94760 N-INVAS EAR/PLS OXIMETRY 1: CPT

## 2019-04-18 PROCEDURE — 36415 COLL VENOUS BLD VENIPUNCTURE: CPT

## 2019-04-18 PROCEDURE — 97150 GROUP THERAPEUTIC PROCEDURES: CPT

## 2019-04-18 RX ADMIN — OXYCODONE HYDROCHLORIDE 10 MG: 10 TABLET, FILM COATED, EXTENDED RELEASE ORAL at 08:59

## 2019-04-18 RX ADMIN — APIXABAN 5 MG: 5 TABLET, FILM COATED ORAL at 08:59

## 2019-04-18 RX ADMIN — Medication 10 ML: at 23:00

## 2019-04-18 RX ADMIN — MONTELUKAST 10 MG: 10 TABLET, FILM COATED ORAL at 08:59

## 2019-04-18 RX ADMIN — FLUTICASONE PROPIONATE AND SALMETEROL 1 PUFF: 250; 50 POWDER RESPIRATORY (INHALATION) at 22:07

## 2019-04-18 RX ADMIN — LOSARTAN POTASSIUM 50 MG: 50 TABLET ORAL at 08:59

## 2019-04-18 RX ADMIN — HYDROMORPHONE HYDROCHLORIDE 2 MG: 2 TABLET ORAL at 08:59

## 2019-04-18 RX ADMIN — BUPROPION HYDROCHLORIDE 150 MG: 75 TABLET, FILM COATED ORAL at 19:00

## 2019-04-18 RX ADMIN — BUPROPION HYDROCHLORIDE 150 MG: 75 TABLET, FILM COATED ORAL at 08:59

## 2019-04-18 RX ADMIN — ACETAMINOPHEN 1000 MG: 500 TABLET, FILM COATED ORAL at 18:59

## 2019-04-18 RX ADMIN — CYCLOBENZAPRINE HYDROCHLORIDE 5 MG: 10 TABLET, FILM COATED ORAL at 09:00

## 2019-04-18 RX ADMIN — FLUTICASONE PROPIONATE AND SALMETEROL 1 PUFF: 250; 50 POWDER RESPIRATORY (INHALATION) at 08:01

## 2019-04-18 RX ADMIN — CYCLOBENZAPRINE HYDROCHLORIDE 5 MG: 10 TABLET, FILM COATED ORAL at 22:10

## 2019-04-18 RX ADMIN — LEVOTHYROXINE SODIUM 150 MCG: 100 TABLET ORAL at 06:03

## 2019-04-18 RX ADMIN — CYCLOBENZAPRINE HYDROCHLORIDE 5 MG: 10 TABLET, FILM COATED ORAL at 16:21

## 2019-04-18 RX ADMIN — HYDROMORPHONE HYDROCHLORIDE 2 MG: 2 TABLET ORAL at 16:21

## 2019-04-18 RX ADMIN — SENNOSIDES,DOCUSATE SODIUM 2 TABLET: 50; 8.6 TABLET, FILM COATED ORAL at 08:59

## 2019-04-18 RX ADMIN — GABAPENTIN 100 MG: 100 CAPSULE ORAL at 09:00

## 2019-04-18 RX ADMIN — APIXABAN 5 MG: 5 TABLET, FILM COATED ORAL at 19:00

## 2019-04-18 RX ADMIN — ACETAMINOPHEN 1000 MG: 500 TABLET, FILM COATED ORAL at 22:53

## 2019-04-18 RX ADMIN — HYDROMORPHONE HYDROCHLORIDE 1 MG: 2 INJECTION, SOLUTION INTRAMUSCULAR; INTRAVENOUS; SUBCUTANEOUS at 22:53

## 2019-04-18 RX ADMIN — HYDROMORPHONE HYDROCHLORIDE 2 MG: 2 TABLET ORAL at 06:04

## 2019-04-18 RX ADMIN — Medication 2 G: at 01:50

## 2019-04-18 RX ADMIN — HYDROMORPHONE HYDROCHLORIDE 2 MG: 2 TABLET ORAL at 20:32

## 2019-04-18 RX ADMIN — ASPIRIN 81 MG: 81 TABLET, COATED ORAL at 08:59

## 2019-04-18 RX ADMIN — GABAPENTIN 100 MG: 100 CAPSULE ORAL at 19:00

## 2019-04-18 RX ADMIN — OXYCODONE HYDROCHLORIDE 10 MG: 10 TABLET, FILM COATED, EXTENDED RELEASE ORAL at 20:32

## 2019-04-18 RX ADMIN — Medication 1 AMPULE: at 20:32

## 2019-04-18 RX ADMIN — HYDROMORPHONE HYDROCHLORIDE 2 MG: 2 TABLET ORAL at 12:32

## 2019-04-18 RX ADMIN — ACETAMINOPHEN 1000 MG: 500 TABLET, FILM COATED ORAL at 06:03

## 2019-04-18 NOTE — PROGRESS NOTES
2019 Post Op day: 1 Day Post-Op Admit Date: 2019 Admit Diagnosis: Unilateral primary osteoarthritis, left knee [M17.12] OA (osteoarthritis) of knee [M17.10] Subjective: Patient stable. No acute events. Objective:  
Visit Vitals /72 Pulse 70 Temp 97.6 °F (36.4 °C) Resp 16 Ht 5' 3\" (1.6 m) Wt 120.7 kg (266 lb) SpO2 90% Breastfeeding? No  
BMI 47.12 kg/m² Temp (24hrs), Av.9 °F (36.6 °C), Min:97.5 °F (36.4 °C), Max:98.5 °F (36.9 °C) Lab Results Component Value Date/Time HGB 11.5 (L) 2019 04:26 AM  
 
Extremity Exam 
Dressing clean and dry - Wound vac in place and patent Tibialis Anterior and Gastroc-Soleus functioning normally left lower extremity Sensation intact to light touch on operative limb Extremity perfused TEDS/SCDS in place No sign of DVT Assessment / Plan : 
WBAT LLE Continue PT/OT Continue current DVT prophylaxis in house. Discharge on Eliquis BID [place] Patient Active Problem List  
Diagnosis Code  Essential hypertension I10  
 Mixed hyperlipidemia E78.2  Major depressive disorder with single episode, in full remission ([facility] [address] F32.5  PRAVEEN (obstructive sleep apnea) G47.33  
 Anxiety F41.9  VSD (ventricular septal defect) Q21.0  Persistent atrial fibrillation (HCC) I48.1  Morbid obesity with BMI of 45.0-49.9, adult (HCC) E66.01, Z68.42  
 Acquired hypothyroidism E03.9  Mild intermittent asthma without complication G95.44  
 High risk medications (not anticoagulants) long-term use Z79.899  
 OA (osteoarthritis) of knee M17.10 Signed By: Rene Ellington NP

## 2019-04-18 NOTE — PROGRESS NOTES
Pt is alert and oriented x3. No NV deficits noted. Pt is able to dorsi/ plantar flex and has +2 pedal pulses. Dressing to surgical incision is clean, dry, and intact. vacuum dressing is intact, no drainage noted Pain is controlled at this time. Bed is low and locked, call light in reach. IS at bedside and pt demonstrated use. No needs stated.

## 2019-04-18 NOTE — PROGRESS NOTES
Problem: Mobility Impaired (Adult and Pediatric) Goal: *Acute Goals and Plan of Care (Insert Text) Description GOALS (1-4 days): 
(1.)Ms. Gonzales Hernandez will move from supine to sit and sit to supine  in bed with STAND BY ASSIST. 
(2.)Ms. Gonzales Hernandez will transfer from bed to chair and chair to bed with STAND BY ASSIST using the least restrictive device. (3.)Ms. Gonzales Hernandez will ambulate with STAND BY ASSIST for 300 feet with the least restrictive device. (4.)Ms. Gonzales Hernandez will ambulate up/down 3 steps with bilateral  railing with CONTACT GUARD ASSIST with no device. (5.)Ms. Gonzales Hernandez will increase left knee ROM to 5°-80°. 
________________________________________________________________________________________________ Outcome: Progressing Towards Goal 
  
PHYSICAL THERAPY JOINT CAMP TKA: Daily Note, Treatment Day: Day of Assessment and PM 4/18/2019 INPATIENT: Hospital Day: 2 [place] / Product Type: Medicare /  
  
NAME/AGE/GENDER: Brian Elam is a 79 y.o. female PRIMARY DIAGNOSIS:  Unilateral primary osteoarthritis, left knee [M17.12] Procedure(s) and Anesthesia Type: 
   * LEFT KNEE ARTHROPLASTY - Spinal (Left) ICD-10: Treatment Diagnosis:  
 · Pain in Left Knee (M25.562) · Stiffness of Left Knee, Not elsewhere classified (M25.662) · Difficulty in walking, Not elsewhere classified (R26.2) ASSESSMENT:  
Ms. Gonzales Hernandez presents up in chair on contact and ready for afternoon therapy. Worked on gait training in the [place] with verbal cues making great progress with gait distance. In gym worked on TKA exercises with verbal cues progressing with repetitions. Pt walked back to her room with verbal cues and stayed up in chair with ice on knee and needs in reach. Hope to further progress in the morning. This section established at most recent assessment PROBLEM LIST (Impairments causing functional limitations): 1. Decreased Strength 2. Decreased ADL/Functional Activities 3. Decreased Transfer Abilities 4. Decreased Ambulation Ability/Technique 5. Edema/Girth 6. Decreased [place] with Home Exercise Program 
 INTERVENTIONS PLANNED: (Benefits and precautions of physical therapy have been discussed with the patient.) 1. Cold 2. bed mobility 3. gait training 4. home exercise program (HEP) 5. Range of Motion: active/assisted/passive 6. Therapeutic Activities 7. therapeutic exercise/strengthening 8. transfer training 9. Group Therapy TREATMENT PLAN: Frequency/Duration: Follow patient BID for duration of hospital stay to address above goals. Rehabilitation Potential For Stated Goals: Good RECOMMENDED REHABILITATION/EQUIPMENT: (at time of discharge pending progress): Continue Skilled Therapy, Home Health: Physical Therapy and Outpatient: Physical Therapy. HISTORY:  
History of Present Injury/Illness (Reason for Referral): 
Pt s/p left TKA on 19 Past Medical History/Comorbidities: Ms. Fadi Marvin  has a past medical history of Adverse effect of anesthesia, Allergic rhinitis, Anxiety, Arthritis, Asthma, Depression, Endocarditis (), Heart murmur, Hematuria, History of MRSA infection (on left breast), HLD (hyperlipidemia) ( ), Hypertension, Hypothyroid, Hypothyroidism due to acquired atrophy of thyroid (2015), Intertrigo, Irregular heart beat, Mass of colon, Morbid obesity ([address] Persistent atrial fibrillation ([address] Reactive airway disease with status asthmaticus, Scoliosis (2016), Sleep apnea, Varicose veins, and VSD (ventricular septal defect) (2016).   Ms. Fadi Marvin  has a past surgical history that includes hx lipoma resection (Right); hx appendectomy; hx hernia repair (incisional [id number]); hx colonoscopy (, ); hx breast reduction (Bilateral, 2016); hx  section; hx dilation and curettage; hx colectomy (Right, ); hx knee replacement (Right, 01/29/2018); hx heent (1969); hx implantable cardioverter defibrillator (12/18/2018); hx heart catheterization; hx afib ablation (2018 & 02/2019); and hx refractive surgery. Social History/Living Environment:  
Home Environment: Private residence # Steps to Enter: 3 Hand Rails : Bilateral 
One/Two Story Residence: One story Living Alone: No 
Support Systems: Spouse/Significant Other/Partner Patient Expects to be Discharged to[de-identified] Private residence Current DME Used/Available at Home: Cane, straight, Commode, bedside, Walker, rolling Tub or Shower Type: Shower Prior Level of Function/Work/Activity: 
Pt living at home, independent with gait with a cane, independent with ADLs Number of Personal Factors/Comorbidities that affect the Plan of Care: 0: LOW COMPLEXITY EXAMINATION:  
Most Recent Physical Functioning:  
Gross Assessment: Yes Gross Assessment AROM: Within functional limits(except left lower extremity, s/p TKA) Strength: Within functional limits(except left lower extremity, s/p TKA) LLE AROM 
L Knee Flexion: 95 L Knee Extension: 10 Transfers Sit to Stand: Stand-by assistance;Contact guard assistance Stand to Sit: Stand-by assistance;Contact guard assistance Bed to Chair: Contact guard assistance Balance Sitting: Intact Standing: Pull to stand; With support Standing - Static: Good Standing - Dynamic : Good(with walker)    Posture Posture (WDL): Within defined limits Gait Training: Yes 
 
Weight Bearing Status Left Side Weight Bearing: As tolerated Distance (ft): 132 Feet (ft)(and another 132 feet) Ambulation - Level of Assistance: Stand-by assistance;Contact guard assistance Assistive Device: Walker, rolling Speed/Luzma: Pace decreased (<100 feet/min) Step Length: Right shortened Stance: Left decreased Gait Abnormalities: Antalgic;Decreased step clearance Interventions: Safety awareness training;Verbal cues Braces/Orthotics: none Left Knee Cold Type: Cryocuff Patient Position: Sitting Body Structures Involved: 1. Joints 2. Muscles Body Functions Affected: 1. Movement Related Activities and Participation Affected: 1. Mobility 2. Self Care Number of elements that affect the Plan of Care: 4+: HIGH COMPLEXITY CLINICAL PRESENTATION:  
Presentation: Stable and uncomplicated: LOW COMPLEXITY CLINICAL DECISION MA[address]IN[address] [place] [address] AM-PAC 6 Clicks Basic Mobility Inpatient Short Form How much difficulty does the patient currently have. .. Unable A Lot A Little None 1. Turning over in bed (including adjusting bedclothes, sheets and blankets)? ? 1   ? 2   ? 3   ? 4  
2. Sitting down on and standing up from a chair with arms ( e.g., wheelchair, bedside commode, etc.)   ? 1   ? 2   ? 3   ? 4  
3. Moving from lying on back to sitting on the side of the bed?   ? 1   ? 2   ? 3   ? 4 How much help from another person does the patient currently need. .. Total A Lot A Little None 4. Moving to and from a bed to a chair (including a wheelchair)? ? 1   ? 2   ? 3   ? 4  
5. Need to walk in hospital room? ? 1   ? 2   ? 3   ? 4  
6. Climbing 3-5 steps with a railing? ? 1   ? 2   ? 3   ? 4  
© , Trustees of [address] [place] [address], under license to [employer]. All rights reserved Score:  Initial: 18 Most Recent: X (Date: -- ) Interpretation of Tool:  Represents activities that are increasingly more difficult (i.e. Bed mobility, Transfers, Gait). Medical Necessity:    
· Patient is expected to demonstrate progress in strength, range of motion and functional technique ·  to decrease assistance required with functional mobility · . Reason for Services/Other Comments: 
· Patient continues to require skilled intervention due to inability to complete functional mobility and TKA exercises independently · . Use of outcome tool(s) and clinical judgement create a POC that gives a: Clear prediction of patient's progress: LOW COMPLEXITY  
  
 
 
 
TREATMENT:  
(In addition to Assessment/Re-Assessment sessions the following treatments were rendered) Pre-treatment Symptoms/Complaints:  none Pain Initial: no reports of pain Pain Intensity 1: (no reports of pain)  Post Session:  no reports of pain Therapeutic Exercise: (45 Minutes(group)):  Exercises per grid below to improve mobility and strength. Required minimal verbal cues to promote proper body alignment and promote proper body posture. Progressed repetitions as indicated. Gait Training (15 Minutes):  Gait training to improve and/or restore physical functioning as related to mobility and strength. Ambulated 132 Feet (ft)(and another 132 feet) with Stand-by assistance;Contact guard assistance using a Walker, rolling and minimal Safety awareness training;Verbal cues related to their stance phase and stride length to promote proper body alignment and promote proper body posture. Instruction in performance of walker use and gait sequencing to correct stance phase and stride length. Date: 
4/18/19 Date: 
 Date: 
  
ACTIVITY/EXERCISE AM PM AM PM AM PM  
GROUP THERAPY  ? X  ?  ?  ?  ? Ankle Pumps 10 15 Quad Sets 10 15 Gluteal Sets 10 15 Hip ABd/ADduction 10 15 Straight Leg Raises 10 15 Knee Slides 10 15 Short Arc Quads  15 812 [facility] Chair Slides  15      
        
B = bilateral; AA = active assistive; A = active; P = passive Treatment/Session Assessment:   
 Response to Treatment:  pt did well, was glad to be out of bed. Education: 
X Home Exercises X Fall Precautions 
? no pillow under knee X D/C Instruction Review X Knee Prosthesis Review Franklin Alley Management/Safety ? Adaptive Equipment as Needed Interdisciplinary Collaboration:  
o Registered Nurse 
o Rehabilitation Attendant After treatment position/precautions:  
o Up in chair o Bed/Chair-wheels locked 
o Call light within reach Compliance with Program/Exercises: Compliant all of the time, Will assess as treatment progresses. Recommendations/Intent for next treatment session:  Treatment next visit will focus on increasing Ms. Jay's independence with bed mobility, transfers, gait training, strength/ROM exercises, modalities for pain, and patient education. Total Treatment Duration: PT Patient Time In/Time Out Time In: 1300 Time Out: 1400 Aly Avila PT

## 2019-04-18 NOTE — PROGRESS NOTES
Problem: Mobility Impaired (Adult and Pediatric) Goal: *Acute Goals and Plan of Care (Insert Text) Description GOALS (1-4 days): 
(1.)Ms. Ramona Wall will move from supine to sit and sit to supine  in bed with STAND BY ASSIST. 
(2.)Ms. Ramona Wall will transfer from bed to chair and chair to bed with STAND BY ASSIST using the least restrictive device. (3.)Ms. Ramona Wall will ambulate with STAND BY ASSIST for 300 feet with the least restrictive device. (4.)Ms. Ramona Wall will ambulate up/down 3 steps with bilateral  railing with CONTACT GUARD ASSIST with no device. (5.)Ms. Ramona Wall will increase left knee ROM to 5°-80°. 
________________________________________________________________________________________________ Outcome: Progressing Towards Goal 
  
PHYSICAL THERAPY JOINT CAMP TKA: Initial Assessment, Treatment Day: Day of Assessment and AM 4/18/2019 INPATIENT: Hospital Day: 2 [place] / Product Type: Medicare /  
  
NAME/AGE/GENDER: Mari Capellan is a 79 y.o. female PRIMARY DIAGNOSIS:  Unilateral primary osteoarthritis, left knee [M17.12] Procedure(s) and Anesthesia Type: 
   * LEFT KNEE ARTHROPLASTY - Spinal (Left) ICD-10: Treatment Diagnosis:  
 Pain in Left Knee (M25.562) Stiffness of Left Knee, Not elsewhere classified (M25.662) Difficulty in walking, Not elsewhere classified (R26.2) ASSESSMENT:  
 
Ms. Ramona Wall presents supine on contact and agreeable to therapy. Pt presents with decreased strength and range of motion left lower extremity and with decreased independence with functional mobility. Pt will benefit from skilled PT interventions to maximize independence with functional mobility and TKA HEP. Pt did well with assessment and walked in room with walker. In chair worked on TKA exercises with verbal cues. Stayed up in chair with ice on knee and needs in reach. Hope to progress at next treatment session. This section established at most recent assessment PROBLEM LIST (Impairments causing functional limitations): 
Decreased Strength Decreased ADL/Functional Activities Decreased Transfer Abilities Decreased Ambulation Ability/Technique Edema/Girth Decreased [place] with Home Exercise Program 
 INTERVENTIONS PLANNED: (Benefits and precautions of physical therapy have been discussed with the patient.) Cold 
bed mobility 
gait training 
home exercise program (HEP) Range of Motion: active/assisted/passive Therapeutic Activities 
therapeutic exercise/strengthening 
transfer training Group Therapy TREATMENT PLAN: Frequency/Duration: Follow patient BID for duration of hospital stay to address above goals. Rehabilitation Potential For Stated Goals: Good RECOMMENDED REHABILITATION/EQUIPMENT: (at time of discharge pending progress): Continue Skilled Therapy, Home Health: Physical Therapy and Outpatient: Physical Therapy. HISTORY:  
History of Present Injury/Illness (Reason for Referral): 
Pt s/p left TKA on 19 Past Medical History/Comorbidities: Ms. Mendoza Barakat  has a past medical history of Adverse effect of anesthesia, Allergic rhinitis, Anxiety, Arthritis, Asthma, Depression, Endocarditis (), Heart murmur, Hematuria, History of MRSA infection (on left breast), HLD (hyperlipidemia) ( ), Hypertension, Hypothyroid, Hypothyroidism due to acquired atrophy of thyroid (2015), Intertrigo, Irregular heart beat, Mass of colon, Morbid obesity ([address] Persistent atrial fibrillation ([address] Reactive airway disease with status asthmaticus, Scoliosis (2016), Sleep apnea, Varicose veins, and VSD (ventricular septal defect) (2016).   Ms. Mendoza Barakat  has a past surgical history that includes hx lipoma resection (Right); hx appendectomy; [id number] hx colonoscopy (, ); hx breast reduction (Bilateral, 2016); hx  section; hx dilation and curettage; hx colectomy (Right, 2010); hx knee replacement (Right, 01/29/2018); hx heent (1969); hx implantable cardioverter defibrillator (12/18/2018); hx heart catheterization; hx afib ablation (2018 & 02/2019); and hx refractive surgery. Social History/Living Environment:  
Home Environment: Private residence # Steps to Enter: 3 Hand Rails : Bilateral 
One/Two Story Residence: One story Living Alone: No 
Support Systems: Spouse/Significant Other/Partner Patient Expects to be Discharged to[de-identified] Private residence Current DME Used/Available at Home: Cane, straight, Commode, bedside, CPAP, Walker, rolling Tub or Shower Type: Shower Prior Level of Function/Work/Activity: 
Pt living at home, independent with gait with a cane, independent with ADLs Number of Personal Factors/Comorbidities that affect the Plan of Care: 0: LOW COMPLEXITY EXAMINATION:  
Most Recent Physical Functioning:  
Gross Assessment: Yes Gross Assessment AROM: Within functional limits(except left lower extremity, s/p TKA) Strength: Within functional limits(except left lower extremity, s/p TKA) Posture Posture (WDL): Within defined limits Weight Bearing Status Left Side Weight Bearing: As tolerated Distance (ft): 20 Feet (ft) Ambulation - Level of Assistance: Contact guard assistance Assistive Device: Walker, rolling Speed/Luzma: Pace decreased (<100 feet/min) Step Length: Right shortened Stance: Left decreased Gait Abnormalities: Antalgic;Decreased step clearance Interventions: Safety awareness training;Verbal cues Braces/Orthotics: none Left Knee Cold Type: Cryocuff Patient Position: Sitting Body Structures Involved: Joints Muscles Body Functions Affected: Movement Related Activities and Participation Affected: Mobility Self Care Number of elements that affect the Plan of Care: 4+: HIGH COMPLEXITY CLINICAL PRESENTATION:  
 Presentation: Stable and uncomplicated: LOW COMPLEXITY CLINICAL DECISION MAKIN[address] [place] [address] AM-PAC? ?6 Clicks? Basic Mobility Inpatient Short Form How much difficulty does the patient currently have. .. Unable A Lot A Little None 1. Turning over in bed (including adjusting bedclothes, sheets and blankets)? ? 1   ? 2   ? 3   ? 4  
2. Sitting down on and standing up from a chair with arms ( e.g., wheelchair, bedside commode, etc.)   ? 1   ? 2   ? 3   ? 4  
3. Moving from lying on back to sitting on the side of the bed?   ? 1   ? 2   ? 3   ? 4 How much help from another person does the patient currently need. .. Total A Lot A Little None 4. Moving to and from a bed to a chair (including a wheelchair)? ? 1   ? 2   ? 3   ? 4  
5. Need to walk in hospital room? ? 1   ? 2   ? 3   ? 4  
6. Climbing 3-5 steps with a railing? ? 1   ? 2   ? 3   ? 4  
© , Trustees of [address] [place] [address], under license to [employer]. All rights reserved Score:  Initial: 18 Most Recent: X (Date: -- ) Interpretation of Tool:  Represents activities that are increasingly more difficult (i.e. Bed mobility, Transfers, Gait). Medical Necessity:    
Patient is expected to demonstrate progress in strength, range of motion and functional technique 
 to decrease assistance required with functional mobility Jennifer Ochoa Reason for Services/Other Comments: 
Patient continues to require skilled intervention due to inability to complete functional mobility and TKA exercises independently Jennifer Ochoa Use of outcome tool(s) and clinical judgement create a POC that gives a: Clear prediction of patient's progress: LOW COMPLEXITY  
  
 
 
 
TREATMENT:  
(In addition to Assessment/Re-Assessment sessions the following treatments were rendered) Pre-treatment Symptoms/Complaints:  none Pain Initial: no reports of pain Post Session:  no reports of pain Therapeutic Exercise: (10 Minutes):  Exercises per grid below to improve mobility and strength. Required minimal verbal cues to promote proper body alignment and promote proper body posture. Progressed repetitions as indicated. Date: 
4/18/19 Date: 
 Date: 
  
ACTIVITY/EXERCISE AM PM AM PM AM PM  
GROUP THERAPY  ?  ?  ?  ?  ?  ? Ankle Pumps 10 Quad Sets 10 Gluteal Sets 10 Hip ABd/ADduction 10 Straight Leg Raises 10 Knee Slides 10 Short Arc The [address] Chair Slides B = bilateral; AA = active assistive; A = active; P = passive Treatment/Session Assessment:   
 Response to Treatment:  pt did well, was glad to be out of bed. Education: ? Home Exercises ? Fall Precautions 
? no pillow under knee ? D/C Instruction Review ? Knee Prosthesis Review ? Walker Management/Safety ? Adaptive Equipment as Needed Interdisciplinary Collaboration: Occupational Therapist 
Registered Nurse After treatment position/precautions:  
Up in chair Bed/Chair-wheels locked Call light within reach Compliance with Program/Exercises: Will assess as treatment progresses. Recommendations/Intent for next treatment session:  Treatment next visit will focus on increasing Ms. Jay's independence with bed mobility, transfers, gait training, strength/ROM exercises, modalities for pain, and patient education. Total Treatment Duration: PT Patient Time In/Time Out Time In: 0848 Time Out: 7773 Mel Lynn, PT

## 2019-04-18 NOTE — PROGRESS NOTES
Pt. Has HCPAP set up at the bedside. Pt started PEP therapy tonight and tolerated well. Pt. Shows no signs of distress at this time.

## 2019-04-18 NOTE — PROGRESS NOTES
Problem: Self Care Deficits Care Plan (Adult) Goal: *Acute Goals and Plan of Care (Insert Text) Description GOALS:  
DISCHARGE GOALS (in preparation for going home/rehab):  3 days 1. Ms. Juanis Tao will perform one lower body dressing activity with minimal assistance required to demonstrate improved functional mobility and safety. GOAL MET 4/18/2019 2. Ms. Juanis Tao will perform one lower body bathing activity with minimal assistance required to demonstrate improved functional mobility and safety. GOAL MET 4/18/2019 3. Ms. Juanis Tao will perform toileting/toilet transfer with contact guard assistance to demonstrate improved functional mobility and safety. GOAL MET 4/18/2019 4. Ms. Juanis Tao will perform shower transfer with contact guard assistance to demonstrate improved functional mobility and safety. GOAL MET 4/18/2019 4/18/2019 1209 by Mikaela Gusman, OT Outcome: Resolved/Met 
4/18/2019 1209 by Mikaela Gusman, OT Outcome: Progressing Towards Goal 
 
 
JOINT CAMP OCCUPATIONAL THERAPY TKA: Initial Assessment, Treatment Day: 1st and AM 4/18/2019 INPATIENT: Hospital Day: 2 [place] / [place] / Product Type: Medicare /  
  
NAME/AGE/GENDER: Dariusz Pretty is a 79 y.o. female PRIMARY DIAGNOSIS:  Unilateral primary osteoarthritis, left knee [M17.12] Procedure(s) and Anesthesia Type: 
   * LEFT KNEE ARTHROPLASTY - Spinal (Left) ICD-10: Treatment Diagnosis:  
 Pain in Left Knee (M25.562) Stiffness of Left Knee, Not elsewhere classified (M25.662) Generalized Muscle Weakness (M62.81) Other lack of cordination (R27.8) ASSESSMENT:  
Ms. Juanis Tao is s/p left TKA and presents with decreased weight bearing on left LE and decreased independence with functional mobility and activities of daily living as compared to baseline level of function and safety.  Patient would benefit from skilled Occupational Therapy to maximize independence and safety with self-care task and functional mobility. Pt would also benefit from education on adaptive equipment and safety precautions in preparation for going home. Patient completed shower and dressing as charter below in ADL grid and is ambulating with rolling walker and contact guard assist.  Patient has met 4/4 goals and plans to return home with good family support. Family able to provide patient with appropriate level of assistance at this time. OT reviewed safety precautions throughout session and therapy schedule for the remainder of today. Patient instructed to call for assistance when needing to get up from recliner and all needs in reach. Patient verbalized understanding of call light. This section established at most recent assessment PROBLEM LIST (Impairments causing functional limitations): 
Decreased Strength Decreased ADL/Functional Activities Decreased Transfer Abilities Increased Pain Increased Fatigue Decreased Flexibility/Joint Mobility Decreased Knowledge of Precautions INTERVENTIONS PLANNED: (Benefits and precautions of occupational therapy have been discussed with the patient.) Activities of daily living training Adaptive equipment training Balance training Clothing management Donning&doffing training Theraputic activity RECOMMENDED REHABILITATION/EQUIPMENT: (at time of discharge pending progress): Home Health: Physical Therapy. OCCUPATIONAL PROFILE AND HISTORY:  
History of Present Injury/Illness (Reason for Referral): Pt presents this date s/p (left) TKA. Past Medical History/Comorbidities: Ms. Evangelist Franco  has a past medical history of Adverse effect of anesthesia, Allergic rhinitis, Anxiety, Arthritis, Asthma, Depression, Endocarditis (1992), Heart murmur, Hematuria, History of MRSA infection (on left breast), HLD (hyperlipidemia) ( ), Hypertension, Hypothyroid, Hypothyroidism due to acquired atrophy of thyroid (2015), Intertrigo, Irregular heart beat, Mass of colon, Morbid obesity ([address] Persistent atrial fibrillation ([address] Reactive airway disease with status asthmaticus, Scoliosis (2016), Sleep apnea, Varicose veins, and VSD (ventricular septal defect) (2016). Ms. Niko Fink  has a past surgical history that includes hx lipoma resection (Right); hx appendectomy; hx hernia repair (incisional [id number]); hx colonoscopy (, ); hx breast reduction (Bilateral, 2016); hx  section; hx dilation and curettage; hx colectomy (Right, ); hx knee replacement (Right, 2018); hx heent (); hx implantable cardioverter defibrillator (2018); hx heart catheterization; hx afib ablation ( & 2019); and hx refractive surgery. Social History/Living Environment:  
Home Environment: Private residence # Steps to Enter: 3 Hand Rails : Bilateral 
One/Two Story Residence: One story Living Alone: No 
Support Systems: Spouse/Significant Other/Partner Patient Expects to be Discharged to[de-identified] Private residence Current DME Used/Available at Home: Cane, straight, Commode, bedside, Walker, rolling Tub or Shower Type: Shower Prior Level of Function/Work/Activity: 
Indep with self care and functional mobility Number of Personal Factors/Comorbidities that affect the Plan of Care: Brief history (0):  LOW COMPLEXITY ASSESSMENT OF OCCUPATIONAL PERFORMANCE[de-identified]  
Most Recent Physical Functioning:  
     
 
Gross Assessment: Yes Gross Assessment AROM: Within functional limits(except left lower extremity, s/p TKA) Strength: Within functional limits(except left lower extremity, s/p TKA) Coordination Fine Motor Skills-Upper: Left Intact; Right Intact Gross Motor Skills-Upper: Left Intact; Right Intact Mental Status Neurologic State: Alert Orientation Level: Oriented X4 Cognition: Appropriate decision making; Appropriate for age attention/concentration; Appropriate safety awareness; Follows commands Perception: Appears intact Perseveration: No perseveration noted Safety/Judgement: Awareness of environment; Fall prevention Basic ADLs (From Assessment) Complex ADLs (From Assessment) Basic ADL Feeding: Setup Oral Facial Hygiene/Grooming: Supervision Bathing: Minimum assistance Type of Bath: Chlorhexidine (CHG), Full, Shower Upper Body Dressing: Supervision Lower Body Dressing: Minimum assistance Toileting: Supervision Grooming/Bathing/Dressing Activities of Daily Living Grooming Grooming Assistance: Supervision Washing Face: Supervision/set-up Washing Hands: Supervision/set-up Brushing Teeth: Supervision/set-up Cognitive Retraining Safety/Judgement: Awareness of environment; Fall prevention Upper Body Bathing Bathing Assistance: Supervision Position Performed: Seated in chair Lower Body Bathing Bathing Assistance: Minimum assistance Perineal  : Contact guard assistance Position Performed: Standing Adaptive Equipment: Grab bar Lower Body : Minimum assistance Position Performed: Seated in chair;Standing Adaptive Equipment: Shower chair Toileting Toileting Assistance: Supervision Bladder Hygiene: Supervision/set-up Upper Body Dressing Assistance Dressing Assistance: Supervision Pullover Shirt: Supervision/set-up Functional Transfers Bathroom Mobility: Contact guard assistance Toilet Transfer : Contact guard assistance Shower Transfer: Contact guard assistance Lower Body Dressing Assistance Dressing Assistance: Minimum assistance Underpants: Minimum assistance Socks: Minimum assistance Position Performed: Standing Bed/Mat Mobility Sit to Stand: Contact guard assistance Stand to Sit: Contact guard assistance Bed to Chair: Contact guard assistance Physical Skills Involved: 
Range of Motion Balance Strength Cognitive Skills Affected (resulting in the inability to perform in a timely and safe manner): 
N/a  Psychosocial Skills Affected: N/a Number of elements that affect the Plan of Care: 1-3:  LOW COMPLEXITY CLINICAL DECISION MAKING:  
[employer] AM-PAC? ?6 Clicks? Daily Activity Inpatient Short Form How much help from another person does the patient currently need. .. Total A Lot A Little None 1. Putting on and taking off regular lower body clothing? ? 1   ? 2   ? 3   ? 4  
2. Bathing (including washing, rinsing, drying)? ? 1   ? 2   ? 3   ? 4  
3. Toileting, which includes using toilet, bedpan or urinal?   ? 1   ? 2   ? 3   ? 4  
4. Putting on and taking off regular upper body clothing? ? 1   ? 2   ? 3   ? 4  
5. Taking care of personal grooming such as brushing teeth? ? 1   ? 2   ? 3   ? 4  
6. Eating meals? ? 1   ? 2   ? 3   ? 4  
© 2007, Trustees of [employer], under license to [employer]. All rights reserved Score:  Initial: 19 Most Recent: X (Date: -- ) Interpretation of Tool:  Represents activities that are increasingly more difficult (i.e. Bed mobility, Transfers, Gait). Use of outcome tool(s) and clinical judgement create a POC that gives a: LOW COMPLEXITY  
  
 
 
 
TREATMENT:  
(In addition to Assessment/Re-Assessment sessions the following treatments were rendered) Pre-treatment Symptoms/Complaints:  NOne 
Pain: Initial:  
Pain Intensity 1: 3 Pain Location 1: Knee Pain Orientation 1: Left Pain Intervention(s) 1: Shower  Post Session:  0 Self Care: (30): Procedure(s) (per grid) utilized to improve and/or restore self-care/home management as related to dressing, bathing, toileting and grooming. Required minimal verbal and tactile cueing to facilitate activities of daily living skills. Assessment/Reassessment only, no treatment provided today Treatment/Session Assessment:   
 Response to Treatment:  Tolerated well. Education: ? Home Exercises ? Fall Precautions ? Hip Precautions ? Going Home Video ? Knee/Hip Prosthesis Review ? Walker Management/Safety ? Adaptive Equipment as Needed Interdisciplinary Collaboration:  
Physical Therapist 
Occupational Therapist 
Registered Nurse After treatment position/precautions:  
Up in chair Bed/Chair-wheels locked Caregiver at bedside Call light within reach RN notified Compliance with Program/Exercises: Compliant all of the time. Recommendations/Intent for next treatment session: Pt doing well all goals met and will do well at home with support from spouse. Patient will be discharged home with home health PT. No further Occupational Therapy warranted, will discharge Occupational Therapy services. Total Treatment Duration: OT Patient Time In/Time Out Time In: 3733 Time Out: 1030 
10 min eval 
30 self care Ilan Molina OT

## 2019-04-19 VITALS
HEIGHT: 63 IN | RESPIRATION RATE: 18 BRPM | TEMPERATURE: 97.7 F | DIASTOLIC BLOOD PRESSURE: 67 MMHG | HEART RATE: 82 BPM | BODY MASS INDEX: 47.13 KG/M2 | WEIGHT: 266 LBS | SYSTOLIC BLOOD PRESSURE: 99 MMHG | OXYGEN SATURATION: 95 %

## 2019-04-19 LAB — HGB BLD-MCNC: 11.4 G/DL (ref 11.7–15.4)

## 2019-04-19 PROCEDURE — 97116 GAIT TRAINING THERAPY: CPT

## 2019-04-19 PROCEDURE — 97150 GROUP THERAPEUTIC PROCEDURES: CPT

## 2019-04-19 PROCEDURE — 74011250637 HC RX REV CODE- 250/637: Performed by: ORTHOPAEDIC SURGERY

## 2019-04-19 PROCEDURE — 85018 HEMOGLOBIN: CPT

## 2019-04-19 PROCEDURE — 36415 COLL VENOUS BLD VENIPUNCTURE: CPT

## 2019-04-19 RX ADMIN — CYCLOBENZAPRINE HYDROCHLORIDE 5 MG: 10 TABLET, FILM COATED ORAL at 08:59

## 2019-04-19 RX ADMIN — HYDROMORPHONE HYDROCHLORIDE 2 MG: 2 TABLET ORAL at 08:59

## 2019-04-19 RX ADMIN — ASPIRIN 81 MG: 81 TABLET, COATED ORAL at 08:58

## 2019-04-19 RX ADMIN — GABAPENTIN 100 MG: 100 CAPSULE ORAL at 08:59

## 2019-04-19 RX ADMIN — BUPROPION HYDROCHLORIDE 150 MG: 75 TABLET, FILM COATED ORAL at 08:58

## 2019-04-19 RX ADMIN — Medication 1 AMPULE: at 09:00

## 2019-04-19 RX ADMIN — LEVOTHYROXINE SODIUM 150 MCG: 100 TABLET ORAL at 06:42

## 2019-04-19 RX ADMIN — SENNOSIDES,DOCUSATE SODIUM 2 TABLET: 50; 8.6 TABLET, FILM COATED ORAL at 08:58

## 2019-04-19 RX ADMIN — OXYCODONE HYDROCHLORIDE 10 MG: 10 TABLET, FILM COATED, EXTENDED RELEASE ORAL at 08:59

## 2019-04-19 RX ADMIN — FLUTICASONE PROPIONATE AND SALMETEROL 1 PUFF: 250; 50 POWDER RESPIRATORY (INHALATION) at 08:00

## 2019-04-19 RX ADMIN — MONTELUKAST 10 MG: 10 TABLET, FILM COATED ORAL at 08:58

## 2019-04-19 RX ADMIN — HYDROMORPHONE HYDROCHLORIDE 2 MG: 2 TABLET ORAL at 02:57

## 2019-04-19 RX ADMIN — ACETAMINOPHEN 1000 MG: 500 TABLET, FILM COATED ORAL at 06:41

## 2019-04-19 RX ADMIN — APIXABAN 5 MG: 5 TABLET, FILM COATED ORAL at 08:59

## 2019-04-19 NOTE — PROGRESS NOTES
I have reviewed discharge instructions with the patient and spouse. Instructed on last pain medication given and when patient is allowed for next dose, ONLY TO BE TAKEN IF NEEDED. The patient and spouse verbalized understanding. Signed discharge paper in paper chart. IV removed with tip intact. Pain medication and other prescriptions given to patient. Will discharge home with family.

## 2019-04-19 NOTE — PROGRESS NOTES
Shift assessment completed. Pt is alert & oriented x4. Able to verbalize needs. Resting quietly with no distress noted. Dressing to left knee is clean, dry and intact. [place] in place. Neurovascular and peripheral vascular checks WNL. Incentive Spirometry at bedside. Patient encouraged to use hourly x 10 repetitions. Patient voiding clear yellow urine without difficulty. Pain is being managed with Dilaudid with patient tolerating well. Bed low and locked. Call light within reach. Patient instructed to call for assistance. Pt verbalizes understanding. Will monitor.

## 2019-04-19 NOTE — DISCHARGE INSTRUCTIONS
[address] [facility]   Patient Discharge Instructions    Hardy Garcia / [id number] : 1950    Admitted 2019 Discharged: 2019     IF YOU HAVE ANY PROBLEMS ONCE YOU ARE AT HOME CALL THE FOLLOWING NUMBERS:   Main office number: [phone]    Take Home Medications     · It is important that you take the medication exactly as they are prescribed. · Keep your medication in the bottles provided by the pharmacist and keep a list of the medication names, dosages, and times to be taken in your wallet. · Do not take other medications without consulting your doctor. What to do at [address] your prehospital diet. If you have excessive nausea or vomitting call your doctor's office     Home Physical Therapy is arranged. Use rolling walker when walking. Patients who have had a joint replacement should not drive until you are seen for your follow up appointment by Dr. Vicki Neal. When to Call    - Call if you have a temperature greater then 101  - Unable to keep food down  - Loose control of your bladder or bowel function  - Are unable to bear any weight   - Need a pain medication refill       DISCHARGE SUMMARY from Nurse    The following personal items collected during your admission are returned to you:   Dental Appliance: Dental Appliances: None  Vision: Visual Aid: Glasses  Hearing Aid:    Jewelry: Jewelry: None  Clothing: Clothing: At bedside  Other Valuables: Other Valuables: Cell Phone(PT'S SPOUSE HAS PHONE)  Valuables sent to safe:      PATIENT INSTRUCTIONS:    After general anesthesia or intravenous sedation, for 24 hours or while taking prescription Narcotics:  · Limit your activities  · Do not drive and operate hazardous machinery  · Do not make important personal or business decisions  · Do  not drink alcoholic beverages  · If you have not urinated within 8 hours after discharge, please contact your surgeon on call.     Report the following to your surgeon:  · Excessive pain, swelling, redness or odor of or around the surgical area  · Temperature over 101  · Nausea and vomiting lasting longer than 4 hours or if unable to take medications  · Any signs of decreased circulation or nerve impairment to extremity: change in color, persistent  numbness, tingling, coldness or increase pain  · Any questions, call office @ [phone]      Keep scheduled follow up appointment. If need to change, call office @ [phone]. *  Please give a list of your current medications to your Primary Care Provider. *  Please update this list whenever your medications are discontinued, doses are      changed, or new medications (including over-the-counter products) are added. *  Please carry medication information at all times in case of emergency situations. Patient Education        Total Knee Replacement: What to Expect at Home  Your Recovery    When you leave the hospital, you should be able to move around with a walker or crutches. But you will need someone to help you at home for the next few weeks or until you have more energy and can move around better. If there is no one to help you at home, you may go to a rehabilitation center. You will go home with a bandage and stitches, staples, tissue glue, or tape strips. Change the bandage as your doctor tells you to. If you have stitches or staples, your doctor will remove them 10 to 21 days after your surgery. Glue or tape strips will fall off on their own over time. You may still have some mild pain, and the area may be swollen for 3 to 6 months after surgery. Your knee will continue to improve for 6 to 12 months. You will probably use a walker for 1 to 3 weeks and then use crutches. When you are ready, you can use a cane. You will probably be able to walk on your own in 4 to 8 weeks. You will need to do months of physical rehabilitation (rehab) after a knee replacement.  Rehab will help you strengthen the muscles of the knee and help you regain movement. After you recover, your artificial knee will allow you to do normal daily activities with less pain or no pain at all. You may be able to hike, dance, ride a bike, and play golf. Talk to your doctor about whether you can do more strenuous activities. Always tell your caregivers that you have an artificial knee. How long it will take to walk on your own, return to normal activities, and go back to work depends on your health and how well your rehabilitation (rehab) program goes. The better you do with your rehab exercises, the quicker you will get your strength and movement back. This care sheet gives you a general idea about how long it will take for you to recover. But each person recovers at a different pace. Follow the steps below to get better as quickly as possible. How can you care for yourself at home? Activity    · Rest when you feel tired. You may take a nap, but do not stay in bed all day. When you sit, use a chair with arms. You can use the arms to help you stand up.     · Work with your physical therapist to find the best way to exercise. You may be able to take frequent, short walks using crutches or a walker. What you can do as your knee heals will depend on whether your new knee is cemented or uncemented. You may not be able to do certain things for a while if your new knee is uncemented.     · After your knee has healed enough, you can do more strenuous activities with caution. ? You can golf, but use a golf cart, and do not wear shoes with spikes. ? You can bike on a flat road or on a stationary bike. Avoid biking up hills. ? Your doctor may suggest that you stay away from activities that put stress on your knee. These include tennis or badminton, squash or racquetball, contact sports like football, jumping (such as in basketball), jogging, or running. ? Avoid activities where you might fall.  These include horseback riding, skiing, and mountain biking.     · Do not sit for more than 1 hour at a time. Get up and walk around for a while before you sit again. If you must sit for a long time, prop up your leg with a chair or footstool. This will help you avoid swelling.     · Ask your doctor when you can drive again. It may take up to 8 weeks after knee replacement surgery before it is safe for you to drive.     · When you get into a car, sit on the edge of the seat. Then pull in your legs, and turn to face the front.     · You should be able to do many everyday activities 3 to 6 weeks after your surgery. You will probably need to take 4 to 16 weeks off from work. When you can go back to work depends on the type of work you do and how you feel.     · Ask your doctor when it is okay for you to have sex.     · Do not lift anything heavier than 10 pounds and do not lift weights for 12 weeks. Diet    · By the time you leave the hospital, you should be eating your normal diet. If your stomach is upset, try bland, low-fat foods like plain rice, broiled chicken, toast, and yogurt. Your doctor may suggest that you take iron and vitamin supplements.     · Drink plenty of fluids (unless your doctor tells you not to).   · Eat healthy foods, and watch your portion sizes. Try to stay at your ideal weight. Too much weight puts more stress on your new knee.     · You may notice that your bowel movements are not regular right after your surgery. This is common. Try to avoid constipation and straining with bowel movements. You may want to take a fiber supplement every day. If you have not had a bowel movement after a couple of days, ask your doctor about taking a mild laxative. Medicines    · Your doctor will tell you if and when you can restart your medicines. He or she will also give you instructions about taking any new medicines.     · If you take blood thinners, such as warfarin (Coumadin), clopidogrel (Plavix), or aspirin, be sure to talk to your doctor.  He or she will tell you if and when to start taking those medicines again. Make sure that you understand exactly what your doctor wants you to do.     · Your doctor may give you a blood-thinning medicine to prevent blood clots. If you take a blood thinner, be sure you get instructions about how to take your medicine safely. Blood thinners can cause serious bleeding problems. This medicine could be in pill form or as a shot (injection). If a shot is necessary, your doctor will tell you how to do this.     · Be safe with medicines. Take pain medicines exactly as directed. ? If the doctor gave you a prescription medicine for pain, take it as prescribed. ? If you are not taking a prescription pain medicine, ask your doctor if you can take an over-the-counter medicine. ? Plan to take your pain medicine 30 minutes before exercises. It is easier to prevent pain before it starts than to stop it once it has started.     · If you think your pain medicine is making you sick to your stomach:  ? Take your medicine after meals (unless your doctor has told you not to). ? Ask your doctor for a different pain medicine.     · If your doctor prescribed antibiotics, take them as directed. Do not stop taking them just because you feel better. You need to take the full course of antibiotics. Incision care    · If your doctor told you how to care for your cut (incision), follow your doctor's instructions. You will have a dressing over the cut. A dressing helps the incision heal and protects it. Your doctor will tell you how to take care of this.     · If you did not get instructions, follow this general advice:  ? If you have strips of tape on the cut the doctor made, leave the tape on for a week or until it falls off.  ? If you have stitches or staples, your doctor will tell you when to come back to have them removed. ? If you have skin adhesive on the cut, leave it on until it falls off. Skin adhesive is also called glue or liquid stitches.   ? Change the bandage every day.  ? Wash the area daily with warm water, and pat it dry. Don't use hydrogen peroxide or alcohol. They can slow healing. ? You may cover the area with a gauze bandage if it oozes fluid or rubs against clothing. ? You may shower 24 to 48 hours after surgery. Pat the incision dry. Don't swim or take a bath for the first 2 weeks, or until your doctor tells you it is okay. Exercise    · Your rehab program will give you a number of exercises to do to help you get back your knee's range of motion and strength. Always do them as your therapist tells you. Ice and elevation    · For pain and swelling, put ice or a cold pack on the area for 10 to 20 minutes at a time. Put a thin cloth between the ice and your skin. Other instructions    · Continue to wear your support stockings as your doctor says. These help to prevent blood clots. The length of time that you will have to wear them depends on your activity level and the amount of swelling.     · You have metal pieces in your knee. These may set off some airport metal detectors. Carry a medical alert card that says you have an artificial joint, just in case. Follow-up care is a key part of your treatment and safety. Be sure to make and go to all appointments, and call your doctor if you are having problems. It's also a good idea to know your test results and keep a list of the medicines you take. When should you call for help? Call 911 anytime you think you may need emergency care. For example, call if:    · You passed out (lost consciousness).     · You have severe trouble breathing.     · You have sudden chest pain and shortness of breath, or you cough up blood.    Call your doctor now or seek immediate medical care if:    · You have signs of infection, such as:  ? Increased pain, swelling, warmth, or redness. ? Red streaks leading from the incision. ? Pus draining from the incision. ?  A fever.     · You have signs of a blood clot, such as:  ? Pain in your calf, back of the knee, thigh, or groin. ? Redness and swelling in your leg or groin.     · Your incision comes open and begins to bleed, or the bleeding increases.     · You have pain that does not get better after you take pain medicine.    Watch closely for changes in your health, and be sure to contact your doctor if:    · You do not have a bowel movement after taking a laxative. Where can you learn more? Go to http://jerson-rashmi.info/. Enter B881 in the search box to learn more about \"Total Knee Replacement: What to Expect at Home. \"  Current as of: September 20, 2018  Content Version: 11.9  © 9824-5262 [employer]. Care instructions adapted under license by [employer] (which disclaims liability or warranty for this information). If you have questions about a medical condition or this instruction, always ask your healthcare professional. [place] [address] any warranty or liability for your use of this information. These are general instructions for a healthy lifestyle:    No smoking/ No tobacco products/ Avoid exposure to second hand smoke    Surgeon General's Warning:  Quitting smoking now greatly reduces serious risk to your health. Obesity, smoking, and sedentary lifestyle greatly increases your risk for illness    A healthy diet, regular physical exercise & weight monitoring are important for maintaining a healthy lifestyle    You may be retaining fluid if you have a history of heart failure or if you experience any of the following symptoms:  Weight gain of 3 pounds or more overnight or 5 pounds in a week, increased swelling in our hands or feet or shortness of breath while lying flat in bed. Please call your doctor as soon as you notice any of these symptoms; do not wait until your next office visit.     Recognize signs and symptoms of STROKE:    F-face looks uneven    A-arms unable to move or move even    S-speech slurred or non-existent    T-time-call 911 as soon as signs and symptoms begin-DO NOT go       Back to bed or wait to see if you get better-TIME IS BRAIN. The discharge information has been reviewed with the patient. The patient verbalized understanding. Information obtained by :  I understand that if any problems occur once I am at home I am to contact my physician. I understand and acknowledge receipt of the instructions indicated above.                                                                                                                                            Physician's or R.N.'s Signature                                                                  Date/Time                                                                                                                                              Patient or Representative Signature                                                          Date/Time

## 2019-04-19 NOTE — PROGRESS NOTES
Patient in recliner. Wound Vac dressing dry and intact. Patient denies needs at present. Neurovascular status WDL. Palpable pulses. Positive dorsiflexion and plantar flexion. Instructed not to get up by themselves and call for assistance or any needs. Patient verbalized understanding. Call bell within reach. Side rails up x3. Bed low and locked. Recliner wheels locked. No distress noted.

## 2019-04-19 NOTE — PROGRESS NOTES
.sfptj Problem: Mobility Impaired (Adult and Pediatric) Goal: *Acute Goals and Plan of Care (Insert Text) Description GOALS (1-4 days): 
(1.)Ms. Ovi Barnes will move from supine to sit and sit to supine  in bed with STAND BY ASSIST. 
(2.)Ms. Ovi Barnes will transfer from bed to chair and chair to bed with STAND BY ASSIST using the least restrictive device. MET 4/19/19 
(3.)Ms. Ovi Barnes will ambulate with STAND BY ASSIST for 300 feet with the least restrictive device. (4.)Ms. Ovi Barnes will ambulate up/down 3 steps with bilateral  railing with CONTACT GUARD ASSIST with no device. MET 4/19/19 
(5.)Ms. Ovi Barnes will increase left knee ROM to 5°-80°. 
________________________________________________________________________________________________ Outcome: Progressing Towards Goal 
  
PHYSICAL THERAPY JOINT CAMP TKA: Daily Note, Treatment Day: 1st and AM 4/19/2019 INPATIENT: Hospital Day: 3 [place] / [place] / Product Type: Medicare /  
  
NAME/AGE/GENDER: Win Zhu is a 79 y.o. female PRIMARY DIAGNOSIS:  Unilateral primary osteoarthritis, left knee [M17.12] Procedure(s) and Anesthesia Type: 
   * LEFT KNEE ARTHROPLASTY - Spinal (Left) ICD-10: Treatment Diagnosis:  
 · Pain in Left Knee (M25.562) · Stiffness of Left Knee, Not elsewhere classified (M25.662) · Difficulty in walking, Not elsewhere classified (R26.2) ASSESSMENT:  
Ms. Ovi Barnes presents up in chair on contact. Worked on gait training in the hallway with RW and verbal cues progressing with distance from yesterday. In gym worked on TKA exercises with verbal cues. Reviewed HEP and use of ice along with frequency. Pt walked back to her room and stayed up in chair with ice on knee and needs in reach. Pt plans to go home today with HHPT for follow up. This section established at most recent assessment PROBLEM LIST (Impairments causing functional limitations): 1. Decreased Strength 2. Decreased ADL/Functional Activities 3. Decreased Transfer Abilities 4. Decreased Ambulation Ability/Technique 5. Edema/Girth 6. Decreased [place] with Home Exercise Program 
 INTERVENTIONS PLANNED: (Benefits and precautions of physical therapy have been discussed with the patient.) 1. Cold 2. bed mobility 3. gait training 4. home exercise program (HEP) 5. Range of Motion: active/assisted/passive 6. Therapeutic Activities 7. therapeutic exercise/strengthening 8. transfer training 9. Group Therapy TREATMENT PLAN: Frequency/Duration: Follow patient BID for duration of hospital stay to address above goals. Rehabilitation Potential For Stated Goals: Good RECOMMENDED REHABILITATION/EQUIPMENT: (at time of discharge pending progress): Continue Skilled Therapy, Home Health: Physical Therapy and Outpatient: Physical Therapy. HISTORY:  
History of Present Injury/Illness (Reason for Referral): 
Pt s/p left TKA on 19 Past Medical History/Comorbidities: Ms. Mendoza Barakat  has a past medical history of Adverse effect of anesthesia, Allergic rhinitis, Anxiety, Arthritis, Asthma, Depression, Endocarditis (), Heart murmur, Hematuria, History of MRSA infection (on left breast), HLD (hyperlipidemia) ( ), Hypertension, Hypothyroid, Hypothyroidism due to acquired atrophy of thyroid (2015), Intertrigo, Irregular heart beat, Mass of colon, Morbid obesity ([address] Persistent atrial fibrillation ([address] Reactive airway disease with status asthmaticus, Scoliosis (2016), Sleep apnea, Varicose veins, and VSD (ventricular septal defect) (2016).   Ms. Mendoza Barakat  has a past surgical history that includes hx lipoma resection (Right); hx appendectomy; hx hernia repair (incisional [id number]); hx colonoscopy (, ); hx breast reduction (Bilateral, 2016); hx  section; hx dilation and curettage; hx colectomy (Right, ); hx knee replacement (Right, 01/29/2018); hx heent (1969); hx implantable cardioverter defibrillator (12/18/2018); hx heart catheterization; hx afib ablation (2018 & 02/2019); and hx refractive surgery. Social History/Living Environment:  
Home Environment: Private residence # Steps to Enter: 3 Hand Rails : Bilateral 
One/Two Story Residence: One story Living Alone: No 
Support Systems: Spouse/Significant Other/Partner Patient Expects to be Discharged to[de-identified] Private residence Current DME Used/Available at Home: Cane, straight, Commode, bedside, Walker, rolling Tub or Shower Type: Shower Prior Level of Function/Work/Activity: 
Pt living at home, independent with gait with a cane, independent with ADLs Number of Personal Factors/Comorbidities that affect the Plan of Care: 0: LOW COMPLEXITY EXAMINATION:  
Most Recent Physical Functioning: LLE AROM 
L Knee Flexion: 90 L Knee Extension: 6 Transfers Sit to Stand: Stand-by assistance Stand to Sit: Stand-by assistance Balance Sitting: Intact Standing: Pull to stand; With support Gait Training: Yes 
 
Weight Bearing Status Left Side Weight Bearing: As tolerated Distance (ft): 150 Feet (ft)(and another 132 feet) Ambulation - Level of Assistance: Supervision;Stand-by assistance Assistive Device: Walker, rolling Speed/Luzma: Pace decreased (<100 feet/min) Step Length: Right shortened Stance: Left decreased Gait Abnormalities: Antalgic Number of Stairs Trained: 3 Stairs - Level of Assistance: Stand-by assistance;Contact guard assistance Rail Use: Both Interventions: Safety awareness training;Verbal cues Braces/Orthotics: none Left Knee Cold Type: Cryocuff Patient Position: Sitting Body Structures Involved: 1. Joints 2. Muscles Body Functions Affected: 1. Movement Related Activities and Participation Affected: 1. Mobility 2. Self Care Number of elements that affect the Plan of Care: 4+: HIGH COMPLEXITY CLINICAL PRESENTATION:  
Presentation: Stable and uncomplicated: LOW COMPLEXITY CLINICAL DECISION [address] [place] [address] AM-PAC 6 Clicks Basic Mobility Inpatient Short Form How much difficulty does the patient currently have. .. Unable A Lot A Little None 1. Turning over in bed (including adjusting bedclothes, sheets and blankets)? ? 1   ? 2   ? 3   ? 4  
2. Sitting down on and standing up from a chair with arms ( e.g., wheelchair, bedside commode, etc.)   ? 1   ? 2   ? 3   ? 4  
3. Moving from lying on back to sitting on the side of the bed?   ? 1   ? 2   ? 3   ? 4 How much help from another person does the patient currently need. .. Total A Lot A Little None 4. Moving to and from a bed to a chair (including a wheelchair)? ? 1   ? 2   ? 3   ? 4  
5. Need to walk in hospital room? ? 1   ? 2   ? 3   ? 4  
6. Climbing 3-5 steps with a railing? ? 1   ? 2   ? 3   ? 4  
© , Trustees of [address] [place] [address], under license to [email or web address]. All rights reserved Score:  Initial: 18 Most Recent: X (Date: -- ) Interpretation of Tool:  Represents activities that are increasingly more difficult (i.e. Bed mobility, Transfers, Gait). Medical Necessity:    
· Patient is expected to demonstrate progress in strength, range of motion and functional technique ·  to decrease assistance required with functional mobility · . Reason for Services/Other Comments: 
· Patient continues to require skilled intervention due to inability to complete functional mobility and TKA exercises independently · . Use of outcome tool(s) and clinical judgement create a POC that gives a: Clear prediction of patient's progress: LOW COMPLEXITY  
  
 
 
 
TREATMENT:  
(In addition to Assessment/Re-Assessment sessions the following treatments were rendered) Pre-treatment Symptoms/Complaints:  none Pain Initial: no reports of pain Pain Intensity 1: (no reports of pain)  Post Session:  no reports of pain Therapeutic Exercise: (45 Minutes(group)):  Exercises per grid below to improve mobility and strength. Required minimal verbal cues to promote proper body alignment and promote proper body posture. Progressed repetitions as indicated. Gait Training (15 Minutes):  Gait training to improve and/or restore physical functioning as related to mobility and strength. Ambulated 150 Feet (ft)(and another 132 feet) with Supervision;Stand-by assistance using a Walker, rolling and minimal Safety awareness training;Verbal cues related to their stance phase and stride length to promote proper body alignment and promote proper body posture. Instruction in performance of walker use and gait sequencing to correct stance phase and stride length. Date: 
4/18/19 Date: 
4/19/19 Date: 
  
ACTIVITY/EXERCISE AM PM AM PM AM PM  
GROUP THERAPY  ? X  X  ?  ?  ? Ankle Pumps 10 15 20 Quad Sets 10 15 20 Gluteal Sets 10 15 20 Hip ABd/ADduction 10 15 20 Straight Leg Raises 10 15 20 Knee Slides 10 15 20 Short Arc Quads  15 20 2 [facility] Chair Slides  15 20 B = bilateral; AA = active assistive; A = active; P = passive Treatment/Session Assessment:   
 Response to Treatment:  pt did well, progressing nicely. Education: 
X Home Exercises X Fall Precautions 
? no pillow under knee X D/C Instruction Review X Knee Prosthesis Review Seng Francois Management/Safety ? Adaptive Equipment as Needed Interdisciplinary Collaboration:  
o Registered Nurse 
o Rehabilitation Attendant After treatment position/precautions:  
o Up in chair 
o Bed/Chair-wheels locked 
o Call light within reach Compliance with Program/Exercises: Compliant all of the time.   
 Recommendations/Intent for next treatment session:  Treatment next visit will focus on increasing Ms. Jay's independence with bed mobility, transfers, gait training, strength/ROM exercises, modalities for pain, and patient education. Total Treatment Duration: PT Patient Time In/Time Out Time In: 0930 Time Out: 1030 Amara Ramos, PT

## 2019-04-19 NOTE — PROGRESS NOTES
2019 Post Op day: 2 Days Post-Op Admit Date: 2019 Admit Diagnosis: Unilateral primary osteoarthritis, left knee [M17.12] OA (osteoarthritis) of knee [M17.10] Subjective: Patient stable. No acute events. Objective:  
Visit Vitals /76 (BP 1 Location: Right arm, BP Patient Position: At rest) Pulse 87 Temp 97.3 °F (36.3 °C) Resp 16 Ht 5' 3\" (1.6 m) Wt 120.7 kg (266 lb) SpO2 90% Breastfeeding? No  
BMI 47.12 kg/m² Temp (24hrs), Av °F (36.7 °C), Min:97.3 °F (36.3 °C), Max:98.9 °F (37.2 °C) Lab Results Component Value Date/Time HGB 11.4 (L) 2019 04:39 AM  
 
Extremity Exam 
Dressing clean and dry Tibialis Anterior and Gastroc-Soleus functioning normally left lower extremity Sensation intact to light touch on operative limb Extremity perfused TEDS/SCDS in place No sign of DVT Assessment / Plan : 
WBAT LLE Continue PT/OT Continue current DVT prophylaxis in house. Discharge on ASA BID Yodit Cancel Patient Active Problem List  
Diagnosis Code  Essential hypertension I10  
 Mixed hyperlipidemia E78.2  Major depressive disorder with single episode, in full remission ([facility] [address] F32.5  PRAVEEN (obstructive sleep apnea) G47.33  
 Anxiety F41.9  VSD (ventricular septal defect) Q21.0  Persistent atrial fibrillation (HCC) I48.1  Morbid obesity with BMI of 45.0-49.9, adult (HCC) E66.01, Z68.42  
 Acquired hypothyroidism E03.9  Mild intermittent asthma without complication K77.07  
 High risk medications (not anticoagulants) long-term use Z79.899  
 OA (osteoarthritis) of knee M17.10 Signed By: Digna Decker MD  
 
I have reviewed the patients controlled substance prescription history, as maintained in the [place] prescription monitoring program, so that the prescription(s) for a  controlled substance can be given.

## 2019-04-20 ENCOUNTER — HOME CARE VISIT (OUTPATIENT)
Dept: SCHEDULING | Facility: HOME HEALTH | Age: 69
End: 2019-04-20
Payer: MEDICARE

## 2019-04-20 VITALS
SYSTOLIC BLOOD PRESSURE: 140 MMHG | HEART RATE: 78 BPM | DIASTOLIC BLOOD PRESSURE: 90 MMHG | RESPIRATION RATE: 16 BRPM | TEMPERATURE: 97.9 F

## 2019-04-20 PROCEDURE — G0151 HHCP-SERV OF PT,EA 15 MIN: HCPCS

## 2019-04-20 PROCEDURE — 3331090002 HH PPS REVENUE DEBIT

## 2019-04-20 PROCEDURE — 3331090001 HH PPS REVENUE CREDIT

## 2019-04-20 PROCEDURE — 400013 HH SOC

## 2019-04-21 PROCEDURE — 3331090002 HH PPS REVENUE DEBIT

## 2019-04-21 PROCEDURE — 3331090001 HH PPS REVENUE CREDIT

## 2019-04-22 ENCOUNTER — HOME CARE VISIT (OUTPATIENT)
Dept: SCHEDULING | Facility: HOME HEALTH | Age: 69
End: 2019-04-22
Payer: MEDICARE

## 2019-04-22 ENCOUNTER — PATIENT OUTREACH (OUTPATIENT)
Dept: CASE MANAGEMENT | Age: 69
End: 2019-04-22

## 2019-04-22 VITALS
DIASTOLIC BLOOD PRESSURE: 80 MMHG | HEART RATE: 82 BPM | TEMPERATURE: 98.3 F | RESPIRATION RATE: 16 BRPM | SYSTOLIC BLOOD PRESSURE: 130 MMHG

## 2019-04-22 PROCEDURE — G0151 HHCP-SERV OF PT,EA 15 MIN: HCPCS

## 2019-04-22 PROCEDURE — 3331090001 HH PPS REVENUE CREDIT

## 2019-04-22 PROCEDURE — 3331090002 HH PPS REVENUE DEBIT

## 2019-04-22 NOTE — PROGRESS NOTES
Transition of Care Discharge Follow-up Questionnaire Date/Time of Call: 
 4/22/19 What was the patient hospitalized for? Left TKA Does the patient understand his/her diagnosis and/or treatment and what happened during the hospitalization? States understanding Did the patient receive discharge instructions? Yes  
CM Assessed Risk for Readmission:  
 
 
Patient stated Risk for Readmission: Low Not asked Review any discharge instructions (see discharge instructions/AVS in [facility]). Ask patient if they understand these. Do they have any questions? List given by surgery Were home services ordered (nursing, PT, OT, ST, etc.)? [facility] for PT If so, has the first visit occurred? If not, why? (Assist with coordination of services if necessary.) Yes Was any DME ordered? None If so, has it been received? If not, why?  (Assist patient in obtaining DME orders &/or equipment if necessary.) N/A Complete a review of all medications (new, continued and discontinued meds per the D/C instructions and medication tab in [facility]). Start: Dilaudid, Zofran, Tylenol as needed post surgery Stop: 
Changed: 
Continue: 
 
  
Were all new prescriptions filled? If not, why?  (Assist patient in obtaining medications if necessary  escalate for CCM &/or SW if ongoing issues are verbalized by pt or anticipated) Filled and taking. No questions. Does the patient understand the purpose and dosing instructions for all medications? (If patient has questions, provide explanation and education.) States understanding Does the patient have any problems in performing ADLs? (If patient is unable to perform ADLs  what is the limiting factor(s)? Do they have a support system that can assist? If no support system is present, discuss possible assistance that they may be able to obtain. Escalate for CCM/SW if ongoing issues are verbalized by pt or anticipated) Independent pre surgery. Needs assistance post surgery.  for PT Does the patient have all follow-up appointments scheduled? 7 day f/up with PCP?  
(f/up with PCP may be w/in 14 days if patient has a f/up with their specialist w/in 7 days) 7-14 day f/up with specialist?  
(or per discharge instructions) If f/up has not been made  what actions has the care coordinator made to accomplish this? Has transportation been arranged? Orthopedic: office will schedule PCP: Scheduled visit in June. Any other questions or concerns expressed by the patient? Ice machine from home is not working, but he is talking with the company to order correct part to fix. Using gel ice packs for now. Schedule next appointment with CHRISTOPHER RODAS Coordinator or refer to RN Case Manager/ per the workflow guidelines. When is care coordinators next follow-up call scheduled? If referred for CCM  what RN care manager was the referral assigned? Follow-up call within 30 days.   
MELANI Call Completed By: Manuel Nieves RN

## 2019-04-23 PROCEDURE — 3331090002 HH PPS REVENUE DEBIT

## 2019-04-23 PROCEDURE — 3331090001 HH PPS REVENUE CREDIT

## 2019-04-24 ENCOUNTER — HOME CARE VISIT (OUTPATIENT)
Dept: SCHEDULING | Facility: HOME HEALTH | Age: 69
End: 2019-04-24
Payer: MEDICARE

## 2019-04-24 VITALS
DIASTOLIC BLOOD PRESSURE: 62 MMHG | TEMPERATURE: 98.7 F | OXYGEN SATURATION: 96 % | SYSTOLIC BLOOD PRESSURE: 118 MMHG | HEART RATE: 85 BPM | RESPIRATION RATE: 20 BRPM

## 2019-04-24 VITALS
TEMPERATURE: 98.2 F | HEART RATE: 78 BPM | DIASTOLIC BLOOD PRESSURE: 78 MMHG | RESPIRATION RATE: 18 BRPM | SYSTOLIC BLOOD PRESSURE: 142 MMHG

## 2019-04-24 PROCEDURE — G0157 HHC PT ASSISTANT EA 15: HCPCS

## 2019-04-24 PROCEDURE — G0299 HHS/HOSPICE OF RN EA 15 MIN: HCPCS

## 2019-04-24 PROCEDURE — 3331090001 HH PPS REVENUE CREDIT

## 2019-04-24 PROCEDURE — 3331090002 HH PPS REVENUE DEBIT

## 2019-04-25 PROCEDURE — 3331090002 HH PPS REVENUE DEBIT

## 2019-04-25 PROCEDURE — 3331090001 HH PPS REVENUE CREDIT

## 2019-04-26 ENCOUNTER — HOME CARE VISIT (OUTPATIENT)
Dept: SCHEDULING | Facility: HOME HEALTH | Age: 69
End: 2019-04-26
Payer: MEDICARE

## 2019-04-26 VITALS
RESPIRATION RATE: 18 BRPM | HEART RATE: 86 BPM | SYSTOLIC BLOOD PRESSURE: 150 MMHG | DIASTOLIC BLOOD PRESSURE: 84 MMHG | TEMPERATURE: 97.9 F

## 2019-04-26 PROCEDURE — 3331090001 HH PPS REVENUE CREDIT

## 2019-04-26 PROCEDURE — G0157 HHC PT ASSISTANT EA 15: HCPCS

## 2019-04-26 PROCEDURE — 3331090002 HH PPS REVENUE DEBIT

## 2019-04-26 NOTE — DISCHARGE SUMMARY
[facility]  Total Joint Discharge Summary      Patient ID:  Delon Sauceda  [id number]  69 y.o.  1950    Admit date: 4/17/2019  Discharge date:   Vitals:    04/18/19 2349 04/19/19 0330 04/19/19 0747 04/19/19 0839   BP: 144/72 128/76 99/67    Pulse: 71 87 82    Resp: 16 16 18    Temp: 97.9 °F (36.6 °C) 97.3 °F (36.3 °C) 97.7 °F (36.5 °C)    SpO2: 96% 90% 93% 95%   Weight:       Height:         Admitting Physician: Tish Lambert MD  Surgeon: Same  Admission Diagnoses: Unilateral primary osteoarthritis, left knee [M17.12]  OA (osteoarthritis) of knee [M17.10]  Discharge Diagnoses: Active Problems:    OA (osteoarthritis) of knee (4/17/2019)      Procedure: Procedure(s) (LRB):  LEFT KNEE ARTHROPLASTY (Left)    Brief History: Was admitted 4/17/2019 for Procedure(s) (LRB):  LEFT KNEE ARTHROPLASTY (Left)    Hospital Course: Patient underwent surgery 4/17/2019. Patient did well postop. Pain was well controlled postop. Physical therapy and occupational therapy were initiated the day of surgery. The patient was placed on dual both chemical and mechanical DVT prophylaxis after surgery. The patient progressed well with therapy postop and was deemed safe and appropriate for discharge after surgery. Complications in Hospital: None                             Perioperative Antibiotics: Antibiotics per administered per protocol based on weight and prior drug allergies. If the patient was undergoing revision surgery or had a positive MRSA nasal swab the patient also received vancomycin. Hospital Medications given:   No current facility-administered medications for this encounter. Current Outpatient Medications   Medication Sig    acetaminophen (TYLENOL EXTRA STRENGTH) 500 mg tablet Take 2 Tabs by mouth as needed for Pain. Instructed to take DOS PRN, with a small sip of water, per anesthesia protocol. Indications: Pain (Patient taking differently: Take 2 Tabs by mouth as needed for Pain. Indications: Pain)    gabapentin (NEURONTIN) 100 mg capsule Take 1 Cap by mouth three (3) times daily.  cyclobenzaprine (FLEXERIL) 5 mg tablet Take 1 Tab by mouth three (3) times daily as needed for Muscle Spasm(s).  ondansetron (ZOFRAN ODT) 8 mg disintegrating tablet Take 1 Tab by mouth every eight (8) hours as needed for Nausea.  furosemide (LASIX) 40 mg tablet Take 1 Tab by mouth daily.  aspirin delayed-release 81 mg tablet Take  by mouth daily. Instructed to take DOS, with a small sip of water, per anesthesia protocol. Instructed to begin taking 81 mg ASA once Eliquis is held per anesthesia protocol.  buPROPion (WELLBUTRIN) 75 mg tablet Take 2 Tabs by mouth two (2) times a day.  dofetilide (TIKOSYN) 500 mcg capsule Take 1 Cap by mouth every twelve (12) hours every twelve (12) hours. (Patient taking differently: Take 500 mcg by mouth every twelve (12) hours every twelve (12) hours. Patient takes 1 tablet at 0800 and 1 tablet at 2000    )    losartan (COZAAR) 100 mg tablet Take 1 Tab by mouth daily.  levothyroxine (SYNTHROID) 150 mcg tablet Take 1 Tab by mouth Daily (before breakfast).  digestive enzymes, plant, (FIBERZYME CONCENTRATE-HP PO) Take 625 mg by mouth daily.  albuterol (VENTOLIN HFA) 90 mcg/actuation inhaler Take 2 Puffs by inhalation every six (6) hours as needed for Wheezing.  mometasone (NASONEX) 50 mcg/actuation nasal spray 2 Sprays by Both Nostrils route daily.  Cetirizine (ZYRTEC) 10 mg cap Take 10 Caps by mouth daily.  fluticasone-salmeterol (ADVAIR DISKUS) 250-50 mcg/dose diskus inhaler Take 1 Puff by inhalation two (2) times a day. RINSE MOUTH WELL AFTER USE (Patient taking differently: Take 1 Puff by inhalation two (2) times a day.)    montelukast (SINGULAIR) 10 mg tablet Take 1 Tab by mouth daily.  cpap machine kit by Does Not Apply route.  senna-docusate (SENNA-S) 8.6-50 mg per tablet Take 1 Tab by mouth daily.     apixaban (ELIQUIS) 5 mg tablet Take 1 Tab by mouth two (2) times a day. Postoperative transfusions: None    Objective    Hemoglobin at discharge:   Lab Results   Component Value Date/Time    HGB 11.4 (L) 04/19/2019 04:39 AM       Extremity Exam  Dressing Clean, dry intact. Positive tibialis anterior and gastroc-soleus function left lower extremity  Sensation grossly intact  Positive PT pulse  No sign of DVT  TEDS/SCDS in place    Physical Therapy started on the day of surgery and progressed. PT/OT:          Assistive Device: Walker (comment)        LLE AROM  L Knee Flexion: 90  L Knee Extension: 6       Discharge instructions:  -WBAT left lower extremity  - Anticoagulate for 4 week period  -Resume pre hospital diet             -Resume home medications per medical continuation form     -Ambulate with walker, appropriate total joint protocol  -Follow up in office as scheduled     IF Rhys [address]:   Main office number: [phone]    Take Home Medications     Cannot display discharge medications since this patient is not currently admitted. · It is important that you take the medication exactly as they are prescribed. · Keep your medication in the bottles provided by the pharmacist and keep a list of the medication names, dosages, and times to be taken in your wallet. · Do not take other medications without consulting your doctor. What to do at [address] your prehospital diet. If you have excessive nausea or vomiting call your doctor's office. Home Physical Therapy is arranged. Use rolling walker when walking. Use TASHA  stockings until we see you in the office for your follow up appointment with Dr. Glen Mahajan. Patients who have had a joint replacement should not drive until you are seen for your follow up appointment by Dr. Glen Mahajan. Total Knee patients keep knee elevated above heart level to prevent and resolve swelling.      Continue to ice the surgical site a few times a day until your follow-up appointment    Leave dressing alone until checked by home health. Patients with Prineo wound closure mesh may shower once at home but do not soak the wound (bath, swimming pool, hot tub, etc.). Gently pat dry wound after shower. No aggressive scrubbing as this may rub off adhesive used to keep wound sealed and irritate the wound. Further do not wear tight clothing such a spandex, yoga pants, or biker shorts as this type of clothing may pull the mesh dressing off. Wear loose fitting clothes only. Finally, do not allow anyone to remove this dressing. Dr Basia Ramires will remove the dressing in his office at your first postoperative visit. If your wound was closed with staples Home Health or the nursing facility will assist with dressing changes. Take it easy, you have just had major surgery. Too much activity will cause additional soreness and swelling. When to Call    - Call if you have a temperature greater then 101  - Have increased wound drainage or wound drainage that persists after 7 days.  - Begin to notice increased redness, swelling, or pus coming from the incision  - Unable to keep food down  - Lose control of your bladder or bowel function  - Are unable to bear any weight on operative leg  - Need a pain medication refill - please have pharmacy phone number available. I have reviewed the patients controlled substance prescription history, as maintained in the [facility] prescription monitoring program, so that the prescription(s) for a  controlled substance can be given.     Signed:  Chastity Simmons MD  4/26/2019  7:42 AM

## 2019-04-27 PROCEDURE — 3331090002 HH PPS REVENUE DEBIT

## 2019-04-27 PROCEDURE — 3331090001 HH PPS REVENUE CREDIT

## 2019-04-28 PROCEDURE — 3331090001 HH PPS REVENUE CREDIT

## 2019-04-28 PROCEDURE — 3331090002 HH PPS REVENUE DEBIT

## 2019-04-29 ENCOUNTER — HOME CARE VISIT (OUTPATIENT)
Dept: SCHEDULING | Facility: HOME HEALTH | Age: 69
End: 2019-04-29
Payer: MEDICARE

## 2019-04-29 VITALS
SYSTOLIC BLOOD PRESSURE: 140 MMHG | RESPIRATION RATE: 16 BRPM | DIASTOLIC BLOOD PRESSURE: 90 MMHG | HEART RATE: 84 BPM | TEMPERATURE: 97.8 F

## 2019-04-29 PROCEDURE — 3331090001 HH PPS REVENUE CREDIT

## 2019-04-29 PROCEDURE — G0151 HHCP-SERV OF PT,EA 15 MIN: HCPCS

## 2019-04-29 PROCEDURE — 3331090002 HH PPS REVENUE DEBIT

## 2019-04-30 PROCEDURE — 3331090002 HH PPS REVENUE DEBIT

## 2019-04-30 PROCEDURE — 3331090001 HH PPS REVENUE CREDIT

## 2019-05-01 ENCOUNTER — HOME CARE VISIT (OUTPATIENT)
Dept: SCHEDULING | Facility: HOME HEALTH | Age: 69
End: 2019-05-01
Payer: MEDICARE

## 2019-05-01 VITALS
RESPIRATION RATE: 16 BRPM | OXYGEN SATURATION: 97 % | SYSTOLIC BLOOD PRESSURE: 110 MMHG | TEMPERATURE: 97.9 F | DIASTOLIC BLOOD PRESSURE: 76 MMHG | HEART RATE: 76 BPM

## 2019-05-01 VITALS
DIASTOLIC BLOOD PRESSURE: 76 MMHG | SYSTOLIC BLOOD PRESSURE: 110 MMHG | HEART RATE: 76 BPM | RESPIRATION RATE: 16 BRPM | TEMPERATURE: 97.9 F

## 2019-05-01 PROCEDURE — G0151 HHCP-SERV OF PT,EA 15 MIN: HCPCS

## 2019-05-01 PROCEDURE — 3331090002 HH PPS REVENUE DEBIT

## 2019-05-01 PROCEDURE — A4649 SURGICAL SUPPLIES: HCPCS

## 2019-05-01 PROCEDURE — 3331090001 HH PPS REVENUE CREDIT

## 2019-05-01 PROCEDURE — G0299 HHS/HOSPICE OF RN EA 15 MIN: HCPCS

## 2019-05-02 PROCEDURE — 3331090002 HH PPS REVENUE DEBIT

## 2019-05-02 PROCEDURE — 3331090001 HH PPS REVENUE CREDIT

## 2019-05-03 ENCOUNTER — HOME CARE VISIT (OUTPATIENT)
Dept: HOME HEALTH SERVICES | Facility: HOME HEALTH | Age: 69
End: 2019-05-03
Payer: MEDICARE

## 2019-05-03 ENCOUNTER — HOME CARE VISIT (OUTPATIENT)
Dept: SCHEDULING | Facility: HOME HEALTH | Age: 69
End: 2019-05-03
Payer: MEDICARE

## 2019-05-03 VITALS
RESPIRATION RATE: 16 BRPM | HEART RATE: 90 BPM | DIASTOLIC BLOOD PRESSURE: 90 MMHG | TEMPERATURE: 98.2 F | SYSTOLIC BLOOD PRESSURE: 140 MMHG

## 2019-05-03 PROCEDURE — 3331090001 HH PPS REVENUE CREDIT

## 2019-05-03 PROCEDURE — G0151 HHCP-SERV OF PT,EA 15 MIN: HCPCS

## 2019-05-03 PROCEDURE — 3331090002 HH PPS REVENUE DEBIT

## 2019-05-04 PROCEDURE — 3331090001 HH PPS REVENUE CREDIT

## 2019-05-04 PROCEDURE — 3331090002 HH PPS REVENUE DEBIT

## 2019-05-05 PROCEDURE — 3331090002 HH PPS REVENUE DEBIT

## 2019-05-05 PROCEDURE — 3331090001 HH PPS REVENUE CREDIT

## 2019-05-06 ENCOUNTER — HOME CARE VISIT (OUTPATIENT)
Dept: SCHEDULING | Facility: HOME HEALTH | Age: 69
End: 2019-05-06
Payer: MEDICARE

## 2019-05-06 VITALS
SYSTOLIC BLOOD PRESSURE: 120 MMHG | TEMPERATURE: 98.5 F | HEART RATE: 89 BPM | RESPIRATION RATE: 16 BRPM | DIASTOLIC BLOOD PRESSURE: 85 MMHG

## 2019-05-06 PROCEDURE — 3331090001 HH PPS REVENUE CREDIT

## 2019-05-06 PROCEDURE — G0151 HHCP-SERV OF PT,EA 15 MIN: HCPCS

## 2019-05-06 PROCEDURE — 3331090002 HH PPS REVENUE DEBIT

## 2019-05-07 PROCEDURE — 3331090001 HH PPS REVENUE CREDIT

## 2019-05-07 PROCEDURE — 3331090002 HH PPS REVENUE DEBIT

## 2019-05-08 ENCOUNTER — HOME CARE VISIT (OUTPATIENT)
Dept: SCHEDULING | Facility: HOME HEALTH | Age: 69
End: 2019-05-08
Payer: MEDICARE

## 2019-05-08 VITALS
DIASTOLIC BLOOD PRESSURE: 80 MMHG | TEMPERATURE: 98.3 F | RESPIRATION RATE: 16 BRPM | HEART RATE: 79 BPM | SYSTOLIC BLOOD PRESSURE: 135 MMHG

## 2019-05-08 PROCEDURE — G0151 HHCP-SERV OF PT,EA 15 MIN: HCPCS

## 2019-05-08 PROCEDURE — 3331090001 HH PPS REVENUE CREDIT

## 2019-05-08 PROCEDURE — 3331090002 HH PPS REVENUE DEBIT

## 2019-05-13 ENCOUNTER — HOSPITAL ENCOUNTER (OUTPATIENT)
Dept: PHYSICAL THERAPY | Age: 69
Discharge: HOME | End: 2019-05-13
Payer: MEDICARE

## 2019-05-13 PROCEDURE — 97162 PT EVAL MOD COMPLEX 30 MIN: CPT

## 2019-05-13 PROCEDURE — 97110 THERAPEUTIC EXERCISES: CPT

## 2019-05-14 NOTE — THERAPY EVALUATION
Smooth Issa  : 1950  [place]  [place] [address] at [place] [address], [address] [facility]  [phone]   [phone]        OUTPATIENT PHYSICAL THERAPY:Initial Assessment 2019   ICD-10: Treatment Diagnosis: Pain in joint, left knee M25.562  ICD-10: Treatment Diagnosis: pain in joint, right knee M25.561  ICD-10: Treatment Diagnosis: difficulty walking, not elsewhere classified R26.2  Precautions/Allergies:   Adhesive tape-silicones; Ceclor [cefaclor]; and Symbyax [olanzapine-fluoxetine]   MD Orders: evaluate and treat MEDICAL/REFERRING DIAGNOSIS:  Knee pain [M25.569]   DATE OF ONSET: surgery 19  REFERRING PHYSICIAN: Emerson Haji MD  RETURN PHYSICIAN APPOINTMENT: as needed     INITIAL ASSESSMENT:  Ms. Mendoza Barakat is a 79 y.o. female who presents to physical therapy s/p left TKA on 19, she underwent 3 weeks of home health physical therapy and continues to be challenged with ambulation, sit to stand transfers, especially out of her car. She also started experiencing right anterior knee pain in 2019, notes tightness across anterior knee and notes clicking in her right knee. Most discomfort in right knee noted with performing sit to stand transfers. She presents with LE weakness, challenged with transfers, gait and balance. She will benefit from skilled physical therapy to improve her overall mobility and function with daily tasks. PROBLEM LIST (Impacting functional limitations):  1. Decreased Strength  2. Decreased ADL/Functional Activities  3. Decreased Transfer Abilities  4. Decreased Ambulation Ability/Technique  5. Decreased Balance  6. Increased Pain  7. Decreased Activity Tolerance  8. Increased Fatigue  9. Decreased Flexibility/Joint Mobility INTERVENTIONS PLANNED: (Treatment may consist of any combination of the following)  1. Balance Exercise  2. Bed Mobility  3. Cold  4.  Gait Training  5. Heat  6. Home Exercise Program (HEP)  7. Manual Therapy  8. Neuromuscular Re-education/Strengthening  9. Range of Motion (ROM)  10. Therapeutic Activites  11. Therapeutic Exercise/Strengthening   TREATMENT PLAN:  Effective Dates: 5/13/2019 TO 8/11/2019 (90 days). Frequency/Duration: 2 times a week for 90 Day(s)  GOALS: (Goals have been discussed and agreed upon with patient.)  Short-Term Functional Goals: Time Frame: 4 weeks  1. Patient demonstrates independence with her HEP without verbal cueing from therapist.  2. Patient demonstrates improve left knee ROM 2-120 degrees to perform ADLs. 3. Patient able to ambulate for 10 minutes without onset of bilateral knee pain  Discharge Goals: Time Frame: 90 days  1. Patient demonstrates functional AROM of left knee 0-125 to perform ADLs  2. Patient demonstrates ability to ambulate for 30 minutes to perform community ambulation. 3. Patient demonstrates ability to perform sit to stand transfers from various surfaces without onset of bilateral knee pain. 4. Improve LEFS outcome measure score from 28/80 to 60/80 to perform ADLs    OUTCOME MEASURE:   Tool Used: Lower Extremity Functional Scale (LEFS)  Score:  Initial: 28/80 Most Recent: X/80 (Date: -- )   Interpretation of Score: 20 questions each scored on a 5 point scale with 0 representing \"extreme difficulty or unable to perform\" and 4 representing \"no difficulty\". The lower the score, the greater the functional disability. 80/80 represents no disability. Minimal detectable change is 9 points. MEDICAL NECESSITY:   · Patient is expected to demonstrate progress in strength, range of motion, balance, coordination and functional technique to increase independence with sit to stand transfers, ambulation and weight bearing activities .   REASON FOR SERVICES/OTHER COMMENTS:  · Patient continues to require skilled intervention due to she continues to have deficits in her left knee ROM and strength, as well as challenged with ambulation and tranfsers . Total Duration:  PT Patient Time In/Time Out  Time In: 1640  Time Out: 1740    Rehabilitation Potential For Stated Goals: Excellent  Regarding Blaine Jay's therapy, I certify that the treatment plan above will be carried out by a therapist or under their direction. Thank you for this referral,  Zahira Coombs, PT          PAIN/SUBJECTIVE:   Initial: Pain Intensity 1: 7  Pain Location 1: Knee  Pain Orientation 1: Left, Right    Post Session:  6/10   HISTORY:   History of Injury/Illness (Reason for Referral):  Left TKA surgery on 4/17/19, surgery went well and improving her ROM in left knee. She continues to experience swelling and discoloration in her left LE. She had three weeks of home health physical therapy. In March 2019 she started having insidious onset of right knee pain (TKA), mostly noted with attempts to stand. She experiences a sharp pain into her right knee and slight giving way with attempt to step. Patient denies dizziness, drop attacks, numbness/tingling, bowel/bladder dysfunction and/or unexplained weight gain/loss. Past Medical History/Comorbidities:   Ms. Juanis Tao  has a past medical history of Adverse effect of anesthesia, Allergic rhinitis, Anxiety, Arthritis, Asthma, Depression, Endocarditis (1992), Heart murmur, Hematuria, History of MRSA infection (on left breast), HLD (hyperlipidemia) ( ), Hypertension, Hypothyroid, Hypothyroidism due to acquired atrophy of thyroid (4/28/2015), Intertrigo, Irregular heart beat, Mass of colon, Morbid obesity ([address] Persistent atrial fibrillation ([address] Reactive airway disease with status asthmaticus, Scoliosis (8/4/2016), Sleep apnea, Varicose veins, and VSD (ventricular septal defect) (07/22/2016).   Ms. Juanis Tao  has a past surgical history that includes hx lipoma resection (Right); hx appendectomy; [facility] hx colonoscopy (2010, 2015); hx breast reduction (Bilateral, 2016); hx  section; hx dilation and curettage; hx colectomy (Right, ); hx knee replacement (Right, 2018); hx heent (1969); hx implantable cardioverter defibrillator (2018); hx heart catheterization; hx afib ablation ( & 2019); and hx refractive surgery. Social History/Living Environment:      lives in a private home with her , challenged with transfers and stairs in her home  Prior Level of Function/Work/Activity:  Retired, has only been working on her therapy exercises, has not been able to return to her daily workouts. Dominant Side:         RIGHT   Ambulatory/Rehab Services H2 Model Falls Risk Assessment   Risk Factors:       No Risk Factors Identified Ability to Rise from Chair:       (3)  Multiple attempts, but successful   Falls Prevention Plan:       No modifications necessary   Total: (5 or greater = High Risk): 3    [facility] [employer]. All Rights Reserved. [facility] Patent #[id number]. Federal Law prohibits the replication, distribution or use without written permission from [facility] [employer]   Current Medications:       Current Outpatient Medications:     senna-docusate (SENNA-S) 8.6-50 mg per tablet, Take 1 Tab by mouth daily. , Disp: , Rfl:     acetaminophen (TYLENOL EXTRA STRENGTH) 500 mg tablet, Take 2 Tabs by mouth as needed for Pain. Instructed to take DOS PRN, with a small sip of water, per anesthesia protocol. Indications: Pain (Patient taking differently: Take 2 Tabs by mouth as needed for Pain. Indications: Pain), Disp: 120 Tab, Rfl: 0    gabapentin (NEURONTIN) 100 mg capsule, Take 1 Cap by mouth three (3) times daily. , Disp: 30 Cap, Rfl: 0    cyclobenzaprine (FLEXERIL) 5 mg tablet, Take 1 Tab by mouth three (3) times daily as needed for Muscle Spasm(s). , Disp: 30 Tab, Rfl: 0    ondansetron (ZOFRAN ODT) 8 mg disintegrating tablet, Take 1 Tab by mouth every eight (8) hours as needed for Nausea., Disp: 30 Tab, Rfl: 0    furosemide (LASIX) 40 mg tablet, Take 1 Tab by mouth daily. , Disp: 30 Tab, Rfl: 1    aspirin delayed-release 81 mg tablet, Take  by mouth daily. Instructed to take DOS, with a small sip of water, per anesthesia protocol. Instructed to begin taking 81 mg ASA once Eliquis is held per anesthesia protocol., Disp: , Rfl:     buPROPion (WELLBUTRIN) 75 mg tablet, Take 2 Tabs by mouth two (2) times a day., Disp: 360 Tab, Rfl: 1    dofetilide (TIKOSYN) 500 mcg capsule, Take 1 Cap by mouth every twelve (12) hours every twelve (12) hours. (Patient taking differently: Take 500 mcg by mouth every twelve (12) hours every twelve (12) hours. Patient takes 1 tablet at 0800 and 1 tablet at 2000  ), Disp: 60 Cap, Rfl: 11    losartan (COZAAR) 100 mg tablet, Take 1 Tab by mouth daily. , Disp: 30 Tab, Rfl: 11    levothyroxine (SYNTHROID) 150 mcg tablet, Take 1 Tab by mouth Daily (before breakfast). , Disp: 30 Tab, Rfl: 3    digestive enzymes, plant, (FIBERZYME CONCENTRATE-HP PO), Take 625 mg by mouth daily. , Disp: , Rfl:     albuterol (VENTOLIN HFA) 90 mcg/actuation inhaler, Take 2 Puffs by inhalation every six (6) hours as needed for Wheezing., Disp: 1 Inhaler, Rfl: 11    mometasone (NASONEX) 50 mcg/actuation nasal spray, 2 Sprays by Both Nostrils route daily. , Disp: 1 Container, Rfl: 11    Cetirizine (ZYRTEC) 10 mg cap, Take 10 Caps by mouth daily. , Disp: , Rfl:     fluticasone-salmeterol (ADVAIR DISKUS) 250-50 mcg/dose diskus inhaler, Take 1 Puff by inhalation two (2) times a day. RINSE MOUTH WELL AFTER USE (Patient taking differently: Take 1 Puff by inhalation two (2) times a day.), Disp: 1 Inhaler, Rfl: 11    montelukast (SINGULAIR) 10 mg tablet, Take 1 Tab by mouth daily. , Disp: 90 Tab, Rfl: 1    apixaban (ELIQUIS) 5 mg tablet, Take 1 Tab by mouth two (2) times a day., Disp: 60 Tab, Rfl: 5    cpap machine kit, by Does Not Apply route., Disp: , Rfl:    Date Last Reviewed:  5/13/2019   Number of Personal Factors/Comorbidities that affect the Plan of Care: 1-2: MODERATE COMPLEXITY   EXAMINATION:   Observation/Orthostatic Postural Assessment:           Lower extremity weight bearing is symmetrical. Observation of gait indicates decreased terminal knee extension in left knee. Patient exhibits a increased lumbar lordosis. Palpation of lower quadrant bony landmarks are left iliac crest elevated. Palpation: Patient exhibits tenderness to palpation/temperature/crepitance/scar mobility/soft tissue mobility/myofasical to bilateral anterior knees, left greater than right. Hamstring flexibility tested supine with straight leg raise is restricted left greater than right. ROM:     AROM(PROM) Right Left   Knee flexion 0-125  ()   Knee extension 0 Lacks 15   Hip flexion 85 85   Hip external rotation (ER) 20 20   Hip internal rotation (IR) 20 20   Hip extension 5 Lacks 5   Hip abduction 45 45     Strength:     Manual Muscle Test (*/5) Right Left   Knee extension 4+ 4-   Knee flexion 4+ 4-   Hip flexion 4+ 4-   Hip ER 4 4-   Hip IR 4 4-   Hip extension 4 4-   Hip abduction 4 4-   Hip adduction 5 5   Ankle DF 5 5   Ankle PF 5 5   Core stability 4-/5     Functional strength with standing heel raise is 3+. Special Tests:  None today  Neurological Screen:  Myotomes: Key muscle strength testing for bilateral LE is intact. Dermatomes: Sensation testing through bilateral LE for light touch is intact. Reflexes: Patellar (L4) and achilles (S1) are not tested. Neural tension tests: Slump test is negative. Passive straight leg raise (SLR) test is negative. Functional Mobility:  Challenged with      Body Structures Involved:  1. Bones  2. Joints  3. Muscles Body Functions Affected:  1. Sensory/Pain  2. Neuromusculoskeletal  3. Movement Related Activities and Participation Affected:  1. General Tasks and Demands  2. Mobility  3. Self Care  4.  Community, Social and [place]   Number of elements (examined above) that affect the Plan of Care: 3: MODERATE COMPLEXITY   CLINICAL PRESENTATION:   Presentation: Evolving clinical presentation with changing clinical characteristics: MODERATE COMPLEXITY   CLINICAL DECISION MAKING:   Use of outcome tool(s) and clinical judgement create a POC that gives a: Questionable prediction of patient's progress: MODERATE COMPLEXITY

## 2019-05-14 NOTE — PROGRESS NOTES
Smooth Issa  : 1950  [place]  [place] [address] at [place] [address], [address] [facility]  [phone]   [phone]      OUTPATIENT PHYSICAL THERAPY: Daily Treatment Note 2019   ICD-10: Treatment Diagnosis: Pain in joint, left knee M25.562  ICD-10: Treatment Diagnosis: pain in joint, right knee M25.561  ICD-10: Treatment Diagnosis: difficulty walking, not elsewhere classified R26.2  Precautions/Allergies:   Adhesive tape-silicones; Ceclor [cefaclor]; and Symbyax [olanzapine-fluoxetine]   Treatment Plan of Care Effective Dates:  19 to 2019    Pre-treatment Symptoms/Complaints:  Patient notes left TKA surgery went well, however has noted increased pain levels in her right knee with sit to stand transfers. Pain: Initial: Pain Intensity 1: 7  Pain Location 1: Knee  Pain Orientation 1: Left, Right  Post Session:  6/10   Medications Last Reviewed: 2019    Updated Objective Findings:  See evaluation note from today   TREATMENT:     THERAPEUTIC EXERCISE: (15 minutes):  Exercises per grid below to improve mobility, strength, balance and coordination. Required moderate verbal and manual cues to promote proper body alignment, promote proper body posture, promote proper body mechanics and promote proper body breathing techniques. Progressed resistance, range, repetitions and complexity of movement as indicated. Date:  2019   Activity/Exercise Parameters   Supine heel slides X 10 reps, 5 sec holds   Supine short arc quad X 15 reps, BLE   Sit to stand transfers X  5 minute review of hip hinge and weight shift. Weight acceptance   Review home health exercises X 5 minute review                 MANUAL THERAPY: (5 minutes): Joint mobilization and Soft tissue mobilization was utilized and necessary because of the patient's restricted joint motion and restricted motion of soft tissue.    (Used abbreviations: MET - muscle energy technique; PNF - proprioceptive neuromuscular facilitation; NMR - neuromuscular re-education; a/p - anterior to posterior; p/a - posterior to anterior, FMP - functional movement patterns)  · Supine bilateral soft tissue mobilization to anterior knee and patella mobilization in all directions. [facility]  Treatment/Session Summary:    · Response to Treatment:  demonstrates limited ROM in left knee and very challenged with sit to stand tranfsers, needed mod assist. .  · Communication/Consultation:  None today  · Equipment provided today:  None today  · Recommendations/Intent for next treatment session: Next visit will focus on LE AROM and PROM, LE strength. .    Total Treatment Billable Duration:  20 minutes.    PT Patient Time In/Time Out  Time In: 1640  Time Out: [address] [place] [address] Dr, PT    Future Appointments   Date Time Provider Uzair Pineda   5/15/2019  4:30 PM Mark Sánchez., PT [facility]   5/20/2019  4:30 PM Adithya Dyer A., PT OFF [facility]   5/22/2019  9:30 AM Adithya Dyer A., PT OFF [facility]   5/22/2019 11:20 AM PP SLEEP MICHELE [facility] PSCD PP   5/28/2019 10:30 AM [facility]yer A., [facility]   5/30/2019 10:30 AM Mark Sánchez., PT OFF [facility]   6/3/2019  3:30 PM Mark Sánchez., PT OFF [facility]   6/5/2019  3:30 PM Mark Suarez, PT [facility]   6/19/2019  2:20 PM Karin Cortez MD [facility]   9/23/2019  3:00 PM Jj Jenkins MD [address]

## 2019-05-15 ENCOUNTER — HOSPITAL ENCOUNTER (OUTPATIENT)
Dept: PHYSICAL THERAPY | Age: 69
Discharge: HOME | End: 2019-05-15
Payer: MEDICARE

## 2019-05-15 PROCEDURE — 97140 MANUAL THERAPY 1/> REGIONS: CPT

## 2019-05-15 PROCEDURE — 97110 THERAPEUTIC EXERCISES: CPT

## 2019-05-15 NOTE — PROGRESS NOTES
Vikas Menard  : 1950  [place]  [place] [address] at [place] [address], [address] [facility]  [phone]   [phone]      OUTPATIENT PHYSICAL THERAPY: Daily Treatment Note 5/15/2019   ICD-10: Treatment Diagnosis: Pain in joint, left knee M25.562  ICD-10: Treatment Diagnosis: pain in joint, right knee M25.561  ICD-10: Treatment Diagnosis: difficulty walking, not elsewhere classified R26.2  Precautions/Allergies:   Adhesive tape-silicones; Ceclor [cefaclor]; and Symbyax [olanzapine-fluoxetine]   Treatment Plan of Care Effective Dates:  19 to 2019    Pre-treatment Symptoms/Complaints:  Patient notes continues to have right knee pain when attempting to perform sit to stand. She continues to have tightness in left knee. Pain: Initial: Pain Intensity 1: 6  Pain Location 1: Knee  Pain Orientation 1: Left, Right  Post Session:  4/10   Medications Last Reviewed: 5/15/2019    Updated Objective Findings:  Left knee ROM 5-110 degrees   TREATMENT:     THERAPEUTIC EXERCISE: (30 minutes):  Exercises per grid below to improve mobility, strength, balance and coordination. Required moderate verbal and manual cues to promote proper body alignment, promote proper body posture, promote proper body mechanics and promote proper body breathing techniques. Progressed resistance, range, repetitions and complexity of movement as indicated. Date:  5/15/2019   Activity/Exercise Parameters   Supine heel slides X 10 reps, 5 sec holds   Supine short arc quad X 15 reps, BLE   Sit to stand transfers X  5 minute review of hip hinge and weight shift and weight acceptance   Supine straight leg raise X 10 reps, BLE   Seated hip hinge/weight acceptance X 5 reps BLE   Nu-step X 8 minutes   Supine knee extension 3 x 1 minute holds with ankle support.       MANUAL THERAPY: (25 minutes): Joint mobilization and Soft tissue mobilization was utilized and necessary because of the patient's restricted joint motion and restricted motion of soft tissue. (Used abbreviations: MET - muscle energy technique; PNF - proprioceptive neuromuscular facilitation; NMR - neuromuscular re-education; a/p - anterior to posterior; p/a - posterior to anterior, FMP - functional movement patterns)  · Supine bilateral soft tissue mobilization to anterior knee and patella mobilization in all directions. · Supine left PROM into knee flexion and extension. ·     [facility] Portal  Treatment/Session Summary:    · Response to Treatment:  improved overall mobility noted with sit to stand transfers, less discomfort noted in her right anterior knee with transfers. .  · Communication/Consultation:  None today  · Equipment provided today:  None today  · Recommendations/Intent for next treatment session: Next visit will focus on LE AROM and PROM, LE strength. Total Treatment Billable Duration:  55 minutes.    PT Patient Time In/Time Out  Time In: 9779  Time Out: [address] [facility], PT    Future Appointments   Date Time Provider Uzair Pineda   5/20/2019  4:30 PM Felipe Stevens., PT [facility]   5/22/2019  9:30 AM Gwenette Catching A., PT [facility]   5/22/2019 11:20 AM PP SLEEP MICHELE [facility] PSCD PP   5/28/2019 10:30 AM Gwenette Catching A., PT [facility]   5/30/2019 10:30 AM Felipe Stevens., PT [facility]   6/3/2019  3:30 PM Felipe Morse, PT [facility]   6/5/2019  3:30 PM Felipe Stevens., PT [facility]   6/19/2019  2:20 PM Rosa Maria Sierra MD [facility]   9/23/2019  3:00 PM Fritzi Schirmer, MD [address]

## 2019-05-17 ENCOUNTER — PATIENT OUTREACH (OUTPATIENT)
Dept: CASE MANAGEMENT | Age: 69
End: 2019-05-17

## 2019-05-17 NOTE — PROGRESS NOTES
MELANI follow-up outreach. Mrs. Evangelist Franco reports she is doing well since D/C. She has attended follow-up appointments. Episode closed.

## 2019-05-20 ENCOUNTER — HOSPITAL ENCOUNTER (OUTPATIENT)
Dept: PHYSICAL THERAPY | Age: 69
Discharge: HOME | End: 2019-05-20
Payer: MEDICARE

## 2019-05-20 PROCEDURE — 97110 THERAPEUTIC EXERCISES: CPT

## 2019-05-20 PROCEDURE — 97140 MANUAL THERAPY 1/> REGIONS: CPT

## 2019-05-20 NOTE — PROGRESS NOTES
Susana Davis  : 1950  [place]  [place] [address] at [address]  [phone]   [phone]      OUTPATIENT PHYSICAL THERAPY: Daily Treatment Note 2019   ICD-10: Treatment Diagnosis: Pain in joint, left knee M25.562  ICD-10: Treatment Diagnosis: pain in joint, right knee M25.561  ICD-10: Treatment Diagnosis: difficulty walking, not elsewhere classified R26.2  Precautions/Allergies:   Adhesive tape-silicones; Ceclor [cefaclor]; and Symbyax [olanzapine-fluoxetine]   Treatment Plan of Care Effective Dates:  19 to 2019    Pre-treatment Symptoms/Complaints:  Mrs. Shahnaz Rowland continues to notes increased right knee pain with sit to stand transfers, has also noted increased low back pain  Pain: Initial: Pain Intensity 1: 7  Pain Location 1: Knee  Pain Orientation 1: Left, Right  Post Session:  5/10   Medications Last Reviewed: 2019    Updated Objective Findings:  Left knee AROM: 5-115 degrees   TREATMENT:     THERAPEUTIC EXERCISE: (30 minutes):  Exercises per grid below to improve mobility, strength, balance and coordination. Required moderate verbal and manual cues to promote proper body alignment, promote proper body posture, promote proper body mechanics and promote proper body breathing techniques. Progressed resistance, range, repetitions and complexity of movement as indicated.      Date:  2019   Activity/Exercise Parameters   Supine heel slides X 10 reps, 5 sec holds   Supine short arc quad X 15 reps, BLE   Sit to stand transfers X  5 minute review of hip hinge and weight shift and weight acceptance, patient demonstrated poor technique with transfers at beginning of session and improved by end of session   Supine straight leg raise    Seated hip hinge/weight acceptance X 5 reps BLE   Nu-step X 10 minutes   Seated knee extension 3 x 1 minute holds on  12\" stool Standing terminal knee extension X 10 reps, BLE 5 sec holds, red t-band   Standing lateral steps X 5 reps, 5' distance    Therapeutic Neuroscience education X 5 minute review of patient's recent x-rays of right knee, review hypersensitivity to right knee with anxiety/stress. MANUAL THERAPY: (25 minutes): Joint mobilization and Soft tissue mobilization was utilized and necessary because of the patient's restricted joint motion and restricted motion of soft tissue. (Used abbreviations: MET - muscle energy technique; PNF - proprioceptive neuromuscular facilitation; NMR - neuromuscular re-education; a/p - anterior to posterior; p/a - posterior to anterior, FMP - functional movement patterns)  · Supine bilateral soft tissue mobilization to anterior knee and patella mobilization in all directions. · Supine left PROM into knee flexion and extension. ·     [facility] Portal  Treatment/Session Summary:    · Response to Treatment:  Poor carry over of correct techniques with sit to stand, patient was not hinging from her hips and utilizing her back for transfer. After review, improved techniques. Spoke in length with patient today about how weak she is in her bilateral LE and how it will take time to regain her strength from not only surrgeries but from dealing with her a-fib and cardiac issues over the past year. .  · Communication/Consultation:  None today  · Equipment provided today:  None today  · Recommendations/Intent for next treatment session: Next visit will focus on LE AROM and PROM, LE strength. Total Treatment Billable Duration:  55 minutes.    PT Patient Time In/Time Out  Time In: 1640  Time Out: [address] [facility] [address], PT    Future Appointments   Date Time Provider Uzair Pineda   5/22/2019  9:30 AM [facility]   5/22/2019 11:20 AM PP SLEEP MICHELE [facility] PSCD PP   5/28/2019 10:30 AM MANUEL Garcia   5/30/2019 10:30 [facility]   6/3/2019  3:30 PM Don Latch., PT [facility]   6/5/2019  3:30 PM Don Latch., [facility]   6/19/2019  2:20 PM Jay Davis MD [facility]   9/9/2019  1:15 PM Gallo Matos MD [facility]   9/23/2019  3:00 PM Heidy Du MD [address]

## 2019-05-22 ENCOUNTER — HOSPITAL ENCOUNTER (OUTPATIENT)
Dept: PHYSICAL THERAPY | Age: 69
Discharge: HOME | End: 2019-05-22
Payer: MEDICARE

## 2019-05-22 PROBLEM — G47.10 HYPERSOMNIA, UNSPECIFIED: Status: ACTIVE | Noted: 2019-05-22

## 2019-05-22 PROCEDURE — 97110 THERAPEUTIC EXERCISES: CPT

## 2019-05-22 PROCEDURE — 97140 MANUAL THERAPY 1/> REGIONS: CPT

## 2019-05-22 NOTE — PROGRESS NOTES
Cindi Beach  : 1950  [place]  Secondary: [facility] at [place] [address], [address] [facility]  [phone]   [phone]      OUTPATIENT PHYSICAL THERAPY: Daily Treatment Note 2019   ICD-10: Treatment Diagnosis: Pain in joint, left knee M25.562  ICD-10: Treatment Diagnosis: pain in joint, right knee M25.561  ICD-10: Treatment Diagnosis: difficulty walking, not elsewhere classified R26.2  Precautions/Allergies:   Adhesive tape-silicones; Ceclor [cefaclor]; and Symbyax [olanzapine-fluoxetine]   Treatment Plan of Care Effective Dates:  19 to 2019    Pre-treatment Symptoms/Complaints:  Mrs. Kat Pastrana notes improvement with sit to stand from higher surfaces, still challenged getting out of the car and low surface seats. Pain: Initial: Pain Intensity 1: 6  Pain Location 1: Knee  Pain Orientation 1: Left, Right  Post Session:  5/10   Medications Last Reviewed: 2019    Updated Objective Findings:  Left knee AROM: 5-115 degrees   TREATMENT:     THERAPEUTIC EXERCISE: (30 minutes):  Exercises per grid below to improve mobility, strength, balance and coordination. Required moderate verbal and manual cues to promote proper body alignment, promote proper body posture, promote proper body mechanics and promote proper body breathing techniques. Progressed resistance, range, repetitions and complexity of movement as indicated. Date:  2019   Activity/Exercise Parameters   Supine heel slides X 10 reps, 5 sec holds   Supine short arc quad X 15 reps, BLE   Sit to stand transfers X 10 reps.     Supine straight leg raise    Seated hip hinge/weight acceptance X 5 reps BLE   Nu-step X 10 minutes   Seated knee extension 3 x 1 minute holds on  12\" stool   Standing terminal knee extension X 10 reps, BLE 5 sec holds, red t-band   Standing lateral steps X 5 reps, 5' distance    Therapeutic Neuroscience education Standing hip extension X 10 reps, 5 sec holds BLE   Standing hip abduction X 10 reps, 5 sec holds BLE   Standing weight shift forward X 10 reps, 5 sec holds BLE     MANUAL THERAPY: (25 minutes): Joint mobilization and Soft tissue mobilization was utilized and necessary because of the patient's restricted joint motion and restricted motion of soft tissue. (Used abbreviations: MET - muscle energy technique; PNF - proprioceptive neuromuscular facilitation; NMR - neuromuscular re-education; a/p - anterior to posterior; p/a - posterior to anterior, FMP - functional movement patterns)  · Supine bilateral soft tissue mobilization to anterior knee and patella mobilization in all directions. · Supine left hamstrings soft tissue mobilization with FMP of knee extension  · Supine with left leg elevated, soft tissue mobilization to posterior calf with ankle DF/PF FMP      [facility] Portal  Treatment/Session Summary:    · Response to Treatment:  improved sit to stand techniques today, fatigued easily with exercises. soft tissue restrictions noted in left posterior thigh and leg. .  · Communication/Consultation:  None today  · Equipment provided today:  None today  · Recommendations/Intent for next treatment session: Next visit will focus on LE AROM and PROM, LE strength and balance. Total Treatment Billable Duration:  55 minutes.    PT Patient Time In/Time Out  Time In: 0930  Time Out: [facility], PT    Future Appointments   Date Time Provider Uzair Pineda   5/28/2019 10:30 AM Blaze FRANCO, PT [facility]   5/30/2019 10:30 AM Anabel Stanford., PT [facility]   6/3/2019  3:30 PM Anabel Stanford., PT [facility]   6/5/2019  3:30 PM Anabel Stanford., PT [facility]   6/19/2019  2:20 PM Donita Alvarez MD [facility]   9/9/2019  1:15 PM Corazon Novoa MD [facility]   9/23/2019  3:00 PM Marilee Pozo MD [address]

## 2019-05-28 ENCOUNTER — HOSPITAL ENCOUNTER (OUTPATIENT)
Dept: PHYSICAL THERAPY | Age: 69
Discharge: HOME | End: 2019-05-28
Payer: MEDICARE

## 2019-05-28 PROCEDURE — 97140 MANUAL THERAPY 1/> REGIONS: CPT

## 2019-05-28 PROCEDURE — 97110 THERAPEUTIC EXERCISES: CPT

## 2019-05-28 NOTE — PROGRESS NOTES
Burton Cummings  : 1950  [place]  [place] [address] at [place] [address], [address] [facility]  [phone]   [phone]      OUTPATIENT PHYSICAL THERAPY: Daily Treatment Note 2019   ICD-10: Treatment Diagnosis: Pain in joint, left knee M25.562  ICD-10: Treatment Diagnosis: pain in joint, right knee M25.561  ICD-10: Treatment Diagnosis: difficulty walking, not elsewhere classified R26.2  Precautions/Allergies:   Adhesive tape-silicones; Ceclor [cefaclor]; and Symbyax [olanzapine-fluoxetine]   Treatment Plan of Care Effective Dates:  19 to 2019    Pre-treatment Symptoms/Complaints:  Mrs. Merline Mcgee notes increased low back pain over the past two weeks, continues to note bilateral knee pain. Pain: Initial: Pain Intensity 1: 6  Pain Location 1: Knee, Spine, lumbar  Pain Orientation 1: Left, Right  Post Session:  5/10   Medications Last Reviewed: 2019    Updated Objective Findings:  Left knee AROM 3-118   TREATMENT:     THERAPEUTIC EXERCISE: (30 minutes):  Exercises per grid below to improve mobility, strength, balance and coordination. Required moderate verbal and manual cues to promote proper body alignment, promote proper body posture, promote proper body mechanics and promote proper body breathing techniques. Progressed resistance, range, repetitions and complexity of movement as indicated. Date:  2019   Activity/Exercise Parameters   Supine heel slides X 10 reps, 5 sec holds   Supine short arc quad HEP   Sit to stand transfers X 10 reps.     Supine straight leg raise    Seated hip hinge/weight acceptance X 5 reps BLE   Nu-step X 10 minutes   Seated knee extension 3 x 1 minute holds on  12\" stool   Standing terminal knee extension X 10 reps, BLE 5 sec holds, red t-band   Standing lateral steps X 5 reps, 5' distance with red t-band   Therapeutic Neuroscience education    Standing hip extension X 10 reps, 5 sec holds BLE   Standing hip abduction X 10 reps, 5 sec holds BLE   Standing weight shift forward X 10 reps, 5 sec holds BLE   Supine UE/LE abdominal brace X 10 reps, 5 sec holds   Standing functional squat X 10 reps     MANUAL THERAPY: (25 minutes): Joint mobilization and Soft tissue mobilization was utilized and necessary because of the patient's restricted joint motion and restricted motion of soft tissue. (Used abbreviations: MET - muscle energy technique; PNF - proprioceptive neuromuscular facilitation; NMR - neuromuscular re-education; a/p - anterior to posterior; p/a - posterior to anterior, FMP - functional movement patterns)  · Supine bilateral soft tissue mobilization to anterior knee and patella mobilization in all directions. · Supine left hamstrings soft tissue mobilization with FMP of knee extension  · Supine with left leg elevated, soft tissue mobilization to posterior calf with ankle DF/PF FMP  · Side lying right for soft tissue mobilization to bilateral lumbar spine paraspinals, left piriformis  · Side lying right for left hip and innominate extension MET, followed with [facility] Portal  Treatment/Session Summary:    · Response to Treatment:  improving overall mobility, however continues to be challenged with left knee extension and LE strength. · Communication/Consultation:  None today  · Equipment provided today:  None today  · Recommendations/Intent for next treatment session: Next visit will focus on LE AROM and PROM, LE strength and balance. Total Treatment Billable Duration:  55 minutes.    PT Patient Time In/Time Out  Time In: 1030  Time Out: Ze Coburn, PT    Future Appointments   Date Time Provider Uzair Pineda   5/30/2019 10:30 AM Anastasiya Adams., PT [facility]   6/3/2019  3:30 PM [facility]   6/5/2019  4:30 PM [facility]   6/19/2019  2:20 PM Cindy Negro MD [place] [facility] 9/9/2019  1:15 PM Elizabet Mart MD [facility]   9/23/2019  3:00 PM Tami Roberto MD [address]

## 2019-05-30 ENCOUNTER — HOSPITAL ENCOUNTER (OUTPATIENT)
Dept: PHYSICAL THERAPY | Age: 69
Discharge: HOME | End: 2019-05-30
Payer: MEDICARE

## 2019-05-30 PROCEDURE — 97140 MANUAL THERAPY 1/> REGIONS: CPT

## 2019-05-30 PROCEDURE — 97110 THERAPEUTIC EXERCISES: CPT

## 2019-05-30 NOTE — PROGRESS NOTES
Miles Veliz  : 1950  [place]  [place] [address] at [address]  [phone]   [phone]      OUTPATIENT PHYSICAL THERAPY: Daily Treatment Note 2019   ICD-10: Treatment Diagnosis: Pain in joint, left knee M25.562  ICD-10: Treatment Diagnosis: pain in joint, right knee M25.561  ICD-10: Treatment Diagnosis: difficulty walking, not elsewhere classified R26.2  Precautions/Allergies:   Adhesive tape-silicones; Ceclor [cefaclor]; and Symbyax [olanzapine-fluoxetine]   Treatment Plan of Care Effective Dates:  19 to 2019    Pre-treatment Symptoms/Complaints:  Mrs. Raffi Padilla continues to note challenges with her low back. Notes she sat for too long of time yesterday and challenged with left knee bending and getting up secondary to discomfort. Pain: Initial: Pain Intensity 1: 6  Pain Location 1: Knee, Spine, lumbar  Pain Orientation 1: Left, Right  Post Session:  5/10   Medications Last Reviewed: 2019    Updated Objective Findings:  Left knee AROM 3-118   TREATMENT:     THERAPEUTIC EXERCISE: (30 minutes):  Exercises per grid below to improve mobility, strength, balance and coordination. Required moderate verbal and manual cues to promote proper body alignment, promote proper body posture, promote proper body mechanics and promote proper body breathing techniques. Progressed resistance, range, repetitions and complexity of movement as indicated. Date:  2019   Activity/Exercise Parameters   Supine heel slides X 10 reps, 5 sec holds   Supine short arc quad HEP   Sit to stand transfers X 10 reps.     Supine straight leg raise    Seated hip hinge/weight acceptance X 5 reps BLE   Nu-step X 10 minutes   Seated knee extension 3 x 1 minute holds on  12\" stool  X 15 reps, 5 sec holds BLE   Standing terminal knee extension X 10 reps, BLE 5 sec holds, red t-band   Standing lateral steps X 5 reps, 5' distance with red t-band   Therapeutic Neuroscience education    Standing hip extension  2 X 10 reps, 5 sec holds BLE   Standing hip abduction  2 X 10 reps, 5 sec holds BLE   Standing weight shift forward  2 X 10 reps, 5 sec holds BLE   Supine UE/LE abdominal brace X 10 reps, 5 sec holds   Standing functional squat X 10 reps     MANUAL THERAPY: (25 minutes): Joint mobilization and Soft tissue mobilization was utilized and necessary because of the patient's restricted joint motion and restricted motion of soft tissue. (Used abbreviations: MET - muscle energy technique; PNF - proprioceptive neuromuscular facilitation; NMR - neuromuscular re-education; a/p - anterior to posterior; p/a - posterior to anterior, FMP - functional movement patterns)  · Supine bilateral soft tissue mobilization to anterior knee and patella mobilization in all directions. · Supine left hamstrings soft tissue mobilization with FMP of knee extension  · Supine with left leg elevated, soft tissue mobilization to posterior calf with ankle DF/PF FMP  · Side lying right for soft tissue mobilization to bilateral lumbar spine paraspinals, left piriformis  · Side lying right for left hip and innominate extension MET, followed with [facility] Portal  Treatment/Session Summary:    · Response to Treatment:  continue to encourage patient to be more compliant with her HEP to improve left knee ROM and bilateral LE strengthening. improving overall gait with improved left knee extension. · Communication/Consultation:  None today  · Equipment provided today:  None today  · Recommendations/Intent for next treatment session: Next visit will focus on LE AROM and PROM, LE strength and balance. Total Treatment Billable Duration:  55 minutes.    PT Patient Time In/Time Out  Time In: 1030  Time Out: Ze Coburn, PT    Future Appointments   Date Time Provider Uzair Pineda   5/30/2019  3:30 PM Ilia Solis NP [facility]   6/3/2019  3:30 PM Kanika Lloyd., PT [facility]   6/5/2019  4:30 PM [facility]   6/19/2019  2:20 PM Ashanti Ramires MD [place] [facility]   9/9/2019  1:15 PM Trena Wren MD [facility]   9/23/2019  3:00 PM Perez Patino MD [address]

## 2019-06-03 ENCOUNTER — HOSPITAL ENCOUNTER (OUTPATIENT)
Dept: PHYSICAL THERAPY | Age: 69
Discharge: HOME | End: 2019-06-03
Payer: MEDICARE

## 2019-06-03 PROCEDURE — 97140 MANUAL THERAPY 1/> REGIONS: CPT

## 2019-06-03 PROCEDURE — 97110 THERAPEUTIC EXERCISES: CPT

## 2019-06-03 NOTE — PROGRESS NOTES
Joseph Hoffmann  : 1950  [place]  Secondary: [address]  [phone]   [phone]      OUTPATIENT PHYSICAL THERAPY: Daily Treatment Note 6/3/2019   ICD-10: Treatment Diagnosis: Pain in joint, left knee M25.562  ICD-10: Treatment Diagnosis: pain in joint, right knee M25.561  ICD-10: Treatment Diagnosis: difficulty walking, not elsewhere classified R26.2  Precautions/Allergies:   Adhesive tape-silicones; Ceclor [cefaclor]; and Symbyax [olanzapine-fluoxetine]   Treatment Plan of Care Effective Dates:  19 to 2019    Pre-treatment Symptoms/Complaints:  Mrs. Aubrey Craft notes she had a bad weekend, increased left knee pain and swelling. Notes she rode in a car for an extended amount of time. Pain: Initial: Pain Intensity 1: 5  Pain Location 1: Knee  Pain Orientation 1: Left  Post Session:  4/10   Medications Last Reviewed: 6/3/2019    Updated Objective Findings:  left knee AROM 3-113 degrees   TREATMENT:     THERAPEUTIC EXERCISE: (30 minutes):  Exercises per grid below to improve mobility, strength, balance and coordination. Required moderate verbal and manual cues to promote proper body alignment, promote proper body posture, promote proper body mechanics and promote proper body breathing techniques. Progressed resistance, range, repetitions and complexity of movement as indicated. Date:  6/3/2019   Activity/Exercise Parameters   Supine heel slides X 10 reps, 5 sec holds   Supine short arc quad HEP   Sit to stand transfers X 10 reps.     Supine straight leg raise    Seated hip hinge/weight acceptance X 5 reps BLE   Nu-step X 5 minutes   Seated knee extension 3 x 1 minute holds on  12\" stool  X 15 reps, 5 sec holds BLE   Standing terminal knee extension X 10 reps, BLE 5 sec holds, red t-band   Standing lateral steps X 5 reps, 5' distance with red t-band   Therapeutic Neuroscience education    Standing hip extension  2 X 10 reps, 5 sec holds BLE   Standing hip abduction  2 X 10 reps, 5 sec holds BLE   Standing weight shift forward  2 X 10 reps, 5 sec holds BLE   Supine UE/LE abdominal brace X 10 reps, 5 sec holds   Standing functional squat X 10 reps   Prone knee hang left X 2 minutes     MANUAL THERAPY: (25 minutes): Joint mobilization and Soft tissue mobilization was utilized and necessary because of the patient's restricted joint motion and restricted motion of soft tissue. (Used abbreviations: MET - muscle energy technique; PNF - proprioceptive neuromuscular facilitation; NMR - neuromuscular re-education; a/p - anterior to posterior; p/a - posterior to anterior, FMP - functional movement patterns)  · Supine bilateral soft tissue mobilization to anterior knee and patella mobilization in all directions. · Supine left hamstrings soft tissue mobilization with FMP of knee extension  · Prone  soft tissue mobilization to left posterior calf with ankle DF/PF FMP  · Side lying right for left hip and innominate extension MET, followed with NMR      [facility] Portal  Treatment/Session Summary:    · Response to Treatment:  continues to be challenged with ROM and strength in LLE, continue to encourage performing her HEP. · Communication/Consultation:  None today  · Equipment provided today:  None today  · Recommendations/Intent for next treatment session: Next visit will focus on LE AROM and PROM, LE strength and balance. Total Treatment Billable Duration:  55 minutes.    PT Patient Time In/Time Out  Time In: [facility]  Time Out: [facility], PT    Future Appointments   Date Time Provider Uzair Pineda   6/5/2019  4:30 PM Jarrett Sacks., PT [facility]   6/11/2019  4:30 PM Jarrett Sacks., PT [facility]   6/18/2019  3:30 PM Jarrett Sacks., PT [facility]   6/19/2019  2:20 PM Olga Xavier MD [place] [facility]   6/20/2019  4:30 PM Jarrett Sacks., PT [facility]   6/24/2019  3:30 PM Juan SKELTON., PT [facility]   6/26/2019  4:30 PM Davene Cordon., PT [facility]   7/1/2019  3:30 PM Davene Cordon., PT [facility]   7/8/2019  3:30 PM Davene Cordon., PT [facility]   7/10/2019  3:30 PM Davene Cordon., PT [facility]   7/23/2019  4:30 PM Davene Cordon., PT [facility]   7/25/2019  4:30 PM Davene Cordon., PT [facility]   9/9/2019  1:15 PM Gloria Martins MD [facility]   9/23/2019  3:00 PM Yin Bae MD [address]

## 2019-06-05 ENCOUNTER — HOSPITAL ENCOUNTER (OUTPATIENT)
Dept: PHYSICAL THERAPY | Age: 69
Discharge: HOME | End: 2019-06-05
Payer: MEDICARE

## 2019-06-05 PROCEDURE — 97140 MANUAL THERAPY 1/> REGIONS: CPT

## 2019-06-05 PROCEDURE — 97110 THERAPEUTIC EXERCISES: CPT

## 2019-06-06 NOTE — PROGRESS NOTES
Miles Veliz  : 1950  [place]  [place] [address] at [address]  [phone]   [phone]      OUTPATIENT PHYSICAL THERAPY: Daily Treatment Note 2019   ICD-10: Treatment Diagnosis: Pain in joint, left knee M25.562  ICD-10: Treatment Diagnosis: pain in joint, right knee M25.561  ICD-10: Treatment Diagnosis: difficulty walking, not elsewhere classified R26.2  Precautions/Allergies:   Adhesive tape-silicones; Ceclor [cefaclor]; and Symbyax [olanzapine-fluoxetine]   Treatment Plan of Care Effective Dates:  19 to 2019    Pre-treatment Symptoms/Complaints:  Mrs. Raffi Padilla notes continues to have increased left knee pain, had to take pain medication today. Pain: Initial: Pain Intensity 1: 5  Pain Location 1: Knee  Pain Orientation 1: Left  Post Session:  4/10   Medications Last Reviewed: 2019    Updated Objective Findings:  left knee AROM 3-113 degrees   TREATMENT:     THERAPEUTIC EXERCISE: (30 minutes):  Exercises per grid below to improve mobility, strength, balance and coordination. Required moderate verbal and manual cues to promote proper body alignment, promote proper body posture, promote proper body mechanics and promote proper body breathing techniques. Progressed resistance, range, repetitions and complexity of movement as indicated. Date:  2019   Activity/Exercise Parameters   Supine heel slides X 10 reps, 5 sec holds   Supine short arc quad HEP   Sit to stand transfers X 10 reps.     Supine straight leg raise    Seated hip hinge/weight acceptance X 5 reps BLE   Nu-step X 8 minutes   Seated knee extension 3 x 1 minute holds on  12\" stool  X 15 reps, 5 sec holds BLE   Standing terminal knee extension X 10 reps, BLE 5 sec holds, red t-band   Standing lateral steps X 5 reps, 5' distance with red t-band   Therapeutic Neuroscience education    Standing hip extension  2 X 10 reps, 5 sec holds BLE   Standing hip abduction  2 X 10 reps, 5 sec holds BLE   Standing weight shift forward  2 X 10 reps, 5 sec holds BLE   Supine UE/LE abdominal brace X 10 reps, 5 sec holds   Standing functional squat X 10 reps   Prone knee hang left X 2 minutes   Prone knee extension/quad sets X 10 reps, BLE     MANUAL THERAPY: (25 minutes): Joint mobilization and Soft tissue mobilization was utilized and necessary because of the patient's restricted joint motion and restricted motion of soft tissue. (Used abbreviations: MET - muscle energy technique; PNF - proprioceptive neuromuscular facilitation; NMR - neuromuscular re-education; a/p - anterior to posterior; p/a - posterior to anterior, FMP - functional movement patterns)  · Supine bilateral soft tissue mobilization to anterior knee and patella mobilization in all directions. · Supine left hamstrings soft tissue mobilization with FMP of knee extension  · Prone soft tissue mobilization to left posterior calf with ankle DF/PF FMP        [facility] Portal  Treatment/Session Summary:    · Response to Treatment:  challenged with prone knee extension, continues to be challenged with quadriceps activation of LLE. .  · Communication/Consultation:  None today  · Equipment provided today:  None today  · Recommendations/Intent for next treatment session: Next visit will focus on LE AROM and PROM, LE strength and balance. Total Treatment Billable Duration:  55 minutes.    PT Patient Time In/Time Out  Time In: 1630  Time Out: MANUEL Vuong    Future Appointments   Date Time Provider Uzair Pineda   6/11/2019  4:30 PM Aimee Obando., PT [facility]   6/18/2019  3:30 PM Aimee Obando., PT [facility]   6/19/2019  2:20 PM Flo Orellana MD [place] [facility]   6/20/2019  4:30 PM Aimee Obando., PT [facility]   6/24/2019  3:30 PM Aimee Obando., PT [facility]   6/26/2019  4:30 PM Aimee Obando., PT [facility]   7/1/2019  3:30 PM Della Hopping., PT [facility]   7/8/2019  3:30 PM Della Hopping., PT [facility]   7/10/2019  3:30 PM [facility]ella Hopping., PT [facility]   7/23/2019  4:30 PM Della Hopping., PT [facility]   7/25/2019  4:30 PM Della Hopping., PT [facility]   9/9/2019  1:15 PM Lukas Rose MD [facility]   9/23/2019  3:00 PM Concha Friedman MD [address]

## 2019-06-11 ENCOUNTER — HOSPITAL ENCOUNTER (OUTPATIENT)
Dept: PHYSICAL THERAPY | Age: 69
Discharge: HOME | End: 2019-06-11
Payer: MEDICARE

## 2019-06-11 PROCEDURE — 97110 THERAPEUTIC EXERCISES: CPT

## 2019-06-11 PROCEDURE — 97140 MANUAL THERAPY 1/> REGIONS: CPT

## 2019-06-12 NOTE — PROGRESS NOTES
Miles Veliz  : 1950  [place]  [place]: [place] [address] at [place] [address], [address] [facility]  [phone]   [phone]      OUTPATIENT PHYSICAL THERAPY: Daily Treatment Note 2019   ICD-10: Treatment Diagnosis: Pain in joint, left knee M25.562  ICD-10: Treatment Diagnosis: pain in joint, right knee M25.561  ICD-10: Treatment Diagnosis: difficulty walking, not elsewhere classified R26.2  Precautions/Allergies:   Adhesive tape-silicones; Ceclor [cefaclor]; and Symbyax [olanzapine-fluoxetine]   Treatment Plan of Care Effective Dates:  19 to 2019    Pre-treatment Symptoms/Complaints:  Mrs. Raffi Padilla notes her pain levels remain high in her left knee, notes with sitting, standing and ambulation. Pain: Initial: Pain Intensity 1: 5  Pain Location 1: Knee  Pain Orientation 1: Left  Post Session:  4/10   Medications Last Reviewed: 2019    Updated Objective Findings:  left knee AROM 2-117 degrees, significant weakness remains in left quadriceps 3-/5   TREATMENT:     THERAPEUTIC EXERCISE: (30 minutes):  Exercises per grid below to improve mobility, strength, balance and coordination. Required moderate verbal and manual cues to promote proper body alignment, promote proper body posture, promote proper body mechanics and promote proper body breathing techniques. Progressed resistance, range, repetitions and complexity of movement as indicated. Date:  2019   Activity/Exercise Parameters   Supine heel slides X 10 reps, 5 sec holds   Supine short arc quad X 10 reps, 5 sec holds    Sit to stand transfers X 10 reps.     Supine straight leg raise X 5 reps   Seated hip hinge/weight acceptance X 5 reps BLE   Nu-step X 8 minutes   Seated knee extension 3 x 1 minute holds on  12\" stool  X 15 reps, 5 sec holds BLE   Standing terminal knee extension X 10 reps, BLE 5 sec holds, red t-band   Standing lateral steps X 5 reps, 5' distance with red t-band   Therapeutic Neuroscience education    Standing hip extension  2 X 10 reps, 5 sec holds BLE   Standing hip abduction  2 X 10 reps, 5 sec holds BLE   Standing weight shift forward  2 X 10 reps, 5 sec holds BLE   Supine UE/LE abdominal brace X 10 reps, 5 sec holds   Standing functional squat X 10 reps   Prone knee hang left X 2 minutes   Prone knee extension/quad sets X 10 reps, BLE   Supine quad set X 15 reps, 5 sec holdsx     MANUAL THERAPY: (25 minutes): Joint mobilization and Soft tissue mobilization was utilized and necessary because of the patient's restricted joint motion and restricted motion of soft tissue. (Used abbreviations: MET - muscle energy technique; PNF - proprioceptive neuromuscular facilitation; NMR - neuromuscular re-education; a/p - anterior to posterior; p/a - posterior to anterior, FMP - functional movement patterns)  · Supine bilateral soft tissue mobilization to anterior knee and patella mobilization in all directions. · Supine left hamstrings soft tissue mobilization with FMP of knee extension  · Prone soft tissue mobilization to left posterior calf with ankle DF/PF FMP        [facility] Portal  Treatment/Session Summary:    · Response to Treatment:  challenged with quadricpes activation and strength with supine and weight bearing activities. .  · Communication/Consultation:  None today  · Equipment provided today:  None today  · Recommendations/Intent for next treatment session: Next visit will focus on LE AROM, LE strength and balance. Total Treatment Billable Duration:  55 minutes.    PT Patient Time In/Time Out  Time In: 9605  Time Out: 620 [facility], PT    Future Appointments   Date Time Provider Uzair Pineda   6/18/2019  3:30 PM Larisa Baugh, PT [facility]   6/19/2019  2:20 PM Esperanza Stevens MD [facility]   6/20/2019  4:30 PM Larisa Baugh, PT [facility]   6/24/2019  3:30 PM [facility]   6/26/2019  4:30 PM Gladys FRANCO, PT [facility]   7/1/2019  3:30 PM Don Latch., PT [facility]   7/8/2019  3:30 PM Don Latch., PT [facility]   7/10/2019  3:30 PM [facility]   7/23/2019  4:30 PM Don Latch., PT [facility]   7/25/2019  4:30 PM Don Latch., PT [facility]   9/9/2019  1:15 PM Gallo Matos MD [facility]   9/23/2019  3:00 PM Heidy Du MD [address]

## 2019-06-18 ENCOUNTER — HOSPITAL ENCOUNTER (OUTPATIENT)
Dept: PHYSICAL THERAPY | Age: 69
Discharge: HOME | End: 2019-06-18
Payer: MEDICARE

## 2019-06-18 PROCEDURE — 97140 MANUAL THERAPY 1/> REGIONS: CPT

## 2019-06-18 PROCEDURE — 97110 THERAPEUTIC EXERCISES: CPT

## 2019-06-19 NOTE — PROGRESS NOTES
Garland Osuna  : 1950  [place]  Secondary: [address]  [phone]   [phone]      OUTPATIENT PHYSICAL THERAPY: Daily Treatment Note 2019   ICD-10: Treatment Diagnosis: Pain in joint, left knee M25.562  ICD-10: Treatment Diagnosis: pain in joint, right knee M25.561  ICD-10: Treatment Diagnosis: difficulty walking, not elsewhere classified R26.2  Precautions/Allergies:   Adhesive tape-silicones; Ceclor [cefaclor]; and Symbyax [olanzapine-fluoxetine]   Treatment Plan of Care Effective Dates:  19 to 2019    Pre-treatment Symptoms/Complaints:  Mrs. Heather Arteaga notes continued left knee pain and challenged with ambulation. Had her follow up with Dr. Lily Ken yesterday, overall he is pleased with progression. Pain: Initial: Pain Intensity 1: 5  Pain Location 1: Knee  Pain Orientation 1: Left  Post Session:  4/10   Medications Last Reviewed: 2019    Updated Objective Findings:  left knee AROM 2-120 degrees, significant weakness remains in left quadriceps 3+/5   TREATMENT:     THERAPEUTIC EXERCISE: (30 minutes):  Exercises per grid below to improve mobility, strength, balance and coordination. Required moderate verbal and manual cues to promote proper body alignment, promote proper body posture, promote proper body mechanics and promote proper body breathing techniques. Progressed resistance, range, repetitions and complexity of movement as indicated. Date:  2019   Activity/Exercise Parameters   Supine heel slides X 10 reps, 5 sec holds   Supine short arc quad X 10 reps, 5 sec holds    Sit to stand transfers X 10 reps.     Supine straight leg raise X 5 reps   Seated hip hinge/weight acceptance X 5 reps BLE   Nu-step X 8 minutes   Seated knee extension 3 x 1 minute holds on  12\" stool  X 15 reps, 5 sec holds BLE   Standing terminal knee extension X 10 reps, BLE 5 sec holds, red t-band   Standing lateral steps X 5 reps, 5' distance with red t-band   Therapeutic Neuroscience education    Standing hip extension  2 X 10 reps, 5 sec holds BLE   Standing hip abduction  2 X 10 reps, 5 sec holds BLE   Standing weight shift forward  2 X 10 reps, 5 sec holds BLE   Supine UE/LE abdominal brace X 10 reps, 5 sec holds   Standing functional squat X 10 reps   Prone knee hang left    Prone knee extension/quad sets    Supine quad set X 15 reps, 5 sec holdsx     MANUAL THERAPY: (25 minutes): Joint mobilization and Soft tissue mobilization was utilized and necessary because of the patient's restricted joint motion and restricted motion of soft tissue. (Used abbreviations: MET - muscle energy technique; PNF - proprioceptive neuromuscular facilitation; NMR - neuromuscular re-education; a/p - anterior to posterior; p/a - posterior to anterior, FMP - functional movement patterns)  · Supine left knee soft tissue mobilization to anterior knee and patella mobilization in all directions. · Supine left hamstrings soft tissue mobilization with FMP of knee extension      [facility] Portal  Treatment/Session Summary:    · Response to Treatment:  improved activation of left quadriceps today, however continues to be challenged with left knee terminal knee extension. · Communication/Consultation:  None today  · Equipment provided today:  None today  · Recommendations/Intent for next treatment session: Next visit will focus on LE AROM, LE strength and balance. Total Treatment Billable Duration:  55 minutes.    PT Patient Time In/Time Out  Time In: 215 [facility]  Time Out: 555 [facility], PT    Future Appointments   Date Time Provider Uzair Pineda   6/19/2019  2:20 PM Maxwell Mcdowell MD [facility]   6/20/2019  4:30 PM [facility] Lee., [facility]   6/24/2019  3:30 PM Vivek Lee., PT [facility]   6/26/2019  4:30 PM [facility]ive[facility] Lee., [facility]   7/1/2019 3:30 PM Preston Risk., PT [facility]   7/8/2019  3:30 PM [facility]   7/10/2019  3:30 PM Preston Risk., PT [facility]   7/23/2019  4:30 PM Preston Risk., PT [facility]   7/25/2019  4:30 PM Preston Risk., PT [facility]   9/9/2019  1:15 PM Ofelia Zhou MD [facility]   9/23/2019  3:00 PM Sergio Vanegas MD [address]

## 2019-06-20 ENCOUNTER — APPOINTMENT (OUTPATIENT)
Dept: PHYSICAL THERAPY | Age: 69
End: 2019-06-20
Payer: MEDICARE

## 2019-06-24 ENCOUNTER — HOSPITAL ENCOUNTER (OUTPATIENT)
Dept: PHYSICAL THERAPY | Age: 69
Discharge: HOME | End: 2019-06-24
Payer: MEDICARE

## 2019-06-24 PROCEDURE — 97016 VASOPNEUMATIC DEVICE THERAPY: CPT

## 2019-06-24 PROCEDURE — 97110 THERAPEUTIC EXERCISES: CPT

## 2019-06-24 PROCEDURE — 97140 MANUAL THERAPY 1/> REGIONS: CPT

## 2019-06-25 NOTE — PROGRESS NOTES
Taurus [facility]  : 1950  [place]  Secondary: [facility] at [address]  [phone]   [phone]      OUTPATIENT PHYSICAL THERAPY: Daily Treatment Note 2019   ICD-10: Treatment Diagnosis: Pain in joint, left knee M25.562  ICD-10: Treatment Diagnosis: pain in joint, right knee M25.561  ICD-10: Treatment Diagnosis: difficulty walking, not elsewhere classified R26.2  Precautions/Allergies:   Adhesive tape-silicones; Ceclor [cefaclor]; and Symbyax [olanzapine-fluoxetine]   Treatment Plan of Care Effective Dates:  19 to 2019    Pre-treatment Symptoms/Complaints:  Mrs. Mauricio Boyle notes continued pain in left knee, especially trying to find position to sleep in. Notes her left knee feels unstable when she lays on her left or right side. Pillows have not helped to support in the sidelying position. Pain: Initial: Pain Intensity 1: 5  Pain Location 1: Knee  Pain Orientation 1: Left  Post Session:  4/10   Medications Last Reviewed: 2019    Updated Objective Findings:  left knee AROM 2-122 degrees, significant weakness remains in left quadriceps 3+/5   TREATMENT:     THERAPEUTIC EXERCISE: (30 minutes):  Exercises per grid below to improve mobility, strength, balance and coordination. Required moderate verbal and manual cues to promote proper body alignment, promote proper body posture, promote proper body mechanics and promote proper body breathing techniques. Progressed resistance, range, repetitions and complexity of movement as indicated. Date:  2019   Activity/Exercise Parameters   Supine heel slides X 10 reps, 5 sec holds   Supine short arc quad X 10 reps, 5 sec holds    Sit to stand transfers X 10 reps.     Supine straight leg raise X 5 reps   Seated hip hinge/weight acceptance X 5 reps BLE   Nu-step X 10 minutes   Seated knee extension 3 x 1 minute holds on  12\" stool  X 15 reps, 5 sec holds BLE   Standing terminal knee extension X 10 reps, BLE 5 sec holds, red t-band   Standing lateral steps X 5 reps, 5' distance with red t-band   Therapeutic Neuroscience education    Standing hip extension  2 X 10 reps, 5 sec holds BLE   Standing hip abduction  2 X 10 reps, 5 sec holds BLE   Standing weight shift forward  2 X 10 reps, 5 sec holds BLE   Supine UE/LE abdominal brace X 10 reps, 5 sec holds   Standing functional squat X 10 reps   Prone knee hang left    Prone knee extension/quad sets    Supine quad set X 15 reps, 5 sec holdsx   HEP review X 5 minute review and address sleeping positions, supports, use of topical ointments for pain. MANUAL THERAPY: (15 minutes): Joint mobilization and Soft tissue mobilization was utilized and necessary because of the patient's restricted joint motion and restricted motion of soft tissue. (Used abbreviations: MET - muscle energy technique; PNF - proprioceptive neuromuscular facilitation; NMR - neuromuscular re-education; a/p - anterior to posterior; p/a - posterior to anterior, FMP - functional movement patterns)  · Supine left knee soft tissue mobilization to anterior knee and patella mobilization in all directions. · Supine left hamstrings soft tissue mobilization with FMP of knee extension    VASOPNEUMATIC COMPRESSION (10 minutes): Applied 36 degrees to left knee, decreased pain and swelling noted after. [facility] Portal  Treatment/Session Summary:    · Response to Treatment:  improved swelling and ROM in left knee today, demonstrates improved gait and sit to stand transfers. .  · Communication/Consultation:  None today  · Equipment provided today:  None today  · Recommendations/Intent for next treatment session: Next visit will focus on LE AROM, LE strength and balance. Total Treatment Billable Duration:  55 minutes.    PT Patient Time In/Time Out  Time In: 1530  Time Out: 555 [facility], PT    Future Appointments Date Time Provider [facility]   6/26/2019  4:30 PM Fritzi Chum., PT [facility]   7/1/2019  3:30 PM Fritzi Chum., PT [facility]   7/8/2019  3:30 PM Fritzi Chum., PT [facility]   7/10/2019  3:30 PM Fritzi Chum., PT [facility]   7/23/2019  4:30 PM Fritzi Chum., PT [facility]   7/25/2019  4:30 PM Fritzi Chum., PT [facility]   9/9/2019  1:15 PM Mayi Hsu MD [facility]   9/18/2019  2:00 PM Manuel Ann MD [facility] 10 [facility] 10 [facility]   9/23/2019  3:00 PM Fabian Yu MD [address]

## 2019-06-25 NOTE — PROGRESS NOTES
Willy   : 1950  [place]  [place] [address] [place] [address]  [phone]   [phone]        OUTPATIENT PHYSICAL THERAPY:Progress Report 2019   ICD-10: Treatment Diagnosis: Pain in joint, left knee M25.562  ICD-10: Treatment Diagnosis: pain in joint, right knee M25.561  ICD-10: Treatment Diagnosis: difficulty walking, not elsewhere classified R26.2  Precautions/Allergies:   Ciprofloxacin; Adhesive tape-silicones; Ceclor [cefaclor]; and Symbyax [olanzapine-fluoxetine]   MD Orders: evaluate and treat MEDICAL/REFERRING DIAGNOSIS:  Knee pain [M25.569]   DATE OF ONSET: surgery 19  REFERRING PHYSICIAN: Claudio Rangel MD  RETURN PHYSICIAN APPOINTMENT: as needed    PROGRESS NOTE: 19: Ms. Ha Kan has attended 10 physical therapy sessions, she continues to present with challenged with gait and prolonged weight bearing activities. Overall strength is improving, however continues to demonstrate strength deficits in left quadriceps and gluteals. She has improved with sit to stand transfers, continues to struggle with ambulation for duration and terrain surfaces. She would benefit from continued skilled physical therapy to improve overall mobility and function. INITIAL ASSESSMENT:  Ms. Ha Kan is a 79 y.o. female who presents to physical therapy s/p left TKA on 19, she underwent 3 weeks of home health physical therapy and continues to be challenged with ambulation, sit to stand transfers, especially out of her car. She also started experiencing right anterior knee pain in 2019, notes tightness across anterior knee and notes clicking in her right knee. Most discomfort in right knee noted with performing sit to stand transfers. She presents with LE weakness, challenged with transfers, gait and balance.  She will benefit from skilled physical therapy to improve her overall mobility and function with daily tasks. PROBLEM LIST (Impacting functional limitations):  1. Decreased Strength  2. Decreased ADL/Functional Activities  3. Decreased Transfer Abilities  4. Decreased Ambulation Ability/Technique  5. Decreased Balance  6. Increased Pain  7. Decreased Activity Tolerance  8. Increased Fatigue  9. Decreased Flexibility/Joint Mobility INTERVENTIONS PLANNED: (Treatment may consist of any combination of the following)  1. Balance Exercise  2. Bed Mobility  3. Cold  4. Gait Training  5. Heat  6. Home Exercise Program (HEP)  7. Manual Therapy  8. Neuromuscular Re-education/Strengthening  9. Range of Motion (ROM)  10. Therapeutic Activites  11. Therapeutic Exercise/Strengthening   TREATMENT PLAN:  Effective Dates: 5/13/2019 TO 8/11/2019 (90 days). Frequency/Duration: 2 times a week for 90 Day(s)  GOALS: (Goals have been discussed and agreed upon with patient.)  Short-Term Functional Goals: Time Frame: 4 weeks  1. Patient demonstrates independence with her HEP without verbal cueing from therapist. GOAL MET  2. Patient demonstrates improve left knee ROM 2-120 degrees to perform ADLs. GOAL MET  3. Patient able to ambulate for 10 minutes without onset of bilateral knee pain ONGOING  Discharge Goals: Time Frame: 90 days  1. Patient demonstrates functional AROM of left knee 0-125 to perform ADLs - ONGOING  2. Patient demonstrates ability to ambulate for 30 minutes to perform community ambulation. - ONGOING  3. Patient demonstrates ability to perform sit to stand transfers from various surfaces without onset of bilateral knee pain. ALMOST MET  4.  Improve LEFS outcome measure score from 28/80 to 60/80 to perform ADLs - ONGOING    OUTCOME MEASURE:   Tool Used: Lower Extremity Functional Scale (LEFS)  Score:  Initial: 28/80 Most Recent: 40/80 (Date: 6-25-19)   Interpretation of Score: 20 questions each scored on a 5 point scale with 0 representing \"extreme difficulty or unable to perform\" and 4 representing \"no difficulty\". The lower the score, the greater the functional disability. 80/80 represents no disability. Minimal detectable change is 9 points. MEDICAL NECESSITY:   · Patient is expected to demonstrate progress in strength, range of motion, balance, coordination and functional technique to increase independence with sit to stand transfers, ambulation and weight bearing activities . REASON FOR SERVICES/OTHER COMMENTS:  · Patient continues to require skilled intervention due to she continues to have deficits in her left knee ROM and strength, as well as challenged with ambulation and tranfsers . Total Duration:  PT Patient Time In/Time Out  Time In: 1530  Time Out: 1630    Rehabilitation Potential For Stated Goals: Excellent  Regarding Marilou Jay's therapy, I certify that the treatment plan above will be carried out by a therapist or under their direction. Thank you for this referral,  Zahra Barron, PT          PAIN/SUBJECTIVE:   Initial: Pain Intensity 1: 5  Pain Location 1: Knee  Pain Orientation 1: Left    Post Session:  6/10   HISTORY:      Current Medications:       Current Outpatient Medications:     SYNTHROID 150 mcg tablet, Take 1 Tab by mouth Daily (before breakfast). , Disp: 30 Tab, Rfl: 11    montelukast (SINGULAIR) 10 mg tablet, Take 1 Tab by mouth daily. , Disp: 90 Tab, Rfl: 1    mometasone (NASONEX) 50 mcg/actuation nasal spray, 2 Sprays by Both Nostrils route daily. , Disp: 1 Container, Rfl: 11    buPROPion (WELLBUTRIN) 75 mg tablet, Take 2 Tabs by mouth two (2) times a day., Disp: 360 Tab, Rfl: 1    fluticasone propion-salmeterol (ADVAIR DISKUS) 250-50 mcg/dose diskus inhaler, Take 1 Puff by inhalation two (2) times a day.  RINSE MOUTH WELL AFTER USE, Disp: 1 Inhaler, Rfl: 11    apixaban (ELIQUIS) 5 mg tablet, Take 1 Tab by mouth two (2) times a day., Disp: 180 Tab, Rfl: 3    senna-docusate (SENNA-S) 8.6-50 mg per tablet, Take 1 Tab by mouth daily., Disp: , Rfl:     dofetilide (TIKOSYN) 500 mcg capsule, Take 1 Cap by mouth every twelve (12) hours every twelve (12) hours. (Patient taking differently: Take 500 mcg by mouth every twelve (12) hours every twelve (12) hours. Patient takes 1 tablet at 0800 and 1 tablet at 2000  ), Disp: 60 Cap, Rfl: 11    losartan (COZAAR) 100 mg tablet, Take 1 Tab by mouth daily. , Disp: 30 Tab, Rfl: 11    levothyroxine (SYNTHROID) 150 mcg tablet, Take 1 Tab by mouth Daily (before breakfast). , Disp: 30 Tab, Rfl: 3    digestive enzymes, plant, (FIBERZYME CONCENTRATE-HP PO), Take 625 mg by mouth daily. , Disp: , Rfl:     albuterol (VENTOLIN HFA) 90 mcg/actuation inhaler, Take 2 Puffs by inhalation every six (6) hours as needed for Wheezing., Disp: 1 Inhaler, Rfl: 11    Cetirizine (ZYRTEC) 10 mg cap, Take 10 Caps by mouth daily. , Disp: , Rfl:     cpap machine kit, by Does Not Apply route., Disp: , Rfl:    Date Last Reviewed:  6/25/2019   UPDATED OBJECTIVE FINDINGS:     Observation/Orthostatic Postural Assessment:           Lower extremity weight bearing is symmetrical. Observation of gait indicates decreased terminal knee extension in left knee. Patient exhibits a increased lumbar lordosis. Palpation of lower quadrant bony landmarks are left iliac crest elevated. Palpation: Patient exhibits tenderness to palpation/temperature/crepitance/scar mobility/soft tissue mobility/myofasical to bilateral anterior knees, left greater than right. Improving 6/24/19  Hamstring flexibility tested supine with straight leg raise is restricted left greater than right.   ROM:     AROM(PROM) Right 6/24/19 Left 6/24/19   Knee flexion 0-125 2-120 (2-122)   Knee extension 0 Lacks 2   Hip flexion 90 90   Hip external rotation (ER) 20 20   Hip internal rotation (IR) 20 20   Hip extension 5 5   Hip abduction 45 45     Strength:     Manual Muscle Test (*/5) Right Left   Knee extension 4+ 4   Knee flexion 4+ 4   Hip flexion 4+ 4   Hip ER 4 4   Hip IR 4 4   Hip extension 4 4   Hip abduction 4 4   Hip adduction 5 5   Ankle DF 5 5   Ankle PF 5 5   Core stability 4/5     Functional strength with standing heel raise is 3+.

## 2019-06-26 ENCOUNTER — HOSPITAL ENCOUNTER (OUTPATIENT)
Dept: PHYSICAL THERAPY | Age: 69
Discharge: HOME | End: 2019-06-26
Payer: MEDICARE

## 2019-06-26 PROCEDURE — 97140 MANUAL THERAPY 1/> REGIONS: CPT

## 2019-06-26 PROCEDURE — 97016 VASOPNEUMATIC DEVICE THERAPY: CPT

## 2019-06-26 PROCEDURE — 97110 THERAPEUTIC EXERCISES: CPT

## 2019-06-27 NOTE — PROGRESS NOTES
Ruben Mcknight  : 1950  [place]  Secondary: [facility] [place] [place] at [place] [address], [address] [facility]  [phone]   [phone]      OUTPATIENT PHYSICAL THERAPY: Daily Treatment Note 2019   ICD-10: Treatment Diagnosis: Pain in joint, left knee M25.562  ICD-10: Treatment Diagnosis: pain in joint, right knee M25.561  ICD-10: Treatment Diagnosis: difficulty walking, not elsewhere classified R26.2  Precautions/Allergies:   Adhesive tape-silicones; Ceclor [cefaclor]; and Symbyax [olanzapine-fluoxetine]   Treatment Plan of Care Effective Dates:  19 to 2019    Pre-treatment Symptoms/Complaints:  Mrs. Mando Maxwell notes knee has felt better since last PT session, able to ambulate with less discomfort. 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 Pain: Initial: Pain Intensity 1: 4  Pain Location 1: Knee  Pain Orientation 1: Left  Post Session:  3/10   Medications Last Reviewed: 2019    Updated Objective Findings:  left knee AROM 2-122 degrees, significant weakness remains in left quadriceps 3+/5   TREATMENT:     THERAPEUTIC EXERCISE: (30 minutes):  Exercises per grid below to improve mobility, strength, balance and coordination. Required moderate verbal and manual cues to promote proper body alignment, promote proper body posture, promote proper body mechanics and promote proper body breathing techniques. Progressed resistance, range, repetitions and complexity of movement as indicated. Date:  2019   Activity/Exercise Parameters   Supine heel slides X 10 reps, 5 sec holds   Supine short arc quad X 10 reps, 5 sec holds    Sit to stand transfers X 10 reps.     Supine straight leg raise X 5 reps   Seated hip hinge/weight acceptance X 5 reps BLE   Nu-step X 10 minutes   Seated knee extension 3 x 1 minute holds on  12\" stool  X 15 reps, 5 sec holds BLE   Standing terminal knee extension X 10 reps, BLE 5 sec holds, red t-band   Standing lateral steps X 5 reps, 5' distance with red t-band   Therapeutic Neuroscience education    Standing hip extension  2 X 10 reps, 5 sec holds BLE   Standing hip abduction  2 X 10 reps, 5 sec holds BLE   Standing weight shift forward  2 X 10 reps, 5 sec holds BLE  X 10 reps of stance phase weight shift  X 10 reps step through   Supine UE/LE abdominal brace X 10 reps, 5 sec holds   Standing functional squat X 10 reps   Prone knee hang left    Prone knee extension/quad sets    Supine quad set X 15 reps, 5 sec holdsx   HEP review X 5 minute review     MANUAL THERAPY: (15 minutes): Joint mobilization and Soft tissue mobilization was utilized and necessary because of the patient's restricted joint motion and restricted motion of soft tissue. (Used abbreviations: MET - muscle energy technique; PNF - proprioceptive neuromuscular facilitation; NMR - neuromuscular re-education; a/p - anterior to posterior; p/a - posterior to anterior, FMP - functional movement patterns)  · Supine left knee soft tissue mobilization to anterior knee and patella mobilization in all directions. · Supine left hamstrings soft tissue mobilization with FMP of knee extension    VASOPNEUMATIC COMPRESSION (10 minutes): Applied 36 degrees to left knee, decreased pain and swelling noted after. [facility] Portal  Treatment/Session Summary:    · Response to Treatment:  improved ambulation and tolerance to weight bearing exercises today. .  · Communication/Consultation:  None today  · Equipment provided today:  None today  · Recommendations/Intent for next treatment session: Next visit will focus on LE AROM, LE strength and balance. Total Treatment Billable Duration:  55 minutes.    PT Patient Time In/Time Out  Time In: 1630  Time Out: MANUEL Vuong    Future Appointments   Date Time Provider Uzair Pineda   7/1/2019  3:30 PM Juventino Closs., PT [facility]   7/8/2019 3:30 PM Wyn Grout., PT [facility]   7/10/2019  3:30 PM Wyn Grout., PT [facility]   7/23/2019  4:30 PM Wyn Grout., PT [facility]   7/25/2019  4:30 PM Wyn Grout., PT [facility]   9/9/2019  1:15 PM Alma Moralez MD [facility]   9/18/2019  2:00 PM Keesha Swanson MD [place] [facility]   9/23/2019  3:00 PM Donna Olmos MD [address]

## 2019-07-01 ENCOUNTER — HOSPITAL ENCOUNTER (OUTPATIENT)
Dept: PHYSICAL THERAPY | Age: 69
Discharge: HOME | End: 2019-07-01
Payer: MEDICARE

## 2019-07-01 PROCEDURE — 97110 THERAPEUTIC EXERCISES: CPT

## 2019-07-01 PROCEDURE — 97140 MANUAL THERAPY 1/> REGIONS: CPT

## 2019-07-01 PROCEDURE — 97016 VASOPNEUMATIC DEVICE THERAPY: CPT

## 2019-07-02 NOTE — PROGRESS NOTES
Antelmo Severe  : 1950  [place]  [place] [address] at [address]  [phone]   [phone]      OUTPATIENT PHYSICAL THERAPY: Daily Treatment Note 2019   ICD-10: Treatment Diagnosis: Pain in joint, left knee M25.562  ICD-10: Treatment Diagnosis: pain in joint, right knee M25.561  ICD-10: Treatment Diagnosis: difficulty walking, not elsewhere classified R26.2  Precautions/Allergies:   Adhesive tape-silicones; Ceclor [cefaclor]; and Symbyax [olanzapine-fluoxetine]   Treatment Plan of Care Effective Dates:  19 to 2019    Pre-treatment Symptoms/Complaints:  Mrs. Mia Jones notes her left knee continues to feel tight, however has been able to walk a little further distance. Pain: Initial: Pain Intensity 1: 4  Pain Location 1: Knee  Pain Orientation 1: Left  Post Session:  3/10   Medications Last Reviewed: 2019    Updated Objective Findings:  left knee AROM 2-122 degrees, significant weakness remains in left quadriceps 4-/5   TREATMENT:     THERAPEUTIC EXERCISE: (30 minutes):  Exercises per grid below to improve mobility, strength, balance and coordination. Required moderate verbal and manual cues to promote proper body alignment, promote proper body posture, promote proper body mechanics and promote proper body breathing techniques. Progressed resistance, range, repetitions and complexity of movement as indicated. Date:  2019   Activity/Exercise Parameters   Supine heel slides X 10 reps, 5 sec holds   Supine short arc quad X 10 reps, 5 sec holds    Sit to stand transfers X 10 reps.     Supine straight leg raise    Seated hip hinge/weight acceptance X 5 reps BLE   Nu-step    Seated knee extension Supine with foam roller x 2 minutes   Standing terminal knee extension X 10 reps, BLE 5 sec holds, red t-band   Standing lateral steps X 5 reps, 5' distance with red t-band Therapeutic Neuroscience education    Standing hip extension  2 X 10 reps, 5 sec holds BLE   Standing hip abduction  2 X 10 reps, 5 sec holds BLE   Standing weight shift forward  2 X 10 reps, 5 sec holds BLE  X 10 reps of stance phase weight shift  X 10 reps step through   Supine UE/LE abdominal brace X 10 reps, 5 sec holds   Standing functional squat X 10 reps   Prone knee hang left    Prone knee extension/quad sets    Supine quad set    HEP review X 5 minute review     MANUAL THERAPY: (15 minutes): Joint mobilization and Soft tissue mobilization was utilized and necessary because of the patient's restricted joint motion and restricted motion of soft tissue. (Used abbreviations: MET - muscle energy technique; PNF - proprioceptive neuromuscular facilitation; NMR - neuromuscular re-education; a/p - anterior to posterior; p/a - posterior to anterior, FMP - functional movement patterns)  · Supine left knee soft tissue mobilization to anterior knee and patella mobilization in all directions. · Supine left hamstrings soft tissue mobilization with FMP of knee extension    VASOPNEUMATIC COMPRESSION (10 minutes): Applied 36 degrees to left knee, decreased pain and swelling noted after. [facility] Portal  Treatment/Session Summary:    · Response to Treatment:  experienced left knee pain throughout entire treatment today, challenged with weight bearing exercises secondary to pain levels. · Communication/Consultation:  None today  · Equipment provided today:  None today  · Recommendations/Intent for next treatment session: Next visit will focus on LE AROM, LE strength and balance. Total Treatment Billable Duration:  55 minutes.    PT Patient Time In/Time Out  Time In: 1530  Time Out: [facility], PT    Future Appointments   Date Time Provider Uzair Pineda   7/8/2019  3:30 PM Nathan Rose, PT [facility]   7/10/2019  3:30 PM Xiomara FRANCO PT [facility]   7/23/2019  4:30 PM [facility]., [facility]   7/25/2019  4:30 PM [facility]yer., [facility]   9/9/2019  1:15 PM Niko Freitas MD [facility]   9/18/2019  2:00 PM Edin Parada MD [place] [facility]   9/23/2019  3:00 PM Nayla Hernandez MD [address]

## 2019-07-08 ENCOUNTER — HOSPITAL ENCOUNTER (OUTPATIENT)
Dept: PHYSICAL THERAPY | Age: 69
Discharge: HOME | End: 2019-07-08
Payer: MEDICARE

## 2019-07-08 PROCEDURE — 97110 THERAPEUTIC EXERCISES: CPT

## 2019-07-08 PROCEDURE — 97140 MANUAL THERAPY 1/> REGIONS: CPT

## 2019-07-09 NOTE — PROGRESS NOTES
Manju Shane  : 1950  [place]  [place] [address] at [place] [address], [address] [facility]  [phone]   [phone]      OUTPATIENT PHYSICAL THERAPY: Daily Treatment Note 2019   ICD-10: Treatment Diagnosis: Pain in joint, left knee M25.562  ICD-10: Treatment Diagnosis: pain in joint, right knee M25.561  ICD-10: Treatment Diagnosis: difficulty walking, not elsewhere classified R26.2  Precautions/Allergies:   Adhesive tape-silicones; Ceclor [cefaclor]; and Symbyax [olanzapine-fluoxetine]   Treatment Plan of Care Effective Dates:  19 to 2019    Pre-treatment Symptoms/Complaints:  Mrs. Emmanuel Dexter notes traveled this weekend to [place], notes challenges with stiffness in left knee. Pain: Initial: Pain Intensity 1: 3  Pain Location 1: Knee  Pain Orientation 1: Left  Post Session:  2/10   Medications Last Reviewed: 2019    Updated Objective Findings:  left knee AROM 2-122 degrees, significant weakness remains in left quadriceps 4-/5   TREATMENT:     THERAPEUTIC EXERCISE: (30 minutes):  Exercises per grid below to improve mobility, strength, balance and coordination. Required moderate verbal and manual cues to promote proper body alignment, promote proper body posture, promote proper body mechanics and promote proper body breathing techniques. Progressed resistance, range, repetitions and complexity of movement as indicated. Date:  2019   Activity/Exercise Parameters   Supine heel slides X 10 reps, 5 sec holds   Supine short arc quad X 10 reps, 5 sec holds    Sit to stand transfers X 10 reps.     Supine straight leg raise    Seated hip hinge/weight acceptance X 5 reps BLE   Nu-step    Seated knee extension Supine with foam roller x 2 minutes   Standing terminal knee extension X 10 reps, BLE 5 sec holds, red t-band   Standing lateral steps X 5 reps, 5' distance with red t-band Therapeutic Neuroscience education    Standing hip extension  2 X 10 reps, 5 sec holds BLE   Standing hip abduction  2 X 10 reps, 5 sec holds BLE   Standing weight shift forward  2 X 10 reps, 5 sec holds BLE  X 10 reps of stance phase weight shift  X 10 reps step through   Supine UE/LE abdominal brace X 10 reps, 5 sec holds   Standing functional squat X 10 reps   Prone knee hang left    Prone knee extension/quad sets    Supine quad set    HEP review X 5 minute review     MANUAL THERAPY: (25 minutes): Joint mobilization and Soft tissue mobilization was utilized and necessary because of the patient's restricted joint motion and restricted motion of soft tissue. (Used abbreviations: MET - muscle energy technique; PNF - proprioceptive neuromuscular facilitation; NMR - neuromuscular re-education; a/p - anterior to posterior; p/a - posterior to anterior, FMP - functional movement patterns)  · Supine left knee soft tissue mobilization to anterior knee and patella mobilization in all directions. · Supine left hamstrings soft tissue mobilization with FMP of knee extension     Ice Pack (5 minutes) sitting applied to left knee. [facility] Portal  Treatment/Session Summary:    · Response to Treatment:  improving overall mobility in LE, continues to fatigue easily with exercises, continue to educate importance of exercises at home. · Communication/Consultation:  None today  · Equipment provided today:  None today  · Recommendations/Intent for next treatment session: Next visit will focus on LE AROM, LE strength and balance. Total Treatment Billable Duration:  55 minutes.    PT Patient Time In/Time Out  Time In: 0  Time Out: 555 [facility], PT    Future Appointments   Date Time Provider Uzair Pineda   7/10/2019  3:30 PM Alexandro Zarco PT [facility]   7/23/2019  4:30 PM Alexandro Zarco PT [facility]   7/25/2019  4:30 PM Brenda FRANCO PT [facility]   9/9/2019  1:15 PM Max Marlo Almonte MD [facility]   9/18/2019  2:00 PM Jayla Borges MD [place] [facility]   9/23/2019  3:00 PM Jose Manuel Hodges MD [address]

## 2019-07-10 ENCOUNTER — HOSPITAL ENCOUNTER (OUTPATIENT)
Dept: PHYSICAL THERAPY | Age: 69
Discharge: HOME | End: 2019-07-10
Payer: MEDICARE

## 2019-07-10 PROCEDURE — 97140 MANUAL THERAPY 1/> REGIONS: CPT

## 2019-07-10 PROCEDURE — 97110 THERAPEUTIC EXERCISES: CPT

## 2019-07-10 NOTE — PROGRESS NOTES
Cyn Marroquin  : 1950  [place]  [place] [address] at [address]  [phone]   [phone]      OUTPATIENT PHYSICAL THERAPY: Daily Treatment Note 7/10/2019   ICD-10: Treatment Diagnosis: Pain in joint, left knee M25.562  ICD-10: Treatment Diagnosis: pain in joint, right knee M25.561  ICD-10: Treatment Diagnosis: difficulty walking, not elsewhere classified R26.2  Precautions/Allergies:   Adhesive tape-silicones; Ceclor [cefaclor]; and Symbyax [olanzapine-fluoxetine]   Treatment Plan of Care Effective Dates:  19 to 2019    Pre-treatment Symptoms/Complaints:  Mrs. Yajaira Garcia notes less knee pain in left knee, however continues to note stiffness. Continues to be challenged with sitting for too long and weight bearing/ambulation    Pain: Initial: Pain Intensity 1: 3  Pain Location 1: Knee  Pain Orientation 1: Left  Post Session:  2/10   Medications Last Reviewed: 7/10/2019    Updated Objective Findings:  left knee AROM 2-122 degrees, significant weakness remains in left quadriceps 4-/5   TREATMENT:     THERAPEUTIC EXERCISE: (30 minutes):  Exercises per grid below to improve mobility, strength, balance and coordination. Required moderate verbal and manual cues to promote proper body alignment, promote proper body posture, promote proper body mechanics and promote proper body breathing techniques. Progressed resistance, range, repetitions and complexity of movement as indicated. Date:  7/10/2019   Activity/Exercise Parameters   Supine heel slides X 10 reps, 5 sec holds   Supine short arc quad X 10 reps, 5 sec holds    Sit to stand transfers X 10 reps.     Supine straight leg raise    Seated hip hinge/weight acceptance X 5 reps BLE   Nu-step    Seated knee extension Supine with foam roller x 2 minutes   Standing terminal knee extension X 10 reps, BLE 5 sec holds, red t-band Standing lateral steps X 5 reps, 5' distance with red t-band   Therapeutic Neuroscience education    Standing hip extension  2 X 10 reps, 5 sec holds BLE   Standing hip abduction  2 X 10 reps, 5 sec holds BLE   Standing weight shift forward  2 X 10 reps, 5 sec holds BLE  X 10 reps of stance phase weight shift  X 10 reps step through   Supine UE/LE abdominal brace X 10 reps, 5 sec holds   Standing functional squat X 10 reps   Prone knee hang left X 3 minutes   Prone knee extension/quad sets X 20 reps   Supine quad set    HEP review X 5 minute review     MANUAL THERAPY: (25 minutes): Joint mobilization and Soft tissue mobilization was utilized and necessary because of the patient's restricted joint motion and restricted motion of soft tissue. (Used abbreviations: MET - muscle energy technique; PNF - proprioceptive neuromuscular facilitation; NMR - neuromuscular re-education; a/p - anterior to posterior; p/a - posterior to anterior, FMP - functional movement patterns)  · Supine left knee soft tissue mobilization to anterior knee and patella mobilization in all directions. · Supine left hamstrings soft tissue mobilization with FMP of knee extension  · Supine left anterior quadriceps and iliopsoas with FMP of knee flexion/extension, hip flexion. [facility] Portal  Treatment/Session Summary:    · Response to Treatment:  challenged with quadriceps activation with supine straight leg raises and prone knee extension. · Communication/Consultation:  None today  · Equipment provided today:  None today  · Recommendations/Intent for next treatment session: Next visit will focus on LE AROM, LE strength and balance. Total Treatment Billable Duration:  55 minutes.    PT Patient Time In/Time Out  Time In: 9620  Time Out: 975 [facility], PT    Future Appointments   Date Time Provider Uzair Garciaisti   7/23/2019  4:30 PM Lucia Carrero., PT [facility]   7/25/2019  4:30 PM Lucia Carrero., PT [facility]   9/9/2019  1:15 PM Esteban Levi MD [facility]   9/18/2019  2:00 PM Sandeep Martines MD [place] [facility] [address] [facility]   9/23/2019  3:00 PM Jacy Mckeon MD [address]

## 2019-07-23 ENCOUNTER — HOSPITAL ENCOUNTER (OUTPATIENT)
Dept: PHYSICAL THERAPY | Age: 69
Discharge: HOME | End: 2019-07-23
Payer: MEDICARE

## 2019-07-23 PROCEDURE — 97110 THERAPEUTIC EXERCISES: CPT

## 2019-07-23 PROCEDURE — 97140 MANUAL THERAPY 1/> REGIONS: CPT

## 2019-07-23 NOTE — PROGRESS NOTES
Kristyn Roach  : 1950  [place]  [place] [address] at [address]  [phone]   [phone]      OUTPATIENT PHYSICAL THERAPY: Daily Treatment Note 2019   ICD-10: Treatment Diagnosis: Pain in joint, left knee M25.562  ICD-10: Treatment Diagnosis: pain in joint, right knee M25.561  ICD-10: Treatment Diagnosis: difficulty walking, not elsewhere classified R26.2  Precautions/Allergies:   Adhesive tape-silicones; Ceclor [cefaclor]; and Symbyax [olanzapine-fluoxetine]   Treatment Plan of Care Effective Dates:  19 to 2019    Pre-treatment Symptoms/Complaints:  Mrs. Loya Mask notes less left knee pain and decreased swelling. Continues to note challenges with prolonged standing and walking activities. Pain: Initial: Pain Intensity 1: 3  Pain Location 1: Knee  Pain Orientation 1: Left  Post Session:  2/10   Medications Last Reviewed: 2019    Updated Objective Findings:  left knee AROM 2-125 degrees, significant weakness remains in left quadriceps 4-/5   TREATMENT:     THERAPEUTIC EXERCISE: (30 minutes):  Exercises per grid below to improve mobility, strength, balance and coordination. Required moderate verbal and manual cues to promote proper body alignment, promote proper body posture, promote proper body mechanics and promote proper body breathing techniques. Progressed resistance, range, repetitions and complexity of movement as indicated. Date:  2019   Activity/Exercise Parameters   Supine heel slides X 10 reps, 5 sec holds   Supine short arc quad X 10 reps, 5 sec holds    Sit to stand transfers X 10 reps.     Supine straight leg raise    Seated hip hinge/weight acceptance X 5 reps BLE   Nu-step    Seated knee extension Supine with foam roller x 2 minutes   Standing terminal knee extension X 10 reps, BLE 5 sec holds, red t-band   Standing lateral steps X 5 reps, 5' distance with red t-band   Therapeutic Neuroscience education    Standing hip extension  2 X 10 reps, 5 sec holds BLE   Standing hip abduction  2 X 10 reps, 5 sec holds BLE   Standing weight shift forward  2 X 10 reps, 5 sec holds BLE  X 10 reps of stance phase weight shift  X 10 reps step through   Supine UE/LE abdominal brace X 10 reps, 5 sec holds   Standing functional squat X 10 reps   Prone knee hang left X 3 minutes   Prone knee extension/quad sets X 20 reps   Supine quad set    HEP review X 5 minute review   Step up X 15 taps BLE  X 15 step ups 4\" BLE     MANUAL THERAPY: (25 minutes): Joint mobilization and Soft tissue mobilization was utilized and necessary because of the patient's restricted joint motion and restricted motion of soft tissue. (Used abbreviations: MET - muscle energy technique; PNF - proprioceptive neuromuscular facilitation; NMR - neuromuscular re-education; a/p - anterior to posterior; p/a - posterior to anterior, FMP - functional movement patterns)  · Supine left knee soft tissue mobilization to anterior knee and patella mobilization in all directions. · Supine left hamstrings soft tissue mobilization with FMP of knee extension  · Supine left anterior quadriceps and iliopsoas with FMP of knee flexion/extension, hip flexion. [facility] Portal  Treatment/Session Summary:    · Response to Treatment:  improving weight bearing activity, improved balance with step ups today and control of LLE stability. · Communication/Consultation:  None today  · Equipment provided today:  None today  · Recommendations/Intent for next treatment session: Next visit will focus on LE AROM, LE strength and balance. Total Treatment Billable Duration:  55 minutes.    PT Patient Time In/Time Out  Time In: 7450  Time Out: MANUEL Vuong    Future Appointments   Date Time Provider Uzair Pineda   7/25/2019  4:30 PM Zheng Ybarra., PT [facility]   8/7/2019  4:30 PM Fanta Wheat Dacia FRANCO, [facility]   8/13/2019  9:20 AM Debbi Lanier MD [facility]   8/14/2019 10:30 AM [facility]el Luna, [facility]   8/28/2019 10:30 AM Yoel Luna[facility]   9/9/2019  1:15 PM Sun Severino MD [facility]   9/18/2019  2:00 PM Blayne Cabrera MD [place] [facility]   9/23/2019  3:00 PM Sajan Rain MD [address]

## 2019-07-25 ENCOUNTER — HOSPITAL ENCOUNTER (OUTPATIENT)
Dept: PHYSICAL THERAPY | Age: 69
Discharge: HOME | End: 2019-07-25
Payer: MEDICARE

## 2019-07-25 PROCEDURE — 97016 VASOPNEUMATIC DEVICE THERAPY: CPT

## 2019-07-25 PROCEDURE — 97140 MANUAL THERAPY 1/> REGIONS: CPT

## 2019-07-25 PROCEDURE — 97110 THERAPEUTIC EXERCISES: CPT

## 2019-07-26 NOTE — PROGRESS NOTES
Franck Mercado  : 1950  [place]  Secondary: [facility] at [address]  [phone]   [id number]      OUTPATIENT PHYSICAL THERAPY: Daily Treatment Note 2019   ICD-10: Treatment Diagnosis: Pain in joint, left knee M25.562  ICD-10: Treatment Diagnosis: pain in joint, right knee M25.561  ICD-10: Treatment Diagnosis: difficulty walking, not elsewhere classified R26.2  Precautions/Allergies:   Adhesive tape-silicones; Ceclor [cefaclor]; and Symbyax [olanzapine-fluoxetine]   Treatment Plan of Care Effective Dates:  19 to 2019    Pre-treatment Symptoms/Complaints:  Mrs. Lv Mckinney notes woke up with left knee stiffness and pain this morning. Overall notes she is walking better, however feels more comfortable with her cane. Pain: Initial: Pain Intensity 1: 3  Pain Location 1: Knee  Pain Orientation 1: Left  Post Session:  2/10   Medications Last Reviewed: 2019    Updated Objective Findings:  left knee AROM 2-125 degrees, significant weakness remains in left quadriceps 4-/5   TREATMENT:     THERAPEUTIC EXERCISE: (15 minutes):  Exercises per grid below to improve mobility, strength, balance and coordination. Required moderate verbal and manual cues to promote proper body alignment, promote proper body posture, promote proper body mechanics and promote proper body breathing techniques. Progressed resistance, range, repetitions and complexity of movement as indicated. Date:  2019   Activity/Exercise Parameters   Supine heel slides X 10 reps, 5 sec holds   Supine short arc quad X 10 reps, 5 sec holds    Sit to stand transfers X 10 reps.     Supine straight leg raise    Seated hip hinge/weight acceptance X 5 reps BLE   Nu-step    Seated knee extension Supine with foam roller x 2 minutes   Standing terminal knee extension X 10 reps, BLE 5 sec holds, red t-band   Standing lateral steps X 5 reps, 5' distance with red t-band   Therapeutic Neuroscience education    Standing hip extension    Standing hip abduction    Standing weight shift forward    Supine UE/LE abdominal brace    Standing functional squat    Prone knee hang left    Prone knee extension/quad sets    Supine quad set    HEP review X 5 minute review   Step up X 15 taps BLE  X 15 step ups 4\" BLE     MANUAL THERAPY: (30 minutes): Joint mobilization and Soft tissue mobilization was utilized and necessary because of the patient's restricted joint motion and restricted motion of soft tissue. (Used abbreviations: MET - muscle energy technique; PNF - proprioceptive neuromuscular facilitation; NMR - neuromuscular re-education; a/p - anterior to posterior; p/a - posterior to anterior, FMP - functional movement patterns)  · Supine left knee soft tissue mobilization to anterior knee and patella mobilization in all directions. · Supine left hamstrings soft tissue mobilization with FMP of knee extension  · Supine left anterior quadriceps and iliopsoas with FMP of knee flexion/extension, hip flexion. Vasopneumatic Compression: 10 minutes: applied to left knee 34 degrees, medium pressure. [facility] Portal  Treatment/Session Summary:    · Response to Treatment:  continued to experience increased pain in left knee throughout session, ended session with vasopneumatic compression  · Communication/Consultation:  None today  · Equipment provided today:  None today  · Recommendations/Intent for next treatment session: Next visit will focus on LE AROM, LE strength and balance. Total Treatment Billable Duration:  55 minutes.    PT Patient Time In/Time Out  Time In: 1630  Time Out: MANUEL Vuong    Future Appointments   Date Time Provider Uzair Pineda   8/7/2019  4:30 PM Manuel Fuchs, MANUEL UMANA   8/13/2019  9:20 AM MD LUIS Fritz ENDO   8/14/2019 10:30 AM Manuel Fuchs PT JEF UMANA 8/28/2019 10:30 AM Pierre FRANCO, PT [facility]   9/9/2019  1:15 PM Lisandro Pena MD SSA NISHANT NISHANT   9/18/2019  2:00 PM Junette Phoenix, MD [place] [facility]   9/23/2019  3:00 PM Haja Kim MD [address]

## 2019-07-31 ENCOUNTER — HOSPITAL ENCOUNTER (OUTPATIENT)
Dept: PHYSICAL THERAPY | Age: 69
Discharge: HOME | End: 2019-07-31
Payer: MEDICARE

## 2019-07-31 PROCEDURE — 97110 THERAPEUTIC EXERCISES: CPT

## 2019-07-31 PROCEDURE — 97140 MANUAL THERAPY 1/> REGIONS: CPT

## 2019-07-31 NOTE — PROGRESS NOTES
Bruce Everett  : 1950  [place]  [place] [facility] [place] [address] at [facility], [address]  [phone]   [phone]      OUTPATIENT PHYSICAL THERAPY: Daily Treatment Note 2019   Visit Count: 18      ICD-10: Treatment Diagnosis: Pain in joint, left knee M25.562  ICD-10: Treatment Diagnosis: pain in joint, right knee M25.561  ICD-10: Treatment Diagnosis: difficulty walking, not elsewhere classified R26.2  Precautions/Allergies:   Adhesive tape-silicones; Ceclor [cefaclor]; and Symbyax [olanzapine-fluoxetine]   Treatment Plan of Care Effective Dates:  19 to 2019    Pre-treatment Symptoms/Complaints:  Mrs. Aleksandr Garza notes continues to fatigue easily with ambulation, continuing to need cane for balance and support. Notes improvements with sit to stand transfers and less tightness in left knee. Continues to struggle with endurance. Pain: Initial: Pain Intensity 1: 2  Pain Location 1: Knee  Pain Orientation 1: Left  Post Session:  2/10   Medications Last Reviewed: 2019    Updated Objective Findings:  left knee AROM 2-125 degrees, significant weakness remains in left quadriceps 4-/5   TREATMENT:     THERAPEUTIC EXERCISE: (30 minutes):  Exercises per grid below to improve mobility, strength, balance and coordination. Required moderate verbal and manual cues to promote proper body alignment, promote proper body posture, promote proper body mechanics and promote proper body breathing techniques. Progressed resistance, range, repetitions and complexity of movement as indicated. Date:  2019   Activity/Exercise Parameters   Supine heel slides X 10 reps, 5 sec holds   Supine short arc quad X 10 reps, 5 sec holds    Sit to stand transfers X 10 reps.     Supine straight leg raise    Seated hip hinge/weight acceptance X 5 reps BLE   Nu-step    Seated knee extension Supine with foam roller x 2 minutes   Standing terminal knee extension X 10 reps, BLE 5 sec holds, red t-band   Standing lateral steps X 5 reps, 5' distance with red t-band   Therapeutic Neuroscience education    Standing hip extension X 30 reps BLE   Standing hip abduction X 30 reps BLE   Standing weight shift forward    Supine UE/LE abdominal brace    Standing functional squat X 30 reps,   Prone knee hang left    Prone knee extension/quad sets    Supine quad set    HEP review X 5 minute review   Step up X 15 taps BLE  X 15 step ups 4\" BLE     MANUAL THERAPY: (25 minutes): Joint mobilization and Soft tissue mobilization was utilized and necessary because of the patient's restricted joint motion and restricted motion of soft tissue. (Used abbreviations: MET - muscle energy technique; PNF - proprioceptive neuromuscular facilitation; NMR - neuromuscular re-education; a/p - anterior to posterior; p/a - posterior to anterior, FMP - functional movement patterns)  · Supine left knee soft tissue mobilization to anterior knee and patella mobilization in all directions. · Supine left hamstrings soft tissue mobilization with FMP of knee extension  · Supine left anterior quadriceps and iliopsoas with FMP of knee flexion/extension, hip flexion. [facility] Portal  Treatment/Session Summary:    · Response to Treatment:  improving overall mobility and improvements in strength, carlos continues to present with defiictis in her strength and endurance  · Communication/Consultation:  None today  · Equipment provided today:  None today  · Recommendations/Intent for next treatment session: Next visit will focus on LE strength, endurance and balance. Total Treatment Billable Duration:  55 minutes.    PT Patient Time In/Time Out  Time In: 1130  Time Out: [address], PT    Future Appointments   Date Time Provider Uzair Pineda   8/7/2019  4:30 PM Laura Martinez, PT [facility]   8/13/2019  9:20 AM Deidra Aviles MD END BS ENDO 8/15/2019 10:30 AM Samira FRANCO, PT [facility]   8/28/2019 10:30 AM Yaneth Nichols, PT [facility]   9/9/2019  1:15 PM Zaheer Schulz MD [facility]   9/18/2019  2:00 PM Joao Napoles MD [place] [facility]   9/23/2019  3:00 PM Elizabeth Martinez MD [address]

## 2019-08-07 ENCOUNTER — APPOINTMENT (OUTPATIENT)
Dept: PHYSICAL THERAPY | Age: 69
End: 2019-08-07
Payer: MEDICARE

## 2019-08-13 PROBLEM — E89.0 HYPOTHYROIDISM FOLLOWING RADIOIODINE THERAPY: Status: ACTIVE | Noted: 2018-07-09

## 2019-08-14 ENCOUNTER — APPOINTMENT (OUTPATIENT)
Dept: PHYSICAL THERAPY | Age: 69
End: 2019-08-14
Payer: MEDICARE

## 2019-08-15 ENCOUNTER — HOSPITAL ENCOUNTER (OUTPATIENT)
Dept: PHYSICAL THERAPY | Age: 69
Discharge: HOME | End: 2019-08-15
Payer: MEDICARE

## 2019-08-15 PROCEDURE — 97110 THERAPEUTIC EXERCISES: CPT

## 2019-08-15 PROCEDURE — 97140 MANUAL THERAPY 1/> REGIONS: CPT

## 2019-08-15 NOTE — PROGRESS NOTES
Makayla Martin  : 1950  [place]  Secondary: [facility] at [place] [address], [address] [facility]  [phone]   [phone]      OUTPATIENT PHYSICAL THERAPY: Daily Treatment Note 8/15/2019   Visit Count: 19      ICD-10: Treatment Diagnosis: Pain in joint, left knee M25.562  ICD-10: Treatment Diagnosis: pain in joint, right knee M25.561  ICD-10: Treatment Diagnosis: difficulty walking, not elsewhere classified R26.2  Precautions/Allergies:   Adhesive tape-silicones; Ceclor [cefaclor]; and Symbyax [olanzapine-fluoxetine]   Treatment Plan of Care Effective Dates:  19 to 2019    Pre-treatment Symptoms/Complaints:  Mrs. Elissa Duke continues to note fatigue with ambulation and has to use her cane. Notes she is walking longer distances, however needs to take rest sitting breaks. Pain: Initial: Pain Intensity 1: 2  Pain Location 1: Knee  Pain Orientation 1: Left  Post Session:  2/10   Medications Last Reviewed: 8/15/2019    Updated Objective Findings:  left knee AROM 2-125 degrees, significant weakness remains in left quadriceps    TREATMENT:     THERAPEUTIC EXERCISE: (30 minutes):  Exercises per grid below to improve mobility, strength, balance and coordination. Required moderate verbal and manual cues to promote proper body alignment, promote proper body posture, promote proper body mechanics and promote proper body breathing techniques. Progressed resistance, range, repetitions and complexity of movement as indicated. Date:  8/15/2019   Activity/Exercise Parameters   Supine heel slides X 10 reps, 5 sec holds   Supine short arc quad X 10 reps, 5 sec holds    Sit to stand transfers X 10 reps.     Supine straight leg raise    Seated hip hinge/weight acceptance X 5 reps BLE   Nu-step    Seated knee extension Supine with foam roller x 2 minutes   Standing terminal knee extension X 10 reps, BLE 5 sec holds, red t-band   Standing lateral steps X 5 reps, 5' distance with red t-band   Therapeutic Neuroscience education    Standing hip extension X 30 reps BLE   Standing hip abduction X 30 reps BLE   Standing weight shift forward    Supine UE/LE abdominal brace    Standing functional squat X 30 reps,   Prone knee hang left    Prone knee extension/quad sets X 25 reps, 5 sec holds   Supine quad set    HEP review X 5 minute review   Step up X 30 taps BLE  X 30 step ups 4\" BLE     MANUAL THERAPY: (25 minutes): Joint mobilization and Soft tissue mobilization was utilized and necessary because of the patient's restricted joint motion and restricted motion of soft tissue. (Used abbreviations: MET - muscle energy technique; PNF - proprioceptive neuromuscular facilitation; NMR - neuromuscular re-education; a/p - anterior to posterior; p/a - posterior to anterior, FMP - functional movement patterns)  · Supine left knee soft tissue mobilization to anterior knee and patella mobilization in all directions. · Supine left hamstrings soft tissue mobilization with FMP of knee extension  · Supine left anterior quadriceps and iliopsoas with FMP of knee flexion/extension, hip flexion. · Prone rectus femoris manual stretch with contract/relax techniques  · Prone soft tissue mobilization to bilateral gastroc/soleus with FMP of ankle DF/PF    [facility] Portal  Treatment/Session Summary:    · Response to Treatment:  challenged with daily ambulation and prolonged standing. Continues to be challenged with terminal knee extension of LLE. · Communication/Consultation:  None today  · Equipment provided today:  None today  · Recommendations/Intent for next treatment session: Next visit will focus on LE strength, endurance and balance. Total Treatment Billable Duration:  55 minutes.    PT Patient Time In/Time Out  Time In: 1030  Time Out: Ze Coburn, PT    Future Appointments   Date Time Provider Uzair Pineda   8/28/2019 10:30 AM [facility]ANCO[facility]   9/9/2019  1:15 PM Carli Boyd MD [facility] NISHANT NISHANT   9/18/2019  2:00 PM Merlin Stank, MD [place] [facility]   9/23/2019  3:00 PM Ria Cardenas MD [facility] UCDG UCD   1/14/2020  3:40 PM Darryl Bustos MD END BS ENDO

## 2019-08-28 ENCOUNTER — HOSPITAL ENCOUNTER (OUTPATIENT)
Dept: PHYSICAL THERAPY | Age: 69
Discharge: HOME | End: 2019-08-28
Payer: MEDICARE

## 2019-08-28 PROCEDURE — 97110 THERAPEUTIC EXERCISES: CPT

## 2019-08-28 PROCEDURE — 97140 MANUAL THERAPY 1/> REGIONS: CPT

## 2019-08-29 NOTE — PROGRESS NOTES
Nik Anguiano  : 1950  [place]  [place] [address] at [facility]  [address], [address]  [phone]   [phone]      OUTPATIENT PHYSICAL THERAPY: Daily Treatment Note 2019   Visit Count: 20      ICD-10: Treatment Diagnosis: Pain in joint, left knee M25.562  ICD-10: Treatment Diagnosis: pain in joint, right knee M25.561  ICD-10: Treatment Diagnosis: difficulty walking, not elsewhere classified R26.2  Precautions/Allergies:   Adhesive tape-silicones; Ceclor [cefaclor]; and Symbyax [olanzapine-fluoxetine]   Treatment Plan of Care Effective Dates:  19 to 2019    Pre-treatment Symptoms/Complaints:  Mrs. Edvin Helm had her follow up with Dr. Arlene Nino this week, continues to be pleased with progression. She continues to ambulate with a limp and needs to use her cane. Pain: Initial: Pain Intensity 1: 2  Pain Location 1: Knee  Pain Orientation 1: Left  Post Session:  1/10   Medications Last Reviewed: 2019    Updated Objective Findings:  left knee AROM 1-125 degrees, significant weakness remains in left quadriceps    TREATMENT:     THERAPEUTIC EXERCISE: (30 minutes):  Exercises per grid below to improve mobility, strength, balance and coordination. Required moderate verbal and manual cues to promote proper body alignment, promote proper body posture, promote proper body mechanics and promote proper body breathing techniques. Progressed resistance, range, repetitions and complexity of movement as indicated. Date:  2019   Activity/Exercise Parameters   Supine heel slides X 10 reps, 5 sec holds   Supine short arc quad X 10 reps, 5 sec holds    Sit to stand transfers X 10 reps.     Supine straight leg raise    Seated hip hinge/weight acceptance X 5 reps BLE   Nu-step    Seated knee extension Supine with foam roller x 2 minutes   Standing terminal knee extension X 10 reps, BLE 5 sec holds, red t-band   Standing lateral steps X 5 reps, 5' distance with red t-band   Therapeutic Neuroscience education    Standing hip extension X 30 reps BLE   Standing hip abduction X 30 reps BLE   Standing weight shift forward    Supine UE/LE abdominal brace    Standing functional squat X 30 reps,   Prone knee hang left    Prone knee extension/quad sets X 25 reps, 5 sec holds   Supine quad set    HEP review X 5 minute review   Step up X 30 taps BLE  X 30 step ups 4\" BLE   Standing marching on airex X 1 minute  X 1 minute weight shift     MANUAL THERAPY: (25 minutes): Joint mobilization and Soft tissue mobilization was utilized and necessary because of the patient's restricted joint motion and restricted motion of soft tissue. (Used abbreviations: MET - muscle energy technique; PNF - proprioceptive neuromuscular facilitation; NMR - neuromuscular re-education; a/p - anterior to posterior; p/a - posterior to anterior, FMP - functional movement patterns)  · Supine left knee soft tissue mobilization to anterior knee and patella mobilization in all directions. · Supine left hamstrings soft tissue mobilization with FMP of knee extension  · Supine left anterior quadriceps and iliopsoas with FMP of knee flexion/extension, hip flexion. · Prone rectus femoris manual stretch with contract/relax techniques  · Prone soft tissue mobilization to bilateral gastroc/soleus with FMP of ankle DF/PF    [facility] Portal  Treatment/Session Summary:    · Response to Treatment:  continues to be challenged with prolonged weight bearing, needs to take rest breaks. improving left knee extension  · Communication/Consultation:  None today  · Equipment provided today:  None today  · Recommendations/Intent for next treatment session: Next visit will focus on LE strength, endurance and balance. Total Treatment Billable Duration:  55 minutes.    PT Patient Time In/Time Out  Time In: 1460  Time Out: [address] [facility],     Future Appointments   Date Time Provider Uzair Pineda   9/4/2019  2:30 PM [facility]   9/9/2019  1:15 PM Spencer Kahn MD [facility] NISHANT NISHANT   9/11/2019  3:30 PM [facility]   9/18/2019  2:00 PM Matthew Ho MD [place] [facility]   9/18/2019  4:30 PM [facility] PT [facility]   9/23/2019  3:00 PM Savannah Cantu MD [facility] UCDG UCD   9/25/2019  3:30 PM Prudence Bhupendra SKELTON., PT [facility]   11/14/2019  4:00 PM PP SLEEP Tyrone Stewart [facility] PSCD PP   1/14/2020  3:40 PM Bryan Wang MD END BS ENDO

## 2019-09-04 ENCOUNTER — HOSPITAL ENCOUNTER (OUTPATIENT)
Dept: PHYSICAL THERAPY | Age: 69
Discharge: HOME | End: 2019-09-04
Payer: MEDICARE

## 2019-09-04 PROCEDURE — 97110 THERAPEUTIC EXERCISES: CPT

## 2019-09-04 PROCEDURE — 97140 MANUAL THERAPY 1/> REGIONS: CPT

## 2019-09-05 NOTE — PROGRESS NOTES
Yovana Jones  : 1950  [place]  [place] [address] at [place] [address], [address] [facility]  [phone]   [phone]      OUTPATIENT PHYSICAL THERAPY: Daily Treatment Note 2019   Visit Count: 21      ICD-10: Treatment Diagnosis: Pain in joint, left knee M25.562  ICD-10: Treatment Diagnosis: pain in joint, right knee M25.561  ICD-10: Treatment Diagnosis: difficulty walking, not elsewhere classified R26.2  Precautions/Allergies:   Adhesive tape-silicones; Ceclor [cefaclor]; and Symbyax [olanzapine-fluoxetine]   Treatment Plan of Care Effective Dates: 8/15/19 TO 19    Pre-treatment Symptoms/Complaints:  Mrs. Ko Maurice notes less left knee pain, however continues to be challenged with her endurance levels and stiffness in her left knee. She also continues to note stiffness and pain in bilateral shoulders, thinks it is from using her UE to push up earlier post-surgery. Pain: Initial: Pain Intensity 1: 2  Pain Location 1: Knee  Pain Orientation 1: Left  Post Session:  1/10   Medications Last Reviewed: 2019    Updated Objective Findings:  left knee AROM 1-125 degrees, significant weakness remains in left quadriceps    TREATMENT:     THERAPEUTIC EXERCISE: (30 minutes):  Exercises per grid below to improve mobility, strength, balance and coordination. Required moderate verbal and manual cues to promote proper body alignment, promote proper body posture, promote proper body mechanics and promote proper body breathing techniques. Progressed resistance, range, repetitions and complexity of movement as indicated. Date:  2019   Activity/Exercise Parameters   Supine heel slides X 10 reps, 5 sec holds   Supine short arc quad X 10 reps, 5 sec holds    Sit to stand transfers X 10 reps.     Supine straight leg raise    Seated hip hinge/weight acceptance X 5 reps BLE   Nu-step    Seated knee extension Supine with foam roller x 2 minutes   Standing terminal knee extension X 10 reps, BLE 5 sec holds, red t-band   Standing lateral steps X 5 reps, 5' distance with green t-band   Therapeutic Neuroscience education    Standing hip extension X 30 reps BLE   Standing hip abduction X 30 reps BLE   Standing weight shift forward    Supine UE/LE abdominal brace    Standing functional squat X 30 reps,   Prone knee hang left    Prone knee extension/quad sets X 25 reps, 5 sec holds   Supine quad set    HEP review X 5 minute review   Step up X 30 taps BLE  X 30 step ups 4\" BLE   Standing marching on airex X 1 minute  X 1 minute weight shift     MANUAL THERAPY: (25 minutes): Joint mobilization and Soft tissue mobilization was utilized and necessary because of the patient's restricted joint motion and restricted motion of soft tissue. (Used abbreviations: MET - muscle energy technique; PNF - proprioceptive neuromuscular facilitation; NMR - neuromuscular re-education; a/p - anterior to posterior; p/a - posterior to anterior, FMP - functional movement patterns)  · Supine left knee soft tissue mobilization to anterior knee and patella mobilization in all directions. · Supine left hamstrings soft tissue mobilization with FMP of knee extension  · Supine left anterior quadriceps and iliopsoas with FMP of knee flexion/extension, hip flexion. · Prone rectus femoris manual stretch with contract/relax techniques  · Prone soft tissue mobilization to bilateral gastroc/soleus with FMP of ankle DF/PF    [facility] Portal  Treatment/Session Summary:    · Response to Treatment:  improving overall mobility in left knee, continues to be challenged with standing endurance and gluteal strength  · Communication/Consultation:  None today  · Equipment provided today:  None today  · Recommendations/Intent for next treatment session: Next visit will focus on LE strength, endurance and balance. Total Treatment Billable Duration:  55 minutes.    PT Patient Time In/Time Out  Time In: 1430  Time Out: [address], PT    Future Appointments   Date Time Provider Uzair Aranai   9/9/2019  1:15 PM Ervin Sawyer MD [facility] NISHANT NISHANT   9/11/2019  3:30 PM Lucia Carrero., PT OFF [facility]   9/18/2019  2:00 PM Scarlet Lyn MD [place] [facility]   9/18/2019  4:30 PM Lucia Carrero., PT OFF [facility]   9/23/2019  3:00 PM Ralph Mohan MD [facility] UCDG UCD   9/25/2019  3:30 PM Don FRANCO, PT [facility]   11/14/2019  4:00 PM PP SLEEP MICHELE [facility] PSCD PP   1/14/2020  3:40 PM Selene Samano MD END BS ENDO

## 2019-09-11 ENCOUNTER — HOSPITAL ENCOUNTER (OUTPATIENT)
Dept: PHYSICAL THERAPY | Age: 69
Discharge: HOME | End: 2019-09-11
Payer: MEDICARE

## 2019-09-11 PROCEDURE — 97110 THERAPEUTIC EXERCISES: CPT

## 2019-09-11 PROCEDURE — 97140 MANUAL THERAPY 1/> REGIONS: CPT

## 2019-09-11 NOTE — PROGRESS NOTES
Tracy Woods  : 1950  [place]  [place]: [place] [address] at [place] [address], [address] [facility]  [phone]   [phone]      OUTPATIENT PHYSICAL THERAPY: Daily Treatment Note 2019   Visit Count: 22      ICD-10: Treatment Diagnosis: Pain in joint, left knee M25.562  ICD-10: Treatment Diagnosis: pain in joint, right knee M25.561  ICD-10: Treatment Diagnosis: difficulty walking, not elsewhere classified R26.2  Precautions/Allergies:   Adhesive tape-silicones; Ceclor [cefaclor]; and Symbyax [olanzapine-fluoxetine]   Treatment Plan of Care Effective Dates: 8/15/19 TO 19    Pre-treatment Symptoms/Complaints:  Mrs. Deepak Shelton notes less pain in her left knee, however continues to note tightness in her knee. Pain: Initial: Pain Intensity 1: 2  Pain Location 1: Knee  Pain Orientation 1: Left  Post Session:  1/10   Medications Last Reviewed: 2019    Updated Objective Findings:  left knee AROM 1-128 degrees, significant weakness remains in left quadriceps    TREATMENT:     THERAPEUTIC EXERCISE: (30 minutes):  Exercises per grid below to improve mobility, strength, balance and coordination. Required moderate verbal and manual cues to promote proper body alignment, promote proper body posture, promote proper body mechanics and promote proper body breathing techniques. Progressed resistance, range, repetitions and complexity of movement as indicated. Date:  2019   Activity/Exercise Parameters   Supine heel slides X 10 reps, 5 sec holds   Supine short arc quad X 10 reps, 5 sec holds    Sit to stand transfers X 10 reps.     Supine straight leg raise    Seated hip hinge/weight acceptance X 5 reps BLE   Nu-step    Seated knee extension Supine with foam roller x 2 minutes   Standing terminal knee extension X 10 reps, BLE 5 sec holds, red t-band   Standing lateral steps X 10 reps, 5' distance with green t-band   Therapeutic Neuroscience education    Standing hip extension X 30 reps BLE   Standing hip abduction X 30 reps BLE   Standing weight shift forward    Supine UE/LE abdominal brace    Standing functional squat X 30 reps,   Prone knee hang left    Prone knee extension/quad sets X 25 reps, 5 sec holds   Supine quad set    HEP review X 5 minute review   Step up X 30 taps BLE  X 30 step ups 4\" BLE   Standing marching on airex X 1 minute       MANUAL THERAPY: (25 minutes): Joint mobilization and Soft tissue mobilization was utilized and necessary because of the patient's restricted joint motion and restricted motion of soft tissue. (Used abbreviations: MET - muscle energy technique; PNF - proprioceptive neuromuscular facilitation; NMR - neuromuscular re-education; a/p - anterior to posterior; p/a - posterior to anterior, FMP - functional movement patterns)  · Supine left knee soft tissue mobilization to anterior knee and patella mobilization in all directions. · Supine left hamstrings soft tissue mobilization with FMP of knee extension  · Supine left anterior quadriceps and iliopsoas with FMP of knee flexion/extension, hip flexion. · Prone rectus femoris manual stretch with contract/relax techniques  · Prone soft tissue mobilization to bilateral gastroc/soleus with FMP of ankle DF/PF    Med[employer] Portal  Treatment/Session Summary:    · Response to Treatment:  continues to demonstrate sag with straight leg raise, improved quadriceps activation, however continues to have strength deficits and needs rest breaks between activities  · Communication/Consultation:  None today  · Equipment provided today:  None today  · Recommendations/Intent for next treatment session: Next visit will focus on LE strength, endurance and balance. Total Treatment Billable Duration:  55 minutes.    PT Patient Time In/Time Out  Time In: 1680  Time Out: [facility], PT    Future Appointments   Date Time Provider Department Center   9/18/2019  4:30 PM [facility] A., [facility]   9/23/2019  3:00 PM Galilea Wren MD [facility] UCDG UCD   9/25/2019  3:30 PM Elta Ill A., PT OFF [facility]   11/14/2019  4:00 PM PP SLEEP MICHELE [facility] PSCD PP   12/9/2019  2:00 PM Jayla Westbrook MD [facility] NISHANT NISHANT   1/14/2020  3:40 PM Tamie Gonzalez MD END BS ENDO

## 2019-09-18 ENCOUNTER — HOSPITAL ENCOUNTER (OUTPATIENT)
Dept: PHYSICAL THERAPY | Age: 69
Discharge: HOME | End: 2019-09-18
Payer: MEDICARE

## 2019-09-18 PROCEDURE — 97140 MANUAL THERAPY 1/> REGIONS: CPT

## 2019-09-18 PROCEDURE — 97110 THERAPEUTIC EXERCISES: CPT

## 2019-09-18 NOTE — PROGRESS NOTES
Edwin Tinajero  : 1950  [place]  Secondary: [address]  [phone]   [phone]      OUTPATIENT PHYSICAL THERAPY: Daily Treatment Note 2019   Visit Count: 23      ICD-10: Treatment Diagnosis: Pain in joint, left knee M25.562  ICD-10: Treatment Diagnosis: pain in joint, right knee M25.561  ICD-10: Treatment Diagnosis: difficulty walking, not elsewhere classified R26.2  Precautions/Allergies:   Adhesive tape-silicones; Ceclor [cefaclor]; and Symbyax [olanzapine-fluoxetine]   Treatment Plan of Care Effective Dates: 8/15/19 TO 19    Pre-treatment Symptoms/Complaints:  Mrs. Vannessa Potter notes traveled this past weekend and notes increased soreness and tightness in bilateral knees and right hip. Notes increased discomfort in left lateral knee and her left patella     Pain: Initial: Pain Intensity 1: 2  Pain Location 1: Knee  Pain Orientation 1: Left  Post Session:  1/10   Medications Last Reviewed: 2019    Updated Objective Findings:  left knee AROM 1-128 degrees, weakness remains in left quadriceps    TREATMENT:     THERAPEUTIC EXERCISE: (30 minutes):  Exercises per grid below to improve mobility, strength, balance and coordination. Required moderate verbal and manual cues to promote proper body alignment, promote proper body posture, promote proper body mechanics and promote proper body breathing techniques. Progressed resistance, range, repetitions and complexity of movement as indicated. Date:  2019   Activity/Exercise Parameters   Supine heel slides X 10 reps, 5 sec holds   Supine short arc quad X 10 reps, 5 sec holds    Sit to stand transfers X 10 reps.     Supine straight leg raise    Seated hip hinge/weight acceptance X 5 reps BLE   Nu-step    Seated knee extension Supine with foam roller x 2 minutes   Standing terminal knee extension X 10 reps, BLE 5 sec holds, red t-band   Standing lateral steps X 10 reps, 5' distance with green t-band   Therapeutic Neuroscience education    Standing hip extension X 30 reps BLE   Standing hip abduction X 30 reps BLE   Standing weight shift forward    Supine UE/LE abdominal brace    Standing functional squat X 30 reps,   Prone knee hang left    Prone knee extension/quad sets X 25 reps, 5 sec holds   Supine quad set    HEP review X 5 minute review   Step up X 30 taps BLE  X 30 step ups 4\" BLE   Standing marching on airex X 1 minute     Standing functional squats X 15 reps   Standing calf stretches X 5 reps, 25 sec holds BLE         MANUAL THERAPY: (25 minutes): Joint mobilization and Soft tissue mobilization was utilized and necessary because of the patient's restricted joint motion and restricted motion of soft tissue. (Used abbreviations: MET - muscle energy technique; PNF - proprioceptive neuromuscular facilitation; NMR - neuromuscular re-education; a/p - anterior to posterior; p/a - posterior to anterior, FMP - functional movement patterns)  · Supine left knee soft tissue mobilization to anterior knee and patella mobilization in all directions. · Supine left hamstrings soft tissue mobilization with FMP of knee extension  · Supine left anterior quadriceps and iliopsoas with FMP of knee flexion/extension, hip flexion. · Prone rectus femoris manual stretch with contract/relax techniques  · Prone soft tissue mobilization to bilateral gastroc/soleus with FMP of ankle DF/PF    Med[employer] Portal  Treatment/Session Summary:    · Response to Treatment:  continues to be challenged with left terminal knee extension and restrictions noted in bilateral calves flexibility  · Communication/Consultation:  None today  · Equipment provided today:  None today  · Recommendations/Intent for next treatment session: Next visit will focus on LE strength, endurance and balance. Total Treatment Billable Duration:  55 minutes.    PT Patient Time In/Time Out  Time In: 1630  Time Out: MANUEL Vuong    Future Appointments   Date Time Provider Uzair Miriam   9/23/2019  3:00 PM Lokesh Campa MD [facility] UCDG UCD   9/25/2019  3:30 PM Shira FRANCO PT [facility]   11/14/2019  4:00 PM PP SLEEP MICHELE [facility] PSCD PP   12/9/2019  2:00 PM Sherryle Clunes, MD [facility] NISHANT NISHANT   1/14/2020  3:40 PM Jamal Oliva MD END BS ENDO

## 2019-09-25 ENCOUNTER — HOSPITAL ENCOUNTER (OUTPATIENT)
Dept: PHYSICAL THERAPY | Age: 69
Discharge: HOME | End: 2019-09-25
Payer: MEDICARE

## 2019-09-25 PROCEDURE — 97110 THERAPEUTIC EXERCISES: CPT

## 2019-09-25 PROCEDURE — 97140 MANUAL THERAPY 1/> REGIONS: CPT

## 2019-09-25 NOTE — PROGRESS NOTES
Singh President  : 1950  [place]  Secondary: [facility], [address] [facility]  [phone]   [phone]      OUTPATIENT PHYSICAL THERAPY: Daily Treatment Note 2019   Visit Count: 24      ICD-10: Treatment Diagnosis: Pain in joint, left knee M25.562  ICD-10: Treatment Diagnosis: pain in joint, right knee M25.561  ICD-10: Treatment Diagnosis: difficulty walking, not elsewhere classified R26.2  Precautions/Allergies:   Adhesive tape-silicones; Ceclor [cefaclor]; and Symbyax [olanzapine-fluoxetine]   Treatment Plan of Care Effective Dates: 8/15/19 TO 19    Pre-treatment Symptoms/Complaints:  Mrs. John Head continues to be challenged with her gait, continues to need to use her cane for balance. Also notes continues to feel fatigued with prolonged activities. Pain: Initial: Pain Intensity 1: 2  Pain Location 1: Knee  Pain Orientation 1: Left  Post Session:  1/10   Medications Last Reviewed: 2019    Updated Objective Findings:  left knee AROM 1-128 degrees, weakness remains in left quadriceps    TREATMENT:     THERAPEUTIC EXERCISE: (30 minutes):  Exercises per grid below to improve mobility, strength, balance and coordination. Required moderate verbal and manual cues to promote proper body alignment, promote proper body posture, promote proper body mechanics and promote proper body breathing techniques. Progressed resistance, range, repetitions and complexity of movement as indicated. Date:  2019   Activity/Exercise Parameters   Supine heel slides X 10 reps, 5 sec holds   Supine short arc quad X 10 reps, 5 sec holds    Sit to stand transfers X 10 reps.     Supine straight leg raise    Seated hip hinge/weight acceptance X 5 reps BLE   Nu-step    Seated knee extension Supine with foam roller x 2 minutes   Standing terminal knee extension X 10 reps, BLE 5 sec holds, red t-band Standing lateral steps X 10 reps, 5' distance with green t-band   Therapeutic Neuroscience education    Standing hip extension X 30 reps BLE   Standing hip abduction X 30 reps BLE   Standing weight shift forward    Supine UE/LE abdominal brace    Standing functional squat X 30 reps,   Prone knee hang left    Prone knee extension/quad sets X 25 reps, 5 sec holds   Supine quad set    HEP review X 5 minute review   Step up X 30 taps BLE  X 30 step ups 4\" BLE   Standing marching on airex X 1 minute     Standing functional squats X 15 reps   Standing calf stretches X 5 reps, 25 sec holds BLE         MANUAL THERAPY: (25 minutes): Joint mobilization and Soft tissue mobilization was utilized and necessary because of the patient's restricted joint motion and restricted motion of soft tissue. (Used abbreviations: MET - muscle energy technique; PNF - proprioceptive neuromuscular facilitation; NMR - neuromuscular re-education; a/p - anterior to posterior; p/a - posterior to anterior, FMP - functional movement patterns)  · Supine left knee soft tissue mobilization to anterior knee and patella mobilization in all directions. · Supine left hamstrings soft tissue mobilization with FMP of knee extension  · Supine left anterior quadriceps and iliopsoas with FMP of knee flexion/extension, hip flexion. · Prone rectus femoris manual stretch with contract/relax techniques  · Prone soft tissue mobilization to bilateral gastroc/soleus with FMP of ankle DF/PF    MedBridge Portal  Treatment/Session Summary:    · Response to Treatment:  improving terminal knee extension with exercises, however struggles with obtaining  it in her gait  · Communication/Consultation:  None today  · Equipment provided today:  None today  · Recommendations/Intent for next treatment session: Next visit will focus on LE strength, endurance and balance. Total Treatment Billable Duration:  55 minutes.    PT Patient Time In/Time Out  Time In: 0873  Time Out: [address] [place] [address],     Future Appointments   Date Time Provider Uzair Pineda   10/1/2019  3:30 PM Jennett Camp., PT [facility]   10/8/2019  8:30 AM Jennett Camp., PT [facility]   10/16/2019  4:30 PM Jennett Camp., PT [facility]   10/23/2019  3:30 PM Jennett Camp., PT [facility]   10/30/2019  3:30 PM Gayle FRANCO, PT [facility]   11/14/2019  4:00 PM PP SLEEP MICHELE [facility] PSCD PP   12/9/2019  2:00 PM Radha Hill MD [facility] NISHANT NISHANT   1/14/2020  3:40 PM Deidra Aviles MD END BS ENDO   3/17/2020  1:15 PM Bhargav Perry MD [facility] UCDG UCD

## 2019-10-01 ENCOUNTER — HOSPITAL ENCOUNTER (OUTPATIENT)
Dept: PHYSICAL THERAPY | Age: 69
Discharge: HOME | End: 2019-10-01
Payer: MEDICARE

## 2019-10-01 PROCEDURE — 97140 MANUAL THERAPY 1/> REGIONS: CPT

## 2019-10-01 PROCEDURE — 97110 THERAPEUTIC EXERCISES: CPT

## 2019-10-01 NOTE — PROGRESS NOTES
Alonso Harness  : 1950  [place]  [place] [address] at [address]  [phone]   [phone]      OUTPATIENT PHYSICAL THERAPY: Daily Treatment Note 10/1/2019   Visit Count: 25      ICD-10: Treatment Diagnosis: Pain in joint, left knee M25.562  ICD-10: Treatment Diagnosis: pain in joint, right knee M25.561  ICD-10: Treatment Diagnosis: difficulty walking, not elsewhere classified R26.2  Precautions/Allergies:   Adhesive tape-silicones; Ceclor [cefaclor]; and Symbyax [olanzapine-fluoxetine]   Treatment Plan of Care Effective Dates: 8/15/19 TO 19    Pre-treatment Symptoms/Complaints:  Mrs. Kinjal Granados continues to be easily out of breath with ambulation and continues to need to use her cane for balance. Pain: Initial: Pain Intensity 1: 2  Pain Location 1: Knee  Pain Orientation 1: Left  Post Session:  1/10   Medications Last Reviewed: 10/1/2019    Updated Objective Findings:  left knee AROM 1-129 degrees, weakness remains in left quadriceps 4+/5   TREATMENT:     THERAPEUTIC EXERCISE: (30 minutes):  Exercises per grid below to improve mobility, strength, balance and coordination. Required moderate verbal and manual cues to promote proper body alignment, promote proper body posture, promote proper body mechanics and promote proper body breathing techniques. Progressed resistance, range, repetitions and complexity of movement as indicated. Date:  10/1/2019   Activity/Exercise Parameters   Supine heel slides X 10 reps, 5 sec holds   Supine short arc quad    Sit to stand transfers X 10 reps.     Supine straight leg raise X 10 reps, 10 sec holds   Seated hip hinge/weight acceptance X 5 reps BLE   Nu-step    Seated knee extension Supine with foam roller x 2 minutes   Standing terminal knee extension X 10 reps, BLE 5 sec holds, red t-band   Standing lateral steps X 10 reps, 5' distance with green t-band   Therapeutic Neuroscience education    Standing hip extension X 30 reps BLE   Standing hip abduction X 30 reps BLE   Standing weight shift forward    Supine UE/LE abdominal brace    Standing functional squat X 30 reps,   Prone knee hang left    Prone knee extension/quad sets X 25 reps, 5 sec holds   Supine quad set    HEP review X 5 minute review   Step up X 30 taps BLE  X 30 step ups 4\" BLE   Standing marching on airex X 1 minute     Standing functional squats X 15 reps   Standing calf stretches X 5 reps, 25 sec holds BLE on board         MANUAL THERAPY: (25 minutes): Joint mobilization and Soft tissue mobilization was utilized and necessary because of the patient's restricted joint motion and restricted motion of soft tissue. (Used abbreviations: MET - muscle energy technique; PNF - proprioceptive neuromuscular facilitation; NMR - neuromuscular re-education; a/p - anterior to posterior; p/a - posterior to anterior, FMP - functional movement patterns)  · Supine left knee soft tissue mobilization to anterior knee and patella mobilization in all directions. · Supine left hamstrings soft tissue mobilization with FMP of knee extension  · Supine left anterior quadriceps and iliopsoas with FMP of knee flexion/extension, hip flexion. · Prone rectus femoris manual stretch with contract/relax techniques  · Prone soft tissue mobilization to bilateral gastroc/soleus with FMP of ankle DF/PF    Med[facility] Portal  Treatment/Session Summary:    · Response to Treatment:  continues to be challenged with overall strength and endurance with exercises, heart conditions before surgery has contributed to slower recovery of strength and endurance  · Communication/Consultation:  None today  · Equipment provided today:  None today  · Recommendations/Intent for next treatment session: Next visit will focus on LE strength, endurance and balance. Total Treatment Billable Duration:  55 minutes.    PT Patient Time In/Time Out  Time In: 1530  Time Out: [address], PT    Future Appointments   Date Time Provider Uzair Miriam   10/8/2019  8:30 AM [facility]   10/16/2019  4:30 PM Matthew Honeycutt., PT [facility]   10/23/2019  3:30 PM Jomatthew Honeycutt., PT [facility]   10/30/2019  3:30 PM Laya FRANCO, PT [facility]   11/14/2019  4:00 PM PP SLEEP MICHELE [facility] PSCD PP   12/9/2019  2:00 PM Mila Valenzuela MD [facility] NISHANT NISHANT   1/14/2020  3:40 PM Lore Santiago MD END BS ENDO   3/17/2020  1:15 PM [facility] Callahan [facility]

## 2019-10-08 ENCOUNTER — HOSPITAL ENCOUNTER (OUTPATIENT)
Dept: PHYSICAL THERAPY | Age: 69
Discharge: HOME | End: 2019-10-08
Payer: MEDICARE

## 2019-10-08 ENCOUNTER — HOSPITAL ENCOUNTER (OUTPATIENT)
Dept: LAB | Age: 69
Discharge: HOME | End: 2019-10-08
Payer: MEDICARE

## 2019-10-08 DIAGNOSIS — E89.0 HYPOTHYROIDISM FOLLOWING RADIOIODINE THERAPY: ICD-10-CM

## 2019-10-08 LAB — TSH W FREE THYROID I,TSHELE: 0.47 UIU/ML (ref 0.36–3.74)

## 2019-10-08 PROCEDURE — 97110 THERAPEUTIC EXERCISES: CPT

## 2019-10-08 PROCEDURE — 97140 MANUAL THERAPY 1/> REGIONS: CPT

## 2019-10-08 PROCEDURE — 84443 ASSAY THYROID STIM HORMONE: CPT

## 2019-10-08 PROCEDURE — 36415 COLL VENOUS BLD VENIPUNCTURE: CPT

## 2019-10-09 NOTE — PROGRESS NOTES
Romana Boo  : 1950  [place]  [place] [address] at [place] [address], [address] [place] [address]  [phone]   [phone]      OUTPATIENT PHYSICAL THERAPY: Daily Treatment Note 10/8/2019   Visit Count: 26      ICD-10: Treatment Diagnosis: Pain in joint, left knee M25.562  ICD-10: Treatment Diagnosis: pain in joint, right knee M25.561  ICD-10: Treatment Diagnosis: difficulty walking, not elsewhere classified R26.2  Precautions/Allergies:   Adhesive tape-silicones; Ceclor [cefaclor]; and Symbyax [olanzapine-fluoxetine]   Treatment Plan of Care Effective Dates: 8/15/19 TO 19    Pre-treatment Symptoms/Complaints:  Mrs. Robbin Stark got a new posture cane that helps her stand more upright with gait. Overall notes less left knee pain however continues to have stiffness and continues to be challenged with endurance levels. Pain: Initial: Pain Intensity 1: 2  Pain Location 1: Knee  Pain Orientation 1: Left  Post Session:  1/10   Medications Last Reviewed: 10/8/2019    Updated Objective Findings:  left knee AROM 1-129 degrees, weakness remains in left quadriceps 4+/5   TREATMENT:     THERAPEUTIC EXERCISE: (30 minutes):  Exercises per grid below to improve mobility, strength, balance and coordination. Required moderate verbal and manual cues to promote proper body alignment, promote proper body posture, promote proper body mechanics and promote proper body breathing techniques. Progressed resistance, range, repetitions and complexity of movement as indicated. Date:  10/8/2019   Activity/Exercise Parameters   Supine heel slides X 10 reps, 5 sec holds   Supine short arc quad    Sit to stand transfers X 10 reps.     Supine straight leg raise X 10 reps, 10 sec holds   Seated hip hinge/weight acceptance X 5 reps BLE   Nu-step    Seated knee extension Supine with foam roller x 2 minutes   Standing terminal knee extension X 10 reps, BLE 5 sec holds, red t-band   Standing lateral steps X 10 reps, 5' distance with green t-band   Therapeutic Neuroscience education    Standing hip extension X 30 reps BLE   Standing hip abduction X 30 reps BLE   Standing weight shift forward    Supine UE/LE abdominal brace    Standing functional squat X 30 reps,   Prone knee hang left    Prone knee extension/quad sets X 25 reps, 5 sec holds   Supine quad set    HEP review X 5 minute review   Step up X 30 taps BLE  X 30 step ups 4\" BLE   Standing marching on airex X 1 minute     Standing functional squats X 15 reps   Standing calf stretches X 5 reps, 25 sec holds BLE on board   Supine diaphragmatic breathing X  5 minutes education and x 10 reps,   Supine 90/90 hip flexion abdominal brace X 5 reps, 5 sec holds   Supine opposite UE/LE flexion and extension with abdominal bracing X 10 reps bilaterally      MANUAL THERAPY: (10 minutes): Joint mobilization and Soft tissue mobilization was utilized and necessary because of the patient's restricted joint motion and restricted motion of soft tissue. (Used abbreviations: MET - muscle energy technique; PNF - proprioceptive neuromuscular facilitation; NMR - neuromuscular re-education; a/p - anterior to posterior; p/a - posterior to anterior, FMP - functional movement patterns)  · Supine left knee soft tissue mobilization to anterior knee and patella mobilization in all directions. · Supine left hamstrings soft tissue mobilization with FMP of knee extension  · Supine left anterior quadriceps and iliopsoas with FMP of knee flexion/extension, hip flexion.       [facility] Portal  Treatment/Session Summary:    · Response to Treatment:  patient is a chest breather and challenged with endurance activities, improved awareness of diaphragmatic breaing by end of session  · Communication/Consultation:  None today  · Equipment provided today:  None today  · Recommendations/Intent for next treatment session: Next visit will focus on LE strength, endurance and balance. Total Treatment Billable Duration:  40 minutes.    PT Patient Time In/Time Out  Time In: 0830  Time Out: [address], PT    Future Appointments   Date Time Provider Uzair Miriam   10/16/2019  4:30 PM Betrai Pooja., PT [facility]   10/23/2019  3:30 PM Betsoumyane Pooja., PT OFF [facility]   10/30/2019  3:30 PM Stephane FRANCO, PT OFF [facility]   11/14/2019  4:00 PM PP SLEEP MICHELE [facility] PSCD PP   12/9/2019  2:00 PM Hector Lovett MD [facility] NISHANT NISHANT   1/14/2020  3:40 PM Rc Sandoval MD END BS ENDO   3/17/2020  1:15 PM Dago Mtz MD [facility] UCDG UCD

## 2019-10-16 ENCOUNTER — HOSPITAL ENCOUNTER (OUTPATIENT)
Dept: PHYSICAL THERAPY | Age: 69
Discharge: HOME | End: 2019-10-16
Payer: MEDICARE

## 2019-10-16 PROCEDURE — 97110 THERAPEUTIC EXERCISES: CPT

## 2019-10-16 PROCEDURE — 97140 MANUAL THERAPY 1/> REGIONS: CPT

## 2019-10-17 NOTE — PROGRESS NOTES
Danielle Malave  : 1950  [place]  [place]: [place] [address] at [address]  [phone]   [phone]      OUTPATIENT PHYSICAL THERAPY: Daily Treatment Note 10/16/2019   Visit Count: 27      ICD-10: Treatment Diagnosis: Pain in joint, left knee M25.562  ICD-10: Treatment Diagnosis: pain in joint, right knee M25.561  ICD-10: Treatment Diagnosis: difficulty walking, not elsewhere classified R26.2  Precautions/Allergies:   Adhesive tape-silicones; Ceclor [cefaclor]; and Symbyax [olanzapine-fluoxetine]   Treatment Plan of Care Effective Dates: 8/15/19 TO 19    Pre-treatment Symptoms/Complaints:  Mrs. Ziegler Shadow notes she was able to walk for further distances this weekend, however continues to be challenged with her breathing. Pain: Initial: Pain Intensity 1: 2  Pain Location 1: Knee  Pain Orientation 1: Left  Post Session:  1/10   Medications Last Reviewed: 10/16/2019    Updated Objective Findings:  left knee AROM 3-130 degrees, poor activation of diaphragmatic breathing, demonstrates chest breathing. Challenged with left knee extension in weight bearing. Continues to be challenged with endurance   TREATMENT:     THERAPEUTIC EXERCISE: (30 minutes):  Exercises per grid below to improve mobility, strength, balance and coordination. Required moderate verbal and manual cues to promote proper body alignment, promote proper body posture, promote proper body mechanics and promote proper body breathing techniques. Progressed resistance, range, repetitions and complexity of movement as indicated. Date:  10/16/2019   Activity/Exercise Parameters   Supine heel slides X 10 reps, 5 sec holds   Supine short arc quad    Sit to stand transfers X 10 reps.     Supine straight leg raise X 10 reps, 10 sec holds   Seated hip hinge/weight acceptance X 5 reps BLE   Nu-step    Seated knee extension    Standing terminal knee extension X 10 reps, BLE 5 sec holds, blue t-band   Standing lateral steps X 10 reps, 5' distance with green t-band   Therapeutic Neuroscience education    Standing hip extension X 30 reps BLE   Standing hip abduction X 30 reps BLE   Standing weight shift forward    Supine UE/LE abdominal brace    Standing functional squat X 30 reps,   Prone knee hang left    Prone knee extension/quad sets X 25 reps, 5 sec holds   Supine quad set    HEP review X 5 minute review   Step up X 30 taps BLE  X 30 step ups 6\" BLE   Standing marching on airex X 1 minute     Standing functional squats X 15 reps   Standing calf stretches X 5 reps, 25 sec holds BLE on board   Supine diaphragmatic breathing X  5 minutes education and x 10 reps,   Supine 90/90 hip flexion abdominal brace X 5 reps, 5 sec holds   Supine opposite UE/LE flexion and extension with abdominal bracing X 10 reps bilaterally      MANUAL THERAPY: (25 minutes): Joint mobilization and Soft tissue mobilization was utilized and necessary because of the patient's restricted joint motion and restricted motion of soft tissue. (Used abbreviations: MET - muscle energy technique; PNF - proprioceptive neuromuscular facilitation; NMR - neuromuscular re-education; a/p - anterior to posterior; p/a - posterior to anterior, FMP - functional movement patterns)  · Supine left knee soft tissue mobilization to anterior knee and patella mobilization in all directions. · Supine left hamstrings soft tissue mobilization with FMP of knee extension  · Supine left anterior quadriceps and iliopsoas with FMP of knee flexion/extension, hip flexion. · Supine left femur on tibia and tibia on femur posterior [facility] Portal  Treatment/Session Summary:    · Response to Treatment:  continues to need rest break between each weight bearing exercise. improved left knee extension at end of session to only lack 1 degree.    · Communication/Consultation:  None today  · Equipment provided today:  None today  · Recommendations/Intent for next treatment session: Next visit will focus on LE strength, endurance and balance. Total Treatment Billable Duration:  55 minutes.    PT Patient Time In/Time Out  Time In: 1630  Time Out: MANUEL Vuong    Future Appointments   Date Time Provider Uzair Pineda   10/23/2019  3:30 PM Cathleen Marcelo, PT [facility]   10/30/2019  3:30 PM Rajat FRANCO, PT OFF [facility]   11/14/2019  4:00 PM PP SLEEP MICHELE [facility] PSCD PP   12/9/2019  2:00 PM Alyson Ritter MD [facility] NISHANT NISHANT   1/14/2020  3:40 PM Zaki Awan MD END BS ENDO   3/17/2020  1:15 PM Sharyle Postin Bruce Median, MD [facility]

## 2019-10-23 ENCOUNTER — HOSPITAL ENCOUNTER (OUTPATIENT)
Dept: PHYSICAL THERAPY | Age: 69
Discharge: HOME | End: 2019-10-23
Payer: MEDICARE

## 2019-10-23 PROCEDURE — 97110 THERAPEUTIC EXERCISES: CPT

## 2019-10-23 PROCEDURE — 97140 MANUAL THERAPY 1/> REGIONS: CPT

## 2019-10-24 NOTE — PROGRESS NOTES
Kae Yang  : 1950  [place]  [place]: [place] [address] [place] [address]  [phone]   [phone]        OUTPATIENT PHYSICAL THERAPY:Progress Report 10/23/2019   ICD-10: Treatment Diagnosis: Pain in joint, left knee M25.562  ICD-10: Treatment Diagnosis: pain in joint, right knee M25.561  ICD-10: Treatment Diagnosis: difficulty walking, not elsewhere classified R26.2  Precautions/Allergies:   Ciprofloxacin; Adhesive tape-silicones; Ceclor [cefaclor]; and Symbyax [olanzapine-fluoxetine]   MD Orders: evaluate and treat MEDICAL/REFERRING DIAGNOSIS:  Knee pain [M25.569]   DATE OF ONSET: surgery 19  REFERRING PHYSICIAN: Mercy Newman MD  RETURN PHYSICIAN APPOINTMENT: as needed   PROGRESS NOTE: 10/23/19: Ms. Hebert Cohen has attended 28 physical therapy sessions since 2019. She continues to struggle with her overall endurance and strength especially in weight bearing. She continues to demonstrate high levels of fatigue with cardiovascular activities. She has improved her left knee ROM, however continues to be challenged with gait without an assistive device and endurance with daily activities. She would benefit from continued therapy to continue to improve overall mobility, strength and endurance. Plan to progress toward discharge and enroll patient in a gym program.       RE-CERTIFICATION: : Ms. Hebert Cohen has attended 19 physical therapy sessions and continues to demonstrate challenged with her left knee extension. She has improved her left knee flexion to 125 degrees. She continues to demonstrate weakness in her LE, especially in gluteals, quadriceps and core stabilizers. She continues to ambulate with a limp and needs to use her cane for assistance. She is challenged with obtaining terminal knee extension.  She would benefit from continued physical therapy for strength and endurance training, gait and balance training and obtaining terminal knee extension in left knee. PROGRESS NOTE: 6/24/19: Ms. Adiel Rees has attended 10 physical therapy sessions, she continues to present with challenged with gait and prolonged weight bearing activities. Overall strength is improving, however continues to demonstrate strength deficits in left quadriceps and gluteals. She has improved with sit to stand transfers, continues to struggle with ambulation for duration and terrain surfaces. She would benefit from continued skilled physical therapy to improve overall mobility and function. INITIAL ASSESSMENT:  Ms. Adiel Rees is a 81 y.o. female who presents to physical therapy s/p left TKA on 4/17/19, she underwent 3 weeks of home health physical therapy and continues to be challenged with ambulation, sit to stand transfers, especially out of her car. She also started experiencing right anterior knee pain in March 2019, notes tightness across anterior knee and notes clicking in her right knee. Most discomfort in right knee noted with performing sit to stand transfers. She presents with LE weakness, challenged with transfers, gait and balance. She will benefit from skilled physical therapy to improve her overall mobility and function with daily tasks. PROBLEM LIST (Impacting functional limitations):  1. Decreased Strength  2. Decreased ADL/Functional Activities  3. Decreased Transfer Abilities  4. Decreased Ambulation Ability/Technique  5. Decreased Balance  6. Increased Pain  7. Decreased Activity Tolerance  8. Increased Fatigue  9. Decreased Flexibility/Joint Mobility INTERVENTIONS PLANNED: (Treatment may consist of any combination of the following)  1. Balance Exercise  2. Bed Mobility  3. Cold  4. Gait Training  5. Heat  6. Home Exercise Program (HEP)  7. Manual Therapy  8. Neuromuscular Re-education/Strengthening  9. Range of Motion (ROM)  10. Therapeutic Activites  11.  Therapeutic Exercise/Strengthening   TREATMENT PLAN:  Effective Dates: 8/15/19 TO 11/13/2019 (90 days). Frequency/Duration: 1 times a week for 90 Day(s)  GOALS: (Goals have been discussed and agreed upon with patient.)  Short-Term Functional Goals: Time Frame: 4 weeks  1. Patient demonstrates independence with her HEP without verbal cueing from therapist. GOAL MET  2. Patient demonstrates improve left knee ROM 2-120 degrees to perform ADLs. GOAL MET  3. Patient able to ambulate for 10 minutes without onset of bilateral knee pain ONGOING  Discharge Goals: Time Frame: 90 days  1. Patient demonstrates functional AROM of left knee 0-125 to perform ADLs - ONGOING, flexion GOAL MET  2. Patient demonstrates ability to ambulate for 30 minutes to perform community ambulation. - ONGOING  3. Patient demonstrates ability to perform sit to stand transfers from various surfaces without onset of bilateral knee pain. GOAL MET  4. Improve LEFS outcome measure score from 28/80 to 60/80 to perform ADLs - ONGOING    OUTCOME MEASURE:   Tool Used: Lower Extremity Functional Scale (LEFS)  Score:  Initial: 28/80 Most Recent: 40/80 (Date: 6-25-19)                       46/80 (Date: 8-15-19)   Interpretation of Score: 20 questions each scored on a 5 point scale with 0 representing \"extreme difficulty or unable to perform\" and 4 representing \"no difficulty\". The lower the score, the greater the functional disability. 80/80 represents no disability. Minimal detectable change is 9 points. MEDICAL NECESSITY:   · Patient is expected to demonstrate progress in strength, range of motion, balance, coordination and functional technique to increase independence with sit to stand transfers, ambulation and weight bearing activities .   REASON FOR SERVICES/OTHER COMMENTS:  · Patient continues to require skilled intervention due to continues to be challenged with obtaining left LE terminal knee extension, continues to ambulate with limp and challenged with remaining strength and endurance deficits . Umu Tucker Total Duration:  PT Patient Time In/Time Out  Time In: 1535  Time Out: 2124    Rehabilitation Potential For Stated Goals: Excellent  Regarding Temo Jay's therapy, I certify that the treatment plan above will be carried out by a therapist or under their direction. Thank you for this referral,  Yunielsudhir Stephensonmikael, PT          PAIN/SUBJECTIVE:   Initial: Pain Intensity 1: 2  Pain Location 1: Knee  Pain Orientation 1: Left    Post Session:  2/10   HISTORY:      Current Medications:       Current Outpatient Medications:     apixaban (ELIQUIS) 5 mg tablet, Take 1 Tab by mouth two (2) times a day., Disp: 180 Tab, Rfl: 3    montelukast (SINGULAIR) 10 mg tablet, Take 1 Tab by mouth daily. , Disp: 90 Tab, Rfl: 4    buPROPion (WELLBUTRIN) 75 mg tablet, Take 1 Tab by mouth two (2) times a day., Disp: 180 Tab, Rfl: 4    amLODIPine (NORVASC) 2.5 mg tablet, Take 1 Tab by mouth daily. , Disp: 90 Tab, Rfl: 3    SYNTHROID 150 mcg tablet, 1 tablet once a day except for 1/2 tablet on Sundays, Disp: 90 Tab, Rfl: 3    acetaminophen (TYLENOL) 325 mg cap, Take  by mouth., Disp: , Rfl:     PROLENSA 0.07 % ophthalmic solution, , Disp: , Rfl: 1    mometasone (NASONEX) 50 mcg/actuation nasal spray, 2 Sprays by Both Nostrils route daily. , Disp: 1 Container, Rfl: 11    fluticasone propion-salmeterol (ADVAIR DISKUS) 250-50 mcg/dose diskus inhaler, Take 1 Puff by inhalation two (2) times a day. RINSE MOUTH WELL AFTER USE, Disp: 1 Inhaler, Rfl: 11    dofetilide (TIKOSYN) 500 mcg capsule, Take 1 Cap by mouth every twelve (12) hours every twelve (12) hours. (Patient taking differently: Take 500 mcg by mouth every twelve (12) hours every twelve (12) hours. Patient takes 1 tablet at 0800 and 1 tablet at 2000  ), Disp: 60 Cap, Rfl: 11    losartan (COZAAR) 100 mg tablet, Take 1 Tab by mouth daily. , Disp: 30 Tab, Rfl: 11    digestive enzymes, plant, (FIBERZYME CONCENTRATE-HP PO), Take 625 mg by mouth daily. , Disp: , Rfl:     albuterol (VENTOLIN HFA) 90 mcg/actuation inhaler, Take 2 Puffs by inhalation every six (6) hours as needed for Wheezing., Disp: 1 Inhaler, Rfl: 11    Cetirizine (ZYRTEC) 10 mg cap, Take 10 Caps by mouth daily. , Disp: , Rfl:     cpap machine kit, by Does Not Apply route., Disp: , Rfl:    Date Last Reviewed:  10/23/2019   UPDATED OBJECTIVE FINDINGS:     Observation/Orthostatic Postural Assessment:           Lower extremity weight bearing is symmetrical. Observation of gait indicates decreased terminal knee extension in left knee. Patient exhibits a increased lumbar lordosis. Palpation of lower quadrant bony landmarks are left iliac crest elevated. Palpation: Patient exhibits tenderness to palpation/temperature/crepitance/scar mobility/soft tissue mobility/myofasical to bilateral anterior knees, left greater than right. Improving 10/23/2019  Hamstring flexibility tested supine with straight leg raise is restricted left greater than right. Improving 10/23/2019    ROM:     AROM(PROM) Right 10/23/19 Left 10/23/19   Knee flexion 0-125 1-130 (0-130)   Knee extension 0 Lacks 1   Hip flexion 90 90   Hip external rotation (ER) 20 20   Hip internal rotation (IR) 20 20   Hip extension 5 5   Hip abduction 45 45     Strength:     Manual Muscle Test (*/5) Right Left   Knee extension 4+ 4   Knee flexion 4+ 4+   Hip flexion 4+ 4   Hip ER 4 4+   Hip IR 4 4+   Hip extension 4 4   Hip abduction 4 4   Hip adduction 5 5   Ankle DF 5 5   Ankle PF 5 5   Core stability 4/5     Functional strength with standing heel raise is 4.

## 2019-10-24 NOTE — PROGRESS NOTES
Geneva Pena  : 1950  [place]  [place]: [place] [address] at [place] [address], [address] [facility]  [phone]   [phone]      OUTPATIENT PHYSICAL THERAPY: Daily Treatment Note 10/23/2019   Visit Count: 28      ICD-10: Treatment Diagnosis: Pain in joint, left knee M25.562  ICD-10: Treatment Diagnosis: pain in joint, right knee M25.561  ICD-10: Treatment Diagnosis: difficulty walking, not elsewhere classified R26.2  Precautions/Allergies:   Adhesive tape-silicones; Ceclor [cefaclor]; and Symbyax [olanzapine-fluoxetine]   Treatment Plan of Care Effective Dates: 8/15/19 TO 19    Pre-treatment Symptoms/Complaints:  Mrs. Augusto Patel continues to note fatigue with prolonged weight bearing activities, trying to walk short distances without her cane. Pain: Initial: Pain Intensity 1: 2  Pain Location 1: Knee  Pain Orientation 1: Left  Post Session:  1/10   Medications Last Reviewed: 10/23/2019    Updated Objective Findings:  left knee AROM 1-130 degrees, poor activation of diaphragmatic breathing, demonstrates chest breathing. Challenged with left knee extension in weight bearing. Continues to be challenged with endurance   TREATMENT:     THERAPEUTIC EXERCISE: (30 minutes):  Exercises per grid below to improve mobility, strength, balance and coordination. Required moderate verbal and manual cues to promote proper body alignment, promote proper body posture, promote proper body mechanics and promote proper body breathing techniques. Progressed resistance, range, repetitions and complexity of movement as indicated. Date:  10/23/2019   Activity/Exercise Parameters   Supine heel slides X 10 reps, 5 sec holds   Supine short arc quad    Sit to stand transfers X 10 reps.     Supine straight leg raise X 10 reps, 10 sec holds   Seated hip hinge/weight acceptance X 5 reps BLE   Nu-step    Seated knee extension    Standing terminal knee extension X 10 reps, BLE 5 sec holds, blue t-band   Standing lateral steps X 10 reps, 5' distance with green t-band   Therapeutic Neuroscience education    Standing hip extension X 30 reps BLE   Standing hip abduction X 30 reps BLE   Standing weight shift forward    Supine UE/LE abdominal brace    Standing functional squat X 30 reps,   Prone knee hang left    Prone knee extension/quad sets X 25 reps, 5 sec holds   Supine quad set    HEP review X 5 minute review   Step up X 30 taps BLE  X 30 step ups 6\" BLE   Standing marching on airex X 1 minute     Standing functional squats X 15 reps   Standing calf stretches X 5 reps, 25 sec holds BLE on board   Supine diaphragmatic breathing X  5 minutes education and x 10 reps,   Supine 90/90 hip flexion abdominal brace X 5 reps, 5 sec holds   Supine opposite UE/LE flexion and extension with abdominal bracing X 10 reps bilaterally    Standing hip flexion/abduction/extension X 15 reps, BLE         MANUAL THERAPY: (25 minutes): Joint mobilization and Soft tissue mobilization was utilized and necessary because of the patient's restricted joint motion and restricted motion of soft tissue. (Used abbreviations: MET - muscle energy technique; PNF - proprioceptive neuromuscular facilitation; NMR - neuromuscular re-education; a/p - anterior to posterior; p/a - posterior to anterior, FMP - functional movement patterns)  · Supine left knee soft tissue mobilization to anterior knee and patella mobilization in all directions. · Supine left hamstrings soft tissue mobilization with FMP of knee extension  · Supine left anterior quadriceps and iliopsoas with FMP of knee flexion/extension, hip flexion.   · Supine left femur on tibia and tibia on femur posterior [facility]  Treatment/Session Summary:    · Response to Treatment:  continues to struggle with endurance and fatigue with weight bearing exercises, improvements noted with breathing  · Communication/Consultation:  None today  · Equipment provided today:  None today  · Recommendations/Intent for next treatment session: Next visit will focus on LE strength, endurance and balance. Total Treatment Billable Duration:  55 minutes.    PT Patient Time In/Time Out  Time In: 9260  Time Out: [facility], PT    Future Appointments   Date Time Provider Uzair Pineda   10/30/2019  3:30 PM Matthew Baltazar, PT [facility]   11/14/2019  4:00 PM PP SLEEP MICHELE [facility] PSCD PP   12/9/2019  2:00 PM Mila Valenzuela MD [facility] NISHANT NISHANT   1/14/2020  3:40 PM Lore Santiago MD [facility] BS ENDO   3/17/2020  1:15 PM Sveta Callahan MD [address]

## 2019-10-30 ENCOUNTER — HOSPITAL ENCOUNTER (OUTPATIENT)
Dept: PHYSICAL THERAPY | Age: 69
Discharge: HOME | End: 2019-10-30
Payer: MEDICARE

## 2019-10-30 PROCEDURE — 97140 MANUAL THERAPY 1/> REGIONS: CPT

## 2019-10-30 PROCEDURE — 97110 THERAPEUTIC EXERCISES: CPT

## 2019-11-06 ENCOUNTER — HOSPITAL ENCOUNTER (OUTPATIENT)
Dept: PHYSICAL THERAPY | Age: 69
Discharge: HOME | End: 2019-11-06
Payer: MEDICARE

## 2019-11-06 PROCEDURE — 97110 THERAPEUTIC EXERCISES: CPT

## 2019-11-06 PROCEDURE — 97140 MANUAL THERAPY 1/> REGIONS: CPT

## 2019-11-07 NOTE — PROGRESS NOTES
Ana Paula Linton  : 1950  [place]  [place]: [place] [address] at [facility], [address]  [phone]   [phone]      OUTPATIENT PHYSICAL THERAPY: Daily Treatment Note 2019   Visit Count: 30      ICD-10: Treatment Diagnosis: Pain in joint, left knee M25.562  ICD-10: Treatment Diagnosis: pain in joint, right knee M25.561  ICD-10: Treatment Diagnosis: difficulty walking, not elsewhere classified R26.2  Precautions/Allergies:   Adhesive tape-silicones; Ceclor [cefaclor]; and Symbyax [olanzapine-fluoxetine]   Treatment Plan of Care Effective Dates: 8/15/19 TO 19    Pre-treatment Symptoms/Complaints:  Mrs. Jordan Munoz notes she is using her cane more frequently, continues to be challenged with her endurance with ambulation. Notes aches in both knees after standing for extended time at a .     Pain: Initial: Pain Intensity 1: 2  Pain Location 1: Knee  Pain Orientation 1: Left  Post Session:  1/10   Medications Last Reviewed: 2019    Updated Objective Findings:  left knee AROM 1-130 degrees, poor activation of diaphragmatic breathing, demonstrates chest breathing. Challenged with left knee extension in weight bearing. Continues to be challenged with endurance   TREATMENT:     THERAPEUTIC EXERCISE: (40 minutes):  Exercises per grid below to improve mobility, strength, balance and coordination. Required moderate verbal and manual cues to promote proper body alignment, promote proper body posture, promote proper body mechanics and promote proper body breathing techniques. Progressed resistance, range, repetitions and complexity of movement as indicated. Date:  2019   Activity/Exercise Parameters   Supine heel slides    Supine short arc quad    Sit to stand transfers X 10 reps.     Supine straight leg raise X 10 reps, 10 sec holds   Seated hip hinge/weight acceptance X 5 reps BLE Nu-step    Seated knee extension    Standing terminal knee extension X 10 reps, BLE 5 sec holds, blue t-band   Standing lateral steps X 10 reps, 5' distance with green t-band   Therapeutic Neuroscience education    Standing hip extension X 30 reps BLE   Standing hip abduction X 30 reps BLE   Standing weight shift forward    Supine UE/LE abdominal brace    Standing functional squat X 30 reps,   Prone knee hang left    Prone knee extension/quad sets X 25 reps, 5 sec holds   Supine quad set    HEP review X 5 minute review   Step up X 30 taps BLE  X 30 step ups 6\" BLE   Standing marching on airex X 1 minute     Standing functional squats X 15 reps   Standing calf stretches X 5 reps, 25 sec holds BLE on board   Supine diaphragmatic breathing x 10 reps,   Supine 90/90 hip flexion abdominal brace X 5 reps, 5 sec holds   Supine opposite UE/LE flexion and extension with abdominal bracing X 10 reps bilaterally    Standing hip flexion/abduction/extension X 15 reps, BLE   Gym machines      MANUAL THERAPY: (15 minutes): Joint mobilization and Soft tissue mobilization was utilized and necessary because of the patient's restricted joint motion and restricted motion of soft tissue. (Used abbreviations: MET - muscle energy technique; PNF - proprioceptive neuromuscular facilitation; NMR - neuromuscular re-education; a/p - anterior to posterior; p/a - posterior to anterior, FMP - functional movement patterns)  · Supine left knee soft tissue mobilization to anterior knee and patella mobilization in all directions. · Supine left hamstrings soft tissue mobilization with FMP of knee extension, repeated on right side  · Supine left anterior quadriceps and iliopsoas with FMP of knee flexion/extension, hip flexion. [facility]  Treatment/Session Summary:    · Response to Treatment:  continues to be challenged with weight bearing activities, continues to need rest breaks in between exercises.    · Communication/Consultation:  None today  · Equipment provided today:  None today  · Recommendations/Intent for next treatment session: Next visit will focus on LE strength, endurance and balance in weight bearing, continue to address gait deficits. Total Treatment Billable Duration:  55 minutes.    PT Patient Time In/Time Out  Time In: 1530  Time Out: [address] [facility], PT    Future Appointments   Date Time Provider Uzair Miriam   11/13/2019  4:30 PM Hymera Flank., PT [facility]   11/14/2019  4:00 PM PP SLEEP MICHELE [facility] PSCD PP   11/20/2019  4:30 PM Hymera Flank., PT [facility]   11/26/2019  4:30 PM Hymera Flank., PT [facility]   12/9/2019  2:00 PM Alyson Ritter MD [facility] NISHANT NISHANT   1/14/2020  3:40 PM Zaki Awan MD [facility] BS ENDO   3/17/2020  1:15 PM Belinda Alba MD [facility]G UCD

## 2019-11-13 ENCOUNTER — HOSPITAL ENCOUNTER (OUTPATIENT)
Dept: PHYSICAL THERAPY | Age: 69
Discharge: HOME | End: 2019-11-13
Payer: MEDICARE

## 2019-11-13 PROCEDURE — 97140 MANUAL THERAPY 1/> REGIONS: CPT

## 2019-11-13 PROCEDURE — 97110 THERAPEUTIC EXERCISES: CPT

## 2019-11-14 NOTE — THERAPY RECERTIFICATION
Analisa Conde  : 1950  [place]  [place] [address] at [place] [address], [address] [facility]  [phone]   [phone]        OUTPATIENT PHYSICAL THERAPY:Recertification    ICD-10: Treatment Diagnosis: Pain in joint, left knee M25.562  ICD-10: Treatment Diagnosis: pain in joint, right knee M25.561  ICD-10: Treatment Diagnosis: difficulty walking, not elsewhere classified R26.2  Precautions/Allergies:   Ciprofloxacin; Adhesive tape-silicones; Ceclor [cefaclor]; and Symbyax [olanzapine-fluoxetine]   MD Orders: evaluate and treat MEDICAL/REFERRING DIAGNOSIS:  Knee pain [M25.569]   DATE OF ONSET: surgery 19  REFERRING PHYSICIAN: Jessica Mayorga MD  RETURN PHYSICIAN APPOINTMENT: as needed     RE-CERTIFICATION: :  Analisa Conde has attended 31 physical therapy sessions post-left knee TKA. Ms. Pia Smith has been challenged since her surgery with regaining endurance and strength. She is challenged with her breathing, intermittent challenges with her her A-fib and limited with amount of time she is able to tolerate a weight bearing position. Therapy has focused on improving overall endurance and strength in weight bearing, however she continues to need constant rest breaks and guidance to perform exercises correctly. We are working towards progressing her to a gym program, however due to her limitations in endurance, the gym program will yet to accept her into the program. We will work in therapy over the next 60 days to improve overall endurance and strength to progress her to the gym program and her independent home program.      RE-CERTIFICATION: : Ms. Pia Smith has attended 19 physical therapy sessions and continues to demonstrate challenged with her left knee extension. She has improved her left knee flexion to 125 degrees.  She continues to demonstrate weakness in her LE, especially in gluteals, quadriceps and core stabilizers. She continues to ambulate with a limp and needs to use her cane for assistance. She is challenged with obtaining terminal knee extension. She would benefit from continued physical therapy for strength and endurance training, gait and balance training and obtaining terminal knee extension in left knee. PROGRESS NOTE: 6/24/19: Ms. Anali Reis has attended 10 physical therapy sessions, she continues to present with challenged with gait and prolonged weight bearing activities. Overall strength is improving, however continues to demonstrate strength deficits in left quadriceps and gluteals. She has improved with sit to stand transfers, continues to struggle with ambulation for duration and terrain surfaces. She would benefit from continued skilled physical therapy to improve overall mobility and function. INITIAL ASSESSMENT:  Ms. Anali Reis is a 81 y.o. female who presents to physical therapy s/p left TKA on 4/17/19, she underwent 3 weeks of home health physical therapy and continues to be challenged with ambulation, sit to stand transfers, especially out of her car. She also started experiencing right anterior knee pain in March 2019, notes tightness across anterior knee and notes clicking in her right knee. Most discomfort in right knee noted with performing sit to stand transfers. She presents with LE weakness, challenged with transfers, gait and balance. She will benefit from skilled physical therapy to improve her overall mobility and function with daily tasks. PROBLEM LIST (Impacting functional limitations):  1. Decreased Strength  2. Decreased ADL/Functional Activities  3. Decreased Transfer Abilities  4. Decreased Ambulation Ability/Technique  5. Decreased Balance  6. Increased Pain  7. Decreased Activity Tolerance  8. Increased Fatigue  9.  Decreased Flexibility/Joint Mobility INTERVENTIONS PLANNED: (Treatment may consist of any combination of the following)  1. Balance Exercise  2. Bed Mobility  3. Cold  4. Gait Training  5. Heat  6. Home Exercise Program (HEP)  7. Manual Therapy  8. Neuromuscular Re-education/Strengthening  9. Range of Motion (ROM)  10. Therapeutic Activites  11. Therapeutic Exercise/Strengthening   TREATMENT PLAN:  Effective Dates: 11/13/2019 TO 2/11/2020  (90 days). Frequency/Duration: 1 times a week for 90 Day(s)  GOALS: (Goals have been discussed and agreed upon with patient.)  Short-Term Functional Goals: Time Frame: 4 weeks  1. Patient demonstrates independence with her HEP without verbal cueing from therapist. GOAL MET  2. Patient demonstrates improve left knee ROM 2-120 degrees to perform ADLs. GOAL MET  3. Patient able to ambulate for 10 minutes without onset of bilateral knee pain GOAL MET for no knee pain, however challenged with endurance  Discharge Goals: Time Frame: 90 days  1. Patient demonstrates functional AROM of left knee 0-125 to perform ADLs - ONGOING, flexion GOAL MET  2. Patient demonstrates ability to ambulate for 30 minutes to perform community ambulation. - ONGOING  3. Patient demonstrates ability to perform sit to stand transfers from various surfaces without onset of bilateral knee pain. GOAL MET  4. Improve LEFS outcome measure score from 28/80 to 60/80 to perform ADLs - ONGOING  5. Patient able to perform 30 minute cardio and exercise routine to progress to the PREP gym program - ONGOING    OUTCOME MEASURE:   Tool Used: Lower Extremity Functional Scale (LEFS)  Score:  Initial: 28/80 Most Recent: 40/80 (Date: 6-25-19)                       46/80 (Date: 8-15-19)                       50/80 (Date: 11-13-19)   Interpretation of Score: 20 questions each scored on a 5 point scale with 0 representing \"extreme difficulty or unable to perform\" and 4 representing \"no difficulty\". The lower the score, the greater the functional disability. 80/80 represents no disability.   Minimal detectable change is 9 points. MEDICAL NECESSITY:   · Patient is expected to demonstrate progress in strength, range of motion, balance, coordination and functional technique to increase independence with sit to stand transfers, ambulation and weight bearing activities . REASON FOR SERVICES/OTHER COMMENTS:  · Patient continues to require skilled intervention due to continues to be challenged with obtaining left LE terminal knee extension, continues to ambulate with limp and challenged with remaining strength and endurance deficits . Mane [place] Total Duration:  PT Patient Time In/Time Out  Time In: 1630  Time Out: 1730    Rehabilitation Potential For Stated Goals: Excellent  Regarding Eliz Jay's therapy, I certify that the treatment plan above will be carried out by a therapist or under their direction. Thank you for this referral,  Derrick Hernandez, PT          PAIN/SUBJECTIVE:   Initial: Pain Intensity 1: 3  Pain Location 1: Knee  Pain Orientation 1: Left    Post Session:  2/10   HISTORY:      Current Medications:       Current Outpatient Medications:     apixaban (ELIQUIS) 5 mg tablet, Take 1 Tab by mouth two (2) times a day., Disp: 180 Tab, Rfl: 3    montelukast (SINGULAIR) 10 mg tablet, Take 1 Tab by mouth daily. , Disp: 90 Tab, Rfl: 4    buPROPion (WELLBUTRIN) 75 mg tablet, Take 1 Tab by mouth two (2) times a day., Disp: 180 Tab, Rfl: 4    amLODIPine (NORVASC) 2.5 mg tablet, Take 1 Tab by mouth daily. , Disp: 90 Tab, Rfl: 3    SYNTHROID 150 mcg tablet, 1 tablet once a day except for 1/2 tablet on Sundays, Disp: 90 Tab, Rfl: 3    acetaminophen (TYLENOL) 325 mg cap, Take  by mouth., Disp: , Rfl:     PROLENSA 0.07 % ophthalmic solution, , Disp: , Rfl: 1    mometasone (NASONEX) 50 mcg/actuation nasal spray, 2 Sprays by Both Nostrils route daily. , Disp: 1 Container, Rfl: 11    fluticasone propion-salmeterol (ADVAIR DISKUS) 250-50 mcg/dose diskus inhaler, Take 1 Puff by inhalation two (2) times a day.  RINSE MOUTH WELL AFTER USE, Disp: 1 Inhaler, Rfl: 11    dofetilide (TIKOSYN) 500 mcg capsule, Take 1 Cap by mouth every twelve (12) hours every twelve (12) hours. (Patient taking differently: Take 500 mcg by mouth every twelve (12) hours every twelve (12) hours. Patient takes 1 tablet at 0800 and 1 tablet at 2000  ), Disp: 60 Cap, Rfl: 11    losartan (COZAAR) 100 mg tablet, Take 1 Tab by mouth daily. , Disp: 30 Tab, Rfl: 11    digestive enzymes, plant, (FIBERZYME CONCENTRATE-HP PO), Take 625 mg by mouth daily. , Disp: , Rfl:     albuterol (VENTOLIN HFA) 90 mcg/actuation inhaler, Take 2 Puffs by inhalation every six (6) hours as needed for Wheezing., Disp: 1 Inhaler, Rfl: 11    Cetirizine (ZYRTEC) 10 mg cap, Take 10 Caps by mouth daily. , Disp: , Rfl:     cpap machine kit, by Does Not Apply route., Disp: , Rfl:    Date Last Reviewed:  11/13/2019   UPDATED OBJECTIVE FINDINGS:     Observation/Orthostatic Postural Assessment:           Lower extremity weight bearing is symmetrical. Observation of gait indicates decreased terminal knee extension in left knee. Patient exhibits a increased lumbar lordosis. Palpation of lower quadrant bony landmarks are left iliac crest elevated. Palpation: Patient exhibits tenderness to palpation/temperature/crepitance/scar mobility/soft tissue mobility/myofasical to bilateral anterior knees, left greater than right. Improving 11/13/2019  Hamstring flexibility tested supine with straight leg raise is restricted left greater than right. Improving 11/13/2019    ROM:     AROM(PROM) Right 11/13/19 Left 11/13/19   Knee flexion 0-127 2-127 (1-120)   Knee extension 0 Lacks 2   Hip flexion 90 90   Hip external rotation (ER) 20 20   Hip internal rotation (IR) 20 20   Hip extension 5 5   Hip abduction 45 45     Strength:   Challenged with endurance of all weight bearing activities, constant rest breaks needed.  Addressing breathing techniques, patient is a chest breather vs efficient diaphragmatic breathing Manual Muscle Test (*/5) Right Left   Knee extension 4+ 4   Knee flexion 4+ 4+   Hip flexion 4+ 4+   Hip ER 4 4+   Hip IR 4 4+   Hip extension 4 4   Hip abduction 4 4   Hip adduction 5 5   Ankle DF 5 5   Ankle PF 5 5   Core stability 4/5     Functional strength with standing heel raise is 4.

## 2019-11-18 ENCOUNTER — APPOINTMENT (RX ONLY)
Dept: URBAN - METROPOLITAN AREA CLINIC 24 | Facility: CLINIC | Age: 69
Setting detail: DERMATOLOGY
End: 2019-11-18

## 2019-11-18 DIAGNOSIS — B07.8 OTHER VIRAL WARTS: ICD-10-CM

## 2019-11-18 DIAGNOSIS — L57.8 OTHER SKIN CHANGES DUE TO CHRONIC EXPOSURE TO NONIONIZING RADIATION: ICD-10-CM

## 2019-11-18 DIAGNOSIS — L81.4 OTHER MELANIN HYPERPIGMENTATION: ICD-10-CM

## 2019-11-18 DIAGNOSIS — L82.1 OTHER SEBORRHEIC KERATOSIS: ICD-10-CM

## 2019-11-18 PROCEDURE — ? LIQUID NITROGEN

## 2019-11-18 PROCEDURE — 99214 OFFICE O/P EST MOD 30 MIN: CPT | Mod: 25

## 2019-11-18 PROCEDURE — ? COUNSELING

## 2019-11-18 PROCEDURE — 17110 DESTRUCTION B9 LES UP TO 14: CPT

## 2019-11-18 ASSESSMENT — LOCATION DETAILED DESCRIPTION DERM
LOCATION DETAILED: LEFT ANTERIOR PROXIMAL THIGH
LOCATION DETAILED: LEFT DISTAL DORSAL FOREARM
LOCATION DETAILED: LEFT SUPERIOR LATERAL UPPER BACK
LOCATION DETAILED: RIGHT POSTERIOR SHOULDER
LOCATION DETAILED: LEFT RIB CAGE
LOCATION DETAILED: RIGHT INFERIOR LATERAL UPPER BACK
LOCATION DETAILED: RIGHT DISTAL DORSAL FOREARM
LOCATION DETAILED: PERIUNGUAL SKIN RIGHT RING FINGER

## 2019-11-18 ASSESSMENT — LOCATION SIMPLE DESCRIPTION DERM
LOCATION SIMPLE: LEFT UPPER BACK
LOCATION SIMPLE: RIGHT SHOULDER
LOCATION SIMPLE: LEFT THIGH
LOCATION SIMPLE: ABDOMEN
LOCATION SIMPLE: LEFT FOREARM
LOCATION SIMPLE: RIGHT RING FINGER
LOCATION SIMPLE: RIGHT FOREARM
LOCATION SIMPLE: RIGHT UPPER BACK

## 2019-11-18 ASSESSMENT — LOCATION ZONE DERM
LOCATION ZONE: LEG
LOCATION ZONE: TRUNK
LOCATION ZONE: FINGER
LOCATION ZONE: ARM

## 2019-11-18 NOTE — PROCEDURE: LIQUID NITROGEN
Number Of Freeze-Thaw Cycles: 1 freeze-thaw cycle
Medical Necessity Clause: Treatment was medically necessary because the spot was
Consent: The patient's verbal consent was obtained including but not limited to risks of crusting, scabbing, blistering, scarring, darker or lighter pigmentary change, recurrence, incomplete removal and infection.
Include Z78.9 (Other Specified Conditions Influencing Health Status) As An Associated Diagnosis?: No
Detail Level: Detailed
Post-Care Instructions: I reviewed with the patient in detail post-care instructions. Patient is to wear sunprotection, and avoid picking at any of the treated lesions. Pt may apply Vaseline to crusted or scabbing areas.
Medical Necessity Information: It is in your best interest to select a reason for this procedure from the list below. All of these items fulfill various CMS LCD requirements except the new and changing color options.

## 2019-11-20 ENCOUNTER — HOSPITAL ENCOUNTER (OUTPATIENT)
Dept: PHYSICAL THERAPY | Age: 69
Discharge: HOME | End: 2019-11-20
Payer: MEDICARE

## 2019-11-20 PROCEDURE — 97140 MANUAL THERAPY 1/> REGIONS: CPT

## 2019-11-20 PROCEDURE — 97110 THERAPEUTIC EXERCISES: CPT

## 2019-11-21 NOTE — PROGRESS NOTES
Lisa Miranda  : 1950  [place]  [place] [address] at [place] [address], [address] [facility]  [phone]   [phone]      OUTPATIENT PHYSICAL THERAPY: Daily Treatment Note 2019   Visit Count: 32      ICD-10: Treatment Diagnosis: Pain in joint, left knee M25.562  ICD-10: Treatment Diagnosis: pain in joint, right knee M25.561  ICD-10: Treatment Diagnosis: difficulty walking, not elsewhere classified R26.2  Precautions/Allergies:   Adhesive tape-silicones; Ceclor [cefaclor]; and Symbyax [olanzapine-fluoxetine]   Treatment Plan of Care Effective Dates: 19 TO 2020      Pre-treatment Symptoms/Complaints:  Mrs. Tia Bee notes trying to walk more without her cane, however continues to be challenged with her endurance. Constantly needs to take seated rest breaks. Pain: Initial: Pain Intensity 1: 3  Pain Location 1: Knee  Pain Orientation 1: Left  Post Session:  1/10   Medications Last Reviewed: 2019    Updated Objective Findings:  left knee AROM 1-130 degrees, poor activation of diaphragmatic breathing, demonstrates chest breathing. Challenged with left knee extension in weight bearing. Continues to be challenged with endurance. Antalgic gait noted and use of assisted device is recommended   TREATMENT:     THERAPEUTIC EXERCISE: (30 minutes):  Exercises per grid below to improve mobility, strength, balance and coordination. Required moderate verbal and manual cues to promote proper body alignment, promote proper body posture, promote proper body mechanics and promote proper body breathing techniques. Progressed resistance, range, repetitions and complexity of movement as indicated. Date:  2019   Activity/Exercise Parameters   Supine heel slides    Supine short arc quad    Sit to stand transfers X 10 reps.     Supine straight leg raise X 10 reps, 10 sec holds   Seated hip hinge/weight acceptance X 5 reps BLE   Nu-step    Seated knee extension    Standing terminal knee extension X 10 reps, BLE 5 sec holds, blue t-band   Standing lateral steps X 10 reps, 5' distance with green t-band   Therapeutic Neuroscience education    Standing hip extension X 30 reps BLE   Standing hip abduction X 30 reps BLE   Standing weight shift forward    Supine UE/LE abdominal brace    Standing functional squat X 30 reps,   Prone knee hang left    Prone knee extension/quad sets X 25 reps, 5 sec holds   Supine quad set    HEP review X 5 minute review   Step up X 30 taps BLE  X 30 step ups 6\" BLE   Standing marching on airex X 1 minute  X 15 lateral steps  X 15 forward steps     Standing functional squats X 15 reps   Standing calf stretches X 5 reps, 25 sec holds BLE on board   Supine diaphragmatic breathing x 10 reps,   Supine 90/90 hip flexion abdominal brace X 5 reps, 5 sec holds   Supine opposite UE/LE flexion and extension with abdominal bracing X 10 reps bilaterally    Standing hip flexion/abduction/extension X 15 reps, BLE   Gym machines    Step ups X 15 reps BLE     MANUAL THERAPY: (25 minutes): Joint mobilization and Soft tissue mobilization was utilized and necessary because of the patient's restricted joint motion and restricted motion of soft tissue. (Used abbreviations: MET - muscle energy technique; PNF - proprioceptive neuromuscular facilitation; NMR - neuromuscular re-education; a/p - anterior to posterior; p/a - posterior to anterior, FMP - functional movement patterns)  · Supine left knee soft tissue mobilization to anterior knee and patella mobilization in all directions. · Supine left hamstrings soft tissue mobilization with FMP of knee extension, repeated on right side  · Supine left anterior quadriceps and iliopsoas with FMP of knee flexion/extension, hip flexion.     MedBridge Portal  Treatment/Session Summary:    · Response to Treatment:  continues to be challenged with her endurance with weight bearing actvities, overall her left knee ROM and strength have continued to improve  · Communication/Consultation:  None today  · Equipment provided today:  None today  · Recommendations/Intent for next treatment session: Next visit will focus on LE strength, endurance and balance in weight bearing, continue to address gait deficits. Total Treatment Billable Duration:  55 minutes.    PT Patient Time In/Time Out  Time In: 1630  Time Out: MANUEL Vuong    Future Appointments   Date Time Provider Uzair Pineda   11/26/2019  4:30 PM Frank Bowens, PT [facility]   12/9/2019  2:00 PM Denny Leonardo MD [facility]   1/14/2020  3:40 PM Antonina Byrnes MD [facility]   3/17/2020  1:15 PM Swathi Crump MD [id number]

## 2019-11-26 ENCOUNTER — HOSPITAL ENCOUNTER (OUTPATIENT)
Dept: PHYSICAL THERAPY | Age: 69
Discharge: HOME | End: 2019-11-26
Payer: MEDICARE

## 2019-11-26 PROCEDURE — 97110 THERAPEUTIC EXERCISES: CPT

## 2019-11-26 PROCEDURE — 97140 MANUAL THERAPY 1/> REGIONS: CPT

## 2019-11-27 NOTE — PROCEDURE: COUNSELING
Detail Level: Detailed

## 2019-11-27 NOTE — PROGRESS NOTES
Teodoro Mayes  : 1950  [place]  [place] [address] at [place] [address], [address] [facility]  [phone]   [phone]      OUTPATIENT PHYSICAL THERAPY: Daily Treatment Note 2019   Visit Count: 33      ICD-10: Treatment Diagnosis: Pain in joint, left knee M25.562  ICD-10: Treatment Diagnosis: pain in joint, right knee M25.561  ICD-10: Treatment Diagnosis: difficulty walking, not elsewhere classified R26.2  Precautions/Allergies:   Adhesive tape-silicones; Ceclor [cefaclor]; and Symbyax [olanzapine-fluoxetine]   Treatment Plan of Care Effective Dates: 19 TO 2020      Pre-treatment Symptoms/Complaints:  Mrs. Barb Doyle continues to fatigue easily with walking and increased ache in left knee with prolonged standing. Pain: Initial: Pain Intensity 1: 3  Pain Location 1: Knee  Pain Orientation 1: Left  Post Session:  1/10   Medications Last Reviewed: 2019    Updated Objective Findings:  left knee AROM 1-130 degrees, poor activation of diaphragmatic breathing, demonstrates chest breathing. Challenged with left knee extension in weight bearing. Continues to be challenged with endurance. Antalgic gait noted and use of assisted device is recommended   TREATMENT:     THERAPEUTIC EXERCISE: (30 minutes):  Exercises per grid below to improve mobility, strength, balance and coordination. Required moderate verbal and manual cues to promote proper body alignment, promote proper body posture, promote proper body mechanics and promote proper body breathing techniques. Progressed resistance, range, repetitions and complexity of movement as indicated. Date:  2019   Activity/Exercise Parameters   Supine heel slides    Supine short arc quad    Sit to stand transfers X 10 reps.     Supine straight leg raise X 10 reps, 10 sec holds   Seated hip hinge/weight acceptance X 5 reps BLE   Nu-step    Seated knee extension    Standing terminal knee extension X 10 reps, BLE 5 sec holds, blue t-band   Standing lateral steps X 10 reps, 5' distance with green t-band   Therapeutic Neuroscience education    Standing hip extension X 30 reps BLE   Standing hip abduction X 30 reps BLE   Standing weight shift forward    Supine UE/LE abdominal brace    Standing functional squat X 30 reps,   Prone knee hang left    Prone knee extension/quad sets X 25 reps, 5 sec holds   Supine quad set    HEP review X 5 minute review   Step up X 30 taps BLE  X 30 step ups 6\" BLE   Standing marching on airex X 1 minute  X 15 lateral steps  X 15 forward steps     Standing functional squats X 15 reps   Standing calf stretches X 5 reps, 25 sec holds BLE on board   Supine diaphragmatic breathing x 10 reps,   Supine 90/90 hip flexion abdominal brace X 5 reps, 5 sec holds   Supine opposite UE/LE flexion and extension with abdominal bracing X 10 reps bilaterally    Standing hip flexion/abduction/extension X 15 reps, BLE   Gym machines    Step ups X 15 reps BLE     MANUAL THERAPY: (25 minutes): Joint mobilization and Soft tissue mobilization was utilized and necessary because of the patient's restricted joint motion and restricted motion of soft tissue. (Used abbreviations: MET - muscle energy technique; PNF - proprioceptive neuromuscular facilitation; NMR - neuromuscular re-education; a/p - anterior to posterior; p/a - posterior to anterior, FMP - functional movement patterns)  · Supine left knee soft tissue mobilization to anterior knee and patella mobilization in all directions. · Supine left hamstrings soft tissue mobilization with FMP of knee extension, repeated on right side  · Supine left anterior quadriceps and iliopsoas with FMP of knee flexion/extension, hip flexion.   · Side lying right for soft tissue mobilization to her pelvis and lumbar spine with FMP of hip ER and abduction    MedBridge Portal  Treatment/Session Summary:    · Response to Treatment:  has continued to benefit from therapy services 1x a week, continues to present with endurance and strength limitations in weight bearing. her flexed posture continues to limit terminal knee extension. · Communication/Consultation:  None today  · Equipment provided today:  None today  · Recommendations/Intent for next treatment session: Next visit will focus on LE strength, endurance and balance in weight bearing, continue to address gait deficits. Total Treatment Billable Duration:  55 minutes.    PT Patient Time In/Time Out  Time In: 1630  Time Out: MANUEL Vuong    Future Appointments   Date Time Provider Uzair Pineda   12/5/2019  3:30 PM Leelsie Handlevi., PT [facility]   12/9/2019  2:00 PM Ty Tsai MD [facility]   12/12/2019  3:30 PM Leory Handy., PT [facility]   12/17/2019  4:30 PM Leory Handy., PT [facility]   12/26/2019  1:30 PM Leelsie Handy., PT [facility]   12/30/2019  3:30 PM Leelsie Ramirez., PT [facility]   1/14/2020  3:40 PM Ronit Otto MD [facility]   3/17/2020  1:15 PM Michael Lockhart MD [facility]

## 2019-12-05 ENCOUNTER — HOSPITAL ENCOUNTER (OUTPATIENT)
Dept: PHYSICAL THERAPY | Age: 69
Discharge: HOME | End: 2019-12-05
Payer: MEDICARE

## 2019-12-05 PROCEDURE — 97110 THERAPEUTIC EXERCISES: CPT

## 2019-12-05 PROCEDURE — 97140 MANUAL THERAPY 1/> REGIONS: CPT

## 2019-12-06 NOTE — PROGRESS NOTES
Nixon Guzmán  : 1950  [place]  [place]: [place] [address] at [address]  [phone]   [phone]      OUTPATIENT PHYSICAL THERAPY: Daily Treatment Note 2019   Visit Count: 34      ICD-10: Treatment Diagnosis: Pain in joint, left knee M25.562  ICD-10: Treatment Diagnosis: pain in joint, right knee M25.561  ICD-10: Treatment Diagnosis: difficulty walking, not elsewhere classified R26.2  Precautions/Allergies:   Adhesive tape-silicones; Ceclor [cefaclor]; and Symbyax [olanzapine-fluoxetine]   Treatment Plan of Care Effective Dates: 19 TO 2020      Pre-treatment Symptoms/Complaints:  Mrs. Toni Garcia continues to be challenged with endurance with daily activities, has also noted increased tightness in her left knee again. Pain: Initial: Pain Intensity 1: 3  Pain Location 1: Knee  Pain Orientation 1: Left  Post Session:  1/10   Medications Last Reviewed: 2019    Updated Objective Findings:  left knee AROM 1-130 degrees, poor activation of diaphragmatic breathing, demonstrates chest breathing. Challenged with left knee extension in weight bearing. Continues to be challenged with endurance. Antalgic gait noted and use of assisted device is recommended   TREATMENT:     THERAPEUTIC EXERCISE: (30 minutes):  Exercises per grid below to improve mobility, strength, balance and coordination. Required moderate verbal and manual cues to promote proper body alignment, promote proper body posture, promote proper body mechanics and promote proper body breathing techniques. Progressed resistance, range, repetitions and complexity of movement as indicated. Date:  2019   Activity/Exercise Parameters   Sit to stand transfers X 10 reps.     Supine straight leg raise X 10 reps, 10 sec holds   Seated hip hinge/weight acceptance X 5 reps BLE   Standing terminal knee extension X 10 reps, BLE 5 sec holds, blue t-band   Standing lateral steps X 10 reps, 5' distance with green t-band   Standing hip extension X 30 reps BLE   Standing hip abduction X 30 reps BLE   Standing weight shift forward X 15 reps onto foam BLE   Supine UE/LE abdominal brace X 5 reps BLE   Standing functional squat X 30 reps,   Prone knee extension/quad sets X 25 reps, 5 sec holds   Supine quad set    HEP review X 5 minute review   Step up X 30 taps BLE  X 30 step ups 6\" BLE   Standing marching on airex X 1 minute  X 15 lateral steps  X 15 forward steps     Standing functional squats X 15 reps   Standing calf stretches X 5 reps, 25 sec holds BLE on board   Supine diaphragmatic breathing x 10 reps,   Supine 90/90 hip flexion abdominal brace X 5 reps, 5 sec holds   Supine opposite UE/LE flexion and extension with abdominal bracing X 10 reps bilaterally    Standing hip flexion/abduction/extension X 15 reps, BLE   Gym machines    Step ups X 15 reps BLE     MANUAL THERAPY: (25 minutes): Joint mobilization and Soft tissue mobilization was utilized and necessary because of the patient's restricted joint motion and restricted motion of soft tissue. (Used abbreviations: MET - muscle energy technique; PNF - proprioceptive neuromuscular facilitation; NMR - neuromuscular re-education; a/p - anterior to posterior; p/a - posterior to anterior, FMP - functional movement patterns)  · Supine left knee soft tissue mobilization to anterior knee and patella mobilization in all directions. · Supine left hamstrings soft tissue mobilization with FMP of knee extension, repeated on right side  · Supine left anterior quadriceps and iliopsoas with FMP of knee flexion/extension, hip flexion.   · Side lying right for soft tissue mobilization to her pelvis and lumbar spine with FMP of hip ER and abduction    [facility] Portal  Treatment/Session Summary:    · Response to Treatment:  improving her overall balance with weight bearing exercises, continued to need to take rest breaks between every 2-3 exercises  · Communication/Consultation:  None today  · Equipment provided today:  None today  · Recommendations/Intent for next treatment session: Next visit will focus on LE strength, endurance and balance in weight bearing, continue to address gait deficits. Total Treatment Billable Duration:  55 minutes.    PT Patient Time In/Time Out  Time In: [facility]  Time Out: 555 [facility], PT    Future Appointments   Date Time Provider Uzair Pineda   12/9/2019  2:00 PM Andre Garcia MD [facility] NISHANT NISHANT   12/12/2019  3:30 PM Sallyanne Passy., PT [facility]   12/17/2019  4:30 PM Sallyanne Passy., PT [facility]   12/26/2019  1:30 PM Sallyanne Passy., PT [facility]   12/30/2019  3:30 PM Sallyanne Passy., PT [facility]   1/14/2020  3:40 PM Daina Edward MD END BS ENDO   3/17/2020  1:15 PM Aileen Meyer MD [facility] UCD

## 2019-12-09 ENCOUNTER — HOSPITAL ENCOUNTER (OUTPATIENT)
Dept: LAB | Age: 69
Discharge: HOME | End: 2019-12-09
Payer: MEDICARE

## 2019-12-09 DIAGNOSIS — E89.0 HYPOTHYROIDISM FOLLOWING RADIOIODINE THERAPY: ICD-10-CM

## 2019-12-09 LAB
T3 SERPL-MCNC: 0.92 NG/ML (ref 0.6–1.81)
T4 FREE SERPL-MCNC: 1.5 NG/DL (ref 0.78–1.46)
TSH W FREE THYROID I,TSHELE: 0.2 UIU/ML (ref 0.36–3.74)

## 2019-12-09 PROCEDURE — 36415 COLL VENOUS BLD VENIPUNCTURE: CPT

## 2019-12-09 PROCEDURE — 84439 ASSAY OF FREE THYROXINE: CPT

## 2019-12-09 PROCEDURE — 84480 ASSAY TRIIODOTHYRONINE (T3): CPT

## 2019-12-09 PROCEDURE — 84443 ASSAY THYROID STIM HORMONE: CPT

## 2019-12-12 ENCOUNTER — HOSPITAL ENCOUNTER (OUTPATIENT)
Dept: PHYSICAL THERAPY | Age: 69
Discharge: HOME | End: 2019-12-12
Payer: MEDICARE

## 2019-12-12 PROCEDURE — 97140 MANUAL THERAPY 1/> REGIONS: CPT

## 2019-12-12 PROCEDURE — 97110 THERAPEUTIC EXERCISES: CPT

## 2019-12-12 NOTE — PROGRESS NOTES
Lisa Miranda  : 1950  [place]  Secondary: [facility] at [place] [address], [address] [facility]  [phone]   [phone]      OUTPATIENT PHYSICAL THERAPY: Daily Treatment Note 2019   Visit Count: 35      ICD-10: Treatment Diagnosis: Pain in joint, left knee M25.562  ICD-10: Treatment Diagnosis: pain in joint, right knee M25.561  ICD-10: Treatment Diagnosis: difficulty walking, not elsewhere classified R26.2  Precautions/Allergies:   Adhesive tape-silicones; Ceclor [cefaclor]; and Symbyax [olanzapine-fluoxetine]   Treatment Plan of Care Effective Dates: 19 TO 2020      Pre-treatment Symptoms/Complaints:  Mrs. Tia Bee notes trying to walk more each day, however continues to need to take rest breaks secondary to fatigue and ache in LEs. Pain: Initial: Pain Intensity 1: 2  Pain Location 1: Knee  Pain Orientation 1: Left  Post Session:  1/10   Medications Last Reviewed: 2019    Updated Objective Findings:  left knee AROM 1-130 degrees, poor activation of diaphragmatic breathing, demonstrates chest breathing. Challenged with left knee extension in weight bearing. Continues to be challenged with endurance. Antalgic gait noted and use of assisted device is recommended   TREATMENT:     THERAPEUTIC EXERCISE: (30 minutes):  Exercises per grid below to improve mobility, strength, balance and coordination. Required moderate verbal and manual cues to promote proper body alignment, promote proper body posture, promote proper body mechanics and promote proper body breathing techniques. Progressed resistance, range, repetitions and complexity of movement as indicated. Date:  2019   Activity/Exercise Parameters   Sit to stand transfers X 10 reps.     Supine straight leg raise X 10 reps, 10 sec holds   Seated hip hinge/weight acceptance X 5 reps BLE   Standing terminal knee extension X 10 reps, BLE 5 sec holds, blue t-band   Standing lateral steps X 10 reps, 5' distance with green t-band   Standing hip extension X 30 reps BLE   Standing hip abduction X 30 reps BLE   Standing weight shift forward X 15 reps onto foam BLE   Supine UE/LE abdominal brace X 5 reps BLE   Standing functional squat X 30 reps,   Prone knee extension/quad sets X 25 reps, 5 sec holds   Supine quad set    HEP review X 5 minute review   Step up X 30 taps BLE  X 30 step ups 6\" BLE   Standing marching on airex X 1 minute  X 15 lateral steps  X 15 forward steps     Standing functional squats X 15 reps   Standing calf stretches X 5 reps, 25 sec holds BLE on board   Supine diaphragmatic breathing x 10 reps,   Supine 90/90 hip flexion abdominal brace X 5 reps, 5 sec holds   Supine opposite UE/LE flexion and extension with abdominal bracing X 10 reps bilaterally    Standing hip flexion/abduction/extension X 15 reps, BLE   Gym machines    Step ups X 15 reps BLE     MANUAL THERAPY: (25 minutes): Joint mobilization and Soft tissue mobilization was utilized and necessary because of the patient's restricted joint motion and restricted motion of soft tissue. (Used abbreviations: MET - muscle energy technique; PNF - proprioceptive neuromuscular facilitation; NMR - neuromuscular re-education; a/p - anterior to posterior; p/a - posterior to anterior, FMP - functional movement patterns)  · Supine left knee soft tissue mobilization to anterior knee and patella mobilization in all directions. · Supine left hamstrings soft tissue mobilization with FMP of knee extension, repeated on right side  · Supine left anterior quadriceps and iliopsoas with FMP of knee flexion/extension, hip flexion.   · Side lying right for soft tissue mobilization to her pelvis and lumbar spine with FMP of hip ER and abduction    [facility] Portal  Treatment/Session Summary:    · Response to Treatment:  continues to improve endurance with exercises today, however still needs to sit between exercises. · Communication/Consultation:  None today  · Equipment provided today:  None today  · Recommendations/Intent for next treatment session: Next visit will focus on LE strength, endurance and balance in weight bearing, continue to address gait deficits. Total Treatment Billable Duration:  55 minutes.    PT Patient Time In/Time Out  Time In: [address]  Time Out: 555 [facility], PT    Future Appointments   Date Time Provider Uzair Pineda   12/17/2019  4:30 PM Justin Solomon., PT [facility]   12/26/2019  1:30 PM [facility]   12/30/2019  3:30 PM [facility]   1/14/2020  3:40 PM Sil Hood MD END BS ENDO   3/17/2020  1:15 PM Ira Whittington MD [facility]CD   4/9/2020  9:30 AM Rollie Holstein, Valeda Portugal, MD [facility] NISHANT

## 2019-12-17 ENCOUNTER — HOSPITAL ENCOUNTER (OUTPATIENT)
Dept: PHYSICAL THERAPY | Age: 69
Discharge: HOME | End: 2019-12-17
Payer: MEDICARE

## 2019-12-17 PROCEDURE — 97110 THERAPEUTIC EXERCISES: CPT

## 2019-12-17 PROCEDURE — 97140 MANUAL THERAPY 1/> REGIONS: CPT

## 2019-12-18 NOTE — PROGRESS NOTES
Licha Arizmendi  : 1950  [place]  [place]: [place] [address] at [place] [address], [address] [facility]  [phone]   [phone]      OUTPATIENT PHYSICAL THERAPY: Daily Treatment Note 2019   Visit Count: 36      ICD-10: Treatment Diagnosis: Pain in joint, left knee M25.562  ICD-10: Treatment Diagnosis: pain in joint, right knee M25.561  ICD-10: Treatment Diagnosis: difficulty walking, not elsewhere classified R26.2  Precautions/Allergies:   Adhesive tape-silicones; Ceclor [cefaclor]; and Symbyax [olanzapine-fluoxetine]   Treatment Plan of Care Effective Dates: 19 TO 2020      Pre-treatment Symptoms/Complaints:  Mrs. Tatum Floyd continues to note pain and aches into bilateral knees with ambulation. Pain: Initial: Pain Intensity 1: 2  Pain Location 1: Knee  Pain Orientation 1: Left  Post Session:  1/10   Medications Last Reviewed: 2019    Updated Objective Findings:  left knee AROM 1-130 degrees, poor activation of diaphragmatic breathing, demonstrates chest breathing. Challenged with left knee extension in weight bearing. Continues to be challenged with endurance. Antalgic gait noted and use of assisted device is recommended   TREATMENT:     THERAPEUTIC EXERCISE: (40 minutes):  Exercises per grid below to improve mobility, strength, balance and coordination. Required moderate verbal and manual cues to promote proper body alignment, promote proper body posture, promote proper body mechanics and promote proper body breathing techniques. Progressed resistance, range, repetitions and complexity of movement as indicated. Date:  2019   Activity/Exercise Parameters   Sit to stand transfers X 10 reps.     Supine straight leg raise X 10 reps, 10 sec holds   Seated hip hinge/weight acceptance X 5 reps BLE   Standing terminal knee extension    Standing lateral steps X 10 reps, 5' distance with green t-band   Standing hip extension X 30 reps BLE   Standing hip abduction X 30 reps BLE   Standing weight shift forward X 15 reps onto foam BLE   Supine UE/LE abdominal brace X 5 reps BLE   Standing functional squat X 30 reps, wall squats   Prone knee extension/quad sets    Supine quad set    HEP review X 5 minute review   Step up X 30 taps BLE  X 30 step ups 6\" BLE   Standing marching on airex      Standing functional squats X 15 reps   Standing calf stretches X 5 reps, 25 sec holds BLE on board   Supine diaphragmatic breathing x 10 reps,   Supine 90/90 hip flexion abdominal brace X 5 reps, 5 sec holds   Supine opposite UE/LE flexion and extension with abdominal bracing X 10 reps bilaterally    Standing hip flexion/abduction/extension X 15 reps, BLE   Gym machines    Hip flexion with single leg balance using dowel X 10 reps, BLE         MANUAL THERAPY: (15 minutes): Joint mobilization and Soft tissue mobilization was utilized and necessary because of the patient's restricted joint motion and restricted motion of soft tissue. (Used abbreviations: MET - muscle energy technique; PNF - proprioceptive neuromuscular facilitation; NMR - neuromuscular re-education; a/p - anterior to posterior; p/a - posterior to anterior, FMP - functional movement patterns)  · Supine left knee soft tissue mobilization to anterior knee and patella mobilization in all directions. · Supine left hamstrings soft tissue mobilization with FMP of knee extension, repeated on right side  · Supine left anterior quadriceps and iliopsoas with FMP of knee flexion/extension, hip flexion.   · Side lying right for soft tissue mobilization to her pelvis and lumbar spine with FMP of hip ER and abduction    [facility] Portal  Treatment/Session Summary:    · Response to Treatment:  continues to need rest breaks between exercises, improving overall mobility in gait however continues to ambulate with forward flexed posture  · Communication/Consultation:  None today  · Equipment provided today:  None today  · Recommendations/Intent for next treatment session: Next visit will focus on LE strength, endurance and balance in weight bearing, continue to address gait deficits. Plan to DC to PREP program to continue strengthening and endurance in two visits. Total Treatment Billable Duration:  55 minutes.    PT Patient Time In/Time Out  Time In: 7870  Time Out: MANUEL Vuong    Future Appointments   Date Time Provider Uzair Pineda   12/26/2019  1:30 PM Alexandre Emerson., PT [facility]   12/30/2019  3:30 PM [facility]   1/14/2020  3:40 PM Brett Nath MD END BS ENDO   3/17/2020  1:15 PM Sushila Mckeon MD [facility]   4/9/2020  9:30 AM Svetlana Wilson MD [facility]

## 2019-12-26 ENCOUNTER — HOSPITAL ENCOUNTER (OUTPATIENT)
Dept: PHYSICAL THERAPY | Age: 69
End: 2019-12-26
Payer: MEDICARE

## 2019-12-30 ENCOUNTER — HOSPITAL ENCOUNTER (OUTPATIENT)
Dept: PHYSICAL THERAPY | Age: 69
Discharge: HOME | End: 2019-12-30
Payer: MEDICARE

## 2019-12-30 PROCEDURE — 97140 MANUAL THERAPY 1/> REGIONS: CPT

## 2019-12-30 PROCEDURE — 97110 THERAPEUTIC EXERCISES: CPT

## 2019-12-31 NOTE — PROGRESS NOTES
Cristina Morillo  : 1950  [place]  [place] [address] at [place] [address], [address] [facility]  [phone]   [phone]      OUTPATIENT PHYSICAL THERAPY: Daily Treatment Note 2019   Visit Count: 37      ICD-10: Treatment Diagnosis: Pain in joint, left knee M25.562  ICD-10: Treatment Diagnosis: pain in joint, right knee M25.561  ICD-10: Treatment Diagnosis: difficulty walking, not elsewhere classified R26.2  Precautions/Allergies:   Adhesive tape-silicones; Ceclor [cefaclor]; and Symbyax [olanzapine-fluoxetine]   Treatment Plan of Care Effective Dates: 19 TO 2020      Pre-treatment Symptoms/Complaints:  Mrs. Ramona Wall has demonstrated progression with her physical therapy, however continues to have fatigue with weight bearing and ambulation. Noted she pulled her low back this week. After discussion about progression we will discharge Jorge Lucas from therapy today to work independently towards her goals. Pain: Initial: Pain Intensity 1: 2  Pain Location 1: Knee  Pain Orientation 1: Left  Post Session:  1/10   Medications Last Reviewed: 2019    Updated Objective Findings:  left knee AROM 1-130 degrees, poor activation of diaphragmatic breathing, demonstrates chest breathing. Challenged with left knee extension in weight bearing. Continues to be challenged with endurance. Antalgic gait noted and use of assisted device is recommended   TREATMENT:     THERAPEUTIC EXERCISE: (30 minutes):  Exercises per grid below to improve mobility, strength, balance and coordination. Required moderate verbal and manual cues to promote proper body alignment, promote proper body posture, promote proper body mechanics and promote proper body breathing techniques. Progressed resistance, range, repetitions and complexity of movement as indicated.      Date:  2019   Activity/Exercise Parameters   Sit to stand transfers X 10 reps. Supine straight leg raise X 10 reps, 10 sec holds   Seated hip hinge/weight acceptance X 5 reps BLE   Standing terminal knee extension    Standing lateral steps X 10 reps, 5' distance with green t-band   Standing hip extension X 30 reps BLE   Standing hip abduction X 30 reps BLE   Standing weight shift forward X 15 reps onto foam BLE   Supine UE/LE abdominal brace X 5 reps BLE   Standing functional squat X 30 reps, wall squats   Prone knee extension/quad sets    Supine quad set    HEP review X 5 minute review   Step up X 30 taps BLE  X 30 step ups 6\" BLE   Standing marching on airex      Standing functional squats X 15 reps   Standing calf stretches X 5 reps, 25 sec holds BLE on board   Supine diaphragmatic breathing x 10 reps,   Supine 90/90 hip flexion abdominal brace X 5 reps, 5 sec holds   Supine opposite UE/LE flexion and extension with abdominal bracing    Standing hip flexion/abduction/extension    Gym machines    Hip flexion with single leg balance using dowel          MANUAL THERAPY: (25 minutes): Joint mobilization and Soft tissue mobilization was utilized and necessary because of the patient's restricted joint motion and restricted motion of soft tissue. (Used abbreviations: MET - muscle energy technique; PNF - proprioceptive neuromuscular facilitation; NMR - neuromuscular re-education; a/p - anterior to posterior; p/a - posterior to anterior, FMP - functional movement patterns)  · Supine left knee soft tissue mobilization to anterior knee and patella mobilization in all directions. · Supine left hamstrings soft tissue mobilization with FMP of knee extension, repeated on right side  · Supine left anterior quadriceps and iliopsoas with FMP of knee flexion/extension, hip flexion.   · Side lying right for soft tissue mobilization to her pelvis and lumbar spine with FMP of hip ER and abduction    [facility] Portal  Treatment/Session Summary:    · Response to Treatment:  patient demonstrates full AROM in left knee, however continues to be challenged with obtaining terminal knee extension with gait. With flexion in her lumbar spine limited innominate mobility. she will be discharged from therapy today to her independent HEP. · Communication/Consultation:  None today  · Equipment provided today:  None today  ·     Total Treatment Billable Duration:  55 minutes.    PT Patient Time In/Time Out  Time In: 1530  Time Out: Lele FRANCOIS [address]. Geovanni Noe, MANUEL    Future Appointments   Date Time Provider Uzair Pineda   1/14/2020  3:40 PM MD LUIS Davey BS ENDO   3/17/2020  1:15 PM Singh Toledo MD [facility]   3/31/2020 11:20 AM Davian Vicente MD [facility] PP PP   4/9/2020  9:30 AM Moustapha Morel MD [facility] NISHANT NISHANT

## 2019-12-31 NOTE — THERAPY DISCHARGE
Nataly Velasco  : 1950  [place]  [place] [address], [address] [place] [address] Drive  [phone]   [phone]        OUTPATIENT PHYSICAL THERAPY:Discharge Summary 2019   ICD-10: Treatment Diagnosis: Pain in joint, left knee M25.562  ICD-10: Treatment Diagnosis: pain in joint, right knee M25.561  ICD-10: Treatment Diagnosis: difficulty walking, not elsewhere classified R26.2  Precautions/Allergies:   Ciprofloxacin; Adhesive tape-silicones; Ceclor [cefaclor]; and Symbyax [olanzapine-fluoxetine]   MD Orders: evaluate and treat MEDICAL/REFERRING DIAGNOSIS:  Knee pain [M25.569]   DATE OF ONSET: surgery 19  REFERRING PHYSICIAN: Paulette Morejon MD  RETURN PHYSICIAN APPOINTMENT: as needed     DISCHARGE SUMMARY: 19: As of 2019, Nataly Velasco has attended 40 physical therapy visits s/p left knee TKA. She was challenged with A-fib for several months before her surgery and was very challenged with her progression of increasing strength and endurance. She continues to be challenged with fatigue and chronic intermittent low back pain. Overall her bilateral knee ROM has improved, however continues to be challenged with obtaining terminal knee extension with gait secondary to pelvic and lumbar spine positions. She would benefit from continuing her independent HEP and joining a group exercise class or training to continue to assist her strength and endurance deficits. She will be discharged from therapy services at this time. RE-CERTIFICATION: :  Nataly Velasco has attended 31 physical therapy sessions post-left knee TKA. Ms. Bharathi Jung has been challenged since her surgery with regaining endurance and strength. She is challenged with her breathing, intermittent challenges with her her A-fib and limited with amount of time she is able to tolerate a weight bearing position. Therapy has focused on improving overall endurance and strength in weight bearing, however she continues to need constant rest breaks and guidance to perform exercises correctly. We are working towards progressing her to a gym program, however due to her limitations in endurance, the gym program will yet to accept her into the program. We will work in therapy over the next 60 days to improve overall endurance and strength to progress her to the gym program and her independent home program.      RE-CERTIFICATION: 4/93/23: Ms. Ortega Cadet has attended 19 physical therapy sessions and continues to demonstrate challenged with her left knee extension. She has improved her left knee flexion to 125 degrees. She continues to demonstrate weakness in her LE, especially in gluteals, quadriceps and core stabilizers. She continues to ambulate with a limp and needs to use her cane for assistance. She is challenged with obtaining terminal knee extension. She would benefit from continued physical therapy for strength and endurance training, gait and balance training and obtaining terminal knee extension in left knee. PROGRESS NOTE: 6/24/19: Ms. Ortega Cadet has attended 10 physical therapy sessions, she continues to present with challenged with gait and prolonged weight bearing activities. Overall strength is improving, however continues to demonstrate strength deficits in left quadriceps and gluteals. She has improved with sit to stand transfers, continues to struggle with ambulation for duration and terrain surfaces. She would benefit from continued skilled physical therapy to improve overall mobility and function. INITIAL ASSESSMENT:  Ms. Ortega Cadet is a 81 y.o. female who presents to physical therapy s/p left TKA on 4/17/19, she underwent 3 weeks of home health physical therapy and continues to be challenged with ambulation, sit to stand transfers, especially out of her car.  She also started experiencing right anterior knee pain in March 2019, notes tightness across anterior knee and notes clicking in her right knee. Most discomfort in right knee noted with performing sit to stand transfers. She presents with LE weakness, challenged with transfers, gait and balance. She will benefit from skilled physical therapy to improve her overall mobility and function with daily tasks. TREATMENT PLAN:  Effective Dates: 11/13/2019 TO 2/11/2020  (90 days). Frequency/Duration: 1 times a week for 90 Day(s)  GOALS: (Goals have been discussed and agreed upon with patient.)  Short-Term Functional Goals: Time Frame: 4 weeks  1. Patient demonstrates independence with her HEP without verbal cueing from therapist. GOAL MET  2. Patient demonstrates improve left knee ROM 2-120 degrees to perform ADLs. GOAL MET  3. Patient able to ambulate for 10 minutes without onset of bilateral knee pain GOAL MET for no knee pain, however challenged with endurance  Discharge Goals: Time Frame: 90 days  1. Patient demonstrates functional AROM of left knee 0-125 to perform ADLs - flexion GOAL MET  2. Patient demonstrates ability to ambulate for 30 minutes to perform community ambulation. - ALMOST MET  3. Patient demonstrates ability to perform sit to stand transfers from various surfaces without onset of bilateral knee pain. GOAL MET  4. Improve LEFS outcome measure score from 28/80 to 60/80 to perform ADLs - NOT MET  5. Patient able to perform 30 minute cardio and exercise routine to progress to the PREP gym program - ONGOING    OUTCOME MEASURE:   Tool Used: Lower Extremity Functional Scale (LEFS)  Score:  Initial: 28/80 Most Recent: 40/80 (Date: 6-25-19)                       46/80 (Date: 8-15-19)                       50/80 (Date: 11-13-19)                       52/80 (Date:12-30-19)   Interpretation of Score: 20 questions each scored on a 5 point scale with 0 representing \"extreme difficulty or unable to perform\" and 4 representing \"no difficulty\".   The lower the score, the greater the functional disability. 80/80 represents no disability. Minimal detectable change is 9 points. MEDICAL NECESSITY:   · Patient is expected to demonstrate progress in strength, range of motion, balance, coordination and functional technique to increase independence with sit to stand transfers, ambulation and weight bearing activities . ·   Total Duration:  PT Patient Time In/Time Out  Time In: 1530  Time Out: 1630    Thank you for this referral,  Mike Duran, PT          PAIN/SUBJECTIVE:   Initial: Pain Intensity 1: 2  Pain Location 1: Knee  Pain Orientation 1: Left    Post Session:  1/10   HISTORY:      Current Medications:       Current Outpatient Medications:     montelukast (SINGULAIR) 10 mg tablet, Take 1 Tab by mouth daily. , Disp: 90 Tab, Rfl: 4    buPROPion (WELLBUTRIN) 75 mg tablet, Take 1 Tab by mouth two (2) times a day. 2 tablets daily, Disp: 180 Tab, Rfl: 4    SYNTHROID 150 mcg tablet, Take 1 Tab by mouth six (6) days a week., Disp: 90 Tab, Rfl: 3    apixaban (ELIQUIS) 5 mg tablet, Take 1 Tab by mouth two (2) times a day., Disp: 180 Tab, Rfl: 3    amLODIPine (NORVASC) 2.5 mg tablet, Take 1 Tab by mouth daily. , Disp: 90 Tab, Rfl: 3    acetaminophen (TYLENOL) 325 mg cap, Take  by mouth., Disp: , Rfl:     mometasone (NASONEX) 50 mcg/actuation nasal spray, 2 Sprays by Both Nostrils route daily. , Disp: 1 Container, Rfl: 11    fluticasone propion-salmeterol (ADVAIR DISKUS) 250-50 mcg/dose diskus inhaler, Take 1 Puff by inhalation two (2) times a day. RINSE MOUTH WELL AFTER USE, Disp: 1 Inhaler, Rfl: 11    dofetilide (TIKOSYN) 500 mcg capsule, Take 1 Cap by mouth every twelve (12) hours every twelve (12) hours. (Patient taking differently: Take 500 mcg by mouth every twelve (12) hours every twelve (12) hours. Patient takes 1 tablet at 0800 and 1 tablet at 2000  ), Disp: 60 Cap, Rfl: 11    losartan (COZAAR) 100 mg tablet, Take 1 Tab by mouth daily. , Disp: 30 Tab, Rfl: 11    digestive enzymes, plant, (FIBERZYME CONCENTRATE-HP PO), Take 625 mg by mouth daily. , Disp: , Rfl:     albuterol (VENTOLIN HFA) 90 mcg/actuation inhaler, Take 2 Puffs by inhalation every six (6) hours as needed for Wheezing., Disp: 1 Inhaler, Rfl: 11    Cetirizine (ZYRTEC) 10 mg cap, Take 10 Caps by mouth daily. , Disp: , Rfl:     cpap machine kit, by Does Not Apply route., Disp: , Rfl:    Date Last Reviewed:  12/30/2019   UPDATED OBJECTIVE FINDINGS:     Observation/Orthostatic Postural Assessment:           Lower extremity weight bearing is symmetrical. Observation of gait indicates decreased terminal knee extension in left knee. Patient exhibits a increased lumbar lordosis. Palpation of lower quadrant bony landmarks are left iliac crest elevated. Palpation: Patient exhibits tenderness to palpation/temperature/crepitance/scar mobility/soft tissue mobility/myofasical to bilateral anterior knees, left greater than right. Improving 12/30/2019  Hamstring flexibility tested supine with straight leg raise is restricted left greater than right. Improving 12/30/2019    ROM:     AROM(PROM) Right 12/30/19 Left 12/30/19   Knee flexion 0-127 1-120 (0-130)   Knee extension 0 Lacks 1   Hip flexion 90 90   Hip external rotation (ER) 20 20   Hip internal rotation (IR) 20 20   Hip extension 5 5   Hip abduction 45 45     Strength:   Challenged with endurance of all weight bearing activities, constant rest breaks needed. Addressing breathing techniques, patient is a chest breather vs efficient diaphragmatic breathing - 12/30/19 continues to be challenged with breathing and endurance levels with all weight bearing exercises. Manual Muscle Test (*/5) Right Left   Knee extension 4+ 4   Knee flexion 4+ 4+   Hip flexion 4+ 4+   Hip ER 4 4+   Hip IR 4 4+   Hip extension 4 4   Hip abduction 4 4   Hip adduction 5 5   Ankle DF 5 5   Ankle PF 5 5   Core stability 4/5     Functional strength with standing heel raise is 4+.

## 2020-01-10 ENCOUNTER — HOSPITAL ENCOUNTER (OUTPATIENT)
Dept: LAB | Age: 70
Discharge: HOME | End: 2020-01-10
Payer: MEDICARE

## 2020-01-10 LAB — TSH W FREE THYROID I,TSHELE: 0.94 UIU/ML (ref 0.36–3.74)

## 2020-01-10 PROCEDURE — 84443 ASSAY THYROID STIM HORMONE: CPT

## 2020-01-10 PROCEDURE — 36415 COLL VENOUS BLD VENIPUNCTURE: CPT

## 2020-01-13 ENCOUNTER — APPOINTMENT (RX ONLY)
Dept: URBAN - METROPOLITAN AREA CLINIC 24 | Facility: CLINIC | Age: 70
Setting detail: DERMATOLOGY
End: 2020-01-13

## 2020-01-13 DIAGNOSIS — B07.8 OTHER VIRAL WARTS: ICD-10-CM

## 2020-01-13 PROCEDURE — ? LIQUID NITROGEN

## 2020-01-13 PROCEDURE — ? COUNSELING

## 2020-01-13 PROCEDURE — ? CANTHARIDIN

## 2020-01-13 PROCEDURE — 17110 DESTRUCTION B9 LES UP TO 14: CPT

## 2020-01-13 PROCEDURE — ? OTHER

## 2020-01-13 ASSESSMENT — LOCATION ZONE DERM: LOCATION ZONE: FINGER

## 2020-01-13 ASSESSMENT — LOCATION SIMPLE DESCRIPTION DERM: LOCATION SIMPLE: RIGHT RING FINGER

## 2020-01-13 ASSESSMENT — LOCATION DETAILED DESCRIPTION DERM: LOCATION DETAILED: PERIUNGUAL SKIN RIGHT RING FINGER

## 2020-01-13 NOTE — PROCEDURE: OTHER
Detail Level: Simple
Other (Free Text): Discussed treating with candida; however, I was worried about her nail because of wart location.
Note Text (......Xxx Chief Complaint.): This diagnosis correlates with the

## 2020-01-13 NOTE — PROCEDURE: LIQUID NITROGEN
Medical Necessity Information: It is in your best interest to select a reason for this procedure from the list below. All of these items fulfill various CMS LCD requirements except the new and changing color options.
Render Note In Bullet Format When Appropriate: No
Post-Care Instructions: I reviewed with the patient in detail post-care instructions. Patient is to wear sunprotection, and avoid picking at any of the treated lesions. Pt may apply Vaseline to crusted or scabbing areas.
Medical Necessity Clause: Treatment was medically necessary because the spot was
Consent: The patient's verbal consent was obtained including but not limited to risks of crusting, scabbing, blistering, scarring, darker or lighter pigmentary change, recurrence, incomplete removal and infection.
Detail Level: Detailed
Number Of Freeze-Thaw Cycles: 1 freeze-thaw cycle

## 2020-01-13 NOTE — PROCEDURE: CANTHARIDIN
Curette Text: Prior to application of cantharidin the lesions were pared with 15 blade.
Medical Necessity Information: It is in your best interest to select a reason for this procedure from the list below. All of these items fulfill various CMS LCD requirements except the new and changing color options.
Consent: The patient's consent was obtained including but not limited to risks of crusting, scabbing, scarring, blistering, darker or lighter pigmentary change, recurrence, incomplete removal and infection.
Post-Care Instructions: I reviewed with the patient in detail post-care instructions. The patient understands that the treated areas should be washed off 6 to 8 hours after application.
Detail Level: Detailed
Curette Before Application?: No
Medical Necessity Clause: This procedure was medically necessary because the lesions that were treated were:
Strength: Canthacur PS

## 2020-02-22 PROBLEM — M77.50 TENDINITIS OF FOOT: Status: ACTIVE | Noted: 2020-02-22

## 2020-02-22 PROBLEM — M20.12 ACQUIRED HALLUX VALGUS OF LEFT FOOT: Status: ACTIVE | Noted: 2020-02-22

## 2020-02-22 PROBLEM — M51.36 DDD (DEGENERATIVE DISC DISEASE), LUMBAR: Status: ACTIVE | Noted: 2020-02-22

## 2020-02-22 PROBLEM — M48.062 SPINAL STENOSIS OF LUMBAR REGION WITH NEUROGENIC CLAUDICATION: Status: ACTIVE | Noted: 2020-02-22

## 2020-02-22 PROBLEM — M19.041 PRIMARY OSTEOARTHRITIS OF RIGHT HAND: Status: ACTIVE | Noted: 2020-02-22

## 2020-02-22 PROBLEM — M20.5X2 ACQUIRED CLAW TOE, LEFT: Status: ACTIVE | Noted: 2020-02-22

## 2020-02-22 PROBLEM — M79.672 LEFT FOOT PAIN: Status: ACTIVE | Noted: 2020-02-22

## 2020-02-22 PROBLEM — M19.011 LOCALIZED OSTEOARTHRITIS OF SHOULDER, RIGHT: Status: ACTIVE | Noted: 2020-02-22

## 2020-02-22 PROBLEM — M19.079 ARTHRITIS OF MIDFOOT: Status: ACTIVE | Noted: 2020-02-22

## 2020-02-24 ENCOUNTER — APPOINTMENT (RX ONLY)
Dept: URBAN - METROPOLITAN AREA CLINIC 24 | Facility: CLINIC | Age: 70
Setting detail: DERMATOLOGY
End: 2020-02-24

## 2020-02-24 DIAGNOSIS — B07.8 OTHER VIRAL WARTS: ICD-10-CM

## 2020-02-24 PROCEDURE — ? LIQUID NITROGEN

## 2020-02-24 PROCEDURE — ? COUNSELING

## 2020-02-24 PROCEDURE — ? TREATMENT REGIMEN

## 2020-02-24 PROCEDURE — ? PRESCRIPTION

## 2020-02-24 PROCEDURE — 17110 DESTRUCTION B9 LES UP TO 14: CPT

## 2020-02-24 RX ORDER — IMIQUIMOD 50 MG/G
CREAM TOPICAL
Qty: 1 | Refills: 1 | Status: ERX

## 2020-02-24 ASSESSMENT — LOCATION SIMPLE DESCRIPTION DERM: LOCATION SIMPLE: RIGHT RING FINGER

## 2020-02-24 ASSESSMENT — LOCATION DETAILED DESCRIPTION DERM: LOCATION DETAILED: PERIUNGUAL SKIN RIGHT RING FINGER

## 2020-02-24 ASSESSMENT — LOCATION ZONE DERM: LOCATION ZONE: FINGER

## 2020-02-24 NOTE — PROCEDURE: LIQUID NITROGEN
Render Post-Care Instructions In Note?: no
Medical Necessity Information: It is in your best interest to select a reason for this procedure from the list below. All of these items fulfill various CMS LCD requirements except the new and changing color options.
Pared With?: 15 blade
Detail Level: Detailed
Number Of Freeze-Thaw Cycles: 1 freeze-thaw cycle
Medical Necessity Clause: Treatment was medically necessary because the spot was
Post-Care Instructions: I reviewed with the patient in detail post-care instructions. Patient is to wear sunprotection, and avoid picking at any of the treated lesions. Pt may apply Vaseline to crusted or scabbing areas.
Consent: The patient's verbal consent was obtained including but not limited to risks of crusting, scabbing, blistering, scarring, darker or lighter pigmentary change, recurrence, incomplete removal and infection.

## 2020-03-13 ENCOUNTER — HOSPITAL ENCOUNTER (OUTPATIENT)
Dept: LAB | Age: 70
Discharge: HOME | End: 2020-03-13
Payer: MEDICARE

## 2020-03-13 DIAGNOSIS — E89.0 HYPOTHYROIDISM FOLLOWING RADIOIODINE THERAPY: ICD-10-CM

## 2020-03-13 LAB — TSH W FREE THYROID I,TSHELE: 0.62 UIU/ML (ref 0.36–3.74)

## 2020-03-13 PROCEDURE — 84443 ASSAY THYROID STIM HORMONE: CPT

## 2020-03-13 PROCEDURE — 36415 COLL VENOUS BLD VENIPUNCTURE: CPT

## 2020-04-06 ENCOUNTER — APPOINTMENT (RX ONLY)
Dept: URBAN - METROPOLITAN AREA CLINIC 23 | Facility: CLINIC | Age: 70
Setting detail: DERMATOLOGY
End: 2020-04-06

## 2020-04-06 DIAGNOSIS — B07.8 OTHER VIRAL WARTS: ICD-10-CM

## 2020-04-06 DIAGNOSIS — Z86.14 PERSONAL HISTORY OF METHICILLIN RESISTANT STAPHYLOCOCCUS AUREUS INFECTION: ICD-10-CM

## 2020-04-06 PROCEDURE — ? OTHER

## 2020-04-06 PROCEDURE — 99212 OFFICE O/P EST SF 10 MIN: CPT | Mod: 25

## 2020-04-06 PROCEDURE — 17110 DESTRUCTION B9 LES UP TO 14: CPT

## 2020-04-06 PROCEDURE — ? LIQUID NITROGEN

## 2020-04-06 PROCEDURE — ? COUNSELING

## 2020-04-06 PROCEDURE — ? TREATMENT REGIMEN

## 2020-04-06 PROCEDURE — ? PRESCRIPTION

## 2020-04-06 ASSESSMENT — LOCATION ZONE DERM: LOCATION ZONE: FINGER

## 2020-04-06 ASSESSMENT — LOCATION DETAILED DESCRIPTION DERM: LOCATION DETAILED: PERIUNGUAL SKIN RIGHT RING FINGER

## 2020-04-06 ASSESSMENT — LOCATION SIMPLE DESCRIPTION DERM: LOCATION SIMPLE: RIGHT RING FINGER

## 2020-04-06 NOTE — PROCEDURE: LIQUID NITROGEN
Post-Care Instructions: I reviewed with the patient in detail post-care instructions. Patient is to wear sunprotection, and avoid picking at any of the treated lesions. Pt may apply Vaseline to crusted or scabbing areas.
Consent: The patient's verbal consent was obtained including but not limited to risks of crusting, scabbing, blistering, scarring, darker or lighter pigmentary change, recurrence, incomplete removal and infection.
Add 52 Modifier (Optional): no
Medical Necessity Clause: Treatment was medically necessary because the spot was
Detail Level: Detailed
Pared With?: 15 blade
Medical Necessity Information: It is in your best interest to select a reason for this procedure from the list below. All of these items fulfill various CMS LCD requirements except the new and changing color options.
Number Of Freeze-Thaw Cycles: 1 freeze-thaw cycle

## 2020-04-06 NOTE — PROCEDURE: OTHER
Note Text (......Xxx Chief Complaint.): This diagnosis correlates with the
Other (Free Text): Has been positive for MRSA and Staph in the past
Detail Level: Simple

## 2020-04-06 NOTE — PROCEDURE: TREATMENT REGIMEN
Detail Level: Detailed
Plan: She may be developing some paronychia or just having a reaction to the Imiquimod. No pus or drainage noted. Start Doxycycline 100mg bid and she may resume cream every other day once she completes antibiotics and to let me know if condition should worsen.

## 2020-06-08 ENCOUNTER — APPOINTMENT (RX ONLY)
Dept: URBAN - METROPOLITAN AREA CLINIC 23 | Facility: CLINIC | Age: 70
Setting detail: DERMATOLOGY
End: 2020-06-08

## 2020-06-08 DIAGNOSIS — B07.8 OTHER VIRAL WARTS: ICD-10-CM

## 2020-06-08 PROCEDURE — ? COUNSELING

## 2020-06-08 PROCEDURE — 17110 DESTRUCTION B9 LES UP TO 14: CPT

## 2020-06-08 PROCEDURE — ? LIQUID NITROGEN

## 2020-06-08 ASSESSMENT — LOCATION SIMPLE DESCRIPTION DERM: LOCATION SIMPLE: RIGHT RING FINGER

## 2020-06-08 ASSESSMENT — LOCATION DETAILED DESCRIPTION DERM: LOCATION DETAILED: PERIUNGUAL SKIN RIGHT RING FINGER

## 2020-06-08 ASSESSMENT — LOCATION ZONE DERM: LOCATION ZONE: FINGER

## 2020-06-08 NOTE — PROCEDURE: LIQUID NITROGEN
Medical Necessity Information: It is in your best interest to select a reason for this procedure from the list below. All of these items fulfill various CMS LCD requirements except the new and changing color options.
Render Note In Bullet Format When Appropriate: No
Post-Care Instructions: I reviewed with the patient in detail post-care instructions. Patient is to wear sunprotection, and avoid picking at any of the treated lesions. Pt may apply Vaseline to crusted or scabbing areas.
Medical Necessity Clause: Treatment was medically necessary because the spot was
Pared With?: 15 blade
Detail Level: Detailed
Number Of Freeze-Thaw Cycles: 1 freeze-thaw cycle
Consent: The patient's verbal consent was obtained including but not limited to risks of crusting, scabbing, blistering, scarring, darker or lighter pigmentary change, recurrence, incomplete removal and infection.

## 2020-06-29 ENCOUNTER — APPOINTMENT (RX ONLY)
Dept: URBAN - METROPOLITAN AREA CLINIC 24 | Facility: CLINIC | Age: 70
Setting detail: DERMATOLOGY
End: 2020-06-29

## 2020-06-29 DIAGNOSIS — B07.8 OTHER VIRAL WARTS: ICD-10-CM

## 2020-06-29 PROCEDURE — ? LIQUID NITROGEN

## 2020-06-29 PROCEDURE — 17110 DESTRUCTION B9 LES UP TO 14: CPT

## 2020-06-29 PROCEDURE — ? COUNSELING

## 2020-06-29 ASSESSMENT — LOCATION SIMPLE DESCRIPTION DERM: LOCATION SIMPLE: RIGHT RING FINGER

## 2020-06-29 ASSESSMENT — LOCATION ZONE DERM: LOCATION ZONE: FINGER

## 2020-06-29 ASSESSMENT — LOCATION DETAILED DESCRIPTION DERM: LOCATION DETAILED: PERIUNGUAL SKIN RIGHT RING FINGER

## 2020-06-29 NOTE — PROCEDURE: LIQUID NITROGEN
Consent: The patient's verbal consent was obtained including but not limited to risks of crusting, scabbing, blistering, scarring, darker or lighter pigmentary change, recurrence, incomplete removal and infection.
Render Post-Care Instructions In Note?: no
Number Of Freeze-Thaw Cycles: 1 freeze-thaw cycle
Detail Level: Detailed
Medical Necessity Information: It is in your best interest to select a reason for this procedure from the list below. All of these items fulfill various CMS LCD requirements except the new and changing color options.
Post-Care Instructions: I reviewed with the patient in detail post-care instructions. Patient is to wear sunprotection, and avoid picking at any of the treated lesions. Pt may apply Vaseline to crusted or scabbing areas.
Pared With?: 15 blade
Medical Necessity Clause: Treatment was medically necessary because the spot was

## 2020-07-13 ENCOUNTER — APPOINTMENT (RX ONLY)
Dept: URBAN - METROPOLITAN AREA CLINIC 23 | Facility: CLINIC | Age: 70
Setting detail: DERMATOLOGY
End: 2020-07-13

## 2020-07-13 DIAGNOSIS — B07.8 OTHER VIRAL WARTS: ICD-10-CM | Status: IMPROVED

## 2020-07-13 PROCEDURE — ? LIQUID NITROGEN

## 2020-07-13 PROCEDURE — ? COUNSELING

## 2020-07-13 PROCEDURE — 17110 DESTRUCTION B9 LES UP TO 14: CPT

## 2020-07-13 ASSESSMENT — LOCATION SIMPLE DESCRIPTION DERM: LOCATION SIMPLE: RIGHT RING FINGER

## 2020-07-13 ASSESSMENT — LOCATION ZONE DERM: LOCATION ZONE: FINGER

## 2020-07-13 ASSESSMENT — LOCATION DETAILED DESCRIPTION DERM: LOCATION DETAILED: PERIUNGUAL SKIN RIGHT RING FINGER

## 2020-07-13 NOTE — PROCEDURE: LIQUID NITROGEN
Diabetes Foot Health: Care Instructions  Your Care Instructions    When you have diabetes, your feet need extra care and attention. Diabetes can damage the nerve endings and blood vessels in your feet, making you less likely to notice when your feet are injured. Diabetes also limits your body's ability to fight infection and get blood to areas that need it. If you get a minor foot injury, it could become an ulcer or a serious infection. With good foot care, you can prevent most of these problems. Caring for your feet can be quick and easy. Most of the care can be done when you are bathing or getting ready for bed. Follow-up care is a key part of your treatment and safety. Be sure to make and go to all appointments, and call your doctor if you are having problems. It's also a good idea to know your test results and keep a list of the medicines you take. How can you care for yourself at home? · Keep your blood sugar close to normal by watching what and how much you eat, monitoring blood sugar, taking medicines if prescribed, and getting regular exercise. · Do not smoke. Smoking affects blood flow and can make foot problems worse. If you need help quitting, talk to your doctor about stop-smoking programs and medicines. These can increase your chances of quitting for good. · Eat a diet that is low in fats. High fat intake can cause fat to build up in your blood vessels and decrease blood flow. · Inspect your feet daily for blisters, cuts, cracks, or sores. If you cannot see well, use a mirror or have someone help you. · Take care of your feet:  ? Wash your feet every day. Use warm (not hot) water. Check the water temperature with your wrists or other part of your body, not your feet. ? Dry your feet well. Pat them dry. Do not rub the skin on your feet too hard. Dry well between your toes. If the skin on your feet stays moist, bacteria or a fungus can grow, which can lead to infection. ?  Keep your skin
Medical Necessity Clause: Treatment was medically necessary because the spot was
soft. Use moisturizing skin cream to keep the skin on your feet soft and prevent calluses and cracks. But do not put the cream between your toes, and stop using any cream that causes a rash. ? Clean underneath your toenails carefully. Do not use a sharp object to clean underneath your toenails. Use the blunt end of a nail file or other rounded tool. ? Trim and file your toenails straight across to prevent ingrown toenails. Use a nail clipper, not scissors. Use an emery board to smooth the edges. · Change socks daily. Socks without seams are best, because seams often rub the feet. You can find socks for people with diabetes from specialty catalogs. · Look inside your shoes every day for things like gravel or torn linings, which could cause blisters or sores. · Buy shoes that fit well:  ? Look for shoes that have plenty of space around the toes. This helps prevent bunions and blisters. ? Try on shoes while wearing the kind of socks you will usually wear with the shoes. ? Avoid plastic shoes. They may rub your feet and cause blisters. Good shoes should be made of materials that are flexible and breathable, such as leather or cloth. ? Break in new shoes slowly by wearing them for no more than an hour a day for several days. Take extra time to check your feet for red areas, blisters, or other problems after you wear new shoes. · Do not go barefoot. Do not wear sandals, and do not wear shoes with very thin soles. Thin soles are easy to puncture. They also do not protect your feet from hot pavement or cold weather. · Have your doctor check your feet during each visit. If you have a foot problem, see your doctor. Do not try to treat an early foot problem at home. Home remedies or treatments that you can buy without a prescription (such as corn removers) can be harmful. · Always get early treatment for foot problems. A minor irritation can lead to a major problem if not properly cared for early.   When should you
Pared With?: 15 blade
call for help? Call your doctor now or seek immediate medical care if:    · You have a foot sore, an ulcer or break in the skin that is not healing after 4 days, bleeding corns or calluses, or an ingrown toenail.     · You have blue or black areas, which can mean bruising or blood flow problems.     · You have peeling skin or tiny blisters between your toes or cracking or oozing of the skin.     · You have a fever for more than 24 hours and a foot sore.     · You have new numbness or tingling in your feet that does not go away after you move your feet or change positions.     · You have unexplained or unusual swelling of the foot or ankle.    Watch closely for changes in your health, and be sure to contact your doctor if:    · You cannot do proper foot care. Where can you learn more? Go to http://lyla-mavis.info/. Enter A739 in the search box to learn more about \"Diabetes Foot Health: Care Instructions. \"  Current as of: July 25, 2018  Content Version: 11.9  © 2466-4165 [employer]. Care instructions adapted under license by [employer] (which disclaims liability or warranty for this information). If you have questions about a medical condition or this instruction, always ask your healthcare professional. [address] any warranty or liability for your use of this information.
Consent: The patient's verbal consent was obtained including but not limited to risks of crusting, scabbing, blistering, scarring, darker or lighter pigmentary change, recurrence, incomplete removal and infection.
Render Post-Care Instructions In Note?: no
Detail Level: Detailed
Number Of Freeze-Thaw Cycles: 1 freeze-thaw cycle
Medical Necessity Information: It is in your best interest to select a reason for this procedure from the list below. All of these items fulfill various CMS LCD requirements except the new and changing color options.
Post-Care Instructions: I reviewed with the patient in detail post-care instructions. Patient is to wear sunprotection, and avoid picking at any of the treated lesions. Pt may apply Vaseline to crusted or scabbing areas.

## 2020-08-03 ENCOUNTER — APPOINTMENT (RX ONLY)
Dept: URBAN - METROPOLITAN AREA CLINIC 23 | Facility: CLINIC | Age: 70
Setting detail: DERMATOLOGY
End: 2020-08-03

## 2020-08-03 VITALS — TEMPERATURE: 97.1 F

## 2020-08-03 DIAGNOSIS — B07.8 OTHER VIRAL WARTS: ICD-10-CM

## 2020-08-03 PROCEDURE — ? LIQUID NITROGEN

## 2020-08-03 PROCEDURE — ? COUNSELING

## 2020-08-03 PROCEDURE — 17110 DESTRUCTION B9 LES UP TO 14: CPT

## 2020-08-03 ASSESSMENT — LOCATION SIMPLE DESCRIPTION DERM
LOCATION SIMPLE: RIGHT RING FINGER
LOCATION SIMPLE: RIGHT MIDDLE FINGER

## 2020-08-03 ASSESSMENT — LOCATION ZONE DERM: LOCATION ZONE: FINGER

## 2020-08-03 ASSESSMENT — LOCATION DETAILED DESCRIPTION DERM
LOCATION DETAILED: PERIUNGUAL SKIN RIGHT MIDDLE FINGER
LOCATION DETAILED: PERIUNGUAL SKIN RIGHT RING FINGER

## 2020-08-03 NOTE — PROCEDURE: LIQUID NITROGEN
Post-Care Instructions: I reviewed with the patient in detail post-care instructions. Patient is to wear sunprotection, and avoid picking at any of the treated lesions. Pt may apply Vaseline to crusted or scabbing areas.
Include Z78.9 (Other Specified Conditions Influencing Health Status) As An Associated Diagnosis?: No
Medical Necessity Clause: Treatment was medically necessary because the spot was
Pared With?: 15 blade
Consent: The patient's verbal consent was obtained including but not limited to risks of crusting, scabbing, blistering, scarring, darker or lighter pigmentary change, recurrence, incomplete removal and infection.
Number Of Freeze-Thaw Cycles: 1 freeze-thaw cycle
Detail Level: Detailed
Medical Necessity Information: It is in your best interest to select a reason for this procedure from the list below. All of these items fulfill various CMS LCD requirements except the new and changing color options.

## 2020-08-10 ENCOUNTER — HOSPITAL ENCOUNTER (OUTPATIENT)
Dept: LAB | Age: 70
Discharge: HOME | End: 2020-08-10
Payer: MEDICARE

## 2020-08-10 DIAGNOSIS — I10 ESSENTIAL HYPERTENSION: ICD-10-CM

## 2020-08-10 LAB
ALBUMIN SERPL-MCNC: 3.5 G/DL (ref 3.2–4.6)
ALBUMIN/GLOB SERPL: 1.2 {RATIO} (ref 1.2–3.5)
ALP SERPL-CCNC: 104 U/L (ref 50–136)
ALT SERPL-CCNC: 42 U/L (ref 12–65)
ANION GAP SERPL CALC-SCNC: 6 MMOL/L (ref 7–16)
AST SERPL-CCNC: 28 U/L (ref 15–37)
BASOPHILS # BLD: 0 K/UL (ref 0–0.2)
BASOPHILS NFR BLD: 0 % (ref 0–2)
BILIRUB SERPL-MCNC: 0.6 MG/DL (ref 0.2–1.1)
BUN SERPL-MCNC: 15 MG/DL (ref 8–23)
CALCIUM SERPL-MCNC: 9.1 MG/DL (ref 8.3–10.4)
CHLORIDE SERPL-SCNC: 111 MMOL/L (ref 98–107)
CHOLEST SERPL-MCNC: 183 MG/DL
CO2 SERPL-SCNC: 26 MMOL/L (ref 21–32)
CREAT SERPL-MCNC: 0.94 MG/DL (ref 0.6–1)
DIFFERENTIAL METHOD BLD: NORMAL
EOSINOPHIL # BLD: 0.2 K/UL (ref 0–0.8)
EOSINOPHIL NFR BLD: 3 % (ref 0.5–7.8)
ERYTHROCYTE [DISTWIDTH] IN BLOOD BY AUTOMATED COUNT: 13.3 % (ref 11.9–14.6)
GLOBULIN SER CALC-MCNC: 3 G/DL (ref 2.3–3.5)
GLUCOSE SERPL-MCNC: 89 MG/DL (ref 65–100)
HCT VFR BLD AUTO: 43.2 % (ref 35.8–46.3)
HDLC SERPL-MCNC: 63 MG/DL (ref 40–60)
HDLC SERPL: 2.9 {RATIO}
HGB BLD-MCNC: 13.7 G/DL (ref 11.7–15.4)
IMM GRANULOCYTES # BLD AUTO: 0 K/UL (ref 0–0.5)
IMM GRANULOCYTES NFR BLD AUTO: 0 % (ref 0–5)
LDLC SERPL CALC-MCNC: 90.8 MG/DL
LIPID PROFILE,FLP: ABNORMAL
LYMPHOCYTES # BLD: 1.3 K/UL (ref 0.5–4.6)
LYMPHOCYTES NFR BLD: 21 % (ref 13–44)
MCH RBC QN AUTO: 29.9 PG (ref 26.1–32.9)
MCHC RBC AUTO-ENTMCNC: 31.7 G/DL (ref 31.4–35)
MCV RBC AUTO: 94.3 FL (ref 79.6–97.8)
MONOCYTES # BLD: 0.5 K/UL (ref 0.1–1.3)
MONOCYTES NFR BLD: 9 % (ref 4–12)
NEUTS SEG # BLD: 3.9 K/UL (ref 1.7–8.2)
NEUTS SEG NFR BLD: 66 % (ref 43–78)
NRBC # BLD: 0 K/UL (ref 0–0.2)
PLATELET # BLD AUTO: 211 K/UL (ref 150–450)
PMV BLD AUTO: 12.1 FL (ref 9.4–12.3)
POTASSIUM SERPL-SCNC: 4 MMOL/L (ref 3.5–5.1)
PROT SERPL-MCNC: 6.5 G/DL (ref 6.3–8.2)
RBC # BLD AUTO: 4.58 M/UL (ref 4.05–5.2)
SODIUM SERPL-SCNC: 143 MMOL/L (ref 136–145)
TRIGL SERPL-MCNC: 146 MG/DL (ref 35–150)
VLDLC SERPL CALC-MCNC: 29.2 MG/DL (ref 6–23)
WBC # BLD AUTO: 5.9 K/UL (ref 4.3–11.1)

## 2020-08-10 PROCEDURE — 80061 LIPID PANEL: CPT

## 2020-08-10 PROCEDURE — 36415 COLL VENOUS BLD VENIPUNCTURE: CPT

## 2020-08-10 PROCEDURE — 85025 COMPLETE CBC W/AUTO DIFF WBC: CPT

## 2020-08-10 PROCEDURE — 80053 COMPREHEN METABOLIC PANEL: CPT

## 2020-08-18 ENCOUNTER — HOSPITAL ENCOUNTER (OUTPATIENT)
Dept: GENERAL RADIOLOGY | Age: 70
Discharge: HOME | End: 2020-08-18
Payer: MEDICARE

## 2020-08-18 DIAGNOSIS — R06.09 DYSPNEA ON EXERTION: ICD-10-CM

## 2020-08-18 PROCEDURE — 71046 X-RAY EXAM CHEST 2 VIEWS: CPT

## 2020-08-24 ENCOUNTER — APPOINTMENT (RX ONLY)
Dept: URBAN - METROPOLITAN AREA CLINIC 23 | Facility: CLINIC | Age: 70
Setting detail: DERMATOLOGY
End: 2020-08-24

## 2020-08-24 VITALS — TEMPERATURE: 97.7 F

## 2020-08-24 DIAGNOSIS — B07.8 OTHER VIRAL WARTS: ICD-10-CM

## 2020-08-24 PROCEDURE — ? LIQUID NITROGEN

## 2020-08-24 PROCEDURE — 17110 DESTRUCTION B9 LES UP TO 14: CPT

## 2020-08-24 PROCEDURE — ? COUNSELING

## 2020-08-24 PROCEDURE — ? OTHER

## 2020-08-24 ASSESSMENT — LOCATION SIMPLE DESCRIPTION DERM
LOCATION SIMPLE: RIGHT MIDDLE FINGER
LOCATION SIMPLE: RIGHT RING FINGER

## 2020-08-24 ASSESSMENT — LOCATION ZONE DERM: LOCATION ZONE: FINGER

## 2020-08-24 NOTE — PROCEDURE: LIQUID NITROGEN
Medical Necessity Information: It is in your best interest to select a reason for this procedure from the list below. All of these items fulfill various CMS LCD requirements except the new and changing color options.
Number Of Freeze-Thaw Cycles: 1 freeze-thaw cycle
Include Z78.9 (Other Specified Conditions Influencing Health Status) As An Associated Diagnosis?: No
Consent: The patient's verbal consent was obtained including but not limited to risks of crusting, scabbing, blistering, scarring, darker or lighter pigmentary change, recurrence, incomplete removal and infection.
Post-Care Instructions: I reviewed with the patient in detail post-care instructions. Patient is to wear sunprotection, and avoid picking at any of the treated lesions. Pt may apply Vaseline to crusted or scabbing areas.
Pared With?: 15 blade
Detail Level: Detailed
Medical Necessity Clause: Treatment was medically necessary because the spot was

## 2020-09-14 NOTE — PROGRESS NOTES
Bedside shift report received from Grant New RN (offgoing nurse). Report given to Anen Ramires RN. Vital signs stable. No complaints present. Patient in stable condition. Refill Authorization Note    is requesting a refill authorization.    Brief assessment and rationale for refill: APPROVE: prr          Medication Therapy Plan: CDMR. FLOS    Medication reconciliation completed: No                    Comments:      Orders Placed This Encounter    atorvastatin (LIPITOR) 40 MG tablet      Requested Prescriptions   Signed Prescriptions Disp Refills    atorvastatin (LIPITOR) 40 MG tablet 90 tablet 0     Sig: TAKE 1 TABLET BY MOUTH EVERY DAY       Cardiovascular:  Antilipid - Statins Failed - 9/11/2020 12:36 AM        Failed - Lipid Panel completed in last 360 days     Lab Results   Component Value Date    CHOL 104 (L) 01/25/2019    HDL 37 (L) 01/25/2019    LDLCALC 43.2 (L) 01/25/2019    TRIG 119 01/25/2019             Failed - ALT is 94 or below and within 360 days     ALT   Date Value Ref Range Status   08/08/2019 21 10 - 44 U/L Final   01/27/2019 10 10 - 44 U/L Final   01/26/2019 12 10 - 44 U/L Final     ALT (SGPT), POC   Date Value Ref Range Status   01/24/2019 20 10 - 47 U/L Final              Failed - AST is 54 or below and within 360 days     AST   Date Value Ref Range Status   08/08/2019 23 10 - 40 U/L Final   01/27/2019 14 10 - 40 U/L Final   01/26/2019 16 10 - 40 U/L Final     AST (SGOT), POC   Date Value Ref Range Status   01/24/2019 27 11 - 38 U/L Final              Passed - Patient is at least 18 years old        Passed - Office visit in past 12 months or future 90 days.     Recent Outpatient Visits            1 month ago Uncontrolled type 2 diabetes mellitus with diabetic neuropathy, without long-term current use of insulin    [place] - Endo/Diabetes Sussy Goldberg NP    5 months ago Essential tremor    [facility] Edward Stratton [place]    8 months ago Rhinitis, unspecified type    [facility] - Family Medicine Moiz Goldberg MD    8 months ago Uncontrolled type 2 diabetes mellitus with diabetic neuropathy, without long-term current use of insulin     [place] - Endo/Diabetes Sussy Goldberg NP    9 months ago Encounter for hearing examination, unspecified whether abnormal findings    [facility] Zeus Dick MD          Future Appointments              In 2 months LAB, [facility], [facility]    In 2 months Sussy Goldberg NP [place] - Endo/Diabetes, [facility]                    Appointments  past 12m or future 3m with PCP    Date Provider   Last Visit   1/8/2020 Moiz Goldberg MD   Next Visit   Visit date not found Moiz Goldberg MD   ED visits in past 90 days: [unfilled]     Note composed:12:33 PM 09/14/2020

## 2020-10-07 ENCOUNTER — APPOINTMENT (RX ONLY)
Dept: URBAN - METROPOLITAN AREA CLINIC 23 | Facility: CLINIC | Age: 70
Setting detail: DERMATOLOGY
End: 2020-10-07

## 2020-10-07 VITALS — TEMPERATURE: 98.2 F

## 2020-10-07 DIAGNOSIS — B07.8 OTHER VIRAL WARTS: ICD-10-CM

## 2020-10-07 PROCEDURE — ? PRESCRIPTION

## 2020-10-07 PROCEDURE — 99212 OFFICE O/P EST SF 10 MIN: CPT

## 2020-10-07 PROCEDURE — ? COUNSELING

## 2020-10-07 RX ORDER — IMIQUIMOD 50 MG/G
CREAM TOPICAL
Qty: 1 | Refills: 2 | Status: ERX | COMMUNITY
Start: 2020-10-07

## 2020-10-07 RX ADMIN — IMIQUIMOD: 50 CREAM TOPICAL at 00:00

## 2020-10-07 ASSESSMENT — LOCATION DETAILED DESCRIPTION DERM: LOCATION DETAILED: RIGHT DISTAL RADIAL DORSAL RING FINGER

## 2020-10-07 ASSESSMENT — LOCATION ZONE DERM: LOCATION ZONE: FINGER

## 2020-10-07 ASSESSMENT — LOCATION SIMPLE DESCRIPTION DERM: LOCATION SIMPLE: RIGHT RING FINGER

## 2020-12-01 ENCOUNTER — HOSPITAL ENCOUNTER (OUTPATIENT)
Dept: LAB | Age: 70
Discharge: HOME | End: 2020-12-01
Payer: MEDICARE

## 2020-12-01 DIAGNOSIS — E89.0 HYPOTHYROIDISM FOLLOWING RADIOIODINE THERAPY: ICD-10-CM

## 2020-12-01 LAB — TSH W FREE THYROID I,TSHELE: 0.54 UIU/ML (ref 0.36–3.74)

## 2020-12-01 PROCEDURE — 36415 COLL VENOUS BLD VENIPUNCTURE: CPT

## 2020-12-01 PROCEDURE — 84443 ASSAY THYROID STIM HORMONE: CPT

## 2020-12-10 ENCOUNTER — HOSPITAL ENCOUNTER (OUTPATIENT)
Dept: PHYSICAL THERAPY | Age: 70
Discharge: HOME | End: 2020-12-10
Payer: MEDICARE

## 2020-12-10 PROCEDURE — 97162 PT EVAL MOD COMPLEX 30 MIN: CPT

## 2020-12-10 PROCEDURE — 97140 MANUAL THERAPY 1/> REGIONS: CPT

## 2020-12-10 NOTE — PROGRESS NOTES
Devin Bolden  : 1950  [place]  [place] [address] at [place] [address], [address] [facility]  [phone]   [phone]      OUTPATIENT PHYSICAL THERAPY: Daily Treatment Note 12/10/2020  Visit Count:  1    ICD-10: Treatment Diagnosis: pain in joint, right shoulder M25.511  ICD-10: Treatment Diagnosis: pain in joint, left shoulder M25.512  Precautions/Allergies:   Ciprofloxacin; Adhesive tape-silicones; Ceclor [cefaclor]; and Symbyax [olanzapine-fluoxetine]   TREATMENT PLAN:  Effective Dates: 12/10/2020 TO 3/10/2021 (90 days). Frequency/Duration: 2 times a week for 90 Day(s) MEDICAL/REFERRING DIAGNOSIS:  Right shoulder pain [M25.511]  Pain in left shoulder [M25.512]   DATE OF ONSET: chronic  REFERRING PHYSICIAN: Edin Fuchs MD MD Orders: evaluate and treat  Return MD Appointment: as needed. Pre-treatment Symptoms/Complaints:  Patient notes cortisone injections have helped with pain and looking forward to therapy to increase strength in her UE. Pain: Initial: Pain Intensity 1: 6  Pain Location 1: Shoulder  Pain Orientation 1: Left, Right /10 Post Session:  5/10   Medications Last Reviewed:  12/10/2020  Updated Objective Findings:  See evaluation note from today  TREATMENT:     THERAPEUTIC EXERCISE: (5 minutes):  Exercises per grid below to improve mobility, strength, balance and coordination. Required moderate verbal, manual and tactile cues to promote proper body alignment, promote proper body posture, promote proper body mechanics and promote proper body breathing techniques. Progressed resistance, range, repetitions and complexity of movement as indicated.    Date:  12/10/20   Activity/Exercise Parameters   Review postural awareness X 5 minutes                               MANUAL THERAPY: (15 minutes): Joint mobilization and Soft tissue mobilization was utilized and necessary because of the patient's restricted joint motion, loss of articular motion and restricted motion of soft tissue. (Used abbreviations: MET - muscle energy technique; PNF - proprioceptive neuromuscular facilitation; NMR - neuromuscular re-education; a/p - anterior to posterior; p/a - posterior to anterior, FMP - functional movement patterns, SF - Superficial Fascia; BC - Bony contours; MP - muscle play; IASTM - instrument-assisted soft tissue mobilization)  · Supine bilateral anterior chest soft tissue mobilization with breathing techniques  · Supine bilateral shoulder PROM into shoulder ER/IR    MedBridge Portal  Treatment/Session Summary:    · Response to Treatment:  improved mobility noted in anterior chest, challenged with postural awareness and will benefit from additional training. · Communication/Consultation:  None today  · Equipment provided today:  None today  · Recommendations/Intent for next treatment session: Next visit will focus on postural awareness, strengthening, AROM/PROM of bilateral shoulders.     Total Treatment Billable Duration:  20 minutes treatment, 40 minute evaluation  PT Patient Time In/Time Out  Time In: 1530  Time Out: [address] [facility], PT    Future Appointments   Date Time Provider Uzair Pineda   12/15/2020  1:30 PM Shawna Fitzgerald., PT [facility]   12/21/2020  2:30 PM Jeannette FRANCO, PT [facility]   1/4/2021 10:30 AM Shawna Amilcar., PT [facility]   1/7/2021 10:30 AM Shawna Amilcar., PT [facility]   1/11/2021  3:30 PM Shawnajanay Jacquesf., PT [facility]   1/12/2021  2:15 PM Charli Dickens MD [facility]   1/14/2021 10:30 AM Shawna Fitzgerald., PT [facility]   2/11/2021  8:45 AM Charli Dickens MD [facility]   4/13/2021 10:40 AM Sawyer Painter MD [facility] ENDO   6/16/2021 10:30 AM Gina Houston MD [facility]

## 2020-12-10 NOTE — THERAPY EVALUATION
Tricia Waite  : 1950  [place]  [place] [address] [place] [address]  [phone]   [phone]        OUTPATIENT PHYSICAL THERAPY:Initial Assessment 12/10/2020   ICD-10: Treatment Diagnosis: pain in joint, right shoulder M25.511  ICD-10: Treatment Diagnosis: pain in joint, left shoulder M25.512  Precautions/Allergies:   Ciprofloxacin; Adhesive tape-silicones; Ceclor [cefaclor]; and Symbyax [olanzapine-fluoxetine]   TREATMENT PLAN:  Effective Dates: 12/10/2020 TO 3/10/2021 (90 days). Frequency/Duration: 2 times a week for 90 Day(s) MEDICAL/REFERRING DIAGNOSIS:  Right shoulder pain [M25.511]  Pain in left shoulder [M25.512]   DATE OF ONSET: chronic  REFERRING PHYSICIAN: Fani Salas MD MD Orders: evaluate and treat  Return MD Appointment: as needed. INITIAL ASSESSMENT:  Ms. Yazmin Corona is a 78 y.o. female who presents to physical therapy with bilateral shoulder pain. She recently had cortisone injections in bilateral shoulders. Overall notes more discomfort in her right shoulder and challenged with sleeping and ROM of right shoulder. She presents with protracted shoulders and forward head posture. She presents with restrictions in her end range mobility in shoulder flexion and IR bilaterally. She is challenged with functional tasks including reaching overhead and getting dressed. She would benefit from skilled physical therapy to improve overall mobility and function with daily activities. PROBLEM LIST (Impacting functional limitations):  1. Decreased Strength  2. Decreased ADL/Functional Activities  3. Decreased Transfer Abilities  4. Increased Pain  5. Decreased Activity Tolerance  6. Decreased Flexibility/Joint Mobility INTERVENTIONS PLANNED: (Treatment may consist of any combination of the following)  1. Heat  2. Home Exercise Program (HEP)  3.  Manual Therapy  4. Neuromuscular Re-education/Strengthening  5. Range of Motion (ROM)  6. Therapeutic Activites  7. Therapeutic Exercise/Strengthening     GOALS: (Goals have been discussed and agreed upon with patient.)  Discharge Goals: Time Frame: 90 days  1. Patient demonstrates independence with her HEP without verbal cueing from her therapist.  2. Patient demonstrates full AROM of bilateral shoulder to perform overhead activities. 3. Patient demonstrates correct sleeping positions and able to sleep for 4 consecutive hours without shoulder discomfort. 4. Improve DASH outcome measure score from 29/55 to 15/55 to perform daily ADLs. OUTCOME MEASURE:   Tool Used: Disabilities of the Arm, Shoulder and Hand (DASH) Questionnaire - Quick Version  Score:  Initial: 29/55  Most Recent: X/55 (Date: -- )   Interpretation of Score: The DASH is designed to measure the activities of daily living in person's with upper extremity dysfunction or pain. Each section is scored on a 1-5 scale, 5 representing the greatest disability. The scores of each section are added together for a total score of 55. MEDICAL NECESSITY:   · Patient is expected to demonstrate progress in strength, range of motion, balance, coordination and functional technique to increase independence with ADLs without shoulder discomfort and improve postural awareness and strength. REASON FOR SERVICES/OTHER COMMENTS:  · Patient continues to require skilled intervention due to challenged with daily discomfort in bilateral shoulders and unable to perform daily activities secondary to pain levels. .  Total Duration:  PT Patient Time In/Time Out  Time In: 1530  Time Out: 1630    Rehabilitation Potential For Stated Goals: Excellent  Regarding Jihan Jay's therapy, I certify that the treatment plan above will be carried out by a therapist or under their direction.   Thank you for this referral,  Lanre Pineda, PT     Referring Physician Signature: Jeffrey Horton MD _______________________________ Date _____________     PAIN/SUBJECTIVE:   Initial: Pain Intensity 1: 6  Pain Location 1: Shoulder  Pain Orientation 1: Left, Right  Post Session:  5/10   HISTORY:   History of Injury/Illness (Reason for Referral):  Chronic history of bilateral shoulder pain with right greater than left. Notes most challenged with sleeping positions, reaching overhead and behind her back. She recently obtained cortisone injections in her shoulders. Current x-rays show more degenerative changes in her left shoulder compared to her right. She does not note any radicular symptoms in her UE. Notes discomfort in all positions from sleeping, sitting and standing. Patient denies dizziness, drop attacks, numbness/tingling, bowel/bladder dysfunction and/or unexplained weight gain/loss. Past Medical History/Comorbidities:   Ms. Marylin Hahn  has a past medical history of Adverse effect of anesthesia, Allergic rhinitis, Anxiety, Arthritis, Asthma, Depression, Endocarditis (), Heart murmur, Hematuria, History of MRSA infection (on left breast), HLD (hyperlipidemia) ( ), Hypertension, Hypertension, Hypothyroid, Hypothyroidism due to acquired atrophy of thyroid (2015), Intertrigo, Irregular heart beat, Mass of colon, Morbid obesity ([address] Persistent atrial fibrillation ([address] Reactive airway disease with status asthmaticus, Scoliosis (2016), Sleep apnea, Varicose veins, and VSD (ventricular septal defect) (2016).   Ms. Marylin Hahn  has a past surgical history that includes hx lipoma resection (Right); hx appendectomy; hx hernia repair (incisional [id number]); hx colonoscopy (, ); hx breast reduction (Bilateral, 2016); hx  section; hx dilation and curettage; hx colectomy (Right, ); hx heent (); hx implantable cardioverter defibrillator (2018); hx afib ablation ( & 2019); hx refractive surgery; hx knee replacement (Right, 2018); hx knee replacement (Left, 04/17/2019); hx heart catheterization; pr cardiac surg procedure unlist (09/2018); pr cardiac surg procedure unlist (11/2018); pr cardiac surg procedure unlist (12/2018); and pr cardiac surg procedure unlist (02/2019). Social History/Living Environment:     lives in a private home with her , challenged with house activities that involve overhead and reaching activities. Prior Level of Function/Work/Activity:  Retired . Dominant Side:         RIGHT   Ambulatory/Rehab Services H2 Model Falls Risk Assessment   Risk Factors:       No Risk Factors Identified Ability to Rise from Chair:       (1)  Pushes up, successful in one attempt   Falls Prevention Plan:       No modifications necessary   Total: (5 or greater = High Risk): 1   ©2010 [facility] [employer]. All Rights Reserved. [facility] Patent #[id number]. Federal Law prohibits the replication, distribution or use without written permission from [facility] [employer]   Current Medications:       Current Outpatient Medications:     Synthroid 125 mcg tablet, Take 1 Tab by mouth Daily (before breakfast). , Disp: 90 Tab, Rfl: 3    sertraline (ZOLOFT) 50 mg tablet, Take 1 Tab by mouth daily. , Disp: 90 Tab, Rfl: 4    amLODIPine (NORVASC) 5 mg tablet, Take 1 Tab by mouth daily. , Disp: 90 Tab, Rfl: 4    mometasone (NASONEX) 50 mcg/actuation nasal spray, 2 Sprays by Both Nostrils route daily. , Disp: 1 Container, Rfl: 11    fluticasone propion-salmeteroL (Advair Diskus) 250-50 mcg/dose diskus inhaler, Take 1 Puff by inhalation two (2) times a day.  RINSE MOUTH WELL AFTER USE, Disp: 1 Inhaler, Rfl: 11    albuterol (Ventolin HFA) 90 mcg/actuation inhaler, Take 2 Puffs by inhalation every six (6) hours as needed for Wheezing., Disp: 1 Inhaler, Rfl: 11    apixaban (Eliquis) 5 mg tablet, Take 1 Tab by mouth two (2) times a day., Disp: 180 Tab, Rfl: 3    dofetilide (TIKOSYN) 500 mcg capsule, Take 1 Cap by mouth every twelve (12) hours every twelve (12) hours. , Disp: 60 Cap, Rfl: 11    losartan (COZAAR) 100 mg tablet, Take 1 Tab by mouth daily. , Disp: 30 Tab, Rfl: 11    montelukast (SINGULAIR) 10 mg tablet, Take 1 Tab by mouth daily. , Disp: 90 Tab, Rfl: 4    acetaminophen (TYLENOL) 325 mg cap, Take  by mouth., Disp: , Rfl:     Cetirizine (ZYRTEC) 10 mg cap, Take 10 Caps by mouth daily. , Disp: , Rfl:     cpap machine kit, by Does Not Apply route., Disp: , Rfl:    Date Last Reviewed:  12/10/2020     Number of Personal Factors/Comorbidities that affect the Plan of Care: 1-2: MODERATE COMPLEXITY   EXAMINATION:   Observation/Orthostatic Postural Assessment:         Shoulder symmetry exhibits bilateral protraction. Shoulder girdles are right elevated, bilateral protraction. Scapular position is right elevated. Clavicles are right slightly elevated. Soft tissue observations indicates restrictions in anterior chest, pectoralis major and minor, subscapularis, infraspinatus, periscapular muscles bilaterally. Patient exhibits a increased cervical lordosis,  intacreased thoracic kyphosis. Palpation:  Myofascial assessment indicates restrictions in anterior chest. Muscle length testing levator scapulae with ipsilateral arm abduction is restricted bilaterally. Upper trapezius length is restricted bilaterally. Pectoralis minor/major and latissimus dorsi length is restricted bilaterally. Scalene muscle length is restricted bilaterally, right greater than left. ROM: Gross active cervical spine rotation is 90% available bilaterally. Apley's scratch test for functional ER/IR is right: ER - base of skull, IR - to iliac crest, left: ER - T1, IR - to L5. [facility] Passive Accessory Motion: Glenohumeral mobility is restricted for inferior glides, right greater than left.     AROM(PROM) in degrees Right Left   Shoulder flexion 0-155 0-160   Shoulder abduction (palm down) 0-95 0-110   Shoulder extension 0-20 0-20   Shoulder internal rotation (IR)  (measured at 90 degrees of abduction) 0-50 0-50   ER (measured at 90 degrees of abduction) 0-55 0-50     Strength:   Manual Muscle Test (*/5) Right Left   Shoulder flexion 4 4   Shoulder extension 4 4   Shoulder abduction 4 4   Shoulder adduction 4 4   Shoulder ER 4 4   Shoulder IR 4 4   Upper trapezius 4 4   Middle trapezius 4 4   Lower trapezius 4 4   Rhomboids 4 4   Serratus Anterior 4 4   Elbow flexion 5 5   Elbow extension 5 5   Special Tests:    Impingement tests performed on the bilaterally shoulder:  Araiza-Kim test: positive. Neer test: negative. Stability tests performed on the bilaterally shoulder:  Sulcus sign: negative. Apprehension test: negative. Neurological Screen:  Myotomes: Key muscle strength testing for bilateral UE is intact. Dermatomes: Sensation testing through bilateral UE for light touch is intact. Reflexes: Biceps (C5), brachioradialis (C6), and triceps (C7) are 2+ and WFL. Neural tension tests: Upper limb tension test A is negative for exacerbation of patient's symptoms    Functional Mobility:  Challenged with sitting postures, reaching overhead and forward reaching. Body Structures Involved:  1. Thoracic Cage  2. Joints  3. Muscles Body Functions Affected:  1. Sensory/Pain  2. Neuromusculoskeletal  3. Movement Related Activities and Participation Affected:  1. General Tasks and Demands  2. Mobility  3. Self Care  4.  Community, Social and [place]   Number of elements (examined above) that affect the Plan of Care: 3: MODERATE COMPLEXITY   CLINICAL PRESENTATION:   Presentation: Evolving clinical presentation with changing clinical characteristics: MODERATE COMPLEXITY   CLINICAL DECISION MAKING:   Use of outcome tool(s) and clinical judgement create a POC that gives a: Questionable prediction of patient's progress: MODERATE COMPLEXITY

## 2020-12-15 ENCOUNTER — HOSPITAL ENCOUNTER (OUTPATIENT)
Dept: PHYSICAL THERAPY | Age: 70
Discharge: HOME | End: 2020-12-15
Payer: MEDICARE

## 2020-12-15 PROCEDURE — 97110 THERAPEUTIC EXERCISES: CPT

## 2020-12-15 PROCEDURE — 97140 MANUAL THERAPY 1/> REGIONS: CPT

## 2020-12-15 NOTE — PROGRESS NOTES
Severa Rink  : 1950  [place]  [place] [address] at [place] [address], [address] [facility]  [phone]   [phone]        OUTPATIENT PHYSICAL THERAPY: Daily Treatment Note 12/15/2020  Visit Count:  2       ICD-10: Treatment Diagnosis: pain in joint, right shoulder M25.511  ICD-10: Treatment Diagnosis: pain in joint, left shoulder M25.512  Precautions/Allergies:   Ciprofloxacin; Adhesive tape-silicones; Ceclor [cefaclor]; and Symbyax [olanzapine-fluoxetine]   TREATMENT PLAN:  Effective Dates: 12/10/2020 TO 3/10/2021 (90 days).  Frequency/Duration: 2 times a week for 90 Day(s) MEDICAL/REFERRING DIAGNOSIS:  Right shoulder pain [M25.511]  Pain in left shoulder Robyn Park MD MD Orders: evaluate and treat  Return MD Appointment: as needed.        Pre-treatment Symptoms/Complaints:  Patient notes a little sore from last session, has noted increased discomfort in her right shoulder. Pain: Initial: Pain Intensity 1: 5  Pain Location 1: Shoulder  Pain Orientation 1: Left, Right /10 Post Session:  4/10   Medications Last Reviewed:  12/15/2020  Updated Objective Findings:  None Today  TREATMENT:   THERAPEUTIC EXERCISE: (25 minutes):  Exercises per grid below to improve mobility, strength, balance and coordination. Required moderate verbal, manual and tactile cues to promote proper body alignment, promote proper body posture, promote proper body mechanics and promote proper body breathing techniques.   Progressed resistance, range, repetitions and complexity of movement as indicated.    Date:  12/15/20   Activity/Exercise Parameters   Review postural awareness X 5 minutes: sitting techniques    Sleeping position X 5 minute review of pillow use and positions    Seated scapular retraction X 10 reps, 5 sec holds   Seated scapular retraction with t-band X 10 reps, 5 sec holds, green band                           MANUAL THERAPY: (30 minutes): Joint mobilization and Soft tissue mobilization was utilized and necessary because of the patient's restricted joint motion, loss of articular motion and restricted motion of soft tissue. (Used abbreviations: MET - muscle energy technique; PNF - proprioceptive neuromuscular facilitation; NMR - neuromuscular re-education; a/p - anterior to posterior; p/a - posterior to anterior, FMP - functional movement patterns, SF - Superficial Fascia; BC - Bony contours; MP - muscle play; IASTM - instrument-assisted soft tissue mobilization)  · Supine bilateral anterior chest soft tissue mobilization with breathing techniques  · Supine bilateral shoulder PROM into shoulder ER/IR  · Supine cervical spine manual traction and suboccipital release     MedBridge Portal  Treatment/Session Summary:    · Response to Treatment:  improving overall mobility in anterior chest and shoulder ROM today, improving awareness of shoulder girdle positioning. · Communication/Consultation:  None today  · Equipment provided today:  None today  · Recommendations/Intent for next treatment session: Next visit will focus on postural stability and awareness, strengthening of UE and ROM of bilateral shoulders, core stability.     Total Treatment Billable Duration:  55 minutes  PT Patient Time In/Time Out  Time In: 1330  Time Out: [address], PT    Future Appointments   Date Time Provider Uzair Miriam   12/21/2020  2:30 PM Ruthann Whitt., PT [facility]   1/4/2021 10:30 AM Ruthann Whitt., PT [facility]   1/7/2021 10:30 AM Ruthann Whitt., PT [facility]   1/11/2021  3:30 PM Ruthann Whitt., PT [facility]   1/12/2021  2:15 PM Niya Levine MD [facility]   1/14/2021 10:30 AM Ruthann Whitt., PT [facility]   2/11/2021  8:45 AM Niya Levine MD [facility]   4/13/2021 10:40 AM Maximino Bhardwaj MD [facility] 6/16/2021 10:30 AM Renan Guerin MD [facility]

## 2020-12-21 ENCOUNTER — HOSPITAL ENCOUNTER (OUTPATIENT)
Dept: PHYSICAL THERAPY | Age: 70
Discharge: HOME | End: 2020-12-21
Payer: MEDICARE

## 2020-12-21 PROCEDURE — 97140 MANUAL THERAPY 1/> REGIONS: CPT

## 2020-12-21 PROCEDURE — 97110 THERAPEUTIC EXERCISES: CPT

## 2020-12-22 NOTE — PROGRESS NOTES
Monica Clifford  : 1950  [place]  [place] [facility] [place] [facility] at [place] [address], [address] [facility]  [phone]   [phone]        OUTPATIENT PHYSICAL THERAPY: Daily Treatment Note 2020  Visit Count:  3       ICD-10: Treatment Diagnosis: pain in joint, right shoulder M25.511  ICD-10: Treatment Diagnosis: pain in joint, left shoulder M25.512  Precautions/Allergies:   Ciprofloxacin; Adhesive tape-silicones; Ceclor [cefaclor]; and Symbyax [olanzapine-fluoxetine]   TREATMENT PLAN:  Effective Dates: 12/10/2020 TO 3/10/2021 (90 days).  Frequency/Duration: 2 times a week for 90 Day(s) MEDICAL/REFERRING DIAGNOSIS:  Right shoulder pain [M25.511]  Pain in left shoulder Oanh Braxton MD MD Orders: evaluate and treat  Return MD Appointment: as needed.        Pre-treatment Symptoms/Complaints:  Patient notes new exercises are going well at home and notes less discomfort with activities. Pain: Initial: Pain Intensity 1: 5  Pain Location 1: Shoulder  Pain Orientation 1: Left, Right /10 Post Session:  4/10   Medications Last Reviewed:  2020  Updated Objective Findings:  soft tissue restrictions in anterior chest bilaterally, protracted shoulder posture  TREATMENT:   THERAPEUTIC EXERCISE: (25 minutes):  Exercises per grid below to improve mobility, strength, balance and coordination. Required moderate verbal, manual and tactile cues to promote proper body alignment, promote proper body posture, promote proper body mechanics and promote proper body breathing techniques.   Progressed resistance, range, repetitions and complexity of movement as indicated.    Date:  2020   Activity/Exercise Parameters   Review postural awareness X 5 minutes: sitting techniques    Sleeping position     Seated scapular retraction X 10 reps, 5 sec holds   Seated scapular retraction with t-band X 10 reps, 5 sec holds, green band    Standing rows X 10 reps elbows flexed, green band  X 20 reps, elbows extended, green band    Standing shoulder ER/IR X 10 reps, BUE, green band               MANUAL THERAPY: (30 minutes): Joint mobilization and Soft tissue mobilization was utilized and necessary because of the patient's restricted joint motion, loss of articular motion and restricted motion of soft tissue. (Used abbreviations: MET - muscle energy technique; PNF - proprioceptive neuromuscular facilitation; NMR - neuromuscular re-education; a/p - anterior to posterior; p/a - posterior to anterior, FMP - functional movement patterns, SF - Superficial Fascia; BC - Bony contours; MP - muscle play; IASTM - instrument-assisted soft tissue mobilization)  · Supine bilateral anterior chest soft tissue mobilization with breathing techniques  · Supine bilateral shoulder PROM into shoulder ER/IR  · Supine cervical spine manual traction and suboccipital release     MedBridge Portal  Treatment/Session Summary:    · Response to Treatment:  improving mobility in soft tissue through anterior chest, continues to present with muscle imbalances from periscapular musculature. · Communication/Consultation:  None today  · Equipment provided today:  None today  · Recommendations/Intent for next treatment session: Next visit will focus on postural stability and awareness, strengthening of UE and ROM of bilateral shoulders, core stability.     Total Treatment Billable Duration:  55 minutes  PT Patient Time In/Time Out  Time In: 1430  Time Out: [facility],     Future Appointments   Date Time Provider [facility]   1/4/2021 10:30 AM Elizabeth FRANCO PT OFF [facility]   1/7/2021 10:30 AM Nisha Victoria, PT J[facility]   1/11/2021  3:30 PM Nisha Victoria, PT [facility]   1/12/2021  2:15 PM Elizabet Mart MD [facility]   1/14/2021 10:30 AM Nisha Victoria, PT [facility]   2/11/2021  8:45 AM Sherri Diego MD [facility]   4/13/2021 10:40 AM Jaskaran Galdamez MD END [facility]   6/16/2021 10:30 AM [facility]

## 2021-01-04 ENCOUNTER — HOSPITAL ENCOUNTER (OUTPATIENT)
Dept: PHYSICAL THERAPY | Age: 71
Discharge: HOME | End: 2021-01-04
Payer: MEDICARE

## 2021-01-04 PROCEDURE — 97140 MANUAL THERAPY 1/> REGIONS: CPT

## 2021-01-04 PROCEDURE — 97110 THERAPEUTIC EXERCISES: CPT

## 2021-01-05 NOTE — PROGRESS NOTES
Adeline Michaud  : 1950  [place]  Secondary: Trudy Rodriguez at [place] [address], [address] [place] [address]  [phone]   [phone]        OUTPATIENT PHYSICAL THERAPY: Daily Treatment Note 2021  Visit Count:  4       ICD-10: Treatment Diagnosis: pain in joint, right shoulder M25.511  ICD-10: Treatment Diagnosis: pain in joint, left shoulder M25.512  Precautions/Allergies:   Ciprofloxacin; Adhesive tape-silicones; Ceclor [cefaclor]; and Symbyax [olanzapine-fluoxetine]   TREATMENT PLAN:  Effective Dates: 12/10/2020 TO 3/10/2021 (90 days).  Frequency/Duration: 2 times a week for 90 Day(s) MEDICAL/REFERRING DIAGNOSIS:  Right shoulder pain [M25.511]  Pain in left shoulder Martha Washington MD MD Orders: evaluate and treat  Return MD Appointment: as needed.        Pre-treatment Symptoms/Complaints:  Patient notes feeling improvements and less pain in bilateral shoulders. Was able to travel in the car with less discomfort in bilateral shoulders. Pain: Initial: Pain Intensity 1: 4  Pain Location 1: Shoulder  Pain Orientation 1: Left, Right /10 Post Session:  3/10   Medications Last Reviewed:  2021  Updated Objective Findings:  soft tissue restrictions in anterior chest bilaterally, protracted shoulder posture  TREATMENT:   THERAPEUTIC EXERCISE: (20 minutes):  Exercises per grid below to improve mobility, strength, balance and coordination. Required moderate verbal, manual and tactile cues to promote proper body alignment, promote proper body posture, promote proper body mechanics and promote proper body breathing techniques.   Progressed resistance, range, repetitions and complexity of movement as indicated.    Date:  2021   Activity/Exercise Parameters   Review postural awareness X 5 minutes: sitting techniques    Sleeping position     Seated scapular retraction X 10 reps, 5 sec holds   Seated scapular retraction with t-band X 10 reps, 5 sec holds, green band    Standing rows X 10 reps elbows flexed, green band  X 20 reps, elbows extended, green band    Standing shoulder ER/IR X 10 reps, BUE, green band    Seated elbow flexion X 10 reps, BUE, green band   Standing shoulder flexion X 10 reps, BUE red band         MANUAL THERAPY: (25 minutes): Joint mobilization and Soft tissue mobilization was utilized and necessary because of the patient's restricted joint motion, loss of articular motion and restricted motion of soft tissue. (Used abbreviations: MET - muscle energy technique; PNF - proprioceptive neuromuscular facilitation; NMR - neuromuscular re-education; a/p - anterior to posterior; p/a - posterior to anterior, FMP - functional movement patterns, SF - Superficial Fascia; BC - Bony contours; MP - muscle play; IASTM - instrument-assisted soft tissue mobilization)  · Supine bilateral anterior chest soft tissue mobilization with breathing techniques  · Supine bilateral shoulder PROM into shoulder ER/IR  · Supine cervical spine manual traction and suboccipital release     MedBridge Portal  Treatment/Session Summary:    · Response to Treatment:  continues to demonstrate improved ROM in bilateral shoulders and decreased soft tissue restrictions in anterior chest. improving endurance with exercises. · Communication/Consultation:  None today  · Equipment provided today:  None today  · Recommendations/Intent for next treatment session: Next visit will focus on postural stability and awareness, strengthening of UE and ROM of bilateral shoulders, core stability.     Total Treatment Billable Duration:  45 minutes  PT Patient Time In/Time Out  Time In: 5198  Time Out: Ze Coburn, PT    Future Appointments   Date Time Provider Uzair Pineda   1/7/2021 10:30 AM Zoe Hawthorne, MANUEL [facility]   1/11/2021  3:30 PM [facility] PT [facility]   1/12/2021  2:15 PM Josef Ingram MD [facility]   1/14/2021 10:30 AM Jovany Harden, PT [facility]   2/11/2021  8:45 AM Minus Killer Josef Graham MD [facility]   4/13/2021 10:40 AM Jose Julian MD END [facility] END[facility]   6/16/2021 10:30 AM Niraj Campa MD [facility]

## 2021-01-06 ENCOUNTER — APPOINTMENT (RX ONLY)
Dept: URBAN - METROPOLITAN AREA CLINIC 23 | Facility: CLINIC | Age: 71
Setting detail: DERMATOLOGY
End: 2021-01-06

## 2021-01-06 VITALS — TEMPERATURE: 98.3 F

## 2021-01-06 DIAGNOSIS — L72.8 OTHER FOLLICULAR CYSTS OF THE SKIN AND SUBCUTANEOUS TISSUE: ICD-10-CM

## 2021-01-06 DIAGNOSIS — B07.8 OTHER VIRAL WARTS: ICD-10-CM | Status: IMPROVED

## 2021-01-06 DIAGNOSIS — Z71.89 OTHER SPECIFIED COUNSELING: ICD-10-CM

## 2021-01-06 DIAGNOSIS — L70.8 OTHER ACNE: ICD-10-CM

## 2021-01-06 DIAGNOSIS — D49.2 NEOPLASM OF UNSPECIFIED BEHAVIOR OF BONE, SOFT TISSUE, AND SKIN: ICD-10-CM

## 2021-01-06 DIAGNOSIS — L82.1 OTHER SEBORRHEIC KERATOSIS: ICD-10-CM

## 2021-01-06 DIAGNOSIS — L57.8 OTHER SKIN CHANGES DUE TO CHRONIC EXPOSURE TO NONIONIZING RADIATION: ICD-10-CM | Status: STABLE

## 2021-01-06 PROCEDURE — ? TREATMENT REGIMEN

## 2021-01-06 PROCEDURE — 99213 OFFICE O/P EST LOW 20 MIN: CPT | Mod: 25

## 2021-01-06 PROCEDURE — ? OTHER

## 2021-01-06 PROCEDURE — ? ACNE SURGERY

## 2021-01-06 PROCEDURE — ? COUNSELING

## 2021-01-06 PROCEDURE — 10040 EXTRACTION: CPT

## 2021-01-06 ASSESSMENT — LOCATION DETAILED DESCRIPTION DERM
LOCATION DETAILED: LEFT AXILLARY VAULT
LOCATION DETAILED: UPPER STERNUM
LOCATION DETAILED: RIGHT LATERAL BREAST 6-7:00 REGION
LOCATION DETAILED: LEFT RIB CAGE
LOCATION DETAILED: RIGHT SUPERIOR MEDIAL UPPER BACK
LOCATION DETAILED: LEFT SUPERIOR MEDIAL UPPER BACK
LOCATION DETAILED: RIGHT DISTAL RADIAL DORSAL RING FINGER

## 2021-01-06 ASSESSMENT — LOCATION ZONE DERM
LOCATION ZONE: AXILLAE
LOCATION ZONE: FINGER
LOCATION ZONE: TRUNK

## 2021-01-06 ASSESSMENT — LOCATION SIMPLE DESCRIPTION DERM
LOCATION SIMPLE: LEFT AXILLARY VAULT
LOCATION SIMPLE: RIGHT BREAST
LOCATION SIMPLE: LEFT UPPER BACK
LOCATION SIMPLE: RIGHT RING FINGER
LOCATION SIMPLE: RIGHT UPPER BACK
LOCATION SIMPLE: CHEST
LOCATION SIMPLE: ABDOMEN

## 2021-01-06 NOTE — PROCEDURE: ACNE SURGERY
Render Number Of Lesions Treated: no
Consent was obtained and risks were reviewed including but not limited to scarring, infection, bleeding, scabbing, incomplete removal, and allergy to anesthesia.
Post-Care Instructions: I reviewed with the patient in detail post-care instructions. Patient is to keep the treatment areaas dry overnight, and then apply bacitracin twice daily until healed. Patient may apply hydrogen peroxide soaks to remove any crusting.
Acne Type: Milial cysts
Extraction Method: lancet and extractor
Prep Text (Optional): Prior to removal the treatment areas were prepped in the usual fashion.
Detail Level: Detailed

## 2021-01-06 NOTE — PROCEDURE: OTHER
Render Risk Assessment In Note?: no
Detail Level: Simple
Other (Free Text): Recommended following up with her PCP.  Nodules present and she had her breast reduction years ago. She is scheduled for a mammogram next month.
Note Text (......Xxx Chief Complaint.): This diagnosis correlates with the

## 2021-01-07 ENCOUNTER — HOSPITAL ENCOUNTER (OUTPATIENT)
Dept: PHYSICAL THERAPY | Age: 71
Discharge: HOME | End: 2021-01-07
Payer: MEDICARE

## 2021-01-07 PROCEDURE — 97110 THERAPEUTIC EXERCISES: CPT

## 2021-01-07 PROCEDURE — 97140 MANUAL THERAPY 1/> REGIONS: CPT

## 2021-01-08 NOTE — PROGRESS NOTES
Killian Rai  : 1950  [place]  Secondary: Trudy Rodriguez at [place] [address], [address] [facility]  [phone]   [phone]        OUTPATIENT PHYSICAL THERAPY: Daily Treatment Note 2021  Visit Count:  5       ICD-10: Treatment Diagnosis: pain in joint, right shoulder M25.511  ICD-10: Treatment Diagnosis: pain in joint, left shoulder M25.512  Precautions/Allergies:   Ciprofloxacin; Adhesive tape-silicones; Ceclor [cefaclor]; and Symbyax [olanzapine-fluoxetine]   TREATMENT PLAN:  Effective Dates: 12/10/2020 TO 3/10/2021 (90 days).  Frequency/Duration: 2 times a week for 90 Day(s) MEDICAL/REFERRING DIAGNOSIS:  Right shoulder pain [M25.511]  Pain in left shoulder Usman Smith MD MD Orders: evaluate and treat  Return MD Appointment: as needed.        Pre-treatment Symptoms/Complaints:  Patient notes continues to have bilateral shoulder pain bilaterally, increased pain noted with driving. Pain: Initial: Pain Intensity 1: 3  Pain Location 1: Shoulder  Pain Orientation 1: Left, Right /10 Post Session:  3/10   Medications Last Reviewed:  2021  Updated Objective Findings:  bilateral protracted shoulders, postural stability 4-/5  TREATMENT:   THERAPEUTIC EXERCISE: (25 minutes):  Exercises per grid below to improve mobility, strength, balance and coordination. Required moderate verbal, manual and tactile cues to promote proper body alignment, promote proper body posture, promote proper body mechanics and promote proper body breathing techniques.   Progressed resistance, range, repetitions and complexity of movement as indicated.    Date:  2021   Activity/Exercise Parameters   Review postural awareness X 5 minutes: sitting techniques    Sleeping position     Seated scapular retraction X 10 reps, 5 sec holds   Seated scapular retraction with t-band X 10 reps, 5 sec holds, green band    Standing rows X 10 reps elbows flexed, green band  X 20 reps, elbows extended, green band    Standing shoulder ER/IR X 10 reps, BUE, green band    Seated elbow flexion X 10 reps, BUE, green band   Standing shoulder flexion X 10 reps, BUE red band   Sitting posture X 5 minutes review of sitting positions: weight shift, weight acceptance, hip hinge, foot placement, pelvic position         MANUAL THERAPY: (30 minutes): Joint mobilization and Soft tissue mobilization was utilized and necessary because of the patient's restricted joint motion, loss of articular motion and restricted motion of soft tissue. (Used abbreviations: MET - muscle energy technique; PNF - proprioceptive neuromuscular facilitation; NMR - neuromuscular re-education; a/p - anterior to posterior; p/a - posterior to anterior, FMP - functional movement patterns, SF - Superficial Fascia; BC - Bony contours; MP - muscle play; IASTM - instrument-assisted soft tissue mobilization)  · Supine bilateral anterior chest soft tissue mobilization with breathing techniques  · Supine bilateral shoulder PROM into shoulder ER/IR, followed with NMR into new ranges  · Supine cervical spine manual traction and suboccipital release   · Supine bilateral subscapularis soft tissue mobilization with FMP of shoulder flexion    MedBridge Portal  Treatment/Session Summary:    · Response to Treatment:  improving awareness of sitting techniques and shoulder girdle position. Continues to be challenged with strength through her postural stability musculature. .  · Communication/Consultation:  None today  · Equipment provided today:  None today  · Recommendations/Intent for next treatment session: Next visit will focus on postural stability and awareness, strengthening of UE and ROM of bilateral shoulders, core stability.     Total Treatment Billable Duration:  55 minutes  PT Patient Time In/Time Out  Time In: 1030  Time Out: 1130  Dacia FRANCO Alton Began, PT    Future Appointments   Date Time Provider Uzair Aranai   1/11/2021  3:30 PM Cory Guerrero., PT [facility]   1/12/2021  2:15 PM Mallory Kinsey MD [facility]   1/14/2021 10:30 AM Cory Guerrero., PT OFF [facility]   2/11/2021  8:45 AM Baltazar Turk MD [facility]   4/13/2021 10:40 AM Domonique Kumar MD END [facility] END[facility]   6/16/2021 10:30 AM Seth Westfall MD [facility]

## 2021-01-11 ENCOUNTER — HOSPITAL ENCOUNTER (OUTPATIENT)
Dept: PHYSICAL THERAPY | Age: 71
Discharge: HOME | End: 2021-01-11
Payer: MEDICARE

## 2021-01-12 NOTE — PROGRESS NOTES
Chio Markham   (CON:5/3/[id number]) Therapy Center at  [address]  [phone]  [phone]             DATE: 1/11/2021    Patient canceled for appointment today due to dental work performed and needed to cancel appointment. Will plan to follow up on next scheduled visit.     Yuliet Garcia PT, DPT, CFMT

## 2021-01-14 ENCOUNTER — HOSPITAL ENCOUNTER (OUTPATIENT)
Dept: PHYSICAL THERAPY | Age: 71
Discharge: HOME | End: 2021-01-14
Payer: MEDICARE

## 2021-01-14 PROCEDURE — 97140 MANUAL THERAPY 1/> REGIONS: CPT

## 2021-01-14 PROCEDURE — 97110 THERAPEUTIC EXERCISES: CPT

## 2021-01-15 NOTE — PROGRESS NOTES
Pina Paris  : 1950  [place]  Secondary: [address] at [address]  [phone]   [phone]        OUTPATIENT PHYSICAL THERAPY: Daily Treatment Note 2021  Visit Count:  6       ICD-10: Treatment Diagnosis: pain in joint, right shoulder M25.511  ICD-10: Treatment Diagnosis: pain in joint, left shoulder M25.512  Precautions/Allergies:   Ciprofloxacin; Adhesive tape-silicones; Ceclor [cefaclor]; and Symbyax [olanzapine-fluoxetine]   TREATMENT PLAN:  Effective Dates: 12/10/2020 TO 3/10/2021 (90 days).  Frequency/Duration: 2 times a week for 90 Day(s) MEDICAL/REFERRING DIAGNOSIS:  Right shoulder pain [M25.511]  Pain in left shoulder Tana Ardon MD MD Orders: evaluate and treat  Return MD Appointment: as needed.        Pre-treatment Symptoms/Complaints:  Patient notes left shoulder is irritated today with certain movements, overall exercises are going well at home. Pain: Initial: Pain Intensity 1: 3  Pain Location 1: Shoulder  Pain Orientation 1: Left /10 Post Session:  3/10   Medications Last Reviewed:  2021  Updated Objective Findings:  bilateral protracted shoulders, postural stability 4-/5  TREATMENT:   THERAPEUTIC EXERCISE: (25 minutes):  Exercises per grid below to improve mobility, strength, balance and coordination. Required moderate verbal, manual and tactile cues to promote proper body alignment, promote proper body posture, promote proper body mechanics and promote proper body breathing techniques.   Progressed resistance, range, repetitions and complexity of movement as indicated.    Date:  2021   Activity/Exercise Parameters   Review postural awareness     Sleeping position X 5 minute review of pillow placement for neck and another pillow to hold shoulder in place.     Seated scapular retraction X 10 reps, 5 sec holds   Seated scapular retraction with t-band X 10 reps, 5 sec holds, green band    Standing rows X 10 reps elbows flexed, green band  X 20 reps, elbows extended, green band    Standing shoulder ER/IR X 10 reps, BUE, green band    Seated elbow flexion X 10 reps, BUE, green band   Standing shoulder flexion X 10 reps, BUE red band   Sitting posture X 5 minutes review of sitting positions: weight shift, weight acceptance, hip hinge, foot placement, pelvic position         MANUAL THERAPY: (30 minutes): Joint mobilization and Soft tissue mobilization was utilized and necessary because of the patient's restricted joint motion, loss of articular motion and restricted motion of soft tissue. (Used abbreviations: MET - muscle energy technique; PNF - proprioceptive neuromuscular facilitation; NMR - neuromuscular re-education; a/p - anterior to posterior; p/a - posterior to anterior, FMP - functional movement patterns, SF - Superficial Fascia; BC - Bony contours; MP - muscle play; IASTM - instrument-assisted soft tissue mobilization)  · Supine bilateral anterior chest soft tissue mobilization with breathing techniques  · Supine bilateral shoulder PROM into shoulder ER/IR, followed with NMR into new ranges  · Supine cervical spine manual traction and suboccipital release   · Supine bilateral subscapularis soft tissue mobilization with FMP of shoulder flexion    MedBridge Portal  Treatment/Session Summary:    · Response to Treatment:  continues to demonstrate improved ROM in bilateral shoulders, however left shoulder experienced increased pain today through ROM. · Communication/Consultation:  None today  · Equipment provided today:  None today  · Recommendations/Intent for next treatment session: Next visit will focus on postural stability and awareness, strengthening of UE and ROM of bilateral shoulders, core stability.     Total Treatment Billable Duration:  55 minutes  PT Patient Time In/Time Out  Time In: 1130  Time Out: [address], PT    Future Appointments   Date Time Provider Uzair Pineda   1/19/2021  2:30 PM [facility]eady., [facility]   1/21/2021  2:30 PM [facility]eady., [facility]   2/2/2021  2:30 PM [facility]., [facility]   2/4/2021  2:30 PM [facility]eady., [facility]   2/11/2021  8:45 AM Ginny Montano MD [facility]   2/11/2021 11:30 AM Mar[facility]eady., [facility]   2/18/2021 11:30 AM [facility]eady., [facility]   2/25/2021  2:30 PM Mar[facility] Beady., PT [facility]   4/13/2021 10:40 AM Catrachito Hernandez MD [facility]   6/16/2021 10:30 AM Rosabel Cox Shelvy Shone, MD [facility]

## 2021-01-19 ENCOUNTER — HOSPITAL ENCOUNTER (OUTPATIENT)
Dept: PHYSICAL THERAPY | Age: 71
Discharge: HOME | End: 2021-01-19
Payer: MEDICARE

## 2021-01-19 PROCEDURE — 97110 THERAPEUTIC EXERCISES: CPT

## 2021-01-19 PROCEDURE — 97140 MANUAL THERAPY 1/> REGIONS: CPT

## 2021-01-20 NOTE — PROGRESS NOTES
Ruby Haro  : 1950  [place]  Secondary: [address] at [address]  [phone]   [phone]        OUTPATIENT PHYSICAL THERAPY: Daily Treatment Note 2021  Visit Count:  7       ICD-10: Treatment Diagnosis: pain in joint, right shoulder M25.511  ICD-10: Treatment Diagnosis: pain in joint, left shoulder M25.512  Precautions/Allergies:   Ciprofloxacin; Adhesive tape-silicones; Ceclor [cefaclor]; and Symbyax [olanzapine-fluoxetine]   TREATMENT PLAN:  Effective Dates: 12/10/2020 TO 3/10/2021 (90 days).  Frequency/Duration: 2 times a week for 90 Day(s) MEDICAL/REFERRING DIAGNOSIS:  Right shoulder pain [M25.511]  Pain in left shoulder Carlo Cunha MD MD Orders: evaluate and treat  Return MD Appointment: as needed.        Pre-treatment Symptoms/Complaints:  Patient notes left shoulder continues to have intermittent twinges and noted some pain in bilateral shoulders with driving today. Overall notes less pain in bilateral shoulders  Pain: Initial: Pain Intensity 1: 2  Pain Location 1: Shoulder  Pain Orientation 1: Left /10 Post Session:  2/10   Medications Last Reviewed:  2021  Updated Objective Findings:  bilateral protracted shoulders, postural stability 4-/5  TREATMENT:   THERAPEUTIC EXERCISE: (25 minutes):  Exercises per grid below to improve mobility, strength, balance and coordination. Required moderate verbal, manual and tactile cues to promote proper body alignment, promote proper body posture, promote proper body mechanics and promote proper body breathing techniques.   Progressed resistance, range, repetitions and complexity of movement as indicated.    Date:  2021   Activity/Exercise Parameters   Review postural awareness     Sleeping position     Seated scapular retraction X 10 reps, 5 sec holds Seated scapular retraction with t-band X 10 reps, 5 sec holds, green band    Standing rows X 10 reps elbows flexed, green band  X 20 reps, elbows extended, green band    Standing shoulder ER/IR X 10 reps, BUE, green band    Seated elbow flexion X 10 reps, BUE, green band   Standing shoulder flexion X 10 reps, BUE red band   Sitting posture X 5 minutes review of sitting positions: weight shift, weight acceptance, hip hinge, foot placement, pelvic position   Seated bicep curl X 15 reps, blue band BUE         MANUAL THERAPY: (30 minutes): Joint mobilization and Soft tissue mobilization was utilized and necessary because of the patient's restricted joint motion, loss of articular motion and restricted motion of soft tissue. (Used abbreviations: MET - muscle energy technique; PNF - proprioceptive neuromuscular facilitation; NMR - neuromuscular re-education; a/p - anterior to posterior; p/a - posterior to anterior, FMP - functional movement patterns, SF - Superficial Fascia; BC - Bony contours; MP - muscle play; IASTM - instrument-assisted soft tissue mobilization)  · Supine bilateral anterior chest soft tissue mobilization with breathing techniques  · Supine bilateral shoulder PROM into shoulder ER/IR, followed with NMR into new ranges  · Supine cervical spine manual traction and suboccipital release   · Supine bilateral subscapularis soft tissue mobilization with FMP of shoulder flexion  · Supine manual rhythmic stabilization in all directions bilateral UE    MedBridge Portal  Treatment/Session Summary:    · Response to Treatment:  demonstrating improved ROM and strength in bilateral UE. · Communication/Consultation:  None today  · Equipment provided today:  None today  · Recommendations/Intent for next treatment session: Next visit will focus on postural stability and awareness, strengthening of UE and ROM of bilateral shoulders, core stability.     Total Treatment Billable Duration:  55 minutes  PT Patient Time In/Time Out  Time In: 1430  Time Out: [facility], PT    Future Appointments   Date Time Provider [facility]   1/21/2021  2:30 PM Lenton [place]., PT [facility]   2/2/2021  2:30 PM Jann FRANCO, PT [facility]   2/4/2021  2:30 PM Lenton [place]., PT [facility]   2/11/2021  8:45 AM Justin Kahn MD [facility]   2/11/2021 11:30 AM Lenton [place]., PT [facility]   2/18/2021 11:30 AM Lenton [place]., PT [facility]   2/25/2021  2:30 PM Lenton [place]., PT [facility]   4/13/2021 10:40 AM Malik Perera MD [facility]   6/16/2021 10:30 AM Sandra Ash MD [facility]

## 2021-01-21 ENCOUNTER — HOSPITAL ENCOUNTER (OUTPATIENT)
Dept: PHYSICAL THERAPY | Age: 71
Discharge: HOME | End: 2021-01-21
Payer: MEDICARE

## 2021-01-21 PROCEDURE — 97140 MANUAL THERAPY 1/> REGIONS: CPT

## 2021-01-21 PROCEDURE — 97110 THERAPEUTIC EXERCISES: CPT

## 2021-01-22 NOTE — PROGRESS NOTES
Chio Markham  : 1950  [place]  [place]: [place] [address] [place] [address] at [address]  [phone]   [phone]        OUTPATIENT PHYSICAL THERAPY: Daily Treatment Note 2021  Visit Count:  8       ICD-10: Treatment Diagnosis: pain in joint, right shoulder M25.511  ICD-10: Treatment Diagnosis: pain in joint, left shoulder M25.512  Precautions/Allergies:   Ciprofloxacin; Adhesive tape-silicones; Ceclor [cefaclor]; and Symbyax [olanzapine-fluoxetine]   TREATMENT PLAN:  Effective Dates: 12/10/2020 TO 3/10/2021 (90 days).  Frequency/Duration: 2 times a week for 90 Day(s) MEDICAL/REFERRING DIAGNOSIS:  Right shoulder pain [M25.511]  Pain in left shoulder Zaida Aparicio MD MD Orders: evaluate and treat  Return MD Appointment: as needed.        Pre-treatment Symptoms/Complaints:  Patient notes ache noted in bilateral shoulders today, challenged with sleeping on her left side. Pain: Initial: Pain Intensity 1: 3  Pain Location 1: Shoulder  Pain Orientation 1: Left, Right /10 Post Session:  2/10   Medications Last Reviewed:  2021  Updated Objective Findings:  bilateral protracted shoulders, postural stability 4-/5  TREATMENT:   THERAPEUTIC EXERCISE: (25 minutes):  Exercises per grid below to improve mobility, strength, balance and coordination. Required moderate verbal, manual and tactile cues to promote proper body alignment, promote proper body posture, promote proper body mechanics and promote proper body breathing techniques.   Progressed resistance, range, repetitions and complexity of movement as indicated.    Date:  2021   Activity/Exercise Parameters   Review postural awareness     Sleeping position X 5 minute review of positions and pillow supports in side lying postures    Seated scapular retraction X 10 reps, 5 sec holds   Seated scapular retraction with t-band X 10 reps, 5 sec holds, green band    Standing rows X 10 reps elbows flexed, green band  X 20 reps, elbows extended, green band    Standing shoulder ER/IR X 10 reps, BUE, green band    Seated elbow flexion X 10 reps, BUE, green band   Standing shoulder flexion X 10 reps, BUE red band   Sitting posture    Seated bicep curl X 15 reps, blue band BUE         MANUAL THERAPY: (30 minutes): Joint mobilization and Soft tissue mobilization was utilized and necessary because of the patient's restricted joint motion, loss of articular motion and restricted motion of soft tissue. (Used abbreviations: MET - muscle energy technique; PNF - proprioceptive neuromuscular facilitation; NMR - neuromuscular re-education; a/p - anterior to posterior; p/a - posterior to anterior, FMP - functional movement patterns, SF - Superficial Fascia; BC - Bony contours; MP - muscle play; IASTM - instrument-assisted soft tissue mobilization)  · Supine bilateral anterior chest soft tissue mobilization with breathing techniques  · Supine bilateral shoulder PROM into shoulder ER/IR, followed with NMR into new ranges  · Supine cervical spine manual traction and suboccipital release   · Supine bilateral subscapularis soft tissue mobilization with FMP of shoulder flexion  · Supine manual rhythmic stabilization in all directions bilateral UE    MedBridge Portal  Treatment/Session Summary:    · Response to Treatment:  improving endurance with exercises today, continues to present with protracted shoulder posture, however progressions noted in postural stability strength. · Communication/Consultation:  None today  · Equipment provided today:  None today  · Recommendations/Intent for next treatment session: Next visit will focus on postural stability and awareness, strengthening of UE and ROM of bilateral shoulders, core stability.     Total Treatment Billable Duration:  55 minutes  PT Patient Time In/Time Out  Time In: 1430  Time Out: [address], PT    Future Appointments   Date Time Provider hospitals   2/2/2021  2:30 PM [facility]   2/4/2021  2:30 PM [facility]   2/11/2021  8:45 AM Layne Tristan MD [facility]   2/11/2021 11:30 AM Eileen Messier., PT [facility]   2/18/2021 11:30 AM Eileen Messier., PT [facility]   2/25/2021  2:30 PM Eileen Messier., PT [facility]   4/13/2021 10:40 AM Rhett Palacios MD [facility] BS ENDO   6/16/2021 10:30 AM Dara Mcgregor MD [facility]

## 2021-02-02 ENCOUNTER — HOSPITAL ENCOUNTER (OUTPATIENT)
Dept: PHYSICAL THERAPY | Age: 71
Discharge: HOME | End: 2021-02-02
Payer: MEDICARE

## 2021-02-02 PROCEDURE — 97140 MANUAL THERAPY 1/> REGIONS: CPT

## 2021-02-02 PROCEDURE — 97110 THERAPEUTIC EXERCISES: CPT

## 2021-02-03 NOTE — PROGRESS NOTES
Dann Sj  : 1950  [place]  [place] [address] [place] [address] at [address]  [phone]   [phone]        OUTPATIENT PHYSICAL THERAPY: Daily Treatment Note 2021  Visit Count:  9       ICD-10: Treatment Diagnosis: pain in joint, right shoulder M25.511  ICD-10: Treatment Diagnosis: pain in joint, left shoulder M25.512  Precautions/Allergies:   Ciprofloxacin; Adhesive tape-silicones; Ceclor [cefaclor]; and Symbyax [olanzapine-fluoxetine]   TREATMENT PLAN:  Effective Dates: 12/10/2020 TO 3/10/2021 (90 days).  Frequency/Duration: 2 times a week for 90 Day(s) MEDICAL/REFERRING DIAGNOSIS:  Right shoulder pain [M25.511]  Pain in left shoulder Edwin Geiger MD MD Orders: evaluate and treat  Return MD Appointment: as needed.        Pre-treatment Symptoms/Complaints:  Patient notes less discomfort in shoulders, continues to be challenged with lifting overhead. Pain: Initial: Pain Intensity 1: 3  Pain Location 1: Shoulder  Pain Orientation 1: Left, Right /10 Post Session:  2/10   Medications Last Reviewed:  2021  Updated Objective Findings:  bilateral protracted shoulders, postural stability 4-/5  TREATMENT:   THERAPEUTIC EXERCISE: (25 minutes):  Exercises per grid below to improve mobility, strength, balance and coordination. Required moderate verbal, manual and tactile cues to promote proper body alignment, promote proper body posture, promote proper body mechanics and promote proper body breathing techniques.   Progressed resistance, range, repetitions and complexity of movement as indicated.    Date:  2021   Activity/Exercise Parameters   Review postural awareness     Sleeping position     Seated scapular retraction X 10 reps, 5 sec holds   Seated scapular retraction with t-band X 10 reps, 5 sec holds, green band  Standing rows X 20 reps elbows flexed, green band  X 20 reps, elbows extended, green band    Standing shoulder ER/IR X 15 reps, BUE, green band    Seated elbow flexion X 10 reps, BUE, green band   Standing shoulder flexion X 10 reps, BUE red band   Sitting posture    Seated bicep curl X 15 reps, blue band BUE         MANUAL THERAPY: (30 minutes): Joint mobilization and Soft tissue mobilization was utilized and necessary because of the patient's restricted joint motion, loss of articular motion and restricted motion of soft tissue. (Used abbreviations: MET - muscle energy technique; PNF - proprioceptive neuromuscular facilitation; NMR - neuromuscular re-education; a/p - anterior to posterior; p/a - posterior to anterior, FMP - functional movement patterns, SF - Superficial Fascia; BC - Bony contours; MP - muscle play; IASTM - instrument-assisted soft tissue mobilization)  · Supine bilateral anterior chest soft tissue mobilization with breathing techniques  · Supine bilateral shoulder PROM into shoulder ER/IR, followed with NMR into new ranges  · Supine cervical spine manual traction and suboccipital release   · Supine bilateral subscapularis soft tissue mobilization with FMP of shoulder flexion  · Supine manual rhythmic stabilization in all directions bilateral UE    MedBridge Portal  Treatment/Session Summary:    · Response to Treatment:  improving bilateral shoulder AROM, improving postural stability strength and bilateral shoulder strength. · Communication/Consultation:  None today  · Equipment provided today:  None today  · Recommendations/Intent for next treatment session: Next visit will focus on postural stability and awareness, strengthening of UE and ROM of bilateral shoulders, core stability.     Total Treatment Billable Duration:  55 minutes  PT Patient Time In/Time Out  Time In: 1440  Time Out: [address], PT    Future Appointments   Date Time Provider Uzair Pineda   2/4/2021  2:30 PM [place]., [facility]   2/11/2021  8:45 AM Justin Kahn MD [facility]   2/11/2021 11:30 AM [place]., PT [facility]   2/18/2021 11:30 AM [place]., PT [facility]   2/25/2021  2:30 PM [place]., PT [facility]   4/13/2021 10:40 AM Malik Perera MD END BS ENDO   6/16/2021 10:30 AM Sandra Ash MD [facility]

## 2021-02-04 ENCOUNTER — HOSPITAL ENCOUNTER (OUTPATIENT)
Dept: PHYSICAL THERAPY | Age: 71
Discharge: HOME | End: 2021-02-04
Payer: MEDICARE

## 2021-02-04 PROCEDURE — 97110 THERAPEUTIC EXERCISES: CPT

## 2021-02-04 PROCEDURE — 97140 MANUAL THERAPY 1/> REGIONS: CPT

## 2021-02-05 NOTE — PROGRESS NOTES
Mary López  : 1950  [place]  [place] [address] [place] [address] at [address]  [phone]   [phone]        OUTPATIENT PHYSICAL THERAPY: Daily Treatment Note 2021  Visit Count:  10       ICD-10: Treatment Diagnosis: pain in joint, right shoulder M25.511  ICD-10: Treatment Diagnosis: pain in joint, left shoulder M25.512  Precautions/Allergies:   Ciprofloxacin; Adhesive tape-silicones; Ceclor [cefaclor]; and Symbyax [olanzapine-fluoxetine]   TREATMENT PLAN:  Effective Dates: 12/10/2020 TO 3/10/2021 (90 days).  Frequency/Duration: 2 times a week for 90 Day(s) MEDICAL/REFERRING DIAGNOSIS:  Right shoulder pain [M25.511]  Pain in left shoulder Pippa Montoya MD MD Orders: evaluate and treat  Return MD Appointment: as needed.        Pre-treatment Symptoms/Complaints:  Patient notes bilateral shoulders continue to feel better and noting more strength with daily activities. Also notes she is sleeping better. Pain: Initial: Pain Intensity 1: 3  Pain Location 1: Shoulder  Pain Orientation 1: Left, Right /10 Post Session:  2/10   Medications Last Reviewed:  2021  Updated Objective Findings:  bilateral protracted shoulders, postural stability 4-/5  TREATMENT:   THERAPEUTIC EXERCISE: (25 minutes):  Exercises per grid below to improve mobility, strength, balance and coordination. Required moderate verbal, manual and tactile cues to promote proper body alignment, promote proper body posture, promote proper body mechanics and promote proper body breathing techniques.   Progressed resistance, range, repetitions and complexity of movement as indicated.    Date:  2021   Activity/Exercise Parameters   Review postural awareness     Sleeping position     Seated scapular retraction X 10 reps, 5 sec holds   Seated scapular retraction with t-band X 10 reps, 5 sec holds, green band    Standing rows X 20 reps elbows flexed, green band  X 20 reps, elbows extended, green band    Standing shoulder ER/IR X 15 reps, BUE, green band    Seated elbow flexion X 10 reps, BUE, green band   Standing shoulder flexion X 10 reps, BUE red band   Sitting posture    Seated bicep curl X 15 reps, blue band BUE         MANUAL THERAPY: (30 minutes): Joint mobilization and Soft tissue mobilization was utilized and necessary because of the patient's restricted joint motion, loss of articular motion and restricted motion of soft tissue. (Used abbreviations: MET - muscle energy technique; PNF - proprioceptive neuromuscular facilitation; NMR - neuromuscular re-education; a/p - anterior to posterior; p/a - posterior to anterior, FMP - functional movement patterns, SF - Superficial Fascia; BC - Bony contours; MP - muscle play; IASTM - instrument-assisted soft tissue mobilization)  · Supine bilateral anterior chest soft tissue mobilization with breathing techniques  · Supine bilateral shoulder PROM into shoulder ER/IR, followed with NMR into new ranges  · Supine cervical spine manual traction and suboccipital release   · Supine bilateral subscapularis soft tissue mobilization with FMP of shoulder flexion  · Supine manual rhythmic stabilization in all directions bilateral UE    MedBridge Portal  Treatment/Session Summary:    · Response to Treatment:  cotninues to improve endurance with strengthening exercises for UE and postural stability. · Communication/Consultation:  None today  · Equipment provided today:  None today  · Recommendations/Intent for next treatment session: Next visit will focus on postural stability and awareness, strengthening of UE and ROM of bilateral shoulders, core stability.     Total Treatment Billable Duration:  55 minutes  PT Patient Time In/Time Out  Time In: 1430  Time Out: [address] [facility], PT    Future Appointments   Date Time Provider Uzair Aranai   2/11/2021  8:45 AM Ginny Montano MD [facility]   2/11/2021 11:30 AM Umair Wolf., PT OFF [facility]   2/18/2021 11:30 AM Umair Wolf., PT OFF [facility]   2/25/2021  2:30 PM Umair Wolf., PT OFF [facility]   4/13/2021 10:40 AM Catrachito Hernandez MD [facility] BS ENDO   6/16/2021 10:30 AM Rosabel Cox Shelvy Shone, MD [facility]

## 2021-02-11 ENCOUNTER — HOSPITAL ENCOUNTER (OUTPATIENT)
Dept: PHYSICAL THERAPY | Age: 71
Discharge: HOME | End: 2021-02-11
Payer: MEDICARE

## 2021-02-11 PROCEDURE — 97110 THERAPEUTIC EXERCISES: CPT

## 2021-02-11 PROCEDURE — 97140 MANUAL THERAPY 1/> REGIONS: CPT

## 2021-02-11 NOTE — PROGRESS NOTES
Consuelo Baker  : 1950  [place]  Secondary: [address] at [address]  [phone]   [phone]        OUTPATIENT PHYSICAL THERAPY: Daily Treatment Note 2021  Visit Count:  11       ICD-10: Treatment Diagnosis: pain in joint, right shoulder M25.511  ICD-10: Treatment Diagnosis: pain in joint, left shoulder M25.512  Precautions/Allergies:   Ciprofloxacin; Adhesive tape-silicones; Ceclor [cefaclor]; and Symbyax [olanzapine-fluoxetine]   TREATMENT PLAN:  Effective Dates: 12/10/2020 TO 3/10/2021 (90 days).  Frequency/Duration: 2 times a week for 90 Day(s) MEDICAL/REFERRING DIAGNOSIS:  Right shoulder pain [M25.511]  Pain in left shoulder Danielle Waldron MD MD Orders: evaluate and treat  Return MD Appointment: as needed.        Pre-treatment Symptoms/Complaints:  Patient notes less pain in bilateral shoulders. Exercises are going well at home. Pain: Initial: Pain Intensity 1: 2  Pain Location 1: Shoulder  Pain Orientation 1: Left, Right /10 Post Session:  2/10   Medications Last Reviewed:  2021  Updated Objective Findings:  bilateral protracted shoulders, postural stability 4-/5  TREATMENT:   THERAPEUTIC EXERCISE: (25 minutes):  Exercises per grid below to improve mobility, strength, balance and coordination. Required moderate verbal, manual and tactile cues to promote proper body alignment, promote proper body posture, promote proper body mechanics and promote proper body breathing techniques.   Progressed resistance, range, repetitions and complexity of movement as indicated.    Date:  2021   Activity/Exercise Parameters   Review postural awareness     Sleeping position     Seated scapular retraction X 10 reps, 5 sec holds   Seated scapular retraction with t-band X 10 reps, 5 sec holds, green band  Standing rows X 20 reps elbows flexed, green band  X 20 reps, elbows extended, green band    Standing shoulder ER/IR X 15 reps, BUE, green band    Seated elbow flexion X 10 reps, BUE, green band   Standing shoulder flexion X 10 reps, BUE red band   Sitting posture X 5 minute review   Seated bicep curl X 15 reps, blue band BUE         MANUAL THERAPY: (30 minutes): Joint mobilization and Soft tissue mobilization was utilized and necessary because of the patient's restricted joint motion, loss of articular motion and restricted motion of soft tissue. (Used abbreviations: MET - muscle energy technique; PNF - proprioceptive neuromuscular facilitation; NMR - neuromuscular re-education; a/p - anterior to posterior; p/a - posterior to anterior, FMP - functional movement patterns, SF - Superficial Fascia; BC - Bony contours; MP - muscle play; IASTM - instrument-assisted soft tissue mobilization)  · Supine bilateral anterior chest soft tissue mobilization with breathing techniques  · Supine bilateral shoulder PROM into shoulder ER/IR, followed with NMR into new ranges  · Supine cervical spine manual traction and suboccipital release   · Supine bilateral subscapularis soft tissue mobilization with FMP of shoulder flexion  · Supine manual rhythmic stabilization in all directions bilateral UE    MedBridge Portal  Treatment/Session Summary:    · Response to Treatment:  continues to improve overall ROM and mobility of bilateral shoulders, improving UE strength and postural awareness. .  · Communication/Consultation:  None today  · Equipment provided today:  None today  · Recommendations/Intent for next treatment session: Next visit will focus on postural stability and awareness, strengthening of UE and ROM of bilateral shoulders, core stability.     Total Treatment Billable Duration:  55 minutes  PT Patient Time In/Time Out  Time In: 1130  Time Out: [address], PT    Future Appointments   Date Time Provider Uzair Miriam   2/18/2021 11:30 AM Deb Flank., PT [facility]   2/25/2021  2:30 PM Deb Flank., PT [facility]   3/1/2021  2:40 PM Tracie Stone MD [facility] PP PP   3/2/2021 11:15 AM SPC COVID VACCINE [facility] SPC SPC   4/13/2021 10:40 AM Natalya Ogden MD [facility] BS ENDO   6/16/2021 10:30 AM Ha Garcia MD [facility] UCDG UCD   8/10/2021 10:15 AM Nikki Reeder MD [facility] NISHANT NISHANT

## 2021-02-11 NOTE — PROGRESS NOTES
Aishwarya Coe  : 1950  [place]  Secondary: [address] at [place] [address], [address] [place] [address]  [phone]   [phone]        OUTPATIENT PHYSICAL THERAPY:Progress Report 2021   ICD-10: Treatment Diagnosis: pain in joint, right shoulder M25.511  ICD-10: Treatment Diagnosis: pain in joint, left shoulder M25.512  Precautions/Allergies:   Ciprofloxacin, Adhesive tape-silicones, Ceclor [cefaclor], and Symbyax [olanzapine-fluoxetine]   TREATMENT PLAN:  Effective Dates: 12/10/2020 TO 3/10/2021 (90 days). Frequency/Duration: 2 times a week for 90 Day(s) MEDICAL/REFERRING DIAGNOSIS:  Right shoulder pain [M25.511]  Pain in left shoulder [M25.512]   DATE OF ONSET: chronic  REFERRING PHYSICIAN: Fazal Alexander MD MD Orders: evaluate and treat  Return MD Appointment: as needed. PROGRESS NOTE: 21: As of 2021, Aishwarya Coe has attended 10 physical therapy sessions for her bilateral shoulder pains. She continues to demonstrate improvements in her ROM, shoulder and postural stability strength. She continues to be challenged with some end-range motions. Postural stability strength continues to improve, however would benefit from continued therapy services to address remaining strength deficits. INITIAL ASSESSMENT:  Ms. Yari Murguia is a 78 y.o. female who presents to physical therapy with bilateral shoulder pain. She recently had cortisone injections in bilateral shoulders. Overall notes more discomfort in her right shoulder and challenged with sleeping and ROM of right shoulder. She presents with protracted shoulders and forward head posture. She presents with restrictions in her end range mobility in shoulder flexion and IR bilaterally. She is challenged with functional tasks including reaching overhead and getting dressed.  She would benefit from skilled physical therapy to improve overall mobility and function with daily activities. PROBLEM LIST (Impacting functional limitations):  1. Decreased Strength  2. Decreased ADL/Functional Activities  3. Decreased Transfer Abilities  4. Increased Pain  5. Decreased Activity Tolerance  6. Decreased Flexibility/Joint Mobility INTERVENTIONS PLANNED: (Treatment may consist of any combination of the following)  1. Heat  2. Home Exercise Program (HEP)  3. Manual Therapy  4. Neuromuscular Re-education/Strengthening  5. Range of Motion (ROM)  6. Therapeutic Activites  7. Therapeutic Exercise/Strengthening     GOALS: (Goals have been discussed and agreed upon with patient.)  Discharge Goals: Time Frame: 90 days  1. Patient demonstrates independence with her HEP without verbal cueing from her therapist. progressing  2. Patient demonstrates full AROM of bilateral shoulder to perform overhead activities. ALMOST MET  3. Patient demonstrates correct sleeping positions and able to sleep for 4 consecutive hours without shoulder discomfort. ALMOST MET  4. Improve DASH outcome measure score from 29/55 to 15/55 to perform daily ADLs. ONGOING    OUTCOME MEASURE:   Tool Used: Disabilities of the Arm, Shoulder and Hand (DASH) Questionnaire - Quick Version  Score:  Initial: 29/55  Most Recent: X/55 (Date: -- )   Interpretation of Score: The DASH is designed to measure the activities of daily living in person's with upper extremity dysfunction or pain. Each section is scored on a 1-5 scale, 5 representing the greatest disability. The scores of each section are added together for a total score of 55. MEDICAL NECESSITY:   · Patient is expected to demonstrate progress in strength, range of motion, balance, coordination and functional technique to increase independence with ADLs without shoulder discomfort and improve postural awareness and strength.   REASON FOR SERVICES/OTHER COMMENTS:  · Patient continues to require skilled intervention due to challenged with daily discomfort in bilateral shoulders and unable to perform daily activities secondary to pain levels. .  Total Duration:  PT Patient Time In/Time Out  Time In: 1430  Time Out: 1530    Rehabilitation Potential For Stated Goals: Excellent  Regarding Chente Jay's therapy, I certify that the treatment plan above will be carried out by a therapist or under their direction. Thank you for this referral,  Madalyn Barrios, PT     Referring Physician Signature: Kendall Edward MD No Signature is Required for this note. PAIN/SUBJECTIVE:   Initial: Pain Intensity 1: 3  Pain Location 1: Shoulder  Pain Orientation 1: Left, Right  Post Session:  3/10   HISTORY:      Current Medications:       Current Outpatient Medications:     buPROPion XL (WELLBUTRIN XL) 150 mg tablet, Take 1 Tab by mouth every morning., Disp: 90 Tab, Rfl: 3    hydroCHLOROthiazide (HYDRODIURIL) 12.5 mg tablet, Take 1 Tab by mouth daily. , Disp: 30 Tab, Rfl: 5    montelukast (SINGULAIR) 10 mg tablet, TAKE 1 TABLET BY MOUTH ONCE A DAY, Disp: 90 Tab, Rfl: 4    losartan (COZAAR) 100 mg tablet, Take 1 Tab by mouth daily. , Disp: 30 Tab, Rfl: 11    dofetilide (TIKOSYN) 500 mcg capsule, Take 1 Cap by mouth every twelve (12) hours every twelve (12) hours. , Disp: 60 Cap, Rfl: 11    Synthroid 125 mcg tablet, Take 1 Tab by mouth Daily (before breakfast). (Patient taking differently: Take 150 mcg by mouth six (6) days a week.), Disp: 90 Tab, Rfl: 3    amLODIPine (NORVASC) 5 mg tablet, Take 1 Tab by mouth daily. , Disp: 90 Tab, Rfl: 4    mometasone (NASONEX) 50 mcg/actuation nasal spray, 2 Sprays by Both Nostrils route daily. , Disp: 1 Container, Rfl: 11    fluticasone propion-salmeteroL (Advair Diskus) 250-50 mcg/dose diskus inhaler, Take 1 Puff by inhalation two (2) times a day.  RINSE MOUTH WELL AFTER USE, Disp: 1 Inhaler, Rfl: 11    albuterol (Ventolin HFA) 90 mcg/actuation inhaler, Take 2 Puffs by inhalation every six (6) hours as needed for Wheezing., Disp: 1 Inhaler, Rfl: 11    apixaban (Eliquis) 5 mg tablet, Take 1 Tab by mouth two (2) times a day., Disp: 180 Tab, Rfl: 3    acetaminophen (TYLENOL) 325 mg cap, Take  by mouth., Disp: , Rfl:     Cetirizine (ZYRTEC) 10 mg cap, Take 10 Caps by mouth daily. , Disp: , Rfl:     cpap machine kit, by Does Not Apply route., Disp: , Rfl:    Date Last Reviewed:  2/11/2021     UPDATED OBJECTIVE FINDINGS:     Observation/Orthostatic Postural Assessment:         Shoulder symmetry exhibits bilateral protraction. Shoulder girdles are right elevated, bilateral protraction. Scapular position is right elevated. Clavicles are right slightly elevated. Soft tissue observations indicates restrictions in anterior chest, pectoralis major and minor, subscapularis, infraspinatus, periscapular muscles bilaterally. Patient exhibits a increased cervical lordosis,  intacreased thoracic kyphosis. Palpation:  Myofascial assessment indicates restrictions in anterior chest. Muscle length testing levator scapulae with ipsilateral arm abduction is restricted bilaterally. Upper trapezius length is restricted bilaterally. Pectoralis minor/major and latissimus dorsi length is restricted bilaterally. Scalene muscle length is restricted bilaterally, right greater than left. Improving 2/4/21    ROM: Gross active cervical spine rotation is 90% available bilaterally. Apley's scratch test for functional ER/IR is right: ER - base of skull, IR - to lumbar spine right , left: ER - T1, IR - to L5. Cherl Spinner Passive Accessory Motion: Glenohumeral mobility is restricted for inferior glides, right greater than left.   Improving 2/4/21    AROM(PROM) in degrees Right Left   Shoulder flexion 0-158 0-160   Shoulder abduction (palm down) 0-100 0-110   Shoulder extension 0-20 0-20   Shoulder internal rotation (IR)  (measured at 90 degrees of abduction) 0-50 0-50   ER (measured at 90 degrees of abduction) 0-55 0-50     Strength:   Manual Muscle Test (*/5) Right Left Shoulder flexion 4 4   Shoulder extension 4 4   Shoulder abduction 4 4   Shoulder adduction 4 4   Shoulder ER 4+ 4   Shoulder IR 4+ 4   Upper trapezius 4+ 4   Middle trapezius 4+ 4   Lower trapezius 4+ 4   Rhomboids 4+ 4   Serratus Anterior 4 4   Elbow flexion 5 5   Elbow extension 5 5       Functional Mobility:  Challenged with sitting postures, reaching overhead and forward reaching. Body Structures Involved:  1. Thoracic Cage  2. Joints  3. Muscles Body Functions Affected:  1. Sensory/Pain  2. Neuromusculoskeletal  3. Movement Related Activities and Participation Affected:  1. General Tasks and Demands  2. Mobility  3. Self Care  4.  Community, Social and [place]

## 2021-02-18 ENCOUNTER — HOSPITAL ENCOUNTER (OUTPATIENT)
Dept: PHYSICAL THERAPY | Age: 71
Discharge: HOME | End: 2021-02-18
Payer: MEDICARE

## 2021-02-18 PROCEDURE — 97110 THERAPEUTIC EXERCISES: CPT

## 2021-02-18 PROCEDURE — 97140 MANUAL THERAPY 1/> REGIONS: CPT

## 2021-02-19 NOTE — PROGRESS NOTES
Renee Walker  : 1950  [place]  [place] [address] at [place] [address], [address] [place] [address]  [phone]   [phone]        OUTPATIENT PHYSICAL THERAPY: Daily Treatment Note 2021  Visit Count:  12       ICD-10: Treatment Diagnosis: pain in joint, right shoulder M25.511  ICD-10: Treatment Diagnosis: pain in joint, left shoulder M25.512  Precautions/Allergies:   Ciprofloxacin; Adhesive tape-silicones; Ceclor [cefaclor]; and Symbyax [olanzapine-fluoxetine]   TREATMENT PLAN:  Effective Dates: 12/10/2020 TO 3/10/2021 (90 days).  Frequency/Duration: 2 times a week for 90 Day(s) MEDICAL/REFERRING DIAGNOSIS:  Right shoulder pain [M25.511]  Pain in left shoulder Mariusz Murphy MD MD Orders: evaluate and treat  Return MD Appointment: as needed.        Pre-treatment Symptoms/Complaints:  Patient notes had injections into her low back this week. Notes bilateral shoulders are feeling better and continues to note improvements in her UE strength. Pain: Initial: Pain Intensity 1: 2  Pain Location 1: Shoulder  Pain Orientation 1: Left, Right /10 Post Session:  2/10   Medications Last Reviewed:  2021  Updated Objective Findings:  bilateral protracted shoulders, postural stability /  TREATMENT:   THERAPEUTIC EXERCISE: (25 minutes):  Exercises per grid below to improve mobility, strength, balance and coordination. Required moderate verbal, manual and tactile cues to promote proper body alignment, promote proper body posture, promote proper body mechanics and promote proper body breathing techniques.   Progressed resistance, range, repetitions and complexity of movement as indicated.    Date:  2021   Activity/Exercise Parameters   Review postural awareness     Sleeping position     Seated scapular retraction X 10 reps, 5 sec holds   Seated scapular retraction with t-band X 10 reps, 5 sec holds, green band    Standing rows X 20 reps elbows flexed, blue band  X 20 reps, elbows extended, blue band    Standing shoulder ER/IR X 15 reps, BUE, green band    Seated elbow flexion X 10 reps, BUE, green band   Standing shoulder flexion X 10 reps, BUE red band   Sitting posture X 5 minute review   Seated bicep curl X 15 reps, blue band BUE         MANUAL THERAPY: (30 minutes): Joint mobilization and Soft tissue mobilization was utilized and necessary because of the patient's restricted joint motion, loss of articular motion and restricted motion of soft tissue. (Used abbreviations: MET - muscle energy technique; PNF - proprioceptive neuromuscular facilitation; NMR - neuromuscular re-education; a/p - anterior to posterior; p/a - posterior to anterior, FMP - functional movement patterns, SF - Superficial Fascia; BC - Bony contours; MP - muscle play; IASTM - instrument-assisted soft tissue mobilization)  · Supine bilateral anterior chest soft tissue mobilization with breathing techniques  · Supine bilateral shoulder PROM into shoulder ER/IR, followed with NMR into new ranges  · Supine cervical spine manual traction and suboccipital release   · Supine bilateral subscapularis soft tissue mobilization with FMP of shoulder flexion  · Supine manual rhythmic stabilization in all directions bilateral UE    MedBridge Portal  Treatment/Session Summary:    · Response to Treatment:  continues to improve bilateral shoulder ROM. Decreased restrictions noted in soft tissue. Continues to improve UE strength. · Communication/Consultation:  None today  · Equipment provided today:  None today  · Recommendations/Intent for next treatment session: Next visit will focus on postural stability and awareness, strengthening of UE and ROM of bilateral shoulders, core stability.     Total Treatment Billable Duration:  55 minutes  PT Patient Time In/Time Out  Time In: 1130  Time Out: 1230  Dacia FRANCO Misty Sender, PT    Future Appointments   Date Time Provider Uzair Pineda   2/25/2021  2:30 PM Zaid Phillips., PT [facility]   3/1/2021  2:40 PM Carolyn Pino MD [facility] PP PP   3/2/2021 11:15 AM SPC COVID VACCINE 21 DAY [facility] SPC SPC   3/31/2021  2:30 PM SFE DEXA BI GE LUNAR DEXA [facility] SFE   4/13/2021 10:40 AM More Reno MD END BS ENDO   6/16/2021 10:30 AM Zuleika Erickson MD [facility] UCD   8/10/2021 10:15 AM Shea De La Garza MD [facility]

## 2021-02-25 ENCOUNTER — HOSPITAL ENCOUNTER (OUTPATIENT)
Dept: PHYSICAL THERAPY | Age: 71
Discharge: HOME | End: 2021-02-25
Payer: MEDICARE

## 2021-02-25 PROCEDURE — 97110 THERAPEUTIC EXERCISES: CPT

## 2021-02-25 PROCEDURE — 97140 MANUAL THERAPY 1/> REGIONS: CPT

## 2021-02-25 NOTE — PROGRESS NOTES
Donna Fuentes  : 1950  [place]  [place] [address] at [place] [address], [address] [facility]  [phone]   [phone]        OUTPATIENT PHYSICAL THERAPY: Daily Treatment Note 2021  Visit Count:  13       ICD-10: Treatment Diagnosis: pain in joint, right shoulder M25.511  ICD-10: Treatment Diagnosis: pain in joint, left shoulder M25.512  Precautions/Allergies:   Ciprofloxacin; Adhesive tape-silicones; Ceclor [cefaclor]; and Symbyax [olanzapine-fluoxetine]   TREATMENT PLAN:  Effective Dates: 12/10/2020 TO 3/10/2021 (90 days).  Frequency/Duration: 2 times a week for 90 Day(s) MEDICAL/REFERRING DIAGNOSIS:  Right shoulder pain [M25.511]  Pain in left shoulder GleSohail Snell MD MD Orders: evaluate and treat  Return MD Appointment: as needed.        Pre-treatment Symptoms/Complaints:  Patient notes shoulders continues to feel much better, little to no pain noted on most days. Has increased her daily walking. Pain: Initial: Pain Intensity 1: 2  Pain Location 1: Shoulder  Pain Orientation 1: Left, Right /10 Post Session:  1/10   Medications Last Reviewed:  2021  Updated Objective Findings:  bilateral protracted shoulders, postural stability 4/  TREATMENT:   THERAPEUTIC EXERCISE: (25 minutes):  Exercises per grid below to improve mobility, strength, balance and coordination. Required moderate verbal, manual and tactile cues to promote proper body alignment, promote proper body posture, promote proper body mechanics and promote proper body breathing techniques.   Progressed resistance, range, repetitions and complexity of movement as indicated.    Date:  2021   Activity/Exercise Parameters   Review postural awareness     Sleeping position     Seated scapular retraction X 10 reps, 5 sec holds   Seated scapular retraction with t-band X 10 reps, 5 sec holds, green band    Standing rows X 20 reps elbows flexed, blue band  X 20 reps, elbows extended, blue band    Standing shoulder ER/IR X 15 reps, BUE, green band    Seated elbow flexion X 10 reps, BUE, green band   Standing shoulder flexion X 10 reps, BUE red band   Sitting posture X 5 minute review   Seated bicep curl X 15 reps, blue band BUE         MANUAL THERAPY: (30 minutes): Joint mobilization and Soft tissue mobilization was utilized and necessary because of the patient's restricted joint motion, loss of articular motion and restricted motion of soft tissue. (Used abbreviations: MET - muscle energy technique; PNF - proprioceptive neuromuscular facilitation; NMR - neuromuscular re-education; a/p - anterior to posterior; p/a - posterior to anterior, FMP - functional movement patterns, SF - Superficial Fascia; BC - Bony contours; MP - muscle play; IASTM - instrument-assisted soft tissue mobilization)  · Supine bilateral anterior chest soft tissue mobilization with breathing techniques  · Supine bilateral shoulder PROM into shoulder ER/IR, followed with NMR into new ranges  · Supine cervical spine manual traction and suboccipital release   · Supine bilateral subscapularis soft tissue mobilization with FMP of shoulder flexion  · Supine manual rhythmic stabilization in all directions bilateral UE    MedBridge Portal  Treatment/Session Summary:    · Response to Treatment:  Demonstrates full AROM of bilateral shoulders. Continues to have strength deficits in her postural stability musculature, however improvements noted. Will benefit from working 2 weeks independently. .  · Communication/Consultation:  None today  · Equipment provided today:  None today  · Recommendations/Intent for next treatment session: Next visit will focus on postural stability and awareness, strengthening of UE and ROM of bilateral shoulders, core stability. Will return in two weeks.      Total Treatment Billable Duration:  55 minutes  PT Patient Time In/Time Out  Time In: 1430  Time Out: [address], PT    Future Appointments   Date Time Provider Uzair Aranai   3/2/2021 11:15 AM SPC COVID VACCINE 21 DAY [facility]   3/10/2021  4:20 PM Willie Todd MD [facility] PP PP   3/18/2021 10:30 AM [facility]   4/1/2021 12:30 PM [facility] DEXA BI GE LUNAR DEXA [facility]   4/8/2021 10:30 AM Edin FRANCO[facility]   4/13/2021 10:40 AM Nelson Golden MD END [facility] ENDO   6/16/2021 10:30 AM Susana Vines MD [facility]   8/10/2021 10:15 AM Shilpa Acosta MD [facility]

## 2021-03-18 ENCOUNTER — HOSPITAL ENCOUNTER (OUTPATIENT)
Dept: PHYSICAL THERAPY | Age: 71
Discharge: HOME | End: 2021-03-18
Payer: MEDICARE

## 2021-03-18 PROCEDURE — 97110 THERAPEUTIC EXERCISES: CPT

## 2021-03-18 PROCEDURE — 97140 MANUAL THERAPY 1/> REGIONS: CPT

## 2021-03-18 NOTE — THERAPY RECERTIFICATION
Leopoldo Ching 
: 1950 [place] Secondary: [address] [facility] at [address], [address] [place] [address] [phone]   [phone] OUTPATIENT PHYSICAL THERAPY:Recertification  ICD-10: Treatment Diagnosis: pain in joint, right shoulder M25.511 ICD-10: Treatment Diagnosis: pain in joint, left shoulder M25.512 Precautions/Allergies:  
Ciprofloxacin, Adhesive tape-silicones, Ceclor [cefaclor], and Symbyax [olanzapine-fluoxetine] TREATMENT PLAN: 
Effective Dates: 3/18/21 TO 2021 (90 days). Frequency/Duration: 1 time every other week for 90 Day(s) MEDICAL/REFERRING DIAGNOSIS: 
Right shoulder pain [M25.511] Pain in left shoulder [M25.512] DATE OF ONSET: chronic REFERRING PHYSICIAN: Janet Santillan MD MD Orders: evaluate and treat Return MD Appointment: as needed. RE-CERTIFICATION: 3/92/81:  Leopoldo Ching has attended 14 physical therapy sessions for her bilateral chronic shoulder pain. She continues to present with increase ROM and strength in bilateral UE. She continues to improve however be challenged with her postural stability strength. She would benefit from continued therapy services to increase strength and endurance in her UE. She is being progressed to her independent HEP. PROGRESS NOTE: 21Dmitry Kc has attended 10 physical therapy sessions for her bilateral shoulder pains. She continues to demonstrate improvements in her ROM, shoulder and postural stability strength. She continues to be challenged with some end-range motions. Postural stability strength continues to improve, however would benefit from continued therapy services to address remaining strength deficits. INITIAL ASSESSMENT:  Ms. Ry Sanchez is a 78 y.o. female who presents to physical therapy with bilateral shoulder pain. She recently had cortisone injections in bilateral shoulders.  Overall notes more discomfort in her right shoulder and challenged with sleeping and ROM of right shoulder. She presents with protracted shoulders and forward head posture. She presents with restrictions in her end range mobility in shoulder flexion and IR bilaterally. She is challenged with functional tasks including reaching overhead and getting dressed. She would benefit from skilled physical therapy to improve overall mobility and function with daily activities. PROBLEM LIST (Impacting functional limitations): 1. Decreased Strength 2. Decreased ADL/Functional Activities 3. Decreased Transfer Abilities 4. Increased Pain 5. Decreased Activity Tolerance 6. Decreased Flexibility/Joint Mobility INTERVENTIONS PLANNED: (Treatment may consist of any combination of the following) 1. Heat 2. Home Exercise Program (HEP) 3. Manual Therapy 4. Neuromuscular Re-education/Strengthening 5. Range of Motion (ROM) 6. Therapeutic Activites 7. Therapeutic Exercise/Strengthening GOALS: (Goals have been discussed and agreed upon with patient.) Discharge Goals: Time Frame: 90 days 1. Patient demonstrates independence with her HEP without verbal cueing from her therapist. GOAL MET 2. Patient demonstrates full AROM of bilateral shoulder to perform overhead activities. Bryant Tamayo 3. Patient demonstrates correct sleeping positions and able to sleep for 4 consecutive hours without shoulder discomfort. ALMOST MET 4. Improve DASH outcome measure score from 29/55 to 15/55 to perform daily ADLs. ONGOING 5. Patient able to ambulate for 45 minutes without onset of bilateral shoulder pain. - ALMOST MET 
 
OUTCOME MEASURE:  
Tool Used: Disabilities of the Arm, Shoulder and Hand (DASH) Questionnaire - Quick Version Score:  Initial: 29/55  Most Recent: X/55 (Date: -- ) Interpretation of Score: The DASH is designed to measure the activities of daily living in person's with upper extremity dysfunction or pain.   Each section is scored on a 1-5 scale, 5 representing the greatest disability. The scores of each section are added together for a total score of 55. MEDICAL NECESSITY:  
· Patient is expected to demonstrate progress in strength, range of motion, balance, coordination and functional technique to increase independence with ADLs without shoulder discomfort and improve postural awareness and strength. REASON FOR SERVICES/OTHER COMMENTS: 
· Patient continues to require skilled intervention due to challenged with daily discomfort in bilateral shoulders and unable to perform daily activities secondary to pain levels. Juan Ramon Young Total Duration: PT Patient Time In/Time Out Time In: 1030 Time Out: 1130 Rehabilitation Potential For Stated Goals: Excellent Regarding [de-identified] M EBENEZER's therapy, I certify that the treatment plan above will be carried out by a therapist or under their direction. Thank you for this referral, Hung Wayne, PT Referring Physician Signature: Ros Hearn MD _______________________________ Date _____________ PAIN/SUBJECTIVE:  
Initial: Pain Intensity 1: 2 Pain Location 1: Shoulder Pain Orientation 1: Left, Right  Post Session:  1/10 HISTORY:  
  
Current Medications:   
  
Current Outpatient Medications:  
  fluticasone propion-salmeteroL (Advair Diskus) 250-50 mcg/dose diskus inhaler, Take 1 Puff by inhalation two (2) times a day. RINSE MOUTH WELL AFTER USE, Disp: 1 Inhaler, Rfl: 11 
  buPROPion XL (WELLBUTRIN XL) 150 mg tablet, Take 1 Tab by mouth every morning., Disp: 90 Tab, Rfl: 3 
  hydroCHLOROthiazide (HYDRODIURIL) 12.5 mg tablet, Take 1 Tab by mouth daily. , Disp: 30 Tab, Rfl: 5 
  montelukast (SINGULAIR) 10 mg tablet, TAKE 1 TABLET BY MOUTH ONCE A DAY, Disp: 90 Tab, Rfl: 4 
  losartan (COZAAR) 100 mg tablet, Take 1 Tab by mouth daily. , Disp: 30 Tab, Rfl: 11 
  dofetilide (TIKOSYN) 500 mcg capsule, Take 1 Cap by mouth every twelve (12) hours every twelve (12) hours. , Disp: 60 Cap, Rfl: 11 
•  Synthroid 125 mcg tablet, Take 1 Tab by mouth Daily (before breakfast). (Patient taking differently: Take 150 mcg by mouth six (6) days a week.), Disp: 90 Tab, Rfl: 3 
•  amLODIPine (NORVASC) 5 mg tablet, Take 1 Tab by mouth daily., Disp: 90 Tab, Rfl: 4 
•  mometasone (NASONEX) 50 mcg/actuation nasal spray, 2 Sprays by Both Nostrils route daily., Disp: 1 Container, Rfl: 11 
•  fluticasone propion-salmeteroL (Advair Diskus) 250-50 mcg/dose diskus inhaler, Take 1 Puff by inhalation two (2) times a day. RINSE MOUTH WELL AFTER USE, Disp: 1 Inhaler, Rfl: 11 
•  albuterol (Ventolin HFA) 90 mcg/actuation inhaler, Take 2 Puffs by inhalation every six (6) hours as needed for Wheezing., Disp: 1 Inhaler, Rfl: 11 
•  apixaban (Eliquis) 5 mg tablet, Take 1 Tab by mouth two (2) times a day., Disp: 180 Tab, Rfl: 3 
•  acetaminophen (TYLENOL) 325 mg cap, Take  by mouth., Disp: , Rfl:  
•  Cetirizine (ZYRTEC) 10 mg cap, Take 10 Caps by mouth daily., Disp: , Rfl:  
•  cpap machine kit, by Does Not Apply route., Disp: , Rfl:   
Date Last Reviewed:  3/18/2021 
  
UPDATED OBJECTIVE FINDINGS:    
Observation/Orthostatic Postural Assessment:   
     Shoulder symmetry exhibits bilateral protraction. Shoulder girdles are right elevated, bilateral protraction. Scapular position is right elevated. Clavicles are right slightly elevated. Soft tissue observations indicates restrictions in anterior chest, pectoralis major and minor, subscapularis, infraspinatus, periscapular muscles bilaterally. Patient exhibits a increased cervical lordosis,  intacreased thoracic kyphosis.  
Palpation:  Myofascial assessment indicates restrictions in anterior chest. Muscle length testing levator scapulae with ipsilateral arm abduction is restricted bilaterally. Upper trapezius length is restricted bilaterally. Pectoralis minor/major and latissimus dorsi length is restricted bilaterally. Scalene muscle length is restricted bilaterally, right greater than  left.  Improving 3/18/2021 ROM: Gross active cervical spine rotation is 90% available bilaterally. Apley's scratch test for functional ER/IR is right: ER - base of skull, IR - to lumbar spine right , left: ER - T1, IR - to L5. Juan Ramon Young Passive Accessory Motion: Glenohumeral mobility is restricted for inferior glides, right greater than left. Improving 3/18/2021 AROM(PROM) in degrees Right Left Shoulder flexion 0-160 0-160 Shoulder abduction (palm down) 0-105 0-110 Shoulder extension 0-20 0-20 Shoulder internal rotation (IR) 
(measured at 90 degrees of abduction) 0-50 0-50 ER (measured at 90 degrees of abduction) 0-55 0-50 Strength:  
Manual Muscle Test (*/5) Right Left Shoulder flexion 4 4 Shoulder extension 4 4 Shoulder abduction 4 4 Shoulder adduction 4 4 Shoulder ER 4+ 4+ Shoulder IR 4+ 4+ Upper trapezius 4+ 4+ Middle trapezius 4+ 4+ Lower trapezius 4+ 4+ Rhomboids 4+ 4+ Serratus Anterior 4 4 Elbow flexion 5 5 Elbow extension 5 5 Functional Mobility:  Challenged with sitting postures, reaching overhead and forward reaching. - improving 3/18/2021 Body Structures Involved: 1. Thoracic Cage 2. Joints 3. Muscles Body Functions Affected: 1. Sensory/Pain 2. Neuromusculoskeletal 
3. Movement Related Activities and Participation Affected: 1. General Tasks and Demands 2. Mobility 3. Self Care 4. Community, Social and Brewster Klemme

## 2021-03-18 NOTE — PROGRESS NOTES
Eugena Schlatter  : 1950  [place]  Secondary: [facility] at [facility], [address] [facility]  [phone]   [phone]        OUTPATIENT PHYSICAL THERAPY: Daily Treatment Note 3/18/2021  Visit Count:  14       ICD-10: Treatment Diagnosis: pain in joint, right shoulder M25.511  ICD-10: Treatment Diagnosis: pain in joint, left shoulder M25.512  Precautions/Allergies:   Ciprofloxacin; Adhesive tape-silicones; Ceclor [cefaclor]; and Symbyax [olanzapine-fluoxetine]   TREATMENT PLAN:  Effective Dates: 3/18/21 TO 2021 (90 days).  Frequency/Duration: 2 times a week for 90 Day(s) MEDICAL/REFERRING DIAGNOSIS:  Right shoulder pain [M25.511]  Pain in left shoulder Louann Rinaldi MD MD Orders: evaluate and treat  Return MD Appointment: as needed.        Pre-treatment Symptoms/Complaints:  Patient notes bilateral shoulder have been significantly better over the past few weeks. Has noted some discomfort the past few days, in right shoulder. Pain: Initial: Pain Intensity 1: 2  Pain Location 1: Shoulder  Pain Orientation 1: Left, Right /10 Post Session:  1/10   Medications Last Reviewed:  3/18/2021  Updated Objective Findings:  bilateral protracted shoulders, postural stability 4/5  TREATMENT:   THERAPEUTIC EXERCISE: (25 minutes):  Exercises per grid below to improve mobility, strength, balance and coordination. Required moderate verbal, manual and tactile cues to promote proper body alignment, promote proper body posture, promote proper body mechanics and promote proper body breathing techniques.   Progressed resistance, range, repetitions and complexity of movement as indicated.    Date:  3/18/2021   Activity/Exercise Parameters   Review postural awareness     Sleeping position     Seated scapular retraction X 10 reps, 5 sec holds   Seated scapular retraction with t-band X 10 reps, 5 sec holds, green band    Standing rows X 20 reps elbows flexed, blue band  X 20 reps, elbows extended, blue band    Standing shoulder ER/IR X 20 reps, BUE, green band    Seated elbow flexion X 20 reps, BUE, green band   Standing shoulder flexion X 20 reps, BUE red band   Sitting posture X 5 minute review   Seated bicep curl X 15 reps, blue band BUE         MANUAL THERAPY: (30 minutes): Joint mobilization and Soft tissue mobilization was utilized and necessary because of the patient's restricted joint motion, loss of articular motion and restricted motion of soft tissue. (Used abbreviations: MET - muscle energy technique; PNF - proprioceptive neuromuscular facilitation; NMR - neuromuscular re-education; a/p - anterior to posterior; p/a - posterior to anterior, FMP - functional movement patterns, SF - Superficial Fascia; BC - Bony contours; MP - muscle play; IASTM - instrument-assisted soft tissue mobilization)  · Supine bilateral anterior chest soft tissue mobilization with breathing techniques  · Supine bilateral shoulder PROM into shoulder ER/IR, followed with NMR into new ranges  · Supine cervical spine manual traction and suboccipital release   · Supine bilateral subscapularis soft tissue mobilization with FMP of shoulder flexion  · Supine manual rhythmic stabilization in all directions bilateral UE    MedBridge Portal  Treatment/Session Summary:    · Response to Treatment:  continues to improve overall ROM and strength in bilateral shoulders. Will conitnune to work independently on her HEP over the next 3-4 weeks and will prorgress her HEP at that time. · Communication/Consultation:  None today  · Equipment provided today:  None today  · Recommendations/Intent for next treatment session: Next visit will focus on postural stability and awareness, strengthening of UE and ROM of bilateral shoulders, core stability. Will return in 3-4 weeks.      Total Treatment Billable Duration:  55 minutes  PT Patient Time In/Time Out  Time In: 1030  Time Out: [address], PT    Future Appointments   Date Time Provider Uzair Miriam   4/1/2021 12:30 PM SFE DEXA BI GE LUNAR DEXA [facility] SFE   4/8/2021 10:30 [facility]   4/13/2021 10:40 AM Kriss Leal MD END BS ENDO   6/16/2021 10:30 AM Erika Hester MD [facility] UCDG UCD   8/10/2021 10:15 AM Vitaliy Helton MD [facility] NISHANT

## 2021-04-08 ENCOUNTER — HOSPITAL ENCOUNTER (OUTPATIENT)
Dept: PHYSICAL THERAPY | Age: 71
Discharge: HOME | End: 2021-04-08
Payer: MEDICARE

## 2021-04-08 PROCEDURE — 97140 MANUAL THERAPY 1/> REGIONS: CPT

## 2021-04-08 PROCEDURE — 97110 THERAPEUTIC EXERCISES: CPT

## 2021-04-09 NOTE — PROGRESS NOTES
BRADLEY LOPEZ [place]  : 1950  [place]  Secondary: [facility] at [facility], [address] [facility]  [phone]   [phone]        OUTPATIENT PHYSICAL THERAPY: Daily Treatment Note 2021  Visit Count:  15       ICD-10: Treatment Diagnosis: pain in joint, right shoulder M25.511  ICD-10: Treatment Diagnosis: pain in joint, left shoulder M25.512  Precautions/Allergies:   Ciprofloxacin; Adhesive tape-silicones; Ceclor [cefaclor]; and Symbyax [olanzapine-fluoxetine]   TREATMENT PLAN:  Effective Dates: 3/18/21 TO 2021 (90 days).  Frequency/Duration: 2 times a week for 90 Day(s) MEDICAL/REFERRING DIAGNOSIS:  Right shoulder pain [M25.511]  Pain in left shoulder Tereso Chester MD MD Orders: evaluate and treat  Return MD Appointment: as needed.        Pre-treatment Symptoms/Complaints:  Patient notes shoulders continues to feel better and little to no discomfort noted. Some soreness noted in left shoulder after riding in car for 5 hours. Pain: Initial: Pain Intensity 1: 1  Pain Location 1: Shoulder  Pain Orientation 1: Left, Right /10 Post Session:  1/10   Medications Last Reviewed:  2021  Updated Objective Findings:  bilateral protracted shoulders, postural stability   TREATMENT:   THERAPEUTIC EXERCISE: (15 minutes):  Exercises per grid below to improve mobility, strength, balance and coordination. Required moderate verbal, manual and tactile cues to promote proper body alignment, promote proper body posture, promote proper body mechanics and promote proper body breathing techniques.   Progressed resistance, range, repetitions and complexity of movement as indicated.    Date:  2021   Activity/Exercise Parameters   Review postural awareness     Sleeping position     Seated scapular retraction X 10 reps, 5 sec holds   Seated scapular retraction with t-band X 10 reps, 5 sec holds, green band    Standing rows X 20 reps elbows flexed, blue band  X 20 reps, elbows extended, blue band    Standing shoulder ER/IR X 20 reps, BUE, green band    Seated elbow flexion X 20 reps, BUE, green band   Standing shoulder flexion X 20 reps, BUE red band   Sitting posture    Seated bicep curl          MANUAL THERAPY: (25 minutes): Joint mobilization and Soft tissue mobilization was utilized and necessary because of the patient's restricted joint motion, loss of articular motion and restricted motion of soft tissue. (Used abbreviations: MET - muscle energy technique; PNF - proprioceptive neuromuscular facilitation; NMR - neuromuscular re-education; a/p - anterior to posterior; p/a - posterior to anterior, FMP - functional movement patterns, SF - Superficial Fascia; BC - Bony contours; MP - muscle play; IASTM - instrument-assisted soft tissue mobilization)  · Supine bilateral anterior chest soft tissue mobilization with breathing techniques  · Supine bilateral shoulder PROM into shoulder ER/IR, followed with NMR into new ranges  · Supine cervical spine manual traction and suboccipital release   · Supine bilateral subscapularis soft tissue mobilization with FMP of shoulder flexion  · Supine manual rhythmic stabilization in all directions bilateral UE    MedBridge Portal  Treatment/Session Summary:    · Response to Treatment:  Improving overall mobility in bilateral shoulders and anterior chest. Continues to improve postural stability strength and endurance. · Communication/Consultation:  None today  · Equipment provided today:  None today  · Recommendations/Intent for next treatment session: Next visit will focus on postural stability and awareness, strengthening of UE and ROM of bilateral shoulders, core stability. Will return in 4 weeks.      Total Treatment Billable Duration:  40 minutes  PT Patient Time In/Time Out  Time In: 4077  Time Out: Sriram Merrill [address]ada, PT    Future Appointments   Date Time Provider Uzair Aranai   4/13/2021 10:40 AM Jeni Lewis MD END BS ENDO   4/22/2021 10:30 AM SFE DEXA BI GE LUNAR DEXA [facility] SFE   5/6/2021 10:30 AM Dov FRANCO PT [facility]   6/16/2021 10:30 AM Haley Mathews MD SSA UCDG UCD   8/10/2021 10:15 AM Albania Mckeon MD [facility] NISHANT

## 2021-04-12 ENCOUNTER — HOSPITAL ENCOUNTER (OUTPATIENT)
Dept: LAB | Age: 71
Discharge: HOME | End: 2021-04-12
Payer: MEDICARE

## 2021-04-12 DIAGNOSIS — E89.0 HYPOTHYROIDISM FOLLOWING RADIOIODINE THERAPY: ICD-10-CM

## 2021-04-12 LAB — TSH W FREE THYROID I,TSHELE: 0.74 UIU/ML (ref 0.36–3.74)

## 2021-04-12 PROCEDURE — 36415 COLL VENOUS BLD VENIPUNCTURE: CPT

## 2021-04-12 PROCEDURE — 84443 ASSAY THYROID STIM HORMONE: CPT

## 2021-04-22 ENCOUNTER — HOSPITAL ENCOUNTER (OUTPATIENT)
Dept: MAMMOGRAPHY | Age: 71
Discharge: HOME | End: 2021-04-22
Attending: INTERNAL MEDICINE
Payer: MEDICARE

## 2021-04-22 DIAGNOSIS — Z78.0 POSTMENOPAUSAL STATE: ICD-10-CM

## 2021-04-22 PROCEDURE — 77080 DXA BONE DENSITY AXIAL: CPT

## 2021-04-23 NOTE — PROGRESS NOTES
Density normal in spine and hip-but has lost density since 2011(expected)-no meds needed -just calcium, vit D and weight bearing exercise

## 2021-05-06 ENCOUNTER — HOSPITAL ENCOUNTER (OUTPATIENT)
Dept: PHYSICAL THERAPY | Age: 71
Discharge: HOME | End: 2021-05-06
Payer: MEDICARE

## 2021-05-06 PROCEDURE — 97110 THERAPEUTIC EXERCISES: CPT

## 2021-05-06 PROCEDURE — 97140 MANUAL THERAPY 1/> REGIONS: CPT

## 2021-05-07 NOTE — PROGRESS NOTES
BRADLEY LOPEZ [place]  : 1950  [place]  Secondary: [facility], [address] [facility]  [phone]   [phone]        OUTPATIENT PHYSICAL THERAPY: Daily Treatment Note 2021  Visit Count:  16       ICD-10: Treatment Diagnosis: pain in joint, right shoulder M25.511  ICD-10: Treatment Diagnosis: pain in joint, left shoulder M25.512  Precautions/Allergies:   Ciprofloxacin; Adhesive tape-silicones; Ceclor [cefaclor]; and Symbyax [olanzapine-fluoxetine]   TREATMENT PLAN:  Effective Dates: 3/18/21 TO 2021 (90 days).  Frequency/Duration: 2 times a week for 90 Day(s) MEDICAL/REFERRING DIAGNOSIS:  Right shoulder pain [M25.511]  Pain in left shoulder Dae Martinez MD MD Orders: evaluate and treat  Return MD Appointment: as needed.        Pre-treatment Symptoms/Complaints:  Patient notes only has shoulder pain while riding in car for too long of time. Pain: Initial: Pain Intensity 1: 1  Pain Location 1: Shoulder  Pain Orientation 1: Left, Right /10 Post Session:  1/10   Medications Last Reviewed:  2021  Updated Objective Findings:  bilateral protracted shoulders, postural stability   TREATMENT:   THERAPEUTIC EXERCISE: (15 minutes):  Exercises per grid below to improve mobility, strength, balance and coordination. Required moderate verbal, manual and tactile cues to promote proper body alignment, promote proper body posture, promote proper body mechanics and promote proper body breathing techniques.   Progressed resistance, range, repetitions and complexity of movement as indicated.    Date:  2021   Activity/Exercise Parameters   Review postural awareness     Sleeping position     Seated scapular retraction X 10 reps, 5 sec holds   Seated scapular retraction with t-band X 10 reps, 5 sec holds, green band    Standing rows X 20 reps elbows flexed, blue band  X 20 reps, elbows extended, blue band    Standing shoulder ER/IR X 20 reps, BUE, green band    Seated elbow flexion X 20 reps, BUE, green band   Standing shoulder flexion X 20 reps, BUE red band   Sitting posture    Seated bicep curl          MANUAL THERAPY: (25 minutes): Joint mobilization and Soft tissue mobilization was utilized and necessary because of the patient's restricted joint motion, loss of articular motion and restricted motion of soft tissue. (Used abbreviations: MET - muscle energy technique; PNF - proprioceptive neuromuscular facilitation; NMR - neuromuscular re-education; a/p - anterior to posterior; p/a - posterior to anterior, FMP - functional movement patterns, SF - Superficial Fascia; BC - Bony contours; MP - muscle play; IASTM - instrument-assisted soft tissue mobilization)  · Supine bilateral anterior chest soft tissue mobilization with breathing techniques  · Supine bilateral shoulder PROM into shoulder ER/IR, followed with NMR into new ranges  · Supine cervical spine manual traction and suboccipital release   · Supine bilateral subscapularis soft tissue mobilization with FMP of shoulder flexion  · Supine manual rhythmic stabilization in all directions bilateral UE    MedBridge Portal  Treatment/Session Summary:    · Response to Treatment:  Improving postural stability and bilateral shoulder strength. Improving overall mobility noted in bilateral shoulders. · Communication/Consultation:  None today  · Equipment provided today:  None today  · Recommendations/Intent for next treatment session:  Will work independently on her HEP over the next 4 weeks.      Total Treatment Billable Duration:  40 minutes  PT Patient Time In/Time Out  Time In: 1030  Time Out: C/ Bree Rand 88, PT    Future Appointments   Date Time Provider Uzair Pineda   6/16/2021 10:30 AM Elton Angulo MD [facility] UCDG UCD   8/10/2021 10:15 AM Jonas Johns MD [facility] NISHANT NISHANT   10/13/2021 10:20 AM MD LUIS Eagle BS ENDO

## 2021-06-03 PROBLEM — Z96.652 STATUS POST LEFT KNEE REPLACEMENT: Status: ACTIVE | Noted: 2021-06-03

## 2021-06-15 ENCOUNTER — HOSPITAL ENCOUNTER (OUTPATIENT)
Dept: PHYSICAL THERAPY | Age: 71
Discharge: HOME | End: 2021-06-15
Payer: MEDICARE

## 2021-06-15 DIAGNOSIS — Z96.652 STATUS POST LEFT KNEE REPLACEMENT: ICD-10-CM

## 2021-06-15 DIAGNOSIS — Z96.651 STATUS POST TOTAL KNEE REPLACEMENT, RIGHT: ICD-10-CM

## 2021-06-15 PROCEDURE — 97140 MANUAL THERAPY 1/> REGIONS: CPT

## 2021-06-15 PROCEDURE — 97162 PT EVAL MOD COMPLEX 30 MIN: CPT

## 2021-06-16 NOTE — PROGRESS NOTES
Bernardo Neves  : 1950  [place]  Secondary: [facility] at [place] [address], [address] [place] [address]  [phone]   [phone]      OUTPATIENT PHYSICAL THERAPY: Daily Treatment Note 6/15/2021  Visit Count:  1    ICD-10: Treatment Diagnosis: Low back pain (M54.5)   ICD-10: Treatment Diagnosis: Pain in right knee (M25.561)   ICD-10: Treatment Diagnosis: Pain in left knee (M25.562)     Precautions/Allergies:   Ciprofloxacin, Adhesive tape-silicones, Ceclor [cefaclor], and Symbyax [olanzapine-fluoxetine]   TREATMENT PLAN:  Effective Dates: 6/15/2021 TO 2021 (90 days). Frequency/Duration: 2 times a week 30 days and progress to 1x a week for a total of 90 Day(s) MEDICAL/REFERRING DIAGNOSIS:  Status post left knee replacement [Z96.652]  Status post total knee replacement, right [Z96.651]   DATE OF ONSET: chronic, recent fall and flare up  REFERRING PHYSICIAN: Sujey Nolasco MD MD Orders: evaluate and treat  Return MD Appointment: as needed       Pre-treatment Symptoms/Complaints:  Patient notes since her fall, she continues to note tightness in bilateral knees and challenged with prolonged standing. No giving way in knees noted. Pain: Initial: Pain Intensity 1: 6  Pain Location 1: Knee, Spine, lumbar  Pain Orientation 1: Left, Right /10 Post Session:  5/10   Medications Last Reviewed:  6/15/2021  Updated Objective Findings:  See evaluation note from today  TREATMENT:     THERAPEUTIC EXERCISE: (5 minutes):  Exercises per grid below to improve mobility, strength, balance and coordination. Required moderate verbal, manual and tactile cues to promote proper body alignment, promote proper body posture, promote proper body mechanics and promote proper body breathing techniques. Progressed resistance, range, repetitions and complexity of movement as indicated.      Date:  6/15/2021   Activity/Exercise Parameters   Supine quad set X 10 reps, 5 sec holds   Supine heel slides X 10 reps, 5 sec holds                           MANUAL THERAPY: (25 minutes): Joint mobilization and Soft tissue mobilization was utilized and necessary because of the patient's restricted joint motion, painful spasm, loss of articular motion and restricted motion of soft tissue. (Used abbreviations: MET - muscle energy technique; PNF - proprioceptive neuromuscular facilitation; NMR - neuromuscular re-education; a/p - anterior to posterior; p/a - posterior to anterior, FMP - functional movement patterns, SF - Superficial Fascia; BC - Bony contours; MP - muscle play; IASTM - instrument-assisted soft tissue mobilization)  · Supine bilateral soft tissue mobilizations to anterior knee and quadriceps with FMP of knee flexion/extension  · Supine patella mobilizations bilaterally in all directions    MedBridge Portal  Treatment/Session Summary:    · Response to Treatment:  improved ROM of bilateral knees after soft tissue mobilization. Demonstrates good carry over with exercises. · Communication/Consultation:  None today  · Equipment provided today:  None today  · Recommendations/Intent for next treatment session: Next visit will focus on soft tissue mobilization of LE, balance activities, strengthening exercises for core and LE.     Total Treatment Billable Duration:  30 minutes  PT Patient Time In/Time Out  Time In: 1630  Time Out: MANUEL Vuong    Future Appointments   Date Time Provider Uzair Pineda   6/16/2021 10:30 AM Yayo Smith MD [facility]   6/21/2021  4:30 PM Hallie FRANCO, PT [facility]   6/23/2021  8:30 AM Hallie FRANCO, PT [facility]   7/13/2021  2:30 PM Hallie FRANCO, PT [facility]   7/15/2021  2:30 PM Hallie FRANCO, PT [facility]   7/20/2021  2:30 PM Rosemary Caldera., PT [facility]   7/22/2021  2:30 PM Hallie FRANCO, PT [facility]   7/26/2021  9:30 AM Hallie Branham SALVADOR, PT OFF [facility]   7/27/2021 10:40 AM Srinivasa Call, ZAHRAA [facility]   7/29/2021  2:30 PM Netta Biswas, PT OFF [facility]   8/10/2021 10:15 AM Shamika Storm MD [facility] N[facility]ANT NISHANT   10/13/2021 10:20 AM Mati Villarreal MD [facility]

## 2021-06-16 NOTE — THERAPY EVALUATION
Cecily Sorto  : 1950  [place]  [place] [address] at [place] [address] [address] [place] [address]  [phone]   [phone]        OUTPATIENT PHYSICAL THERAPY:Initial Assessment 6/15/2021   ICD-10: Treatment Diagnosis: Low back pain (M54.5)   ICD-10: Treatment Diagnosis: Pain in right knee (M25.561)   ICD-10: Treatment Diagnosis: Pain in left knee (M25.562)    Precautions/Allergies:   Ciprofloxacin, Adhesive tape-silicones, Ceclor [cefaclor], and Symbyax [olanzapine-fluoxetine]   TREATMENT PLAN:  Effective Dates: 6/15/2021 TO 2021 (90 days). Frequency/Duration: 2 times a week 30 days and progress to 1x a week for a total of 90 Day(s) MEDICAL/REFERRING DIAGNOSIS:  Status post left knee replacement [Z96.652]  Status post total knee replacement, right [Z96.651]   DATE OF ONSET: chronic, recent fall and flare up  REFERRING PHYSICIAN: Laith Somers MD MD Orders: evaluate and treat  Return MD Appointment: as needed     INITIAL ASSESSMENT:  Ms. Мария Perez is a 78 y.o. female who presents to physical therapy with bilateral knee pain and low back pain after a recent fall off of her bed. Notes she fell onto both of her knees and her  had to assist her to get back up. Increased tightness and tenderness in bilateral knees, left greater than right since the fall. She presents with soft tissue restrictions, decreased mobility of patella, left greater than right and LE strength and endurance deficits. She would benefit from skilled physical therapy to improve overall mobility, strength and endurance as well as improve ambulation and tolerance in weight bearing. PROBLEM LIST (Impacting functional limitations):  1. Decreased Strength  2. Decreased ADL/Functional Activities  3. Decreased Transfer Abilities  4. Decreased Ambulation Ability/Technique  5. Decreased Balance  6. Increased Pain  7.  Decreased Activity Tolerance  8. Increased Fatigue  9. Decreased Flexibility/Joint Mobility INTERVENTIONS PLANNED: (Treatment may consist of any combination of the following)  1. Balance Exercise  2. Bed Mobility  3. Gait Training  4. Home Exercise Program (HEP)  5. Manual Therapy  6. Neuromuscular Re-education/Strengthening  7. Range of Motion (ROM)  8. Therapeutic Activites  9. Therapeutic Exercise/Strengthening     GOALS: (Goals have been discussed and agreed upon with patient.)  Discharge Goals: Time Frame: 90 days  1. Patient demonstrates independence with her HEP without verbal cueing from therapist.  2. Patient able to weight bear for 30 minutes to perform house hold activities, with pain no more than 2/10. 3. Patient able to ambulate for 30 minutes to perform community distance and return to daily walking routine without onset of bilateral knee pain. 4. Improve LEFS outcome measure score from 47/80 to 65/80 to perform ADLs    OUTCOME MEASURE:   Tool Used: Lower Extremity Functional Scale (LEFS)  Score:  Initial: 47/80 Most Recent: X/80 (Date: -- )   Interpretation of Score: 20 questions each scored on a 5 point scale with 0 representing \"extreme difficulty or unable to perform\" and 4 representing \"no difficulty\". The lower the score, the greater the functional disability. 80/80 represents no disability. Minimal detectable change is 9 points. MEDICAL NECESSITY:   · Patient is expected to demonstrate progress in strength, range of motion, balance, coordination and functional technique to increase independence with improving endurance with ambulation and weight bearing activities without onset of bilateral knee pain. REASON FOR SERVICES/OTHER COMMENTS:  · Patient continues to require skilled intervention due to challenged with prolonged weight bearing secondary to bilateral knee pain and low back pain.   Total Duration:  PT Patient Time In/Time Out  Time In: 1630  Time Out: 1730    Rehabilitation Potential For Stated Goals: Excellent  Regarding Sveta Jay's therapy, I certify that the treatment plan above will be carried out by a therapist or under their direction. Thank you for this referral,  Blanca Bailey, MANUEL     Referring Physician Signature: Noel Medina MD                  PAIN/SUBJECTIVE:   Initial: Pain Intensity 1: 6  Pain Location 1: Knee, Spine, lumbar  Pain Orientation 1: Left, Right  Post Session:  5/10   HISTORY:   History of Injury/Illness (Reason for Referral):  Chronic history of low back pain and bilateral knee pain for several years. She has had bilateral TKAs in 2018 and 2019. She has been very active with a daily walking routine and active in a weight loss program. She recently fell out of her bed, falling onto bilateral knees. She notes she was challenged to get back up and her  helped her off of the floor. She notes she continues to have tightness and tingling into her knees. Notes she is able to weight bear without any giving way in knees. Notes challenged with prolonged standing and walking due to fatigue and discomfort. Patient denies dizziness, drop attacks, numbness/tingling, bowel/bladder dysfunction and/or unexplained weight gain/loss. Past Medical History/Comorbidities:   Ms. Daniella Otero  has a past medical history of Adverse effect of anesthesia, Allergic rhinitis, Anxiety, Arthritis, Asthma, Depression, Endocarditis (1992), Heart murmur, Hematuria, History of MRSA infection (on left breast), HLD (hyperlipidemia) ( ), Hypertension, Hypertension, Hypothyroid, Hypothyroidism due to acquired atrophy of thyroid (4/28/2015), Intertrigo, Irregular heart beat, Mass of colon, Morbid obesity ([address] Persistent atrial fibrillation ([address] Reactive airway disease with status asthmaticus, Scoliosis (8/4/2016), Sleep apnea, Varicose veins, and VSD (ventricular septal defect) (07/22/2016).   Ms. Daniella Otero  has a past surgical history that includes hx lipoma resection (Right); hx appendectomy; hx hernia repair (incisional [id number]); hx colonoscopy (, ); hx breast reduction (Bilateral, 2016); hx  section; hx dilation and curettage; hx total colectomy (Right, ); hx heent (); hx implantable cardioverter defibrillator (2018); hx afib ablation ( & 2019); hx refractive surgery; hx knee replacement (Right, 2018); hx knee replacement (Left, 2019); hx heart catheterization; pr cardiac surg procedure unlist (2018); pr cardiac surg procedure unlist (2018); pr cardiac surg procedure unlist (2018); and pr cardiac surg procedure unlist (2019). Social History/Living Environment:     Lives in a private home with her , currently challenged with prolonged weight bearing activities around the house. Prior Level of Function/Work/Activity:  Retired, has been active with walking daily, has not performed since her fall. Dominant Side:         RIGHT   Ambulatory/Rehab Services H2 Model Falls Risk Assessment   Risk Factors:       (5)  History of Recent Falls [w/in 3 months] Ability to Rise from Chair:       (1)  Pushes up, successful in one attempt   Falls Prevention Plan:       No modifications necessary   Total: (5 or greater = High Risk): 6    [facility] of [employer]. All Rights Reserved. [facility] Patent #[id number]. Federal Law prohibits the replication, distribution or use without written permission from [facility] [employer]   Current Medications:       Current Outpatient Medications:     buPROPion (WELLBUTRIN) 75 mg tablet, Take 1 Tab by mouth two (2) times a day., Disp: 180 Tab, Rfl: 1    Synthroid 125 mcg tablet, Take 1 Tab by mouth Daily (before breakfast). , Disp: 90 Tab, Rfl: 3    hydroCHLOROthiazide (HYDRODIURIL) 12.5 mg tablet, Take 1 Tab by mouth daily. , Disp: 30 Tab, Rfl: 5    montelukast (SINGULAIR) 10 mg tablet, TAKE 1 TABLET BY MOUTH ONCE A DAY, Disp: 90 Tab, Rfl: 4    losartan (COZAAR) 100 mg tablet, Take 1 Tab by mouth daily. , Disp: 30 Tab, Rfl: 11    dofetilide (TIKOSYN) 500 mcg capsule, Take 1 Cap by mouth every twelve (12) hours every twelve (12) hours. , Disp: 60 Cap, Rfl: 11    amLODIPine (NORVASC) 5 mg tablet, Take 1 Tab by mouth daily. , Disp: 90 Tab, Rfl: 4    mometasone (NASONEX) 50 mcg/actuation nasal spray, 2 Sprays by Both Nostrils route daily. , Disp: 1 Container, Rfl: 11    fluticasone propion-salmeteroL (Advair Diskus) 250-50 mcg/dose diskus inhaler, Take 1 Puff by inhalation two (2) times a day. RINSE MOUTH WELL AFTER USE, Disp: 1 Inhaler, Rfl: 11    albuterol (Ventolin HFA) 90 mcg/actuation inhaler, Take 2 Puffs by inhalation every six (6) hours as needed for Wheezing., Disp: 1 Inhaler, Rfl: 11    apixaban (Eliquis) 5 mg tablet, Take 1 Tab by mouth two (2) times a day., Disp: 180 Tab, Rfl: 3    acetaminophen (TYLENOL) 325 mg cap, Take  by mouth., Disp: , Rfl:     Cetirizine (ZYRTEC) 10 mg cap, Take 10 Caps by mouth daily. , Disp: , Rfl:     cpap machine kit, by Does Not Apply route., Disp: , Rfl:    Date Last Reviewed: 6/15/2021     Number of Personal Factors/Comorbidities that affect the Plan of Care: 1-2: MODERATE COMPLEXITY   EXAMINATION:   Observation/Orthostatic Postural Assessment:          Lower extremity weight bearing is symmetrical. Observation of gait indicates challenges with endurance, decreased pelvic mobility, however presents with pelvic rotation. Patient exhibits a increased lumbar lordosis and increased thoracic kyphosis. Knee alignment is bilateral genu valgus. Observation of patellar position indicates left positioned lateral with soft tissue tightness in left ITB. Patellar tracking with short knee bend indicates poor tracking. Single leg stand exhibits able to perform, however challenged with endurance, 2 seconds left, 2 seconds right. Soft tissue observation indicates restrictions in bilateral rectus femoris, iliopsoas and hamstrings.     Palpation: Patient exhibits tenderness to palpation along left greater than right patella. In modified Hadley position, muscle length of tensor fascia jacob (TFL)  is restricted bilaterally; muscle length of iliopsoas is restricted bilaterally; and muscle length of rectus femoris is restricted left greater than right. Hamstring flexibility tested supine with straight leg raise is mild-moderate restrictions bilaterally. Piriformis flexibility is restrictions bilaterally. ROM:   Passive Accessory Mobility Testing: Patellofemoral mobility is restricted left greater than right. Anterior/posterior tibia on femur is WFL bilaterally. AROM(PROM) Right Left   Knee flexion 1-127 1-125   Knee extension Lack 1 Lack 1   Hip flexion 95 90   Hip external rotation (ER) 0-20 0-20   Hip internal rotation (IR) 0-15 0-10   Hip extension 0-20 0-15   Hip abduction 0-45 0-45     Strength:     Manual Muscle Test (*/5) Right Left   Knee extension 4 4   Knee flexion 4 4   Hip flexion 4 4   Hip ER 4 4   Hip IR 4 4   Hip extension 4- 4-   Hip abduction 4- 4-   Hip adduction 5 5   Ankle DF 5 5   Ankle PF 5 5   Core stability 4-/5          Special Tests:    Ligament stress tests performed today of the bilaterally knee include:  Valgus Stress Test at 0 and 30 degrees for medial instability: negative. Varus Stress Test at 0 and 30 degrees for lateral instability: negative. One-plane posterior stress for posterior instability: Posterior  Drawer Test - negative   Clint Bulla test is negative. Scour test is negative bilaterally. Neurological Screen:  Myotomes: Key muscle strength testing for bilateral LE is intact  . Dermatomes: Sensation testing through bilateral LE for light touch is intact. Reflexes: Patellar (L4) and achilles (S1) are not tested. Neural tension tests: Slump test is negative. Passive straight leg raise (SLR) test is negative.    Functional Mobility:  Challenged with prolonged standing, greater than 6 minutes, challenged with ambulation greater than 5 minutes. Body Structures Involved:  1. Nerves  2. Joints  3. Muscles Body Functions Affected:  1. Sensory/Pain  2. Neuromusculoskeletal  3. Movement Related Activities and Participation Affected:  1. General Tasks and Demands  2.  Mobility   Number of elements (examined above) that affect the Plan of Care: 3: MODERATE COMPLEXITY   CLINICAL PRESENTATION:   Presentation: Evolving clinical presentation with changing clinical characteristics: MODERATE COMPLEXITY   CLINICAL DECISION MAKING:   Use of outcome tool(s) and clinical judgement create a POC that gives a: Questionable prediction of patient's progress: MODERATE COMPLEXITY

## 2021-06-21 ENCOUNTER — APPOINTMENT (OUTPATIENT)
Dept: PHYSICAL THERAPY | Age: 71
End: 2021-06-21
Payer: MEDICARE

## 2021-06-23 ENCOUNTER — APPOINTMENT (OUTPATIENT)
Dept: PHYSICAL THERAPY | Age: 71
End: 2021-06-23
Payer: MEDICARE

## 2021-06-29 ENCOUNTER — HOSPITAL ENCOUNTER (OUTPATIENT)
Dept: PHYSICAL THERAPY | Age: 71
Discharge: HOME | End: 2021-06-29
Payer: MEDICARE

## 2021-06-29 PROCEDURE — 97110 THERAPEUTIC EXERCISES: CPT

## 2021-06-29 PROCEDURE — 97140 MANUAL THERAPY 1/> REGIONS: CPT

## 2021-06-30 NOTE — PROGRESS NOTES
Kelin Clifford  : 1950  [place]  [place] [address] at [facility], [address]  [phone]   [phone]      OUTPATIENT PHYSICAL THERAPY: Daily Treatment Note 2021  Visit Count:  2    ICD-10: Treatment Diagnosis: Low back pain (M54.5)   ICD-10: Treatment Diagnosis: Pain in right knee (M25.561)   ICD-10: Treatment Diagnosis: Pain in left knee (M25.562)     Precautions/Allergies:   Ciprofloxacin, Adhesive tape-silicones, Ceclor [cefaclor], and Symbyax [olanzapine-fluoxetine]   TREATMENT PLAN:  Effective Dates: 6/15/2021 TO 2021 (90 days). Frequency/Duration: 2 times a week 30 days and progress to 1x a week for a total of 90 Day(s) MEDICAL/REFERRING DIAGNOSIS:  Status post left knee replacement [Z96.652]  Status post total knee replacement, right [Z96.651]   DATE OF ONSET: chronic, recent fall and flare up  REFERRING PHYSICIAN: Josefina Lombardo MD MD Orders: evaluate and treat  Return MD Appointment: as needed       Pre-treatment Symptoms/Complaints:  Patient notes bilateral knees are feeling better, less discomfort noted already. Continues to be challenged with prolonged standing and ambulation. Pain: Initial: Pain Intensity 1: 5  Pain Location 1: Knee  Pain Orientation 1: Left, Right /10 Post Session:  4/10   Medications Last Reviewed:  2021  Updated Objective Findings:  None Today  TREATMENT:     THERAPEUTIC EXERCISE: (25 minutes):  Exercises per grid below to improve mobility, strength, balance and coordination. Required moderate verbal, manual and tactile cues to promote proper body alignment, promote proper body posture, promote proper body mechanics and promote proper body breathing techniques. Progressed resistance, range, repetitions and complexity of movement as indicated.      Date:  2021   Activity/Exercise Parameters   Supine quad set X 10 reps, 5 sec holds Supine heel slides X 10 reps, 5 sec holds   Supine straight leg raise X 10 reps, 5 sec holds   Supine knee to chest X 5 reps, 10 sec holds BLE                   MANUAL THERAPY: (30 minutes): Joint mobilization and Soft tissue mobilization was utilized and necessary because of the patient's restricted joint motion, painful spasm, loss of articular motion and restricted motion of soft tissue. (Used abbreviations: MET - muscle energy technique; PNF - proprioceptive neuromuscular facilitation; NMR - neuromuscular re-education; a/p - anterior to posterior; p/a - posterior to anterior, FMP - functional movement patterns, SF - Superficial Fascia; BC - Bony contours; MP - muscle play; IASTM - instrument-assisted soft tissue mobilization)  · Supine bilateral soft tissue mobilizations to anterior knee and quadriceps with FMP of knee flexion/extension  · Supine patella mobilizations bilaterally in all directions  · Supine bilateral hamstrings soft tissue mobilization with FMP of knee flexion and extension      MedBridge Portal  Treatment/Session Summary:    · Response to Treatment: decreased restrictions in bilateral knees, improving LE flexibility. · Communication/Consultation:  None today  · Equipment provided today:  None today  · Recommendations/Intent for next treatment session: Next visit will focus on soft tissue mobilization of LE, balance activities, strengthening exercises for core and LE.     Total Treatment Billable Duration:  55 minutes  PT Patient Time In/Time Out  Time In: 1130  Time Out: [address], PT    Future Appointments   Date Time Provider Uzair Pineda   7/6/2021 10:30 AM Jose Guadalupe FRANCO, PT [facility]   7/13/2021  2:30 PM Jose Guadalupe FRANCO, PT [facility]   7/15/2021  2:30 PM Jose Guadalupe FRANCO, PT [facility]   7/20/2021  2:30 PM Tracy Gan., PT [facility]   7/22/2021  2:30 PM Jose Guadalupe FRANCO, PT [facility]   7/26/2021  9:30 AM Jose Guadalupe Marroquin SALVADOR, PT OFF [facility]   7/27/2021 10:40 AM Shama Call, ZAHRAA [facility] PP   7/29/2021  2:30 PM Luis Jackson., PT OFF [facility]   8/10/2021 10:15 AM Ruth Nance MD [facility] NISHANT NISHANT   10/13/2021 10:20 AM Phil Prince MD [facility]   12/15/2021 10:30 AM Heather Cuellar MD [facility]

## 2021-07-06 ENCOUNTER — HOSPITAL ENCOUNTER (OUTPATIENT)
Dept: PHYSICAL THERAPY | Age: 71
Discharge: HOME | End: 2021-07-06
Payer: MEDICARE

## 2021-07-06 PROCEDURE — 97110 THERAPEUTIC EXERCISES: CPT

## 2021-07-06 PROCEDURE — 97140 MANUAL THERAPY 1/> REGIONS: CPT

## 2021-07-07 NOTE — PROGRESS NOTES
Muriel Anguiano  : 1950  [place]  Secondary: [facility] at [place] [address], [address] [place] [address]  [phone]   [phone]      OUTPATIENT PHYSICAL THERAPY: Daily Treatment Note 2021  Visit Count:  3    ICD-10: Treatment Diagnosis: Low back pain (M54.5)   ICD-10: Treatment Diagnosis: Pain in right knee (M25.561)   ICD-10: Treatment Diagnosis: Pain in left knee (M25.562)     Precautions/Allergies:   Ciprofloxacin, Adhesive tape-silicones, Ceclor [cefaclor], and Symbyax [olanzapine-fluoxetine]   TREATMENT PLAN:  Effective Dates: 6/15/2021 TO 2021 (90 days). Frequency/Duration: 2 times a week 30 days and progress to 1x a week for a total of 90 Day(s) MEDICAL/REFERRING DIAGNOSIS:  Status post left knee replacement [Z96.652]  Status post total knee replacement, right [Z96.651]   DATE OF ONSET: chronic, recent fall and flare up  REFERRING PHYSICIAN: Luis Enrique Jonas MD MD Orders: evaluate and treat  Return MD Appointment: as needed       Pre-treatment Symptoms/Complaints:  Patient notes less discomfort noted in bilateral knees, has been able to ambulate further distances but not back to her normal distance yet. Pain: Initial: Pain Intensity 1: 4  Pain Location 1: Knee  Pain Orientation 1: Left, Right /10 Post Session:  3/10   Medications Last Reviewed:  2021  Updated Objective Findings:  None Today  TREATMENT:     THERAPEUTIC EXERCISE: (10 minutes):  Exercises per grid below to improve mobility, strength, balance and coordination. Required moderate verbal, manual and tactile cues to promote proper body alignment, promote proper body posture, promote proper body mechanics and promote proper body breathing techniques. Progressed resistance, range, repetitions and complexity of movement as indicated.      Date:  2021   Activity/Exercise Parameters   Supine quad set X 10 reps, 5 sec holds   Supine heel slides X 10 reps, 5 sec holds   Supine straight leg raise X 10 reps, 5 sec holds   Supine knee to chest X 5 reps, 10 sec holds BLE   Supine hamstrings stretch X 5 reps, 10 sec holds BLE               MANUAL THERAPY: (30 minutes): Joint mobilization and Soft tissue mobilization was utilized and necessary because of the patient's restricted joint motion, painful spasm, loss of articular motion and restricted motion of soft tissue. (Used abbreviations: MET - muscle energy technique; PNF - proprioceptive neuromuscular facilitation; NMR - neuromuscular re-education; a/p - anterior to posterior; p/a - posterior to anterior, FMP - functional movement patterns, SF - Superficial Fascia; BC - Bony contours; MP - muscle play; IASTM - instrument-assisted soft tissue mobilization)  · Supine bilateral soft tissue mobilizations to anterior knee and quadriceps with FMP of knee flexion/extension  · Supine patella mobilizations bilaterally in all directions  · Supine bilateral hamstrings soft tissue mobilization with FMP of knee flexion and extension      MedBridge Portal  Treatment/Session Summary:    · Response to Treatment: improving overall mobility in bilateral knees, continues to presents with significant restrictions in bilateral hamstrings. · Communication/Consultation:  None today  · Equipment provided today:  None today  · Recommendations/Intent for next treatment session: Next visit will focus on soft tissue mobilization of LE, balance activities, strengthening exercises for core and LE.     Total Treatment Billable Duration:  55 minutes  PT Patient Time In/Time Out  Time In: 9461  Time Out: Ze [address], PT    Future Appointments   Date Time Provider Uzair Pineda   2021  2:30 PM Starlett Magdy., PT [facility]   7/15/2021  2:30 PM Aniket FRANCO, PT [facility]   2021  2:30 PM Aniket FRANCO, PT [facility]   2021  2:30 PM Starlett Magdy., PT [facility] 7/26/2021  9:30 AM Escobar Ogden., PT [facility]   7/27/2021 10:40 AM Xavier Riedel, Reginia Brighter, NP [facility] PP   7/29/2021  2:30 PM Escobar Ogden., PT [facility]   8/10/2021 10:15 AM Pearlean Epstein Jolene Merlin, MD [facility] N[facility] NISHANT   10/13/2021 10:20 AM Christine Beatty MD [facility]   12/15/2021 10:30 AM [facility]

## 2021-07-13 ENCOUNTER — HOSPITAL ENCOUNTER (OUTPATIENT)
Dept: PHYSICAL THERAPY | Age: 71
Discharge: HOME | End: 2021-07-13
Payer: MEDICARE

## 2021-07-13 PROCEDURE — 97140 MANUAL THERAPY 1/> REGIONS: CPT

## 2021-07-13 PROCEDURE — 97110 THERAPEUTIC EXERCISES: CPT

## 2021-07-14 NOTE — PROGRESS NOTES
Annette Canales  : 1950  [place]  [place] [address] at [place] [address], [address] [place] [address]  [phone]   [phone]      OUTPATIENT PHYSICAL THERAPY: Daily Treatment Note 2021  Visit Count:  4    ICD-10: Treatment Diagnosis: Low back pain (M54.5)   ICD-10: Treatment Diagnosis: Pain in right knee (M25.561)   ICD-10: Treatment Diagnosis: Pain in left knee (M25.562)     Precautions/Allergies:   Ciprofloxacin, Adhesive tape-silicones, Ceclor [cefaclor], and Symbyax [olanzapine-fluoxetine]   TREATMENT PLAN:  Effective Dates: 6/15/2021 TO 2021 (90 days). Frequency/Duration: 2 times a week 30 days and progress to 1x a week for a total of 90 Day(s) MEDICAL/REFERRING DIAGNOSIS:  Status post left knee replacement [Z96.652]  Status post total knee replacement, right [Z96.651]   DATE OF ONSET: chronic, recent fall and flare up  REFERRING PHYSICIAN: Diamond Loomis MD MD Orders: evaluate and treat  Return MD Appointment: as needed       Pre-treatment Symptoms/Complaints:  Patient notes has resumed her walking program, however less distance still secondary to weather. Overall less knee pain noted. Has noted increased discomfort in her lumbar spine and buttocks. Pain: Initial: Pain Intensity 1: 3  Pain Location 1: Knee, Spine, lumbar  Pain Orientation 1: Left, Right /10 Post Session:  3/10   Medications Last Reviewed:  2021  Updated Objective Findings:  Soft tissue restrictions noted in bilateral hamstrings. TREATMENT:     THERAPEUTIC EXERCISE: (25 minutes):  Exercises per grid below to improve mobility, strength, balance and coordination. Required moderate verbal, manual and tactile cues to promote proper body alignment, promote proper body posture, promote proper body mechanics and promote proper body breathing techniques.   Progressed resistance, range, repetitions and complexity of movement as indicated. Date:  7/13/2021   Activity/Exercise Parameters   Supine quad set 2 X 10 reps, 5 sec holds   Supine heel slides 2 X 10 reps, 5 sec holds   Supine straight leg raise 2 X 10 reps, 5 sec holds   Supine knee to chest X 5 reps, 10 sec holds BLE   Supine hamstrings stretch X 5 reps, 10 sec holds BLE   Supine scapular retraction X 10 reps, 5 sec holds green band           MANUAL THERAPY: (30 minutes): Joint mobilization and Soft tissue mobilization was utilized and necessary because of the patient's restricted joint motion, painful spasm, loss of articular motion and restricted motion of soft tissue. (Used abbreviations: MET - muscle energy technique; PNF - proprioceptive neuromuscular facilitation; NMR - neuromuscular re-education; a/p - anterior to posterior; p/a - posterior to anterior, FMP - functional movement patterns, SF - Superficial Fascia; BC - Bony contours; MP - muscle play; IASTM - instrument-assisted soft tissue mobilization)  · Supine bilateral soft tissue mobilizations to anterior knee and quadriceps with FMP of knee flexion/extension  · Supine patella mobilizations bilaterally in all directions  · Supine bilateral hamstrings soft tissue mobilization with FMP of knee flexion and extension  · Side lying soft tissue mobilization to lumbar spine and right hip rotators. MedBridge Portal  Treatment/Session Summary:    · Response to Treatment: improving overall mobility in pelvis and lumbar spine, continues to present with major restrictions in bilateral hamstrings. · Communication/Consultation:  None today  · Equipment provided today:  None today  · Recommendations/Intent for next treatment session: Next visit will focus on soft tissue mobilization of LE, balance activities, strengthening exercises for core and LE.     Total Treatment Billable Duration:  55 minutes  PT Patient Time In/Time Out  Time In: 1430  Time Out: [address] [facility], PT    Future Appointments   Date Time Provider [place]   7/15/2021  2:30 PM Gillermina Hymen A., PT [facility]   7/20/2021  2:30 PM Gillermina Hymen A., PT [facility]   7/22/2021  2:30 PM Derril Danes., PT [facility]   7/26/2021  9:30 AM Derril Danes., PT [facility]   7/27/2021 10:40 AM Saray Nieves, NP [facility]   7/29/2021  2:30 PM Derril Danes., PT [facility]   8/10/2021 10:15 AM Emily Gautam MD [facility] NISHANT NISHANT   10/13/2021 10:20 AM Ruth Weinberg MD [facility] END[facility]   12/15/2021 10:30 AM Farzaneh Brenner MD [facility]

## 2021-07-15 ENCOUNTER — HOSPITAL ENCOUNTER (OUTPATIENT)
Dept: PHYSICAL THERAPY | Age: 71
Discharge: HOME | End: 2021-07-15
Payer: MEDICARE

## 2021-07-15 PROCEDURE — 97110 THERAPEUTIC EXERCISES: CPT

## 2021-07-15 PROCEDURE — 97140 MANUAL THERAPY 1/> REGIONS: CPT

## 2021-07-16 NOTE — PROGRESS NOTES
Mitch Solid  : 1950  [place]  [place]: [place] [address] at [place] [address], [address] [facility]  [phone]   [phone]      OUTPATIENT PHYSICAL THERAPY: Daily Treatment Note 7/15/2021  Visit Count:  5    ICD-10: Treatment Diagnosis: Low back pain (M54.5)   ICD-10: Treatment Diagnosis: Pain in right knee (M25.561)   ICD-10: Treatment Diagnosis: Pain in left knee (M25.562)     Precautions/Allergies:   Ciprofloxacin, Adhesive tape-silicones, Ceclor [cefaclor], and Symbyax [olanzapine-fluoxetine]   TREATMENT PLAN:  Effective Dates: 6/15/2021 TO 2021 (90 days). Frequency/Duration: 2 times a week 30 days and progress to 1x a week for a total of 90 Day(s) MEDICAL/REFERRING DIAGNOSIS:  Status post left knee replacement [Z96.652]  Status post total knee replacement, right [Z96.651]   DATE OF ONSET: chronic, recent fall and flare up  REFERRING PHYSICIAN: Sina Kwan MD MD Orders: evaluate and treat  Return MD Appointment: as needed       Pre-treatment Symptoms/Complaints:  Patient notes less iscomfort in bilateral knees. Traveled to [facility] and increased discomfort noted in lumbar spine. Pain: Initial: Pain Intensity 1: 3  Pain Location 1: Knee, Spine, lumbar  Pain Orientation 1: Left, Right /10 Post Session:  210   Medications Last Reviewed:  7/15/2021  Updated Objective Findings:  Soft tissue restrictions noted in bilateral hamstrings. TREATMENT:     THERAPEUTIC EXERCISE: (15 minutes):  Exercises per grid below to improve mobility, strength, balance and coordination. Required moderate verbal, manual and tactile cues to promote proper body alignment, promote proper body posture, promote proper body mechanics and promote proper body breathing techniques. Progressed resistance, range, repetitions and complexity of movement as indicated.      Date:  7/15/2021   Activity/Exercise Parameters   Supine quad set    Supine heel slides 2 X 10 reps, 5 sec holds   Supine straight leg raise 2 X 10 reps, 5 sec holds   Supine knee to chest X 5 reps, 10 sec holds BLE   Supine hamstrings stretch X 5 reps, 10 sec holds BLE   Supine scapular retraction X 10 reps, 5 sec holds green band           MANUAL THERAPY: (40 minutes): Joint mobilization and Soft tissue mobilization was utilized and necessary because of the patient's restricted joint motion, painful spasm, loss of articular motion and restricted motion of soft tissue. (Used abbreviations: MET - muscle energy technique; PNF - proprioceptive neuromuscular facilitation; NMR - neuromuscular re-education; a/p - anterior to posterior; p/a - posterior to anterior, FMP - functional movement patterns, SF - Superficial Fascia; BC - Bony contours; MP - muscle play; IASTM - instrument-assisted soft tissue mobilization)  · Supine bilateral soft tissue mobilizations to anterior knee and quadriceps with FMP of knee flexion/extension  · Supine patella mobilizations bilaterally in all directions  · Supine bilateral hamstrings soft tissue mobilization with FMP of knee flexion and extension  · Side lying soft tissue mobilization to lumbar spine and right hip rotators. · Supine anterior chest and left anterior shoulder soft tissue mobilization with breathing techniques      [facility]  Treatment/Session Summary:    · Response to Treatment: improving flexibility in bilateral hamstrings, presents with restrictions in lumbar spine paraspinals and challenges with hip rotation ROM. · Communication/Consultation:  None today  · Equipment provided today:  None today  · Recommendations/Intent for next treatment session: Next visit will focus on soft tissue mobilization of LE, balance activities, strengthening exercises for core and LE.     Total Treatment Billable Duration:  55 minutes  PT Patient Time In/Time Out  Time In: 1430  Time Out: 975 [facility], PT    Future Appointments Date Time Provider Uzair Pineda   7/20/2021  2:30 PM Manuela Generous., PT [facility]   7/22/2021  2:30 PM Rei Notice A., PT [facility]   7/26/2021  7:00 PM Rei Notice A., PT [facility]   7/27/2021 10:40 AM Tim Call, NP [facility] PSCD PP   7/29/2021  2:30 PM Manuela Generous., PT [facility]   8/2/2021  3:30 PM Manuela Generous., PT [facility]   8/10/2021 10:15 AM Kee Greco MD [facility] NISHANT NISHANT   8/12/2021 10:30 AM Manuela Generous., PT [facility]   8/16/2021 10:30 AM Manuela Generous., PT [facility]   8/19/2021 10:30 AM Manuela Generous., PT [facility]   8/25/2021  3:30 PM Manuela Generous., PT [facility]   8/30/2021  3:30 PM Manuela Generous., PT [facility]   10/13/2021 10:20 AM Randell Nuñez MD END [facility] ENDO   12/15/2021 10:30 AM Mel Ponce MD [facility]

## 2021-07-20 ENCOUNTER — HOSPITAL ENCOUNTER (OUTPATIENT)
Dept: PHYSICAL THERAPY | Age: 71
Discharge: HOME | End: 2021-07-20
Payer: MEDICARE

## 2021-07-20 PROCEDURE — 97140 MANUAL THERAPY 1/> REGIONS: CPT

## 2021-07-20 PROCEDURE — 97110 THERAPEUTIC EXERCISES: CPT

## 2021-07-21 NOTE — PROGRESS NOTES
Prosper Hidalgo  : 1950  [place]  [place] [address] at [place] [address], [address] [place] [address]  [phone]   [phone]      OUTPATIENT PHYSICAL THERAPY: Daily Treatment Note 2021  Visit Count:  6    ICD-10: Treatment Diagnosis: Low back pain (M54.5)   ICD-10: Treatment Diagnosis: Pain in right knee (M25.561)   ICD-10: Treatment Diagnosis: Pain in left knee (M25.562)     Precautions/Allergies:   Ciprofloxacin, Adhesive tape-silicones, Ceclor [cefaclor], and Symbyax [olanzapine-fluoxetine]   TREATMENT PLAN:  Effective Dates: 6/15/2021 TO 2021 (90 days). Frequency/Duration: 2 times a week 30 days and progress to 1x a week for a total of 90 Day(s) MEDICAL/REFERRING DIAGNOSIS:  Status post left knee replacement [Z96.652]  Status post total knee replacement, right [Z96.651]   DATE OF ONSET: chronic, recent fall and flare up  REFERRING PHYSICIAN: Kenyatta Villegas MD MD Orders: evaluate and treat  Return MD Appointment: as needed       Pre-treatment Symptoms/Complaints:  Patient notes little to no discomfort in her bilateral knees this week. Continues to have low back pain and has an appointment with her pain management MD today. Pain: Initial: Pain Intensity 1: 1  Pain Location 1: Knee  Pain Orientation 1: Left, Right /10 Post Session: 0/10   Medications Last Reviewed:  2021  Updated Objective Findings:  Soft tissue restrictions noted in bilateral hamstrings. TREATMENT:     THERAPEUTIC EXERCISE: (15 minutes):  Exercises per grid below to improve mobility, strength, balance and coordination. Required moderate verbal, manual and tactile cues to promote proper body alignment, promote proper body posture, promote proper body mechanics and promote proper body breathing techniques. Progressed resistance, range, repetitions and complexity of movement as indicated.      Date:  2021 Activity/Exercise Parameters   Supine quad set    Supine heel slides 2 X 10 reps, 5 sec holds   Supine straight leg raise 2 X 10 reps, 5 sec holds   Supine knee to chest X 5 reps, 10 sec holds BLE   Supine hamstrings stretch X 5 reps, 10 sec holds BLE   Supine scapular retraction X 10 reps, 5 sec holds green band           MANUAL THERAPY: (25 minutes): Joint mobilization and Soft tissue mobilization was utilized and necessary because of the patient's restricted joint motion, painful spasm, loss of articular motion and restricted motion of soft tissue. (Used abbreviations: MET - muscle energy technique; PNF - proprioceptive neuromuscular facilitation; NMR - neuromuscular re-education; a/p - anterior to posterior; p/a - posterior to anterior, FMP - functional movement patterns, SF - Superficial Fascia; BC - Bony contours; MP - muscle play; IASTM - instrument-assisted soft tissue mobilization)  · Supine bilateral soft tissue mobilizations to anterior knee and quadriceps with FMP of knee flexion/extension  · Supine patella mobilizations bilaterally in all directions  · Supine bilateral hamstrings soft tissue mobilization with FMP of knee flexion and extension  · Side lying soft tissue mobilization to lumbar spine and right hip rotators. · Supine anterior chest and left anterior shoulder soft tissue mobilization with breathing techniques      MedBridge Portal  Treatment/Session Summary:    · Response to Treatment: continues to have restrictions in her bilateral hamstrings. Overall discomfort improved in bilateral knees, next session will focus on left shoulder secondary to limited ROM noted. · Communication/Consultation:  None today  · Equipment provided today:  None today  · Recommendations/Intent for next treatment session: Next visit will focus treatment to her left shoulder from her other physical therapy case.      Total Treatment Billable Duration:  55 minutes  PT Patient Time In/Time Out  Time In: 1430  Time Out: 82 Prabha Mari, PT    Future Appointments   Date Time Provider Uzair [place]   7/22/2021  2:30 PM Michaeleen Mews., PT [facility]   7/26/2021  7:00 PM Nano SKELTON., PT [facility]   7/27/2021 10:40 AM Ailyn Call, ZAHRAA [facility] PSCD PP   7/29/2021  2:30 PM Michaeleen Mews., PT [facility]   8/2/2021  3:30 PM Michaeleen Mews., PT [facility]   8/10/2021 10:15 AM Emory Ross MD [facility] NISHANT NISHANT   8/12/2021 10:30 AM Michaeleen Mews., PT [facility]   8/16/2021 10:30 AM Michaeleen Mews., PT [facility]   8/19/2021 10:30 AM Michaeleen Mews., PT [facility]   8/25/2021  3:30 PM Michaeleen Mews., PT [facility]   8/30/2021  3:30 PM [place]ae[place] Mews., PT [facility]   10/13/2021 10:20 AM Vicky De Guzman MD END [facility] ENDO   12/15/2021 10:30 AM Ilan Luke [facility]

## 2021-07-22 ENCOUNTER — HOSPITAL ENCOUNTER (OUTPATIENT)
Dept: PHYSICAL THERAPY | Age: 71
Discharge: HOME | End: 2021-07-22
Payer: MEDICARE

## 2021-07-22 PROCEDURE — 97110 THERAPEUTIC EXERCISES: CPT

## 2021-07-22 PROCEDURE — 97140 MANUAL THERAPY 1/> REGIONS: CPT

## 2021-07-23 NOTE — PROGRESS NOTES
Alcides Levi  : 1950  [place]  Secondary: [facility], [address] [facility]  [phone]   [phone]      OUTPATIENT PHYSICAL THERAPY: Daily Treatment Note 2021  Visit Count:  7    ICD-10: Treatment Diagnosis: Low back pain (M54.5)   ICD-10: Treatment Diagnosis: Pain in right knee (M25.561)   ICD-10: Treatment Diagnosis: Pain in left knee (M25.562)     Precautions/Allergies:   Ciprofloxacin, Adhesive tape-silicones, Ceclor [cefaclor], and Symbyax [olanzapine-fluoxetine]   TREATMENT PLAN:  Effective Dates: 6/15/2021 TO 2021 (90 days). Frequency/Duration: 2 times a week 30 days and progress to 1x a week for a total of 90 Day(s) MEDICAL/REFERRING DIAGNOSIS:  Status post left knee replacement [Z96.652]  Status post total knee replacement, right [Z96.651]   DATE OF ONSET: chronic, recent fall and flare up  REFERRING PHYSICIAN: Kyle Rubio MD MD Orders: evaluate and treat  Return MD Appointment: as needed       Pre-treatment Symptoms/Complaints:  Patient notes tightness in bilateral hamstrings. Had injections in her lumbar spine this week. Continues to note soreness, however improvements noted. Pain: Initial: Pain Intensity 1: 1  Pain Location 1: Knee  Pain Orientation 1: Left, Right /10 Post Session: 0/10   Medications Last Reviewed:  2021  Updated Objective Findings:  Soft tissue restrictions noted in bilateral hamstrings. TREATMENT:     THERAPEUTIC EXERCISE: (15 minutes):  Exercises per grid below to improve mobility, strength, balance and coordination. Required moderate verbal, manual and tactile cues to promote proper body alignment, promote proper body posture, promote proper body mechanics and promote proper body breathing techniques. Progressed resistance, range, repetitions and complexity of movement as indicated.      Date:  2021   Activity/Exercise Parameters   Supine quad set    Supine heel slides 2 X 10 reps, 5 sec holds   Supine straight leg raise 2 X 10 reps, 5 sec holds   Supine knee to chest X 5 reps, 10 sec holds BLE   Supine hamstrings stretch X 5 reps, 10 sec holds BLE   Supine scapular retraction X 10 reps, 5 sec holds green band           MANUAL THERAPY: (25 minutes): Joint mobilization and Soft tissue mobilization was utilized and necessary because of the patient's restricted joint motion, painful spasm, loss of articular motion and restricted motion of soft tissue. (Used abbreviations: MET - muscle energy technique; PNF - proprioceptive neuromuscular facilitation; NMR - neuromuscular re-education; a/p - anterior to posterior; p/a - posterior to anterior, FMP - functional movement patterns, SF - Superficial Fascia; BC - Bony contours; MP - muscle play; IASTM - instrument-assisted soft tissue mobilization)  · Supine bilateral soft tissue mobilizations to anterior knee and quadriceps with FMP of knee flexion/extension  · Supine patella mobilizations bilaterally in all directions  · Supine bilateral hamstrings soft tissue mobilization with FMP of knee flexion and extension  · Side lying soft tissue mobilization to lumbar spine and right hip rotators. · Supine anterior chest and left anterior shoulder soft tissue mobilization with breathing techniques      MedBridge Portal  Treatment/Session Summary:    · Response to Treatment: improving overall mobility in anterior knees. Continues to present with restrictions in bilateral hamstrings. · Communication/Consultation:  None today  · Equipment provided today:  None today  · Recommendations/Intent for next treatment session: Next visit will focus treatment to her left shoulder from her other physical therapy case.      Total Treatment Billable Duration:  40 minutes  PT Patient Time In/Time Out  Time In: 1430  Time Out: 82 Prabha Guerra PT    Future Appointments   Date Time Provider Department [place]   7/26/2021  7:00 PM Chika FRANCO, PT [facility]   7/29/2021  2:30 PM Edythe Barbra., PT [facility]   8/2/2021  3:30 PM Edythe Barbra., PT [facility]   8/10/2021 10:15 AM Taya Arias MD [facility]   8/12/2021 10:30 AM Edythe Barbra., PT [facility]   8/16/2021 10:30 AM Edythe Barbra., PT [facility]   8/19/2021 10:30 AM Edythe Barbra., PT [facility]   8/25/2021  3:30 PM Edythe Barbra., PT [facility]   8/30/2021  3:30 PM Edythe Barbra., PT [facility]   10/13/2021 10:20 AM Goyo Hansen MD [facility] END[facility]   12/15/2021 10:30 AM Billie Anderson MD [facility]

## 2021-07-26 ENCOUNTER — APPOINTMENT (OUTPATIENT)
Dept: PHYSICAL THERAPY | Age: 71
End: 2021-07-26
Payer: MEDICARE

## 2021-07-28 ENCOUNTER — HOSPITAL ENCOUNTER (OUTPATIENT)
Dept: PHYSICAL THERAPY | Age: 71
Discharge: HOME | End: 2021-07-28
Payer: MEDICARE

## 2021-07-28 PROCEDURE — 97110 THERAPEUTIC EXERCISES: CPT

## 2021-07-28 PROCEDURE — 97140 MANUAL THERAPY 1/> REGIONS: CPT

## 2021-07-29 NOTE — PROGRESS NOTES
Prosper Hidalgo  : 1950  [place]  [place] [address] at [address]  [phone]   [phone]      OUTPATIENT PHYSICAL THERAPY: Daily Treatment Note 2021  Visit Count:  8    ICD-10: Treatment Diagnosis: Low back pain (M54.5)   ICD-10: Treatment Diagnosis: Pain in right knee (M25.561)   ICD-10: Treatment Diagnosis: Pain in left knee (M25.562)     Precautions/Allergies:   Ciprofloxacin, Adhesive tape-silicones, Ceclor [cefaclor], and Symbyax [olanzapine-fluoxetine]   TREATMENT PLAN:  Effective Dates: 6/15/2021 TO 2021 (90 days). Frequency/Duration: 2 times a week 30 days and progress to 1x a week for a total of 90 Day(s) MEDICAL/REFERRING DIAGNOSIS:  Status post left knee replacement [Z96.652]  Status post total knee replacement, right [Z96.651]   DATE OF ONSET: chronic, recent fall and flare up  REFERRING PHYSICIAN: Kenyatta Villegas MD MD Orders: evaluate and treat  Return MD Appointment: as needed       Pre-treatment Symptoms/Complaints:  Patient notes injections are helping her low back pain. Continues to have mild tightness in her legs and knees. Left shoulder continues to catch with certain movements. Pain: Initial: Pain Intensity 1: 2  Pain Location 1: Knee, Spine, lumbar  Pain Orientation 1: Left, Right /10 Post Session: 0/10   Medications Last Reviewed:  2021  Updated Objective Findings:  Soft tissue restrictions noted in bilateral hamstrings. TREATMENT:     THERAPEUTIC EXERCISE: (15 minutes):  Exercises per grid below to improve mobility, strength, balance and coordination. Required moderate verbal, manual and tactile cues to promote proper body alignment, promote proper body posture, promote proper body mechanics and promote proper body breathing techniques. Progressed resistance, range, repetitions and complexity of movement as indicated. Date:  7/28/2021   Activity/Exercise Parameters   Supine quad set    Supine heel slides 2 X 10 reps, 5 sec holds   Supine straight leg raise 2 X 10 reps, 5 sec holds   Supine knee to chest X 5 reps, 10 sec holds BLE   Supine hamstrings stretch X 5 reps, 10 sec holds BLE   Supine scapular retraction X 10 reps, 5 sec holds green band           MANUAL THERAPY: (40 minutes): Joint mobilization and Soft tissue mobilization was utilized and necessary because of the patient's restricted joint motion, painful spasm, loss of articular motion and restricted motion of soft tissue. (Used abbreviations: MET - muscle energy technique; PNF - proprioceptive neuromuscular facilitation; NMR - neuromuscular re-education; a/p - anterior to posterior; p/a - posterior to anterior, FMP - functional movement patterns, SF - Superficial Fascia; BC - Bony contours; MP - muscle play; IASTM - instrument-assisted soft tissue mobilization)  · Supine bilateral soft tissue mobilizations to anterior knee and quadriceps with FMP of knee flexion/extension  · Supine patella mobilizations bilaterally in all directions  · Supine bilateral hamstrings soft tissue mobilization with FMP of knee flexion and extension  · Side lying soft tissue mobilization to lumbar spine and right hip rotators. · Supine anterior chest and left anterior shoulder soft tissue mobilization with breathing techniques      [facility]  Treatment/Session Summary:    · Response to Treatment: decreased restrictions noted in bilateral anterior knees and quadriceps. Continues to present with soft tissue restrictions and decreased flexibility in bilateral hamstrings  · Communication/Consultation:  None today  · Equipment provided today:  None today  · Recommendations/Intent for next treatment session: Next visit will focus treatment to her left shoulder from her other physical therapy case.      Total Treatment Billable Duration:  55 minutes  PT Patient Time In/Time Out  Time In: 1330  Time Out: [address], PT    Future Appointments   Date Time Provider Uzair Pineda   8/2/2021  3:30 PM Osvaldo Herd., PT [facility]   8/10/2021 10:15 AM Jasbir Martinez MD [facility] NISHANT NISHANT   8/12/2021 10:30 AM Osvaldo Herd., PT [facility]   8/16/2021 10:30 AM Osvaldo Herd., PT [facility]   8/19/2021 10:30 AM Osvaldo Herd., PT [facility]   8/25/2021  3:30 PM Osvaldo Herd., PT [facility]   8/30/2021  3:30 PM Osvaldo Herd., PT [facility]   9/16/2021  8:40 AM Camron Lanier MD [facility] PP PP   10/13/2021 10:20 AM Shyanne Martinez MD END BS ENDO   12/15/2021 10:30 AM Devin Cox MD [facility]G UCD   7/27/2022  1:40 PM Luis Márquez NP [facility] PSCD PP

## 2021-08-02 ENCOUNTER — HOSPITAL ENCOUNTER (OUTPATIENT)
Dept: PHYSICAL THERAPY | Age: 71
Discharge: HOME | End: 2021-08-02
Payer: MEDICARE

## 2021-08-02 PROCEDURE — 97110 THERAPEUTIC EXERCISES: CPT

## 2021-08-02 PROCEDURE — 97140 MANUAL THERAPY 1/> REGIONS: CPT

## 2021-08-02 NOTE — PROGRESS NOTES
Rei Stout  : 1950  [place]  Secondary: [facility], [address] [facility]  [phone]   [phone]      OUTPATIENT PHYSICAL THERAPY: Daily Treatment Note 2021  Visit Count:  9    ICD-10: Treatment Diagnosis: Low back pain (M54.5)   ICD-10: Treatment Diagnosis: Pain in right knee (M25.561)   ICD-10: Treatment Diagnosis: Pain in left knee (M25.562)     Precautions/Allergies:   Ciprofloxacin, Adhesive tape-silicones, Ceclor [cefaclor], and Symbyax [olanzapine-fluoxetine]   TREATMENT PLAN:  Effective Dates: 6/15/2021 TO 2021 (90 days). Frequency/Duration: 2 times a week 30 days and progress to 1x a week for a total of 90 Day(s) MEDICAL/REFERRING DIAGNOSIS:  Status post left knee replacement [Z96.652]  Status post total knee replacement, right [Z96.651]   DATE OF ONSET: chronic, recent fall and flare up  REFERRING PHYSICIAN: Jess Arreguin MD MD Orders: evaluate and treat  Return MD Appointment: as needed       Pre-treatment Symptoms/Complaints:  Patient notes twisted her back over the weekend and increased discomfort noted in right mid-low back. Bilateral knees continue to feel better and stronger with ambulation. Pain: Initial: Pain Intensity 1: 2  Pain Location 1: Knee, Spine, lumbar  Pain Orientation 1: Left, Right /10 Post Session: 0/10   Medications Last Reviewed:  2021  Updated Objective Findings:  Soft tissue restrictions noted in bilateral hamstrings. TREATMENT:     THERAPEUTIC EXERCISE: (15 minutes):  Exercises per grid below to improve mobility, strength, balance and coordination. Required moderate verbal, manual and tactile cues to promote proper body alignment, promote proper body posture, promote proper body mechanics and promote proper body breathing techniques. Progressed resistance, range, repetitions and complexity of movement as indicated. Date:  8/2/2021   Activity/Exercise Parameters   Supine quad set    Supine heel slides 2 X 10 reps, 5 sec holds   Supine straight leg raise 2 X 10 reps, 5 sec holds   Supine knee to chest X 5 reps, 10 sec holds BLE   Supine hamstrings stretch X 5 reps, 10 sec holds BLE   Supine scapular retraction X 10 reps, 5 sec holds green band           MANUAL THERAPY: (40 minutes): Joint mobilization and Soft tissue mobilization was utilized and necessary because of the patient's restricted joint motion, painful spasm, loss of articular motion and restricted motion of soft tissue. (Used abbreviations: MET - muscle energy technique; PNF - proprioceptive neuromuscular facilitation; NMR - neuromuscular re-education; a/p - anterior to posterior; p/a - posterior to anterior, FMP - functional movement patterns, SF - Superficial Fascia; BC - Bony contours; MP - muscle play; IASTM - instrument-assisted soft tissue mobilization)  · Supine bilateral soft tissue mobilizations to anterior knee and quadriceps with FMP of knee flexion/extension  · Supine patella mobilizations bilaterally in all directions  · Supine bilateral hamstrings soft tissue mobilization with FMP of knee flexion and extension  · Side lying soft tissue mobilization to lumbar spine and right hip rotators. · Supine anterior chest and left anterior shoulder soft tissue mobilization with breathing techniques      [facility]  Treatment/Session Summary:    · Response to Treatment: improving overall mobility in LE flexibility and decreased soft tissue restrictions noted in lumbar spine paraspinals. · Communication/Consultation:  None today  · Equipment provided today:  None today  · Recommendations/Intent for next treatment session: Next visit will focus treatment to her left shoulder from her other physical therapy case.      Total Treatment Billable Duration:  40 minutes  PT Patient Time In/Time Out  Time In: 1847  Time Out: [address] [facility], PT    Future Appointments   Date Time Provider Uzair Aranai   8/10/2021 10:15 AM Sd Dang MD [facility] NISHANT NISHANT   8/12/2021 10:30 AM Pam FRANCO, PT [facility]   8/16/2021 10:30 AM [facility]., [facility]   8/19/2021 10:30 AM Faye Blackevensd., [facility]   8/25/2021  3:30 PM Faye Blackevensd., PT [facility]   8/30/2021  3:30 PM Faye Blackevensd., PT [facility]   9/16/2021  8:40 AM Corey Reinoso MD [facility] PP PP   10/13/2021 10:20 AM Ritchie Champion MD END BS ENDO   12/15/2021 10:30 AM Dewey Horner MD [facility] UCDG UCD   7/27/2022  1:40 PM Jennifer Byrd NP [facility] PSCD PP

## 2021-08-12 ENCOUNTER — HOSPITAL ENCOUNTER (OUTPATIENT)
Dept: PHYSICAL THERAPY | Age: 71
Discharge: HOME | End: 2021-08-12
Payer: MEDICARE

## 2021-08-12 NOTE — PROGRESS NOTES
Judith Rivera   (TSQ:[id number]/5622) [address]  at  [facility]  [address], [address]  [phone]  [phone]             DATE: 8/12/2021    Patient canceled for appointment today due to is out of town. Will plan to follow up on next scheduled visit.     Rob Tavera PT, DPT, CFMT

## 2021-08-15 NOTE — PROGRESS NOTES
Dinora Reyes  : 1950  [place]  [place] [address] at [place] [address], [address] [facility]  [phone]   [phone]      OUTPATIENT PHYSICAL THERAPY: Daily Treatment Note 2019   Visit Count: 31      ICD-10: Treatment Diagnosis: Pain in joint, left knee M25.562  ICD-10: Treatment Diagnosis: pain in joint, right knee M25.561  ICD-10: Treatment Diagnosis: difficulty walking, not elsewhere classified R26.2  Precautions/Allergies:   Adhesive tape-silicones; Ceclor [cefaclor]; and Symbyax [olanzapine-fluoxetine]   Treatment Plan of Care Effective Dates: 19 TO 2020      Pre-treatment Symptoms/Complaints:  Mrs. Shane Avery notes continues to fatigue easily with daily tasks and ambulation. Has been using her cane more consistently. Pain: Initial: Pain Intensity 1: 3  Pain Location 1: Knee  Pain Orientation 1: Left  Post Session:  1/10   Medications Last Reviewed: 2019    Updated Objective Findings:  left knee AROM 1-130 degrees, poor activation of diaphragmatic breathing, demonstrates chest breathing. Challenged with left knee extension in weight bearing. Continues to be challenged with endurance. Antalgic gait noted and use of assisted device is recommended   TREATMENT:     THERAPEUTIC EXERCISE: (30 minutes):  Exercises per grid below to improve mobility, strength, balance and coordination. Required moderate verbal and manual cues to promote proper body alignment, promote proper body posture, promote proper body mechanics and promote proper body breathing techniques. Progressed resistance, range, repetitions and complexity of movement as indicated. Date:  2019   Activity/Exercise Parameters   Supine heel slides    Supine short arc quad    Sit to stand transfers X 10 reps.     Supine straight leg raise X 10 reps, 10 sec holds   Seated hip hinge/weight acceptance X 5 reps BLE Nu-step    Seated knee extension    Standing terminal knee extension X 10 reps, BLE 5 sec holds, blue t-band   Standing lateral steps X 10 reps, 5' distance with green t-band   Therapeutic Neuroscience education    Standing hip extension X 30 reps BLE   Standing hip abduction X 30 reps BLE   Standing weight shift forward    Supine UE/LE abdominal brace    Standing functional squat X 30 reps,   Prone knee hang left    Prone knee extension/quad sets X 25 reps, 5 sec holds   Supine quad set    HEP review X 5 minute review   Step up X 30 taps BLE  X 30 step ups 6\" BLE   Standing marching on airex X 1 minute  X 15 lateral steps  X 15 forward steps     Standing functional squats X 15 reps   Standing calf stretches X 5 reps, 25 sec holds BLE on board   Supine diaphragmatic breathing x 10 reps,   Supine 90/90 hip flexion abdominal brace X 5 reps, 5 sec holds   Supine opposite UE/LE flexion and extension with abdominal bracing X 10 reps bilaterally    Standing hip flexion/abduction/extension X 15 reps, BLE   Gym machines      MANUAL THERAPY: (25 minutes): Joint mobilization and Soft tissue mobilization was utilized and necessary because of the patient's restricted joint motion and restricted motion of soft tissue. (Used abbreviations: MET - muscle energy technique; PNF - proprioceptive neuromuscular facilitation; NMR - neuromuscular re-education; a/p - anterior to posterior; p/a - posterior to anterior, FMP - functional movement patterns)  · Supine left knee soft tissue mobilization to anterior knee and patella mobilization in all directions. · Supine left hamstrings soft tissue mobilization with FMP of knee extension, repeated on right side  · Supine left anterior quadriceps and iliopsoas with FMP of knee flexion/extension, hip flexion.     [facility] Portal  Treatment/Session Summary:    · Response to Treatment:  continues to be challenged with endurance in weight bearing, needs rest breaks between exercises, challenged with ambulation secondary for foward flexed posture in lumbar spine  · Communication/Consultation:  None today  · Equipment provided today:  None today  · Recommendations/Intent for next treatment session: Next visit will focus on LE strength, endurance and balance in weight bearing, continue to address gait deficits. Total Treatment Billable Duration:  55 minutes.    PT Patient Time In/Time Out  Time In: 1630  Time Out: MANUEL Vuong    Future Appointments   Date Time Provider Uzair Pineda   11/14/2019  4:00 PM PP EM[facility] [place] [facility] PSCD PP   11/20/2019  4:30 PM Sun Robertson, PT [facility]   11/26/2019  4:30 PM Randee FRANCO, PT [facility]   12/9/2019  2:00 PM Ty Tsai MD [facility] NISHANT NISHANT   1/14/2020  3:40 PM Ronit Otto MD [facility] BS ENDO   3/17/2020  1:15 PM Moose Hammond MD [facility] UCDG UCD no

## 2021-08-16 ENCOUNTER — HOSPITAL ENCOUNTER (OUTPATIENT)
Dept: PHYSICAL THERAPY | Age: 71
Discharge: HOME | End: 2021-08-16
Payer: MEDICARE

## 2021-08-16 PROCEDURE — 97140 MANUAL THERAPY 1/> REGIONS: CPT

## 2021-08-16 PROCEDURE — 97110 THERAPEUTIC EXERCISES: CPT

## 2021-08-17 NOTE — PROGRESS NOTES
Nik Cleveland  : 1950  [place]  Secondary: [facility] [place] at [place] [address], [address] [facility]  [phone]   [phone]        OUTPATIENT PHYSICAL THERAPY:Progress Report 2021   ICD-10: Treatment Diagnosis: Low back pain (M54.5)   ICD-10: Treatment Diagnosis: Pain in right knee (M25.561)   ICD-10: Treatment Diagnosis: Pain in left knee (M25.562)    Precautions/Allergies:   Ciprofloxacin, Adhesive tape-silicones, Ceclor [cefaclor], and Symbyax [olanzapine-fluoxetine]   TREATMENT PLAN:  Effective Dates: 6/15/2021 TO 2021 (90 days). Frequency/Duration: 2 times a week 30 days and progress to 1x a week for a total of 90 Day(s) MEDICAL/REFERRING DIAGNOSIS:  Presence of left artificial knee joint [Z96.652]  Presence of right artificial knee joint [Z96.651]   DATE OF ONSET: chronic, recent fall and flare up  REFERRING PHYSICIAN: Eliel Mueller MD MD Orders: evaluate and treat  Return MD Appointment: as needed     PROGRESS NOTE: 21: Nik Cleveland has attended 10 physical therapy visits for her bilateral knee pain and low back pain. Improvements have been noted in bilateral knees with little to no pain noted. She continues to have low back pain. Recent injections have helped, however continues to have discomfort. She continues to be challenged with ambulation and prolonged standing activities. She would benefit from continued therapy services to improve overall mobility, strength and endurance. INITIAL ASSESSMENT:  Ms. Cristel Alcocer is a 78 y.o. female who presents to physical therapy with bilateral knee pain and low back pain after a recent fall off of her bed. Notes she fell onto both of her knees and her  had to assist her to get back up. Increased tightness and tenderness in bilateral knees, left greater than right since the fall.  She presents with soft tissue restrictions, decreased mobility of patella, left greater than right and LE strength and endurance deficits. She would benefit from skilled physical therapy to improve overall mobility, strength and endurance as well as improve ambulation and tolerance in weight bearing. PROBLEM LIST (Impacting functional limitations):  1. Decreased Strength  2. Decreased ADL/Functional Activities  3. Decreased Transfer Abilities  4. Decreased Ambulation Ability/Technique  5. Decreased Balance  6. Increased Pain  7. Decreased Activity Tolerance  8. Increased Fatigue  9. Decreased Flexibility/Joint Mobility INTERVENTIONS PLANNED: (Treatment may consist of any combination of the following)  1. Balance Exercise  2. Bed Mobility  3. Gait Training  4. Home Exercise Program (HEP)  5. Manual Therapy  6. Neuromuscular Re-education/Strengthening  7. Range of Motion (ROM)  8. Therapeutic Activites  9. Therapeutic Exercise/Strengthening     GOALS: (Goals have been discussed and agreed upon with patient.)  Discharge Goals: Time Frame: 90 days  1. Patient demonstrates independence with her HEP without verbal cueing from therapist. GOAL MET, progress as needed  2. Patient able to weight bear for 30 minutes to perform house hold activities, with pain no more than 2/10. ONGOING  3. Patient able to ambulate for 30 minutes to perform community distance and return to daily walking routine without onset of bilateral knee pain. ONGOING  4. Improve LEFS outcome measure score from 47/80 to 65/80 to perform ADLs ONGOING    OUTCOME MEASURE:   Tool Used: Lower Extremity Functional Scale (LEFS)  Score:  Initial: 47/80 Most Recent: X/80 (Date: -- )   Interpretation of Score: 20 questions each scored on a 5 point scale with 0 representing \"extreme difficulty or unable to perform\" and 4 representing \"no difficulty\". The lower the score, the greater the functional disability. 80/80 represents no disability. Minimal detectable change is 9 points.       MEDICAL NECESSITY:   · Patient is expected to demonstrate progress in strength, range of motion, balance, coordination and functional technique to increase independence with improving endurance with ambulation and weight bearing activities without onset of bilateral knee pain. REASON FOR SERVICES/OTHER COMMENTS:  · Patient continues to require skilled intervention due to challenged with prolonged weight bearing secondary to bilateral knee pain and low back pain. Total Duration:  PT Patient Time In/Time Out  Time In: 1030  Time Out: 1115    Rehabilitation Potential For Stated Goals: Excellent  Regarding Bjorn Jay's therapy, I certify that the treatment plan above will be carried out by a therapist or under their direction. Thank you for this referral,  Ila Swanson, PT     Referring Physician Signature: Jerman Rodriguez MD No Signature is Required for this note. PAIN/SUBJECTIVE:   Initial: Pain Intensity 1: 2  Pain Location 1: Knee, Spine, lumbar  Pain Orientation 1: Left, Right  Post Session:  2/10   HISTORY:   History of Injury/Illness (Reason for Referral):  Chronic history of low back pain and bilateral knee pain for several years. She has had bilateral TKAs in 2018 and 2019. She has been very active with a daily walking routine and active in a weight loss program. She recently fell out of her bed, falling onto bilateral knees. She notes she was challenged to get back up and her  helped her off of the floor. She notes she continues to have tightness and tingling into her knees. Notes she is able to weight bear without any giving way in knees. Notes challenged with prolonged standing and walking due to fatigue and discomfort. Patient denies dizziness, drop attacks, numbness/tingling, bowel/bladder dysfunction and/or unexplained weight gain/loss.      Ambulatory/Rehab Services H2 Model Falls Risk Assessment   Risk Factors:       (5)  History of Recent Falls [w/in 3 months] Ability to Rise from Chair:       (1)  Pushes up, successful in one attempt   Falls Prevention Plan:       No modifications necessary   Total: (5 or greater = High Risk): 6   ©2010 [facility] [address] [place] [address] States Patent #[id number]. Federal Law prohibits the replication, distribution or use without written permission from [facility]   Current Medications:       Current Outpatient Medications:     Calcium-Cholecalciferol, D3, (Calcium 600 with Vitamin D3) 600 mg(1,500mg) -400 unit chew, Take 2 Tablets by mouth daily. , Disp: , Rfl:     mometasone (NASONEX) 50 mcg/actuation nasal spray, 2 Sprays by Both Nostrils route daily. , Disp: 1 Container, Rfl: 11    DISABLED PLACARD (DISABLED PLACARD) DMV, As directed, Disp: 1 Each, Rfl: 0    hydroCHLOROthiazide (HYDRODIURIL) 12.5 mg tablet, TAKE 1 TABLET BY MOUTH DAILY, Disp: 30 Tablet, Rfl: 5    apixaban (Eliquis) 5 mg tablet, Take 1 Tablet by mouth two (2) times a day., Disp: 180 Tablet, Rfl: 3    buPROPion (WELLBUTRIN) 75 mg tablet, Take 1 Tab by mouth two (2) times a day., Disp: 180 Tab, Rfl: 1    Synthroid 125 mcg tablet, Take 1 Tab by mouth Daily (before breakfast). , Disp: 90 Tab, Rfl: 3    montelukast (SINGULAIR) 10 mg tablet, TAKE 1 TABLET BY MOUTH ONCE A DAY, Disp: 90 Tab, Rfl: 4    losartan (COZAAR) 100 mg tablet, Take 1 Tab by mouth daily. , Disp: 30 Tab, Rfl: 11    dofetilide (TIKOSYN) 500 mcg capsule, Take 1 Cap by mouth every twelve (12) hours every twelve (12) hours. , Disp: 60 Cap, Rfl: 11    amLODIPine (NORVASC) 5 mg tablet, Take 1 Tab by mouth daily. , Disp: 90 Tab, Rfl: 4    fluticasone propion-salmeteroL (Advair Diskus) 250-50 mcg/dose diskus inhaler, Take 1 Puff by inhalation two (2) times a day.  RINSE MOUTH WELL AFTER USE, Disp: 1 Inhaler, Rfl: 11    albuterol (Ventolin HFA) 90 mcg/actuation inhaler, Take 2 Puffs by inhalation every six (6) hours as needed for Wheezing., Disp: 1 Inhaler, Rfl: 11    acetaminophen (TYLENOL) 325 mg cap, Take 360 mg by mouth three (3) times daily as needed. , Disp: , Rfl:     Cetirizine (ZYRTEC) 10 mg cap, Take 10 Caps by mouth daily. , Disp: , Rfl:     cpap machine kit, by Does Not Apply route., Disp: , Rfl:    Date Last Reviewed: 8/16/2021     UPDATED OBJECTIVE FINDINGS:     Observation/Orthostatic Postural Assessment:          Lower extremity weight bearing is symmetrical. Observation of gait indicates challenges with endurance, decreased pelvic mobility, however presents with pelvic rotation. Patient exhibits a increased lumbar lordosis and increased thoracic kyphosis. Knee alignment is bilateral genu valgus. Observation of patellar position indicates left positioned lateral with soft tissue tightness in left ITB. Patellar tracking with short knee bend indicates poor tracking. Single leg stand exhibits able to perform, however challenged with endurance, 5 seconds left, 5  seconds right. Soft tissue observation indicates restrictions in bilateral rectus femoris, iliopsoas and hamstrings. Improving 8/16/21    Palpation: Patient exhibits tenderness to palpation along left greater than right patella. In modified Hadley position, muscle length of tensor fascia jacob (TFL)  is restricted bilaterally; muscle length of iliopsoas is restricted bilaterally; and muscle length of rectus femoris is restricted left greater than right. Hamstring flexibility tested supine with straight leg raise is mild-moderate restrictions bilaterally. Piriformis flexibility is restrictions bilaterally. Improving 8/16/21  ROM:   Passive Accessory Mobility Testing: Patellofemoral mobility is restricted left greater than right. Anterior/posterior tibia on femur is WFL bilaterally.    AROM(PROM) Right Left   Knee flexion 1-127 1-127   Knee extension Lack 1 Lack 1   Hip flexion 95 95   Hip external rotation (ER) 0-20 0-20   Hip internal rotation (IR) 0-15 0-10   Hip extension 0-20 0-15   Hip abduction 0-45 0-45     Strength:     Manual Muscle Test (*/5) Right Left   Knee extension 4 4   Knee flexion 4 4   Hip flexion 4 4   Hip ER 4 4   Hip IR 4 4   Hip extension 4 4   Hip abduction 4 4   Hip adduction 5 5   Ankle DF 5 5   Ankle PF 5 5   Core stability 4/5        Functional Mobility:  Challenged with prolonged standing, greater than 6 minutes, challenged with ambulation greater than 14 minutes. Body Structures Involved:  1. Nerves  2. Joints  3. Muscles Body Functions Affected:  1. Sensory/Pain  2. Neuromusculoskeletal  3. Movement Related Activities and Participation Affected:  1. General Tasks and Demands  2.  Mobility

## 2021-08-17 NOTE — PROGRESS NOTES
Akbar Nicholson  : 1950  [place]  [place] [address] at [place] [address] [address] [place] [address]  [phone]   [phone]      OUTPATIENT PHYSICAL THERAPY: Daily Treatment Note 2021  Visit Count:  10    ICD-10: Treatment Diagnosis: Low back pain (M54.5)   ICD-10: Treatment Diagnosis: Pain in right knee (M25.561)   ICD-10: Treatment Diagnosis: Pain in left knee (M25.562)     Precautions/Allergies:   Ciprofloxacin, Adhesive tape-silicones, Ceclor [cefaclor], and Symbyax [olanzapine-fluoxetine]   TREATMENT PLAN:  Effective Dates: 6/15/2021 TO 2021 (90 days). Frequency/Duration: 2 times a week 30 days and progress to 1x a week for a total of 90 Day(s) MEDICAL/REFERRING DIAGNOSIS:  Status post left knee replacement [Z96.652]  Status post total knee replacement, right [Z96.651]   DATE OF ONSET: chronic, recent fall and flare up  REFERRING PHYSICIAN: Olga Delacruz MD MD Orders: evaluate and treat  Return MD Appointment: as needed       Pre-treatment Symptoms/Complaints:  Patient notes little to no knee pain however continues to get twinges in her low back. Pain: Initial: Pain Intensity 1: 2  Pain Location 1: Knee, Spine, lumbar  Pain Orientation 1: Left, Right /10 Post Session: 0/10   Medications Last Reviewed:  2021  Updated Objective Findings:  Soft tissue restrictions noted in bilateral hamstrings. TREATMENT:     THERAPEUTIC EXERCISE: (15 minutes):  Exercises per grid below to improve mobility, strength, balance and coordination. Required moderate verbal, manual and tactile cues to promote proper body alignment, promote proper body posture, promote proper body mechanics and promote proper body breathing techniques. Progressed resistance, range, repetitions and complexity of movement as indicated.      Date:  2021   Activity/Exercise Parameters   Supine quad set    Supine heel slides 2 X 10 reps, 5 sec holds   Supine straight leg raise 2 X 10 reps, 5 sec holds   Supine knee to chest X 5 reps, 10 sec holds BLE   Supine hamstrings stretch X 5 reps, 10 sec holds BLE   Supine scapular retraction X 10 reps, 5 sec holds green band           MANUAL THERAPY: (30 minutes): Joint mobilization and Soft tissue mobilization was utilized and necessary because of the patient's restricted joint motion, painful spasm, loss of articular motion and restricted motion of soft tissue. (Used abbreviations: MET - muscle energy technique; PNF - proprioceptive neuromuscular facilitation; NMR - neuromuscular re-education; a/p - anterior to posterior; p/a - posterior to anterior, FMP - functional movement patterns, SF - Superficial Fascia; BC - Bony contours; MP - muscle play; IASTM - instrument-assisted soft tissue mobilization)  · Supine bilateral soft tissue mobilizations to anterior knee and quadriceps with FMP of knee flexion/extension  · Supine patella mobilizations bilaterally in all directions  · Supine bilateral hamstrings soft tissue mobilization with FMP of knee flexion and extension  · Side lying soft tissue mobilization to lumbar spine and right hip rotators. · Supine anterior chest and left anterior shoulder soft tissue mobilization with breathing techniques      [facility]  Treatment/Session Summary:    · Response to Treatment: decreased soft tissue restrictions in bilateral LE, improve mobility in lumbar spine. · Communication/Consultation:  None today  · Equipment provided today:  None today  · Recommendations/Intent for next treatment session: Next visit will focus treatment to her left shoulder from her other physical therapy case.      Total Treatment Billable Duration:  45 minutes  PT Patient Time In/Time Out  Time In: 1030  Time Out: C/ Bree Rand [address], PT    Future Appointments   Date Time Provider Uzair Pineda   8/19/2021 10:30 AM MANUEL Jaquez [facility]   8/25/2021 3:30 PM Leatha Nj., PT [facility]   8/30/2021  7:00 PM Leatha Nj., PT [facility]   9/3/2021 11:30 AM Leatha Nj., PT [facility]   9/16/2021  8:40 AM Rosalind Staley MD [facility] PP PP   10/13/2021 10:20 AM Vicky De Guzman MD END BS ENDO   12/15/2021 10:30 AM Favian Pinedo MD [facility]   2/15/2022  8:45 AM Regina Galdamez MD [facility] N[facility]   7/27/2022  1:40 PM Ashley Nicholson NP [facility] PSCD PP

## 2021-08-19 ENCOUNTER — HOSPITAL ENCOUNTER (OUTPATIENT)
Dept: PHYSICAL THERAPY | Age: 71
Discharge: HOME | End: 2021-08-19
Payer: MEDICARE

## 2021-08-19 PROCEDURE — 97110 THERAPEUTIC EXERCISES: CPT

## 2021-08-19 PROCEDURE — 97140 MANUAL THERAPY 1/> REGIONS: CPT

## 2021-08-20 NOTE — PROGRESS NOTES
Gabriela García  : 1950  [place]  [place] [address] at [place] [address] [address] [place] [address]  [phone]   [phone]      OUTPATIENT PHYSICAL THERAPY: Daily Treatment Note 2021  Visit Count:  11    ICD-10: Treatment Diagnosis: Low back pain (M54.5)   ICD-10: Treatment Diagnosis: Pain in right knee (M25.561)   ICD-10: Treatment Diagnosis: Pain in left knee (M25.562)     Precautions/Allergies:   Ciprofloxacin, Adhesive tape-silicones, Ceclor [cefaclor], and Symbyax [olanzapine-fluoxetine]   TREATMENT PLAN:  Effective Dates: 6/15/2021 TO 2021 (90 days). Frequency/Duration: 2 times a week 30 days and progress to 1x a week for a total of 90 Day(s) MEDICAL/REFERRING DIAGNOSIS:  Status post left knee replacement [Z96.652]  Status post total knee replacement, right [Z96.651]   DATE OF ONSET: chronic, recent fall and flare up  REFERRING PHYSICIAN: Ariella Wright MD MD Orders: evaluate and treat  Return MD Appointment: as needed       Pre-treatment Symptoms/Complaints:  Patient continues to note improvements in her bilateral knee pain. Continues to have right sided low back pain, injections have helped some. Continues to be challenged with prolonged standing and ambulation. Pain: Initial: Pain Intensity 1: 2  Pain Location 1: Knee, Spine, lumbar  Pain Orientation 1: Left, Right /10 Post Session: 0/10   Medications Last Reviewed:  2021  Updated Objective Findings:  Soft tissue restrictions noted in bilateral hamstrings. TREATMENT:     THERAPEUTIC EXERCISE: (15 minutes):  Exercises per grid below to improve mobility, strength, balance and coordination. Required moderate verbal, manual and tactile cues to promote proper body alignment, promote proper body posture, promote proper body mechanics and promote proper body breathing techniques.   Progressed resistance, range, repetitions and complexity of movement as indicated. Date:  8/19/2021   Activity/Exercise Parameters   Supine quad set    Supine heel slides 2 X 10 reps, 5 sec holds   Supine straight leg raise 2 X 10 reps, 5 sec holds   Supine knee to chest X 5 reps, 10 sec holds BLE   Supine hamstrings stretch X 5 reps, 10 sec holds BLE   Supine scapular retraction    Seated forward flexion X 5 reps, 5 sec holds       MANUAL THERAPY: (25 minutes): Joint mobilization and Soft tissue mobilization was utilized and necessary because of the patient's restricted joint motion, painful spasm, loss of articular motion and restricted motion of soft tissue. (Used abbreviations: MET - muscle energy technique; PNF - proprioceptive neuromuscular facilitation; NMR - neuromuscular re-education; a/p - anterior to posterior; p/a - posterior to anterior, FMP - functional movement patterns, SF - Superficial Fascia; BC - Bony contours; MP - muscle play; IASTM - instrument-assisted soft tissue mobilization)  · Supine bilateral soft tissue mobilizations to anterior knee and quadriceps with FMP of knee flexion/extension  · Supine patella mobilizations bilaterally in all directions  · Supine bilateral hamstrings soft tissue mobilization with FMP of knee flexion and extension  · Side lying soft tissue mobilization to lumbar spine and right hip rotators. · Supine anterior chest and left anterior shoulder soft tissue mobilization with breathing techniques      [facility] Portal  Treatment/Session Summary:    · Response to Treatment: demonstrating improvements in LE flexibility and strength. Improving ambulation and upright posture. · Communication/Consultation:  None today   · Equipment provided today:  None today  · Recommendations/Intent for next treatment session: Next visit will focus treatment to her left shoulder from her other physical therapy case.      Total Treatment Billable Duration:  40 minutes  PT Patient Time In/Time Out  Time In: 1030  Time Out: 5695  Dacia FRANCO Gen Wallace, PT    Future Appointments   Date Time Provider Uzair Miriam   8/25/2021  3:30 PM Ashely Michie., PT [facility]   8/30/2021  7:00 PM Ashely Michie., PT [facility]   9/3/2021 11:30 AM Ashely Bert., PT [facility]   9/16/2021  8:40 AM Mami Stock MD [facility] PP PP   10/13/2021 10:20 AM Holger Nino MD END BS ENDO   12/15/2021 10:30 AM Suzanne Rodney MD [facility] UCDG UCD   2/15/2022  8:45 AM Melanie Madrid MD [facility] NISHANT NISHANT   7/27/2022  1:40 PM Marlo Garcia NP [facility] PSCD PP

## 2021-08-25 ENCOUNTER — HOSPITAL ENCOUNTER (OUTPATIENT)
Dept: PHYSICAL THERAPY | Age: 71
Discharge: HOME | End: 2021-08-25
Payer: MEDICARE

## 2021-08-25 PROCEDURE — 97140 MANUAL THERAPY 1/> REGIONS: CPT

## 2021-08-25 PROCEDURE — 97110 THERAPEUTIC EXERCISES: CPT

## 2021-08-26 NOTE — PROGRESS NOTES
Valentino Wall  : 1950  [place]  [place] [facility] at [place] [address], [address] [place] [address]  [phone]   [phone]      OUTPATIENT PHYSICAL THERAPY: Daily Treatment Note 2021  Visit Count:  12    ICD-10: Treatment Diagnosis: Low back pain (M54.5)   ICD-10: Treatment Diagnosis: Pain in right knee (M25.561)   ICD-10: Treatment Diagnosis: Pain in left knee (M25.562)     Precautions/Allergies:   Ciprofloxacin, Adhesive tape-silicones, Ceclor [cefaclor], and Symbyax [olanzapine-fluoxetine]   TREATMENT PLAN:  Effective Dates: 6/15/2021 TO 2021 (90 days). Frequency/Duration: 2 times a week 30 days and progress to 1x a week for a total of 90 Day(s) MEDICAL/REFERRING DIAGNOSIS:  Status post left knee replacement [Z96.652]  Status post total knee replacement, right [Z96.651]   DATE OF ONSET: chronic, recent fall and flare up  REFERRING PHYSICIAN: Aneudy Gonzales MD MD Orders: evaluate and treat  Return MD Appointment: as needed       Pre-treatment Symptoms/Complaints:  Patient continues to note less discomfort in bilateral knees. Continues to get twinges into her right side of lumbar spine. Increasing amount of walking again at home. Pain: Initial: Pain Intensity 1: 2  Pain Location 1: Knee, Spine, lumbar  Pain Orientation 1: Left, Right /10 Post Session: 0/10   Medications Last Reviewed:  2021  Updated Objective Findings:  Soft tissue restrictions noted in bilateral hamstrings and lumbar spine paraspinals. Improving core engagement with exercises. TREATMENT:     THERAPEUTIC EXERCISE: (15 minutes):  Exercises per grid below to improve mobility, strength, balance and coordination. Required moderate verbal, manual and tactile cues to promote proper body alignment, promote proper body posture, promote proper body mechanics and promote proper body breathing techniques.   Progressed resistance, range, repetitions and complexity of movement as indicated. Date:  8/25/2021   Activity/Exercise Parameters   Supine quad set    Supine heel slides 2 X 10 reps, 5 sec holds   Supine straight leg raise 2 X 10 reps, 5 sec holds   Supine knee to chest X 5 reps, 10 sec holds BLE   Supine hamstrings stretch X 5 reps, 10 sec holds BLE   Supine scapular retraction    Seated forward flexion X 5 reps, 5 sec holds   Supine 90/90 march X 10 reps,        MANUAL THERAPY: (25 minutes): Joint mobilization and Soft tissue mobilization was utilized and necessary because of the patient's restricted joint motion, painful spasm, loss of articular motion and restricted motion of soft tissue. (Used abbreviations: MET - muscle energy technique; PNF - proprioceptive neuromuscular facilitation; NMR - neuromuscular re-education; a/p - anterior to posterior; p/a - posterior to anterior, FMP - functional movement patterns, SF - Superficial Fascia; BC - Bony contours; MP - muscle play; IASTM - instrument-assisted soft tissue mobilization)  · Supine bilateral soft tissue mobilizations to anterior knee and quadriceps with FMP of knee flexion/extension  · Supine patella mobilizations bilaterally in all directions  · Supine bilateral hamstrings soft tissue mobilization with FMP of knee flexion and extension  · Side lying soft tissue mobilization to lumbar spine and right hip rotators. · Supine anterior chest and left anterior shoulder soft tissue mobilization with breathing techniques      [facility] Portal  Treatment/Session Summary:    · Response to Treatment: improving overall mobility and tolerance to ambulation, progress to HEP over next few sessions. · Communication/Consultation:  None today   · Equipment provided today:  None today  · Recommendations/Intent for next treatment session: Next visit will focus treatment to her left shoulder from her other physical therapy case.      Total Treatment Billable Duration:  40 minutes  PT Patient Time In/Time Out  Time In: 1545  Time Out: [address], PT    Future Appointments   Date Time Provider Uzair Aranai   8/30/2021  7:00 PM [place] Kiana., PT [facility]   9/3/2021 11:30 AM [place] Kiana., [facility]   9/16/2021  8:40 AM Rose Mary Wheeler MD [facility] PP PP   10/13/2021 10:20 AM Anabela Merritt MD END BS ENDO   12/15/2021 10:30 AM Larry Mederos MD [facility]G UCD   2/15/2022  8:45 AM Malena Loyola MD [facility] N[facility]ANT NISHANT   7/27/2022  1:40 PM Kyaw Amezcua NP [facility] PSCD PP

## 2021-08-30 ENCOUNTER — APPOINTMENT (OUTPATIENT)
Dept: PHYSICAL THERAPY | Age: 71
End: 2021-08-30
Payer: MEDICARE

## 2021-09-03 ENCOUNTER — HOSPITAL ENCOUNTER (OUTPATIENT)
Dept: PHYSICAL THERAPY | Age: 71
Discharge: HOME | End: 2021-09-03
Payer: MEDICARE

## 2021-09-03 PROCEDURE — 97140 MANUAL THERAPY 1/> REGIONS: CPT

## 2021-09-03 PROCEDURE — 97110 THERAPEUTIC EXERCISES: CPT

## 2021-09-03 NOTE — THERAPY DISCHARGE
Liam Bowling  : 1950  [place]  [place]: [place] [address] at [place] [address], [address] [facility]  [phone]   [phone]        OUTPATIENT PHYSICAL THERAPY:Discharge Summary 9/3/2021   ICD-10: Treatment Diagnosis: Low back pain (M54.5)   ICD-10: Treatment Diagnosis: Pain in right knee (M25.561)   ICD-10: Treatment Diagnosis: Pain in left knee (M25.562)    Precautions/Allergies:   Ciprofloxacin, Adhesive tape-silicones, Ceclor [cefaclor], and Symbyax [olanzapine-fluoxetine]   TREATMENT PLAN:  Effective Dates: 6/15/2021 TO 2021 (90 days). Frequency/Duration: 2 times a week 30 days and progress to 1x a week for a total of 90 Day(s) MEDICAL/REFERRING DIAGNOSIS:  Presence of left artificial knee joint [Z96.652]  Presence of right artificial knee joint [Z96.651]   DATE OF ONSET: chronic, recent fall and flare up  REFERRING PHYSICIAN: Frandy Talavera MD MD Orders: evaluate and treat  Return MD Appointment: as needed   DISCHARGE: 9/3/21: As of 9/3/2021, Liam Bowling has attended 13 out of 13 scheduled visits, with 0 cancellation(s) and 0 no shows. Ms. Tolliver Patient has shown improvements in overall mobility and pain levels in bilateral knees. She has been able to return to her walking program with little to no pain in her knees, however continues to have discomfort in her low back. She has met her physical therapy goals at this time. She has been encouraged to continue her exercise and walking program to manage her low back pain. She will be discharged from therapy services at this time. PROGRESS NOTE: 21: Liam Bowling has attended 10 physical therapy visits for her bilateral knee pain and low back pain. Improvements have been noted in bilateral knees with little to no pain noted. She continues to have low back pain. Recent injections have helped, however continues to have discomfort.  She continues to be challenged with ambulation and prolonged standing activities. She would benefit from continued therapy services to improve overall mobility, strength and endurance. INITIAL ASSESSMENT:  Ms. Ruddy Mancilla is a 78 y.o. female who presents to physical therapy with bilateral knee pain and low back pain after a recent fall off of her bed. Notes she fell onto both of her knees and her  had to assist her to get back up. Increased tightness and tenderness in bilateral knees, left greater than right since the fall. She presents with soft tissue restrictions, decreased mobility of patella, left greater than right and LE strength and endurance deficits. She would benefit from skilled physical therapy to improve overall mobility, strength and endurance as well as improve ambulation and tolerance in weight bearing. GOALS: (Goals have been discussed and agreed upon with patient.)  Discharge Goals: Time Frame: 90 days  1. Patient demonstrates independence with her HEP without verbal cueing from therapist. GOAL MET  2. Patient able to weight bear for 30 minutes to perform house hold activities, with pain no more than 2/10. GOAL MET  3. Patient able to ambulate for 30 minutes to perform community distance and return to daily walking routine without onset of bilateral knee pain. GOAL MET  4. Improve LEFS outcome measure score from 47/80 to 65/80 to perform ADLs GOAL MET    OUTCOME MEASURE:   Tool Used: Lower Extremity Functional Scale (LEFS)  Score:  Initial: 47/80 Most Recent: 58/80 (Date: 9/3/21 )   Interpretation of Score: 20 questions each scored on a 5 point scale with 0 representing \"extreme difficulty or unable to perform\" and 4 representing \"no difficulty\". The lower the score, the greater the functional disability. 80/80 represents no disability. Minimal detectable change is 9 points.       MEDICAL NECESSITY:   · Patient is expected to demonstrate progress in strength, range of motion, balance, coordination and functional technique to increase independence with improving endurance with ambulation and weight bearing activities without onset of bilateral knee pain. ·   Total Duration:  PT Patient Time In/Time Out  Time In: 1140  Time Out: 1240    Rehabilitation Potential For Stated Goals: Excellent  Regarding Taj Jay's therapy, I certify that the treatment plan above will be carried out by a therapist or under their direction. Thank you for this referral,  Josse Garcia, PT     Referring Physician Signature: Kenyatta Villegas MD No Signature is Required for this note. PAIN/SUBJECTIVE:   Initial: Pain Intensity 1: 1  Pain Location 1: Spine, lumbar  Post Session:  1/10   HISTORY:   History of Injury/Illness (Reason for Referral):  Chronic history of low back pain and bilateral knee pain for several years. She has had bilateral TKAs in 2018 and 2019. She has been very active with a daily walking routine and active in a weight loss program. She recently fell out of her bed, falling onto bilateral knees. She notes she was challenged to get back up and her  helped her off of the floor. She notes she continues to have tightness and tingling into her knees. Notes she is able to weight bear without any giving way in knees. Notes challenged with prolonged standing and walking due to fatigue and discomfort. Patient denies dizziness, drop attacks, numbness/tingling, bowel/bladder dysfunction and/or unexplained weight gain/loss. Ambulatory/Rehab Services H2 Model Falls Risk Assessment   Risk Factors:       (5)  History of Recent Falls [w/in 3 months] Ability to Rise from Chair:       (1)  Pushes up, successful in one attempt   Falls Prevention Plan:       No modifications necessary   Total: (5 or greater = High Risk): 6   ©2010 [facility] [employer]. All Rights Reserved. [facility] Patent #[id number].  Federal Law prohibits the replication, distribution or use without written permission from [facility]   Current Medications:       Current Outpatient Medications:     Advair Diskus 250-50 mcg/dose diskus inhaler, TAKE 1 PUFF BY MOUTH TWICE A DAY. RINSE MOUTH WELL AFTER USE, Disp: 1 Inhaler, Rfl: 11    Calcium-Cholecalciferol, D3, (Calcium 600 with Vitamin D3) 600 mg(1,500mg) -400 unit chew, Take 2 Tablets by mouth daily. , Disp: , Rfl:     mometasone (NASONEX) 50 mcg/actuation nasal spray, 2 Sprays by Both Nostrils route daily. , Disp: 1 Container, Rfl: 11    DISABLED PLACARD (DISABLED PLACARD) DMV, As directed, Disp: 1 Each, Rfl: 0    hydroCHLOROthiazide (HYDRODIURIL) 12.5 mg tablet, TAKE 1 TABLET BY MOUTH DAILY, Disp: 30 Tablet, Rfl: 5    apixaban (Eliquis) 5 mg tablet, Take 1 Tablet by mouth two (2) times a day., Disp: 180 Tablet, Rfl: 3    buPROPion (WELLBUTRIN) 75 mg tablet, Take 1 Tab by mouth two (2) times a day., Disp: 180 Tab, Rfl: 1    Synthroid 125 mcg tablet, Take 1 Tab by mouth Daily (before breakfast). , Disp: 90 Tab, Rfl: 3    montelukast (SINGULAIR) 10 mg tablet, TAKE 1 TABLET BY MOUTH ONCE A DAY, Disp: 90 Tab, Rfl: 4    losartan (COZAAR) 100 mg tablet, Take 1 Tab by mouth daily. , Disp: 30 Tab, Rfl: 11    dofetilide (TIKOSYN) 500 mcg capsule, Take 1 Cap by mouth every twelve (12) hours every twelve (12) hours. , Disp: 60 Cap, Rfl: 11    amLODIPine (NORVASC) 5 mg tablet, Take 1 Tab by mouth daily. , Disp: 90 Tab, Rfl: 4    albuterol (Ventolin HFA) 90 mcg/actuation inhaler, Take 2 Puffs by inhalation every six (6) hours as needed for Wheezing., Disp: 1 Inhaler, Rfl: 11    acetaminophen (TYLENOL) 325 mg cap, Take 360 mg by mouth three (3) times daily as needed. , Disp: , Rfl:     Cetirizine (ZYRTEC) 10 mg cap, Take 10 Caps by mouth daily. , Disp: , Rfl:     cpap machine kit, by Does Not Apply route., Disp: , Rfl:    Date Last Reviewed: 9/3/2021     UPDATED OBJECTIVE FINDINGS:     Observation/Orthostatic Postural Assessment:          Lower extremity weight bearing is symmetrical. Observation of gait indicates challenges with endurance, decreased pelvic mobility, however presents with pelvic rotation. Patient exhibits a increased lumbar lordosis and increased thoracic kyphosis. Knee alignment is bilateral genu valgus. Observation of patellar position indicates left positioned lateral with soft tissue tightness in left ITB. Patellar tracking with short knee bend indicates poor tracking. Single leg stand exhibits able to perform, however challenged with endurance, 5 seconds left, 5  seconds right. Soft tissue observation indicates restrictions in bilateral rectus femoris, iliopsoas and hamstrings. Improving 9/3/2021      Palpation: Patient exhibits tenderness to palpation along left greater than right patella. In modified Hadley position, muscle length of tensor fascia jacob (TFL)  is restricted bilaterally; muscle length of iliopsoas is restricted bilaterally; and muscle length of rectus femoris is restricted left greater than right. Hamstring flexibility tested supine with straight leg raise is mild-moderate restrictions bilaterally. Piriformis flexibility is restrictions bilaterally. Improving 9/3/2021    ROM:   Passive Accessory Mobility Testing: Patellofemoral mobility is restricted left greater than right. Anterior/posterior tibia on femur is WFL bilaterally.    AROM(PROM) Right Left   Knee flexion 1-128 1-128   Knee extension Lack 1 Lack 1   Hip flexion 95 95   Hip external rotation (ER) 0-20 0-20   Hip internal rotation (IR) 0-15 0-10   Hip extension 0-20 0-15   Hip abduction 0-45 0-45     Strength:     Manual Muscle Test (*/5) Right Left   Knee extension 4+ 4+   Knee flexion 4+ 4+   Hip flexion 4+ 4+   Hip ER 4+ 4+   Hip IR 4+ 4+   Hip extension 4+ 4+   Hip abduction 4+ 4+   Hip adduction 5 5   Ankle DF 5 5   Ankle PF 5 5   Core stability 4/5        Functional Mobility:  Challenged with prolonged standing, greater than 15 minutes, challenged with ambulation greater than 45 minutes. Body Structures Involved:  1. Nerves  2. Joints  3. Muscles Body Functions Affected:  1. Sensory/Pain  2. Neuromusculoskeletal  3. Movement Related Activities and Participation Affected:  1. General Tasks and Demands  2.  Mobility

## 2021-09-03 NOTE — PROGRESS NOTES
Gabriela García  : 1950  [place]  [place]: [place] [address] [place] [address], [address] [place] [address]  [phone]   [phone]      OUTPATIENT PHYSICAL THERAPY: Daily Treatment Note 9/3/2021  Visit Count:  13    ICD-10: Treatment Diagnosis: Low back pain (M54.5)   ICD-10: Treatment Diagnosis: Pain in right knee (M25.561)   ICD-10: Treatment Diagnosis: Pain in left knee (M25.562)     Precautions/Allergies:   Ciprofloxacin, Adhesive tape-silicones, Ceclor [cefaclor], and Symbyax [olanzapine-fluoxetine]   TREATMENT PLAN:  Effective Dates: 6/15/2021 TO 2021 (90 days). Frequency/Duration: 2 times a week 30 days and progress to 1x a week for a total of 90 Day(s) MEDICAL/REFERRING DIAGNOSIS:  Status post left knee replacement [Z96.652]  Status post total knee replacement, right [Z96.651]   DATE OF ONSET: chronic, recent fall and flare up  REFERRING PHYSICIAN: Ariella Wright MD MD Orders: evaluate and treat  Return MD Appointment: as needed       Pre-treatment Symptoms/Complaints:  Patient notes overall no bilateral knee pain present, continues to have discomfort and pain across the small of her back ,right greater than left. Has increased amount of walking again. Pain: Initial: Pain Intensity 1: 1  Pain Location 1: Spine, lumbar /10 Post Session: 1/10   Medications Last Reviewed:  9/3/2021  Updated Objective Findings:  Soft tissue restrictions noted in bilateral hamstrings and lumbar spine paraspinals. Improving core engagement with exercises. TREATMENT:     THERAPEUTIC EXERCISE: (15 minutes):  Exercises per grid below to improve mobility, strength, balance and coordination. Required moderate verbal, manual and tactile cues to promote proper body alignment, promote proper body posture, promote proper body mechanics and promote proper body breathing techniques.   Progressed resistance, range, repetitions and complexity of movement as indicated. Date:  9/3/2021   Activity/Exercise Parameters   Supine quad set    Supine heel slides 2 X 10 reps, 5 sec holds   Supine straight leg raise 2 X 10 reps, 5 sec holds   Supine knee to chest X 5 reps, 10 sec holds BLE   Supine hamstrings stretch X 5 reps, 10 sec holds BLE   Supine scapular retraction    Seated forward flexion X 5 reps, 5 sec holds   Supine 90/90 march X 10 reps,    Review HEP X 10 minute review of all exercises and stretches, review walking program and modifications to weight bearing activities. Review use of pillows for support with sitting       MANUAL THERAPY: (40 minutes): Joint mobilization and Soft tissue mobilization was utilized and necessary because of the patient's restricted joint motion, painful spasm, loss of articular motion and restricted motion of soft tissue. (Used abbreviations: MET - muscle energy technique; PNF - proprioceptive neuromuscular facilitation; NMR - neuromuscular re-education; a/p - anterior to posterior; p/a - posterior to anterior, FMP - functional movement patterns, SF - Superficial Fascia; BC - Bony contours; MP - muscle play; IASTM - instrument-assisted soft tissue mobilization)  · Supine bilateral soft tissue mobilizations to anterior knee and quadriceps with FMP of knee flexion/extension  · Supine patella mobilizations bilaterally in all directions  · Supine bilateral hamstrings soft tissue mobilization with FMP of knee flexion and extension  · Side lying soft tissue mobilization to lumbar spine and right hip rotators. · Supine anterior chest and left anterior shoulder soft tissue mobilization with breathing techniques      [facility] Portal  Treatment/Session Summary:    · Response to Treatment: overall improving LE strength and flexibility, improved mobility noted in bilateral hamstrings and lumbar paraspinals. Progressing well with her home program, will be discharged from therapy today. · Communication/Consultation:  None today   · Equipment provided today:  None today    Total Treatment Billable Duration:  55 minutes  PT Patient Time In/Time Out  Time In: 1140  Time Out: 1400 Anne Marie Wise, PT    Future Appointments   Date Time Provider Uzair Miriam   9/16/2021  8:40 AM Dmitri Rendon MD [facility] PP PP   10/13/2021 10:20 AM Jayant Corbin MD END BS ENDO   12/15/2021 10:30 AM Hayde Resendiz MD [facility] UCDG UCD   2/15/2022  8:45 AM Aaron Schwartz MD [facility] NISHANT NISHANT   7/27/2022  1:40 PM Inna Camarena NP [facility] PSCD PP

## 2021-09-03 NOTE — THERAPY DISCHARGE
Opal Castellanos  : 1950  [place]  [place] [address] at [place] [id number] [address], [address]  [phone]   [phone]        OUTPATIENT PHYSICAL THERAPY:Discontinuation Summary 2021   ICD-10: Treatment Diagnosis: pain in joint, right shoulder M25.511  ICD-10: Treatment Diagnosis: pain in joint, left shoulder M25.512  Precautions/Allergies:   Ciprofloxacin, Adhesive tape-silicones, Ceclor [cefaclor], and Symbyax [olanzapine-fluoxetine]   TREATMENT PLAN:  Effective Dates: 3/18/21 TO 2021 (90 days). Frequency/Duration: 1 time every other week for 90 Day(s) MEDICAL/REFERRING DIAGNOSIS:  Right shoulder pain [M25.511]  Pain in left shoulder [M25.512]   DATE OF ONSET: chronic  REFERRING PHYSICIAN: Chinmay Knox MD MD Orders: evaluate and treat  Return MD Appointment: as needed. DISCONTINUATION NOTE: 9/3/2021: As of 2021, Opal Castellanos has attended 16 out of 16 scheduled visits, with 1 cancellation(s) and 0 no shows. She has continued to work independently on her HEP and continues to improve overall mobility, strength and ROM. She has met her physical therapy goals at this time and will be discharged from therapy services. RE-CERTIFICATION: :  Opal Castellanos has attended 14 physical therapy sessions for her bilateral chronic shoulder pain. She continues to present with increase ROM and strength in bilateral UE. She continues to improve however be challenged with her postural stability strength. She would benefit from continued therapy services to increase strength and endurance in her UE. She is being progressed to her independent HEP. PROGRESS NOTE: 21: , Opal Castellanos has attended 10 physical therapy sessions for her bilateral shoulder pains. She continues to demonstrate improvements in her ROM, shoulder and postural stability strength.  She continues to be challenged with some end-range motions. Postural stability strength continues to improve, however would benefit from continued therapy services to address remaining strength deficits. INITIAL ASSESSMENT:  Ms. Gregory Pyle is a 78 y.o. female who presents to physical therapy with bilateral shoulder pain. She recently had cortisone injections in bilateral shoulders. Overall notes more discomfort in her right shoulder and challenged with sleeping and ROM of right shoulder. She presents with protracted shoulders and forward head posture. She presents with restrictions in her end range mobility in shoulder flexion and IR bilaterally. She is challenged with functional tasks including reaching overhead and getting dressed. She would benefit from skilled physical therapy to improve overall mobility and function with daily activities. GOALS: (Goals have been discussed and agreed upon with patient.)  Discharge Goals: Time Frame: 90 days  1. Patient demonstrates independence with her HEP without verbal cueing from her therapist. GOAL MET  2. Patient demonstrates full AROM of bilateral shoulder to perform overhead activities. GOALMET   3. Patient demonstrates correct sleeping positions and able to sleep for 4 consecutive hours without shoulder discomfort. GOAL MET  4. Improve DASH outcome measure score from 29/55 to 15/55 to perform daily ADLs. NOT ASSESSED  5. Patient able to ambulate for 45 minutes without onset of bilateral shoulder pain. - ALMOST MET    OUTCOME MEASURE:   Tool Used: Disabilities of the Arm, Shoulder and Hand (DASH) Questionnaire - Quick Version  Score:  Initial: 29/55  Most Recent: X/55 (Date: -- )   Interpretation of Score: The DASH is designed to measure the activities of daily living in person's with upper extremity dysfunction or pain. Each section is scored on a 1-5 scale, 5 representing the greatest disability. The scores of each section are added together for a total score of 55. MEDICAL NECESSITY:   · Patient is expected to demonstrate progress in strength, range of motion, balance, coordination and functional technique to increase independence with ADLs without shoulder discomfort and improve postural awareness and strength. Total Duration:  PT Patient Time In/Time Out  Time In: 1030  Time Out: 1115    Rehabilitation Potential For Stated Goals: Excellent  Regarding Tyra Jay's therapy, I certify that the treatment plan above will be carried out by a therapist or under their direction. Thank you for this referral,  Adeline Abbasi, PT     Referring Physician Signature: Edwar Tomas MD No Signature is Required for this note. PAIN/SUBJECTIVE:   Initial: Pain Intensity 1: 1  Pain Location 1: Shoulder  Pain Orientation 1: Left, Right  Post Session:  1/10   HISTORY:      Current Medications:       Current Outpatient Medications:     Advair Diskus 250-50 mcg/dose diskus inhaler, TAKE 1 PUFF BY MOUTH TWICE A DAY. RINSE MOUTH WELL AFTER USE, Disp: 1 Inhaler, Rfl: 11    Calcium-Cholecalciferol, D3, (Calcium 600 with Vitamin D3) 600 mg(1,500mg) -400 unit chew, Take 2 Tablets by mouth daily. , Disp: , Rfl:     mometasone (NASONEX) 50 mcg/actuation nasal spray, 2 Sprays by Both Nostrils route daily. , Disp: 1 Container, Rfl: 11    DISABLED PLACARD (DISABLED PLACARD) DMV, As directed, Disp: 1 Each, Rfl: 0    hydroCHLOROthiazide (HYDRODIURIL) 12.5 mg tablet, TAKE 1 TABLET BY MOUTH DAILY, Disp: 30 Tablet, Rfl: 5    apixaban (Eliquis) 5 mg tablet, Take 1 Tablet by mouth two (2) times a day., Disp: 180 Tablet, Rfl: 3    buPROPion (WELLBUTRIN) 75 mg tablet, Take 1 Tab by mouth two (2) times a day., Disp: 180 Tab, Rfl: 1    Synthroid 125 mcg tablet, Take 1 Tab by mouth Daily (before breakfast). , Disp: 90 Tab, Rfl: 3    montelukast (SINGULAIR) 10 mg tablet, TAKE 1 TABLET BY MOUTH ONCE A DAY, Disp: 90 Tab, Rfl: 4    losartan (COZAAR) 100 mg tablet, Take 1 Tab by mouth daily., Disp: 30 Tab, Rfl: 11    dofetilide (TIKOSYN) 500 mcg capsule, Take 1 Cap by mouth every twelve (12) hours every twelve (12) hours. , Disp: 60 Cap, Rfl: 11    amLODIPine (NORVASC) 5 mg tablet, Take 1 Tab by mouth daily. , Disp: 90 Tab, Rfl: 4    albuterol (Ventolin HFA) 90 mcg/actuation inhaler, Take 2 Puffs by inhalation every six (6) hours as needed for Wheezing., Disp: 1 Inhaler, Rfl: 11    acetaminophen (TYLENOL) 325 mg cap, Take 360 mg by mouth three (3) times daily as needed. , Disp: , Rfl:     Cetirizine (ZYRTEC) 10 mg cap, Take 10 Caps by mouth daily. , Disp: , Rfl:     cpap machine kit, by Does Not Apply route., Disp: , Rfl:    Date Last Reviewed:  9/3/2021     UPDATED OBJECTIVE FINDINGS:     Observation/Orthostatic Postural Assessment:         Shoulder symmetry exhibits bilateral protraction. Shoulder girdles are right elevated, bilateral protraction. Scapular position is right elevated. Clavicles are right slightly elevated. Soft tissue observations indicates restrictions in anterior chest, pectoralis major and minor, subscapularis, infraspinatus, periscapular muscles bilaterally. Patient exhibits a increased cervical lordosis,  intacreased thoracic kyphosis. Palpation:  Myofascial assessment indicates restrictions in anterior chest. Muscle length testing levator scapulae with ipsilateral arm abduction is restricted bilaterally. Upper trapezius length is restricted bilaterally. Pectoralis minor/major and latissimus dorsi length is restricted bilaterally. Scalene muscle length is restricted bilaterally, right greater than left. Improving 9/3/2021      ROM: Gross active cervical spine rotation is 90% available bilaterally. Apley's scratch test for functional ER/IR is right: ER - base of skull, IR - to lumbar spine right , left: ER - T1, IR - to L5. Joy Rad Passive Accessory Motion: Glenohumeral mobility is restricted for inferior glides, right greater than left.   Improving 9/3/2021      AROM(PROM) in degrees Right Left   Shoulder flexion 0-165 0-165   Shoulder abduction (palm down) 0-145 0-145   Shoulder extension 0-20 0-20   Shoulder internal rotation (IR)  (measured at 90 degrees of abduction) 0-50 0-50   ER (measured at 90 degrees of abduction) 0-55 0-50     Strength:   Manual Muscle Test (*/5) Right Left   Shoulder flexion 4+ 4+   Shoulder extension 4+ 4+   Shoulder abduction 4+ 4+   Shoulder adduction 4+ 4+   Shoulder ER 4+ 4+   Shoulder IR 4+ 4+   Upper trapezius 4+ 4+   Middle trapezius 4+ 4+   Lower trapezius 4+ 4+   Rhomboids 4+ 4+   Serratus Anterior 4 4   Elbow flexion 5 5   Elbow extension 5 5       Functional Mobility:  Challenged with sitting postures, reaching overhead and forward reaching. - improving 9/3/2021

## 2021-10-12 ENCOUNTER — HOSPITAL ENCOUNTER (OUTPATIENT)
Dept: LAB | Age: 71
Discharge: HOME | End: 2021-10-12
Payer: MEDICARE

## 2021-10-12 DIAGNOSIS — E89.0 HYPOTHYROIDISM FOLLOWING RADIOIODINE THERAPY: ICD-10-CM

## 2021-10-12 LAB — TSH W FREE THYROID I,TSHELE: 0.94 UIU/ML (ref 0.36–3.74)

## 2021-10-12 PROCEDURE — 84443 ASSAY THYROID STIM HORMONE: CPT

## 2021-10-12 PROCEDURE — 36415 COLL VENOUS BLD VENIPUNCTURE: CPT

## 2022-01-06 ENCOUNTER — APPOINTMENT (RX ONLY)
Dept: URBAN - METROPOLITAN AREA CLINIC 23 | Facility: CLINIC | Age: 72
Setting detail: DERMATOLOGY
End: 2022-01-06

## 2022-01-06 DIAGNOSIS — B07.8 OTHER VIRAL WARTS: ICD-10-CM

## 2022-01-06 DIAGNOSIS — Z71.89 OTHER SPECIFIED COUNSELING: ICD-10-CM

## 2022-01-06 DIAGNOSIS — L57.8 OTHER SKIN CHANGES DUE TO CHRONIC EXPOSURE TO NONIONIZING RADIATION: ICD-10-CM

## 2022-01-06 PROCEDURE — 99213 OFFICE O/P EST LOW 20 MIN: CPT | Mod: 25

## 2022-01-06 PROCEDURE — ? LIQUID NITROGEN

## 2022-01-06 PROCEDURE — ? COUNSELING

## 2022-01-06 PROCEDURE — 17110 DESTRUCTION B9 LES UP TO 14: CPT

## 2022-01-06 PROCEDURE — ? SUNSCREEN RECOMMENDATIONS

## 2022-01-06 ASSESSMENT — LOCATION DETAILED DESCRIPTION DERM
LOCATION DETAILED: UPPER STERNUM
LOCATION DETAILED: RIGHT SUPERIOR MEDIAL UPPER BACK
LOCATION DETAILED: PERIUNGUAL SKIN RIGHT RING FINGER

## 2022-01-06 ASSESSMENT — LOCATION ZONE DERM
LOCATION ZONE: TRUNK
LOCATION ZONE: FINGER

## 2022-01-06 ASSESSMENT — LOCATION SIMPLE DESCRIPTION DERM
LOCATION SIMPLE: RIGHT RING FINGER
LOCATION SIMPLE: RIGHT UPPER BACK
LOCATION SIMPLE: CHEST

## 2022-02-15 ENCOUNTER — HOSPITAL ENCOUNTER (OUTPATIENT)
Dept: LAB | Age: 72
Discharge: HOME | End: 2022-02-15
Payer: MEDICARE

## 2022-02-15 DIAGNOSIS — I10 ESSENTIAL HYPERTENSION: ICD-10-CM

## 2022-02-15 LAB
ALBUMIN SERPL-MCNC: 3.4 G/DL (ref 3.2–4.6)
ALBUMIN/GLOB SERPL: 1 {RATIO} (ref 1.2–3.5)
ALP SERPL-CCNC: 113 U/L (ref 50–136)
ALT SERPL-CCNC: 30 U/L (ref 12–65)
ANION GAP SERPL CALC-SCNC: 4 MMOL/L (ref 7–16)
AST SERPL-CCNC: 21 U/L (ref 15–37)
BASOPHILS # BLD: 0 K/UL (ref 0–0.2)
BASOPHILS NFR BLD: 0 % (ref 0–2)
BILIRUB SERPL-MCNC: 0.6 MG/DL (ref 0.2–1.1)
BUN SERPL-MCNC: 16 MG/DL (ref 8–23)
CALCIUM SERPL-MCNC: 9.3 MG/DL (ref 8.3–10.4)
CHLORIDE SERPL-SCNC: 110 MMOL/L (ref 98–107)
CHOLEST SERPL-MCNC: 208 MG/DL
CO2 SERPL-SCNC: 27 MMOL/L (ref 21–32)
CREAT SERPL-MCNC: 0.8 MG/DL (ref 0.6–1)
DIFFERENTIAL METHOD BLD: NORMAL
EOSINOPHIL # BLD: 0.1 K/UL (ref 0–0.8)
EOSINOPHIL NFR BLD: 1 % (ref 0.5–7.8)
ERYTHROCYTE [DISTWIDTH] IN BLOOD BY AUTOMATED COUNT: 13.7 % (ref 11.9–14.6)
GLOBULIN SER CALC-MCNC: 3.4 G/DL (ref 2.3–3.5)
GLUCOSE SERPL-MCNC: 96 MG/DL (ref 65–100)
HCT VFR BLD AUTO: 44.3 % (ref 35.8–46.3)
HDLC SERPL-MCNC: 71 MG/DL (ref 40–60)
HDLC SERPL: 2.9 {RATIO}
HGB BLD-MCNC: 14.1 G/DL (ref 11.7–15.4)
IMM GRANULOCYTES # BLD AUTO: 0 K/UL (ref 0–0.5)
IMM GRANULOCYTES NFR BLD AUTO: 0 % (ref 0–5)
LDLC SERPL CALC-MCNC: 118.4 MG/DL
LYMPHOCYTES # BLD: 1.3 K/UL (ref 0.5–4.6)
LYMPHOCYTES NFR BLD: 23 % (ref 13–44)
MCH RBC QN AUTO: 30.5 PG (ref 26.1–32.9)
MCHC RBC AUTO-ENTMCNC: 31.8 G/DL (ref 31.4–35)
MCV RBC AUTO: 95.9 FL (ref 79.6–97.8)
MONOCYTES # BLD: 0.5 K/UL (ref 0.1–1.3)
MONOCYTES NFR BLD: 9 % (ref 4–12)
NEUTS SEG # BLD: 3.7 K/UL (ref 1.7–8.2)
NEUTS SEG NFR BLD: 65 % (ref 43–78)
NRBC # BLD: 0 K/UL (ref 0–0.2)
PLATELET # BLD AUTO: 207 K/UL (ref 150–450)
PMV BLD AUTO: 11.1 FL (ref 9.4–12.3)
POTASSIUM SERPL-SCNC: 3.8 MMOL/L (ref 3.5–5.1)
PROT SERPL-MCNC: 6.8 G/DL (ref 6.3–8.2)
RBC # BLD AUTO: 4.62 M/UL (ref 4.05–5.2)
SODIUM SERPL-SCNC: 141 MMOL/L (ref 136–145)
TRIGL SERPL-MCNC: 93 MG/DL (ref 35–150)
VLDLC SERPL CALC-MCNC: 18.6 MG/DL (ref 6–23)
WBC # BLD AUTO: 5.6 K/UL (ref 4.3–11.1)

## 2022-02-15 PROCEDURE — 80061 LIPID PANEL: CPT

## 2022-02-15 PROCEDURE — 85025 COMPLETE CBC W/AUTO DIFF WBC: CPT

## 2022-02-15 PROCEDURE — 80053 COMPREHEN METABOLIC PANEL: CPT

## 2022-02-15 PROCEDURE — 36415 COLL VENOUS BLD VENIPUNCTURE: CPT

## 2022-02-17 ENCOUNTER — APPOINTMENT (RX ONLY)
Dept: URBAN - METROPOLITAN AREA CLINIC 24 | Facility: CLINIC | Age: 72
Setting detail: DERMATOLOGY
End: 2022-02-17

## 2022-02-17 DIAGNOSIS — B07.8 OTHER VIRAL WARTS: ICD-10-CM

## 2022-02-17 PROCEDURE — ? COUNSELING

## 2022-02-17 PROCEDURE — ? LIQUID NITROGEN

## 2022-02-17 PROCEDURE — 17110 DESTRUCTION B9 LES UP TO 14: CPT

## 2022-02-17 ASSESSMENT — LOCATION ZONE DERM: LOCATION ZONE: FINGER

## 2022-02-17 ASSESSMENT — LOCATION DETAILED DESCRIPTION DERM: LOCATION DETAILED: PERIUNGUAL SKIN RIGHT RING FINGER

## 2022-02-17 ASSESSMENT — LOCATION SIMPLE DESCRIPTION DERM: LOCATION SIMPLE: RIGHT RING FINGER

## 2022-02-17 NOTE — PROCEDURE: LIQUID NITROGEN
Render Note In Bullet Format When Appropriate: No
Post-Care Instructions: I reviewed with the patient in detail post-care instructions. Patient is to wear sunprotection, and avoid picking at any of the treated lesions. Pt may apply Vaseline to crusted or scabbing areas.
Pared With?: 15 blade
Detail Level: Detailed
Medical Necessity Clause: Treatment was medically necessary because the spot was
Medical Necessity Information: It is in your best interest to select a reason for this procedure from the list below. All of these items fulfill various CMS LCD requirements except the new and changing color options.
Number Of Freeze-Thaw Cycles: 1 freeze-thaw cycle
Consent: The patient's verbal consent was obtained including but not limited to risks of crusting, scabbing, blistering, scarring, darker or lighter pigmentary change, recurrence, incomplete removal and infection.

## 2022-02-24 ENCOUNTER — HOSPITAL ENCOUNTER (OUTPATIENT)
Dept: PHYSICAL THERAPY | Age: 72
Discharge: HOME | End: 2022-02-24
Payer: MEDICARE

## 2022-02-24 DIAGNOSIS — M26.622 ARTHRALGIA OF LEFT TEMPOROMANDIBULAR JOINT: ICD-10-CM

## 2022-02-24 PROCEDURE — 97140 MANUAL THERAPY 1/> REGIONS: CPT

## 2022-02-24 PROCEDURE — 97162 PT EVAL MOD COMPLEX 30 MIN: CPT

## 2022-02-25 NOTE — THERAPY EVALUATION
Eulalio Beach  : 1950  [place]  [place] [address] at [place] [address], [address] [facility]  [phone]   [phone]        OUTPATIENT PHYSICAL THERAPY:Initial Assessment 2022   ICD-10: Treatment Diagnosis: cervicalgia M54.2  ICD-10: Treatment Diagnosis: thoracic spine pain M54.6   ICD-10: Treatment Diagnosis: arthraligia of left tempromandibular joint M26.622    Precautions/Allergies:   Ciprofloxacin, Adhesive tape-silicones, Ceclor [cefaclor], and Symbyax [olanzapine-fluoxetine]   TREATMENT PLAN:  Effective Dates: 2022 TO 2022 (90 days). Frequency/Duration: 2 times a week for 90 Day(s) MEDICAL/REFERRING DIAGNOSIS:  Arthralgia of left temporomandibular joint [M26.622]   DATE OF ONSET: 2022  REFERRING PHYSICIAN: Callie Alvarado MD MD Orders: evaluate and treat  Return MD Appointment: as needed     INITIAL ASSESSMENT:  Ms. Maria Del Rosario Choudhury is a 73 y.o. female who presents to physical therapy with chronic neck and upper back pain and tightness. Over the past 2 months she has also started noting increased challenges with her left jaw mobility. She presents with significant restrictions in her cervical and upper thoracic spine ROM in all directions, limited left 1st and 2nd rib mobility and limited mobility of her bilateral TMJs. She would benefit from physical therapy services to address her cervical and thoracic spine arthrokinematic dysfunctions and soft tissue restrictions to improve her posture which will allow less tension to her head and TMJs. She will also benefit from strength and endurance training to her UE and postural and core stabilizers to improve overall mobility and ability to perform daily function with less discomfort. PROBLEM LIST (Impacting functional limitations):  1. Decreased Strength  2. Decreased ADL/Functional Activities  3. Decreased Balance  4.  Increased Pain  5. Decreased Activity Tolerance  6. Increased Fatigue  7. Increased Shortness of Breath  8. Decreased Flexibility/Joint Mobility INTERVENTIONS PLANNED: (Treatment may consist of any combination of the following)  1. Home Exercise Program (HEP)  2. Manual Therapy  3. Neuromuscular Re-education/Strengthening  4. Range of Motion (ROM)  5. Therapeutic Activites  6. Therapeutic Exercise/Strengthening     GOALS: (Goals have been discussed and agreed upon with patient.)    Discharge Goals: Time Frame: 90 days  1. Patient demonstrates independence with her HEP without verbal cueing from her therapists. 2. Patient able to sit with correct sitting posture for 30 minutes without onset of cervical spine pain. 3. Improve ability to open/close mouth to be able to eat entire meal without left jaw pain and popping. 4. Improve NDI outcome measure score from 10/50 to 5/50 to perform daily activities. OUTCOME MEASURE:   Tool Used: Neck Disability Index (NDI)  Score:  Initial: 10/50  (Date: 2/24/22) Most Recent: X/50 (Date: -- )   Interpretation of Score: The Neck Disability Index is a revised form of the Oswestry Low Back Pain Index and is designed to measure the activities of daily living in person's with neck pain. Each section is scored on a 0-5 scale, 5 representing the greatest disability. The scores of each section are added together for a total score of 50. MEDICAL NECESSITY:   · Patient is expected to demonstrate progress in strength, range of motion, balance, coordination and functional technique to increase independence with cervical and thoracic spine mobility and ability to eat/chew and talk without left jaw pain. .  REASON FOR SERVICES/OTHER COMMENTS:  · Patient continues to require skilled intervention due to challenged with performing daily tasks due to cervical spine mobility restrictions and jaw pain and joint restrictions.   Total Duration:  PT Patient Time In/Time Out  Time In: 1430  Time Out:     Rehabilitation Potential For Stated Goals: Excellent  Regarding José Jay's therapy, I certify that the treatment plan above will be carried out by a therapist or under their direction. Thank you for this referral,  Nora Hughes, PT     Referring Physician Signature: Yahaira Chandra MD                  PAIN/SUBJECTIVE:   Initial: Pain Intensity 1: 7  Pain Location 1: Jaw,Spine, cervical  Pain Orientation 1: Left  Post Session:  6/10   HISTORY:   History of Injury/Illness (Reason for Referral):  Since December 2021 she started having increased cervical spine pain and challenged with end range rotation. She also noted increased discomfort in her left jaw, challenged with opening/closing, chewing, talking. She notes a significant amount of popping in her left jaw. Symptoms have progressively gotten worse since December 2021. She has been to the dentist 6 times since December, notes challenges with left jaw mobility after mouth shield used for crown repair. She continues to have challenges with popping in her left jaw, tinnitus in bilateral ears, left greater than right. Patient denies dizziness, drop attacks, numbness/tingling, bowel/bladder dysfunction and/or unexplained weight gain/loss. Past Medical History/Comorbidities:   Ms. Tray Esteves  has a past medical history of Adverse effect of anesthesia, Allergic rhinitis, Anxiety, Arthritis, Asthma, Depression, Endocarditis (1992), Heart murmur, Hematuria, History of MRSA infection (on left breast), HLD (hyperlipidemia) ( ), Hypertension, Hypertension, Hypothyroid, Hypothyroidism due to acquired atrophy of thyroid (4/28/2015), Intertrigo, Irregular heart beat, Mass of colon, Morbid obesity ([address] Persistent atrial fibrillation ([address] Reactive airway disease with status asthmaticus, Scoliosis (8/4/2016), Sleep apnea, Varicose veins, and VSD (ventricular septal defect) (07/22/2016).   Ms. Tray Esteves  has a past surgical history that includes hx lipoma resection (Right); hx appendectomy; hx hernia repair (incisional [id number]); hx colonoscopy (, ); hx breast reduction (Bilateral, 2016); hx  section; hx dilation and curettage; hx total colectomy (Right, ); hx heent (); hx implantable cardioverter defibrillator (2018); hx afib ablation ( & 2019); hx refractive surgery; hx knee replacement (Right, 2018); hx knee replacement (Left, 2019); hx heart catheterization; pr cardiac surg procedure unlist (2018); pr cardiac surg procedure unlist (2018); pr cardiac surg procedure unlist (2018); and pr cardiac surg procedure unlist (2019). Social History/Living Environment:     Lives in a private home with her . She is challenged with eating/chewing, talking with daily activities  Prior Level of Function/Work/Activity:  Retired, continuing to walk at least 3x a week. Dominant Side:         RIGHT   Ambulatory/Rehab Services H2 Model Falls Risk Assessment   Risk Factors:       No Risk Factors Identified Ability to Rise from Chair:       (1)  Pushes up, successful in one attempt   Falls Prevention Plan:       No modifications necessary   Total: (5 or greater = High Risk): 1    [facility] of [employer]. All Rights Reserved. [facility] Patent #[id number]. Federal Law prohibits the replication, distribution or use without written permission from [facility] clinovo   Current Medications:       Current Outpatient Medications:     metaxalone (SKELAXIN) 800 mg tablet, Take 1 Tablet by mouth three (3) times daily. (Patient not taking: Reported on 2/15/2022), Disp: 30 Tablet, Rfl: 2    montelukast (SINGULAIR) 10 mg tablet, TAKE 1 TABLET BY MOUTH ONCE A DAY, Disp: 90 Tablet, Rfl: 4    mometasone (NASONEX) 50 mcg/actuation nasal spray, 2 Sprays by Both Nostrils route daily. , Disp: 1 Each, Rfl: 12    losartan (COZAAR) 100 mg tablet, Take 1 Tablet by mouth daily. , Disp: 30 Tablet, Rfl: 11    dofetilide (TIKOSYN) 500 mcg capsule, Take 1 Capsule by mouth every twelve (12) hours every twelve (12) hours. , Disp: 60 Capsule, Rfl: 11    amLODIPine (NORVASC) 5 mg tablet, TAKE 1 TABLET BY MOUTH DAILY, Disp: 90 Tablet, Rfl: 4    Synthroid 125 mcg tablet, Take 1 Tablet by mouth Daily (before breakfast). , Disp: 90 Tablet, Rfl: 3    buPROPion (WELLBUTRIN) 75 mg tablet, TAKE 1 TABLET BY MOUTH 2 TIMES A DAY, Disp: 180 Tablet, Rfl: 4    Advair Diskus 250-50 mcg/dose diskus inhaler, TAKE 1 PUFF BY MOUTH TWICE A DAY. RINSE MOUTH WELL AFTER USE, Disp: 1 Inhaler, Rfl: 11    Calcium-Cholecalciferol, D3, (Calcium 600 with Vitamin D3) 600 mg(1,500mg) -400 unit chew, Take 2 Tablets by mouth daily. , Disp: , Rfl:     DISABLED PLACARD (DISABLED PLACARD) DMV, As directed, Disp: 1 Each, Rfl: 0    apixaban (Eliquis) 5 mg tablet, Take 1 Tablet by mouth two (2) times a day., Disp: 180 Tablet, Rfl: 3    albuterol (Ventolin HFA) 90 mcg/actuation inhaler, Take 2 Puffs by inhalation every six (6) hours as needed for Wheezing., Disp: 1 Inhaler, Rfl: 11    acetaminophen (TYLENOL) 325 mg cap, Take 360 mg by mouth three (3) times daily as needed. , Disp: , Rfl:     Cetirizine (ZYRTEC) 10 mg cap, Take 10 Caps by mouth daily. , Disp: , Rfl:     cpap machine kit, by Does Not Apply route., Disp: , Rfl:    Date Last Reviewed:  2/24/2022     Number of Personal Factors/Comorbidities that affect the Plan of Care: 1-2: MODERATE COMPLEXITY   EXAMINATION:   Observation/Orthostatic Postural Assessment:          Patient denies any increase of symptoms with coughing, sneezing or valsalva maneuver. Patient denies any headaches, changes in vision, dizziness, vertigo, nausea, drop attacks, black outs, tinnitus, dysphagia, dysarthria, LE symptoms or bowel/bladder dysfunction. Patient exhibits a neutral cervical lordosis and increased thoracic kyphosis . Patient exhibits a increased lumbar lordosis.  Shoulder symmetry exhibits right slightly elevated compared to left. Shoulder girdles are bilateral protracted. Scapular position is right slightly elevated. Clavicles are right slightly elevated. Position of head is left cranial head tilt. Vertical compression test (VCT) for alignment is 2. Elbow flexion test (EFT) for motor activation is 2. Soft tissue observations indicates restrictions in anterior chest, bilateral upper trapezius, levator scapula, left scalenes greater than right, bilateral pectoralis major and minor and bilateral latissimus dorsi. Palpation:  Myofascial assessment indicates restrictions in anterior chest. Muscle length testing levator scapulae with ipsilateral arm abduction is restricted left greater than right. Upper trapezius length is restricted left greater than right. Pectoralis minor/major and latissimus dorsi exhibit restricted bilaterally. Breathing assessment indicates decrease inhalation left. Ist rib palpation exhibits decreased inferior glide left greater than right. Scalene muscle length is restrictions in left greater than right. Soft tissue restrictions noted in left greater than right masseter, medial and lateral pterygoids and temporalis, left greater than right. .     ROM: Gross active cervical spine rotation is 80% available on the R and 80% available of the L. Active cervical spine flexion is 5% restricted. Active cervical spine extension is 5% restricted. R side bending is 3 finger breaths ear to shoulder. L side bending is 3 finger breaths ear to shoulder. PROM C0/1 is limited anterior roll/posterior glide left. PROM C1/2 rotation is restricted end range right rotation. Mid/lower cervical spine PROM is restrictions end range rotation right C5-7. Thoracic positional testing at transverse processes indicates limited bilateral extension T1-3. Jaw opening 4mm, mild lateral shift right. Strength: Functional strength testing through cervical spine 4-/5. Postural stability strength 4-/5.    Special Tests:   ·  Sharp-Moshe test is not tested  ·  cranial atlas lift is negative  ·  Erie-Axis shear test is negative  ·  Erie-Axis side flexion test for integrity of alar ligament is negative  ·  Tectorial membrane test is negative. ·  Spurling test is negative. ·  Cervical distraction is negative. ·  Cervical compression is negative. · Hautant's test is negative. (Vertebral-Basilar Screen)  ·  Cranial extension test is negative. (Vertebral-Basilar Screen)  Neurological Screen:  Myotomes: Key muscle strength testing for bilateral UE is intact. Dermatomes: Sensation testing through bilateral upper quadrants for light touch is intact. Reflexes: Biceps (C5), brachioradialis (C6), and triceps (C7) are 2+ and WFL. Neural tension tests: Upper limb tension test A is negative. Slump test is negative. Functional Mobility:  Challenged with sitting posture, challenged with opening/closing mouth without popping in left TMJ. Body Structures Involved:  1. Nerves  2. Thoracic Cage  3. Bones  4. Joints  5. Muscles Body Functions Affected:  1. Sensory/Pain  2. Neuromusculoskeletal  3. Movement Related Activities and Participation Affected:  1. General Tasks and Demands  2. Communication  3. Mobility  4. Self Care  5.  Community, Social and [place]   Number of elements (examined above) that affect the Plan of Care: 3: MODERATE COMPLEXITY   CLINICAL PRESENTATION:   Presentation: Evolving clinical presentation with changing clinical characteristics: MODERATE COMPLEXITY   CLINICAL DECISION MAKING:   Use of outcome tool(s) and clinical judgement create a POC that gives a: Questionable prediction of patient's progress: MODERATE COMPLEXITY

## 2022-02-25 NOTE — PROGRESS NOTES
Apryl Germain  : 1950  [place]  [place] [address] at [address]  [phone]   [phone]      OUTPATIENT PHYSICAL THERAPY: Daily Treatment Note 2022  Visit Count:  1    ICD-10: Treatment Diagnosis: cervicalgia M54.2  ICD-10: Treatment Diagnosis: thoracic spine pain M54.6   ICD-10: Treatment Diagnosis: arthraligia of left tempromandibular joint M26.622     Precautions/Allergies:   Ciprofloxacin, Adhesive tape-silicones, Ceclor [cefaclor], and Symbyax [olanzapine-fluoxetine]   TREATMENT PLAN:  Effective Dates: 2022 TO 2022 (90 days). Frequency/Duration: 2 times a week for 90 Day(s) MEDICAL/REFERRING DIAGNOSIS:  Arthralgia of left temporomandibular joint [M26.622]   DATE OF ONSET: 2022  REFERRING PHYSICIAN: Lynnette Good MD MD Orders: evaluate and treat  Return MD Appointment: as needed       Pre-treatment Symptoms/Complaints:  Patient notes tightness in her neck and continued popping in her left jaw. Has also noted increased amount of tinnitus in her ears. Pain: Initial: Pain Intensity 1: 7  Pain Location 1: Jaw,Spine, cervical  Pain Orientation 1: Left /10 Post Session:  6/10   Medications Last Reviewed:  2022  Updated Objective Findings:  See evaluation note from today  TREATMENT:     THERAPEUTIC EXERCISE: (5 minutes):  Exercises per grid below to improve mobility, strength, balance and coordination. Required moderate visual, verbal, manual and tactile cues to promote proper body alignment, promote proper body posture, promote proper body mechanics and promote proper body breathing techniques. Progressed resistance, range, repetitions and complexity of movement as indicated.    Date:  2022   Activity/Exercise Parameters   Axial elongation X 10 reps, 5 sec holds   Tongue on roof of mouth, opening/closing X 10 reps                           MANUAL THERAPY: (20 minutes): Joint mobilization and Soft tissue mobilization was utilized and necessary because of the patient's restricted joint motion, loss of articular motion and restricted motion of soft tissue. (Used abbreviations: MET - muscle energy technique; PNF - proprioceptive neuromuscular facilitation; NMR - neuromuscular re-education; a/p - anterior to posterior; p/a - posterior to anterior, FMP - functional movement patterns, SF - Superficial Fascia; BC - Bony contours; MP - muscle play; IASTM - instrument-assisted soft tissue mobilization)  · Supine anterior chest soft tissue mobilization with breathing techniques. · Supine anterior cervical spine soft tissue mobilization with FMP of cervical spine rotation. · Supine facial soft tissue mobilization: masseter and temporalis. [employer] Portal  Treatment/Session Summary:    · Response to Treatment:  improvement noted in cervical spine rotation at end of session today, no change in popping sensation in left jaw. .  · Communication/Consultation:  None today  · Equipment provided today:  None today  · Recommendations/Intent for next treatment session: Next visit will focus on soft tissue mobilization in cervical and thoracic spine, postural stability strengthening.     Total Treatment Billable Duration:  25 minutes treatment, 35 minutes evaluation   PT Patient Time In/Time Out  Time In: 1430  Time Out: [facility], PT    Future Appointments   Date Time Provider [facility]   2/28/2022  4:30 PM [facility]el, [facility]   3/3/2022  2:30 PM Rosalie FRANCO, PT [facility]   3/8/2022 10:00 AM Rosalie FRANCO, PT [facility]   3/15/2022 11:30 AM Rosalie FRANCO, PT [facility]   3/17/2022 10:00 AM Max Byrnes, NP [facility]   3/17/2022  2:30 PM Elza Martel, PT [facility]   3/22/2022  2:30 PM Rosalie FRANCO, PT [facility]   3/24/2022  2:30 PM Elza Martel, PT [facility] 4/13/2022 10:20 AM Samanta Flores MD END BS ENDO   4/29/2022 11:00 AM Leisa Recio MD [facility] PP PP   6/16/2022 10:30 AM Maninder Cuevas MD [facility] UCDG UCD   7/27/2022  1:40 PM Parris Vanegas NP [facility] PSCD PP   2/21/2023  9:00 AM Hannah Jacques MD [facility] N[facility]ANT

## 2022-02-28 ENCOUNTER — HOSPITAL ENCOUNTER (OUTPATIENT)
Dept: PHYSICAL THERAPY | Age: 72
Discharge: HOME | End: 2022-02-28
Payer: MEDICARE

## 2022-02-28 PROCEDURE — 97140 MANUAL THERAPY 1/> REGIONS: CPT

## 2022-02-28 PROCEDURE — 97110 THERAPEUTIC EXERCISES: CPT

## 2022-03-01 NOTE — PROGRESS NOTES
Nelma Lundborg  : 1950  [place]  [place] [address] at [address]  [phone]   [phone]      OUTPATIENT PHYSICAL THERAPY: Daily Treatment Note 2022  Visit Count:  2    ICD-10: Treatment Diagnosis: cervicalgia M54.2  ICD-10: Treatment Diagnosis: thoracic spine pain M54.6   ICD-10: Treatment Diagnosis: arthraligia of left tempromandibular joint M26.622     Precautions/Allergies:   Ciprofloxacin, Adhesive tape-silicones, Ceclor [cefaclor], and Symbyax [olanzapine-fluoxetine]   TREATMENT PLAN:  Effective Dates: 2022 TO 2022 (90 days). Frequency/Duration: 2 times a week for 90 Day(s) MEDICAL/REFERRING DIAGNOSIS:  Arthralgia of left temporomandibular joint [M26.622]   DATE OF ONSET: 2022  REFERRING PHYSICIAN: Mery Lyle MD MD Orders: evaluate and treat  Return MD Appointment: as needed       Pre-treatment Symptoms/Complaints:  Patient notes challenges with opening her mouth and continued tinnitus in her ears. Notes end range cervical spine continues to be restricted. Pain: Initial: Pain Intensity 1: 6  Pain Location 1: Jaw,Spine, cervical  Pain Orientation 1: Left /10 Post Session:  5/10   Medications Last Reviewed:  2022  Updated Objective Findings:  None Today  TREATMENT:     THERAPEUTIC EXERCISE: (15 minutes):  Exercises per grid below to improve mobility, strength, balance and coordination. Required moderate visual, verbal, manual and tactile cues to promote proper body alignment, promote proper body posture, promote proper body mechanics and promote proper body breathing techniques. Progressed resistance, range, repetitions and complexity of movement as indicated.    Date:  2022   Activity/Exercise Parameters   Supine axial elongation X 10 reps, 5 sec holds   Tongue on roof of mouth, opening/closing X 10 reps   Cervical spine AROM rotation X 10 reps   Self massage to temporalis X 3 minutes                   MANUAL THERAPY: (20 minutes): Joint mobilization and Soft tissue mobilization was utilized and necessary because of the patient's restricted joint motion, loss of articular motion and restricted motion of soft tissue. (Used abbreviations: MET - muscle energy technique; PNF - proprioceptive neuromuscular facilitation; NMR - neuromuscular re-education; a/p - anterior to posterior; p/a - posterior to anterior, FMP - functional movement patterns, SF - Superficial Fascia; BC - Bony contours; MP - muscle play; IASTM - instrument-assisted soft tissue mobilization)  · Supine anterior chest soft tissue mobilization with breathing techniques. · Supine anterior cervical spine soft tissue mobilization with FMP of cervical spine rotation. · Supine facial soft tissue mobilization: masseter and temporalis. · Supine anterior roll/posterior glide mobilization to left TMJ with FMP of opening/closing  · Supine cervical spine manual traction with suboccipital release. [facility] Portal  Treatment/Session Summary:    · Response to Treatment:  improving overall mobility in cervical spine, continues to present with restrictions in soft tissue and challenged with jaw mobility. .  · Communication/Consultation:  None today  · Equipment provided today:  None today  · Recommendations/Intent for next treatment session: Next visit will focus on soft tissue mobilization and joint mobilization to cervical and thoracic spine, postural stability and jaw mobility exercises.     Total Treatment Billable Duration:  55 minutes  PT Patient Time In/Time Out  Time In: 1630  Time Out: MANUEL Vuong    Future Appointments   Date Time Provider [facility]ineda   3/3/2022  2:30 PM Fabian Thomas, PT [facility]   3/8/2022 10:00 AM Shawn FRANCO, PT [facility]   3/15/2022 11:30 AM Shawn FRANCO, PT [facility]   3/17/2022 10:00 AM Sitton, Levi Primrose M, NP [facility]   3/17/2022  2:30 PM Isaias SKELTON., PT [facility]   3/22/2022  2:30 PM Arsenio Langston., PT [facility]   3/24/2022  2:30 PM Arsenio Langston., PT [facility]   4/13/2022 10:20 AM Miguel Angel Veras MD END BS ENDO   4/29/2022 11:00 AM Mary Rush MD [facility] PP PP   6/16/2022 10:30 AM Franck Rivera MD [facility] UCDG UCD   7/27/2022  1:40 PM Mame Fernandez NP [facility] PSCD PP   2/21/2023  9:00 AM Ary Miller MD [facility] NISHANT

## 2022-03-03 ENCOUNTER — HOSPITAL ENCOUNTER (OUTPATIENT)
Dept: PHYSICAL THERAPY | Age: 72
Discharge: HOME | End: 2022-03-03
Payer: MEDICARE

## 2022-03-03 PROCEDURE — 97110 THERAPEUTIC EXERCISES: CPT

## 2022-03-03 PROCEDURE — 97140 MANUAL THERAPY 1/> REGIONS: CPT

## 2022-03-04 NOTE — PROGRESS NOTES
Myla Davis  : 1950  [place]  [place] [facility] at [address]  [phone]   [phone]      OUTPATIENT PHYSICAL THERAPY: Daily Treatment Note 3/3/2022  Visit Count:  3     ICD-10: Treatment Diagnosis: cervicalgia M54.2  ICD-10: Treatment Diagnosis: thoracic spine pain M54.6   ICD-10: Treatment Diagnosis: arthraligia of left tempromandibular joint M26.622     Precautions/Allergies:   Ciprofloxacin, Adhesive tape-silicones, Ceclor [cefaclor], and Symbyax [olanzapine-fluoxetine]   TREATMENT PLAN:  Effective Dates: 2022 TO 2022 (90 days). Frequency/Duration: 2 times a week for 90 Day(s) MEDICAL/REFERRING DIAGNOSIS:  Arthralgia of left temporomandibular joint [M26.622]   DATE OF ONSET: 2022  REFERRING PHYSICIAN: Jaja Ponce MD MD Orders: evaluate and treat  Return MD Appointment: as needed       Pre-treatment Symptoms/Complaints:  Patient notes improving cervical spine mobility, continues to have tinnitus in bilateral ears and challenges with left jaw. Pain: Initial: Pain Intensity 1: 6  Pain Location 1: Jaw,Spine, cervical  Pain Orientation 1: Left /10 Post Session:  5/10   Medications Last Reviewed:  3/3/2022  Updated Objective Findings:  None Today  TREATMENT:     THERAPEUTIC EXERCISE: (15 minutes):  Exercises per grid below to improve mobility, strength, balance and coordination. Required moderate visual, verbal, manual and tactile cues to promote proper body alignment, promote proper body posture, promote proper body mechanics and promote proper body breathing techniques. Progressed resistance, range, repetitions and complexity of movement as indicated.    Date:  3/3/2022   Activity/Exercise Parameters   Supine axial elongation X 10 reps, 5 sec holds   Supine tongue on roof of mouth, open/close jaw X 10 reps   Cervical spine ROM rotation X 10 reps,   Supine breathing techniques X 5 minutes education  X 5 reps slow and controlled. MANUAL THERAPY: (40 minutes): Joint mobilization and Soft tissue mobilization was utilized and necessary because of the patient's restricted joint motion, loss of articular motion and restricted motion of soft tissue. (Used abbreviations: MET - muscle energy technique; PNF - proprioceptive neuromuscular facilitation; NMR - neuromuscular re-education; a/p - anterior to posterior; p/a - posterior to anterior, FMP - functional movement patterns, SF - Superficial Fascia; BC - Bony contours; MP - muscle play; IASTM - instrument-assisted soft tissue mobilization)  · Supine anterior chest soft tissue mobilization with breathing techniques. · Supine anterior cervical spine soft tissue mobilization with FMP of cervical spine rotation. · Supine facial soft tissue mobilization: masseter and temporalis. · Supine cervical spine manual traction and suboccipital release. · Supine soft tissue mobilization to bilateral upper trapezius and levator scapula. [facility] Portal  Treatment/Session Summary:    · Response to Treatment:  improving overall mobility in cervical spine, decreased restrictions noted in left upper trapezius at end of session. .  · Communication/Consultation:  None today  · Equipment provided today:  None today  · Recommendations/Intent for next treatment session: Next visit will focus on soft tissue mobilization in cervical and thoracic spine, postural stability and training, jaw exercises.     Total Treatment Billable Duration:  55 minutes  PT Patient Time In/Time Out  Time In: 1430  Time Out: 82 Prabha Guerra PT    Future Appointments   Date Time Provider Uzair Pineda   3/8/2022 10:00 AM Jim FRANCO, PT OFF [facility]   3/15/2022 11:30 AM Jim FRANCO PT [facility]   3/17/2022 10:00 AM Sudha Byrnes, ZAHRAA UPSG UPSG   3/17/2022  2:30 PM Marjorie Mack, PT [facility] 3/22/2022  2:30 PM Greg FRANCO, PT [facility]   3/24/2022  2:30 PM Jovi Choudhury, PT [facility]   4/13/2022 10:20 AM Neris Gonzales MD END BS ENDO   4/29/2022 11:00 AM Hermilo Reed MD SSA PP PP   6/16/2022 10:30 AM Cal Oliveira MD SSA UCDG UCD   7/27/2022  1:40 PM Kathleen Segura NP SSA PSCD PP   2/21/2023  9:00 AM Joana Kwok, Gilberto Velasquez MD [facility] NISHANT

## 2022-03-08 ENCOUNTER — HOSPITAL ENCOUNTER (OUTPATIENT)
Dept: PHYSICAL THERAPY | Age: 72
Discharge: HOME | End: 2022-03-08
Payer: MEDICARE

## 2022-03-08 PROCEDURE — 97110 THERAPEUTIC EXERCISES: CPT

## 2022-03-08 PROCEDURE — 97140 MANUAL THERAPY 1/> REGIONS: CPT

## 2022-03-09 NOTE — PROGRESS NOTES
Misty Franklin  : 1950  [place]  [place] [address] at [address]  [phone]   [phone]      OUTPATIENT PHYSICAL THERAPY: Daily Treatment Note 3/8/2022  Visit Count:  4     ICD-10: Treatment Diagnosis: cervicalgia M54.2  ICD-10: Treatment Diagnosis: thoracic spine pain M54.6   ICD-10: Treatment Diagnosis: arthraligia of left tempromandibular joint M26.622     Precautions/Allergies:   Ciprofloxacin, Adhesive tape-silicones, Ceclor [cefaclor], and Symbyax [olanzapine-fluoxetine]   TREATMENT PLAN:  Effective Dates: 2022 TO 2022 (90 days). Frequency/Duration: 2 times a week for 90 Day(s) MEDICAL/REFERRING DIAGNOSIS:  Arthralgia of left temporomandibular joint [M26.622]   DATE OF ONSET: 2022  REFERRING PHYSICIAN: Gerald Srinivasan MD MD Orders: evaluate and treat  Return MD Appointment: as needed       Pre-treatment Symptoms/Complaints:  Patient notes continues to have popping in her left jaw, however has noted less tinnitus this week. Pain: Initial: Pain Intensity 1: 5  Pain Location 1: Jaw,Spine, cervical  Pain Orientation 1: Left /10 Post Session:  5/10   Medications Last Reviewed:  3/8/2022  Updated Objective Findings:  None Today  TREATMENT:     THERAPEUTIC EXERCISE: (15 minutes):  Exercises per grid below to improve mobility, strength, balance and coordination. Required moderate visual, verbal, manual and tactile cues to promote proper body alignment, promote proper body posture, promote proper body mechanics and promote proper body breathing techniques. Progressed resistance, range, repetitions and complexity of movement as indicated.    Date:  3/8/2022   Activity/Exercise Parameters   Supine axial elongation X 10 reps, 5 sec holds   Supine tongue on roof of mouth, open/close jaw X 10 reps   Cervical spine ROM rotation X 10 reps,   Supine breathing techniques X 5 minutes education  X 5 reps slow and controlled. Tongue circles X 10 reps, CW,CCW   Ark/unk jaw mobility X 10 reps           MANUAL THERAPY: (25 minutes): Joint mobilization and Soft tissue mobilization was utilized and necessary because of the patient's restricted joint motion, loss of articular motion and restricted motion of soft tissue. (Used abbreviations: MET - muscle energy technique; PNF - proprioceptive neuromuscular facilitation; NMR - neuromuscular re-education; a/p - anterior to posterior; p/a - posterior to anterior, FMP - functional movement patterns, SF - Superficial Fascia; BC - Bony contours; MP - muscle play; IASTM - instrument-assisted soft tissue mobilization)  · Supine anterior chest soft tissue mobilization with breathing techniques. · Supine anterior cervical spine soft tissue mobilization with FMP of cervical spine rotation. · Supine facial soft tissue mobilization: masseter and temporalis. · Supine cervical spine manual traction and suboccipital release. · Supine soft tissue mobilization to bilateral upper trapezius and levator scapula. · Supine internal soft tissue mobilization to left lateral pterygoid and masster  [facility] Portal  Treatment/Session Summary:    · Response to Treatment: improving cervical spine rotation ROM, less tension noted in left masseter and lateral pterygoid today, however continues to present with popping in left TMJ. · Communication/Consultation:  None today  · Equipment provided today:  None today  · Recommendations/Intent for next treatment session: Next visit will focus on soft tissue mobilization in cervical and thoracic spine, postural stability and training, jaw exercises.     Total Treatment Billable Duration:  40 minutes  PT Patient Time In/Time Out  Time In: 1015  Time Out: 101 Theresa Kay, PT    Future Appointments   Date Time Provider Uzair Pineda   3/15/2022 11:30 AM MANUEL Chaudhari   3/17/2022 10:00 AM Mahogany Byrnes, ZAHRAA UPSG UPSG   3/17/2022  2:30 PM Derek SKELTON., PT [facility]   3/22/2022  2:30 PM Kayla Rodriguez., PT [facility]   3/24/2022  2:30 PM Kayla Rodriguez., PT [facility]   4/13/2022 10:20 AM Ericka Koehler MD END BS ENDO   6/16/2022 10:30 AM Cata Cortez MD SSA UCDG UCD   7/27/2022  1:40 PM Tarun Bennett NP SSA PSCD PP   2/21/2023  9:00 AM Frannie Orlando MD [facility] NISHANT

## 2022-03-15 ENCOUNTER — HOSPITAL ENCOUNTER (OUTPATIENT)
Dept: PHYSICAL THERAPY | Age: 72
Discharge: HOME | End: 2022-03-15
Payer: MEDICARE

## 2022-03-15 PROCEDURE — 97110 THERAPEUTIC EXERCISES: CPT

## 2022-03-15 PROCEDURE — 97140 MANUAL THERAPY 1/> REGIONS: CPT

## 2022-03-16 NOTE — PROGRESS NOTES
Sandeep Hanss  : 1950  [place]  [place] [address] at [address]  [phone]   [phone]      OUTPATIENT PHYSICAL THERAPY: Daily Treatment Note 3/15/2022  Visit Count:  5     ICD-10: Treatment Diagnosis: cervicalgia M54.2  ICD-10: Treatment Diagnosis: thoracic spine pain M54.6   ICD-10: Treatment Diagnosis: arthraligia of left tempromandibular joint M26.622     Precautions/Allergies:   Ciprofloxacin, Adhesive tape-silicones, Ceclor [cefaclor], and Symbyax [olanzapine-fluoxetine]   TREATMENT PLAN:  Effective Dates: 2022 TO 2022 (90 days). Frequency/Duration: 2 times a week for 90 Day(s) MEDICAL/REFERRING DIAGNOSIS:  Arthralgia of left temporomandibular joint [M26.622]   DATE OF ONSET: 2022  REFERRING PHYSICIAN: Hernando Bland MD MD Orders: evaluate and treat  Return MD Appointment: as needed       Pre-treatment Symptoms/Complaints:  Patient notes less jaw popping noted this week, continues to have tinnitus but a little less noted. Pain: Initial: Pain Intensity 1: 4  Pain Location 1: Jaw,Spine, cervical  Pain Orientation 1: Left /10 Post Session:  5/10   Medications Last Reviewed:  3/15/2022  Updated Objective Findings:  None Today  TREATMENT:     THERAPEUTIC EXERCISE: (15 minutes):  Exercises per grid below to improve mobility, strength, balance and coordination. Required moderate visual, verbal, manual and tactile cues to promote proper body alignment, promote proper body posture, promote proper body mechanics and promote proper body breathing techniques. Progressed resistance, range, repetitions and complexity of movement as indicated.    Date:  3/15/2022   Activity/Exercise Parameters   Supine axial elongation X 10 reps, 5 sec holds   Supine tongue on roof of mouth, open/close jaw X 10 reps   Cervical spine ROM rotation X 10 reps,   Supine breathing techniques X 5 reps slow and controlled. Tongue circles X 10 reps, CW,CCW   Ark/unk jaw mobility X 10 reps           MANUAL THERAPY: (40 minutes): Joint mobilization and Soft tissue mobilization was utilized and necessary because of the patient's restricted joint motion, loss of articular motion and restricted motion of soft tissue. (Used abbreviations: MET - muscle energy technique; PNF - proprioceptive neuromuscular facilitation; NMR - neuromuscular re-education; a/p - anterior to posterior; p/a - posterior to anterior, FMP - functional movement patterns, SF - Superficial Fascia; BC - Bony contours; MP - muscle play; IASTM - instrument-assisted soft tissue mobilization)  · Supine anterior chest soft tissue mobilization with breathing techniques. · Supine anterior cervical spine soft tissue mobilization with FMP of cervical spine rotation. · Supine facial soft tissue mobilization: masseter and temporalis. · Supine cervical spine manual traction and suboccipital release. · Supine soft tissue mobilization to bilateral upper trapezius and levator scapula. · Supine internal soft tissue mobilization to left lateral pterygoid and masster  [facility] Portal  Treatment/Session Summary:    · Response to Treatment: decreased soft tissue restrictions noted in anterior cervical spine, improving jaw mobility. · Communication/Consultation:  None today  · Equipment provided today:  None today  · Recommendations/Intent for next treatment session: Next visit will focus on soft tissue mobilization in cervical and thoracic spine, postural stability and training, jaw exercises.     Total Treatment Billable Duration:  55 minutes  PT Patient Time In/Time Out  Time In: 1140  Time Out: 1400 [facility], PT    Future Appointments   Date Time Provider Uzair Pineda   3/17/2022  7:00 PM Claude Quinonez, MANUEL ERLIN [facility]   3/22/2022  2:30 PM MANUEL Juarez [facility]   3/24/2022  2:30 PM Claude Quinonez, PT [facility]   4/13/2022 10:20 AM Ravindra Bravo MD END BS ENDO   5/5/2022  9:40 AM Tana Molina MD SSA PP PP   6/16/2022 10:30 AM Jayden Dutton MD SSA UCDG UCD   7/27/2022  1:40 PM Jahaira Bashir NP SSA PSCD PP   2/21/2023  9:00 AM Lorre Snellen, Roy Meline, MD [facility] NISHANT

## 2022-03-17 ENCOUNTER — HOSPITAL ENCOUNTER (OUTPATIENT)
Dept: PHYSICAL THERAPY | Age: 72
Discharge: HOME | End: 2022-03-17
Payer: MEDICARE

## 2022-03-18 PROBLEM — M79.672 LEFT FOOT PAIN: Status: ACTIVE | Noted: 2020-02-22

## 2022-03-18 PROBLEM — M19.041 PRIMARY OSTEOARTHRITIS OF RIGHT HAND: Status: ACTIVE | Noted: 2020-02-22

## 2022-03-18 PROBLEM — G47.10 HYPERSOMNIA, UNSPECIFIED: Status: ACTIVE | Noted: 2019-05-22

## 2022-03-18 PROBLEM — E89.0 HYPOTHYROIDISM FOLLOWING RADIOIODINE THERAPY: Status: ACTIVE | Noted: 2018-07-09

## 2022-03-18 PROBLEM — M19.011 LOCALIZED OSTEOARTHRITIS OF SHOULDER, RIGHT: Status: ACTIVE | Noted: 2020-02-22

## 2022-03-18 NOTE — PROGRESS NOTES
Loki Alert   (DPB:[id number]) [address]  at  [address], [address] [place] [address]  [phone]  [phone]             DATE: 3/17/2022    Patient cancelled for appointment today due to out of town. Will plan to follow up on next scheduled visit.     Mahnaz Chamorro PT, DPT, CFMT

## 2022-03-19 PROBLEM — M20.5X2 ACQUIRED CLAW TOE, LEFT: Status: ACTIVE | Noted: 2020-02-22

## 2022-03-19 PROBLEM — M17.9 OA (OSTEOARTHRITIS) OF KNEE: Status: ACTIVE | Noted: 2019-04-17

## 2022-03-19 PROBLEM — M19.079 ARTHRITIS OF MIDFOOT: Status: ACTIVE | Noted: 2020-02-22

## 2022-03-19 PROBLEM — M20.12 ACQUIRED HALLUX VALGUS OF LEFT FOOT: Status: ACTIVE | Noted: 2020-02-22

## 2022-03-19 PROBLEM — M48.062 SPINAL STENOSIS OF LUMBAR REGION WITH NEUROGENIC CLAUDICATION: Status: ACTIVE | Noted: 2020-02-22

## 2022-03-19 PROBLEM — J45.20 MILD INTERMITTENT ASTHMA WITHOUT COMPLICATION: Status: ACTIVE | Noted: 2018-07-09

## 2022-03-19 PROBLEM — Z79.899 HIGH RISK MEDICATIONS (NOT ANTICOAGULANTS) LONG-TERM USE: Status: ACTIVE | Noted: 2019-03-13

## 2022-03-19 PROBLEM — M77.50 TENDINITIS OF FOOT: Status: ACTIVE | Noted: 2020-02-22

## 2022-03-19 PROBLEM — E66.01 MORBID OBESITY WITH BMI OF 45.0-49.9, ADULT (HCC): Status: ACTIVE | Noted: 2018-07-09

## 2022-03-19 PROBLEM — M51.36 DDD (DEGENERATIVE DISC DISEASE), LUMBAR: Status: ACTIVE | Noted: 2020-02-22

## 2022-03-19 PROBLEM — I48.19 ATRIAL FIBRILLATION, PERSISTENT (HCC): Status: ACTIVE | Noted: 2018-07-05

## 2022-03-19 PROBLEM — Z96.652 STATUS POST LEFT KNEE REPLACEMENT: Status: ACTIVE | Noted: 2021-06-03

## 2022-03-19 PROBLEM — M51.369 DDD (DEGENERATIVE DISC DISEASE), LUMBAR: Status: ACTIVE | Noted: 2020-02-22

## 2022-03-22 ENCOUNTER — HOSPITAL ENCOUNTER (OUTPATIENT)
Dept: PHYSICAL THERAPY | Age: 72
Discharge: HOME | End: 2022-03-22
Payer: MEDICARE

## 2022-03-22 PROCEDURE — 97140 MANUAL THERAPY 1/> REGIONS: CPT

## 2022-03-22 PROCEDURE — 97110 THERAPEUTIC EXERCISES: CPT

## 2022-03-23 NOTE — PROGRESS NOTES
Myla Davis  : 1950  [place]  [place] [address] at [address]  [phone]   [phone]      OUTPATIENT PHYSICAL THERAPY: Daily Treatment Note 3/22/2022  Visit Count:  6     ICD-10: Treatment Diagnosis: cervicalgia M54.2  ICD-10: Treatment Diagnosis: thoracic spine pain M54.6   ICD-10: Treatment Diagnosis: arthraligia of left tempromandibular joint M26.622     Precautions/Allergies:   Ciprofloxacin, Adhesive tape-silicones, Ceclor [cefaclor], and Symbyax [olanzapine-fluoxetine]   TREATMENT PLAN:  Effective Dates: 2022 TO 2022 (90 days). Frequency/Duration: 2 times a week for 90 Day(s) MEDICAL/REFERRING DIAGNOSIS:  Arthralgia of left temporomandibular joint [M26.622]   DATE OF ONSET: 2022  REFERRING PHYSICIAN: Jaja Ponce MD MD Orders: evaluate and treat  Return MD Appointment: as needed       Pre-treatment Symptoms/Complaints:  Patient notes less tinnitis and jaw popping this week. Pain: Initial: Pain Intensity 1: 4  Pain Location 1: Jaw,Spine, cervical  Pain Orientation 1: Left /10 Post Session:  3/10   Medications Last Reviewed:  3/22/2022  Updated Objective Findings:  None Today  TREATMENT:     THERAPEUTIC EXERCISE: (10 minutes):  Exercises per grid below to improve mobility, strength, balance and coordination. Required moderate visual, verbal, manual and tactile cues to promote proper body alignment, promote proper body posture, promote proper body mechanics and promote proper body breathing techniques. Progressed resistance, range, repetitions and complexity of movement as indicated. Date:  3/22/2022   Activity/Exercise Parameters   Supine axial elongation X 10 reps, 5 sec holds   Supine tongue on roof of mouth, open/close jaw X 10 reps   Cervical spine ROM rotation X 10 reps,   Supine breathing techniques X 5 reps slow and controlled.    Tongue circles X 10 reps, CW,CCW   Ark/unk jaw mobility X 10 reps           MANUAL THERAPY: (30 minutes): Joint mobilization and Soft tissue mobilization was utilized and necessary because of the patient's restricted joint motion, loss of articular motion and restricted motion of soft tissue. (Used abbreviations: MET - muscle energy technique; PNF - proprioceptive neuromuscular facilitation; NMR - neuromuscular re-education; a/p - anterior to posterior; p/a - posterior to anterior, FMP - functional movement patterns, SF - Superficial Fascia; BC - Bony contours; MP - muscle play; IASTM - instrument-assisted soft tissue mobilization)  · Supine anterior chest soft tissue mobilization with breathing techniques. · Supine anterior cervical spine soft tissue mobilization with FMP of cervical spine rotation. · Supine facial soft tissue mobilization: masseter and temporalis. · Supine cervical spine manual traction and suboccipital release. · Supine soft tissue mobilization to bilateral upper trapezius and levator scapula. · Supine internal soft tissue mobilization to left lateral pterygoid and masster  [facility] Portal  Treatment/Session Summary:    · Response to Treatment: decreased soft tissue restrictions noted in left temporalis and pterygoids. Improving jaw opening. · Communication/Consultation:  None today  · Equipment provided today:  None today  · Recommendations/Intent for next treatment session: Next visit will focus on soft tissue mobilization in cervical and thoracic spine, postural stability and training, jaw exercises.     Total Treatment Billable Duration:  40 minutes  PT Patient Time In/Time Out  Time In: 6994  Time Out: 975 [facility],     Future Appointments   Date Time Provider [facility]   3/24/2022  2:30 PM Kashmir Ward., PT [facility]   4/13/2022 10:20 AM Idania Giron MD END BS ENDO   5/5/2022  9:40 AM Momo Boyer MD SSA PP PP   6/16/2022 10:30 AM Celestina Diaz MD DEVIN GOLDSTEIN UCDG UCD   7/27/2022  1:40 PM ZAHRAA Yap PSCD PP   2/21/2023  9:00 AM Leonard Gottlieb MD [facility] NISHANT

## 2022-03-24 ENCOUNTER — HOSPITAL ENCOUNTER (OUTPATIENT)
Dept: PHYSICAL THERAPY | Age: 72
Discharge: HOME | End: 2022-03-24
Payer: MEDICARE

## 2022-03-24 PROCEDURE — 97140 MANUAL THERAPY 1/> REGIONS: CPT

## 2022-03-24 PROCEDURE — 97110 THERAPEUTIC EXERCISES: CPT

## 2022-03-25 NOTE — PROGRESS NOTES
Avinash Fleeting  : 1950  [place]  Secondary: [facility] [place] [facility] at [address]  [phone]   [phone]      OUTPATIENT PHYSICAL THERAPY: Daily Treatment Note 3/24/2022  Visit Count:  7     ICD-10: Treatment Diagnosis: cervicalgia M54.2  ICD-10: Treatment Diagnosis: thoracic spine pain M54.6   ICD-10: Treatment Diagnosis: arthraligia of left tempromandibular joint M26.622     Precautions/Allergies:   Ciprofloxacin, Adhesive tape-silicones, Ceclor [cefaclor], and Symbyax [olanzapine-fluoxetine]   TREATMENT PLAN:  Effective Dates: 2022 TO 2022 (90 days). Frequency/Duration: 2 times a week for 90 Day(s) MEDICAL/REFERRING DIAGNOSIS:  Arthralgia of left temporomandibular joint [M26.622]   DATE OF ONSET: 2022  REFERRING PHYSICIAN: Susana Mendez MD MD Orders: evaluate and treat  Return MD Appointment: as needed       Pre-treatment Symptoms/Complaints:  Cornelio Tsang has continued to note improvements in her cervical spine and jaw mobility, less popping noted in left jaw. Pain: Initial: Pain Intensity 1: 3  Pain Location 1: Jaw,Spine, cervical  Pain Orientation 1: Left /10 Post Session:  2/10   Medications Last Reviewed:  3/24/2022  Updated Objective Findings:  None Today  TREATMENT:     THERAPEUTIC EXERCISE: (15 minutes):  Exercises per grid below to improve mobility, strength, balance and coordination. Required moderate visual, verbal, manual and tactile cues to promote proper body alignment, promote proper body posture, promote proper body mechanics and promote proper body breathing techniques. Progressed resistance, range, repetitions and complexity of movement as indicated.    Date:  3/24/2022   Activity/Exercise Parameters   Supine axial elongation X 10 reps, 5 sec holds   Supine tongue on roof of mouth, open/close jaw X 10 reps   Cervical spine ROM rotation X 10 reps,   Supine breathing techniques X 5 reps slow and controlled. Tongue circles X 10 reps, CW,CCW   Ark/unk jaw mobility X 10 reps           MANUAL THERAPY: (25 minutes): Joint mobilization and Soft tissue mobilization was utilized and necessary because of the patient's restricted joint motion, loss of articular motion and restricted motion of soft tissue. (Used abbreviations: MET - muscle energy technique; PNF - proprioceptive neuromuscular facilitation; NMR - neuromuscular re-education; a/p - anterior to posterior; p/a - posterior to anterior, FMP - functional movement patterns, SF - Superficial Fascia; BC - Bony contours; MP - muscle play; IASTM - instrument-assisted soft tissue mobilization)  · Supine anterior chest soft tissue mobilization with breathing techniques. · Supine anterior cervical spine soft tissue mobilization with FMP of cervical spine rotation. · Supine facial soft tissue mobilization: masseter and temporalis. · Supine cervical spine manual traction and suboccipital release. · Supine soft tissue mobilization to bilateral upper trapezius and levator scapula. · Supine internal soft tissue mobilization to left lateral pterygoid and masster  [facility] Portal  Treatment/Session Summary:    · Response to Treatment: improving cervical spine ROM, decreased soft tissue restrictions noted in bilateral upper trapezius and levator scapula. · Communication/Consultation:  None today  · Equipment provided today:  None today  · Recommendations/Intent for next treatment session: Next visit will focus on soft tissue mobilization in cervical and thoracic spine, postural stability and training, jaw exercises.     Total Treatment Billable Duration:  40 minutes  PT Patient Time In/Time Out  Time In: 1430  Time Out: 975 [facility], PT    Future Appointments   Date Time Provider Uzair [facility]   3/29/2022  9:30 AM Tato FRANCO PT [facility]   3/31/2022 11:30 AM Bryant Stovall, PT [facility]   4/4/2022 10:30 AM Rafiq FRANCO, PT [facility]   4/12/2022  2:30 PM Rafiq SKELTON., PT [facility]   4/13/2022 10:20 AM Anna James MD END BS ENDO   4/14/2022  9:30 AM Beverly Sevin., PT [facility]   4/19/2022  2:30 PM Beverly Sevin., PT [facility]   4/21/2022  2:30 PM Beverly Sevin., PT [facility]   4/26/2022  2:30 PM Beverly Sevin., PT [facility]   4/28/2022  2:30 PM Beverly Sevin., PT [facility]   5/5/2022  9:40 AM Shemar Fuentes MD SSA PP PP   6/16/2022 10:30 AM Liliam Cam MD SSA UCDG UCD   7/27/2022  1:40 PM Sonia Lee NP SSA PSCD PP   2/21/2023  9:00 AM Aditi Alvarado MD [facility] NISHANT

## 2022-03-29 ENCOUNTER — HOSPITAL ENCOUNTER (OUTPATIENT)
Dept: PHYSICAL THERAPY | Age: 72
Discharge: HOME | End: 2022-03-29
Payer: MEDICARE

## 2022-03-29 PROCEDURE — 97110 THERAPEUTIC EXERCISES: CPT

## 2022-03-29 PROCEDURE — 97140 MANUAL THERAPY 1/> REGIONS: CPT

## 2022-03-30 NOTE — PROGRESS NOTES
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 Frequency/Duration: 2 times a week for 90 Day(s) MEDICAL/REFERRING DIAGNOSIS:  Arthralgia of left temporomandibular joint [M26.622]   DATE OF ONSET: 2022  REFERRING PHYSICIAN: Maggie Boudreaux MD MD Orders: evaluate and treat  Return MD Appointment: as needed       Pre-treatment Symptoms/Complaints:  Patient notes continued improvement in neck and jaw mobility. Overall notes wearing her mouth piece at night has helped with jaw too. Pain: Initial: Pain Intensity 1: 3  Pain Location 1: Jaw,Spine, cervical  Pain Orientation 1: Left /10 Post Session:  2/10   Medications Last Reviewed:  3/29/2022  Updated Objective Findings:  None Today  TREATMENT:     THERAPEUTIC EXERCISE: (15 minutes):  Exercises per grid below to improve mobility, strength, balance and coordination. Required moderate visual, verbal, manual and tactile cues to promote proper body alignment, promote proper body posture, promote proper body mechanics and promote proper body breathing techniques. Progressed resistance, range, repetitions and complexity of movement as indicated.    Date:  3/29/2022   Activity/Exercise Parameters   Supine axial elongation X 10 reps, 5 sec holds   Supine tongue on roof of mouth, open/close jaw X 10 reps   Cervical spine ROM rotation X 10 reps,   Supine breathing techniques X 5 reps slow and controlled. Tongue circles X 10 reps, CW,CCW   Ark/unk jaw mobility X 10 reps           MANUAL THERAPY: (25 minutes): Joint mobilization and Soft tissue mobilization was utilized and necessary because of the patient's restricted joint motion, loss of articular motion and restricted motion of soft tissue. (Used abbreviations: MET - muscle energy technique; PNF - proprioceptive neuromuscular facilitation; NMR - neuromuscular re-education; a/p - anterior to posterior; p/a - posterior to anterior, FMP - functional movement patterns, SF - Superficial Fascia; BC - Bony contours; MP - muscle play; IASTM - instrument-assisted soft tissue mobilization)  · Supine anterior chest soft tissue mobilization with breathing techniques. · Supine anterior cervical spine soft tissue mobilization with FMP of cervical spine rotation. · Supine facial soft tissue mobilization: masseter and temporalis. · Supine cervical spine manual traction and suboccipital release. · Supine soft tissue mobilization to bilateral upper trapezius and levator scapula. · Supine internal soft tissue mobilization to left lateral pterygoid and masster  MedBridge Portal  Treatment/Session Summary:    · Response to Treatment: improving mobility in cervical spine and thoracic spine. Decreased anterior shear of left TMJ with opening. · Communication/Consultation:  None today  · Equipment provided today:  None today  · Recommendations/Intent for next treatment session: Next visit will focus on soft tissue mobilization in cervical and thoracic spine, postural stability and training, jaw exercises.     Total Treatment Billable Duration:  40 minutes  PT Patient Time In/Time Out  Time In: 1645  Time Out: [facility], PT    Future Appointments   Date Time Provider Uzair Pineda   3/31/2022 11:30 AM Tara Severance A., PT [facility]   4/4/2022 10:30 AM Janet Negrete, [facility]   4/12/2022  2:30 PM Yanique SKELTON., PT [facility]   4/13/2022 10:20 AM Shyanne Martinez MD [facility]   4/14/2022  9:30 AM Osvaldo Herd., PT [facility]   4/19/2022  2:30 PM Osvaldo Herd., PT [facility]   4/21/2022  2:30 PM Osvaldo Herd., PT [facility]   4/26/2022  2:30 PM Osvaldo Herd., PT [facility]   4/28/2022  2:30 PM Osvaldo Herd., PT [facility]   5/5/2022  9:40 AM Camron Lanier MD [facility] PP   6/16/2022 10:30 AM Devin Cox MD [facility]   7/27/2022  1:40 PM Luis Márquez NP [facility] PP   2/21/2023  9:00 AM Jasbir Martinez MD [facility]

## 2022-03-31 ENCOUNTER — HOSPITAL ENCOUNTER (OUTPATIENT)
Dept: PHYSICAL THERAPY | Age: 72
Discharge: HOME | End: 2022-03-31
Payer: MEDICARE

## 2022-03-31 ENCOUNTER — APPOINTMENT (RX ONLY)
Dept: URBAN - METROPOLITAN AREA CLINIC 24 | Facility: CLINIC | Age: 72
Setting detail: DERMATOLOGY
End: 2022-03-31

## 2022-03-31 DIAGNOSIS — B07.8 OTHER VIRAL WARTS: ICD-10-CM

## 2022-03-31 PROCEDURE — ? COUNSELING

## 2022-03-31 PROCEDURE — ? LIQUID NITROGEN

## 2022-03-31 PROCEDURE — 17110 DESTRUCTION B9 LES UP TO 14: CPT

## 2022-03-31 PROCEDURE — 97110 THERAPEUTIC EXERCISES: CPT

## 2022-03-31 PROCEDURE — ? ADDITIONAL NOTES

## 2022-03-31 PROCEDURE — 97140 MANUAL THERAPY 1/> REGIONS: CPT

## 2022-03-31 ASSESSMENT — LOCATION ZONE DERM: LOCATION ZONE: FINGER

## 2022-03-31 ASSESSMENT — LOCATION DETAILED DESCRIPTION DERM: LOCATION DETAILED: PERIUNGUAL SKIN RIGHT RING FINGER

## 2022-03-31 ASSESSMENT — LOCATION SIMPLE DESCRIPTION DERM: LOCATION SIMPLE: RIGHT RING FINGER

## 2022-03-31 NOTE — PROCEDURE: LIQUID NITROGEN
Render Post-Care Instructions In Note?: no
Detail Level: Detailed
Medical Necessity Clause: Treatment was medically necessary because the spot was
Medical Necessity Information: It is in your best interest to select a reason for this procedure from the list below. All of these items fulfill various CMS LCD requirements except the new and changing color options.
Number Of Freeze-Thaw Cycles: 1 freeze-thaw cycle
Consent: The patient's verbal consent was obtained including but not limited to risks of crusting, scabbing, blistering, scarring, darker or lighter pigmentary change, recurrence, incomplete removal and infection.
Post-Care Instructions: I reviewed with the patient in detail post-care instructions. Patient is to wear sunprotection, and avoid picking at any of the treated lesions. Pt may apply Vaseline to crusted or scabbing areas.
Pared With?: 15 blade

## 2022-03-31 NOTE — PROCEDURE: ADDITIONAL NOTES
Additional Notes: Pt will call [facility] to discuss yag laser for wart removal . We discussed possible biopsy if wart persist
Render Risk Assessment In Note?: no
Detail Level: Simple

## 2022-04-01 NOTE — PROGRESS NOTES
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 Frequency/Duration: 2 times a week for 90 Day(s) MEDICAL/REFERRING DIAGNOSIS:  Arthralgia of left temporomandibular joint [M26.622]   DATE OF ONSET: 2022  REFERRING PHYSICIAN: Lenore Park MD MD Orders: evaluate and treat  Return MD Appointment: as needed       Pre-treatment Symptoms/Complaints:  Patient notes overall noting some improvements, however continues to note tinnitis in her bilateral ears. Pain: Initial: Pain Intensity 1: 3  Pain Location 1: Jaw,Spine, cervical  Pain Orientation 1: Left /10 Post Session:  5/10   Medications Last Reviewed:  3/31/2022  Updated Objective Findings:  None Today  TREATMENT:     THERAPEUTIC EXERCISE: (15 minutes):  Exercises per grid below to improve mobility, strength, balance and coordination. Required moderate visual, verbal, manual and tactile cues to promote proper body alignment, promote proper body posture, promote proper body mechanics and promote proper body breathing techniques. Progressed resistance, range, repetitions and complexity of movement as indicated.    Date:  3/31/2022   Activity/Exercise Parameters   Supine axial elongation X 10 reps, 5 sec holds   Supine tongue on roof of mouth, open/close jaw X 10 reps   Cervical spine ROM rotation X 10 reps,   Supine breathing techniques X 5 reps slow and controlled. Tongue circles X 10 reps, CW,CCW   Ark/unk jaw mobility X 10 reps   Seated scapular retraction  x 10 reps, 5 sec holds       MANUAL THERAPY: (25 minutes): Joint mobilization and Soft tissue mobilization was utilized and necessary because of the patient's restricted joint motion, loss of articular motion and restricted motion of soft tissue. (Used abbreviations: MET - muscle energy technique; PNF - proprioceptive neuromuscular facilitation; NMR - neuromuscular re-education; a/p - anterior to posterior; p/a - posterior to anterior, FMP - functional movement patterns, SF - Superficial Fascia; BC - Bony contours; MP - muscle play; IASTM - instrument-assisted soft tissue mobilization)  · Supine anterior chest soft tissue mobilization with breathing techniques. · Supine anterior cervical spine soft tissue mobilization with FMP of cervical spine rotation. · Supine facial soft tissue mobilization: masseter and temporalis. · Supine cervical spine manual traction and suboccipital release. · Supine soft tissue mobilization to bilateral upper trapezius and levator scapula. · Supine internal soft tissue mobilization to left lateral pterygoid and masster  · Supine cranial mobilizations with axial elongation and eye movement patterns. [facility]  Treatment/Session Summary:    · Response to Treatment: decreasing soft tissue restrictions in cervical spine and improving ROM in bilateral rotation. Improving facial muscle mobility and improved TMJ mobility bilaterally. · Communication/Consultation:  None today  · Equipment provided today:  None today  · Recommendations/Intent for next treatment session: Next visit will focus on soft tissue mobilization in cervical and thoracic spine, postural stability and training, jaw exercises.     Total Treatment Billable Duration:  40 minutes  PT Patient Time In/Time Out  Time In: 2091  Time Out: [address], PT    Future Appointments   Date Time Provider Uzair Miriam   4/4/2022 10:30 AM Diana FRANCO, PT [facility]   4/12/2022  2:30 PM Diana FRANCO, PT [facility]   4/13/2022 10:20 AM Ellard Lefort, MD [facility]   4/14/2022  9:30 AM Manuel Mow., PT [facility]   4/19/2022  2:30 PM Manuel Mow., PT [facility]   4/21/2022  2:30 PM Manuel Mow., PT [facility]   4/26/2022  2:30 PM Manuel Mow., PT [facility]   4/28/2022  2:30 PM Manuel Mow., PT [facility]   5/5/2022  9:40 AM Kailash Ellis MD [facility] PP PP   6/16/2022 10:30 AM Jo Ann Li MD [facility] UCD   7/27/2022  1:40 PM Chelita Penn NP [facility] PS[facility] PP   2/21/2023  9:00 AM Jarrett Bay MD [facility]

## 2022-04-04 ENCOUNTER — HOSPITAL ENCOUNTER (OUTPATIENT)
Dept: PHYSICAL THERAPY | Age: 72
Discharge: HOME | End: 2022-04-04
Payer: MEDICARE

## 2022-04-04 PROCEDURE — 97140 MANUAL THERAPY 1/> REGIONS: CPT

## 2022-04-04 PROCEDURE — 97110 THERAPEUTIC EXERCISES: CPT

## 2022-04-05 NOTE — PROGRESS NOTES
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 Frequency/Duration: 2 times a week for 90 Day(s) MEDICAL/REFERRING DIAGNOSIS:  Arthralgia of left temporomandibular joint [M26.622]   DATE OF ONSET: 2022  REFERRING PHYSICIAN: Anupama Ford MD MD Orders: evaluate and treat  Return MD Appointment: as needed       Pre-treatment Symptoms/Complaints:  Patient notes improvements continues to be noted in her jaw and neck. However, she continues to have tinnitis in bilateral ears. Pain: Initial: Pain Intensity 1: 3  Pain Location 1: Jaw,Spine, cervical  Pain Orientation 1: Left /10 Post Session:  2/10   Medications Last Reviewed:  2022  Updated Objective Findings:  see progress note  TREATMENT:     THERAPEUTIC EXERCISE: (15 minutes):  Exercises per grid below to improve mobility, strength, balance and coordination. Required moderate visual, verbal, manual and tactile cues to promote proper body alignment, promote proper body posture, promote proper body mechanics and promote proper body breathing techniques. Progressed resistance, range, repetitions and complexity of movement as indicated.    Date:  2022   Activity/Exercise Parameters   Supine axial elongation X 10 reps, 5 sec holds   Supine tongue on roof of mouth, open/close jaw X 10 reps   Cervical spine ROM rotation X 10 reps,   Supine breathing techniques X 5 reps slow and controlled. Tongue circles X 10 reps, CW,CCW   Ark/unk jaw mobility X 10 reps   Shoulder rolls X 10 reps       MANUAL THERAPY: (40 minutes): Joint mobilization and Soft tissue mobilization was utilized and necessary because of the patient's restricted joint motion, loss of articular motion and restricted motion of soft tissue. (Used abbreviations: MET - muscle energy technique; PNF - proprioceptive neuromuscular facilitation; NMR - neuromuscular re-education; a/p - anterior to posterior; p/a - posterior to anterior, FMP - functional movement patterns, SF - Superficial Fascia; BC - Bony contours; MP - muscle play; IASTM - instrument-assisted soft tissue mobilization)  · Supine anterior chest soft tissue mobilization with breathing techniques. · Supine anterior cervical spine soft tissue mobilization with FMP of cervical spine rotation. · Supine facial soft tissue mobilization: masseter and temporalis. · Supine cervical spine manual traction and suboccipital release. · Supine soft tissue mobilization to bilateral upper trapezius and levator scapula. · Supine internal soft tissue mobilization to left lateral pterygoid and masster  MedBridge Portal  Treatment/Session Summary:    · Response to Treatment: improvements noted in cervical and upper thoracic spine mobility. Continues to present with weakness in postural stabilizers and deep cervical flexors. · Communication/Consultation:  None today  · Equipment provided today:  None today  · Recommendations/Intent for next treatment session: Next visit will focus on soft tissue mobilization in cervical and thoracic spine, postural stability and training, jaw exercises. Patient will be out of town next week.      Total Treatment Billable Duration:  55 minutes  PT Patient Time In/Time Out  Time In: 1030  Time Out: [address], PT    Future Appointments   Date Time Provider Uzair Pineda 4/12/2022  2:30 PM Patricia SKELTON., PT [facility]   4/14/2022  9:30 AM Particia SKELTON., PT [facility]   4/19/2022  2:30 PM Betsoumyane ., PT [facility]   4/21/2022  2:30 PM Bethanne ., PT [facility]   4/26/2022  2:30 PM Betrai Beeer., PT [facility]   4/27/2022  4:20 PM Kvng Longoria MD [facility]   4/28/2022  2:30 PM Nae Beeer., PT [facility]   5/5/2022  9:40 AM Jennifer Walters MD [facility] PP PP   6/16/2022 10:30 AM Elizabeth Hopkins MD [facility] UCD   7/27/2022  1:40 PM Steve Lopez NP [facility] PSCD PP   2/21/2023  9:00 AM Garth Seo MD [facility]

## 2022-04-05 NOTE — PROGRESS NOTES
Alpa Bettencourt  : 1950  [place]  Secondary: [facility] [address], [address] [facility]  [phone]   [phone]        OUTPATIENT PHYSICAL THERAPY:Progress Report 2022   ICD-10: Treatment Diagnosis: cervicalgia M54.2  ICD-10: Treatment Diagnosis: thoracic spine pain M54.6   ICD-10: Treatment Diagnosis: arthraligia of left tempromandibular joint M26.622    Precautions/Allergies:   Ciprofloxacin, Adhesive tape-silicones, Ceclor [cefaclor], and Symbyax [olanzapine-fluoxetine]   TREATMENT PLAN:  Effective Dates: 2022 TO 2022 (90 days). Frequency/Duration: 2 times a week for 90 Day(s) MEDICAL/REFERRING DIAGNOSIS:  Arthralgia of left temporomandibular joint [M26.622]   DATE OF ONSET: 2022  REFERRING PHYSICIAN: Irina Soriano MD MD Orders: evaluate and treat  Return MD Appointment: as needed      PROGRESS NOTE: 22:  Alpa Bettencourt has attended 10 physical therapy sessions for her cervical spine pain and left jaw pain. She continues to have tinnitus in bilateral ears. Her cervical spine and jaw mobility is improving, however continues to present with restrictions at end range of motions. She continues to have popping in her left TMJ. She would benefit from continued therapy services to improve overall cervical spine mobility and jaw mobility to perform ADLs. INITIAL ASSESSMENT:  Ms. Jhonny Mendiola is a 73 y.o. female who presents to physical therapy with chronic neck and upper back pain and tightness. Over the past 2 months she has also started noting increased challenges with her left jaw mobility. She presents with significant restrictions in her cervical and upper thoracic spine ROM in all directions, limited left 1st and 2nd rib mobility and limited mobility of her bilateral TMJs.  She would benefit from physical therapy services to address her cervical and thoracic spine arthrokinematic dysfunctions and soft tissue restrictions to improve her posture which will allow less tension to her head and TMJs. She will also benefit from strength and endurance training to her UE and postural and core stabilizers to improve overall mobility and ability to perform daily function with less discomfort. PROBLEM LIST (Impacting functional limitations):  1. Decreased Strength  2. Decreased ADL/Functional Activities  3. Decreased Balance  4. Increased Pain  5. Decreased Activity Tolerance  6. Increased Fatigue  7. Increased Shortness of Breath  8. Decreased Flexibility/Joint Mobility INTERVENTIONS PLANNED: (Treatment may consist of any combination of the following)  1. Home Exercise Program (HEP)  2. Manual Therapy  3. Neuromuscular Re-education/Strengthening  4. Range of Motion (ROM)  5. Therapeutic Activites  6. Therapeutic Exercise/Strengthening     GOALS: (Goals have been discussed and agreed upon with patient.)    Discharge Goals: Time Frame: 90 days  1. Patient demonstrates independence with her HEP without verbal cueing from her therapists. GOAL MET  2. Patient able to sit with correct sitting posture for 30 minutes without onset of cervical spine pain. ONGOING  3. Improve ability to open/close mouth to be able to eat entire meal without left jaw pain and popping. ONGOING  4. Improve NDI outcome measure score from 10/50 to 5/50 to perform daily activities. ONGOING    OUTCOME MEASURE:   Tool Used: Neck Disability Index (NDI)  Score:  Initial: 10/50  (Date: 2/24/22) Most Recent: 8/50 (Date: 4-4-22)   Interpretation of Score: The Neck Disability Index is a revised form of the Oswestry Low Back Pain Index and is designed to measure the activities of daily living in person's with neck pain. Each section is scored on a 0-5 scale, 5 representing the greatest disability. The scores of each section are added together for a total score of 50.        MEDICAL NECESSITY:   · Patient is expected to demonstrate progress in strength, range of motion, balance, coordination and functional technique to increase independence with cervical and thoracic spine mobility and ability to eat/chew and talk without left jaw pain. .  REASON FOR SERVICES/OTHER COMMENTS:  · Patient continues to require skilled intervention due to challenged with performing daily tasks due to cervical spine mobility restrictions and jaw pain and joint restrictions. Total Duration:  PT Patient Time In/Time Out  Time In: 1030  Time Out: 1130    Rehabilitation Potential For Stated Goals: Excellent  Regarding Brain Alicia Jay's therapy, I certify that the treatment plan above will be carried out by a therapist or under their direction. Thank you for this referral,  Madina Zheng, PT     Referring Physician Signature: Lucho Han MD No Signature is Required for this note. PAIN/SUBJECTIVE:   Initial: Pain Intensity 1: 3  Pain Location 1: Jaw,Spine, cervical  Pain Orientation 1: Left  Post Session:  2/10   HISTORY:   History of Injury/Illness (Reason for Referral):  Since December 2021 she started having increased cervical spine pain and challenged with end range rotation. She also noted increased discomfort in her left jaw, challenged with opening/closing, chewing, talking. She notes a significant amount of popping in her left jaw. Symptoms have progressively gotten worse since December 2021. She has been to the dentist 6 times since December, notes challenges with left jaw mobility after mouth shield used for crown repair. She continues to have challenges with popping in her left jaw, tinnitus in bilateral ears, left greater than right. Patient denies dizziness, drop attacks, numbness/tingling, bowel/bladder dysfunction and/or unexplained weight gain/loss.      Current Medications:       Current Outpatient Medications:     montelukast (SINGULAIR) 10 mg tablet, TAKE 1 TABLET BY MOUTH ONCE A DAY, Disp: 90 Tablet, Rfl: 4    mometasone (NASONEX) 50 mcg/actuation nasal spray, 2 Sprays by Both Nostrils route daily. , Disp: 1 Each, Rfl: 12    losartan (COZAAR) 100 mg tablet, Take 1 Tablet by mouth daily. , Disp: 30 Tablet, Rfl: 11    dofetilide (TIKOSYN) 500 mcg capsule, Take 1 Capsule by mouth every twelve (12) hours every twelve (12) hours. , Disp: 60 Capsule, Rfl: 11    amLODIPine (NORVASC) 5 mg tablet, TAKE 1 TABLET BY MOUTH DAILY, Disp: 90 Tablet, Rfl: 4    Synthroid 125 mcg tablet, Take 1 Tablet by mouth Daily (before breakfast). , Disp: 90 Tablet, Rfl: 3    buPROPion (WELLBUTRIN) 75 mg tablet, TAKE 1 TABLET BY MOUTH 2 TIMES A DAY, Disp: 180 Tablet, Rfl: 4    Advair Diskus 250-50 mcg/dose diskus inhaler, TAKE 1 PUFF BY MOUTH TWICE A DAY. RINSE MOUTH WELL AFTER USE, Disp: 1 Inhaler, Rfl: 11    Calcium-Cholecalciferol, D3, (Calcium 600 with Vitamin D3) 600 mg(1,500mg) -400 unit chew, Take 2 Tablets by mouth daily. , Disp: , Rfl:     DISABLED PLACARD (DISABLED PLACARD) DMV, As directed, Disp: 1 Each, Rfl: 0    apixaban (Eliquis) 5 mg tablet, Take 1 Tablet by mouth two (2) times a day., Disp: 180 Tablet, Rfl: 3    albuterol (Ventolin HFA) 90 mcg/actuation inhaler, Take 2 Puffs by inhalation every six (6) hours as needed for Wheezing., Disp: 1 Inhaler, Rfl: 11    acetaminophen (TYLENOL) 325 mg cap, Take 360 mg by mouth three (3) times daily as needed. , Disp: , Rfl:     Cetirizine (ZYRTEC) 10 mg cap, Take 10 Caps by mouth daily. , Disp: , Rfl:     cpap machine kit, by Does Not Apply route., Disp: , Rfl:    Date Last Reviewed:  4/4/2022     UPDATED OBJECTIVE FINDINGS:     Observation/Orthostatic Postural Assessment:          Patient denies any increase of symptoms with coughing, sneezing or valsalva maneuver. Patient denies any headaches, changes in vision, dizziness, vertigo, nausea, drop attacks, black outs, tinnitus, dysphagia, dysarthria, LE symptoms or bowel/bladder dysfunction.      Patient exhibits a neutral cervical lordosis and increased thoracic kyphosis . Patient exhibits a increased lumbar lordosis. Shoulder symmetry exhibits right slightly elevated compared to left. Shoulder girdles are bilateral protracted. Scapular position is right slightly elevated. Clavicles are right slightly elevated. Position of head is left cranial head tilt. Vertical compression test (VCT) for alignment is 3. Elbow flexion test (EFT) for motor activation is 3. Soft tissue observations indicates restrictions in anterior chest, bilateral upper trapezius, levator scapula, left scalenes greater than right, bilateral pectoralis major and minor and bilateral latissimus dorsi. Improving 4/4/2022    Palpation:  Myofascial assessment indicates restrictions in anterior chest. Muscle length testing levator scapulae with ipsilateral arm abduction is restricted left greater than right. Upper trapezius length is restricted left greater than right. Pectoralis minor/major and latissimus dorsi exhibit restricted bilaterally. Breathing assessment indicates decrease inhalation left. Improving 4/4/2022  Ist rib palpation exhibits decreased inferior glide left greater than right. Scalene muscle length is restrictions in left greater than right. Soft tissue restrictions noted in left greater than right masseter, medial and lateral pterygoids and temporalis, left greater than right. Improving 4/4/2022      ROM: Gross active cervical spine rotation is 85% available on the R and 85% available of the L. Active cervical spine flexion is 5% restricted. Active cervical spine extension is 5% restricted. R side bending is 3 finger breaths ear to shoulder. L side bending is 3 finger breaths ear to shoulder. PROM C0/1 is limited anterior roll/posterior glide left. PROM C1/2 rotation is restricted end range right rotation. Mid/lower cervical spine PROM is restrictions end range rotation right C5-7.   Improving 4/4/2022  Thoracic positional testing at transverse processes indicates limited bilateral extension T1-3. Jaw opening 5mm, mild lateral shift right. Improving 4/4/2022    Strength: Functional strength testing through cervical spine 4/5. Postural stability strength 4-/5. Functional Mobility:  Challenged with sitting posture, challenged with opening/closing mouth without popping in left TMJ. Body Structures Involved:  1. Nerves  2. Thoracic Cage  3. Bones  4. Joints  5. Muscles Body Functions Affected:  1. Sensory/Pain  2. Neuromusculoskeletal  3. Movement Related Activities and Participation Affected:  1. General Tasks and Demands  2. Communication  3. Mobility  4. Self Care  5.  Community, Social and [place]

## 2022-04-12 ENCOUNTER — APPOINTMENT (OUTPATIENT)
Dept: PHYSICAL THERAPY | Age: 72
End: 2022-04-12
Payer: MEDICARE

## 2022-04-14 ENCOUNTER — APPOINTMENT (OUTPATIENT)
Dept: PHYSICAL THERAPY | Age: 72
End: 2022-04-14
Payer: MEDICARE

## 2022-04-19 ENCOUNTER — HOSPITAL ENCOUNTER (OUTPATIENT)
Dept: PHYSICAL THERAPY | Age: 72
Discharge: HOME | End: 2022-04-19
Payer: MEDICARE

## 2022-04-19 PROCEDURE — 97140 MANUAL THERAPY 1/> REGIONS: CPT

## 2022-04-19 PROCEDURE — 97110 THERAPEUTIC EXERCISES: CPT

## 2022-04-20 NOTE — PROGRESS NOTES
Oanh Thompson  : 1950  [place]  [place] [address] at [address]  [phone]   [phone]      OUTPATIENT PHYSICAL THERAPY: Daily Treatment Note 2022  Visit Count:  11     ICD-10: Treatment Diagnosis: cervicalgia M54.2  ICD-10: Treatment Diagnosis: thoracic spine pain M54.6   ICD-10: Treatment Diagnosis: arthraligia of left tempromandibular joint M26.622     Precautions/Allergies:   Ciprofloxacin, Adhesive tape-silicones, Ceclor [cefaclor], and Symbyax [olanzapine-fluoxetine]   TREATMENT PLAN:  Effective Dates: 2022 TO 2022 (90 days). Frequency/Duration: 2 times a week for 90 Day(s) MEDICAL/REFERRING DIAGNOSIS:  Arthralgia of left temporomandibular joint [M26.622]   DATE OF ONSET: 2022  REFERRING PHYSICIAN: Denny Jiménez MD MD Orders: evaluate and treat  Return MD Appointment: as needed       Pre-treatment Symptoms/Complaints:  Patient notes continues to have popping and clicking in her left jaw. Challenged with eating and talking. Pain: Initial: Pain Intensity 1: 3  Pain Location 1: Jaw,Spine, cervical  Pain Orientation 1: Left /10 Post Session:  2/10   Medications Last Reviewed:  2022  Updated Objective Findings:  None Today  TREATMENT:     THERAPEUTIC EXERCISE: (15 minutes):  Exercises per grid below to improve mobility, strength, balance and coordination. Required moderate visual, verbal, manual and tactile cues to promote proper body alignment, promote proper body posture, promote proper body mechanics and promote proper body breathing techniques. Progressed resistance, range, repetitions and complexity of movement as indicated.    Date:  2022   Activity/Exercise Parameters   Supine axial elongation X 10 reps, 5 sec holds   Supine tongue on roof of mouth, open/close jaw X 10 reps   Cervical spine ROM rotation X 10 reps,   Supine breathing techniques X 5 reps slow and controlled. Tongue circles X 10 reps, CW,CCW   Ark/unk jaw mobility X 10 reps   Shoulder rolls X 10 reps       MANUAL THERAPY: (25 minutes): Joint mobilization and Soft tissue mobilization was utilized and necessary because of the patient's restricted joint motion, loss of articular motion and restricted motion of soft tissue. (Used abbreviations: MET - muscle energy technique; PNF - proprioceptive neuromuscular facilitation; NMR - neuromuscular re-education; a/p - anterior to posterior; p/a - posterior to anterior, FMP - functional movement patterns, SF - Superficial Fascia; BC - Bony contours; MP - muscle play; IASTM - instrument-assisted soft tissue mobilization)  · Supine anterior chest soft tissue mobilization with breathing techniques. · Supine anterior cervical spine soft tissue mobilization with FMP of cervical spine rotation. · Supine facial soft tissue mobilization: masseter and temporalis. · Supine cervical spine manual traction and suboccipital release. · Supine soft tissue mobilization to bilateral upper trapezius and levator scapula. · Supine internal soft tissue mobilization to left lateral pterygoid and masster  MedBridge Portal  Treatment/Session Summary:    · Response to Treatment: decreased soft tissue restrictions noted in left masseter and pterygoids today. Improving cervical spine ROM, continues to be challenged with anterior roll of left TMJ. · Communication/Consultation:  None today  · Equipment provided today:  None today  · Recommendations/Intent for next treatment session: Next visit will focus on soft tissue mobilization in cervical and thoracic spine, postural stability and training, jaw exercises.      Total Treatment Billable Duration:  40 minutes  PT Patient Time In/Time Out  Time In: 5861  Time Out: 975 [facility], PT    Future Appointments   Date Time Provider [facility]   4/21/2022  2:30 PM Manolo Fisher PT [facility]   4/26/2022  2:30 PM Janet Israel., PT [facility]   4/27/2022  4:20 PM Janel Villaseñor MD END BS ENDO   4/28/2022  2:30 PM Janet Israel., PT [facility]   5/5/2022  9:40 AM Leida Park MD [facility] PP PP   6/16/2022 10:30 AM Rosamaria Yang MD [facility]   7/27/2022  1:40 PM Kailash Castellano NP [facility] PSCD PP   2/21/2023  9:00 AM Miguel Ham MD [facility]

## 2022-04-21 ENCOUNTER — HOSPITAL ENCOUNTER (OUTPATIENT)
Dept: PHYSICAL THERAPY | Age: 72
Discharge: HOME | End: 2022-04-21
Payer: MEDICARE

## 2022-04-21 PROCEDURE — 97110 THERAPEUTIC EXERCISES: CPT

## 2022-04-21 PROCEDURE — 97140 MANUAL THERAPY 1/> REGIONS: CPT

## 2022-04-22 NOTE — PROGRESS NOTES
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 Frequency/Duration: 2 times a week for 90 Day(s) MEDICAL/REFERRING DIAGNOSIS:  Arthralgia of left temporomandibular joint [M26.622]   DATE OF ONSET: 2022  REFERRING PHYSICIAN: Libertad Peterson MD MD Orders: evaluate and treat  Return MD Appointment: as needed       Pre-treatment Symptoms/Complaints:  Patient notes continues to feel popping in her left jaw and ringing in bilateral ears. Notes some improvements, however continued challenges with opening her mouth to take a bite of food. Pain: Initial: Pain Intensity 1: 3  Pain Location 1: Jaw,Spine, cervical  Pain Orientation 1: Left /10 Post Session:  2/10   Medications Last Reviewed:  2022  Updated Objective Findings:  None Today  TREATMENT:     THERAPEUTIC EXERCISE: (15 minutes):  Exercises per grid below to improve mobility, strength, balance and coordination. Required moderate visual, verbal, manual and tactile cues to promote proper body alignment, promote proper body posture, promote proper body mechanics and promote proper body breathing techniques. Progressed resistance, range, repetitions and complexity of movement as indicated.    Date:  2022   Activity/Exercise Parameters   Supine axial elongation X 10 reps, 5 sec holds  X 5 reps, with 5 sec lift   Supine tongue on roof of mouth, open/close jaw X 10 reps   Cervical spine ROM rotation X 10 reps,   Supine breathing techniques X 5 reps slow and controlled. Tongue circles X 10 reps, CW,CCW   Ark/unk jaw mobility X 10 reps   Shoulder rolls X 10 reps       MANUAL THERAPY: (25 minutes): Joint mobilization and Soft tissue mobilization was utilized and necessary because of the patient's restricted joint motion, loss of articular motion and restricted motion of soft tissue. (Used abbreviations: MET - muscle energy technique; PNF - proprioceptive neuromuscular facilitation; NMR - neuromuscular re-education; a/p - anterior to posterior; p/a - posterior to anterior, FMP - functional movement patterns, SF - Superficial Fascia; BC - Bony contours; MP - muscle play; IASTM - instrument-assisted soft tissue mobilization)  · Supine anterior chest soft tissue mobilization with breathing techniques. · Supine anterior cervical spine soft tissue mobilization with FMP of cervical spine rotation. · Supine facial soft tissue mobilization: masseter and temporalis. · Supine cervical spine manual traction and suboccipital release. · Supine soft tissue mobilization to bilateral upper trapezius and levator scapula. · Supine internal soft tissue mobilization to left lateral pterygoid and masster  MedBridge Portal  Treatment/Session Summary:    · Response to Treatment: improving cervical spine rotation AROM bilaterally, improvements noted in soft tissue mobility of her cervical spine. Continues to be restricted with left TMJ mobility. · Communication/Consultation:  None today  · Equipment provided today:  None today  · Recommendations/Intent for next treatment session: Next visit will focus on soft tissue mobilization in cervical and thoracic spine, postural stability and training, jaw exercises.      Total Treatment Billable Duration:  40 minutes  PT Patient Time In/Time Out  Time In: 6692  Time Out: 202 Malachi Ocampo PT    Future Appointments   Date Time Provider [place]   4/26/2022  2:30 PM [facility]   4/27/2022  4:20 PM Marylene Mariner, MD [facility]   4/28/2022  2:30 PM [facility]ee[facility]   5/5/2022  9:40 AM Allison Marcelo MD [facility] PP PP   6/16/2022 10:30 AM Brianna Zhu MD [facility]   7/27/2022  1:40 PM Deidra Gomez NP [facility] PSCD PP   2/21/2023  9:00 AM Darby Valle MD [facility]

## 2022-04-26 ENCOUNTER — HOSPITAL ENCOUNTER (OUTPATIENT)
Dept: PHYSICAL THERAPY | Age: 72
Discharge: HOME | End: 2022-04-26
Payer: MEDICARE

## 2022-04-26 ENCOUNTER — HOSPITAL ENCOUNTER (OUTPATIENT)
Dept: LAB | Age: 72
Discharge: HOME | End: 2022-04-26
Payer: MEDICARE

## 2022-04-26 DIAGNOSIS — E89.0 HYPOTHYROIDISM FOLLOWING RADIOIODINE THERAPY: ICD-10-CM

## 2022-04-26 LAB — TSH W FREE THYROID I,TSHELE: 1.61 UIU/ML (ref 0.36–3.74)

## 2022-04-26 PROCEDURE — 36415 COLL VENOUS BLD VENIPUNCTURE: CPT

## 2022-04-26 PROCEDURE — 84443 ASSAY THYROID STIM HORMONE: CPT

## 2022-04-26 PROCEDURE — 97140 MANUAL THERAPY 1/> REGIONS: CPT

## 2022-04-26 PROCEDURE — 97110 THERAPEUTIC EXERCISES: CPT

## 2022-04-27 NOTE — PROGRESS NOTES
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 Frequency/Duration: 2 times a week for 90 Day(s) MEDICAL/REFERRING DIAGNOSIS:  Arthralgia of left temporomandibular joint [M26.622]   DATE OF ONSET: 2022  REFERRING PHYSICIAN: Braeden Pineda MD MD Orders: evaluate and treat  Return MD Appointment: as needed       Pre-treatment Symptoms/Complaints:  Patient notes good and bad days. Continues to note challenges with left jaw opening. Pain: Initial: Pain Intensity 1: 3  Pain Location 1: Jaw,Spine, cervical  Pain Orientation 1: Left /10 Post Session:  2/10   Medications Last Reviewed:  2022  Updated Objective Findings:  None Today  TREATMENT:     THERAPEUTIC EXERCISE: (15 minutes):  Exercises per grid below to improve mobility, strength, balance and coordination. Required moderate visual, verbal, manual and tactile cues to promote proper body alignment, promote proper body posture, promote proper body mechanics and promote proper body breathing techniques. Progressed resistance, range, repetitions and complexity of movement as indicated.    Date:  2022   Activity/Exercise Parameters   Supine axial elongation X 10 reps, 5 sec holds   Supine tongue on roof of mouth, open/close jaw X 10 reps   Cervical spine ROM rotation X 10 reps,   Supine breathing techniques X 5 reps slow and controlled. Tongue circles X 10 reps, CW,CCW   Ark/unk jaw mobility X 10 reps   Shoulder rolls X 10 reps   Seated upper trapezius and levator scapula stretches X 5 reps, 20 sec holds       MANUAL THERAPY: (40 minutes): Joint mobilization and Soft tissue mobilization was utilized and necessary because of the patient's restricted joint motion, loss of articular motion and restricted motion of soft tissue. (Used abbreviations: MET - muscle energy technique; PNF - proprioceptive neuromuscular facilitation; NMR - neuromuscular re-education; a/p - anterior to posterior; p/a - posterior to anterior, FMP - functional movement patterns, SF - Superficial Fascia; BC - Bony contours; MP - muscle play; IASTM - instrument-assisted soft tissue mobilization)  · Supine anterior chest soft tissue mobilization with breathing techniques. · Supine anterior cervical spine soft tissue mobilization with FMP of cervical spine rotation. · Supine facial soft tissue mobilization: masseter and temporalis. · Supine cervical spine manual traction and suboccipital release. · Supine soft tissue mobilization to bilateral upper trapezius and levator scapula. · Supine internal soft tissue mobilization to left lateral pterygoid and masster  MedBridge Portal  Treatment/Session Summary:    · Response to Treatment: decreased soft tissue restrictions noted in anterior chest and cervical spine. Continues to have restrictions in left medial and lateral pterygoids. · Communication/Consultation:  None today  · Equipment provided today:  None today  · Recommendations/Intent for next treatment session: Next visit will focus on soft tissue mobilization in cervical and thoracic spine, postural stability and training, jaw exercises. Patient will be out of town next week.      Total Treatment Billable Duration:  55 minutes  PT Patient Time In/Time Out  Time In: 1430  Time Out: [facility]    Future Appointments   Date Time Provider [place]   4/27/2022  4:20 PM Abby Cleveland MD END [facility] ENDO   4/28/2022  2:30 PM Sunita Six., PT [facility]   5/5/2022  9:40 AM Dottie Damon MD [facility] PP PP   5/5/2022  1:30 PM Donya FRANCO, PT [facility]   5/11/2022  1:30 PM Sunita Six., PT [facility]   5/18/2022  1:30 PM Sunita Six., PT [facility]   5/25/2022  3:30 PM Sunita Six., PT [facility]   6/16/2022 10:30 AM Geovani Em MD [facility]   7/27/2022  1:40 PM Tyrone Ugarte NP [facility] PP   2/21/2023  9:00 AM Wesly Vera MD [facility]

## 2022-04-28 ENCOUNTER — HOSPITAL ENCOUNTER (OUTPATIENT)
Dept: PHYSICAL THERAPY | Age: 72
Discharge: HOME | End: 2022-04-28
Payer: MEDICARE

## 2022-04-28 PROCEDURE — 97110 THERAPEUTIC EXERCISES: CPT

## 2022-04-28 PROCEDURE — 97140 MANUAL THERAPY 1/> REGIONS: CPT

## 2022-04-29 NOTE — PROGRESS NOTES
Noralee Babinski  : 1950  [place]  [place] [address] [place] at [address]  [phone]   [phone]      OUTPATIENT PHYSICAL THERAPY: Daily Treatment Note 2022  Visit Count:  14     ICD-10: Treatment Diagnosis: cervicalgia M54.2  ICD-10: Treatment Diagnosis: thoracic spine pain M54.6   ICD-10: Treatment Diagnosis: arthraligia of left tempromandibular joint M26.622     Precautions/Allergies:   Ciprofloxacin, Adhesive tape-silicones, Ceclor [cefaclor], and Symbyax [olanzapine-fluoxetine]   TREATMENT PLAN:  Effective Dates: 2022 TO 2022 (90 days). Frequency/Duration: 2 times a week for 90 Day(s) MEDICAL/REFERRING DIAGNOSIS:  Arthralgia of left temporomandibular joint [M26.622]   DATE OF ONSET: 2022  REFERRING PHYSICIAN: Christie Akhtar MD MD Orders: evaluate and treat  Return MD Appointment: as needed       Pre-treatment Symptoms/Complaints:  Patient notes decreased pain levels, however still notes popping in her left jaw. Improvements noted in her cervical spine mobility. Pain: Initial: Pain Intensity 1: 3  Pain Location 1: Jaw,Spine, cervical  Pain Orientation 1: Left /10 Post Session:  2/10   Medications Last Reviewed:  2022  Updated Objective Findings:  None Today  TREATMENT:     THERAPEUTIC EXERCISE: (15 minutes):  Exercises per grid below to improve mobility, strength, balance and coordination. Required moderate visual, verbal, manual and tactile cues to promote proper body alignment, promote proper body posture, promote proper body mechanics and promote proper body breathing techniques. Progressed resistance, range, repetitions and complexity of movement as indicated.    Date:  2022   Activity/Exercise Parameters   Supine axial elongation X 10 reps, 5 sec holds   Supine tongue on roof of mouth, open/close jaw X 10 reps   Cervical spine ROM rotation X 10 reps,   Supine breathing techniques X 5 reps slow and controlled. Tongue circles X 10 reps, CW,CCW   Ark/unk jaw mobility X 10 reps   Shoulder rolls X 10 reps       MANUAL THERAPY: (25 minutes): Joint mobilization and Soft tissue mobilization was utilized and necessary because of the patient's restricted joint motion, loss of articular motion and restricted motion of soft tissue. (Used abbreviations: MET - muscle energy technique; PNF - proprioceptive neuromuscular facilitation; NMR - neuromuscular re-education; a/p - anterior to posterior; p/a - posterior to anterior, FMP - functional movement patterns, SF - Superficial Fascia; BC - Bony contours; MP - muscle play; IASTM - instrument-assisted soft tissue mobilization)  · Supine anterior chest soft tissue mobilization with breathing techniques. · Supine anterior cervical spine soft tissue mobilization with FMP of cervical spine rotation. · Supine facial soft tissue mobilization: masseter and temporalis. · Supine cervical spine manual traction and suboccipital release. · Supine soft tissue mobilization to bilateral upper trapezius and levator scapula. · Supine internal soft tissue mobilization to left lateral pterygoid and masster    MedBridge Portal  Treatment/Session Summary:    · Response to Treatment: decreased soft tissue restrictions noted in her cervical spine and facial muscles. Continues to demonstrate asymmetries in bilateral TMJs  · Communication/Consultation:  None today  · Equipment provided today:  None today  · Recommendations/Intent for next treatment session: Next visit will focus on soft tissue mobilization in cervical and thoracic spine, postural stability and training, jaw exercises.      Total Treatment Billable Duration:  40 minutes  PT Patient Time In/Time Out  Time In: 1430  Time Out: 82 Prabha Guerra PT    Future Appointments   Date Time Provider Uzair Miriam   5/5/2022  9:40 AM Allison Marcelo MD [facility] PP PP 5/5/2022  1:30 PM Kym FRANCO, PT [facility]   5/11/2022  1:30 PM Kym SKELTON., PT [facility]   5/18/2022  1:30 PM Andre Rump., PT [facility]   5/25/2022  3:30 PM Andre Rump., PT [facility]   6/16/2022 10:30 AM Evaristo Hernandez MD [facility]   7/27/2022  1:40 PM Dion Sherman NP [facility] PP   2/21/2023  9:00 AM Braeden Pineda MD [facility]   4/27/2023 11:00 AM Ed Davenport MD END BS ENDO

## 2022-05-02 ENCOUNTER — HOSPITAL ENCOUNTER (OUTPATIENT)
Dept: PHYSICAL THERAPY | Age: 72
Setting detail: RECURRING SERIES
Discharge: HOME | End: 2022-05-05

## 2022-05-04 ENCOUNTER — HOSPITAL ENCOUNTER (OUTPATIENT)
Dept: PHYSICAL THERAPY | Age: 72
Discharge: HOME | End: 2022-05-04
Payer: MEDICARE

## 2022-05-04 PROCEDURE — 97110 THERAPEUTIC EXERCISES: CPT

## 2022-05-04 PROCEDURE — 97140 MANUAL THERAPY 1/> REGIONS: CPT

## 2022-05-05 NOTE — PROGRESS NOTES
Priscilla Raphael  : 1950  [place]  [place] [address] at [place] [address], [facility]  [phone]   [phone]      OUTPATIENT PHYSICAL THERAPY: Daily Treatment Note 2022  Visit Count:  15     ICD-10: Treatment Diagnosis: cervicalgia M54.2  ICD-10: Treatment Diagnosis: thoracic spine pain M54.6   ICD-10: Treatment Diagnosis: arthraligia of left tempromandibular joint M26.622     Precautions/Allergies:   Ciprofloxacin, Adhesive tape-silicones, Ceclor [cefaclor], and Symbyax [olanzapine-fluoxetine]   TREATMENT PLAN:  Effective Dates: 2022 TO 2022 (90 days). Frequency/Duration: 2 times a week for 90 Day(s) MEDICAL/REFERRING DIAGNOSIS:  Arthralgia of left temporomandibular joint [M26.622]   DATE OF ONSET: 2022  REFERRING PHYSICIAN: Braeden Pineda MD MD Orders: evaluate and treat  Return MD Appointment: as needed       Pre-treatment Symptoms/Complaints:  Patient notes less popping in left jaw this week and less tinnitus in bilateral ears. Pain: Initial: Pain Intensity 1: 3  Pain Location 1: Spine, cervical  Pain Orientation 1: Left /10 Post Session:  2/10   Medications Last Reviewed:  2022  Updated Objective Findings:  None Today  TREATMENT:     THERAPEUTIC EXERCISE: (15 minutes):  Exercises per grid below to improve mobility, strength, balance and coordination. Required moderate visual, verbal, manual and tactile cues to promote proper body alignment, promote proper body posture, promote proper body mechanics and promote proper body breathing techniques. Progressed resistance, range, repetitions and complexity of movement as indicated.    Date:  2022   Activity/Exercise Parameters   Supine axial elongation X 10 reps, 5 sec holds   Supine tongue on roof of mouth, open/close jaw X 10 reps   Cervical spine ROM rotation X 10 reps,   Supine breathing techniques X 5 reps slow and controlled. Tongue circles X 10 reps, CW,CCW   Ark/unk jaw mobility X 10 reps   Shoulder rolls X 10 reps   Breathing techniques  X 5 reps       MANUAL THERAPY: (25 minutes): Joint mobilization and Soft tissue mobilization was utilized and necessary because of the patient's restricted joint motion, loss of articular motion and restricted motion of soft tissue. (Used abbreviations: MET - muscle energy technique; PNF - proprioceptive neuromuscular facilitation; NMR - neuromuscular re-education; a/p - anterior to posterior; p/a - posterior to anterior, FMP - functional movement patterns, SF - Superficial Fascia; BC - Bony contours; MP - muscle play; IASTM - instrument-assisted soft tissue mobilization)  · Supine anterior chest soft tissue mobilization with breathing techniques. · Supine anterior cervical spine soft tissue mobilization with FMP of cervical spine rotation. · Supine facial soft tissue mobilization: masseter and temporalis. · Supine cervical spine manual traction and suboccipital release. · Supine soft tissue mobilization to bilateral upper trapezius and levator scapula. · Supine internal soft tissue mobilization to left lateral pterygoid and masster    MedBridge Portal  Treatment/Session Summary:    · Response to Treatment: improving overall mobility in cervical spine and increased jaw opening motion. · Communication/Consultation:  None today  · Equipment provided today:  None today  · Recommendations/Intent for next treatment session: Next visit will focus on soft tissue mobilization in cervical and thoracic spine, postural stability and training, jaw exercises.      Total Treatment Billable Duration:  40 minutes  PT Patient Time In/Time Out  Time In: 8397  Time Out: [address], PT    Future Appointments   Date Time Provider Uzair Aranai   5/5/2022  9:40 AM Dottie Damon MD [facility] PP PP   5/11/2022  1:30 PM MANUEL [facility]   5/18/2022  1:30 PM Manolo Fisher, PT [facility]   5/25/2022  3:30 PM Manolo Fisher, PT [facility]   6/16/2022 10:30 AM Susana Peterson MD [facility]   7/27/2022  1:40 PM Raquel Adams NP [facility] PP   2/21/2023  9:00 AM [facility]   4/27/2023 11:00 AM Grover Gamez MD [facility] BS ENDO

## 2022-05-11 ENCOUNTER — HOSPITAL ENCOUNTER (OUTPATIENT)
Dept: PHYSICAL THERAPY | Age: 72
Discharge: HOME | End: 2022-05-11
Payer: MEDICARE

## 2022-05-11 PROCEDURE — 97140 MANUAL THERAPY 1/> REGIONS: CPT

## 2022-05-11 PROCEDURE — 97110 THERAPEUTIC EXERCISES: CPT

## 2022-05-12 NOTE — PROGRESS NOTES
Jason Palmer  : 1950  [place]  Secondary: [address]  [phone]   [phone]      OUTPATIENT PHYSICAL THERAPY: Daily Treatment Note 2022  Visit Count:  16     ICD-10: Treatment Diagnosis: cervicalgia M54.2  ICD-10: Treatment Diagnosis: thoracic spine pain M54.6   ICD-10: Treatment Diagnosis: arthraligia of left tempromandibular joint M26.622     Precautions/Allergies:   Ciprofloxacin, Adhesive tape-silicones, Ceclor [cefaclor], and Symbyax [olanzapine-fluoxetine]   TREATMENT PLAN:  Effective Dates: 2022 TO 2022 (90 days). Frequency/Duration: 2 times a week for 90 Day(s) MEDICAL/REFERRING DIAGNOSIS:  Arthralgia of left temporomandibular joint [M26.622]   DATE OF ONSET: 2022  REFERRING PHYSICIAN: Sindy Ludwig MD MD Orders: evaluate and treat  Return MD Appointment: as needed       Pre-treatment Symptoms/Complaints:  Patient notes less neck, jaw and ear pain this past week. Continues to note a deep ache into her bilateral ears and neck. Pain: Initial: Pain Intensity 1: 3  Pain Location 1: Spine, cervical  Pain Orientation 1: Left /10 Post Session:  2/10   Medications Last Reviewed:  2022  Updated Objective Findings:  None Today  TREATMENT:     THERAPEUTIC EXERCISE: (15 minutes):  Exercises per grid below to improve mobility, strength, balance and coordination. Required moderate visual, verbal, manual and tactile cues to promote proper body alignment, promote proper body posture, promote proper body mechanics and promote proper body breathing techniques. Progressed resistance, range, repetitions and complexity of movement as indicated.    Date:  2022   Activity/Exercise Parameters   Supine axial elongation X 10 reps, 5 sec holds   Supine tongue on roof of mouth, open/close jaw X 10 reps   Cervical spine ROM rotation X 10 reps,   Supine breathing techniques X 5 reps slow and controlled. Tongue circles X 10 reps, CW,CCW   Ark/unk jaw mobility X 10 reps   Shoulder rolls X 10 reps   Breathing techniques  X 5 reps       MANUAL THERAPY: (25 minutes): Joint mobilization and Soft tissue mobilization was utilized and necessary because of the patient's restricted joint motion, loss of articular motion and restricted motion of soft tissue. (Used abbreviations: MET - muscle energy technique; PNF - proprioceptive neuromuscular facilitation; NMR - neuromuscular re-education; a/p - anterior to posterior; p/a - posterior to anterior, FMP - functional movement patterns, SF - Superficial Fascia; BC - Bony contours; MP - muscle play; IASTM - instrument-assisted soft tissue mobilization)  · Supine anterior chest soft tissue mobilization with breathing techniques. · Supine anterior cervical spine soft tissue mobilization with FMP of cervical spine rotation. · Supine bilateral SCM soft tissue mobilization with cervical spine AROM rotation. · Supine facial soft tissue mobilization: masseter and temporalis. · Supine cervical spine manual traction and suboccipital release. · Supine soft tissue mobilization to bilateral upper trapezius and levator scapula. · Supine internal soft tissue mobilization to left lateral pterygoid and masster    MedBridge Portal  Treatment/Session Summary:    · Response to Treatment: improved AROM and PROM of cervical spine and upper thoracic spine today, improving jaw mobility and decreased tension noted in left SCM. · Communication/Consultation:  None today  · Equipment provided today:  None today  · Recommendations/Intent for next treatment session: Next visit will focus on soft tissue mobilization in cervical and thoracic spine, postural stability and training, jaw exercises.      Total Treatment Billable Duration:  40 minutes  PT Patient Time In/Time Out  Time In: 8125  Time Out: [address], PT    Future Appointments   Date Time Provider Uzair Pineda   5/18/2022  1:30 PM Prashanth Moralez, PT [facility]   5/25/2022  3:30 PM [facility]

## 2022-05-18 ENCOUNTER — APPOINTMENT (OUTPATIENT)
Dept: PHYSICAL THERAPY | Age: 72
End: 2022-05-18
Payer: MEDICARE

## 2022-05-25 ENCOUNTER — APPOINTMENT (OUTPATIENT)
Dept: PHYSICAL THERAPY | Age: 72
End: 2022-05-25
Payer: MEDICARE

## 2022-06-07 ENCOUNTER — HOSPITAL ENCOUNTER (OUTPATIENT)
Dept: PHYSICAL THERAPY | Age: 72
Setting detail: RECURRING SERIES
Discharge: HOME | End: 2022-06-10
Payer: MEDICARE

## 2022-06-07 PROCEDURE — 97110 THERAPEUTIC EXERCISES: CPT

## 2022-06-07 PROCEDURE — 97140 MANUAL THERAPY 1/> REGIONS: CPT

## 2022-06-07 ASSESSMENT — PAIN DESCRIPTION - ORIENTATION: ORIENTATION: RIGHT;LEFT

## 2022-06-07 ASSESSMENT — PAIN DESCRIPTION - LOCATION: LOCATION: JAW;NECK

## 2022-06-07 ASSESSMENT — PAIN SCALES - GENERAL: PAINLEVEL_OUTOF10: 4

## 2022-06-08 NOTE — PROGRESS NOTES
Yfn Peer  : 1950  Primary: Medicare Part [place]  Secondary: [place] @ [facility] [address]  Phone: [phone]  Fax: [phone] Plan Frequency: 1x a week for 90 days    Plan of Care/Certification Expiration Date: 22      PT Visit Info: Total # of Visits to Date: 17  No Show: 0  Canceled Appointment: 1      OUTPATIENT PHYSICAL THERAPY:OP NOTE TYPE: Treatment Note 2022       Episode  }Appt Desk            ICD-10: Treatment Tacy Gins M54.2   ICD-10 : Treatment Diagnosis: thoracic spine pain M54.6    ICD-10 : Treatment Diagnosis: arthraligia of left tempromandibular joint M26.622   Medical/Referring Diagnosis:  Arthralgia of left temporomandibular joint [M26.622]  Cervicalgia [M54.2]  Referring Physician:  Chalo Arnold MD MD Orders:  PT Eval and Treat   Date of Onset:  Onset Date: 22     Allergies:   Ciprofloxacin, Olanzapine-fluoxetine hcl, and Cefaclor  Restrictions/Precautions:  Restrictions/Precautions: None  No data recorded   Interventions Planned (Treatment may consist of any combination of the following):    Current Treatment Recommendations: Strengthening; ROM; Balance training; Manual Therapy - Soft Tissue Mobilization; Manual Therapy - Joint Manipulation; Home exercise program     Subjective Comments:  Rowena Parker notes challenges with breathing with walking short distances without her walking poles. Continues to have challenges with tinnitus and jaw pain. Initial:}Right,Left Jaw,Neck 10Post Session:  Right,Left  Jaw,Neck 10  Medications Last Reviewed:  2022  Updated Objective Findings:  see re-certification from today  Treatment   THERAPEUTIC EXERCISE: (15 minutes):    Exercises per grid below to improve mobility, strength, balance and coordination.   Required moderate visual, verbal, manual and tactile cues to promote proper body alignment, promote proper body posture, promote proper body mechanics and promote proper body breathing techniques. Progressed resistance, range, repetitions and complexity of movement as indicated. Date:   6/7/2022         Activity/Exercise  Parameters         Supine axial elongation  X 10 reps, 5 sec holds         Supine tongue on roof of mouth, open/close jaw  X 10 reps         Cervical spine ROM rotation  X 10 reps,         Supine breathing techniques  X 5 reps slow and controlled. Tongue circles          Ark/unk jaw mobility          Shoulder rolls          Breathing techniques   X 5 minute education  X 5 reps diaphragmatic breathing   Walking techniques X 5 minute review: arm swing with gait. Intervals with walking 20-30 seconds faster pace, 2-3 minutes normal pace       MANUAL THERAPY: (25 minutes):   Joint mobilization and Soft tissue mobilization was utilized and necessary because of the patient's restricted joint motion, loss of articular motion and restricted motion of soft tissue. (Used abbreviations: MET - muscle energy technique; PNF - proprioceptive neuromuscular facilitation; NMR - neuromuscular re-education; a/p - anterior to posterior; p/a - posterior to anterior, FMP - functional movement patterns, SF - Superficial Fascia; BC - Bony contours; MP - muscle play; IASTM - instrument-assisted soft tissue mobilization)  · Supine anterior chest soft tissue mobilization with breathing techniques. ·   Supine anterior cervical spine soft tissue mobilization with FMP of cervical spine rotation. ·   Supine bilateral SCM soft tissue mobilization with cervical spine AROM rotation. ·   Supine facial soft tissue mobilization: masseter and temporalis.   ·   Supine cervical spine manual traction and suboccipital release. ·   Supine soft tissue mobilization to bilateral upper trapezius and levator scapula.    ·   Supine internal soft tissue mobilization to left lateral pterygoid and masster      Treatment/Session Summary:    · Treatment Assessment:  Improving awareness of breathing techniques however continues to demonstrate shallow chest breathing. Would benefit from continued endurance and cardiovascular training  · Communication/Consultation:  None today  · Equipment provided today:  None  · Recommendations/Intent for next treatment session: Next visit will focus on soft tissue mobilization of cervical and thoracic spine, thoracic spine and costal cage mobility, breathing techniques and postural stability.     Total Treatment Billable Duration:  40 minutes   Time In: 1430  Time Out: 5841    Jazzy Bain, PT       Charge Capture  }Post Session Pain  MedBridge Portal  MD Guidelines  Scanned Media  Benefits  MyChart    Future Appointments   Date Time Provider Radhika Henry   6/21/2022  2:30 PM Lacey Giron, PT [facility]   7/7/2022  2:30 PM Lacey Giron, PT [facility]   7/27/2022  1:40 PM SKY Cheema - CNP [facility]CD [facility]   8/4/2022  3:15 PM Frandy Pettit MD [facility]   2/21/2023  9:00 AM Rocio Rae MD [facility]   4/27/2023 11:00 AM Jo Ma MD [facility]   5/5/2023  8:40 AM Florida Pinto MD [facility]

## 2022-06-08 NOTE — PLAN OF CARE
Steven Bass  : 1950  Primary: Medicare Part A And B  Secondary: [place] @ [facility] [address]  Phone: [phone]  Fax: [phone] Plan Frequency: 1x a week for 90 days    Plan of Care/Certification Expiration Date: 22      PT Visit Info: Total # of Visits to Date: 17  No Show: 0  Canceled Appointment: 1      OUTPATIENT PHYSICAL THERAPY:OP NOTE TYPE: Recertification 3/2/6369               Episode  Appt Desk            ICD-10: Treatment Diagnosis: cervicalgia M54.2   ICD-10 : Treatment Diagnosis: thoracic spine pain M54.6    ICD-10 : Treatment Diagnosis: arthraligia of left tempromandibular joint M26.622       Medical/Referring Diagnosis:  Arthralgia of left temporomandibular joint [M26.622]  Cervicalgia [M54.2]  Referring Physician:  Prateek Mendez MD MD Orders:  PT Eval and Treat   Return MD Appt:  As needed  Date of Onset:  Onset Date: 22     Allergies:  Ciprofloxacin, Olanzapine-fluoxetine hcl, and Cefaclor  Restrictions/Precautions:    Restrictions/Precautions: None  No data recorded   Medications Last Reviewed:  2022     SUBJECTIVE   History of Injury/Illness (Reason for Referral): Increased pain noted in her left jaw and bilateral tinnitus since 2022.    Patient Stated Goal(s):  \"to decrease jaw pain, decrease tinnitus in ears\"  Initial: Right,Left Jaw,Neck 4/10 Post Session: Kathryn Rose 2/10  Past Medical History/Comorbidities:   Ms. Tracie Sol  has a past medical history of Adverse effect of anesthesia, Allergic rhinitis, Anxiety, Arthritis, Asthma, Depression, Endocarditis, Heart murmur, Hematuria, History of MRSA infection, HLD (hyperlipidemia), Hypertension, Hypertension, Hypothyroid, Hypothyroidism due to acquired atrophy of thyroid, Intertrigo, Irregular heart beat, Mass of colon, Morbid obesity ([address] Persistent atrial fibrillation ([address] Reactive airway disease with status asthmaticus, Scoliosis, Sleep apnea, Varicose veins, and VSD (ventricular septal defect). Ms. Tracie Sol  has a past surgical history that includes Dilation and curettage of uterus; total colectomy (Right, ); heent (1969); Cardiac defibrillator placement (2018); ablation of dysrhythmic focus ( & 2019); Refractive surgery; Total knee arthroplasty (Right, 2018); Total knee arthroplasty (Left, 2019); Cardiac catheterization; pr cardiac surg procedure unlist (2018); pr cardiac surg procedure unlist (2018); pr cardiac surg procedure unlist (2018); pr cardiac surg procedure unlist (2019); lipoma resection (Right); Appendectomy; hernia repair (incisional hernia-); Colonoscopy (, ); Breast reduction surgery (Bilateral, 2016); and  section. Social History/Living Environment:   Lives With: Spouse  Type of Home: House  Home Layout: Two level  Home Access: Stairs to enter with rails     Prior Level of Function/Work/Activity:   Prior level of function: Independent  Occupation: Retired  No data [id number] recorded   Learning:   Does the patient/guardian have any barriers to learning?: No barriers  Will there be a co-learner?: No  What is the preferred language of the patient/guardian?: English  Is an  required?: No        OBJECTIVE   Observation/Orthostatic Postural Assessment:           Patient denies any increase of symptoms with coughing, sneezing or valsalva maneuver. Patient denies any headaches,  changes in vision, dizziness, vertigo, nausea, drop attacks, black outs, tinnitus, dysphagia, dysarthria, LE symptoms or bowel/bladder dysfunction. Patient exhibits a neutral cervical lordosis and increased thoracic kyphosis . Patient exhibits a increased lumbar lordosis. Shoulder symmetry exhibits right slightly elevated compared to left. Shoulder girdles are bilateral protracted. Scapular position  is right slightly elevated.  Clavicles are right slightly elevated. Position of head is left cranial head tilt. Vertical compression test (VCT) for alignment is 3. Elbow flexion test (EFT) for motor activation is 3. Soft tissue observations indicates  restrictions in anterior chest, bilateral upper trapezius, levator scapula, left scalenes greater than right, bilateral pectoralis major and minor and bilateral latissimus dorsi. Improving  6/7/2022        Palpation:  Myofascial assessment indicates restrictions in anterior chest. Muscle length testing levator scapulae with ipsilateral arm abduction  is restricted left greater than right. Upper trapezius length is restricted left greater than right. Pectoralis minor/major and latissimus dorsi exhibit restricted bilaterally. Breathing assessment indicates decrease inhalation left. Improving 6/7/2022    Ist rib palpation exhibits decreased inferior glide left greater  than right. Scalene muscle length is restrictions in left greater than right. Soft tissue restrictions noted in left greater than right masseter, medial and lateral pterygoids and temporalis, left greater than right. Improving 6/7/2022     ROM: Gross active cervical spine rotation is 85% available on the R and 85% available of the L. Active cervical spine flexion is 5% restricted. Active cervical spine extension is 5% restricted. R side bending is 3 finger breaths ear to shoulder. L side bending is 3 finger breaths ear to shoulder. PROM C0/1 is limited anterior roll/posterior glide left. PROM C1/2 rotation is restricted end range  right rotation. Mid/lower cervical spine PROM is restrictions end range rotation right C5-7. Improving 6/7/2022   Thoracic positional testing at transverse processes indicates limited bilateral extension T1-3. Jaw opening 5mm, mild lateral shift right. Improving 6/7/2022        Strength: Functional strength testing through cervical spine 4/5. Postural  stability strength 4/5.   Challenged with diaphragmatic breathing. Functional Mobility:  Challenged with sitting posture, challenged with opening/closing mouth without popping in  left TMJ  Improving 6/7/2022    ASSESSMENT   RE-CERTIFICATION:  Shanika Roth has attended 17 visits for her cervical spine pain, TMJ dysfunctions and tinnitus. She continues to be challenged with pain levels, challenged breathing and shortness of breath with fast pace ambulation. Problem List: (Impacting functional limitations): Body Structures, Functions, Activity Limitations Requiring Skilled Therapeutic Intervention: Decreased functional mobility ; Decreased ADL status; Decreased ROM; Decreased body mechanics; Decreased strength; Decreased endurance; Decreased balance; Increased pain; Decreased posture     Therapy Prognosis:   Therapy Prognosis: Excellent          PLAN   Effective Dates: 6/7/22 TO  Plan of Care/Certification Expiration Date: 09/05/22     Frequency/Duration: Plan Frequency: 1x a week for 90 days     Interventions Planned (Treatment may consist of any combination of the following):    Current Treatment Recommendations: Strengthening; ROM; Balance training; Manual Therapy - Soft Tissue Mobilization; Manual Therapy - Joint Manipulation; Home exercise program     Goals: (Goals have been discussed and agreed upon with patient.)  Discharge Goals: Time Frame: 90 days  1. Patient demonstrates independence with her HEP without verbal cueing from her therapists. GOAL MET  2. Patient able to sit with correct sitting posture for 30 minutes without onset of cervical spine pain. ONGOING  3. Improve ability to open/close mouth to be able to eat entire meal without left jaw pain and popping. ONGOING  4. Improve NDI outcome measure score from 10/50 to 5/50 to perform daily activities. ONGOING         Outcome Measure:    Tool Used: Neck Disability Index (NDI)  Score:  Initial: 10/50 (2/24/22) Most Recent: 8/50 (Date: 4-4-22 )                          7/50 (Date: 6/7/22) Interpretation of Score: The Neck Disability Index is a revised form of the Oswestry Low Back Pain Index and is designed to measure the activities of daily living in person's with neck pain. Each section is scored on a 0-5 scale, 5 representing the greatest disability. The scores of each section are added together for a total score of 50. Medical Necessity:   Patient is expected to demonstrate progress in strength, range of motion, balance, coordination and functional technique to increase independence with cervical spine mobility, jaw mobility and improved breathing techniques. Reason For Services/Other Comments:  Patient continues to require skilled intervention due to challenged with performing daily tasks due to pain levels and limited jaw mobility. Total Duration:       Regarding Skye Garnicaway's therapy, I certify that the treatment plan above will be carried out by a therapist or under their direction.   Thank you for this referral,  Jazzy Carrillo, PT     Referring Physician Signature: Michaela Cruz MD                    Post Session Pain  Charge Capture   POC Link  Treatment Note Link  MD Guidelines  [facility]

## 2022-06-10 DIAGNOSIS — E89.0 HYPOTHYROIDISM FOLLOWING RADIOIODINE THERAPY: Primary | ICD-10-CM

## 2022-06-16 ENCOUNTER — APPOINTMENT (RX ONLY)
Dept: URBAN - METROPOLITAN AREA CLINIC 24 | Facility: CLINIC | Age: 72
Setting detail: DERMATOLOGY
End: 2022-06-16

## 2022-06-16 DIAGNOSIS — B00.1 HERPESVIRAL VESICULAR DERMATITIS: ICD-10-CM

## 2022-06-16 DIAGNOSIS — D485 NEOPLASM OF UNCERTAIN BEHAVIOR OF SKIN: ICD-10-CM

## 2022-06-16 PROBLEM — D48.5 NEOPLASM OF UNCERTAIN BEHAVIOR OF SKIN: Status: ACTIVE | Noted: 2022-06-16

## 2022-06-16 PROCEDURE — ? PRESCRIPTION MEDICATION MANAGEMENT

## 2022-06-16 PROCEDURE — 11102 TANGNTL BX SKIN SINGLE LES: CPT

## 2022-06-16 PROCEDURE — ? PRESCRIPTION

## 2022-06-16 PROCEDURE — ? COUNSELING

## 2022-06-16 PROCEDURE — 99213 OFFICE O/P EST LOW 20 MIN: CPT | Mod: 25

## 2022-06-16 PROCEDURE — ? BIOPSY BY SHAVE METHOD

## 2022-06-16 ASSESSMENT — LOCATION ZONE DERM
LOCATION ZONE: NOSE
LOCATION ZONE: LIP
LOCATION ZONE: FINGER

## 2022-06-16 ASSESSMENT — LOCATION SIMPLE DESCRIPTION DERM
LOCATION SIMPLE: RIGHT NOSE
LOCATION SIMPLE: RIGHT UPPER LIP
LOCATION SIMPLE: RIGHT RING FINGER

## 2022-06-16 ASSESSMENT — LOCATION DETAILED DESCRIPTION DERM
LOCATION DETAILED: RIGHT NARIS
LOCATION DETAILED: PERIUNGUAL SKIN RIGHT RING FINGER
LOCATION DETAILED: RIGHT SUPERIOR VERMILION BORDER

## 2022-06-16 NOTE — PROCEDURE: BIOPSY BY SHAVE METHOD
Validate Triangulation: No
Post-care instructions were reviewed in detail and written instructions are provided. Patient is to keep the biopsy site dry overnight, and then apply bacitracin twice daily until healed. Patient may apply hydrogen peroxide soaks to remove any crusting.
Electrodesiccation And Curettage Text: The wound bed was treated with electrodesiccation and curettage after the biopsy was performed.
Type Of Destruction Used: Curettage
Biopsy Method: double edge Personna blade
Billing Type: Third-Party Bill
Notification Instructions: Patient will be notified of biopsy results. However, patient instructed to call the office if not contacted within 2 weeks.
Wound Care: Vaseline
Additional Anesthesia Volume In Cc (Will Not Render If 0): 0
Anesthesia Type: 1% lidocaine without epinephrine and a 1:10 solution of 8.4% sodium bicarbonate
Silver Nitrate Text: The wound bed was treated with silver nitrate after the biopsy was performed.
Was A Bandage Applied: Yes
Dressing: bandage
Detail Level: Detailed
Consent: Verbal consent was obtained and risks were reviewed including but not limited to scarring, infection, bleeding, scabbing, incomplete removal, and allergy to anesthesia. LUIS Moeller.
Cryotherapy Text: The wound bed was treated with cryotherapy after the biopsy was performed.
Anesthesia Volume In Cc: 0.5
Information: Selecting Yes will display possible errors in your note based on the variables you have selected. This validation is only offered as a suggestion for you. PLEASE NOTE THAT THE VALIDATION TEXT WILL BE REMOVED WHEN YOU FINALIZE YOUR NOTE. IF YOU WANT TO FAX A PRELIMINARY NOTE YOU WILL NEED TO TOGGLE THIS TO 'NO' IF YOU DO NOT WANT IT IN YOUR FAXED NOTE.
Biopsy Type: H and E
Hemostasis: Aluminum Chloride and Electrocautery
Electrodesiccation Text: The wound bed was treated with electrodesiccation after the biopsy was performed.
Depth Of Biopsy: dermis
Accession #: [id number]

## 2022-06-16 NOTE — HPI: INFECTION (HERPES SIMPLEX)
How Severe Is It?: moderate
Is This A New Presentation, Or A Follow-Up?: Infection
Additional History: Patient would like a rx of Valtrex

## 2022-06-17 RX ORDER — VALACYCLOVIR HYDROCHLORIDE 1 G/1
TABLET, FILM COATED ORAL
Qty: 28 | Refills: 2 | Status: ERX | COMMUNITY
Start: 2022-06-17

## 2022-06-17 RX ADMIN — VALACYCLOVIR HYDROCHLORIDE: 1 TABLET, FILM COATED ORAL at 00:00

## 2022-06-21 ENCOUNTER — HOSPITAL ENCOUNTER (EMERGENCY)
Age: 72
Discharge: HOME | End: 2022-06-21
Attending: EMERGENCY MEDICINE | Admitting: EMERGENCY MEDICINE
Payer: MEDICARE

## 2022-06-21 ENCOUNTER — HOSPITAL ENCOUNTER (OUTPATIENT)
Dept: PHYSICAL THERAPY | Age: 72
Setting detail: RECURRING SERIES
Discharge: HOME | End: 2022-06-24
Payer: MEDICARE

## 2022-06-21 ENCOUNTER — APPOINTMENT (OUTPATIENT)
Dept: CT IMAGING | Age: 72
End: 2022-06-21
Payer: MEDICARE

## 2022-06-21 VITALS
TEMPERATURE: 98.2 F | OXYGEN SATURATION: 91 % | HEIGHT: 63 IN | DIASTOLIC BLOOD PRESSURE: 67 MMHG | SYSTOLIC BLOOD PRESSURE: 146 MMHG | WEIGHT: 265 LBS | HEART RATE: 74 BPM | BODY MASS INDEX: 46.95 KG/M2 | RESPIRATION RATE: 19 BRPM

## 2022-06-21 DIAGNOSIS — E83.42 HYPOMAGNESEMIA: ICD-10-CM

## 2022-06-21 DIAGNOSIS — K80.50 BILIARY COLIC: Primary | ICD-10-CM

## 2022-06-21 DIAGNOSIS — K80.20 CALCULUS OF GALLBLADDER WITHOUT CHOLECYSTITIS WITHOUT OBSTRUCTION: ICD-10-CM

## 2022-06-21 LAB
ALBUMIN SERPL-MCNC: 4.1 G/DL (ref 3.2–4.6)
ALBUMIN/GLOB SERPL: 2.6 {RATIO}
ALP SERPL-CCNC: 169 U/L (ref 45–117)
ALT SERPL-CCNC: 45 U/L (ref 13–61)
ANION GAP SERPL CALC-SCNC: 8 MMOL/L (ref 7–16)
APPEARANCE UR: CLEAR
AST SERPL-CCNC: 41 U/L (ref 15–37)
BASOPHILS # BLD: 0 K/UL (ref 0–0.2)
BASOPHILS NFR BLD: 0 % (ref 0–2)
BILIRUB SERPL-MCNC: 0.5 MG/DL (ref 0.2–1.1)
BILIRUB UR QL: NEGATIVE
BUN SERPL-MCNC: 14 MG/DL (ref 7–18)
CALCIUM SERPL-MCNC: 9.5 MG/DL (ref 8.3–10.4)
CHLORIDE SERPL-SCNC: 104 MMOL/L (ref 98–107)
CO2 SERPL-SCNC: 27 MMOL/L (ref 21–32)
COLOR UR: YELLOW
CREAT SERPL-MCNC: 0.58 MG/DL (ref 0.6–1)
DIFFERENTIAL METHOD BLD: ABNORMAL
EOSINOPHIL # BLD: 0.1 K/UL (ref 0–0.8)
EOSINOPHIL NFR BLD: 1 % (ref 0.5–7.8)
ERYTHROCYTE [DISTWIDTH] IN BLOOD BY AUTOMATED COUNT: 13.1 % (ref 11.9–14.6)
GLOBULIN SER CALC-MCNC: 1.6 G/DL (ref 2.3–3.5)
GLUCOSE SERPL-MCNC: 129 MG/DL (ref 65–100)
GLUCOSE UR STRIP.AUTO-MCNC: NEGATIVE MG/DL
HCT VFR BLD AUTO: 43 % (ref 35.8–46.3)
HGB BLD-MCNC: 13.8 G/DL (ref 11.7–15.4)
HGB UR QL STRIP: NEGATIVE
IMM GRANULOCYTES # BLD AUTO: 0 K/UL (ref 0–0.5)
IMM GRANULOCYTES NFR BLD AUTO: 0 % (ref 0–5)
KETONES UR QL STRIP.AUTO: NEGATIVE MG/DL
LACTATE SERPL-SCNC: 1.2 MMOL/L (ref 0.4–2)
LEUKOCYTE ESTERASE UR QL STRIP.AUTO: NEGATIVE
LIPASE SERPL-CCNC: 22 U/L (ref 13–60)
LYMPHOCYTES # BLD: 0.9 K/UL (ref 0.5–4.6)
LYMPHOCYTES NFR BLD: 10 % (ref 13–44)
MAGNESIUM SERPL-MCNC: 1.7 MG/DL (ref 1.2–2.6)
MCH RBC QN AUTO: 30.5 PG (ref 26.1–32.9)
MCHC RBC AUTO-ENTMCNC: 32.1 G/DL (ref 31.4–35)
MCV RBC AUTO: 94.9 FL (ref 79.6–97.8)
MONOCYTES # BLD: 0.4 K/UL (ref 0.1–1.3)
MONOCYTES NFR BLD: 5 % (ref 4–12)
NEUTS SEG # BLD: 7.2 K/UL (ref 1.7–8.2)
NEUTS SEG NFR BLD: 84 % (ref 43–78)
NITRITE UR QL STRIP.AUTO: NEGATIVE
NRBC # BLD: 0 K/UL (ref 0–0.2)
PH UR STRIP: 6 [PH] (ref 5–9)
PLATELET # BLD AUTO: 223 K/UL (ref 150–450)
PMV BLD AUTO: 10.4 FL (ref 9.4–12.3)
POTASSIUM SERPL-SCNC: 3.6 MMOL/L (ref 3.5–5.1)
PROT SERPL-MCNC: 6.6 G/DL (ref 6.4–8.2)
PROT UR STRIP-MCNC: NEGATIVE MG/DL
RBC # BLD AUTO: 4.53 M/UL (ref 4.05–5.2)
SODIUM SERPL-SCNC: 139 MMOL/L (ref 136–145)
SP GR UR REFRACTOMETRY: 1.02 (ref 1–1.02)
UROBILINOGEN UR QL STRIP.AUTO: 0.2 EU/DL (ref 0.2–1)
WBC # BLD AUTO: 8.6 K/UL (ref 4.3–11.1)

## 2022-06-21 PROCEDURE — 96366 THER/PROPH/DIAG IV INF ADDON: CPT

## 2022-06-21 PROCEDURE — 83735 ASSAY OF MAGNESIUM: CPT

## 2022-06-21 PROCEDURE — 83690 ASSAY OF LIPASE: CPT

## 2022-06-21 PROCEDURE — 96365 THER/PROPH/DIAG IV INF INIT: CPT

## 2022-06-21 PROCEDURE — 6370000000 HC RX 637 (ALT 250 FOR IP): Performed by: EMERGENCY MEDICINE

## 2022-06-21 PROCEDURE — 74176 CT ABD & PELVIS W/O CONTRAST: CPT

## 2022-06-21 PROCEDURE — 96375 TX/PRO/DX INJ NEW DRUG ADDON: CPT

## 2022-06-21 PROCEDURE — 83605 ASSAY OF LACTIC ACID: CPT

## 2022-06-21 PROCEDURE — 2580000003 HC RX 258: Performed by: EMERGENCY MEDICINE

## 2022-06-21 PROCEDURE — 81003 URINALYSIS AUTO W/O SCOPE: CPT

## 2022-06-21 PROCEDURE — 80053 COMPREHEN METABOLIC PANEL: CPT

## 2022-06-21 PROCEDURE — 99284 EMERGENCY DEPT VISIT MOD MDM: CPT

## 2022-06-21 PROCEDURE — 6360000002 HC RX W HCPCS: Performed by: EMERGENCY MEDICINE

## 2022-06-21 PROCEDURE — 97140 MANUAL THERAPY 1/> REGIONS: CPT

## 2022-06-21 PROCEDURE — 85025 COMPLETE CBC W/AUTO DIFF WBC: CPT

## 2022-06-21 PROCEDURE — 97110 THERAPEUTIC EXERCISES: CPT

## 2022-06-21 RX ORDER — MORPHINE SULFATE 4 MG/ML
4 INJECTION INTRAVENOUS
Status: COMPLETED | OUTPATIENT
Start: 2022-06-21 | End: 2022-06-21

## 2022-06-21 RX ORDER — ONDANSETRON 2 MG/ML
8 INJECTION INTRAMUSCULAR; INTRAVENOUS
Status: COMPLETED | OUTPATIENT
Start: 2022-06-21 | End: 2022-06-21

## 2022-06-21 RX ORDER — 0.9 % SODIUM CHLORIDE 0.9 %
1000 INTRAVENOUS SOLUTION INTRAVENOUS ONCE
Status: COMPLETED | OUTPATIENT
Start: 2022-06-21 | End: 2022-06-21

## 2022-06-21 RX ORDER — KETOROLAC TROMETHAMINE 15 MG/ML
15 INJECTION, SOLUTION INTRAMUSCULAR; INTRAVENOUS
Status: COMPLETED | OUTPATIENT
Start: 2022-06-21 | End: 2022-06-21

## 2022-06-21 RX ORDER — HYOSCYAMINE SULFATE 0.12 MG/1
0.12 TABLET SUBLINGUAL
Qty: 30 EACH | Refills: 0 | Status: SHIPPED | OUTPATIENT
Start: 2022-06-21 | End: 2022-08-04

## 2022-06-21 RX ORDER — MAGNESIUM SULFATE IN WATER 40 MG/ML
2000 INJECTION, SOLUTION INTRAVENOUS
Status: COMPLETED | OUTPATIENT
Start: 2022-06-21 | End: 2022-06-21

## 2022-06-21 RX ORDER — ONDANSETRON 8 MG/1
8 TABLET, ORALLY DISINTEGRATING ORAL EVERY 8 HOURS PRN
Qty: 20 TABLET | Refills: 0 | Status: SHIPPED | OUTPATIENT
Start: 2022-06-21 | End: 2022-08-04

## 2022-06-21 RX ADMIN — KETOROLAC TROMETHAMINE 15 MG: 15 INJECTION, SOLUTION INTRAMUSCULAR; INTRAVENOUS at 20:06

## 2022-06-21 RX ADMIN — ONDANSETRON 8 MG: 2 INJECTION INTRAMUSCULAR; INTRAVENOUS at 20:06

## 2022-06-21 RX ADMIN — MAGNESIUM SULFATE HEPTAHYDRATE 2000 MG: 40 INJECTION, SOLUTION INTRAVENOUS at 21:09

## 2022-06-21 RX ADMIN — MORPHINE SULFATE 4 MG: 4 INJECTION INTRAVENOUS at 20:07

## 2022-06-21 RX ADMIN — HYOSCYAMINE SULFATE 125 MCG: 0.12 TABLET ORAL; SUBLINGUAL at 21:09

## 2022-06-21 RX ADMIN — SODIUM CHLORIDE 1000 ML: 9 INJECTION, SOLUTION INTRAVENOUS at 20:08

## 2022-06-21 ASSESSMENT — PAIN DESCRIPTION - DESCRIPTORS
DESCRIPTORS: DULL
DESCRIPTORS: SORE

## 2022-06-21 ASSESSMENT — PAIN SCALES - GENERAL
PAINLEVEL_OUTOF10: 9
PAINLEVEL_OUTOF10: 9
PAINLEVEL_OUTOF10: 2
PAINLEVEL_OUTOF10: 2
PAINLEVEL_OUTOF10: 8
PAINLEVEL_OUTOF10: 2

## 2022-06-21 ASSESSMENT — PAIN DESCRIPTION - FREQUENCY
FREQUENCY: CONTINUOUS
FREQUENCY: CONTINUOUS

## 2022-06-21 ASSESSMENT — PAIN DESCRIPTION - ORIENTATION
ORIENTATION: RIGHT;MID;UPPER
ORIENTATION: RIGHT
ORIENTATION: RIGHT

## 2022-06-21 ASSESSMENT — ENCOUNTER SYMPTOMS
NAUSEA: 1
BACK PAIN: 1
ABDOMINAL PAIN: 1
RESPIRATORY NEGATIVE: 1
DIARRHEA: 1

## 2022-06-21 ASSESSMENT — PAIN - FUNCTIONAL ASSESSMENT: PAIN_FUNCTIONAL_ASSESSMENT: 0-10

## 2022-06-21 ASSESSMENT — PAIN DESCRIPTION - LOCATION
LOCATION: NECK
LOCATION: RIB CAGE
LOCATION: ABDOMEN

## 2022-06-21 ASSESSMENT — PAIN DESCRIPTION - PAIN TYPE: TYPE: ACUTE PAIN

## 2022-06-21 NOTE — ED PROVIDER NOTES
[facility] Emergency Department Provider Note                   PCP:                Tete Ferguson MD               Age: 73 y.o. Sex: female       ICD-10-CM    1. Biliary colic  H74.29    2. Calculus of gallbladder without cholecystitis without obstruction  K80.20    3. Hypomagnesemia  E83.42        DISPOSITION         New Prescriptions    HYOSCYAMINE SULFATE SL (LEVSIN/SL) 0.125 MG SUBL    Place 0.125 mg under the tongue every 4-6 hours as needed (Abdominal cramping)    ONDANSETRON (ZOFRAN ODT) 8 MG TBDP DISINTEGRATING TABLET    Place 1 tablet under the tongue every 8 hours as needed for Nausea or Vomiting       Orders Placed This Encounter   Procedures    CT ABDOMEN PELVIS RENAL STONE Additional Contrast? None    CBC with Auto Differential    CMP    Lipase    Magnesium    Lactic Acid    Urinalysis    EKG 12 Lead        Amos Brown MD 10:16 PM      MDM  Number of Diagnoses or Management Options  Biliary colic  Calculus of gallbladder without cholecystitis without obstruction  Hypomagnesemia  Diagnosis management comments: 72-year-old  female presented to the emergency department with complaints of right flank and back pain that began earlier today. Patient labs are reassuring. Patient's magnesium was low and this was replaced in the emergency department. Patient underwent CT scan which revealed a large gallstone without evidence of acute cholecystitis. Patient felt much better after medications. She will be discharged home with Levsin and Zofran. She will return the emergency department for fevers, uncontrolled pain, uncontrolled vomiting or any other concerns. Patient will be given referral to general surgery. She is encouraged to call to set up follow-up appointment.          Amount and/or Complexity of Data Reviewed  Clinical lab tests: ordered and reviewed  Tests in the radiology section of CPT®: ordered and reviewed  Decide to obtain previous medical records or to obtain history from someone other than the patient: yes  Review and summarize past medical records: yes  Independent visualization of images, tracings, or specimens: yes    Patient Progress  Patient progress: improved       Karuna Post is a 73 y.o. female who presents to the Emergency Department with chief complaint of    Chief Complaint   Patient presents with    Rib Pain      46-year-old  female with history of atrial fibrillation on Eliquis and Tikosyn, asthma, arthritis, hypertension, ventricular septal defect presented to the emergency department with complaints of right flank and right sided abdominal pain that began around lunchtime today. Patient states that she has not experienced feeling this before. She states that she did have some nausea and mild diarrhea earlier in the day as well. She did take an Imodium to see if that would help with her symptoms but it did not improve. States that she also started muscle relaxer but did not have any relief. On she states that she could not find a position of comfort secondary to the pain. She denies any chest pain or shortness of breath. No she denies any fevers, chills, changes in bladder habits. Patient does states she still has her gallbladder. She has no history of kidney stones. She denies any sick contacts or bad food exposures. She has no other complaints at this time. Patient states that her symptoms did begin after emotional upset earlier today as her dog passed away and she found out that one of her good friends is now under the care of hospice. The history is provided by the patient and medical records. All other systems reviewed and are negative. Review of Systems   Constitutional: Negative. HENT: Negative. Respiratory: Negative. Cardiovascular: Negative. Gastrointestinal: Positive for abdominal pain, diarrhea and nausea. Genitourinary: Negative. Musculoskeletal: Positive for back pain.    Skin: Negative. Neurological: Negative. Psychiatric/Behavioral: Negative. All other systems reviewed and are negative. Past Medical History:   Diagnosis Date    Adverse effect of anesthesia     delayed awakening in the past, patient states improved after using CPAP--patient's  states better for patient to wake up on side, not back.  Allergic rhinitis     Anxiety     Arthritis     OA    Asthma     Followed by Dr. Shaan Jauregui, controlled with daily inhaler and PRN Ventolin     Depression     under control with med    Endocarditis     hx     Heart murmur     congenital, asymptomatic--Echo completed 19    Hematuria     History of MRSA infection on left breast    2016 : treated/ resolved    HLD (hyperlipidemia)      Hypertension     controlled with medication     Hypertension     Hypothyroid     controlled with medication     Hypothyroidism due to acquired atrophy of thyroid 2015    Intertrigo     Irregular heart beat     Mass of colon     Morbid obesity ([address]     48.7 bmi    Persistent atrial fibrillation ([address]     s/p ablation (2019), Tikosyn loading---EKG dated 3/13/19 shows \"sinus rhythm, rate 70. \" Patient is followed by [facility].  Reactive airway disease with status asthmaticus     hx only    Scoliosis 2016    Sleep apnea     cpap compliant.  Varicose veins     VSD (ventricular septal defect) 2016    per echo (19) \"small PFO in the baseline state. \" Patient is followed by [facility].          Past Surgical History:   Procedure Laterality Date    ABLATION OF DYSRHYTHMIC FOCUS   & 2019    cardioversion / ablation    APPENDECTOMY      BREAST REDUCTION SURGERY Bilateral 2016    BREAST REDUCTION/ bilateral performed by Kevin Lawton MD at [address]      heart cath age 5   [address]  2018     SECTION      x2    COLONOSCOPY  ,     DILATION AND CURETTAGE OF UTERUS      multiple    HEENT  1969    jaw surgery due to underbite     HERNIA REPAIR  incisional [id number]    with mesh    LIPOMA RESECTION Right     right foot between toes    NC CARDIAC SURG PROCEDURE UNLIST  09/2018    cardioversion    NC CARDIAC SURG PROCEDURE UNLIST  11/2018    ablation    NC CARDIAC SURG PROCEDURE UNLIST  12/2018    cardioversion    NC CARDIAC SURG PROCEDURE UNLIST  02/2019    ablation    REFRACTIVE SURGERY      TOTAL COLECTOMY Right 2010 8 in colon removed    TOTAL KNEE ARTHROPLASTY Right 01/29/2018    TOTAL KNEE ARTHROPLASTY Left 04/17/2019        Family History   Problem Relation Age of Onset    Colon Cancer Maternal Uncle     Thyroid Disease Daughter         Strong Susan' disease    Thyroid Disease Maternal Aunt         Hypothyroidism    Cancer Mother         Lymphoma    Heart Disease Brother     Breast Cancer Mother     Thyroid Disease Mother         Hypothyroidism    Heart Disease Father         s/p MI and CABG    Hypertension Father     Heart Disease Brother     Thyroid Disease Sister         Hypothyroidism    Hypertension Mother            Social Connections:     Frequency of Communication with Friends and Family: Not on file    Frequency of Social Gatherings with Friends and Family: Not on file    Attends Worship Services: Not on file    Active Member of [address] [place] [address] [employer] Apps or Organizations: Not on file    Attends Club or Organization Meetings: Not on file    Marital Status: Not on file        Allergies   Allergen Reactions    Latex Other (See Comments)     Redness to site    Ciprofloxacin Other (See Comments)     Reacts with Afib meds    Olanzapine-Fluoxetine Hcl Other (See Comments)     Causes Severe pain     Cefaclor Rash        Vitals signs and nursing note reviewed.    Patient Vitals for the past 4 hrs:   Temp Pulse Resp BP SpO2   06/21/22 2202 -- 86 19 -- 93 %   06/21/22 2114 -- 74 16 124/70 94 %   06/21/22 1935 -- -- -- (!) 153/79 96 % 06/21/22 1927 98.2 °F (36.8 °C) 80 18 (!) 160/84 98 %          Physical Exam  Vitals and nursing note reviewed. Constitutional:       General: She is in acute distress (Obvious discomfort). HENT:      Head: Normocephalic and atraumatic. Mouth/Throat:      Mouth: Mucous membranes are moist.   Eyes:      Extraocular Movements: Extraocular movements intact. Pupils: Pupils are equal, round, and reactive to light. Cardiovascular:      Rate and Rhythm: Normal rate and regular rhythm. Heart sounds: Murmur heard. Pulmonary:      Effort: Pulmonary effort is normal.      Breath sounds: Normal breath sounds. Abdominal:      Palpations: Abdomen is soft. Tenderness: There is abdominal tenderness (Right flank and lateral abdomen tenderness to palpation). There is right CVA tenderness. There is no guarding or rebound. Musculoskeletal:         General: Normal range of motion. Cervical back: Normal range of motion. Skin:     General: Skin is warm and dry. Neurological:      General: No focal deficit present. Mental Status: She is alert and oriented to person, place, and time. Psychiatric:         Mood and Affect: Mood normal.         Behavior: Behavior normal.         Thought Content:  Thought content normal.         Judgment: Judgment normal.              ED EKG Interpretation  EKG was interpreted in the absence of a cardiologist.    Rate: Rate: Normal  EKG Interpretation: EKG Interpretation: Ectopic atrial rhythm  ST Segments: Nonspecific ST segments - NO STEMI    Labs Reviewed   CBC WITH AUTO DIFFERENTIAL - Abnormal; Notable for the following components:       Result Value    Seg Neutrophils 84 (*)     Lymphocytes 10 (*)     All other components within normal limits   COMPREHENSIVE METABOLIC PANEL - Abnormal; Notable for the following components:    Glucose 129 (*)     CREATININE 0.58 (*)     AST 41 (*)     Alk Phosphatase 169 (*)     Globulin 1.6 (*)     All other components within normal limits   LIPASE   MAGNESIUM   LACTIC ACID   URINALYSIS        CT ABDOMEN PELVIS RENAL STONE Additional Contrast? None   Final Result   1. Cholelithiasis with mild gallbladder distention. No gallbladder wall   thickening or pericholecystic fluid is evident. Findings are equivocal for acute   cholecystitis. 2.  No renal calculi or hydronephrosis. 3.  Degenerative changes and scoliotic curvature of the lumbar spine. ED Course as of 06/21/22 2216   Tue Jun 21, 2022 2108 Patient's pain has significantly improved after medications. [JL]   2086 Patient's initial labs are incorrect. Labs rerun with significant improvement. [JL]      ED Course User Index  [JL] Siddharth Scott MD        Voice dictation software was used during the making of this note. This software is not perfect and grammatical and other typographical errors may be present. This note has not been completely proofread for errors.        Siddharth Scott MD  06/21/22 2216

## 2022-06-22 NOTE — PROGRESS NOTES
Jossie murillo  : 1950  Primary: Medicare Part A And B  Secondary: [place] @ [facility] [address]  Phone: [phone]  Fax: [phone] Plan Frequency: 1x a week for 90 days    Plan of Care/Certification Expiration Date: 22      PT Visit Info: Total # of Visits to Date: 25  No Show: 0  Canceled Appointment: 1      OUTPATIENT PHYSICAL THERAPY:OP NOTE TYPE: Treatment Note 2022       Episode  }Appt Desk            ICD-10: Treatment Kristofer Wu M54.2   ICD-10 : Treatment Diagnosis: thoracic spine pain M54.6    ICD-10 : Treatment Diagnosis: arthraligia of left tempromandibular joint M26.622   Medical/Referring Diagnosis:  Arthralgia of left temporomandibular joint [M26.622]  Cervicalgia [M54.2]  Referring Physician:  Alanna Rodríguez MD MD Orders:  PT Eval and Treat   Date of Onset:  Onset Date: 22     Allergies:   Latex, Ciprofloxacin, Olanzapine-fluoxetine hcl, and Cefaclor  Restrictions/Precautions:  Restrictions/Precautions: None  No data recorded   Interventions Planned (Treatment may consist of any combination of the following):    Current Treatment Recommendations: Strengthening; ROM; Balance training; Manual Therapy - Soft Tissue Mobilization; Manual Therapy - Joint Manipulation; Home exercise program     Subjective Comments:  Elaina Kaminski notes her dog passed away unexpectedly this week and received bad news about a friend's cancer diagnosis. Notes her jaw and neck are feeling better but just feels like her stomach is in a knot with all of the bad news lately.    Initial:}Right Neck 2/10Post Session:  Right  Neck 1/10  Medications Last Reviewed:  2022  Updated Objective Findings:  improvements noted in cervical spine ROM rotation, decreased soft tissue restrictions in bilateral medial and lateral pterygoids  Treatment   THERAPEUTIC EXERCISE: (15 minutes):    Exercises per grid below to improve mobility, strength, balance and coordination. Required moderate visual, verbal, manual and tactile cues to promote proper body alignment, promote proper body posture, promote proper body mechanics and promote proper body breathing techniques. Progressed resistance, range, repetitions and complexity of movement as indicated. Date:   6/21/2022         Activity/Exercise  Parameters         Supine axial elongation  X 10 reps, 5 sec holds         Supine tongue on roof of mouth, open/close jaw  X 10 reps         Cervical spine ROM rotation  X 10 reps,         Supine breathing techniques  X 5 reps slow and controlled. Tongue circles          Ark/unk jaw mobility          Shoulder rolls          Breathing techniques     X 5 reps diaphragmatic breathing   Walking techniques        MANUAL THERAPY: (25 minutes):   Joint mobilization and Soft tissue mobilization was utilized and necessary because of the patient's restricted joint motion, loss of articular motion and restricted motion of soft tissue. (Used abbreviations: MET - muscle energy technique; PNF - proprioceptive neuromuscular facilitation; NMR - neuromuscular re-education; a/p - anterior to posterior; p/a - posterior to anterior, FMP - functional movement patterns, SF - Superficial Fascia; BC - Bony contours; MP - muscle play; IASTM - instrument-assisted soft tissue mobilization)  · Supine anterior chest soft tissue mobilization with breathing techniques. ·   Supine anterior cervical spine soft tissue mobilization with FMP of cervical spine rotation. ·   Supine bilateral SCM soft tissue mobilization with cervical spine AROM rotation. ·   Supine facial soft tissue mobilization: masseter and temporalis.   ·   Supine cervical spine manual traction and suboccipital release. ·   Supine soft tissue mobilization to bilateral upper trapezius and levator scapula.    ·   Supine internal soft tissue mobilization to left lateral pterygoid and [facility]      Treatment/Session Summary:    · Treatment Assessment:  Decreased soft tissue restrictions noted today in her cervical spine and improved mobility of her jaw. Continues to present with strength deficits of her postural stabilizers. · Communication/Consultation:  None today  · Equipment provided today:  None  · Recommendations/Intent for next treatment session: Next visit will focus on soft tissue mobilization of cervical and thoracic spine, thoracic spine and costal cage mobility, breathing techniques and postural stability.     Total Treatment Billable Duration:  40 minutes   Time In: 1430  Time Out: 9669    Jazzy Epstein, PT       Charge Capture  }Post Session Pain  MedBridge Portal  MD Guidelines  Scanned Media  Benefits  MyChart    Future Appointments   Date Time Provider Radhika Henry   7/7/2022  2:30 PM Madeline Samuels PT [facility]   7/27/2022  1:40 PM Kendy Joaquin APRN - CNP [facility]   8/4/2022  3:15 PM Romana Belcher MD [facility]   2/21/2023  9:00 AM Nicolasa Smith MD [facility]   4/27/2023 11:00 AM Siria Blanchard MD [facility]   5/5/2023  8:40 AM Brandyn Stokes MD [facility]

## 2022-06-22 NOTE — ED NOTES
I have reviewed discharge instructions with the patient. The patient verbalized understanding. Patient left ED via Discharge Method: ambulatory to Home with . Opportunity for questions and clarification provided. Patient given 2 scripts. To continue your aftercare when you leave the hospital, you may receive an automated call from our care team to check in on how you are doing. This is a free service and part of our promise to provide the best care and service to meet your aftercare needs.  If you have questions, or wish to unsubscribe from this service please call [phone]. Thank you for Choosing our [facility] Emergency Department.       Capo Doyle RN  06/21/22 3341

## 2022-06-23 ENCOUNTER — OFFICE VISIT (OUTPATIENT)
Dept: INTERNAL MEDICINE CLINIC | Facility: CLINIC | Age: 72
End: 2022-06-23
Payer: MEDICARE

## 2022-06-23 VITALS
WEIGHT: 266 LBS | HEIGHT: 63 IN | DIASTOLIC BLOOD PRESSURE: 86 MMHG | SYSTOLIC BLOOD PRESSURE: 126 MMHG | BODY MASS INDEX: 47.13 KG/M2

## 2022-06-23 DIAGNOSIS — K80.00 CALCULUS OF GALLBLADDER WITH ACUTE CHOLECYSTITIS WITHOUT OBSTRUCTION: Primary | ICD-10-CM

## 2022-06-23 PROCEDURE — 1090F PRES/ABSN URINE INCON ASSESS: CPT | Performed by: INTERNAL MEDICINE

## 2022-06-23 PROCEDURE — 99213 OFFICE O/P EST LOW 20 MIN: CPT | Performed by: INTERNAL MEDICINE

## 2022-06-23 PROCEDURE — 3017F COLORECTAL CA SCREEN DOC REV: CPT | Performed by: INTERNAL MEDICINE

## 2022-06-23 PROCEDURE — G8417 CALC BMI ABV UP PARAM F/U: HCPCS | Performed by: INTERNAL MEDICINE

## 2022-06-23 PROCEDURE — 1123F ACP DISCUSS/DSCN MKR DOCD: CPT | Performed by: INTERNAL MEDICINE

## 2022-06-23 PROCEDURE — 1036F TOBACCO NON-USER: CPT | Performed by: INTERNAL MEDICINE

## 2022-06-23 PROCEDURE — G8399 PT W/DXA RESULTS DOCUMENT: HCPCS | Performed by: INTERNAL MEDICINE

## 2022-06-23 PROCEDURE — G8427 DOCREV CUR MEDS BY ELIG CLIN: HCPCS | Performed by: INTERNAL MEDICINE

## 2022-06-23 ASSESSMENT — ENCOUNTER SYMPTOMS
ABDOMINAL PAIN: 1
NAUSEA: 0
VOMITING: 0

## 2022-06-23 NOTE — PROGRESS NOTES
SUBJECTIVE:   Blank Forte is a 73 y.o. female seen for a visit regarding   Chief Complaint   Patient presents with    Follow-up     ED f/u        Abdominal Pain  This is a new problem. Episode onset: 2 days ago about 1 pm had RUQ pain , radiated around to back-to ER and had CT with gallstone with some GB distention ; no fluid-pain about 8 h. The onset quality is sudden. The problem has been resolved. The quality of the pain is dull (intense). Pertinent negatives include no nausea or vomiting. Lab Results   Component Value Date     06/21/2022    K 3.6 06/21/2022     06/21/2022    CO2 27 06/21/2022    BUN 14 06/21/2022    CREATININE 0.58 (L) 06/21/2022    GLUCOSE 129 (H) 06/21/2022    CALCIUM 9.5 06/21/2022    PROT 6.6 06/21/2022    LABALBU 4.1 06/21/2022    BILITOT 0.5 06/21/2022    ALKPHOS 169 (H) 06/21/2022    AST 41 (H) 06/21/2022    ALT 45 06/21/2022    LABGLOM >60 06/21/2022    GFRAA >133 06/21/2022    AGRATIO 1.0 (L) 02/15/2022    GLOB 1.6 (L) 06/21/2022           Past Medical History, Past Surgical History, Family history, Social History, and Medications were all reviewed with the patient today and updated as necessary.        Current Outpatient Medications   Medication Sig Dispense Refill    ondansetron (ZOFRAN ODT) 8 MG TBDP disintegrating tablet Place 1 tablet under the tongue every 8 hours as needed for Nausea or Vomiting 20 tablet 0    Hyoscyamine Sulfate SL (LEVSIN/SL) 0.125 MG SUBL Place 0.125 mg under the tongue every 4-6 hours as needed (Abdominal cramping) 30 each 0    Acetaminophen 325 MG CAPS Take 360 mg by mouth 3 times daily as needed      albuterol sulfate  (90 Base) MCG/ACT inhaler Inhale 2 puffs into the lungs every 6 hours as needed      amLODIPine (NORVASC) 5 MG tablet TAKE 1 TABLET BY MOUTH DAILY      apixaban (ELIQUIS) 5 MG TABS tablet Take 5 mg by mouth 2 times daily      buPROPion (WELLBUTRIN) 75 MG tablet TAKE 1 TABLET BY MOUTH 2 TIMES A DAY  Calcium Carb-Cholecalciferol 600-800 MG-UNIT CHEW Take 2 tablets by mouth daily      Cetirizine HCl 10 MG CAPS Take 10 capsules by mouth daily      dofetilide (TIKOSYN) 500 MCG capsule Take 500 mcg by mouth every 12 hours      fluticasone-salmeterol (ADVAIR DISKUS) 250-50 MCG/ACT AEPB diskus inhaler TAKE 1 PUFF BY MOUTH TWICE A DAY. RINSE MOUTH WELL AFTER USE      levothyroxine (SYNTHROID) 125 MCG tablet Take 125 mcg by mouth every morning (before breakfast)      losartan (COZAAR) 100 MG tablet Take 100 mg by mouth daily      mometasone (NASONEX) 50 MCG/ACT nasal spray 2 sprays by Nasal route daily      montelukast (SINGULAIR) 10 MG tablet TAKE 1 TABLET BY MOUTH ONCE A DAY       No current facility-administered medications for this visit.      Allergies   Allergen Reactions    Latex Other (See Comments)     Redness to site    Ciprofloxacin Other (See Comments)     Reacts with Afib meds    Olanzapine-Fluoxetine Hcl Other (See Comments)     Causes Severe pain     Cefaclor Rash     Patient Active Problem List   Diagnosis    Left foot pain    Localized osteoarthritis of shoulder, right    Essential hypertension    VSD (ventricular septal defect)    Major depressive disorder with single episode, in full remission ([facility] [address]    Mixed hyperlipidemia    Hypersomnia, unspecified    Hypothyroidism following radioiodine therapy    Primary osteoarthritis of right hand    Acquired hallux valgus of left foot    Acquired claw toe, left    Arthritis of midfoot    Spinal stenosis of lumbar region with neurogenic claudication    High risk medications (not anticoagulants) long-term use    Anxiety    Morbid obesity with BMI of 45.0-49.9, adult ([facility] [address]    Atrial fibrillation, persistent (HCC)    OA (osteoarthritis) of knee    BILLIE (obstructive sleep apnea)    Mild intermittent asthma without complication    Status post left knee replacement    Tendinitis of foot    DDD (degenerative disc disease), lumbar Social History     Tobacco Use    Smoking status: Never Smoker    Smokeless tobacco: Never Used   Substance Use Topics    Alcohol use: No          Review of Systems   Gastrointestinal: Positive for abdominal pain. Negative for nausea and vomiting. OBJECTIVE:  /86   Ht 5' 3\" (1.6 m)   Wt 266 lb (120.7 kg)   BMI 47.12 kg/m²      Physical Exam  Constitutional:       General: She is not in acute distress. Appearance: She is obese. Abdominal:      Tenderness: There is no abdominal tenderness. There is no guarding. Medical problems and test results were reviewed with the patient today. ASSESSMENT and PLAN     1. Calculus of gallbladder with acute cholecystitis without obstruction   clinical gallbladder attack; had calculus ; alk phos up slightly --exam fine now --has surgery appt 7/1 -discussed likely need to have GB out -need to stop Eliquis prior also    Current treatment plan is effective. no changes in therapy  lab results and schedule for future lab studies reviewed with patient  reviewed medications and side effects in detail   radiology results and schedule of future radiology studies reviewed with patient     No follow-ups on file.

## 2022-07-01 ENCOUNTER — OFFICE VISIT (OUTPATIENT)
Dept: SURGERY | Age: 72
End: 2022-07-01
Payer: MEDICARE

## 2022-07-01 ENCOUNTER — RX ONLY (OUTPATIENT)
Age: 72
Setting detail: RX ONLY
End: 2022-07-01

## 2022-07-01 VITALS
HEART RATE: 74 BPM | WEIGHT: 265 LBS | HEIGHT: 63 IN | BODY MASS INDEX: 46.95 KG/M2 | SYSTOLIC BLOOD PRESSURE: 122 MMHG | DIASTOLIC BLOOD PRESSURE: 80 MMHG | OXYGEN SATURATION: 98 %

## 2022-07-01 DIAGNOSIS — K80.12 CALCULUS OF GALLBLADDER WITH ACUTE ON CHRONIC CHOLECYSTITIS WITHOUT OBSTRUCTION: ICD-10-CM

## 2022-07-01 DIAGNOSIS — E66.01 MORBID OBESITY WITH BMI OF 45.0-49.9, ADULT (HCC): ICD-10-CM

## 2022-07-01 DIAGNOSIS — Z79.01 CURRENT USE OF LONG TERM ANTICOAGULATION: ICD-10-CM

## 2022-07-01 DIAGNOSIS — Z87.19 HISTORY OF INCISIONAL HERNIA REPAIR: ICD-10-CM

## 2022-07-01 DIAGNOSIS — E89.0 HYPOTHYROIDISM FOLLOWING RADIOIODINE THERAPY: ICD-10-CM

## 2022-07-01 DIAGNOSIS — Z98.890 HISTORY OF INCISIONAL HERNIA REPAIR: ICD-10-CM

## 2022-07-01 DIAGNOSIS — K80.12 CALCULUS OF GALLBLADDER WITH ACUTE ON CHRONIC CHOLECYSTITIS WITHOUT OBSTRUCTION: Primary | ICD-10-CM

## 2022-07-01 LAB
BASOPHILS # BLD: 0.1 K/UL (ref 0–0.2)
BASOPHILS NFR BLD: 1 % (ref 0–2)
DIFFERENTIAL METHOD BLD: NORMAL
EOSINOPHIL # BLD: 0.3 K/UL (ref 0–0.8)
EOSINOPHIL NFR BLD: 4 % (ref 0.5–7.8)
ERYTHROCYTE [DISTWIDTH] IN BLOOD BY AUTOMATED COUNT: 13.3 % (ref 11.9–14.6)
HCT VFR BLD AUTO: 43.2 % (ref 35.8–46.3)
HGB BLD-MCNC: 13.7 G/DL (ref 11.7–15.4)
IMM GRANULOCYTES # BLD AUTO: 0 K/UL (ref 0–0.5)
IMM GRANULOCYTES NFR BLD AUTO: 0 % (ref 0–5)
LYMPHOCYTES # BLD: 1.6 K/UL (ref 0.5–4.6)
LYMPHOCYTES NFR BLD: 24 % (ref 13–44)
MCH RBC QN AUTO: 30 PG (ref 26.1–32.9)
MCHC RBC AUTO-ENTMCNC: 31.7 G/DL (ref 31.4–35)
MCV RBC AUTO: 94.5 FL (ref 79.6–97.8)
MONOCYTES # BLD: 0.6 K/UL (ref 0.1–1.3)
MONOCYTES NFR BLD: 10 % (ref 4–12)
NEUTS SEG # BLD: 3.9 K/UL (ref 1.7–8.2)
NEUTS SEG NFR BLD: 61 % (ref 43–78)
NRBC # BLD: 0 K/UL (ref 0–0.2)
PLATELET # BLD AUTO: 290 K/UL (ref 150–450)
PMV BLD AUTO: 11.1 FL (ref 9.4–12.3)
RBC # BLD AUTO: 4.57 M/UL (ref 4.05–5.2)
WBC # BLD AUTO: 6.5 K/UL (ref 4.3–11.1)

## 2022-07-01 PROCEDURE — 1036F TOBACCO NON-USER: CPT | Performed by: SURGERY

## 2022-07-01 PROCEDURE — G8427 DOCREV CUR MEDS BY ELIG CLIN: HCPCS | Performed by: SURGERY

## 2022-07-01 PROCEDURE — G8399 PT W/DXA RESULTS DOCUMENT: HCPCS | Performed by: SURGERY

## 2022-07-01 PROCEDURE — 1123F ACP DISCUSS/DSCN MKR DOCD: CPT | Performed by: SURGERY

## 2022-07-01 PROCEDURE — 99204 OFFICE O/P NEW MOD 45 MIN: CPT | Performed by: SURGERY

## 2022-07-01 PROCEDURE — 1090F PRES/ABSN URINE INCON ASSESS: CPT | Performed by: SURGERY

## 2022-07-01 PROCEDURE — 3017F COLORECTAL CA SCREEN DOC REV: CPT | Performed by: SURGERY

## 2022-07-01 PROCEDURE — G8417 CALC BMI ABV UP PARAM F/U: HCPCS | Performed by: SURGERY

## 2022-07-01 RX ORDER — PHARMACY COMPOUNDING ACCESSORY
EACH MISCELLANEOUS
Qty: 15 | Refills: 1 | Status: ERX | COMMUNITY
Start: 2022-07-01

## 2022-07-01 NOTE — PROGRESS NOTES
[address]  [phone]    Office Note/Consult Note   Zoe Madison   MRN: [id number]     : 1950        HPI: Zoe Madison is a 73 y.o. female who is seen in the office referred by Dr. Norma Mancilla for [facility] following a visit on 2022. She was evaluated for right back and right flank pain. She had an episode of mild nausea and diarrhea earlier that day. Renal calculus protocol CT was obtained which identified no evidence of renal calculi but cholelithiasis was identified. There was no identification of pericholecystic inflammation. Her lab work was unremarkable and her symptoms abated quickly and she was discharged home with referral to surgery. I reviewed her findings and identified only a mildly elevated alkaline phosphatase and minimally elevated AST. Total bilirubin and ALT were nonelevated. Lipase was not elevated. She had no other symptoms other than pain while in the ER. She denied other abdominal symptoms or postprandial symptoms. She has multiple medical issues including a cardiac issues with known VSD, chronic asthma, sleep apnea, chronic atrial fibrillation with anticoagulation on Eliquis and Tikosyn, and severe obesity with a weight of 265 pounds with a BMI of 46.94 kg/m². She also has a significant abdominal surgical history. In  she was found to have an unusual hepatic flexure mass and underwent right hemicolectomy with anastomosis at [facility] for a benign colon lesion. She developed a ventral hernia from her laparotomy incision that was repaired by Dr. Indu Lopez in  with a 10 x 14 cm Ventrio composite mesh of ePTFE and polypropylene. I personally reviewed her operative reports and CT scan. There are no prior ultrasounds or MRIs. CT does show evidence of cholelithiasis without inflammatory changes around the gallbladder. An ileocolic anastomosis is present inferior to the gallbladder. Severe obesity with a wide rectus diastases is identified as well as an off centered anterior abdominal wall prosthetic, consistent with known implanted mesh. She has remained asymptomatic at home. Her original symptoms had an emotional component as her dog passed away and one of her good friends was placed in hospice care. Past Medical History:   Diagnosis Date    Adverse effect of anesthesia     delayed awakening in the past, patient states improved after using CPAP--patient's  states better for patient to wake up on side, not back. Allergic rhinitis     Anxiety     Arthritis     OA    Asthma     Followed by Dr. Jet Miller, controlled with daily inhaler and PRN Ventolin     Depression     under control with med    Endocarditis 1992    hx 1992    Heart murmur     congenital, asymptomatic--Echo completed 2/18/19    Hematuria     History of MRSA infection on left breast    5/2016 : treated/ resolved    HLD (hyperlipidemia)      Hypertension     controlled with medication     Hypertension     Hypothyroid     controlled with medication     Hypothyroidism due to acquired atrophy of thyroid 4/28/2015    Intertrigo     Irregular heart beat     Mass of colon     Morbid obesity ([address]     48.7 bmi    Persistent atrial fibrillation ([address]     s/p ablation (2/2019), Tikosyn loading---EKG dated 3/13/19 shows \"sinus rhythm, rate 70. \" Patient is followed by [facility]. Reactive airway disease with status asthmaticus     hx only    Scoliosis 8/4/2016    Sleep apnea     cpap compliant. Varicose veins     VSD (ventricular septal defect) 07/22/2016    per echo (2/18/19) \"small PFO in the baseline state. \" Patient is followed by [facility].       Past Surgical History:   Procedure Laterality Date    ABLATION OF DYSRHYTHMIC FOCUS  2018 & 02/2019    cardioversion / ablation    APPENDECTOMY      BREAST REDUCTION SURGERY Bilateral 8/2/2016 BREAST REDUCTION/ bilateral performed by Ivory Desouza MD at [place] [address]      heart cath age 5    [address]  2018     SECTION      x2    COLONOSCOPY  ,     DILATION AND CURETTAGE OF UTERUS      multiple    HEENT  1969    jaw surgery due to underbite     HERNIA REPAIR  incisional [id number]    with mesh    LIPOMA RESECTION Right     right foot between toes    GA CARDIAC SURG PROCEDURE UNLIST  2018    cardioversion    GA CARDIAC SURG PROCEDURE UNLIST  2018    ablation    GA CARDIAC SURG PROCEDURE UNLIST  2018    cardioversion    GA CARDIAC SURG PROCEDURE UNLIST  2019    ablation    REFRACTIVE SURGERY      TOTAL COLECTOMY Right 2010 in colon removed    TOTAL KNEE ARTHROPLASTY Right 2018    TOTAL KNEE ARTHROPLASTY Left 2019     Current Outpatient Medications   Medication Sig    Hyoscyamine Sulfate SL (LEVSIN/SL) 0.125 MG SUBL Place 0.125 mg under the tongue every 4-6 hours as needed (Abdominal cramping)    Acetaminophen 325 MG CAPS Take 360 mg by mouth 3 times daily as needed    albuterol sulfate  (90 Base) MCG/ACT inhaler Inhale 2 puffs into the lungs every 6 hours as needed    amLODIPine (NORVASC) 5 MG tablet TAKE 1 TABLET BY MOUTH DAILY    apixaban (ELIQUIS) 5 MG TABS tablet Take 5 mg by mouth 2 times daily    buPROPion (WELLBUTRIN) 75 MG tablet TAKE 1 TABLET BY MOUTH 2 TIMES A DAY    Calcium Carb-Cholecalciferol 600-800 MG-UNIT CHEW Take 2 tablets by mouth daily    Cetirizine HCl 10 MG CAPS Take 10 capsules by mouth daily    dofetilide (TIKOSYN) 500 MCG capsule Take 500 mcg by mouth every 12 hours    fluticasone-salmeterol (ADVAIR DISKUS) 250-50 MCG/ACT AEPB diskus inhaler TAKE 1 PUFF BY MOUTH TWICE A DAY.  RINSE MOUTH WELL AFTER USE    levothyroxine (SYNTHROID) 125 MCG tablet Take 125 mcg by mouth every morning (before breakfast)    losartan (COZAAR) 100 MG tablet Take 100 mg by mouth daily    mometasone (NASONEX) 50 MCG/ACT nasal spray 2 sprays by Nasal route daily    montelukast (SINGULAIR) 10 MG tablet TAKE 1 TABLET BY MOUTH ONCE A DAY    ondansetron (ZOFRAN ODT) 8 MG TBDP disintegrating tablet Place 1 tablet under the tongue every 8 hours as needed for Nausea or Vomiting (Patient not taking: Reported on 7/1/2022)     No current facility-administered medications for this visit. ALLERGIES:  Latex, Ciprofloxacin, Olanzapine-fluoxetine hcl, and Cefaclor    Social History     Socioeconomic History    Marital status:      Spouse name: None    Number of children: None    Years of education: None    Highest education level: None   Occupational History    None   Tobacco Use    Smoking status: Never Smoker    Smokeless tobacco: Never Used   Substance and Sexual Activity    Alcohol use: No    Drug use: Never    Sexual activity: None   Other Topics Concern    None   Social History Narrative    None     Social Determinants of Health     Financial Resource Strain:     Difficulty of Paying Living Expenses: Not on file   Food Insecurity:     Worried About [address] in the Last Year: Not on file    [place] of Food in the Last Year: Not on file   Transportation Needs:     Lack of Transportation (Medical): Not on file    Lack of Transportation (Non-Medical):  Not on file   Physical Activity:     Days of Exercise per Week: Not on file    Minutes of Exercise per Session: Not on file   Stress:     Feeling of Stress : Not on file   Social Connections:     Frequency of Communication with Friends and Family: Not on file    Frequency of Social Gatherings with Friends and Family: Not on file    Attends Christianity Services: Not on file    Active Member of Clubs or Organizations: Not on file    Attends Club or Organization Meetings: Not on file    Marital Status: Not on file   Intimate Partner Violence:     Fear of Current or Ex-Partner: Not on file    Emotionally Abused: Not on file    Physically Abused: Not on file Sexually Abused: Not on file   Housing Stability:     Unable to Pay for Housing in the Last Year: Not on file    Number of Places Lived in the Last Year: Not on file    Unstable Housing in the Last Year: Not on file     Social History     Tobacco Use   Smoking Status Never Smoker   Smokeless Tobacco Never Used     Family History   Problem Relation Age of Onset    Colon Cancer Maternal Uncle     Thyroid Disease Daughter         [place] Magdalena' disease    Thyroid Disease Maternal Aunt         Hypothyroidism    Cancer Mother         Lymphoma    Heart Disease Brother     Breast Cancer Mother     Thyroid Disease Mother         Hypothyroidism    Heart Disease Father         s/p MI and CABG    Hypertension Father     Heart Disease Brother     Thyroid Disease Sister         Hypothyroidism    Hypertension Mother        ROS: The patient has no difficulty with chest pain or shortness of breath. No fever or chills. The patient denies any personal or family history of abnormal clotting or bleeding. Comprehensive review of systems was otherwise unremarkable except as noted above. Physical Exam:   Constitutional: Alert oriented cooperative patient in no acute distress. /80   Pulse 74   Ht 5' 3\" (1.6 m)   Wt 265 lb (120.2 kg)   SpO2 98%   BMI 46.94 kg/m²   Eyes:Sclera are clear without icterus. ENMT: no obvious neck masses, no ear or lip lesions  CV: RRR. Normal perfusion  Resp: No JVD. Breathing is  non-labored. GI: very obese, soft and non-distended, well-healed incision scar to the left of the umbilicus in the midline that extends several centimeters above the umbilicus. There appears to be a significant large area of the upper abdomen without incision scar. No right upper quadrant tenderness or Wan sign. Musculoskeletal: unremarkable with normal function.    Neuro:  No obvious focal deficits  Psychiatric: normal affect and mood, no memory impairment    Lab Results   Component Value Date/Time    WBC 8.6 biliary dilation. SPLEEN: Normal.    PANCREAS: No pancreatic mass or ductal dilation. ADRENALS: Normal.    KIDNEYS/BLADDER: The kidneys are symmetric in size. No renal calculus or  hydronephrosis. No renal mass. The urinary bladder is unremarkable. BOWEL: Scattered colonic diverticula without evidence of acute diverticulitis. Postsurgical changes of partial colectomy. Small bowel is normal in caliber. Stomach and duodenum are unremarkable. APPENDIX: The appendix is not visualized and absent. PERITONEUM/RETROPERITONEUM: No ascites or free air. No pelvic or retroperitoneal  lymphadenopathy. VESSELS: No abdominal aortic aneurysm. ABDOMINAL WALL: Ventral wall hernia repair patch. REPRODUCTIVE: Uterus is unremarkable. No adnexal mass. BONES: No suspicious osseous lesion. Levocurvature of the thoracolumbar spine. Multilevel degenerative changes of the lumbar spine. Partial fusion of L2 and  L3. Impression  1. Cholelithiasis with mild gallbladder distention. No gallbladder wall  thickening or pericholecystic fluid is evident. Findings are equivocal for acute  cholecystitis. 2.  No renal calculi or hydronephrosis. 3.  Degenerative changes and scoliotic curvature of the lumbar spine. Large abdominal mesh 11 x 14 cm Ventrio in 2012 by Dr. Indu Lopez (slightly off-center to left)  Implants:   Implant Name Type Inv. Item Serial No.  Lot No. LRB No. Used Action   MESH VENTRIO MED Naveen Fergusson W/AB RG - [id number] MESH VENTRIO MED Naveen Fergusson W/AB [id number] Jakob Palmeringjaxson [employer] [id number] Implanted        Assessment/Plan:     Zoe Madison is a 73 y.o. female who presents to the office for evaluation of cholelithiasis. She possibly has chronic cholecystitis. She has mild elevation of alkaline phosphatase which is a new finding compared to prior blood work. Her liver does appear fatty on CT scan and she has severe obesity with a BMI of 46.94 kg/m².   She has a previously operated on abdomen from a right hemicolectomy for benign reason followed by ventral incisional hernia repair with permanent mesh. Prior surgeries complicate the ability to perform cholecystectomy. I would like to repeat her blood work and obtain an MRI/MRCP to further evaluate her biliary tree. I will see her back in 10 days. If she develops worsening symptoms she may need to present to the emergency department at the [facility].  If she develops mild symptoms we will follow those up at her next visit following imaging. A copy of this note is sent to the referring physician.     Patient Active Problem List    Diagnosis Date Noted    Calculus of gallbladder with acute on chronic cholecystitis without obstruction 07/01/2022     Priority: Medium    Current use of long term anticoagulation 07/01/2022     Priority: Medium    History of incisional hernia repair 07/01/2022     Priority: Medium     2012 Dr. Jatin Lucas - Gabe Sifuentes 11 x 14 cm mesh      Status post left knee replacement 06/03/2021    Left foot pain 02/22/2020    Localized osteoarthritis of shoulder, right 02/22/2020    Primary osteoarthritis of right hand 02/22/2020    Acquired hallux valgus of left foot 02/22/2020    Acquired claw toe, left 02/22/2020    Arthritis of midfoot 02/22/2020    Spinal stenosis of lumbar region with neurogenic claudication 02/22/2020    Tendinitis of foot 02/22/2020    DDD (degenerative disc disease), lumbar 02/22/2020    Hypersomnia, unspecified 05/22/2019    OA (osteoarthritis) of knee 04/17/2019    High risk medications (not anticoagulants) long-term use 03/13/2019    Hypothyroidism following radioiodine therapy 07/09/2018    Morbid obesity with BMI of 45.0-49.9, adult ([address] 07/09/2018    Mild intermittent asthma without complication 35/19/4322    Atrial fibrillation, persistent ([address] 07/05/2018    VSD (ventricular septal defect) 07/22/2016    Anxiety     BILLIE (obstructive sleep apnea) 01/25/2016    Essential hypertension 04/28/2015    Major depressive disorder with single episode, in full remission ([facility] 04/28/2015    Mixed hyperlipidemia 04/28/2015        Level of MDM (Based on 2/3 elements below)  Number and Complexity of Problems Addressed Amount and/or Complexity of Data to be Reviewed and Analyzed  *Each unique test, order, or document contributes to the combination of 2 or combination of 3 in Category 1 below. Risk of Complications and/or Morbidity or Mortality of pt Management     58424  84269 SF Minimal  ?1self-limited or minor problem Minimal or none Minimal risk of morbidity from additional diagnostic testing or Rx   85819  25966 Low Low  ? 2or more self-limited or minor problems;    or  ? 1stable chronic illness;    or  ?1acute, uncomplicated illness or injury   Limited  (Must meet the requirements of at least 1 of the 2 categories)  Category 1: Tests and documents   ? Any combination of 2 from the following:  ?Review of prior external note(s) from each unique source*;  ?review of the result(s) of each unique test*;   ?ordering of each unique test*    or   Category 2: Assessment requiring an independent historian(s)  (For the categories of independent interpretation of tests and discussion of management or test interpretation, see moderate or high) Low risk of morbidity from additional diagnostic testing or treatment     03031  07560 Mod Moderate  ? 1or more chronic illnesses with exacerbation, progression, or side effects of treatment;    or  ?2or more stable chronic illnesses;    or      1undiagnosed new problem with uncertain prognosis;    or  ?1acute illness with systemic symptoms;    or  ?1acute complicated injury   Moderate:  (Must meet the requirements of 1 out of 3 categories)    Category 1: Tests, documents, or independent historian(s)  ? Any combination of 3 from the following:   ?Review of prior external note(s) from each unique source*;  ?Review of the result(s) of each unique test*;  ?Ordering of each unique test*;  ?Assessment requiring an independent historian(s)    or  Category 2: Independent interpretation of tests   ? Independent interpretation of a test performed by another physician/other qualified health care professional (not separately reported);     or  Category 3: Discussion of management or test interpretation  ? Discussion of management or test interpretation with external physician/other qualified health care professional/appropriate source (not separately reported)   Moderate risk of morbidity from additional diagnostic testing or treatment  Examples only: ? ? Prescription drug management - including advanced wound care prescription wound care products  (eg Iodosorb, hydrofera, silvadene, collagen, puracol plus, optiloc, biologics - placenta, Theraskin, Apligraf). Note also that if home health care is involved, they will not apply topical therapies without a prescription/order from physician    ? Decision regarding minor surgery with identified patient or procedure risk factors  ? Decision regarding elective major surgery without identified patient or procedure risk factors   ? Diagnosis or treatment significantly limited by social determinants of health       39217  76719 High High  ? 1or more chronic illnesses with severe exacerbation, progression, or side effects of treatment;    or  ?1 acute or chronic illness or injury that poses a threat to life or bodily function   Extensive  (Must meet the requirements of at least 2 out of 3 categories)    Category 1: Tests, documents, or independent historian(s)  ? Any combination of 3 from the following:   ?Review of prior external note(s) from each unique source*;  ?Review of the result(s) of each unique test*;   ?Ordering of each unique test*;   ?Assessment requiring an independent historian(s)    or   Category 2: Independent interpretation of tests   ? Independent interpretation of a test performed by another physician/other qualified health care professional (not separately reported);     or  Category 3: Discussion of management or test interpretation  ? Discussion of management or test interpretation with external physician/other qualified health care professional/appropriate source (not separately reported)   High risk of morbidity from additional diagnostic testing or treatment  Examples only:  ?Drug therapy requiring intensive monitoring for toxicity  ? Decision regarding elective major surgery with identified patient or procedure risk factors  ? Decision regarding emergency major surgery  ? Decision regarding hospitalization  ? Decision not to resuscitate or to de-escalate care because of poor prognosis               Jasvir Davison MD,  FACS

## 2022-07-05 RX ORDER — APIXABAN 5 MG/1
TABLET, FILM COATED ORAL
Qty: 180 TABLET | Refills: 1 | Status: SHIPPED | OUTPATIENT
Start: 2022-07-05

## 2022-07-07 ENCOUNTER — HOSPITAL ENCOUNTER (OUTPATIENT)
Dept: PHYSICAL THERAPY | Age: 72
Setting detail: RECURRING SERIES
Discharge: HOME | End: 2022-07-10
Payer: MEDICARE

## 2022-07-07 PROCEDURE — 97110 THERAPEUTIC EXERCISES: CPT

## 2022-07-07 PROCEDURE — 97140 MANUAL THERAPY 1/> REGIONS: CPT

## 2022-07-07 ASSESSMENT — PAIN DESCRIPTION - ORIENTATION: ORIENTATION: RIGHT

## 2022-07-07 ASSESSMENT — PAIN SCALES - GENERAL: PAINLEVEL_OUTOF10: 1

## 2022-07-07 ASSESSMENT — PAIN DESCRIPTION - LOCATION: LOCATION: NECK

## 2022-07-08 NOTE — PROGRESS NOTES
Armando Velásquez  : 1950  Primary: Medicare Part A And B  Secondary: [place] [facility] [address]  Phone: [phone]  Fax: [phone] Plan Frequency: 1x a week for 90 days    Plan of Care/Certification Expiration Date: 22      PT Visit Info: Total # of Visits to Date: 23  No Show: 0  Canceled Appointment: 1      OUTPATIENT PHYSICAL THERAPY:OP NOTE TYPE: Treatment Note 2022       Episode  }Appt Desk            ICD-10: Treatment Idell Sulyard M54.2   ICD-10 : Treatment Diagnosis: thoracic spine pain M54.6    ICD-10 : Treatment Diagnosis: arthraligia of left tempromandibular joint M26.622   Medical/Referring Diagnosis:  Arthralgia of left temporomandibular joint [M26.622]  Cervicalgia [M54.2]  Referring Physician:  Dalila Bentley, MD [facility] Orders:  PT Eval and Treat   Date of Onset:  Onset Date: 22     Allergies:   Latex, Ciprofloxacin, Olanzapine-fluoxetine hcl, and Cefaclor  Restrictions/Precautions:  Restrictions/Precautions: None  No data recorded   Interventions Planned (Treatment may consist of any combination of the following):    Current Treatment Recommendations: Strengthening; ROM; Balance training; Manual Therapy - Soft Tissue Mobilization; Manual Therapy - Joint Manipulation; Home exercise program     Subjective Comments:  Isaura Avelar notes less jaw pain and neck pain. Possibly going to have surgery on her gallbladder. Initial:}Right Neck 1/10Post Session:  Right  Neck 1/10  Medications Last Reviewed:  2022  Updated Objective Findings:  improvements noted in cervical spine ROM rotation, decreased soft tissue restrictions in bilateral medial and lateral pterygoids  Treatment   THERAPEUTIC EXERCISE: (15 minutes):    Exercises per grid below to improve mobility, strength, balance and coordination.   Required moderate visual, verbal, manual and tactile cues to promote proper body alignment, promote proper body posture, promote proper body mechanics and promote proper body breathing techniques. Progressed resistance, range, repetitions and complexity of movement as indicated. Date:   7/7/2022         Activity/Exercise  Parameters         Supine axial elongation  X 10 reps, 5 sec holds         Supine tongue on roof of mouth, open/close jaw  X 10 reps         Cervical spine ROM rotation  X 10 reps,         Supine breathing techniques  X 5 reps slow and controlled. Tongue circles          Ark/unk jaw mobility          Shoulder rolls          Breathing techniques     X 5 reps diaphragmatic breathing   Walking techniques        MANUAL THERAPY: (25 minutes):   Joint mobilization and Soft tissue mobilization was utilized and necessary because of the patient's restricted joint motion, loss of articular motion and restricted motion of soft tissue. (Used abbreviations: MET - muscle energy technique; PNF - proprioceptive neuromuscular facilitation; NMR - neuromuscular re-education; a/p - anterior to posterior; p/a - posterior to anterior, FMP - functional movement patterns, SF - Superficial Fascia; BC - Bony contours; MP - muscle play; IASTM - instrument-assisted soft tissue mobilization)  · Supine anterior chest soft tissue mobilization with breathing techniques. ·   Supine anterior cervical spine soft tissue mobilization with FMP of cervical spine rotation. ·   Supine bilateral SCM soft tissue mobilization with cervical spine AROM rotation. ·   Supine facial soft tissue mobilization: masseter and temporalis.   ·   Supine cervical spine manual traction and suboccipital release. ·   Supine soft tissue mobilization to bilateral upper trapezius and levator scapula. ·   Supine internal soft tissue mobilization to left lateral pterygoid and masster      Treatment/Session Summary:    · Treatment Assessment:  Improvements noted in cervical spine ROM and jaw mobility.  Plans to work independently for 1 month. · Communication/Consultation:  None today  · Equipment provided today:  None  · Recommendations/Intent for next treatment session: Next visit will focus on soft tissue mobilization of cervical and thoracic spine, thoracic spine and costal cage mobility, breathing techniques and postural stability.     Total Treatment Billable Duration:  40 minutes   Time In: 1430  Time Out: 9542    Jazzy Tovarsaira Mora, PT       Charge Capture  }Post Session Pain  [facility] Portal  MD Guidelines  Scanned Media  Benefits  MyChart    Future Appointments   Date Time Provider Radhika Henry   7/14/2022  8:00 PM [facility] MRI UNIT 1 SFERMRI [facility]   7/18/2022  3:30 PM Lauren Klein MD CSA [facility]   7/27/2022  1:40 PM Lieutenant Citlaly APRN - CNP PSCD [facility]   8/4/2022  3:15 PM Alvin Junior MD UCDG [facility]   2/21/2023  9:00 AM Elen Vick MD MLMIM [facility]   4/27/2023 11:00 AM Jesu Garcia MD END [facility]   5/5/2023  8:40 AM Janell Benites MD PPS [facility]

## 2022-07-14 ENCOUNTER — HOSPITAL ENCOUNTER (OUTPATIENT)
Dept: MRI IMAGING | Age: 72
Discharge: HOME | End: 2022-07-17
Payer: MEDICARE

## 2022-07-14 ENCOUNTER — TELEPHONE (OUTPATIENT)
Dept: SURGERY | Age: 72
End: 2022-07-14

## 2022-07-14 DIAGNOSIS — Z87.19 HISTORY OF INCISIONAL HERNIA REPAIR: ICD-10-CM

## 2022-07-14 DIAGNOSIS — Z98.890 HISTORY OF INCISIONAL HERNIA REPAIR: ICD-10-CM

## 2022-07-14 DIAGNOSIS — E66.01 MORBID OBESITY WITH BMI OF 45.0-49.9, ADULT (HCC): ICD-10-CM

## 2022-07-14 DIAGNOSIS — K80.12 CALCULUS OF GALLBLADDER WITH ACUTE ON CHRONIC CHOLECYSTITIS WITHOUT OBSTRUCTION: Primary | ICD-10-CM

## 2022-07-14 DIAGNOSIS — K80.12 CALCULUS OF GALLBLADDER WITH ACUTE ON CHRONIC CHOLECYSTITIS WITHOUT OBSTRUCTION: ICD-10-CM

## 2022-07-14 PROCEDURE — 74183 MRI ABD W/O CNTR FLWD CNTR: CPT

## 2022-07-14 PROCEDURE — A9579 GAD-BASE MR CONTRAST NOS,1ML: HCPCS | Performed by: SURGERY

## 2022-07-14 PROCEDURE — 6360000004 HC RX CONTRAST MEDICATION: Performed by: SURGERY

## 2022-07-14 RX ADMIN — GADOTERIDOL 24 ML: 279.3 INJECTION, SOLUTION INTRAVENOUS at 20:36

## 2022-07-14 NOTE — TELEPHONE ENCOUNTER
I let the patient know that the lab called and let us know that the blood was not spun down correctly due to equipment and they recommended that they be repeated. She will have them redrawn.

## 2022-07-15 DIAGNOSIS — K80.12 CALCULUS OF GALLBLADDER WITH ACUTE ON CHRONIC CHOLECYSTITIS WITHOUT OBSTRUCTION: ICD-10-CM

## 2022-07-15 LAB
BASOPHILS # BLD: 0 K/UL (ref 0–0.2)
BASOPHILS NFR BLD: 1 % (ref 0–2)
DIFFERENTIAL METHOD BLD: ABNORMAL
EOSINOPHIL # BLD: 0.2 K/UL (ref 0–0.8)
EOSINOPHIL NFR BLD: 3 % (ref 0.5–7.8)
ERYTHROCYTE [DISTWIDTH] IN BLOOD BY AUTOMATED COUNT: 13.5 % (ref 11.9–14.6)
HCT VFR BLD AUTO: 44.4 % (ref 35.8–46.3)
HGB BLD-MCNC: 13.9 G/DL (ref 11.7–15.4)
IMM GRANULOCYTES # BLD AUTO: 0 K/UL (ref 0–0.5)
IMM GRANULOCYTES NFR BLD AUTO: 0 % (ref 0–5)
LYMPHOCYTES # BLD: 1.7 K/UL (ref 0.5–4.6)
LYMPHOCYTES NFR BLD: 31 % (ref 13–44)
MCH RBC QN AUTO: 30.7 PG (ref 26.1–32.9)
MCHC RBC AUTO-ENTMCNC: 31.3 G/DL (ref 31.4–35)
MCV RBC AUTO: 98 FL (ref 79.6–97.8)
MONOCYTES # BLD: 0.6 K/UL (ref 0.1–1.3)
MONOCYTES NFR BLD: 11 % (ref 4–12)
NEUTS SEG # BLD: 3 K/UL (ref 1.7–8.2)
NEUTS SEG NFR BLD: 54 % (ref 43–78)
NRBC # BLD: 0 K/UL (ref 0–0.2)
PLATELET # BLD AUTO: 223 K/UL (ref 150–450)
PMV BLD AUTO: 11.5 FL (ref 9.4–12.3)
RBC # BLD AUTO: 4.53 M/UL (ref 4.05–5.2)
WBC # BLD AUTO: 5.5 K/UL (ref 4.3–11.1)

## 2022-07-26 NOTE — PROGRESS NOTES
[address] [place] [address] Court, [address] [facility]  [phone]    Patient Name:  Holden Dumont  YOB: 1950      Office Visit 7/27/2022    CHIEF COMPLAINT:    Chief Complaint   Patient presents with    Sleep Apnea         HISTORY OF PRESENT ILLNESS:      Patient is a 72 yo female seen today for follow up of BILLIE. She is prescribed cpap therapy with a humidifier set at 11cm with a full face mask. Most recent download reveals AHI on PAP therapy is 0.7, leak is 17.9 and the hourly usage is 8 hours 14 minutes nightly. The overall use is 3007 hours with days greater than four hours at 364/365. The patient is compliant with the Pap therapy and is feeling better as a result. She reports she has been feeling well. Takes 1 nap during the day around 230 pm usually because when she visits her daughter she stays up later than normal. Denies feeling tired during the day. States she wakes up one time during the night to use the bathroom. Denies having difficulty falling asleep and bedtime schedule is from 11pm - 6am. Reports that she sleeps on her right side and cannot tolerate sleeping on her left side or back. Drinks coffee in the morning time but not in the afternoon. Denies any discomfort or problems with her mask. Reports that she had Covid and TMJ in January of this year and was having some ringing in her ears. She called and had her pressure reduced to 11 cm and it helped. Her weight has remained the the same and she had a \"gallbladder\" attack about 1 month ago. States she had a CT and saw surgery but they are trying to decide if the risk of surgery is too great due her past medical hx and being on Eliquis. Has some leg and ankle swelling but relates it to recent travel and long hours in the car along with long hx of varicose veins and is supposed to have some injections done for varicose veins within the next month. BP controlled today.            Past Medical History:   Diagnosis Date    Adverse effect of anesthesia     delayed awakening in the past, patient states improved after using CPAP--patient's  states better for patient to wake up on side, not back. Allergic rhinitis     Anxiety     Arthritis     OA    Asthma     Followed by Dr. Mingo Arora, controlled with daily inhaler and PRN Ventolin     Depression     under control with med    Endocarditis 1992    hx 1992    Heart murmur     congenital, asymptomatic--Echo completed 2/18/19    Hematuria     History of MRSA infection on left breast    5/2016 : treated/ resolved    HLD (hyperlipidemia)      Hypertension     controlled with medication     Hypertension     Hypothyroid     controlled with medication     Hypothyroidism due to acquired atrophy of thyroid 4/28/2015    Intertrigo     Irregular heart beat     Mass of colon     Morbid obesity ([address]     48.7 bmi    Persistent atrial fibrillation ([address]     s/p ablation (2/2019), Tikosyn loading---EKG dated 3/13/19 shows \"sinus rhythm, rate 70. \" Patient is followed by [facility]. Reactive airway disease with status asthmaticus     hx only    Scoliosis 8/4/2016    Sleep apnea     cpap compliant. Varicose veins     VSD (ventricular septal defect) 07/22/2016    per echo (2/18/19) \"small PFO in the baseline state. \" Patient is followed by [facility].           Patient Active Problem List   Diagnosis    Left foot pain    Localized osteoarthritis of shoulder, right    Essential hypertension    VSD (ventricular septal defect)    Major depressive disorder with single episode, in full remission ([address]    Mixed hyperlipidemia    Hypersomnia, unspecified    Hypothyroidism following radioiodine therapy    Primary osteoarthritis of right hand    Acquired hallux valgus of left foot    Acquired claw toe, left    Arthritis of midfoot    Spinal stenosis of lumbar region with neurogenic claudication    High risk medications (not anticoagulants) long-term use Topics Concern    Not on file   Social History Narrative    Not on file     Social Determinants of Health     Financial Resource Strain: Not on file   Food Insecurity: Not on file   Transportation Needs: Not on file   Physical Activity: Not on file   Stress: Not on file   Social Connections: Not on file   Intimate Partner Violence: Not on file   Housing Stability: Not on file         Family History   Problem Relation Age of Onset    Colon Cancer Maternal Uncle     Thyroid Disease Daughter         Dallin Bal' disease    Thyroid Disease Maternal Aunt         Hypothyroidism    Cancer Mother         Lymphoma    Heart Disease Brother     Breast Cancer Mother     Thyroid Disease Mother         Hypothyroidism    Heart Disease Father         s/p MI and CABG    Hypertension Father     Heart Disease Brother     Thyroid Disease Sister         Hypothyroidism    Hypertension Mother          Allergies   Allergen Reactions    Latex Other (See Comments)     Redness to site    Ciprofloxacin Other (See Comments)     Reacts with Afib meds    Olanzapine-Fluoxetine Hcl Other (See Comments)     Causes Severe pain     Cefaclor Rash         Current Outpatient Medications   Medication Sig    ELIQUIS 5 MG TABS tablet TAKE 1 TABLET BY MOUTH TWICE A DAY    Hyoscyamine Sulfate SL (LEVSIN/SL) 0.125 MG SUBL Place 0.125 mg under the tongue every 4-6 hours as needed (Abdominal cramping)    Acetaminophen 325 MG CAPS Take 360 mg by mouth 3 times daily as needed    albuterol sulfate  (90 Base) MCG/ACT inhaler Inhale 2 puffs into the lungs every 6 hours as needed    amLODIPine (NORVASC) 5 MG tablet TAKE 1 TABLET BY MOUTH DAILY    buPROPion (WELLBUTRIN) 75 MG tablet TAKE 1 TABLET BY MOUTH 2 TIMES A DAY    Calcium Carb-Cholecalciferol 600-800 MG-UNIT CHEW Take 2 tablets by mouth daily    Cetirizine HCl 10 MG CAPS Take 10 capsules by mouth daily    dofetilide (TIKOSYN) 500 MCG capsule Take 500 mcg by mouth every 12 hours    fluticasone-salmeterol (ADVAIR) 250-50 MCG/ACT AEPB diskus inhaler TAKE 1 PUFF BY MOUTH TWICE A DAY. RINSE MOUTH WELL AFTER USE    levothyroxine (SYNTHROID) 125 MCG tablet Take 125 mcg by mouth every morning (before breakfast)    losartan (COZAAR) 100 MG tablet Take 100 mg by mouth daily    mometasone (NASONEX) 50 MCG/ACT nasal spray 2 sprays by Nasal route daily    montelukast (SINGULAIR) 10 MG tablet TAKE 1 TABLET BY MOUTH ONCE A DAY    ondansetron (ZOFRAN ODT) 8 MG TBDP disintegrating tablet Place 1 tablet under the tongue every 8 hours as needed for Nausea or Vomiting (Patient not taking: Reported on 7/27/2022)     No current facility-administered medications for this visit. REVIEW OF SYSTEMS:   CONSTITUTIONAL:   There is no history of fever, chills, night sweats, weight loss, weight gain, persistent fatigue, or lethargy/hypersomnolence. CARDIAC:   No chest pain, pressure, discomfort, palpitations, orthopnea, murmurs, or edema. GI:   No dysphagia, heartburn reflux, nausea/vomiting, diarrhea, abdominal pain, or bleeding. NEURO:   There is no history of AMS, persistent headache, decreased level of consciousness, seizures, or motor or sensory deficits. PHYSICAL EXAM:    Vitals:    07/27/22 1352   BP: 126/80   Pulse: 75   Resp: 15   Temp: 97.3 °F (36.3 °C)   SpO2: 96%   Weight: 266 lb 12.8 oz (121 kg)   Height: 5' 3\" (1.6 m)           GENERAL APPEARANCE:   The patient is over weight and in no respiratory distress. HEENT:   PERRL. Conjunctivae unremarkable. Nasal mucosa is without epistaxis, exudate, or polyps. Gums and dentition are unremarkable. There is no oropharyngeal narrowing. TMs are clear. NECK/LYMPHATIC:   Symmetrical with no elevation of jugular venous pulsation. Trachea midline. No thyroid enlargement. No cervical adenopathy. LUNGS:   Normal respiratory effort with symmetrical lung expansion. Breath sounds clear. HEART:   There is a regular rate and rhythm. No rub or gallop.   There is 1+ edema in the lower extremities. Murmur present. ABDOMEN:   Soft and non-tender. No hepatosplenomegaly. Bowel sounds are normal.     NEURO:   The patient is alert and oriented to person, place, and time. Memory appears intact and mood is normal.  No gross sensorimotor deficits are present. ASSESSMENT:  (Medical Decision Making)       ICD-10-CM    1. BILLIE (obstructive sleep apnea)  G47.33 DME - DURABLE MEDICAL EQUIPMENT   AHI well controlled. Patient is tolerating and benefiting from PAP therapy. 2. Class 3 severe obesity with body mass index (BMI) of 45.0 to 49.9 in adult, unspecified obesity type, unspecified whether serious comorbidity present ([facility]  E66.01     Z68.42    Weight reduction encouraged. PLAN:  Continue CPAP with nightly compliance. Renew supplies today. Follow up in 1 year or sooner if needed. Orders Placed This Encounter   Procedures    [facility] [address] [facility]  Phone: [email or web address]@    Patient Name: Casper Garcia  : 1950  Gender: female  Address: [address] [facility] [place] [address]  Last 4 digits of SSN: [unfilled]    [facility] Insurance: Payor: [employer] / Plan: MEDICARE PART A AND B / Product Type: *No Product type* /   Subscriber ID: [id number] - (Medicare)      AMB Supply Order  Order Details     DME Location: [facility]   Order Date: 2022   There were no encounter diagnoses.              (  X   )Supplies Needed       CPAP Machine   (     ) CPAP Unit  (     ) Auto CPAP Unit  (     ) BiLevel Unit  (     ) Auto BiLevel Unit  (     ) ASV   (     ) Bilevel ST      Length of need: 12 months    Pressure:  11 cmH20  EPR:      Starting Ramp Pressure:   cm H20  Ramp Time: min      Patient had a diagnostic Apnea Hypopnea Index (AHI) of :  118.3  *SUPPLIES* Replace all as needed, or per coverage guidelines     Masks Type:  ( x   ) -Full Face

## 2022-07-27 ENCOUNTER — OFFICE VISIT (OUTPATIENT)
Dept: SLEEP MEDICINE | Age: 72
End: 2022-07-27
Payer: MEDICARE

## 2022-07-27 VITALS
OXYGEN SATURATION: 96 % | RESPIRATION RATE: 15 BRPM | TEMPERATURE: 97.3 F | WEIGHT: 266.8 LBS | HEIGHT: 63 IN | SYSTOLIC BLOOD PRESSURE: 126 MMHG | DIASTOLIC BLOOD PRESSURE: 80 MMHG | BODY MASS INDEX: 47.27 KG/M2 | HEART RATE: 75 BPM

## 2022-07-27 DIAGNOSIS — G47.33 OSA (OBSTRUCTIVE SLEEP APNEA): Primary | ICD-10-CM

## 2022-07-27 DIAGNOSIS — E66.01 CLASS 3 SEVERE OBESITY WITH BODY MASS INDEX (BMI) OF 45.0 TO 49.9 IN ADULT, UNSPECIFIED OBESITY TYPE, UNSPECIFIED WHETHER SERIOUS COMORBIDITY PRESENT (HCC): ICD-10-CM

## 2022-07-27 PROCEDURE — 99213 OFFICE O/P EST LOW 20 MIN: CPT | Performed by: NURSE PRACTITIONER

## 2022-07-27 PROCEDURE — 1123F ACP DISCUSS/DSCN MKR DOCD: CPT | Performed by: NURSE PRACTITIONER

## 2022-07-27 ASSESSMENT — SLEEP AND FATIGUE QUESTIONNAIRES
HOW LIKELY ARE YOU TO NOD OFF OR FALL ASLEEP IN A CAR, WHILE STOPPED FOR A FEW MINUTES IN TRAFFIC: 0
HOW LIKELY ARE YOU TO NOD OFF OR FALL ASLEEP WHILE SITTING AND TALKING TO SOMEONE: 0
ESS TOTAL SCORE: 0
HOW LIKELY ARE YOU TO NOD OFF OR FALL ASLEEP WHILE SITTING INACTIVE IN A PUBLIC PLACE: 0
HOW LIKELY ARE YOU TO NOD OFF OR FALL ASLEEP WHILE SITTING QUIETLY AFTER LUNCH WITHOUT ALCOHOL: 0
HOW LIKELY ARE YOU TO NOD OFF OR FALL ASLEEP WHILE SITTING AND READING: 0
HOW LIKELY ARE YOU TO NOD OFF OR FALL ASLEEP WHEN YOU ARE A PASSENGER IN A CAR FOR AN HOUR WITHOUT A BREAK: 0
HOW LIKELY ARE YOU TO NOD OFF OR FALL ASLEEP WHILE LYING DOWN TO REST IN THE AFTERNOON WHEN CIRCUMSTANCES PERMIT: 0
HOW LIKELY ARE YOU TO NOD OFF OR FALL ASLEEP WHILE WATCHING TV: 0

## 2022-07-27 NOTE — PATIENT INSTRUCTIONS
Continue CPAP with nightly compliance. Renew supplies today. Follow up in 1 year or sooner if needed.

## 2022-08-01 ENCOUNTER — OFFICE VISIT (OUTPATIENT)
Dept: SURGERY | Age: 72
End: 2022-08-01

## 2022-08-01 VITALS — HEIGHT: 63 IN | BODY MASS INDEX: 46.78 KG/M2 | WEIGHT: 264 LBS

## 2022-08-01 DIAGNOSIS — E66.01 MORBID OBESITY WITH BMI OF 45.0-49.9, ADULT (HCC): ICD-10-CM

## 2022-08-01 DIAGNOSIS — Z79.01 CURRENT USE OF LONG TERM ANTICOAGULATION: ICD-10-CM

## 2022-08-01 DIAGNOSIS — K80.12 CALCULUS OF GALLBLADDER WITH ACUTE ON CHRONIC CHOLECYSTITIS WITHOUT OBSTRUCTION: Primary | ICD-10-CM

## 2022-08-01 DIAGNOSIS — Z98.890 HISTORY OF INCISIONAL HERNIA REPAIR: ICD-10-CM

## 2022-08-01 DIAGNOSIS — Z87.19 HISTORY OF INCISIONAL HERNIA REPAIR: ICD-10-CM

## 2022-08-04 ENCOUNTER — OFFICE VISIT (OUTPATIENT)
Dept: CARDIOLOGY CLINIC | Age: 72
End: 2022-08-04
Payer: MEDICARE

## 2022-08-04 VITALS
SYSTOLIC BLOOD PRESSURE: 124 MMHG | DIASTOLIC BLOOD PRESSURE: 88 MMHG | BODY MASS INDEX: 46.42 KG/M2 | HEIGHT: 63 IN | WEIGHT: 262 LBS | HEART RATE: 84 BPM

## 2022-08-04 DIAGNOSIS — I10 ESSENTIAL HYPERTENSION: ICD-10-CM

## 2022-08-04 DIAGNOSIS — I48.19 ATRIAL FIBRILLATION, PERSISTENT (HCC): Primary | ICD-10-CM

## 2022-08-04 PROCEDURE — 1090F PRES/ABSN URINE INCON ASSESS: CPT | Performed by: INTERNAL MEDICINE

## 2022-08-04 PROCEDURE — G8399 PT W/DXA RESULTS DOCUMENT: HCPCS | Performed by: INTERNAL MEDICINE

## 2022-08-04 PROCEDURE — G8417 CALC BMI ABV UP PARAM F/U: HCPCS | Performed by: INTERNAL MEDICINE

## 2022-08-04 PROCEDURE — 93000 ELECTROCARDIOGRAM COMPLETE: CPT | Performed by: INTERNAL MEDICINE

## 2022-08-04 PROCEDURE — 1036F TOBACCO NON-USER: CPT | Performed by: INTERNAL MEDICINE

## 2022-08-04 PROCEDURE — G8427 DOCREV CUR MEDS BY ELIG CLIN: HCPCS | Performed by: INTERNAL MEDICINE

## 2022-08-04 PROCEDURE — 1123F ACP DISCUSS/DSCN MKR DOCD: CPT | Performed by: INTERNAL MEDICINE

## 2022-08-04 PROCEDURE — 3017F COLORECTAL CA SCREEN DOC REV: CPT | Performed by: INTERNAL MEDICINE

## 2022-08-04 PROCEDURE — 99214 OFFICE O/P EST MOD 30 MIN: CPT | Performed by: INTERNAL MEDICINE

## 2022-08-04 RX ORDER — FUROSEMIDE 20 MG/1
20 TABLET ORAL DAILY PRN
Qty: 30 TABLET | Refills: 1 | Status: SHIPPED | OUTPATIENT
Start: 2022-08-04

## 2022-08-04 RX ORDER — VALACYCLOVIR HYDROCHLORIDE 1 G/1
TABLET, FILM COATED ORAL
COMMUNITY
Start: 2022-06-17 | End: 2022-09-23 | Stop reason: ALTCHOICE

## 2022-08-04 NOTE — PROGRESS NOTES
[address] Skylar Bejarano  [address]  PHONE: [phone]  1950    SUBJECTIVE:   Ratna Gomez is a 73 y.o. female seen for a follow up visit regarding the following:     No chief complaint on file. HPI:    Ratna Gomez is a very pleasant 73 y.o. female with a past medical and cardiac history significant for atrial fibrillation s/p afib ablation and recurrent AF s/p failed DCCV and has been maintained on tikosyn. Pt is doing well, no chest pain, significant palpitations, presyncope or syncope. Exercising and no exertional symptoms. Having some dependent edema. Cardiac PMH: (Old records have been reviewed and summarized below)    Reviewed office note Dr. Mars Raymond 8/1/22     Past Medical History, Past Surgical History, Family history, Social History, and Medications were all reviewed with the patient today and updated as necessary. Current Outpatient Medications   Medication Sig Dispense Refill    valACYclovir (VALTREX) 1 g tablet Indications: PRN ONLY      furosemide (LASIX) 20 MG tablet Take 1 tablet by mouth daily as needed (swelling) 30 tablet 1    ELIQUIS 5 MG TABS tablet TAKE 1 TABLET BY MOUTH TWICE A  tablet 1    Acetaminophen 325 MG CAPS Take 360 mg by mouth 3 times daily as needed      albuterol sulfate  (90 Base) MCG/ACT inhaler Inhale 2 puffs into the lungs every 6 hours as needed      amLODIPine (NORVASC) 5 MG tablet TAKE 1 TABLET BY MOUTH DAILY      buPROPion (WELLBUTRIN) 75 MG tablet TAKE 1 TABLET BY MOUTH 2 TIMES A DAY      Calcium Carb-Cholecalciferol 600-800 MG-UNIT CHEW Take 2 tablets by mouth daily      Cetirizine HCl 10 MG CAPS Take 10 capsules by mouth daily      dofetilide (TIKOSYN) 500 MCG capsule Take 500 mcg by mouth every 12 hours      fluticasone-salmeterol (ADVAIR) 250-50 MCG/ACT AEPB diskus inhaler TAKE 1 PUFF BY MOUTH TWICE A DAY.  RINSE MOUTH WELL AFTER USE      levothyroxine (SYNTHROID) 125 MCG tablet Take 125 mcg by mouth every morning (before breakfast)      losartan (COZAAR) 100 MG tablet Take 100 mg by mouth daily      mometasone (NASONEX) 50 MCG/ACT nasal spray 2 sprays by Nasal route daily      montelukast (SINGULAIR) 10 MG tablet TAKE 1 TABLET BY MOUTH ONCE A DAY       No current facility-administered medications for this visit. Allergies   Allergen Reactions    Latex Other (See Comments)     Redness to site    Ciprofloxacin Other (See Comments)     Reacts with Afib meds    Olanzapine-Fluoxetine Hcl Other (See Comments)     Causes Severe pain     Cefaclor Rash     [unfilled]  Past Medical History:   Diagnosis Date    Adverse effect of anesthesia     delayed awakening in the past, patient states improved after using CPAP--patient's  states better for patient to wake up on side, not back. Allergic rhinitis     Anxiety     Arthritis     OA    Asthma     Followed by Dr. Lenard Dumont, controlled with daily inhaler and PRN Ventolin     Depression     under control with med    Endocarditis 1992    hx 1992    Heart murmur     congenital, asymptomatic--Echo completed 2/18/19    Hematuria     History of MRSA infection on left breast    5/2016 : treated/ resolved    HLD (hyperlipidemia)      Hypertension     controlled with medication     Hypertension     Hypothyroid     controlled with medication     Hypothyroidism due to acquired atrophy of thyroid 4/28/2015    Intertrigo     Irregular heart beat     Mass of colon     Morbid obesity ([address]     48.7 bmi    Persistent atrial fibrillation ([address]     s/p ablation (2/2019), Tikosyn loading---EKG dated 3/13/19 shows \"sinus rhythm, rate 70. \" Patient is followed by [facility]. Reactive airway disease with status asthmaticus     hx only    Scoliosis 8/4/2016    Sleep apnea     cpap compliant. Varicose veins     VSD (ventricular septal defect) 07/22/2016    per echo (2/18/19) \"small PFO in the baseline state. \" Patient is followed by [facility] [place] [facility].       Past Surgical History:   Procedure Laterality Date    ABLATION OF DYSRHYTHMIC FOCUS   & 2019    cardioversion / ablation    APPENDECTOMY      BREAST REDUCTION SURGERY Bilateral 2016    BREAST REDUCTION/ bilateral performed by Ronny Rivero MD at [place] [address]      heart cath age 5    [address]  2018     SECTION      x2    COLONOSCOPY  ,     DILATION AND CURETTAGE OF UTERUS      multiple    HEENT  1969    jaw surgery due to underbite     HERNIA REPAIR  incisional [id number]    with mesh    LIPOMA RESECTION Right     right foot between toes    HI CARDIAC SURG PROCEDURE UNLIST  2018    cardioversion    HI CARDIAC SURG PROCEDURE UNLIST  2018    ablation    HI CARDIAC SURG PROCEDURE UNLIST  2018    cardioversion    HI CARDIAC SURG PROCEDURE UNLIST  2019    ablation    REFRACTIVE SURGERY      TOTAL COLECTOMY Right 2010 in colon removed    TOTAL KNEE ARTHROPLASTY Right 2018    TOTAL KNEE ARTHROPLASTY Left 2019     Family History   Problem Relation Age of Onset    Colon Cancer Maternal Uncle     Thyroid Disease Daughter         Graves' disease    Thyroid Disease Maternal Aunt         Hypothyroidism    Cancer Mother         Lymphoma    Heart Disease Brother     Breast Cancer Mother     Thyroid Disease Mother         Hypothyroidism    Heart Disease Father         s/p MI and CABG    Hypertension Father     Heart Disease Brother     Thyroid Disease Sister         Hypothyroidism    Hypertension Mother      Social History     Tobacco Use    Smoking status: Never    Smokeless tobacco: Never   Substance Use Topics    Alcohol use: No       PHYSICAL EXAM:    /88   Pulse 84   Ht 5' 3\" (1.6 m)   Wt 262 lb (118.8 kg)   BMI 46.41 kg/m²      Wt Readings from Last 5 Encounters:   22 262 lb (118.8 kg)   22 264 lb (119.7 kg)   22 266 lb 12.8 oz (121 kg)   22 265 lb (120.2 kg) eliquis 5mg, no bleeding problems      3. Tikosyn: QTc acceptable, cont tikosyn 500 mcg Q12H, labs stable, normal creatining      4. HTN: controlled, cont losartan 100mg, amlodipine 5mg    5. ISMAEL: discussed low salt diet, exercise, lasix PRN    Patient has been instructed and agrees to call our office with any issues or other concerns related to their cardiac condition(s) and/or complaint(s). Return in about 6 months (around 2/4/2023). Seymour Ojeda MD, MS  Cardiology/Electrophysiology  8/4/2022  3:36 PM

## 2022-08-29 RX ORDER — FLUTICASONE PROPIONATE AND SALMETEROL 50; 250 UG/1; UG/1
POWDER RESPIRATORY (INHALATION)
Qty: 1 EACH | Refills: 10 | Status: SHIPPED | OUTPATIENT
Start: 2022-08-29

## 2022-09-07 ENCOUNTER — APPOINTMENT (RX ONLY)
Dept: URBAN - METROPOLITAN AREA CLINIC 23 | Facility: CLINIC | Age: 72
Setting detail: DERMATOLOGY
End: 2022-09-07

## 2022-09-07 DIAGNOSIS — L0390 CELLULITIS AND ABSCESS OF UNSPECIFIED SITES: ICD-10-CM

## 2022-09-07 DIAGNOSIS — L0391 CELLULITIS AND ABSCESS OF UNSPECIFIED SITES: ICD-10-CM

## 2022-09-07 DIAGNOSIS — L57.0 ACTINIC KERATOSIS: ICD-10-CM | Status: INADEQUATELY CONTROLLED

## 2022-09-07 PROBLEM — L02.412 CUTANEOUS ABSCESS OF LEFT AXILLA: Status: ACTIVE | Noted: 2022-09-07

## 2022-09-07 PROCEDURE — ? OTHER

## 2022-09-07 PROCEDURE — ? REFERRAL

## 2022-09-07 PROCEDURE — ? PRESCRIPTION

## 2022-09-07 PROCEDURE — ? INCISION AND DRAINAGE

## 2022-09-07 PROCEDURE — ? COUNSELING

## 2022-09-07 PROCEDURE — 10060 I&D ABSCESS SIMPLE/SINGLE: CPT

## 2022-09-07 PROCEDURE — 99214 OFFICE O/P EST MOD 30 MIN: CPT | Mod: 25

## 2022-09-07 PROCEDURE — ? ORDER TESTS

## 2022-09-07 RX ORDER — DOXYCYCLINE HYCLATE 100 MG/1
TABLET, COATED ORAL
Qty: 20 | Refills: 0 | Status: ERX | COMMUNITY
Start: 2022-09-07

## 2022-09-07 RX ADMIN — DOXYCYCLINE HYCLATE: 100 TABLET, COATED ORAL at 00:00

## 2022-09-07 ASSESSMENT — LOCATION ZONE DERM
LOCATION ZONE: AXILLAE
LOCATION ZONE: FINGER

## 2022-09-07 ASSESSMENT — LOCATION SIMPLE DESCRIPTION DERM
LOCATION SIMPLE: RIGHT RING FINGER
LOCATION SIMPLE: LEFT AXILLARY VAULT

## 2022-09-07 ASSESSMENT — LOCATION DETAILED DESCRIPTION DERM
LOCATION DETAILED: PERIUNGUAL SKIN RIGHT RING FINGER
LOCATION DETAILED: LEFT AXILLARY VAULT

## 2022-09-07 NOTE — PROCEDURE: INCISION AND DRAINAGE
Lesion Type: Furuncle
Drainage Amount?: moderate
Render Postcare In Note?: No
Wound Care: Vaseline
Method: 11 blade
Size Of Lesion In Cm (Optional But May Be Required For Some Insurances): 0
Detail Level: Detailed
Dressing: dry sterile dressing
Suture Text: The incision was partially closed with
Anesthesia Volume In Cc: 0.5
Anesthesia Type: 1% lidocaine with 1:200,000 epinephrine and a 1:10 solution of 8.4% sodium bicarbonate
Preparation Text: The area was prepped in the usual clean fashion.
Post-Care Instructions: I reviewed with the patient in detail post-care instructions. Patient should keep wound covered and call the office should any redness, pain, swelling or worsening occur.
Curette Text (Optional): After the contents were expressed a curette was used to partially remove the cyst wall.
Epidermal Closure: simple interrupted
Epidermal Sutures: 4-0 Ethilon
Drainage Type?: bloody and cyst-like
Consent was obtained and risks were reviewed including but not limited to delayed wound healing, infection, need for multiple I and D's, and pain.

## 2022-09-07 NOTE — PROCEDURE: OTHER
Render Risk Assessment In Note?: no
Other (Free Text): “At Least Hypertrophic Actinic Keratosis with Verrucoid Features” is what path showed. 5FU Compound did not clear it, and she has failed numerous LN treatments prior so will refer to MOHS with .
Note Text (......Xxx Chief Complaint.): This diagnosis correlates with the
Detail Level: Simple

## 2022-09-07 NOTE — HPI: CYST (MILIA)
How Severe Is It?: moderate
Is This A New Presentation, Or A Follow-Up?: Cyst
Additional History: \\n\\nPrevious History of MRSA

## 2022-09-14 ENCOUNTER — RX ONLY (OUTPATIENT)
Age: 72
Setting detail: RX ONLY
End: 2022-09-14

## 2022-09-14 ENCOUNTER — TELEPHONE (OUTPATIENT)
Dept: CARDIOLOGY CLINIC | Age: 72
End: 2022-09-14

## 2022-09-14 RX ORDER — GENTAMICIN SULFATE 1 MG/G
OINTMENT TOPICAL
Qty: 15 | Refills: 1 | Status: ERX | COMMUNITY
Start: 2022-09-14

## 2022-09-14 RX ORDER — CIPROFLOXACIN HYDROCHLORIDE 500 MG/1
TABLET, FILM COATED ORAL
Qty: 20 | Refills: 0 | Status: ERX | COMMUNITY
Start: 2022-09-14

## 2022-09-16 ENCOUNTER — NURSE ONLY (OUTPATIENT)
Dept: CARDIOLOGY CLINIC | Age: 72
End: 2022-09-16
Payer: MEDICARE

## 2022-09-16 DIAGNOSIS — I10 ESSENTIAL HYPERTENSION: Primary | ICD-10-CM

## 2022-09-16 PROCEDURE — 93000 ELECTROCARDIOGRAM COMPLETE: CPT | Performed by: INTERNAL MEDICINE

## 2022-09-19 NOTE — DISCHARGE INSTRUCTIONS
DISPOSITION: The patient is being discharged home in stable condition on a low saturated fat, low cholesterol and low salt diet. The patient is instructed to advance activities as tolerated to the limit of fatigue or shortness of breath. The patient is instructed to avoid all heavy lifting, straining, stooping or squatting for 3-5 days. The patient is instructed to watch the cath site for bleeding/oozing; if seen, the patient is instructed to apply firm pressure with a clean cloth and call [facility]iology at [phone]. The patient is instructed to watch for signs of infection which include: increasing area of redness, fever/hot to touch or purulent drainage at the catheterization site. The patient is instructed not to soak in a bathtub for 7-10 days, but is cleared to shower.             Atrial Fibrillation: Care Instructions  Your Care Instructions    Atrial fibrillation is an irregular and often fast heartbeat. Treating this condition is important for several reasons. It can cause blood clots, which can travel from your heart to your brain and cause a stroke. If you have a fast heartbeat, you may feel lightheaded, dizzy, and weak. An irregular heartbeat can also increase your risk for heart failure. Atrial fibrillation is often the result of another heart condition, such as high blood pressure or coronary artery disease. Making changes to improve your heart condition will help you stay healthy and active. Follow-up care is a key part of your treatment and safety. Be sure to make and go to all appointments, and call your doctor if you are having problems. It's also a good idea to know your test results and keep a list of the medicines you take. How can you care for yourself at home? Medicines    · Take your medicines exactly as prescribed. Call your doctor if you think you are having a problem with your medicine.  You will get more details on the specific medicines your doctor prescribes.     · If your doctor has given you a blood thinner to prevent a stroke, be sure you get instructions about how to take your medicine safely. Blood thinners can cause serious bleeding problems.     · Do not take any vitamins, over-the-counter drugs, or herbal products without talking to your doctor first.    Lifestyle changes    · Do not smoke. Smoking can increase your chance of a stroke and heart attack. If you need help quitting, talk to your doctor about stop-smoking programs and medicines. These can increase your chances of quitting for good.     · Eat a heart-healthy diet.     · Stay at a healthy weight. Lose weight if you need to.     · Limit alcohol to 2 drinks a day for men and 1 drink a day for women. Too much alcohol can cause health problems.     · Avoid colds and flu. Get a pneumococcal vaccine shot. If you have had one before, ask your doctor whether you need another dose. Get a flu shot every year. If you must be around people with colds or flu, wash your hands often. Activity    · If your doctor recommends it, get more exercise. Walking is a good choice. Bit by bit, increase the amount you walk every day. Try for at least 30 minutes on most days of the week. You also may want to swim, bike, or do other activities. Your doctor may suggest that you join a cardiac rehabilitation program so that you can have help increasing your physical activity safely.     · Start light exercise if your doctor says it is okay. Even a small amount will help you get stronger, have more energy, and manage stress. Walking is an easy way to get exercise. Start out by walking a little more than you did in the hospital. Gradually increase the amount you walk.     · When you exercise, watch for signs that your heart is working too hard. You are pushing too hard if you cannot talk while you are exercising. If you become short of breath or dizzy or have chest pain, sit down and rest immediately.     · Check your pulse regularly.  Place two fingers on the artery at the palm side of your wrist, in line with your thumb. If your heartbeat seems uneven or fast, talk to your doctor. When should you call for help? Call 911 anytime you think you may need emergency care. For example, call if:    · You have symptoms of a heart attack. These may include:  ? Chest pain or pressure, or a strange feeling in the chest.  ? Sweating. ? Shortness of breath. ? Nausea or vomiting. ? Pain, pressure, or a strange feeling in the back, neck, jaw, or upper belly or in one or both shoulders or arms. ? Lightheadedness or sudden weakness. ? A fast or irregular heartbeat. After you call 911, the  may tell you to chew 1 adult-strength or 2 to 4 low-dose aspirin. Wait for an ambulance. Do not try to drive yourself.     · You have symptoms of a stroke. These may include:  ? Sudden numbness, tingling, weakness, or loss of movement in your face, arm, or leg, especially on only one side of your body. ? Sudden vision changes. ? Sudden trouble speaking. ? Sudden confusion or trouble understanding simple statements. ? Sudden problems with walking or balance. ? A sudden, severe headache that is different from past headaches.     · You passed out (lost consciousness).    Call your doctor now or seek immediate medical care if:    · You have new or increased shortness of breath.     · You feel dizzy or lightheaded, or you feel like you may faint.     · Your heart rate becomes irregular.     · You can feel your heart flutter in your chest or skip heartbeats. Tell your doctor if these symptoms are new or worse.    Watch closely for changes in your health, and be sure to contact your doctor if you have any problems. Where can you learn more? Go to http://jerson-rashmi.info/. Enter U020 in the search box to learn more about \"Atrial Fibrillation: Care Instructions. \"  Current as of: December 6, 2017  Content Version: 11.8  © 3232-4848 [employer]. Care instructions adapted under license by [employer] (which disclaims liability or warranty for this information). If you have questions about a medical condition or this instruction, always ask your healthcare professional. [address] any warranty or liability for your use of this information. Electrophysiology Study and Catheter Ablation: What to Expect at [address] [place] [address] had an electrophysiology study for a problem with your heartbeat. You may also have had a catheter ablation to try to correct the problem. You may have swelling, bruising, or a small lump around the site where the catheters went into your body. These should go away in 3 to 4 weeks. Do not exercise hard or lift anything heavy for a week. You may be able to go back to work and to your normal routine in 1 or 2 days. This care sheet gives you a general idea about how long it will take for you to recover. But each person recovers at a different pace. Follow the steps below to get better as quickly as possible. How can you care for yourself at home? Activity    · For 1 week, do not lift anything that would make you strain. This may include heavy grocery bags and milk containers, a heavy briefcase or backpack, cat litter or dog food bags, a vacuum , or a child.     · For 1 week, do not exercise hard or do any activity that could strain your blood vessels or the site where the catheters went into your body.     · Ask your doctor when it is okay to have sex.     · You may shower 24 to 48 hours after the procedure, if your doctor okays it. Pat the incision dry. Do not take a bath for 1 week, or until your doctor tells you it is okay. Diet    · You can eat your normal diet. If your stomach is upset, try bland, low-fat foods like plain rice, broiled chicken, toast, and yogurt.     · Drink plenty of fluids (unless your doctor tells you not to).    Medicines    · Your doctor will tell you if and when you can restart your medicines. He or she will also give you instructions about taking any new medicines.     · If you take blood thinners, such as warfarin (Coumadin), clopidogrel (Plavix), or aspirin, be sure to talk to your doctor. He or she will tell you if and when to start taking those medicines again. Make sure that you understand exactly what your doctor wants you to do.     · Ask your doctor if you can take acetaminophen (Tylenol) for pain. Do not take aspirin for 3 days, unless your doctor says it is okay.     · Check with your doctor before you take aspirin or anti-inflammatory medicines to reduce pain and swelling. These include ibuprofen (Advil, Motrin) and naproxen (Aleve).   · Make sure you know which heart medicines to continue and which ones to stop. Ask your doctor if you are not sure.   [facility] site care    · You can remove your bandages the day after the procedure.     · If you are bleeding, lie down and press on the area for 15 minutes to try to make it stop. If the bleeding does not stop, call your doctor or seek immediate medical care. Follow-up care is a key part of your treatment and safety. Be sure to make and go to all appointments, and call your doctor if you are having problems. It's also a good idea to know your test results and keep a list of the medicines you take. When should you call for help? Call 911 anytime you think you may need emergency care. For example, call if:    · You passed out (lost consciousness).     · You have symptoms of a heart attack. These may include:  ? Chest pain or pressure, or a strange feeling in the chest.  ? Sweating. ? Shortness of breath. ? Nausea or vomiting. ? Pain, pressure, or a strange feeling in the back, neck, jaw, or upper belly, or in one or both shoulders or arms. ? Lightheadedness or sudden weakness. ? A fast or irregular heartbeat.   After you call 911, the  may tell you to chew 1 adult-strength or 2 to 4 low-dose aspirin. Wait for an ambulance. Do not try to drive yourself.     · You have symptoms of a stroke. These may include:  ? Sudden numbness, tingling, weakness, or loss of movement in your face, arm, or leg, especially on only one side of your body. ? Sudden vision changes. ? Sudden trouble speaking. ? Sudden confusion or trouble understanding simple statements. ? Sudden problems with walking or balance. ? A sudden, severe headache that is different from past headaches.    Call your doctor now or seek immediate medical care if:    · You are bleeding from the area where the catheter was put in your artery.     · You have a fast-growing, painful lump at the catheter site.     · You have signs of infection, such as:  ? Increased pain, swelling, warmth, or redness. ? Red streaks leading from the catheter site. ? Pus draining from the catheter site. ? A fever.     · Your leg or arm looks blue or feels cold, numb, or tingly.    Watch closely for any changes in your health, and be sure to contact your doctor if you have any problems. Where can you learn more? Go to http://jerson-rashmi.info/. Enter [phone] in the search box to learn more about \"Electrophysiology Study and Catheter Ablation: What to Expect at Home. \"  Current as of: December 6, 2017  Content Version: 11.8  © 7505-6963 [employer]. Care instructions adapted under license by [employer] (which disclaims liability or warranty for this information). If you have questions about a medical condition or this instruction, always ask your healthcare professional. [place] [address] any warranty or liability for your use of this information. Ablation Discharge Instructions    *Check the puncture site frequently for swelling or bleeding. If you see any bleeding, lie down and apply pressure over the area with a clean town or washcloth.  Notify your doctor for any redness, swelling, drainage or oozing from the puncture site. Notify your doctor for any fever or chills. *If the leg or arm with the puncture becomes cold, numb or painful, call [facility] cardiology at  [phone]. *Activity should be limited for the next 48 hours. Climb stairs as little as possible and avoid any stooping, bending or strenuous activity for 48 hours. No heavy lifting (anything over 10 pounds) for three days. *Do not drive for 48 hours. *You may resume your usual diet. Drink more fluids than usual.    *Have a responsible person drive you home and stay with you for at least 24 hours after your heart catheterization/angiography. *You may remove the bandage from your Right, Left Groin in 24 hours. You may shower in 24 hours. No tub baths, hot tubs or swimming for one week. Do not place any lotions, creams, powders, ointments over the puncture site for one week. You may place a clean band-aid over the puncture site each day for 5 days. Change this daily. DISCHARGE SUMMARY from Nurse    PATIENT INSTRUCTIONS:    After general anesthesia or intravenous sedation, for 24 hours or while taking prescription Narcotics:  · Limit your activities  · Do not drive and operate hazardous machinery  · Do not make important personal or business decisions  · Do  not drink alcoholic beverages  · If you have not urinated within 8 hours after discharge, please contact your surgeon on call. Report the following to your surgeon:  · Excessive pain, swelling, redness or odor of or around the surgical area  · Temperature over 100.5  · Nausea and vomiting lasting longer than 4 hours or if unable to take medications  · Any signs of decreased circulation or nerve impairment to extremity: change in color, persistent  numbness, tingling, coldness or increase pain  · Any questions    What to do at Home:    *  Please give a list of your current medications to your Primary Care Provider.     *  Please update this list whenever your medications are discontinued, doses are      changed, or new medications (including over-the-counter products) are added. *  Please carry medication information at all times in case of emergency situations. These are general instructions for a healthy lifestyle:    No smoking/ No tobacco products/ Avoid exposure to second hand smoke  Surgeon General's Warning:  Quitting smoking now greatly reduces serious risk to your health. Obesity, smoking, and sedentary lifestyle greatly increases your risk for illness    A healthy diet, regular physical exercise & weight monitoring are important for maintaining a healthy lifestyle    You may be retaining fluid if you have a history of heart failure or if you experience any of the following symptoms:  Weight gain of 3 pounds or more overnight or 5 pounds in a week, increased swelling in our hands or feet or shortness of breath while lying flat in bed. Please call your doctor as soon as you notice any of these symptoms; do not wait until your next office visit. Recognize signs and symptoms of STROKE:    F-face looks uneven    A-arms unable to move or move unevenly    S-speech slurred or non-existent    T-time-call 911 as soon as signs and symptoms begin-DO NOT go       Back to bed or wait to see if you get better-TIME IS BRAIN. Warning Signs of HEART ATTACK     Call 911 if you have these symptoms:   Chest discomfort. Most heart attacks involve discomfort in the center of the chest that lasts more than a few minutes, or that goes away and comes back. It can feel like uncomfortable pressure, squeezing, fullness, or pain.  Discomfort in other areas of the upper body. Symptoms can include pain or discomfort in one or both arms, the back, neck, jaw, or stomach.  Shortness of breath with or without chest discomfort.  Other signs may include breaking out in a cold sweat, nausea, or lightheadedness. Don't wait more than five minutes to call 911 - MINUTES MATTER! Fast action can save your life. Calling 911 is almost always the fastest way to get lifesaving treatment. Emergency Medical Services staff can begin treatment when they arrive -- up to an hour sooner than if someone gets to the hospital by car. The discharge information has been reviewed with the patient. The patient verbalized understanding. Discharge medications reviewed with the patient and appropriate educational materials and side effects teaching were provided.   ___________________________________________________________________________________________________________________________________ sudden onset

## 2022-09-20 ENCOUNTER — HOSPITAL ENCOUNTER (EMERGENCY)
Age: 72
Discharge: HOME | End: 2022-09-20
Attending: STUDENT IN AN ORGANIZED HEALTH CARE EDUCATION/TRAINING PROGRAM
Payer: MEDICARE

## 2022-09-20 ENCOUNTER — TELEPHONE (OUTPATIENT)
Dept: INTERNAL MEDICINE CLINIC | Facility: CLINIC | Age: 72
End: 2022-09-20

## 2022-09-20 VITALS
DIASTOLIC BLOOD PRESSURE: 78 MMHG | WEIGHT: 250 LBS | BODY MASS INDEX: 41.65 KG/M2 | OXYGEN SATURATION: 93 % | SYSTOLIC BLOOD PRESSURE: 150 MMHG | RESPIRATION RATE: 18 BRPM | HEIGHT: 65 IN | TEMPERATURE: 98.8 F | HEART RATE: 78 BPM

## 2022-09-20 DIAGNOSIS — L02.91 ABSCESS: Primary | ICD-10-CM

## 2022-09-20 PROCEDURE — 99282 EMERGENCY DEPT VISIT SF MDM: CPT

## 2022-09-20 ASSESSMENT — PAIN - FUNCTIONAL ASSESSMENT
PAIN_FUNCTIONAL_ASSESSMENT: ACTIVITIES ARE NOT PREVENTED
PAIN_FUNCTIONAL_ASSESSMENT: 0-10

## 2022-09-20 ASSESSMENT — PAIN SCALES - GENERAL
PAINLEVEL_OUTOF10: 5
PAINLEVEL_OUTOF10: 5

## 2022-09-20 ASSESSMENT — ENCOUNTER SYMPTOMS
SHORTNESS OF BREATH: 0
COUGH: 0
VOMITING: 0
PHOTOPHOBIA: 0
DIARRHEA: 0
NAUSEA: 0
SORE THROAT: 0

## 2022-09-20 ASSESSMENT — PAIN DESCRIPTION - ORIENTATION
ORIENTATION: LEFT
ORIENTATION: LEFT

## 2022-09-20 ASSESSMENT — PAIN DESCRIPTION - PAIN TYPE: TYPE: ACUTE PAIN

## 2022-09-20 ASSESSMENT — PAIN DESCRIPTION - DESCRIPTORS: DESCRIPTORS: ACHING

## 2022-09-20 ASSESSMENT — PAIN DESCRIPTION - LOCATION: LOCATION: OTHER (COMMENT)

## 2022-09-20 ASSESSMENT — PAIN DESCRIPTION - FREQUENCY: FREQUENCY: INTERMITTENT

## 2022-09-20 NOTE — DISCHARGE INSTRUCTIONS
Continue taking your antibiotics as prescribed. Follow-up with your dermatologist tomorrow as previously scheduled. Turn to the ER for worsening or worrisome symptoms.

## 2022-09-20 NOTE — ED PROVIDER NOTES
Emergency Department Provider Note                   PCP:                Cristina Rodriguez MD               Age: 81 y.o. Sex: female       ICD-10-CM    1. Abscess  L02.91           DISPOSITION Decision To Discharge 09/20/2022 01:48:19 PM        MDM  Number of Diagnoses or Management Options  Abscess  Diagnosis management comments: Patient with a healing abscess to left axilla, current antibiotics. Small amount of drainage with palpation to area. Patient encouraged to continue taking her antibiotics and to see her dermatologist tomorrow as previously scheduled. No additional work-up necessary. Risk of Complications, Morbidity, and/or Mortality  Presenting problems: low  Diagnostic procedures: low  Management options: low               No orders of the defined types were placed in this encounter. Medications - No data to display    New Prescriptions    No medications on file        Carlos Childers is a 81 y.o. female who presents to the Emergency Department with chief complaint of    Chief Complaint   Patient presents with    Abscess     Infected area under left arm      79-year-old female presents emergency department with negative drainage under her left armpit. Reports I&D performed 6 days ago by her dermatologist and patient was placed on ciprofloxacin. Has been compliant. States she has 4 more days of this medication. Denies fever or chills. Was concerned because she noticed some drainage while in the shower and her dermatologist was unable to see her today so advised her to come to the ER. Review of Systems   Constitutional:  Negative for chills and fever. HENT:  Negative for sore throat. Eyes:  Negative for photophobia. Respiratory:  Negative for cough and shortness of breath. Cardiovascular:  Negative for chest pain. Gastrointestinal:  Negative for diarrhea, nausea and vomiting. Genitourinary:  Negative for dysuria.    Musculoskeletal:  Negative for neck pain and neck stiffness. Skin:  Positive for wound. Neurological:  Negative for syncope and headaches. Psychiatric/Behavioral:  Negative for confusion. All other systems reviewed and are negative. Past Medical History:   Diagnosis Date    Adverse effect of anesthesia     delayed awakening in the past, patient states improved after using CPAP--patient's  states better for patient to wake up on side, not back. Allergic rhinitis     Anxiety     Arthritis     OA    Asthma     Followed by Dr. Godwin Keene, controlled with daily inhaler and PRN Ventolin     Depression     under control with med    Endocarditis     hx     Heart murmur     congenital, asymptomatic--Echo completed 19    Hematuria     History of MRSA infection on left breast    2016 : treated/ resolved    HLD (hyperlipidemia)      Hypertension     controlled with medication     Hypertension     Hypothyroid     controlled with medication     Hypothyroidism due to acquired atrophy of thyroid 2015    Intertrigo     Irregular heart beat     Mass of colon     Morbid obesity ([address]     48.7 bmi    Persistent atrial fibrillation ([address]     s/p ablation (2019), Tikosyn loading---EKG dated 3/13/19 shows \"sinus rhythm, rate 70. \" Patient is followed by [facility]. Reactive airway disease with status asthmaticus     hx only    Scoliosis 2016    Sleep apnea     cpap compliant. Varicose veins     VSD (ventricular septal defect) 2016    per echo (19) \"small PFO in the baseline state. \" Patient is followed by [facility].          Past Surgical History:   Procedure Laterality Date    ABLATION OF DYSRHYTHMIC FOCUS   & 2019    cardioversion / ablation    APPENDECTOMY      BREAST REDUCTION SURGERY Bilateral 2016    BREAST REDUCTION/ bilateral performed by Jama Carias MD at [place] [address]      heart cath age   [facility] [address]  2018     SECTION x2    COLONOSCOPY  2010, 2015    DILATION AND CURETTAGE OF UTERUS      multiple    HEENT  1969    jaw surgery due to underbite     HERNIA REPAIR  incisional [id number]    with mesh    LIPOMA RESECTION Right     right foot between toes    NE CARDIAC SURG PROCEDURE UNLIST  09/2018    cardioversion    NE CARDIAC SURG PROCEDURE UNLIST  11/2018    ablation    NE CARDIAC SURG PROCEDURE UNLIST  12/2018    cardioversion    NE CARDIAC SURG PROCEDURE UNLIST  02/2019    ablation    REFRACTIVE SURGERY      TOTAL COLECTOMY Right 2010    8 in colon removed    TOTAL KNEE ARTHROPLASTY Right 01/29/2018    TOTAL KNEE ARTHROPLASTY Left 04/17/2019        Family History   Problem Relation Age of Onset    Colon Cancer Maternal Uncle     Thyroid Disease Daughter         Graves' disease    Thyroid Disease Maternal Aunt         Hypothyroidism    Cancer Mother         Lymphoma    Heart Disease Brother     Breast Cancer Mother     Thyroid Disease Mother         Hypothyroidism    Heart Disease Father         s/p MI and CABG    Hypertension Father     Heart Disease Brother     Thyroid Disease Sister         Hypothyroidism    Hypertension Mother         Social History     Socioeconomic History    Marital status:    Tobacco Use    Smoking status: Never    Smokeless tobacco: Never   Substance and Sexual Activity    Alcohol use: No    Drug use: Never         Latex, Ciprofloxacin, Olanzapine-fluoxetine hcl, and Cefaclor     Previous Medications    ACETAMINOPHEN 325 MG CAPS    Take 360 mg by mouth 3 times daily as needed    ADVAIR DISKUS 250-50 MCG/ACT AEPB DISKUS INHALER    INHALE 1 PUFF TWICE A DAY (RINSE MOUTH WELL AFTER USE)    ALBUTEROL SULFATE  (90 BASE) MCG/ACT INHALER    Inhale 2 puffs into the lungs every 6 hours as needed    AMLODIPINE (NORVASC) 5 MG TABLET    TAKE 1 TABLET BY MOUTH DAILY    BUPROPION (WELLBUTRIN) 75 MG TABLET    TAKE 1 TABLET BY MOUTH 2 TIMES A DAY    CALCIUM CARB-CHOLECALCIFEROL 600-800 MG-UNIT CHEW    Take 2 tablets by mouth daily    CETIRIZINE HCL 10 MG CAPS    Take 10 capsules by mouth daily    DOFETILIDE (TIKOSYN) 500 MCG CAPSULE    Take 500 mcg by mouth every 12 hours    ELIQUIS 5 MG TABS TABLET    TAKE 1 TABLET BY MOUTH TWICE A DAY    FUROSEMIDE (LASIX) 20 MG TABLET    Take 1 tablet by mouth daily as needed (swelling)    LEVOTHYROXINE (SYNTHROID) 125 MCG TABLET    Take 125 mcg by mouth every morning (before breakfast)    LOSARTAN (COZAAR) 100 MG TABLET    Take 100 mg by mouth daily    MOMETASONE (NASONEX) 50 MCG/ACT NASAL SPRAY    2 sprays by Nasal route daily    MONTELUKAST (SINGULAIR) 10 MG TABLET    TAKE 1 TABLET BY MOUTH ONCE A DAY    VALACYCLOVIR (VALTREX) 1 G TABLET    Indications: PRN ONLY        Vitals signs and nursing note reviewed. Patient Vitals for the past 4 hrs:   Temp Pulse Resp BP SpO2   09/20/22 1330 -- -- -- (!) 150/84 98 %   09/20/22 1315 -- -- -- (!) 161/84 96 %   09/20/22 1300 -- -- -- (!) 158/74 95 %   09/20/22 1243 98.8 °F (37.1 °C) 78 18 (!) 165/94 98 %          Physical Exam  Vitals and nursing note reviewed. Constitutional:       General: She is not in acute distress. Appearance: Normal appearance. HENT:      Head: Normocephalic. Nose: Nose normal.      Mouth/Throat:      Mouth: Mucous membranes are moist.   Eyes:      Extraocular Movements: Extraocular movements intact. Cardiovascular:      Rate and Rhythm: Normal rate. Pulmonary:      Effort: Pulmonary effort is normal. No respiratory distress. Musculoskeletal:         General: No swelling or tenderness. Normal range of motion. Cervical back: Normal range of motion. No rigidity. Skin:     General: Skin is warm. Findings: No rash. Comments: Left axilla: 1 cm, open, healing surgical incision, small amount of purulent drainage was expressed, nontender, no significant erythema, no induration or fluctuance. Neurological:      General: No focal deficit present.       Mental Status: She is alert and oriented to person, place, and time. Psychiatric:         Mood and Affect: Mood normal.        Procedures    No results found for any visits on 09/20/22. No orders to display                       Voice dictation software was used during the making of this note. This software is not perfect and grammatical and other typographical errors may be present. This note has not been completely proofread for errors.         Wale Oconnor, DO  09/20/22 5721

## 2022-09-20 NOTE — ED NOTES
I have reviewed discharge instructions with the patient. The patient verbalized understanding. Patient left ED via Discharge Method: ambulatory to Home with spouse    Opportunity for questions and clarification provided. Patient given 0 scripts. To continue your aftercare when you leave the hospital, you may receive an automated call from our care team to check in on how you are doing. This is a free service and part of our promise to provide the best care and service to meet your aftercare needs.  If you have questions, or wish to unsubscribe from this service please call [phone]. Thank you for Choosing our [address] [place] [address] Emergency Department.        Sean Nice RN  09/20/22 4847

## 2022-09-20 NOTE — TELEPHONE ENCOUNTER
Patient called office through [facility] ([facility]) today c/o infected cyst under her arm. Patient reported over Labor Day W/E had 2 cysts under her arm that burst and had green drainage. She in turn saw derm and was given abx and was lanced. She said she has been on Abx but not much improvement and asking for an appt with Dr Lorre Snellen today. Asked if she had called derm back and was told the Dr she saw was out of the office and no one else would see her and advised to call PCP.   Explained Dr Lorre Snellen was full today and no other provider available to see her and recommend going to urgent care or ER to have cyst reassessed/TD

## 2022-09-20 NOTE — ED TRIAGE NOTES
Patient had I and D of infected area under her left arm. Placed on ABX. Patient says the bacteria is resistant to Doxycycline. Abcess not improving.

## 2022-09-21 ENCOUNTER — APPOINTMENT (RX ONLY)
Dept: URBAN - METROPOLITAN AREA CLINIC 24 | Facility: CLINIC | Age: 72
Setting detail: DERMATOLOGY
End: 2022-09-21

## 2022-09-21 DIAGNOSIS — L0390 CELLULITIS AND ABSCESS OF UNSPECIFIED SITES: ICD-10-CM | Status: WELL CONTROLLED

## 2022-09-21 DIAGNOSIS — L57.0 ACTINIC KERATOSIS: ICD-10-CM

## 2022-09-21 DIAGNOSIS — L0391 CELLULITIS AND ABSCESS OF UNSPECIFIED SITES: ICD-10-CM | Status: WELL CONTROLLED

## 2022-09-21 PROBLEM — L02.412 CUTANEOUS ABSCESS OF LEFT AXILLA: Status: ACTIVE | Noted: 2022-09-21

## 2022-09-21 PROCEDURE — ? PRESCRIPTION

## 2022-09-21 PROCEDURE — ? PRESCRIPTION MEDICATION MANAGEMENT

## 2022-09-21 PROCEDURE — ? ORDER TESTS

## 2022-09-21 PROCEDURE — ? OTHER

## 2022-09-21 PROCEDURE — 99214 OFFICE O/P EST MOD 30 MIN: CPT

## 2022-09-21 PROCEDURE — ? COUNSELING

## 2022-09-21 RX ORDER — PHARMACY COMPOUNDING ACCESSORY
EACH MISCELLANEOUS
Qty: 15 | Refills: 2 | Status: ERX | COMMUNITY
Start: 2022-09-21

## 2022-09-21 RX ADMIN — Medication: at 00:00

## 2022-09-21 ASSESSMENT — LOCATION ZONE DERM
LOCATION ZONE: AXILLAE
LOCATION ZONE: FINGER

## 2022-09-21 ASSESSMENT — LOCATION DETAILED DESCRIPTION DERM
LOCATION DETAILED: PERIUNGUAL SKIN RIGHT RING FINGER
LOCATION DETAILED: LEFT AXILLARY VAULT

## 2022-09-21 ASSESSMENT — LOCATION SIMPLE DESCRIPTION DERM
LOCATION SIMPLE: LEFT AXILLARY VAULT
LOCATION SIMPLE: RIGHT RING FINGER

## 2022-09-21 NOTE — PROCEDURE: PRESCRIPTION MEDICATION MANAGEMENT
Detail Level: Simple
Render In Strict Bullet Format?: No
Plan: Continue with rest of Cipro antibiotic. Cultured today to see what grows out after being on antibiotic for past 8 days. Area is still draining and tender. Discussed she may need IV Rocephin to treat abscess.

## 2022-09-21 NOTE — PROCEDURE: OTHER
Render Risk Assessment In Note?: no
Other (Free Text): “At Least Hypertrophic Actinic Keratosis with Verrucoid Features” is what path showed. 5FU Compound has not cleared  it, but patient claims it helps and she used it the other day. Her appt is with Sade Huizar in November. We discussed using the compound for 10 days and then take a break. She was told not to treat this right before her Mohs appt.
Detail Level: Simple
Note Text (......Xxx Chief Complaint.): This diagnosis correlates with the

## 2022-09-23 ENCOUNTER — OFFICE VISIT (OUTPATIENT)
Dept: INTERNAL MEDICINE CLINIC | Facility: CLINIC | Age: 72
End: 2022-09-23
Payer: MEDICARE

## 2022-09-23 VITALS
BODY MASS INDEX: 42.99 KG/M2 | HEIGHT: 65 IN | SYSTOLIC BLOOD PRESSURE: 138 MMHG | DIASTOLIC BLOOD PRESSURE: 96 MMHG | WEIGHT: 258 LBS | TEMPERATURE: 98.4 F

## 2022-09-23 DIAGNOSIS — L08.9 INFECTED CYST OF SKIN: Primary | ICD-10-CM

## 2022-09-23 DIAGNOSIS — L72.9 INFECTED CYST OF SKIN: Primary | ICD-10-CM

## 2022-09-23 PROCEDURE — G8399 PT W/DXA RESULTS DOCUMENT: HCPCS | Performed by: INTERNAL MEDICINE

## 2022-09-23 PROCEDURE — 1090F PRES/ABSN URINE INCON ASSESS: CPT | Performed by: INTERNAL MEDICINE

## 2022-09-23 PROCEDURE — 99213 OFFICE O/P EST LOW 20 MIN: CPT | Performed by: INTERNAL MEDICINE

## 2022-09-23 PROCEDURE — 1123F ACP DISCUSS/DSCN MKR DOCD: CPT | Performed by: INTERNAL MEDICINE

## 2022-09-23 PROCEDURE — 1036F TOBACCO NON-USER: CPT | Performed by: INTERNAL MEDICINE

## 2022-09-23 PROCEDURE — G8417 CALC BMI ABV UP PARAM F/U: HCPCS | Performed by: INTERNAL MEDICINE

## 2022-09-23 PROCEDURE — G8427 DOCREV CUR MEDS BY ELIG CLIN: HCPCS | Performed by: INTERNAL MEDICINE

## 2022-09-23 PROCEDURE — 3017F COLORECTAL CA SCREEN DOC REV: CPT | Performed by: INTERNAL MEDICINE

## 2022-09-23 RX ORDER — CIPROFLOXACIN 500 MG/1
500 TABLET, FILM COATED ORAL 2 TIMES DAILY
COMMUNITY
Start: 2022-09-14 | End: 2022-09-23

## 2022-09-23 RX ORDER — CIPROFLOXACIN 500 MG/1
500 TABLET, FILM COATED ORAL 2 TIMES DAILY
Qty: 14 TABLET | Refills: 0 | Status: SHIPPED | OUTPATIENT
Start: 2022-09-23 | End: 2022-10-03

## 2022-09-23 NOTE — PROGRESS NOTES
SUBJECTIVE:   Cristobal Randall is a 81 y.o. female seen for a visit regarding   Chief Complaint   Patient presents with    Cyst     Left underarm-see attached notes-has seen derm several times and currently on Abx but taking last one tonight-generally feel bad all around from this        Skin Problem  This is a new problem. The current episode started 1 to 4 weeks ago (had cyst left axilla for years -did not bother until Labor day-swelling , pain and abcessed -saw derm that week and lanced, left open , cultured -took doxycycline but was resistant-had serratia marcescens heavy-started cipro 9/14 after EKG done ok). The problem is unchanged (reculture 9/21). The affected locations include the left axilla. Pertinent negatives include no fever. Past Medical History, Past Surgical History, Family history, Social History, and Medications were all reviewed with the patient today and updated as necessary.        Current Outpatient Medications   Medication Sig Dispense Refill    ciprofloxacin (CIPRO) 500 MG tablet Take 1 tablet by mouth 2 times daily for 10 days 14 tablet 0    ADVAIR DISKUS 250-50 MCG/ACT AEPB diskus inhaler INHALE 1 PUFF TWICE A DAY (RINSE MOUTH WELL AFTER USE) 1 each 10    furosemide (LASIX) 20 MG tablet Take 1 tablet by mouth daily as needed (swelling) 30 tablet 1    ELIQUIS 5 MG TABS tablet TAKE 1 TABLET BY MOUTH TWICE A  tablet 1    Acetaminophen 325 MG CAPS Take 360 mg by mouth 3 times daily as needed      albuterol sulfate  (90 Base) MCG/ACT inhaler Inhale 2 puffs into the lungs every 6 hours as needed      amLODIPine (NORVASC) 5 MG tablet TAKE 1 TABLET BY MOUTH DAILY      buPROPion (WELLBUTRIN) 75 MG tablet TAKE 1 TABLET BY MOUTH 2 TIMES A DAY      Cetirizine HCl 10 MG CAPS Take 10 capsules by mouth daily      dofetilide (TIKOSYN) 500 MCG capsule Take 500 mcg by mouth every 12 hours      levothyroxine (SYNTHROID) 125 MCG tablet Take 125 mcg by mouth every morning (before breakfast)      losartan (COZAAR) 100 MG tablet Take 100 mg by mouth daily      mometasone (NASONEX) 50 MCG/ACT nasal spray 2 sprays by Nasal route daily      montelukast (SINGULAIR) 10 MG tablet TAKE 1 TABLET BY MOUTH ONCE A DAY       No current facility-administered medications for this visit. Allergies   Allergen Reactions    Latex Other (See Comments)     Redness to site    Ciprofloxacin Other (See Comments)     Reacts with Afib meds    Olanzapine-Fluoxetine Hcl Other (See Comments)     Causes Severe pain     Cefaclor Rash     Patient Active Problem List   Diagnosis    Left foot pain    Localized osteoarthritis of shoulder, right    Essential hypertension    VSD (ventricular septal defect)    Major depressive disorder with single episode, in full remission ([facility]    Mixed hyperlipidemia    Hypersomnia, unspecified    Hypothyroidism following radioiodine therapy    Primary osteoarthritis of right hand    Acquired hallux valgus of left foot    Acquired claw toe, left    Arthritis of midfoot    Spinal stenosis of lumbar region with neurogenic claudication    High risk medications (not anticoagulants) long-term use    Anxiety    Morbid obesity with BMI of 45.0-49.9, adult (HCC)    Atrial fibrillation, persistent (HCC)    OA (osteoarthritis) of knee    BILLIE (obstructive sleep apnea)    Mild intermittent asthma without complication    Status post left knee replacement    Tendinitis of foot    DDD (degenerative disc disease), lumbar    Calculus of gallbladder with acute on chronic cholecystitis without obstruction    Current use of long term anticoagulation    History of incisional hernia repair     Social History     Tobacco Use    Smoking status: Never    Smokeless tobacco: Never   Substance Use Topics    Alcohol use: No          Review of Systems   Constitutional:  Negative for fever.        OBJECTIVE:  BP (!) 138/96   Temp 98.4 °F (36.9 °C) (Oral)   Ht 5' 5\" (1.651 m)   Wt 258 lb (117 kg)   BMI 42.93 kg/m² Physical Exam  Skin:     Findings: Lesion (small open cyst wound at left axilla-some drainage expressed -no redness around it(this is better apparently-swelling down a lot also)) present. Medical problems and test results were reviewed with the patient today. ASSESSMENT and PLAN     1. Infected cyst of skin  -     ciprofloxacin (CIPRO) 500 MG tablet; Take 1 tablet by mouth 2 times daily for 10 days, Disp-14 tablet, R-0Normal  -     [facility] - Ashley Brink MD, General Surgery, [place]   The wound looks ok --serratia grew -repeat culture pending -think the cyst underlying should be excised at this point -refer to surgery or she can see if a derm there can do this-use cipro another week also  Current treatment plan is effective. no changes in therapy  reviewed medications and side effects in detail     Return if symptoms worsen or fail to improve.

## 2022-09-26 ENCOUNTER — APPOINTMENT (RX ONLY)
Dept: URBAN - METROPOLITAN AREA CLINIC 23 | Facility: CLINIC | Age: 72
Setting detail: DERMATOLOGY
End: 2022-09-26

## 2022-09-26 DIAGNOSIS — L0391 CELLULITIS AND ABSCESS OF UNSPECIFIED SITES: ICD-10-CM | Status: WELL CONTROLLED

## 2022-09-26 DIAGNOSIS — L23.1 ALLERGIC CONTACT DERMATITIS DUE TO ADHESIVES: ICD-10-CM | Status: INADEQUATELY CONTROLLED

## 2022-09-26 DIAGNOSIS — L0390 CELLULITIS AND ABSCESS OF UNSPECIFIED SITES: ICD-10-CM | Status: WELL CONTROLLED

## 2022-09-26 PROBLEM — L02.412 CUTANEOUS ABSCESS OF LEFT AXILLA: Status: ACTIVE | Noted: 2022-09-26

## 2022-09-26 PROCEDURE — ? PRESCRIPTION

## 2022-09-26 PROCEDURE — ? PRESCRIPTION MEDICATION MANAGEMENT

## 2022-09-26 PROCEDURE — 99213 OFFICE O/P EST LOW 20 MIN: CPT

## 2022-09-26 PROCEDURE — ? COUNSELING

## 2022-09-26 RX ORDER — AMOXICILLIN AND CLAVULANATE POTASSIUM 875; 125 MG/1; MG/1
1 TABLET, FILM COATED ORAL 2 TIMES DAILY
COMMUNITY
End: 2022-09-26 | Stop reason: SDUPTHER

## 2022-09-26 RX ORDER — MUPIROCIN 20 MG/G
OINTMENT TOPICAL
Qty: 22 | Refills: 0 | Status: ERX | COMMUNITY
Start: 2022-09-26

## 2022-09-26 RX ORDER — AMOXICILLIN AND CLAVULANATE POTASSIUM 875; 125 MG/1; MG/1
1 TABLET, FILM COATED ORAL 2 TIMES DAILY
Qty: 14 TABLET | Refills: 0 | Status: SHIPPED | OUTPATIENT
Start: 2022-09-26 | End: 2022-10-26

## 2022-09-26 RX ADMIN — MUPIROCIN: 20 OINTMENT TOPICAL at 00:00

## 2022-09-26 ASSESSMENT — LOCATION ZONE DERM
LOCATION ZONE: AXILLAE
LOCATION ZONE: TRUNK

## 2022-09-26 ASSESSMENT — LOCATION DETAILED DESCRIPTION DERM
LOCATION DETAILED: LEFT AXILLARY VAULT
LOCATION DETAILED: LEFT RIB CAGE
LOCATION DETAILED: LEFT AXILLARY TAIL OF BREAST

## 2022-09-26 ASSESSMENT — LOCATION SIMPLE DESCRIPTION DERM
LOCATION SIMPLE: ABDOMEN
LOCATION SIMPLE: LEFT AXILLARY VAULT
LOCATION SIMPLE: LEFT BREAST

## 2022-09-26 NOTE — PROCEDURE: PRESCRIPTION MEDICATION MANAGEMENT
Plan: Start OTC hydrocortisone as needed bid
Render In Strict Bullet Format?: No
Detail Level: Simple
Plan: The patient’s Internist called her in another round of Cipro. He also started her on Amoxicillin. She showed me her culture on my chart and showed streptococcus.  Amoxicillin should help but also will start Mupirocin ointment bid. Keflex is contraindicated with Tikosyn.

## 2022-09-29 DIAGNOSIS — F32.5 MAJOR DEPRESSIVE DISORDER, SINGLE EPISODE, IN FULL REMISSION (HCC): Primary | ICD-10-CM

## 2022-09-29 RX ORDER — BUPROPION HYDROCHLORIDE 75 MG/1
TABLET ORAL
Qty: 180 TABLET | Refills: 3 | Status: SHIPPED | OUTPATIENT
Start: 2022-09-29

## 2022-10-05 NOTE — THERAPY DISCHARGE
Alfonso murillo  : 1950  Primary: Medicare Part A And B  Secondary: Anoop Madsen @ [facility] [address]  Phone: [phone]  Fax: [phone] Plan Frequency: 1x a week for 90 days    Plan of Care/Certification Expiration Date: 22      PT Visit Info: Total # of Visits to Date: 23  No Show: 0  Canceled Appointment: 1      OUTPATIENT PHYSICAL THERAPY:OP NOTE TYPE: Discontinuation Summary 2022               Episode  Appt Desk            ICD-10: Treatment Diagnosis: cervicalgia M54.2   ICD-10 : Treatment Diagnosis: thoracic spine pain M54.6    ICD-10 : Treatment Diagnosis: arthraligia of left tempromandibular joint M26.622       Medical/Referring Diagnosis:  Arthralgia of left temporomandibular joint [M26.622]  Cervicalgia [M54.2]  Referring Physician:  Federico Yap MD MD Orders:  PT Eval and Treat   Return MD Appt:  As needed    Medications Last Reviewed:  2022      Britney Torre has been seen in physical therapy for 19 visits. She has worked independently on her HEP since 2022 and has not needed to return to therapy. She has met majority of her physical therapy goals. She will be discontinued from therapy at this time. Goals: (Goals have been discussed and agreed upon with patient.)  Discharge Goals: Time Frame: 90 days    Patient demonstrates independence with her HEP without verbal cueing from her therapists. GOAL MET    Patient able to sit with correct sitting posture for 30 minutes without onset of cervical spine pain. ALMOST MET    Improve ability to open/close mouth to be able to eat entire meal without left jaw pain and popping. GOAL MET    Improve NDI outcome measure score from 10/50 to 5/50 to perform daily activities. ALMOST MET         Thank you for this referral,  Harjit Peters, PT     Referring Physician Signature: Federico Yap MD No Signature is Required for this note.         Post Session Pain  Charge Capture   POC Link  Treatment Note Link  MD Guidelines  MyChart

## 2022-10-07 ENCOUNTER — PREP FOR PROCEDURE (OUTPATIENT)
Dept: SURGERY | Age: 72
End: 2022-10-07

## 2022-10-07 ENCOUNTER — TELEPHONE (OUTPATIENT)
Dept: CARDIOLOGY CLINIC | Age: 72
End: 2022-10-07

## 2022-10-07 ENCOUNTER — OFFICE VISIT (OUTPATIENT)
Dept: SURGERY | Age: 72
End: 2022-10-07
Payer: MEDICARE

## 2022-10-07 VITALS
BODY MASS INDEX: 43.37 KG/M2 | TEMPERATURE: 98.1 F | HEART RATE: 75 BPM | HEIGHT: 65 IN | DIASTOLIC BLOOD PRESSURE: 90 MMHG | WEIGHT: 260.3 LBS | OXYGEN SATURATION: 97 % | SYSTOLIC BLOOD PRESSURE: 140 MMHG

## 2022-10-07 DIAGNOSIS — Z79.01 CURRENT USE OF LONG TERM ANTICOAGULATION: ICD-10-CM

## 2022-10-07 DIAGNOSIS — L72.3 SEBACEOUS CYST OF AXILLA: Primary | ICD-10-CM

## 2022-10-07 PROCEDURE — G8417 CALC BMI ABV UP PARAM F/U: HCPCS | Performed by: SURGERY

## 2022-10-07 PROCEDURE — G8427 DOCREV CUR MEDS BY ELIG CLIN: HCPCS | Performed by: SURGERY

## 2022-10-07 PROCEDURE — 1123F ACP DISCUSS/DSCN MKR DOCD: CPT | Performed by: SURGERY

## 2022-10-07 PROCEDURE — 99214 OFFICE O/P EST MOD 30 MIN: CPT | Performed by: SURGERY

## 2022-10-07 PROCEDURE — 1036F TOBACCO NON-USER: CPT | Performed by: SURGERY

## 2022-10-07 PROCEDURE — 1090F PRES/ABSN URINE INCON ASSESS: CPT | Performed by: SURGERY

## 2022-10-07 PROCEDURE — G8399 PT W/DXA RESULTS DOCUMENT: HCPCS | Performed by: SURGERY

## 2022-10-07 PROCEDURE — G8484 FLU IMMUNIZE NO ADMIN: HCPCS | Performed by: SURGERY

## 2022-10-07 PROCEDURE — 3017F COLORECTAL CA SCREEN DOC REV: CPT | Performed by: SURGERY

## 2022-10-07 NOTE — PROGRESS NOTES
[address]  [phone]    Office Note/Consult Note   Randall Tejeda   MRN: [id number]     : 1950        HPI: Randall Tejeda is a 81 y.o. female who is seen in the office at the request of Dr. Celia Smith for evaluation of an infected cyst of the left axilla. Issues began around Labor Day weekend when a previously known cyst in the left axilla became enlarged to the size of half of a golf ball. It was incredibly painful and she saw her dermatologist who performed an I&D and expressed a lot of material.  Cultures were obtained which grew heavy Serratia marcescens. She was originally placed on Cipro but there was some concern about interference with her cardiac medications. She was placed on other antibiotics for a total of approximately 30 days. She had persistent drainage despite improvement with a reculture showing microaerophilic Streptococcus. She has been using Dial soap. She has also been using mupirocin on the area. It has stopped draining and is no longer painful. She feels as if there is a residual lesion. She presents today with her . On questioning, she likes to bathe in a pool. She removes her underarm hair with a razor that she keeps in her shower without frequent changing of razor blade. They have squeezed the lesion to express contents over the years. Of a side note, she is having no gallbladder issues. She is known to me previously referred by Dr. Sunshine Sifuentes for [facility] following a visit on 2022. She was evaluated for right back and right flank pain. She had an episode of mild nausea and diarrhea earlier that day. Renal calculus protocol CT was obtained which identified no evidence of renal calculi but cholelithiasis was identified. There was no identification of pericholecystic inflammation.   Her lab work was unremarkable and daily inhaler and PRN Ventolin     Depression     under control with med    Endocarditis     hx 1992    Heart murmur     congenital, asymptomatic--Echo completed 19    Hematuria     History of MRSA infection on left breast    2016 : treated/ resolved    HLD (hyperlipidemia)      Hypertension     controlled with medication     Hypertension     Hypothyroid     controlled with medication     Hypothyroidism due to acquired atrophy of thyroid 2015    Intertrigo     Irregular heart beat     Mass of colon     Morbid obesity ([address]     48.7 bmi    Persistent atrial fibrillation ([address]     s/p ablation (2019), Tikosyn loading---EKG dated 3/13/19 shows \"sinus rhythm, rate 70. \" Patient is followed by [facility]. Reactive airway disease with status asthmaticus     hx only    Scoliosis 2016    Sleep apnea     cpap compliant. Varicose veins     VSD (ventricular septal defect) 2016    per echo (19) \"small PFO in the baseline state. \" Patient is followed by [facility].       Past Surgical History:   Procedure Laterality Date    ABLATION OF DYSRHYTHMIC FOCUS   & 2019    cardioversion / ablation    APPENDECTOMY      BREAST REDUCTION SURGERY Bilateral 2016    BREAST REDUCTION/ bilateral performed by Pleasant Essex, MD at [place] [address]      heart cath age   [address]  2018     SECTION      x2    COLONOSCOPY  ,     DILATION AND CURETTAGE OF UTERUS      multiple    HEENT  1969    jaw surgery due to underbite     HERNIA REPAIR  incisional [id number]    with mesh    LIPOMA RESECTION Right     right foot between toes    DE CARDIAC SURG PROCEDURE UNLIST  2018    cardioversion    DE CARDIAC SURG PROCEDURE UNLIST  2018    ablation    DE CARDIAC SURG PROCEDURE UNLIST  2018    cardioversion    DE CARDIAC SURG PROCEDURE UNLIST  2019    ablation    REFRACTIVE SURGERY      TOTAL COLECTOMY Right     8 in colon removed    TOTAL KNEE ARTHROPLASTY Right 01/29/2018    TOTAL KNEE ARTHROPLASTY Left 04/17/2019     Current Outpatient Medications   Medication Sig    buPROPion (WELLBUTRIN) 75 MG tablet TAKE 1 TABLET BY MOUTH 2 TIMES A DAY    ADVAIR DISKUS 250-50 MCG/ACT AEPB diskus inhaler INHALE 1 PUFF TWICE A DAY (RINSE MOUTH WELL AFTER USE)    furosemide (LASIX) 20 MG tablet Take 1 tablet by mouth daily as needed (swelling)    ELIQUIS 5 MG TABS tablet TAKE 1 TABLET BY MOUTH TWICE A DAY    albuterol sulfate  (90 Base) MCG/ACT inhaler Inhale 2 puffs into the lungs every 6 hours as needed    amLODIPine (NORVASC) 5 MG tablet TAKE 1 TABLET BY MOUTH DAILY    Cetirizine HCl 10 MG CAPS Take 10 capsules by mouth daily    dofetilide (TIKOSYN) 500 MCG capsule Take 500 mcg by mouth every 12 hours    levothyroxine (SYNTHROID) 125 MCG tablet Take 125 mcg by mouth every morning (before breakfast)    losartan (COZAAR) 100 MG tablet Take 100 mg by mouth daily    mometasone (NASONEX) 50 MCG/ACT nasal spray 2 sprays by Nasal route daily    montelukast (SINGULAIR) 10 MG tablet TAKE 1 TABLET BY MOUTH ONCE A DAY    amoxicillin-clavulanate (AUGMENTIN) 875-125 MG per tablet Take 1 tablet by mouth 2 times daily (Patient not taking: Reported on 10/7/2022)    Acetaminophen 325 MG CAPS Take 360 mg by mouth 3 times daily as needed     No current facility-administered medications for this visit.      ALLERGIES:  Latex, Ciprofloxacin, Olanzapine-fluoxetine hcl, and Cefaclor    Social History     Socioeconomic History    Marital status:      Spouse name: None    Number of children: None    Years of education: None    Highest education level: None   Tobacco Use    Smoking status: Never    Smokeless tobacco: Never   Substance and Sexual Activity    Alcohol use: No    Drug use: Never     Social History     Tobacco Use   Smoking Status Never   Smokeless Tobacco Never     Family History   Problem Relation Age of Onset    Colon Cancer Maternal Uncle     Thyroid Disease Daughter         Ivan Mast' disease    Thyroid Disease Maternal Aunt         Hypothyroidism    Cancer Mother         Lymphoma    Heart Disease Brother     Breast Cancer Mother     Thyroid Disease Mother         Hypothyroidism    Heart Disease Father         s/p MI and CABG    Hypertension Father     Heart Disease Brother     Thyroid Disease Sister         Hypothyroidism    Hypertension Mother        ROS: The patient has no difficulty with chest pain or shortness of breath. No fever or chills. The patient denies any personal or family history of abnormal clotting or bleeding. Comprehensive review of systems was otherwise unremarkable except as noted above. Physical Exam:   Constitutional: Alert oriented cooperative patient in no acute distress. BP (!) 140/90   Pulse 75   Temp 98.1 °F (36.7 °C)   Ht 5' 5\" (1.651 m)   Wt 260 lb 4.8 oz (118.1 kg)   SpO2 97%   BMI 43.32 kg/m²   Eyes:Sclera are clear without icterus. ENMT: no obvious neck masses, no ear or lip lesions  CV: RRR. Normal perfusion  L axilla: Hair removed and powder deodorant/antiperspirant in place. Mild rash present consistent with tinea fungal overgrowth. 1 cm I&D site that is well-healing with residual nodularity beneath and superior to the incision scar. No other adenopathy. No erythema. No fluctuance. No drainage. No tenderness. Resp: No JVD. Breathing is  non-labored. GI: very obese, soft and non-distended, well-healed incision scar to the left of the umbilicus in the midline that extends several centimeters above the umbilicus. There appears to be a significant large area of the upper abdomen without incision scar. No right upper quadrant tenderness or Wan sign. Musculoskeletal: unremarkable with normal function.    Neuro:  No obvious focal deficits  Psychiatric: normal affect and mood, no memory impairment    Lab Results   Component Value Date/Time    WBC 5.5 07/15/2022 01:11 PM    HGB 13.9 07/15/2022 01:11 PM    HCT 44.4 07/15/2022 01:11 PM     07/15/2022 01:11 PM    MCV 98.0 07/15/2022 01:11 PM     Lab Results   Component Value Date     06/21/2022    K 3.6 06/21/2022     06/21/2022    CO2 27 06/21/2022    BUN 14 06/21/2022    CREATININE 0.58 (L) 06/21/2022    GLUCOSE 129 (H) 06/21/2022    CALCIUM 9.5 06/21/2022    PROT 6.6 06/21/2022    LABALBU 4.1 06/21/2022    BILITOT 0.5 06/21/2022    ALKPHOS 169 (H) 06/21/2022    AST 41 (H) 06/21/2022    ALT 45 06/21/2022    LABGLOM >60 06/21/2022    GFRAA >133 06/21/2022    AGRATIO 1.0 (L) 02/15/2022    GLOB 1.6 (L) 06/21/2022     US Result (most recent):  No results found for this or any previous visit from the past 3650 days. CT Result (most recent):  CT ABDOMEN PELVIS RENAL STONE 06/21/2022    Narrative  EXAMINATION: CT ABDOMEN PELVIS RENAL STONE 6/21/2022 8:37 PM    ACCESSION NUMBER: [id number]    COMPARISON: CT abdomen pelvis dated 4/30/2010    INDICATION: R flank pain/R sided abd pain    TECHNIQUE: Contiguous axial computed tomographic images were obtained from the  upper abdomen to the symphysis pubis without intravenous contrast. Coronal  reconstructions were also performed. Please note that the detection of solid organ and vascular abnormalities is  limited in the absence of intravenous contrast.    Radiation dose reduction techniques were used for this study. Our CT scanners  use one or all of the following: Automated exposure control, adjustment of the  mA and/or kV according to patient size, iterative reconstruction. FINDINGS:  LUNG BASES: Bibasilar reticulation in the lung bases. Mitral valvular  calcifications. No sizable pleural effusion. The heart is not enlarged. LIVER: The liver contour is normal. No suspicious liver lesion. BILIARY TREE: Mildly hyperdense gallstone within the gallbladder. The  gallbladder is normal. No gallbladder wall thickening or pericholecystic fluid. No biliary dilation.     SPLEEN: Normal.    PANCREAS: No pancreatic mass or ductal dilation. ADRENALS: Normal.    KIDNEYS/BLADDER: The kidneys are symmetric in size. No renal calculus or  hydronephrosis. No renal mass. The urinary bladder is unremarkable. BOWEL: Scattered colonic diverticula without evidence of acute diverticulitis. Postsurgical changes of partial colectomy. Small bowel is normal in caliber. Stomach and duodenum are unremarkable. APPENDIX: The appendix is not visualized and absent. PERITONEUM/RETROPERITONEUM: No ascites or free air. No pelvic or retroperitoneal  lymphadenopathy. VESSELS: No abdominal aortic aneurysm. ABDOMINAL WALL: Ventral wall hernia repair patch. REPRODUCTIVE: Uterus is unremarkable. No adnexal mass. BONES: No suspicious osseous lesion. Levocurvature of the thoracolumbar spine. Multilevel degenerative changes of the lumbar spine. Partial fusion of L2 and  L3. Impression  1. Cholelithiasis with mild gallbladder distention. No gallbladder wall  thickening or pericholecystic fluid is evident. Findings are equivocal for acute  cholecystitis. 2.  No renal calculi or hydronephrosis. 3.  Degenerative changes and scoliotic curvature of the lumbar spine. Large abdominal mesh 11 x 14 cm Ventrio in 2012 by Dr. Amanda Marvin (slightly off-center to left)  Implants:   Implant Name Type Inv. Item Serial No.  Lot No. LRB No. Used Action   MESH VENTRIO MED OVAL W/AB RG - [id number] MESH VENTRIO MED OVAL W/AB [id number] [address] [facility] [id number] Implanted        MRI Result (most recent):  MRI ABDOMEN W WO CONTRAST 07/14/2022    Narrative  MRI OF THE ABDOMEN    INDICATION: Abdominal pain with history of gallstones. Elevated alkaline  phosphatase. Standard MRI sequences were obtained through the abdomen in multiple planes. Images were obtained before and after intravenous injection of 10 mL ProHance. COMPARISON: CT abdomen pelvis 6/21/2022.     FINDINGS:  LOWER CHEST: No pleural effusions. HEPATOBILIARY: There is mild fatty infiltration liver. No abnormal enhancing  liver lesions. MRCP sequences show no evidence of intrahepatic or common bile  duct dilatation. No common bile duct stones are evident. Common bile duct  measures 7 mm on image 69 of series 9 consistent with patient's age. Gallstones  noted in the gallbladder but there is no gallbladder wall thickening or abnormal  pericholecystic inflammation. PANCREAS: Pancreas is normal. No evidence of a pancreatic mass or surrounding  inflammation. Pancreatic duct is normal in caliber and course. SPLEEN: Normal.    ADRENAL GLANDS: Normal.    KIDNEYS: Tiny subcentimeter right renal cortical cysts are present. No  enhancement. A few parapelvic left renal cysts are also present without  enhancement. No specific follow-up suggested. No enhancing renal mass. No  hydronephrosis. BOWEL: Normal.    LYMPH NODES: No enlarged abdominal lymph nodes. VASCULATURE: Unremarkable. MUSCULOSKELETAL: Normal.    Impression  Cholelithiasis. No significant intrahepatic or extrahepatic bile  duct dilatation. Pancreatic duct is normal in caliber and course. Remainder the  abdomen is unremarkable the exception of a few small renal cysts. No specific  follow-up suggested. Assessment/Plan:     Kwaku Flores is a 81 y.o. female who presents to the office for evaluation of infected left axillary cyst.  She most likely has had a longstanding cyst that became infected likely secondary to minor laceration during axillary hair removal using a contaminated razor possibly also contaminated by pool swimming. Serratia marcescens is a pathogen that lives in moist environments such as a soap dish, not sterilized razor, or pet dish. It can also be found in pools or hot tubs. I think the most likely source of the infection of the cyst came from razor burn transmission. The infection is quiescent at this time.   I believe there is residual cyst that would be best excised to lessen the risk of recurrence. Given her Eliquis and location within the axilla, I do not recommend excision in the office. We will obtain clearance to have her Eliquis held and perform excision in the operating room. Her 30-day use of antibiotics, use of Dial soap and mupirocin have also resulted in some fungal overgrowth in the axilla. I would like her to discontinue the use of the Dial soap and mupirocin. She has completed the antibiotics. I would like her to obtain Nizoral shampoo as a body wash to help suppress the fungal growth prior to surgery. She will also avoid using the deodorants and this axilla. She will not bathe in a pool. She will be careful with hair removal and use only new razors for single use. We discussed proceeding with excision of left axillary cyst.  We will do this in the supine position with her left arm elevated above her head and can do it with MAC anesthesia plus local in the OR. I discussed the patient's condition and treatment options with the patient. I discussed risks of surgery in language the patient could understand including bleeding, infection, need for further surgery, abscess, fistula, SBO, DVT, PE, heart attack, stroke, renal failure, respiratory failure, ventilatory dependence, and death. The patient voiced understanding of all this and all questions were answered. Alternatives to surgery were discussed also and risks of the alternatives. The patient requested that we proceed with surgery. Informed consent was obtained. A copy of this note is sent to the referring physician.      Patient Active Problem List    Diagnosis Date Noted    Calculus of gallbladder with acute on chronic cholecystitis without obstruction 07/01/2022     Priority: Medium    Current use of long term anticoagulation 07/01/2022     Priority: Medium    History of incisional hernia repair 07/01/2022     Priority: Medium     2012 Dr. Malka Green - BARD Ventrio 11 x 14 cm mesh      Sebaceous cyst of axilla 10/07/2022     Added automatically from request for surgery [id number]      Status post left knee replacement 06/03/2021    Left foot pain 02/22/2020    Localized osteoarthritis of shoulder, right 02/22/2020    Primary osteoarthritis of right hand 02/22/2020    Acquired hallux valgus of left foot 02/22/2020    Acquired claw toe, left 02/22/2020    Arthritis of midfoot 02/22/2020    Spinal stenosis of lumbar region with neurogenic claudication 02/22/2020    Tendinitis of foot 02/22/2020    DDD (degenerative disc disease), lumbar 02/22/2020    Hypersomnia, unspecified 05/22/2019    OA (osteoarthritis) of knee 04/17/2019    High risk medications (not anticoagulants) long-term use 03/13/2019    Hypothyroidism following radioiodine therapy 07/09/2018    Morbid obesity with BMI of 45.0-49.9, adult ([facility] [address] 07/09/2018    Mild intermittent asthma without complication 33/72/5338    Atrial fibrillation, persistent ([facility] [address] [facility] 07/05/2018    VSD (ventricular septal defect) 07/22/2016    Anxiety     BILLIE (obstructive sleep apnea) 01/25/2016    Essential hypertension 04/28/2015    Major depressive disorder with single episode, in full remission ([facility] [address] 04/28/2015    Mixed hyperlipidemia 04/28/2015        Level of MDM (Based on 2/3 elements below)  Number and Complexity of Problems Addressed Amount and/or Complexity of Data to be Reviewed and Analyzed  *Each unique test, order, or document contributes to the combination of 2 or combination of 3 in Category 1 below. Risk of Complications and/or Morbidity or Mortality of pt Management     96393  42395 SF Minimal  ?1self-limited or minor problem Minimal or none Minimal risk of morbidity from additional diagnostic testing or Rx   16129  38383 Low Low  ? 2or more self-limited or minor problems;    or  ? 1stable chronic illness;    or  ?1acute, uncomplicated illness or injury   Limited  (Must meet the requirements of at least 1 of the 2 categories)  Category 1: Tests and documents   ? Any combination of 2 from the following:  ?Review of prior external note(s) from each unique source*;  ?review of the result(s) of each unique test*;   ?ordering of each unique test*    or   Category 2: Assessment requiring an independent historian(s)  (For the categories of independent interpretation of tests and discussion of management or test interpretation, see moderate or high) Low risk of morbidity from additional diagnostic testing or treatment     70796  09076 Mod Moderate  ? 1or more chronic illnesses with exacerbation, progression, or side effects of treatment;    or  ?2or more stable chronic illnesses;    or      1undiagnosed new problem with uncertain prognosis;    or  ?1acute illness with systemic symptoms;    or  ?1acute complicated injury   Moderate:  (Must meet the requirements of 1 out of 3 categories)    Category 1: Tests, documents, or independent historian(s)  ? Any combination of 3 from the following:   ?Review of prior external note(s) from each unique source*;  ?Review of the result(s) of each unique test*;  ?Ordering of each unique test*;  ?Assessment requiring an independent historian(s)    or  Category 2: Independent interpretation of tests   ? Independent interpretation of a test performed by another physician/other qualified health care professional (not separately reported);     or  Category 3: Discussion of management or test interpretation  ? Discussion of management or test interpretation with external physician/other qualified health care professional/appropriate source (not separately reported)   Moderate risk of morbidity from additional diagnostic testing or treatment  Examples only: ? ? Prescription drug management - including advanced wound care prescription wound care products  (eg Iodosorb, hydrofera, silvadene, collagen, puracol plus, optiloc, biologics - placenta, Theraskin, Apligraf).   Note also that if home health care is involved, they will not apply topical therapies without a prescription/order from physician    ? Decision regarding minor surgery with identified patient or procedure risk factors  ? Decision regarding elective major surgery without identified patient or procedure risk factors   ? Diagnosis or treatment significantly limited by social determinants of health       [id number] High High  ? 1or more chronic illnesses with severe exacerbation, progression, or side effects of treatment;    or  ?1 acute or chronic illness or injury that poses a threat to life or bodily function   Extensive  (Must meet the requirements of at least 2 out of 3 categories)    Category 1: Tests, documents, or independent historian(s)  ? Any combination of 3 from the following:   ?Review of prior external note(s) from each unique source*;  ?Review of the result(s) of each unique test*;   ?Ordering of each unique test*;   ?Assessment requiring an independent historian(s)    or   Category 2: Independent interpretation of tests   ? Independent interpretation of a test performed by another physician/other qualified health care professional (not separately reported);     or  Category 3: Discussion of management or test interpretation  ? Discussion of management or test interpretation with external physician/other qualified health care professional/appropriate source (not separately reported)   High risk of morbidity from additional diagnostic testing or treatment  Examples only:  ?Drug therapy requiring intensive monitoring for toxicity  ? Decision regarding elective major surgery with identified patient or procedure risk factors  ? Decision regarding emergency major surgery  ? Decision regarding hospitalization  ? Decision not to resuscitate or to de-escalate care because of poor prognosis               Gail Jason MD,  FACS

## 2022-10-07 NOTE — TELEPHONE ENCOUNTER
----- Message from Britt Mai sent at 10/7/2022 10:10 AM EDT -----  She is scheduled for an excision of an axilla cyst on 10/26/22 with Dr. Ashely Teague and he needs to know if she can stop her Eliquis and for how many days prior to surgery. Thanks!

## 2022-10-10 ENCOUNTER — TELEPHONE (OUTPATIENT)
Dept: CARDIOLOGY CLINIC | Age: 72
End: 2022-10-10

## 2022-10-10 NOTE — TELEPHONE ENCOUNTER
Pt is scheduled for an excision of an axilla cyst on 10/26/22 with Dr. Kylie Heart. Office requests Eliquis hold with days to be determined by Dr. James Mortensen.

## 2022-10-14 ENCOUNTER — PREP FOR PROCEDURE (OUTPATIENT)
Dept: SURGERY | Age: 72
End: 2022-10-14

## 2022-10-14 RX ORDER — SODIUM CHLORIDE 0.9 % (FLUSH) 0.9 %
5-40 SYRINGE (ML) INJECTION EVERY 12 HOURS SCHEDULED
Status: CANCELLED | OUTPATIENT
Start: 2022-10-14

## 2022-10-14 RX ORDER — SODIUM CHLORIDE 0.9 % (FLUSH) 0.9 %
5-40 SYRINGE (ML) INJECTION PRN
Status: CANCELLED | OUTPATIENT
Start: 2022-10-14

## 2022-10-14 RX ORDER — SODIUM CHLORIDE 9 MG/ML
INJECTION, SOLUTION INTRAVENOUS PRN
Status: CANCELLED | OUTPATIENT
Start: 2022-10-14

## 2022-10-26 NOTE — PRE-PROCEDURE INSTRUCTIONS
Patient verified name and . Order for consent was found in EHR and matches case posting; patient verifies procedure. Type lB surgery, phone assessment complete. Orders were received. Labs per surgeon: none at present time  Labs per anesthesia protocol: EKG, bmp, magnesium DOS per Tikosyn protocol    Patient answered medical/surgical history questions at their best of ability. All prior to admission medications documented in [facility]. Patient instructed to take the following medications the day of surgery according to anesthesia guidelines with a small sip of water: Amldoipine, Zyrtec, Levothyroxine, Wellbutrin, Nasonex, Advair inhaler bring albuterol inhaler DOS      On the day before surgery please take Acetaminophen 1000mg in the morning and then again before bed. You may substitute for Tylenol 650 mg. Hold all vitamins and NSAIDS  prior to surgery. Prescription meds to hold:Losartan, and Lasix am of surgery    Instructed patient to hold Tikosyn am of surgery per anesthesia protocol but patient states would prefer not to. Instructed patient to bring Tikosyn am of surgery with her to hospital and will verify medication with anesthesiologist prior to surgery. Patient verbalized understanding    Patient states no instructions received regarding Eliquis. Called and spoke with Guy at Dr Vidya Abdi office regarding patient taking Eliquis this am. Order in [facility] from [facility] that patient may hold Eliquis prior to procedure.  Returned call to patient and instructed to hold Eliquis prior to surgery beginning now  Patient instructed on the following:    > Arrive at [facility] [employer], time of arrival to be called the day before by 1700  > NPO after midnight, unless otherwise indicated, including gum, mints, and ice chips  > Responsible adult must drive patient to the hospital, stay during surgery, and patient will need supervision 24 hours after anesthesia  > Use antibacterial soap in shower the night before surgery and on the morning of surgery  > All piercings must be removed prior to arrival.    > Leave all valuables (money and jewelry) at home but bring insurance card and ID on DOS.   > You may be required to pay a deductible or co-pay on the day of your procedure. You can pre-pay by calling [phone] if your surgery is at the [facility] or [phone] if your surgery is at the [facility]. > Do not wear make-up, nail polish, lotions, cologne, perfumes, powders, or oil on skin. Artificial nails are not permitted.

## 2022-10-27 ENCOUNTER — ANESTHESIA EVENT (OUTPATIENT)
Dept: SURGERY | Age: 72
End: 2022-10-27
Payer: MEDICARE

## 2022-10-28 ENCOUNTER — HOSPITAL ENCOUNTER (OUTPATIENT)
Age: 72
Setting detail: OUTPATIENT SURGERY
Discharge: HOME | End: 2022-10-28
Attending: SURGERY | Admitting: SURGERY
Payer: MEDICARE

## 2022-10-28 ENCOUNTER — ANESTHESIA (OUTPATIENT)
Dept: SURGERY | Age: 72
End: 2022-10-28
Payer: MEDICARE

## 2022-10-28 VITALS
WEIGHT: 260 LBS | BODY MASS INDEX: 46.07 KG/M2 | OXYGEN SATURATION: 95 % | DIASTOLIC BLOOD PRESSURE: 63 MMHG | HEART RATE: 77 BPM | SYSTOLIC BLOOD PRESSURE: 142 MMHG | HEIGHT: 63 IN | TEMPERATURE: 97.5 F | RESPIRATION RATE: 15 BRPM

## 2022-10-28 DIAGNOSIS — L72.3 SEBACEOUS CYST OF AXILLA: ICD-10-CM

## 2022-10-28 LAB
ANION GAP SERPL CALC-SCNC: 4 MMOL/L (ref 2–11)
BUN SERPL-MCNC: 12 MG/DL (ref 8–23)
CALCIUM SERPL-MCNC: 9.6 MG/DL (ref 8.3–10.4)
CHLORIDE SERPL-SCNC: 111 MMOL/L (ref 101–110)
CO2 SERPL-SCNC: 27 MMOL/L (ref 21–32)
CREAT SERPL-MCNC: 0.8 MG/DL (ref 0.6–1)
EKG ATRIAL RATE: 72 BPM
EKG DIAGNOSIS: NORMAL
EKG Q-T INTERVAL: 402 MS
EKG QRS DURATION: 98 MS
EKG QTC CALCULATION (BAZETT): 452 MS
EKG R AXIS: -3 DEGREES
EKG T AXIS: 53 DEGREES
EKG VENTRICULAR RATE: 76 BPM
GLUCOSE SERPL-MCNC: 91 MG/DL (ref 65–100)
MAGNESIUM SERPL-MCNC: 2.2 MG/DL (ref 1.8–2.4)
POTASSIUM BLD-SCNC: 4.5 MMOL/L (ref 3.5–5.1)
POTASSIUM SERPL-SCNC: 4.7 MMOL/L (ref 3.5–5.1)
SODIUM SERPL-SCNC: 142 MMOL/L (ref 133–143)

## 2022-10-28 PROCEDURE — 2709999900 HC NON-CHARGEABLE SUPPLY: Performed by: SURGERY

## 2022-10-28 PROCEDURE — 80048 BASIC METABOLIC PNL TOTAL CA: CPT

## 2022-10-28 PROCEDURE — 6360000002 HC RX W HCPCS: Performed by: SURGERY

## 2022-10-28 PROCEDURE — 7100000000 HC PACU RECOVERY - FIRST 15 MIN: Performed by: SURGERY

## 2022-10-28 PROCEDURE — 3700000001 HC ADD 15 MINUTES (ANESTHESIA): Performed by: SURGERY

## 2022-10-28 PROCEDURE — 6360000002 HC RX W HCPCS: Performed by: NURSE ANESTHETIST, CERTIFIED REGISTERED

## 2022-10-28 PROCEDURE — 3600000013 HC SURGERY LEVEL 3 ADDTL 15MIN: Performed by: SURGERY

## 2022-10-28 PROCEDURE — 3700000000 HC ANESTHESIA ATTENDED CARE: Performed by: SURGERY

## 2022-10-28 PROCEDURE — 88304 TISSUE EXAM BY PATHOLOGIST: CPT

## 2022-10-28 PROCEDURE — 84132 ASSAY OF SERUM POTASSIUM: CPT

## 2022-10-28 PROCEDURE — 3600000003 HC SURGERY LEVEL 3 BASE: Performed by: SURGERY

## 2022-10-28 PROCEDURE — 6370000000 HC RX 637 (ALT 250 FOR IP): Performed by: ANESTHESIOLOGY

## 2022-10-28 PROCEDURE — 93005 ELECTROCARDIOGRAM TRACING: CPT | Performed by: ANESTHESIOLOGY

## 2022-10-28 PROCEDURE — 2500000003 HC RX 250 WO HCPCS: Performed by: NURSE ANESTHETIST, CERTIFIED REGISTERED

## 2022-10-28 PROCEDURE — 83735 ASSAY OF MAGNESIUM: CPT

## 2022-10-28 PROCEDURE — 7100000010 HC PHASE II RECOVERY - FIRST 15 MIN: Performed by: SURGERY

## 2022-10-28 PROCEDURE — 7100000001 HC PACU RECOVERY - ADDTL 15 MIN: Performed by: SURGERY

## 2022-10-28 PROCEDURE — 11404 EXC TR-EXT B9+MARG 3.1-4 CM: CPT | Performed by: SURGERY

## 2022-10-28 PROCEDURE — 2500000003 HC RX 250 WO HCPCS: Performed by: SURGERY

## 2022-10-28 PROCEDURE — 2580000003 HC RX 258: Performed by: ANESTHESIOLOGY

## 2022-10-28 RX ORDER — PROCHLORPERAZINE EDISYLATE 5 MG/ML
5 INJECTION INTRAMUSCULAR; INTRAVENOUS
Status: DISCONTINUED | OUTPATIENT
Start: 2022-10-28 | End: 2022-10-28 | Stop reason: HOSPADM

## 2022-10-28 RX ORDER — SODIUM CHLORIDE 0.9 % (FLUSH) 0.9 %
5-40 SYRINGE (ML) INJECTION EVERY 12 HOURS SCHEDULED
Status: DISCONTINUED | OUTPATIENT
Start: 2022-10-28 | End: 2022-10-28 | Stop reason: HOSPADM

## 2022-10-28 RX ORDER — ONDANSETRON 2 MG/ML
4 INJECTION INTRAMUSCULAR; INTRAVENOUS
Status: DISCONTINUED | OUTPATIENT
Start: 2022-10-28 | End: 2022-10-28 | Stop reason: HOSPADM

## 2022-10-28 RX ORDER — SODIUM CHLORIDE, SODIUM LACTATE, POTASSIUM CHLORIDE, CALCIUM CHLORIDE 600; 310; 30; 20 MG/100ML; MG/100ML; MG/100ML; MG/100ML
INJECTION, SOLUTION INTRAVENOUS CONTINUOUS
Status: DISCONTINUED | OUTPATIENT
Start: 2022-10-28 | End: 2022-10-28 | Stop reason: HOSPADM

## 2022-10-28 RX ORDER — ACETAMINOPHEN 500 MG
1000 TABLET ORAL ONCE
Status: DISCONTINUED | OUTPATIENT
Start: 2022-10-28 | End: 2022-10-28 | Stop reason: HOSPADM

## 2022-10-28 RX ORDER — HYDROMORPHONE HYDROCHLORIDE 2 MG/ML
0.25 INJECTION, SOLUTION INTRAMUSCULAR; INTRAVENOUS; SUBCUTANEOUS EVERY 5 MIN PRN
Status: DISCONTINUED | OUTPATIENT
Start: 2022-10-28 | End: 2022-10-28 | Stop reason: HOSPADM

## 2022-10-28 RX ORDER — SODIUM CHLORIDE 0.9 % (FLUSH) 0.9 %
5-40 SYRINGE (ML) INJECTION PRN
Status: DISCONTINUED | OUTPATIENT
Start: 2022-10-28 | End: 2022-10-28 | Stop reason: HOSPADM

## 2022-10-28 RX ORDER — LIDOCAINE HYDROCHLORIDE 20 MG/ML
INJECTION, SOLUTION EPIDURAL; INFILTRATION; INTRACAUDAL; PERINEURAL PRN
Status: DISCONTINUED | OUTPATIENT
Start: 2022-10-28 | End: 2022-10-28 | Stop reason: SDUPTHER

## 2022-10-28 RX ORDER — SODIUM CHLORIDE 9 MG/ML
INJECTION, SOLUTION INTRAVENOUS PRN
Status: DISCONTINUED | OUTPATIENT
Start: 2022-10-28 | End: 2022-10-28 | Stop reason: HOSPADM

## 2022-10-28 RX ORDER — OXYCODONE HYDROCHLORIDE 5 MG/1
10 TABLET ORAL PRN
Status: COMPLETED | OUTPATIENT
Start: 2022-10-28 | End: 2022-10-28

## 2022-10-28 RX ORDER — LIDOCAINE HYDROCHLORIDE 10 MG/ML
1 INJECTION, SOLUTION INFILTRATION; PERINEURAL
Status: DISCONTINUED | OUTPATIENT
Start: 2022-10-28 | End: 2022-10-28 | Stop reason: HOSPADM

## 2022-10-28 RX ORDER — PROPOFOL 10 MG/ML
INJECTION, EMULSION INTRAVENOUS PRN
Status: DISCONTINUED | OUTPATIENT
Start: 2022-10-28 | End: 2022-10-28 | Stop reason: SDUPTHER

## 2022-10-28 RX ORDER — HYDROMORPHONE HYDROCHLORIDE 2 MG/ML
0.5 INJECTION, SOLUTION INTRAMUSCULAR; INTRAVENOUS; SUBCUTANEOUS EVERY 5 MIN PRN
Status: DISCONTINUED | OUTPATIENT
Start: 2022-10-28 | End: 2022-10-28 | Stop reason: HOSPADM

## 2022-10-28 RX ORDER — BUPIVACAINE HYDROCHLORIDE 2.5 MG/ML
INJECTION, SOLUTION EPIDURAL; INFILTRATION; INTRACAUDAL PRN
Status: DISCONTINUED | OUTPATIENT
Start: 2022-10-28 | End: 2022-10-28 | Stop reason: HOSPADM

## 2022-10-28 RX ORDER — FENTANYL CITRATE 50 UG/ML
INJECTION, SOLUTION INTRAMUSCULAR; INTRAVENOUS PRN
Status: DISCONTINUED | OUTPATIENT
Start: 2022-10-28 | End: 2022-10-28 | Stop reason: SDUPTHER

## 2022-10-28 RX ORDER — MIDAZOLAM HYDROCHLORIDE 2 MG/2ML
2 INJECTION, SOLUTION INTRAMUSCULAR; INTRAVENOUS
Status: DISCONTINUED | OUTPATIENT
Start: 2022-10-28 | End: 2022-10-28 | Stop reason: HOSPADM

## 2022-10-28 RX ORDER — OXYCODONE HYDROCHLORIDE 5 MG/1
5 TABLET ORAL PRN
Status: COMPLETED | OUTPATIENT
Start: 2022-10-28 | End: 2022-10-28

## 2022-10-28 RX ORDER — DIPHENHYDRAMINE HYDROCHLORIDE 50 MG/ML
12.5 INJECTION INTRAMUSCULAR; INTRAVENOUS
Status: DISCONTINUED | OUTPATIENT
Start: 2022-10-28 | End: 2022-10-28 | Stop reason: HOSPADM

## 2022-10-28 RX ORDER — PROPOFOL 10 MG/ML
INJECTION, EMULSION INTRAVENOUS CONTINUOUS PRN
Status: DISCONTINUED | OUTPATIENT
Start: 2022-10-28 | End: 2022-10-28 | Stop reason: SDUPTHER

## 2022-10-28 RX ADMIN — FENTANYL CITRATE 50 MCG: 50 INJECTION, SOLUTION INTRAMUSCULAR; INTRAVENOUS at 12:57

## 2022-10-28 RX ADMIN — LIDOCAINE HYDROCHLORIDE 40 MG: 20 INJECTION, SOLUTION EPIDURAL; INFILTRATION; INTRACAUDAL; PERINEURAL at 13:00

## 2022-10-28 RX ADMIN — OXYCODONE 5 MG: 5 TABLET ORAL at 14:10

## 2022-10-28 RX ADMIN — SODIUM CHLORIDE, POTASSIUM CHLORIDE, SODIUM LACTATE AND CALCIUM CHLORIDE: 600; 310; 30; 20 INJECTION, SOLUTION INTRAVENOUS at 11:40

## 2022-10-28 RX ADMIN — PROPOFOL 160 MCG/KG/MIN: 10 INJECTION, EMULSION INTRAVENOUS at 13:00

## 2022-10-28 RX ADMIN — PROPOFOL 40 MG: 10 INJECTION, EMULSION INTRAVENOUS at 13:00

## 2022-10-28 RX ADMIN — Medication 2000 MG: at 13:02

## 2022-10-28 RX ADMIN — FENTANYL CITRATE 50 MCG: 50 INJECTION, SOLUTION INTRAMUSCULAR; INTRAVENOUS at 13:37

## 2022-10-28 ASSESSMENT — PAIN DESCRIPTION - LOCATION: LOCATION: ARM

## 2022-10-28 ASSESSMENT — PAIN DESCRIPTION - ORIENTATION: ORIENTATION: LEFT

## 2022-10-28 ASSESSMENT — PAIN SCALES - GENERAL
PAINLEVEL_OUTOF10: 4
PAINLEVEL_OUTOF10: 4
PAINLEVEL_OUTOF10: 0

## 2022-10-28 NOTE — DISCHARGE INSTRUCTIONS
Leave dressing for 4 days, then remove. May need to redress if any drainage and decrease irritation from knots of suture. .  Sutures will dissolve over time, do not remove or cut. OK to shower tomorrow but do not spray water directly into wound. May resume normal activity including walking, which is encouraged to reduce risk of blood clots. Resume Eliquis with Monday 10/31 AM dose. Follow-up with Dr Rojas Herrera NP in 2 weeks ([phone])     MEDICATION INTERACTION:  During your procedure you potentially received a medication or medications which may reduce the effectiveness of oral contraceptives. Please consider other forms of contraception for 1 month following your procedure if you are currently using oral contraceptives as your primary form of birth control. In addition to this, we recommend continuing your oral contraceptive as prescribed, unless otherwise instructed by your physician, during this time    After general anesthesia or intravenous sedation, for 24 hours or while taking prescription Narcotics:  Limit your activities  A responsible adult needs to be with you for the next 24 hours  Do not drive and operate hazardous machinery  Do not make important personal or business decisions  Do not drink alcoholic beverages  If you have not urinated within 8 hours after discharge, and you are experiencing discomfort from urinary retention, please go to the nearest ED. If you have sleep apnea and have a CPAP machine, please use it for all naps and sleeping. Please use caution when taking narcotics and any of your home medications that may cause drowsiness. *  Please give a list of your current medications to your Primary Care Provider. *  Please update this list whenever your medications are discontinued, doses are      changed, or new medications (including over-the-counter products) are added. *  Please carry medication information at all times in case of emergency situations.     These are general instructions for a healthy lifestyle:  No smoking/ No tobacco products/ Avoid exposure to second hand smoke  Surgeon General's Warning:  Quitting smoking now greatly reduces serious risk to your health. Obesity, smoking, and sedentary lifestyle greatly increases your risk for illness  A healthy diet, regular physical exercise & weight monitoring are important for maintaining a healthy lifestyle    You may be retaining fluid if you have a history of heart failure or if you experience any of the following symptoms:  Weight gain of 3 pounds or more overnight or 5 pounds in a week, increased swelling in our hands or feet or shortness of breath while lying flat in bed. Please call your doctor as soon as you notice any of these symptoms; do not wait until your next office visit.

## 2022-10-28 NOTE — BRIEF OP NOTE
Brief Postoperative Note      Patient: Chris Monae  YOB: 1950  MRN: [id number]    Date of Procedure: 10/28/2022    Pre-Op Diagnosis: Sebaceous cyst of axilla [L72.3]    Post-Op Diagnosis: Same       Procedure(s):  AXILLARY LESION BIOPSY EXCISION (4 cm)    Surgeon(s):  Heather Marin MD    Assistant:  * No surgical staff found *    Anesthesia: Monitor Anesthesia Care    Estimated Blood Loss (mL): Minimal    Complications: None    Specimens:   ID Type Source Tests Collected by Time Destination   A : LEFT AXILLA LESION Tissue Axillary SURGICAL PATHOLOGY Heather Marin MD 10/28/2022 1336        Implants:  * No implants in log *      Drains: * No LDAs found *    Findings: L axilla lesion with chronic drainage present. Elliptical excision. Skin too thin for subcuticular closure and simple running 4-0 Monocryl used for 4 cm defect.     Electronically signed by Heather Marin MD on 10/28/2022 at 1:49 PM

## 2022-10-28 NOTE — ANESTHESIA POSTPROCEDURE EVALUATION
Department of Anesthesiology  Postprocedure Note    Patient: Jad Page  MRN: [id number]  YOB: 1950  Date of evaluation: 10/28/2022      Procedure Summary     Date: 10/28/22 Room / Location: [facility] OP OR 05 / D OPC    Anesthesia Start: 1252 Anesthesia Stop: 1965    Procedure: AXILLARY LESION BIOPSY EXCISION (Left: Axilla) Diagnosis:       Sebaceous cyst of axilla      (Sebaceous cyst of axilla [L72.3])    Surgeons: Lillie Wilder MD Responsible Provider: James Adhikari MD    Anesthesia Type: TIVA ASA Status: 3          Anesthesia Type: TIVA    Sb Phase I: Sb Score: 8    Sb Phase II: Sb Score: 10      Anesthesia Post Evaluation    Patient location during evaluation: PACU  Patient participation: complete - patient participated  Level of consciousness: awake and alert  Pain score: 0  Airway patency: patent  Nausea & Vomiting: no nausea and no vomiting  Complications: no  Cardiovascular status: hemodynamically stable  Respiratory status: acceptable, nonlabored ventilation and spontaneous ventilation  Hydration status: euvolemic  Comments: BP (!) 142/63   Pulse 77   Temp 97.5 °F (36.4 °C) (Temporal)   Resp 15   Ht 5' 3\" (1.6 m)   Wt 260 lb (117.9 kg)   SpO2 95%   BMI 46.06 kg/m²     Multimodal analgesia pain management approach

## 2022-10-28 NOTE — ANESTHESIA PRE PROCEDURE
Department of Anesthesiology  Preprocedure Note       Name:  Iván Sanders   Age:  81 y.o.  :  1950                                          MRN:  [id number]         Date:  10/28/2022      Surgeon: Lisa Anderson):  Almaz Pino MD    Procedure: Procedure(s):  AXILLARY LESION BIOPSY EXCISION    Medications prior to admission:   Prior to Admission medications    Medication Sig Start Date End Date Taking?  Authorizing Provider   buPROPion (WELLBUTRIN) 75 MG tablet TAKE 1 TABLET BY MOUTH 2 TIMES A DAY 22   Ronald Tobar MD   ADVAIR DISKUS 250-50 MCG/ACT AEPB diskus inhaler INHALE 1 PUFF TWICE A DAY (RINSE MOUTH WELL AFTER USE) 22   Jerrell Harden MD   furosemide (LASIX) 20 MG tablet Take 1 tablet by mouth daily as needed (swelling) 22   Clarence Yip MD   ELIQUIS 5 MG TABS tablet TAKE 1 TABLET BY MOUTH TWICE A DAY 22   Clarence iYp MD   Acetaminophen 325 MG CAPS Take 360 mg by mouth 3 times daily as needed    Ar Automatic Reconciliation   albuterol sulfate  (90 Base) MCG/ACT inhaler Inhale 2 puffs into the lungs every 6 hours as needed 20   Ar Automatic Reconciliation   amLODIPine (NORVASC) 5 MG tablet TAKE 1 TABLET BY MOUTH DAILY 21   Ar Automatic Reconciliation   Cetirizine HCl 10 MG CAPS Take 10 capsules by mouth daily    Ar Automatic Reconciliation   dofetilide (TIKOSYN) 500 MCG capsule Take 500 mcg by mouth every 12 hours 12/15/21   Ar Automatic Reconciliation   levothyroxine (SYNTHROID) 125 MCG tablet Take 125 mcg by mouth every morning (before breakfast) 10/13/21   Ar Automatic Reconciliation   losartan (COZAAR) 100 MG tablet Take 100 mg by mouth daily 12/15/21   Ar Automatic Reconciliation   mometasone (NASONEX) 50 MCG/ACT nasal spray 2 sprays by Nasal route daily 21   Ar Automatic Reconciliation   montelukast (SINGULAIR) 10 MG tablet nightly 22   Ar Automatic Reconciliation       Current medications:    Current Facility-Administered Medications   Medication Dose Route Frequency Provider Last Rate Last Admin    lidocaine 1 % injection 1 mL  1 mL IntraDERmal Once PRN Raul Portillo IV, MD        acetaminophen (TYLENOL) tablet 1,000 mg  1,000 mg Oral Once Raul Portillo IV, MD        lactated ringers infusion   IntraVENous Continuous Asa Lindsey NICKERSON  mL/hr at 10/28/22 1140 New Bag at 10/28/22 1140    sodium chloride flush 0.9 % injection 5-40 mL  5-40 mL IntraVENous 2 times per day Raul Portillo IV, MD        sodium chloride flush 0.9 % injection 5-40 mL  5-40 mL IntraVENous PRN Raul Portillo IV, MD        0.9 % sodium chloride infusion   IntraVENous PRN Raul Portillo IV, MD        midazolam PF (VERSED) injection 2 mg  2 mg IntraVENous Once PRN Raul Portillo IV, MD        sodium chloride flush 0.9 % injection 5-40 mL  5-40 mL IntraVENous 2 times per day Aidan Garces MD        sodium chloride flush 0.9 % injection 5-40 mL  5-40 mL IntraVENous PRN Aidan Garces MD        0.9 % sodium chloride infusion   IntraVENous PRN Aidan Garces MD        ceFAZolin (ANCEF) 2000 mg in sterile water 20 mL IV syringe  2,000 mg IntraVENous On Call to [address] [place] [address], MD           Allergies:     Allergies   Allergen Reactions    Latex Other (See Comments)     Redness to site    Ciprofloxacin Other (See Comments)     Reacts with Afib meds    Olanzapine-Fluoxetine Hcl Other (See Comments)     Causes Severe pain     Cefaclor Rash       Problem List:    Patient Active Problem List   Diagnosis Code    Left foot pain M79.672    Localized osteoarthritis of shoulder, right M19.011    Essential hypertension I10    VSD (ventricular septal defect) Q21.0    Major depressive disorder with single episode, in full remission ([facility] [address] F32.5    Mixed hyperlipidemia E78.2    Hypersomnia, unspecified G47.10    Hypothyroidism following radioiodine therapy E89.0    Primary osteoarthritis of right hand M19.041    Acquired hallux valgus of left foot M20.12    Acquired claw toe, left M20.5X2    Arthritis of midfoot M19.079    Spinal stenosis of lumbar region with neurogenic claudication M48.062    High risk medications (not anticoagulants) long-term use Z79.899    Anxiety F41.9    Morbid obesity with BMI of 45.0-49.9, adult (HCC) E66.01, Z68.42    Atrial fibrillation, persistent (HCC) I48.19    OA (osteoarthritis) of knee M17.9    BILLIE (obstructive sleep apnea) G47.33    Mild intermittent asthma without complication L28.12    Status post left knee replacement Z96.652    Tendinitis of foot M77.50    DDD (degenerative disc disease), lumbar M51.36    Calculus of gallbladder with acute on chronic cholecystitis without obstruction K80.12    Current use of long term anticoagulation Z79.01    History of incisional hernia repair Z98.890, Z87.19    Sebaceous cyst of axilla L72.3       Past Medical History:        Diagnosis Date    Adverse effect of anesthesia     delayed awakening in the past, patient states improved after using CPAP--patient's  states better for patient to wake up on side, not back.  Allergic rhinitis     Anxiety     Arthritis     OA    Asthma     Followed by Dr. Silvia Ojeda, controlled with daily inhaler and PRN Ventolin     Depression     under control with med    Endocarditis 1992    hx 1992    Heart murmur     congenital, asymptomatic--Echo completed 2/18/19 EF 50-55    Hematuria     History of MRSA infection on left breast    5/2016 : treated/ resolved    HLD (hyperlipidemia)      Hypertension     controlled with medication     Hypertension     Hypothyroid     controlled with medication     Hypothyroidism due to acquired atrophy of thyroid 4/28/2015    Intertrigo     Irregular heart beat     Mass of colon     Morbid obesity ([address]     48.7 bmi    Persistent atrial fibrillation ([address]     s/p ablation (2/2019), Tikosyn loading---EKG dated 3/13/19 shows \"sinus rhythm, rate 70. \" Patient is [facility] by [facility].     Reactive airway disease with status asthmaticus     hx only    Scoliosis 2016    Sleep apnea     cpap compliant.  Varicose veins     VSD (ventricular septal defect) 2016    per echo (19) \"small PFO in the baseline state. \" Patient is [facility] by [facility]. Past Surgical History:        Procedure Laterality Date    ABLATION OF DYSRHYTHMIC FOCUS   & 2019    cardioversion / ablation    APPENDECTOMY      BREAST REDUCTION SURGERY Bilateral 2016    BREAST REDUCTION/ bilateral performed by Lan Reddy MD at [address] [facility]      heart cath age 5   Orvan Huguenin  SECTION      x2    COLONOSCOPY  ,     DILATION AND CURETTAGE OF UTERUS      multiple    HEENT  1969    jaw surgery due to underbite     HERNIA REPAIR  incisional [id number]    with mesh    LIPOMA RESECTION Right     right foot between toes    NE CARDIAC SURG PROCEDURE UNLIST  2018    cardioversion    NE CARDIAC SURG PROCEDURE UNLIST  2018    ablation    NE CARDIAC SURG PROCEDURE UNLIST  2018    cardioversion    NE CARDIAC SURG PROCEDURE UNLIST  2019    ablation    REFRACTIVE SURGERY      TOTAL COLECTOMY Right 2010 in colon removed    TOTAL KNEE ARTHROPLASTY Right 2018    TOTAL KNEE ARTHROPLASTY Left 2019       Social History:    Social History     Tobacco Use    Smoking status: Never    Smokeless tobacco: Never   Substance Use Topics    Alcohol use:  No                                Counseling given: Not Answered      Vital Signs (Current):   Vitals:    10/26/22 1131   Weight: 260 lb (117.9 kg)   Height: 5' 3\" (1.6 m)                                              BP Readings from Last 3 Encounters:   10/07/22 (!) 140/90   22 (!) 138/96   22 (!) 150/78       NPO Status:                                                                                 BMI:   Wt Readings from Last 3 Encounters:   10/26/22 260 lb (117.9 kg)   10/07/22 260 lb 4.8 oz (118.1 kg)   09/23/22 258 lb (117 kg)     Body mass index is 46.06 kg/m². CBC:   Lab Results   Component Value Date/Time    WBC 5.5 07/15/2022 01:11 PM    RBC 4.53 07/15/2022 01:11 PM    HGB 13.9 07/15/2022 01:11 PM    HCT 44.4 07/15/2022 01:11 PM    MCV 98.0 07/15/2022 01:11 PM    RDW 13.5 07/15/2022 01:11 PM     07/15/2022 01:11 PM       CMP:   Lab Results   Component Value Date/Time     06/21/2022 08:00 PM    K 3.6 06/21/2022 08:00 PM     06/21/2022 08:00 PM    CO2 27 06/21/2022 08:00 PM    BUN 14 06/21/2022 08:00 PM    CREATININE 0.58 06/21/2022 08:00 PM    GFRAA >133 06/21/2022 08:00 PM    AGRATIO 1.0 02/15/2022 10:01 AM    LABGLOM >60 06/21/2022 08:00 PM    GLUCOSE 129 06/21/2022 08:00 PM    PROT 6.6 06/21/2022 08:00 PM    CALCIUM 9.5 06/21/2022 08:00 PM    BILITOT 0.5 06/21/2022 08:00 PM    ALKPHOS 169 06/21/2022 08:00 PM    ALKPHOS 113 02/15/2022 10:01 AM    AST 41 06/21/2022 08:00 PM    ALT 45 06/21/2022 08:00 PM       POC Tests: No results for input(s): POCGLU, POCNA, POCK, POCCL, POCBUN, POCHEMO, POCHCT in the last 72 hours. Coags: No results found for: PROTIME, INR, APTT    HCG (If Applicable): No results found for: PREGTESTUR, PREGSERUM, HCG, HCGQUANT     ABGs: No results found for: PHART, PO2ART, CRU3BWJ, UXT3MGR, BEART, S0FCMVIL     Type & Screen (If Applicable):  No results found for: LABABO, LABRH    Drug/Infectious Status (If Applicable):  No results found for: HIV, HEPCAB    COVID-19 Screening (If Applicable):   Lab Results   Component Value Date/Time    COVID19 Detected 02/02/2022 12:00 AM           Anesthesia Evaluation   history of anesthetic complications (respiratory arrest with morphine):    Airway: Mallampati: II  TM distance: >3 FB   Neck ROM: full  Mouth opening: > = 3 FB   Dental: normal exam         Pulmonary:   (+) sleep apnea: on CPAP,  decreased breath sounds: bibasilar asthma:                            Cardiovascular:  Exercise tolerance: poor (<4 METS), (+) hypertension:, dysrhythmias: atrial fibrillation,         Rhythm: irregular  Rate: normal                    Neuro/Psych:   (+) depression/anxiety             GI/Hepatic/Renal:   (+) morbid obesity          Endo/Other:                     Abdominal:             Vascular: Other Findings:           Anesthesia Plan      TIVA     ASA 3       Induction: intravenous. Anesthetic plan and risks discussed with patient.                         Shaan Ram MD   10/28/2022

## 2022-10-28 NOTE — PERIOP NOTE
Discharge instructions discussed with  at bedside, no questions or concerns at this time. Hard copy of instructions given to , no new prescriptions.

## 2022-10-28 NOTE — H&P
[address]  [phone]    H&P Note   Julianna Quiroga   MRN: [id number]     : 1950        HPI: Julianan Quiroga is a 81 y.o. female who is seen in the office at the request of Dr. Amirah Russo for evaluation of an infected cyst of the left axilla. Issues began around Labor Day weekend when a previously known cyst in the left axilla became enlarged to the size of half of a golf ball. It was incredibly painful and she saw her dermatologist who performed an I&D and expressed a lot of material.  Cultures were obtained which grew heavy Serratia marcescens. She was originally placed on Cipro but there was some concern about interference with her cardiac medications. She was placed on other antibiotics for a total of approximately 30 days. She had persistent drainage despite improvement with a reculture showing microaerophilic Streptococcus. She has been using Dial soap. She has also been using mupirocin on the area. It has stopped draining and is no longer painful. She feels as if there is a residual lesion. She presents today with her . On questioning, she likes to bathe in a pool. She removes her underarm hair with a razor that she keeps in her shower without frequent changing of razor blade. They have squeezed the lesion to express contents over the years. Of a side note, she is having no gallbladder issues. She is known to me previously referred by Dr. Sawyer Maoly for Melisabrenda Kendall  following a visit on 2022. She was evaluated for right back and right flank pain. She had an episode of mild nausea and diarrhea earlier that day. Renal calculus protocol CT was obtained which identified no evidence of renal calculi but cholelithiasis was identified. There was no identification of pericholecystic inflammation.   Her lab work was unremarkable and her symptoms abated quickly and she was discharged home with referral to surgery. I reviewed her findings and identified only a mildly elevated alkaline phosphatase and minimally elevated AST. Total bilirubin and ALT were nonelevated. Lipase was not elevated. She had no other symptoms other than pain while in the ER. She denied other abdominal symptoms or postprandial symptoms. She has multiple medical issues including a cardiac issues with known VSD, chronic asthma, sleep apnea, chronic atrial fibrillation with anticoagulation on Eliquis and Tikosyn, and severe obesity with a weight of 265 pounds with a BMI of 46.94 kg/m². She also has a significant abdominal surgical history. In 2010 she was found to have an unusual hepatic flexure mass and underwent right hemicolectomy with anastomosis at [facility] for a benign colon lesion. She developed a ventral hernia from her laparotomy incision that was repaired by Dr. Lory Malhotra in 2012 with a 10 x 14 cm Ventrio composite mesh of ePTFE and polypropylene. I personally reviewed her operative reports and CT scan. There are no prior ultrasounds or MRIs. CT does show evidence of cholelithiasis without inflammatory changes around the gallbladder. An ileocolic anastomosis is present inferior to the gallbladder. Severe obesity with a wide rectus diastases is identified as well as an off centered anterior abdominal wall prosthetic, consistent with known implanted mesh. She has remained asymptomatic at home. Her original symptoms had an emotional component as her dog passed away and one of her good friends was placed in hospice care. Past Medical History:   Diagnosis Date    Adverse effect of anesthesia     delayed awakening in the past, patient states improved after using CPAP--patient's  states better for patient to wake up on side, not back.      Allergic rhinitis     Anxiety     Arthritis     OA    Asthma     Followed by Dr. Lyubov Barfield, controlled with daily inhaler and PRN Ventolin     Depression     under control with med    Endocarditis     hx     Heart murmur     congenital, asymptomatic--Echo completed 19 EF 50-55    Hematuria     History of MRSA infection on left breast    2016 : treated/ resolved    HLD (hyperlipidemia)      Hypertension     controlled with medication     Hypertension     Hypothyroid     controlled with medication     Hypothyroidism due to acquired atrophy of thyroid 2015    Intertrigo     Irregular heart beat     Mass of colon     Morbid obesity ([address]     48.7 bmi    Persistent atrial fibrillation ([address]     s/p ablation (2019), Tikosyn loading---EKG dated 3/13/19 shows \"sinus rhythm, rate 70. \" Patient is followed by [facility]. Reactive airway disease with status asthmaticus     hx only    Scoliosis 2016    Sleep apnea     cpap compliant. Varicose veins     VSD (ventricular septal defect) 2016    per echo (19) \"small PFO in the baseline state. \" Patient is followed by [facility].       Past Surgical History:   Procedure Laterality Date    ABLATION OF DYSRHYTHMIC FOCUS   & 2019    cardioversion / ablation    APPENDECTOMY      BREAST REDUCTION SURGERY Bilateral 2016    BREAST REDUCTION/ bilateral performed by Hudson Blackwell MD at [place] [address]      heart cath age 5     SECTION      x2    COLONOSCOPY  ,     DILATION AND CURETTAGE OF UTERUS      multiple    HEENT  1969    jaw surgery due to underbite     HERNIA REPAIR  incisional [id number]    with mesh    LIPOMA RESECTION Right     right foot between toes    TX CARDIAC SURG PROCEDURE UNLIST  2018    cardioversion    TX CARDIAC SURG PROCEDURE UNLIST  2018    ablation    TX CARDIAC SURG PROCEDURE UNLIST  2018    cardioversion    TX CARDIAC SURG PROCEDURE UNLIST  2019    ablation    REFRACTIVE SURGERY      TOTAL COLECTOMY Right     8 in colon removed    TOTAL KNEE ARTHROPLASTY Right 01/29/2018    TOTAL KNEE ARTHROPLASTY Left 04/17/2019     No current facility-administered medications for this encounter.      Current Outpatient Medications   Medication Sig    buPROPion (WELLBUTRIN) 75 MG tablet TAKE 1 TABLET BY MOUTH 2 TIMES A DAY    ADVAIR DISKUS 250-50 MCG/ACT AEPB diskus inhaler INHALE 1 PUFF TWICE A DAY (RINSE MOUTH WELL AFTER USE)    furosemide (LASIX) 20 MG tablet Take 1 tablet by mouth daily as needed (swelling)    ELIQUIS 5 MG TABS tablet TAKE 1 TABLET BY MOUTH TWICE A DAY    Acetaminophen 325 MG CAPS Take 360 mg by mouth 3 times daily as needed    albuterol sulfate  (90 Base) MCG/ACT inhaler Inhale 2 puffs into the lungs every 6 hours as needed    amLODIPine (NORVASC) 5 MG tablet TAKE 1 TABLET BY MOUTH DAILY    Cetirizine HCl 10 MG CAPS Take 10 capsules by mouth daily    dofetilide (TIKOSYN) 500 MCG capsule Take 500 mcg by mouth every 12 hours    levothyroxine (SYNTHROID) 125 MCG tablet Take 125 mcg by mouth every morning (before breakfast)    losartan (COZAAR) 100 MG tablet Take 100 mg by mouth daily    mometasone (NASONEX) 50 MCG/ACT nasal spray 2 sprays by Nasal route daily    montelukast (SINGULAIR) 10 MG tablet nightly     ALLERGIES:  Latex, Ciprofloxacin, Olanzapine-fluoxetine hcl, and Cefaclor    Social History     Socioeconomic History    Marital status:      Spouse name: None    Number of children: None    Years of education: None    Highest education level: None   Tobacco Use    Smoking status: Never    Smokeless tobacco: Never   Substance and Sexual Activity    Alcohol use: No    Drug use: Never     Social History     Tobacco Use   Smoking Status Never   Smokeless Tobacco Never     Family History   Problem Relation Age of Onset    Colon Cancer Maternal Uncle     Thyroid Disease Daughter         Rudfortino Byes' disease    Thyroid Disease Maternal Aunt         Hypothyroidism    Cancer Mother         Lymphoma    Heart Disease Brother     Breast Cancer Mother     Thyroid Disease Mother         Hypothyroidism    Heart Disease Father         s/p MI and CABG    Hypertension Father     Heart Disease Brother     Thyroid Disease Sister         Hypothyroidism    Hypertension Mother        ROS: The patient has no difficulty with chest pain or shortness of breath. No fever or chills. The patient denies any personal or family history of abnormal clotting or bleeding. Comprehensive review of systems was otherwise unremarkable except as noted above. Physical Exam:   Constitutional: Alert oriented cooperative patient in no acute distress. Ht 5' 3\" (1.6 m)   Wt 260 lb (117.9 kg)   BMI 46.06 kg/m²   BP (!) 140/90   Pulse 75   Temp 98.1 °F (36.7 °C)    SpO2 97%    Eyes:Sclera are clear without icterus. ENMT: no obvious neck masses, no ear or lip lesions  CV: RRR. Normal perfusion  L axilla: Hair removed and powder deodorant/antiperspirant in place. Mild rash present consistent with tinea fungal overgrowth. 1 cm I&D site that is well-healing with residual nodularity beneath and superior to the incision scar. No other adenopathy. No erythema. No fluctuance. No drainage. No tenderness. Resp: No JVD. Breathing is  non-labored. GI: very obese, soft and non-distended, well-healed incision scar to the left of the umbilicus in the midline that extends several centimeters above the umbilicus. There appears to be a significant large area of the upper abdomen without incision scar. No right upper quadrant tenderness or Wan sign. Musculoskeletal: unremarkable with normal function.    Neuro:  No obvious focal deficits  Psychiatric: normal affect and mood, no memory impairment    Lab Results   Component Value Date/Time    WBC 5.5 07/15/2022 01:11 PM    HGB 13.9 07/15/2022 01:11 PM    HCT 44.4 07/15/2022 01:11 PM     07/15/2022 01:11 PM    MCV 98.0 07/15/2022 01:11 PM     Lab Results   Component Value Date     06/21/2022    K 3.6 06/21/2022     06/21/2022    CO2 27 06/21/2022    BUN 14 06/21/2022    CREATININE 0.58 (L) 06/21/2022    GLUCOSE 129 (H) 06/21/2022    CALCIUM 9.5 06/21/2022    PROT 6.6 06/21/2022    LABALBU 4.1 06/21/2022    BILITOT 0.5 06/21/2022    ALKPHOS 169 (H) 06/21/2022    AST 41 (H) 06/21/2022    ALT 45 06/21/2022    LABGLOM >60 06/21/2022    GFRAA >133 06/21/2022    AGRATIO 1.0 (L) 02/15/2022    GLOB 1.6 (L) 06/21/2022     US Result (most recent):  No results found for this or any previous visit from the past 3650 days. CT Result (most recent):  CT ABDOMEN PELVIS RENAL STONE 06/21/2022    Narrative  EXAMINATION: CT ABDOMEN PELVIS RENAL STONE 6/21/2022 8:37 PM    ACCESSION NUMBER: [id number]    COMPARISON: CT abdomen pelvis dated 4/30/2010    INDICATION: R flank pain/R sided abd pain    TECHNIQUE: Contiguous axial computed tomographic images were obtained from the  upper abdomen to the symphysis pubis without intravenous contrast. Coronal  reconstructions were also performed. Please note that the detection of solid organ and vascular abnormalities is  limited in the absence of intravenous contrast.    Radiation dose reduction techniques were used for this study. Our CT scanners  use one or all of the following: Automated exposure control, adjustment of the  mA and/or kV according to patient size, iterative reconstruction. FINDINGS:  LUNG BASES: Bibasilar reticulation in the lung bases. Mitral valvular  calcifications. No sizable pleural effusion. The heart is not enlarged. LIVER: The liver contour is normal. No suspicious liver lesion. BILIARY TREE: Mildly hyperdense gallstone within the gallbladder. The  gallbladder is normal. No gallbladder wall thickening or pericholecystic fluid. No biliary dilation. SPLEEN: Normal.    PANCREAS: No pancreatic mass or ductal dilation. ADRENALS: Normal.    KIDNEYS/BLADDER: The kidneys are symmetric in size. No renal calculus or  hydronephrosis. No renal mass.  The urinary bladder is unremarkable. BOWEL: Scattered colonic diverticula without evidence of acute diverticulitis. Postsurgical changes of partial colectomy. Small bowel is normal in caliber. Stomach and duodenum are unremarkable. APPENDIX: The appendix is not visualized and absent. PERITONEUM/RETROPERITONEUM: No ascites or free air. No pelvic or retroperitoneal  lymphadenopathy. VESSELS: No abdominal aortic aneurysm. ABDOMINAL WALL: Ventral wall hernia repair patch. REPRODUCTIVE: Uterus is unremarkable. No adnexal mass. BONES: No suspicious osseous lesion. Levocurvature of the thoracolumbar spine. Multilevel degenerative changes of the lumbar spine. Partial fusion of L2 and  L3. Impression  1. Cholelithiasis with mild gallbladder distention. No gallbladder wall  thickening or pericholecystic fluid is evident. Findings are equivocal for acute  cholecystitis. 2.  No renal calculi or hydronephrosis. 3.  Degenerative changes and scoliotic curvature of the lumbar spine. Large abdominal mesh 11 x 14 cm Ventrio in 2012 by Dr. Martita Frazier (slightly off-center to left)  Implants:   Implant Name Type Inv. Item Serial No.  Lot No. LRB No. Used Action   MESH VENTRIO MED OVAL W/AB RG - [id number] MESH VENTRIO MED OVAL W/AB [id number] [address] [facility] [id number] Implanted        MRI Result (most recent):  MRI ABDOMEN W WO CONTRAST 07/14/2022    Narrative  MRI OF THE ABDOMEN    INDICATION: Abdominal pain with history of gallstones. Elevated alkaline  phosphatase. Standard MRI sequences were obtained through the abdomen in multiple planes. Images were obtained before and after intravenous injection of 10 mL ProHance. COMPARISON: CT abdomen pelvis 6/21/2022. FINDINGS:  LOWER CHEST: No pleural effusions. HEPATOBILIARY: There is mild fatty infiltration liver. No abnormal enhancing  liver lesions. MRCP sequences show no evidence of intrahepatic or common bile  duct dilatation.  No common bile duct stones are evident. Common bile duct  measures 7 mm on image 69 of series 9 consistent with patient's age. Gallstones  noted in the gallbladder but there is no gallbladder wall thickening or abnormal  pericholecystic inflammation. PANCREAS: Pancreas is normal. No evidence of a pancreatic mass or surrounding  inflammation. Pancreatic duct is normal in caliber and course. SPLEEN: Normal.    ADRENAL GLANDS: Normal.    KIDNEYS: Tiny subcentimeter right renal cortical cysts are present. No  enhancement. A few parapelvic left renal cysts are also present without  enhancement. No specific follow-up suggested. No enhancing renal mass. No  hydronephrosis. BOWEL: Normal.    LYMPH NODES: No enlarged abdominal lymph nodes. VASCULATURE: Unremarkable. MUSCULOSKELETAL: Normal.    Impression  Cholelithiasis. No significant intrahepatic or extrahepatic bile  duct dilatation. Pancreatic duct is normal in caliber and course. Remainder the  abdomen is unremarkable the exception of a few small renal cysts. No specific  follow-up suggested. Assessment/Plan:     Alana Dye is a 81 y.o. female who presents to the office for evaluation of infected left axillary cyst.  She most likely has had a longstanding cyst that became infected likely secondary to minor laceration during axillary hair removal using a contaminated razor possibly also contaminated by pool swimming. Serratia marcescens is a pathogen that lives in moist environments such as a soap dish, not sterilized razor, or pet dish. It can also be found in pools or hot tubs. I think the most likely source of the infection of the cyst came from razor burn transmission. The infection is quiescent at this time. I believe there is residual cyst that would be best excised to lessen the risk of recurrence. Given her Eliquis and location within the axilla, I do not recommend excision in the office.   We will obtain clearance to have her Eliquis held and perform excision in the operating room. Her 30-day use of antibiotics, use of Dial soap and mupirocin have also resulted in some fungal overgrowth in the axilla. I would like her to discontinue the use of the Dial soap and mupirocin. She has completed the antibiotics. I would like her to obtain Nizoral shampoo as a body wash to help suppress the fungal growth prior to surgery. She will also avoid using the deodorants and this axilla. She will not bathe in a pool. She will be careful with hair removal and use only new razors for single use. We discussed proceeding with excision of left axillary cyst.  We will do this in the supine position with her left arm elevated above her head and can do it with MAC anesthesia plus local in the OR. I discussed the patient's condition and treatment options with the patient. I discussed risks of surgery in language the patient could understand including bleeding, infection, need for further surgery, abscess, fistula, SBO, DVT, PE, heart attack, stroke, renal failure, respiratory failure, ventilatory dependence, and death. The patient voiced understanding of all this and all questions were answered. Alternatives to surgery were discussed also and risks of the alternatives. The patient requested that we proceed with surgery. Informed consent was obtained.       Patient Active Problem List    Diagnosis Date Noted    Calculus of gallbladder with acute on chronic cholecystitis without obstruction 07/01/2022     Priority: Medium    Current use of long term anticoagulation 07/01/2022     Priority: Medium    History of incisional hernia repair 07/01/2022     Priority: Medium     2012 Dr. Jazmine Colmenares -  Ventrio 11 x 14 cm mesh      Sebaceous cyst of axilla 10/07/2022     Added automatically from request for surgery [id number]      Status post left knee replacement 06/03/2021    Left foot pain 02/22/2020    Localized osteoarthritis of shoulder, right 02/22/2020    Primary osteoarthritis of right hand 02/22/2020    Acquired hallux valgus of left foot 02/22/2020    Acquired claw toe, left 02/22/2020    Arthritis of midfoot 02/22/2020    Spinal stenosis of lumbar region with neurogenic claudication 02/22/2020    Tendinitis of foot 02/22/2020    DDD (degenerative disc disease), lumbar 02/22/2020    Hypersomnia, unspecified 05/22/2019    OA (osteoarthritis) of knee 04/17/2019    High risk medications (not anticoagulants) long-term use 03/13/2019    Hypothyroidism following radioiodine therapy 07/09/2018    Morbid obesity with BMI of 45.0-49.9, adult ([facility] 07/09/2018    Mild intermittent asthma without complication 45/35/5266    Atrial fibrillation, persistent ([address] 07/05/2018    VSD (ventricular septal defect) 07/22/2016    Anxiety     BILLIE (obstructive sleep apnea) 01/25/2016    Essential hypertension 04/28/2015    Major depressive disorder with single episode, in full remission ([facility] 04/28/2015    Mixed hyperlipidemia 04/28/2015          Prashanth Viramontes MD,  FACS

## 2022-11-01 NOTE — OP NOTE
Operative Note      Patient: Ruiz Helm  YOB: 1950  MRN: [id number]    Date of Procedure: 10/28/2022    Pre-Op Diagnosis: Sebaceous cyst of axilla [L72.3]     Post-Op Diagnosis: Same       Procedure(s):  AXILLARY LESION BIOPSY EXCISION (4 cm) [cpt 54251]     Surgeon(s):  Edwar Bowles MD     Assistant:  * No surgical staff found *     Anesthesia: Monitor Anesthesia Care     Estimated Blood Loss (mL): Minimal     Complications: None     Specimens:   ID Type Source Tests Collected by Time Destination   A : LEFT AXILLA LESION Tissue Axillary SURGICAL PATHOLOGY Edwar Bowles MD 10/28/2022 1336           Implants:  * No implants in log *      Drains: * No LDAs found *     Findings: L axilla lesion with chronic drainage present. Elliptical excision. Skin too thin for subcuticular closure and simple running 4-0 Monocryl used for 4 cm defect. Detailed Description of Procedure:   Informed consent is obtained. Following sedation with total IV anesthesia by the anesthesia department, her left arm was padded and secured above her head and tissues were retracted to help expose the left axilla. The area is sterily prepped. Local anesthesia is attained with instillation of 0.25% bupivacaine with 1:100,000 aqueous epinephrine. Approximately 20 cc in total.    Location: Left axilla  Lesion size:  2 cm Length of closure:  4 cm     An elliptical incision is made. The lesion is excised using sharp dissection. The wound was irrigated and hemostasis confirmed. Wound is repaired using running simple 4-0 Monocryl suture. The skin was too thin to close with a subcuticular suture. Sterile dressing was placed. All sponge, needle, and instrument counts were correct. Patient was taken to PACU in good condition having tolerated the procedure well.          Electronically signed by Edwar Bowles MD on 10/31/2022 at 9:55 PM

## 2022-11-09 ENCOUNTER — OFFICE VISIT (OUTPATIENT)
Dept: SURGERY | Age: 72
End: 2022-11-09

## 2022-11-09 DIAGNOSIS — L72.3 SEBACEOUS CYST OF AXILLA: Primary | ICD-10-CM

## 2022-11-09 NOTE — PROGRESS NOTES
[address], [address] [facility]  [phone]    H&P Note   Franc Escalona   MRN: [id number]     : 1950        HPI: Franc Escalona is a 81 y.o. female who is seen in the office at the request of Dr. Teresa Phipps for evaluation of an infected cyst of the left axilla. Issues began around Labor Day weekend when a previously known cyst in the left axilla became enlarged to the size of half of a golf ball. It was incredibly painful and she saw her dermatologist who performed an I&D and expressed a lot of material.  Cultures were obtained which grew heavy Serratia marcescens. She was originally placed on Cipro but there was some concern about interference with her cardiac medications. She was placed on other antibiotics for a total of approximately 30 days. She had persistent drainage despite improvement with a reculture showing microaerophilic Streptococcus. She has been using Dial soap. She has also been using mupirocin on the area. It has stopped draining and is no longer painful. She feels as if there is a residual lesion. She presents today with her . On questioning, she likes to bathe in a pool. She removes her underarm hair with a razor that she keeps in her shower without frequent changing of razor blade. They have squeezed the lesion to express contents over the years. Of a side note, she is having no gallbladder issues. She is known to me previously referred by Dr. Ricky Clark for [facility] following a visit on 2022. She was evaluated for right back and right flank pain. She had an episode of mild nausea and diarrhea earlier that day. Renal calculus protocol CT was obtained which identified no evidence of renal calculi but cholelithiasis was identified. There was no identification of pericholecystic inflammation.   Her lab work was unremarkable and her symptoms abated quickly and she was discharged home with referral to surgery. I reviewed her findings and identified only a mildly elevated alkaline phosphatase and minimally elevated AST. Total bilirubin and ALT were nonelevated. Lipase was not elevated. She had no other symptoms other than pain while in the ER. She denied other abdominal symptoms or postprandial symptoms. She has multiple medical issues including a cardiac issues with known VSD, chronic asthma, sleep apnea, chronic atrial fibrillation with anticoagulation on Eliquis and Tikosyn, and severe obesity with a weight of 265 pounds with a BMI of 46.94 kg/m². She also has a significant abdominal surgical history. In 2010 she was found to have an unusual hepatic flexure mass and underwent right hemicolectomy with anastomosis at [facility] for a benign colon lesion. She developed a ventral hernia from her laparotomy incision that was repaired by Dr. Leigh Ann Be in 2012 with a 10 x 14 cm Ventrio composite mesh of ePTFE and polypropylene. I personally reviewed her operative reports and CT scan. There are no prior ultrasounds or MRIs. CT does show evidence of cholelithiasis without inflammatory changes around the gallbladder. An ileocolic anastomosis is present inferior to the gallbladder. Severe obesity with a wide rectus diastases is identified as well as an off centered anterior abdominal wall prosthetic, consistent with known implanted mesh. She has remained asymptomatic at home. Her original symptoms had an emotional component as her dog passed away and one of her good friends was placed in hospice care. Past Medical History:   Diagnosis Date    Adverse effect of anesthesia     delayed awakening in the past, patient states improved after using CPAP--patient's  states better for patient to wake up on side, not back.      Allergic rhinitis     Anxiety     Arthritis     OA    Asthma     Followed by Dr. Mike Galvez, controlled with daily inhaler and PRN Ventolin     Depression     under control with med    Endocarditis     hx     Heart murmur     congenital, asymptomatic--Echo completed 19 EF 50-55    Hematuria     History of MRSA infection on left breast    2016 : treated/ resolved    HLD (hyperlipidemia)      Hypertension     controlled with medication     Hypertension     Hypothyroid     controlled with medication     Hypothyroidism due to acquired atrophy of thyroid 2015    Intertrigo     Irregular heart beat     Mass of colon     Morbid obesity ([address]     48.7 bmi    Persistent atrial fibrillation ([address]     s/p ablation (2019), Tikosyn loading---EKG dated 3/13/19 shows \"sinus rhythm, rate 70. \" Patient is followed by [facility]. Reactive airway disease with status asthmaticus     hx only    Scoliosis 2016    Sleep apnea     cpap compliant. Varicose veins     VSD (ventricular septal defect) 2016    per echo (19) \"small PFO in the baseline state. \" Patient is followed by [facility].       Past Surgical History:   Procedure Laterality Date    ABLATION OF DYSRHYTHMIC FOCUS   & 2019    cardioversion / ablation    APPENDECTOMY      AXILLARY SURGERY Left 10/28/2022    AXILLARY LESION BIOPSY EXCISION performed by Polly Le MD at [facility] [facility] [address] Bilateral 2016    BREAST REDUCTION/ bilateral performed by Hudson Blackwell MD at [facility] 72      heart cath age 5     SECTION      x2    COLONOSCOPY  ,     DILATION AND CURETTAGE OF UTERUS      multiple    HEENT  1969    jaw surgery due to underbite     HERNIA REPAIR  incisional [id number]    with mesh    LIPOMA RESECTION Right     right foot between toes    AL CARDIAC SURG PROCEDURE UNLIST  2018    cardioversion    AL CARDIAC SURG PROCEDURE UNLIST  2018    ablation    AL CARDIAC SURG PROCEDURE UNLIST  2018    cardioversion    AL CARDIAC SURG PROCEDURE UNLIST  2019    ablation REFRACTIVE SURGERY      TOTAL COLECTOMY Right 2010 8 in colon removed    TOTAL KNEE ARTHROPLASTY Right 01/29/2018    TOTAL KNEE ARTHROPLASTY Left 04/17/2019     Current Outpatient Medications   Medication Sig    buPROPion (WELLBUTRIN) 75 MG tablet TAKE 1 TABLET BY MOUTH 2 TIMES A DAY    ADVAIR DISKUS 250-50 MCG/ACT AEPB diskus inhaler INHALE 1 PUFF TWICE A DAY (RINSE MOUTH WELL AFTER USE)    furosemide (LASIX) 20 MG tablet Take 1 tablet by mouth daily as needed (swelling)    ELIQUIS 5 MG TABS tablet TAKE 1 TABLET BY MOUTH TWICE A DAY    Acetaminophen 325 MG CAPS Take 360 mg by mouth 3 times daily as needed    albuterol sulfate  (90 Base) MCG/ACT inhaler Inhale 2 puffs into the lungs every 6 hours as needed    amLODIPine (NORVASC) 5 MG tablet TAKE 1 TABLET BY MOUTH DAILY    Cetirizine HCl 10 MG CAPS Take 10 capsules by mouth daily    dofetilide (TIKOSYN) 500 MCG capsule Take 500 mcg by mouth every 12 hours    levothyroxine (SYNTHROID) 125 MCG tablet Take 125 mcg by mouth every morning (before breakfast)    losartan (COZAAR) 100 MG tablet Take 100 mg by mouth daily    mometasone (NASONEX) 50 MCG/ACT nasal spray 2 sprays by Nasal route daily    montelukast (SINGULAIR) 10 MG tablet nightly     No current facility-administered medications for this visit.      ALLERGIES:  Ciprofloxacin, Adhesive tape, Olanzapine-fluoxetine hcl, and Cefaclor    Social History     Socioeconomic History    Marital status:    Tobacco Use    Smoking status: Never    Smokeless tobacco: Never   Substance and Sexual Activity    Alcohol use: No    Drug use: Never     Social History     Tobacco Use   Smoking Status Never   Smokeless Tobacco Never     Family History   Problem Relation Age of Onset    Colon Cancer Maternal Uncle     Thyroid Disease Daughter         Alessandra Bald' disease    Thyroid Disease Maternal Aunt         Hypothyroidism    Cancer Mother         Lymphoma    Heart Disease Brother     Breast Cancer Mother     Thyroid Disease Mother         Hypothyroidism    Heart Disease Father         s/p MI and CABG    Hypertension Father     Heart Disease Brother     Thyroid Disease Sister         Hypothyroidism    Hypertension Mother        ROS: The patient has no difficulty with chest pain or shortness of breath. No fever or chills. The patient denies any personal or family history of abnormal clotting or bleeding. Comprehensive review of systems was otherwise unremarkable except as noted above. Physical Exam:   Constitutional: Alert oriented cooperative patient in no acute distress. There were no vitals taken for this visit. BP (!) 140/90   Pulse 75   Temp 98.1 °F (36.7 °C)    SpO2 97%    Eyes:Sclera are clear without icterus. ENMT: no obvious neck masses, no ear or lip lesions  CV: RRR. Normal perfusion    L axilla: Incision healing with subcuticular stitching. One clear stitch at lateral edge trimmed, small 2mm skin lesion present. No erythema. No fluctuance. No drainage. No tenderness. Resp: No JVD. Breathing is  non-labored. GI: very obese, soft and non-distended, well-healed incision scar to the left of the umbilicus in the midline that extends several centimeters above the umbilicus. There appears to be a significant large area of the upper abdomen without incision scar. No right upper quadrant tenderness or Wan sign. Musculoskeletal: unremarkable with normal function.    Neuro:  No obvious focal deficits  Psychiatric: normal affect and mood, no memory impairment    Lab Results   Component Value Date/Time    WBC 5.5 07/15/2022 01:11 PM    HGB 13.9 07/15/2022 01:11 PM    HCT 44.4 07/15/2022 01:11 PM     07/15/2022 01:11 PM    MCV 98.0 07/15/2022 01:11 PM     Lab Results   Component Value Date     10/28/2022    K 4.7 10/28/2022     (H) 10/28/2022    CO2 27 10/28/2022    BUN 12 10/28/2022    CREATININE 0.80 10/28/2022    GLUCOSE 91 10/28/2022    CALCIUM 9.6 10/28/2022    PROT 6.6 06/21/2022    LABALBU 4.1 06/21/2022    BILITOT 0.5 06/21/2022    ALKPHOS 169 (H) 06/21/2022    AST 41 (H) 06/21/2022    ALT 45 06/21/2022    LABGLOM >60 10/28/2022    GFRAA >133 06/21/2022    AGRATIO 1.0 (L) 02/15/2022    GLOB 1.6 (L) 06/21/2022     US Result (most recent):  No results found for this or any previous visit from the past 3650 days. CT Result (most recent):  CT ABDOMEN PELVIS RENAL STONE 06/21/2022    Narrative  EXAMINATION: CT ABDOMEN PELVIS RENAL STONE 6/21/2022 8:37 PM    ACCESSION NUMBER: [id number]    COMPARISON: CT abdomen pelvis dated 4/30/2010    INDICATION: R flank pain/R sided abd pain    TECHNIQUE: Contiguous axial computed tomographic images were obtained from the  upper abdomen to the symphysis pubis without intravenous contrast. Coronal  reconstructions were also performed. Please note that the detection of solid organ and vascular abnormalities is  limited in the absence of intravenous contrast.    Radiation dose reduction techniques were used for this study. Our CT scanners  use one or all of the following: Automated exposure control, adjustment of the  mA and/or kV according to patient size, iterative reconstruction. FINDINGS:  LUNG BASES: Bibasilar reticulation in the lung bases. Mitral valvular  calcifications. No sizable pleural effusion. The heart is not enlarged. LIVER: The liver contour is normal. No suspicious liver lesion. BILIARY TREE: Mildly hyperdense gallstone within the gallbladder. The  gallbladder is normal. No gallbladder wall thickening or pericholecystic fluid. No biliary dilation. SPLEEN: Normal.    PANCREAS: No pancreatic mass or ductal dilation. ADRENALS: Normal.    KIDNEYS/BLADDER: The kidneys are symmetric in size. No renal calculus or  hydronephrosis. No renal mass. The urinary bladder is unremarkable. BOWEL: Scattered colonic diverticula without evidence of acute diverticulitis. Postsurgical changes of partial colectomy.  Small bowel is normal in caliber. Stomach and duodenum are unremarkable. APPENDIX: The appendix is not visualized and absent. PERITONEUM/RETROPERITONEUM: No ascites or free air. No pelvic or retroperitoneal  lymphadenopathy. VESSELS: No abdominal aortic aneurysm. ABDOMINAL WALL: Ventral wall hernia repair patch. REPRODUCTIVE: Uterus is unremarkable. No adnexal mass. BONES: No suspicious osseous lesion. Levocurvature of the thoracolumbar spine. Multilevel degenerative changes of the lumbar spine. Partial fusion of L2 and  L3. Impression  1. Cholelithiasis with mild gallbladder distention. No gallbladder wall  thickening or pericholecystic fluid is evident. Findings are equivocal for acute  cholecystitis. 2.  No renal calculi or hydronephrosis. 3.  Degenerative changes and scoliotic curvature of the lumbar spine. Large abdominal mesh 11 x 14 cm Ventrio in 2012 by Dr. Favio Sanders (slightly off-center to left)  Implants:   Implant Name Type Inv. Item Serial No.  Lot No. LRB No. Used Action   MESH VENTRIO MED OVAL W/AB RG - [id number] MESH VENTRIO MED OVAL W/AB [id number] [address] [facility] [id number] 1 Implanted        MRI Result (most recent):  MRI ABDOMEN W WO CONTRAST 07/14/2022    Narrative  MRI OF THE ABDOMEN    INDICATION: Abdominal pain with history of gallstones. Elevated alkaline  phosphatase. Standard MRI sequences were obtained through the abdomen in multiple planes. Images were obtained before and after intravenous injection of 10 mL ProHance. COMPARISON: CT abdomen pelvis 6/21/2022. FINDINGS:  LOWER CHEST: No pleural effusions. HEPATOBILIARY: There is mild fatty infiltration liver. No abnormal enhancing  liver lesions. MRCP sequences show no evidence of intrahepatic or common bile  duct dilatation. No common bile duct stones are evident. Common bile duct  measures 7 mm on image 69 of series 9 consistent with patient's age.  Gallstones  noted in the gallbladder but there is no gallbladder wall thickening or abnormal  pericholecystic inflammation. PANCREAS: Pancreas is normal. No evidence of a pancreatic mass or surrounding  inflammation. Pancreatic duct is normal in caliber and course. SPLEEN: Normal.    ADRENAL GLANDS: Normal.    KIDNEYS: Tiny subcentimeter right renal cortical cysts are present. No  enhancement. A few parapelvic left renal cysts are also present without  enhancement. No specific follow-up suggested. No enhancing renal mass. No  hydronephrosis. BOWEL: Normal.    LYMPH NODES: No enlarged abdominal lymph nodes. VASCULATURE: Unremarkable. MUSCULOSKELETAL: Normal.    Impression  Cholelithiasis. No significant intrahepatic or extrahepatic bile  duct dilatation. Pancreatic duct is normal in caliber and course. Remainder the  abdomen is unremarkable the exception of a few small renal cysts. No specific  follow-up suggested. Assessment/Plan:     Benito Wilkins is a 81 y.o. female who presents to the office for evaluation of infected left axillary cyst.  She most likely has had a longstanding cyst that became infected likely secondary to minor laceration during axillary hair removal using a contaminated razor possibly also contaminated by pool swimming. Serratia marcescens is a pathogen that lives in moist environments such as a soap dish, not sterilized razor, or pet dish. It can also be found in pools or hot tubs. I think the most likely source of the infection of the cyst came from razor burn transmission. The infection is quiescent at this time. I believe there is residual cyst that would be best excised to lessen the risk of recurrence. Given her Eliquis and location within the axilla, I do not recommend excision in the office. We will obtain clearance to have her Eliquis held and perform excision in the operating room.   Her 30-day use of antibiotics, use of Dial soap and mupirocin have also resulted in some fungal overgrowth in the axilla. I would like her to discontinue the use of the Dial soap and mupirocin. She has completed the antibiotics. I would like her to obtain Nizoral shampoo as a body wash to help suppress the fungal growth prior to surgery. She will also avoid using the deodorants and this axilla. She will not bathe in a pool. She will be careful with hair removal and use only new razors for single use. We discussed proceeding with excision of left axillary cyst.  We will do this in the supine position with her left arm elevated above her head and can do it with MAC anesthesia plus local in the OR. I discussed the patient's condition and treatment options with the patient. I discussed risks of surgery in language the patient could understand including bleeding, infection, need for further surgery, abscess, fistula, SBO, DVT, PE, heart attack, stroke, renal failure, respiratory failure, ventilatory dependence, and death. The patient voiced understanding of all this and all questions were answered. Alternatives to surgery were discussed also and risks of the alternatives. The patient requested that we proceed with surgery. Informed consent was obtained. S/P  10/28/22 excision of axillary cyst  Pathology: \"LEFT AXILLA LESION\":             FINDINGS CONSISTENT WITH RUPTURED EPIDERMAL INCLUSION CYST. Doing well  Pathology report printed/discussed and given to patient today  Follow up in 3 -4 weeks to monitor lateral wound edge skin lesion seen on exam today. Pt agrees.       Patient Active Problem List    Diagnosis Date Noted    Calculus of gallbladder with acute on chronic cholecystitis without obstruction 07/01/2022     Priority: Medium    Current use of long term anticoagulation 07/01/2022     Priority: Medium    History of incisional hernia repair 07/01/2022     Priority: Medium     2012 Dr. Tin Levy -  Ventrio 11 x 14 cm mesh      Sebaceous cyst of axilla 10/07/2022     Added automatically from request for surgery [id number]      Status post left knee replacement 06/03/2021    Left foot pain 02/22/2020    Localized osteoarthritis of shoulder, right 02/22/2020    Primary osteoarthritis of right hand 02/22/2020    Acquired hallux valgus of left foot 02/22/2020    Acquired claw toe, left 02/22/2020    Arthritis of midfoot 02/22/2020    Spinal stenosis of lumbar region with neurogenic claudication 02/22/2020    Tendinitis of foot 02/22/2020    DDD (degenerative disc disease), lumbar 02/22/2020    Hypersomnia, unspecified 05/22/2019    OA (osteoarthritis) of knee 04/17/2019    High risk medications (not anticoagulants) long-term use 03/13/2019    Hypothyroidism following radioiodine therapy 07/09/2018    Morbid obesity with BMI of 45.0-49.9, adult ([address] 07/09/2018    Mild intermittent asthma without complication 64/04/5273    Atrial fibrillation, persistent ([address] 07/05/2018    VSD (ventricular septal defect) 07/22/2016    Anxiety     BILLIE (obstructive sleep apnea) 01/25/2016    Essential hypertension 04/28/2015    Major depressive disorder with single episode, in full remission ([address] 04/28/2015    Mixed hyperlipidemia 04/28/2015          Rashmi Pastrana, APRN - CNP

## 2022-11-15 ENCOUNTER — OFFICE VISIT (OUTPATIENT)
Dept: INTERNAL MEDICINE CLINIC | Facility: CLINIC | Age: 72
End: 2022-11-15
Payer: MEDICARE

## 2022-11-15 VITALS
SYSTOLIC BLOOD PRESSURE: 144 MMHG | HEART RATE: 88 BPM | DIASTOLIC BLOOD PRESSURE: 84 MMHG | WEIGHT: 261 LBS | HEIGHT: 63 IN | BODY MASS INDEX: 46.25 KG/M2

## 2022-11-15 DIAGNOSIS — R60.9 EDEMA, UNSPECIFIED TYPE: ICD-10-CM

## 2022-11-15 DIAGNOSIS — I10 ESSENTIAL (PRIMARY) HYPERTENSION: Primary | ICD-10-CM

## 2022-11-15 PROCEDURE — 99213 OFFICE O/P EST LOW 20 MIN: CPT | Performed by: INTERNAL MEDICINE

## 2022-11-15 PROCEDURE — G8399 PT W/DXA RESULTS DOCUMENT: HCPCS | Performed by: INTERNAL MEDICINE

## 2022-11-15 PROCEDURE — 3017F COLORECTAL CA SCREEN DOC REV: CPT | Performed by: INTERNAL MEDICINE

## 2022-11-15 PROCEDURE — 3074F SYST BP LT 130 MM HG: CPT | Performed by: INTERNAL MEDICINE

## 2022-11-15 PROCEDURE — 1090F PRES/ABSN URINE INCON ASSESS: CPT | Performed by: INTERNAL MEDICINE

## 2022-11-15 PROCEDURE — G8417 CALC BMI ABV UP PARAM F/U: HCPCS | Performed by: INTERNAL MEDICINE

## 2022-11-15 PROCEDURE — 1123F ACP DISCUSS/DSCN MKR DOCD: CPT | Performed by: INTERNAL MEDICINE

## 2022-11-15 PROCEDURE — 3078F DIAST BP <80 MM HG: CPT | Performed by: INTERNAL MEDICINE

## 2022-11-15 PROCEDURE — 1036F TOBACCO NON-USER: CPT | Performed by: INTERNAL MEDICINE

## 2022-11-15 PROCEDURE — G8427 DOCREV CUR MEDS BY ELIG CLIN: HCPCS | Performed by: INTERNAL MEDICINE

## 2022-11-15 PROCEDURE — G8484 FLU IMMUNIZE NO ADMIN: HCPCS | Performed by: INTERNAL MEDICINE

## 2022-11-15 RX ORDER — FUROSEMIDE 20 MG/1
20 TABLET ORAL DAILY
Qty: 90 TABLET | Refills: 3 | Status: SHIPPED | OUTPATIENT
Start: 2022-11-15

## 2022-11-15 NOTE — PROGRESS NOTES
SUBJECTIVE:   Humberto Grimm is a 81 y.o. female seen for a visit regarding   Chief Complaint   Patient presents with    Skin Problem     Pt has cyst removed on 10/28/22 under left arm pit and pt feels like a skin tag? At incision site and also MOHS right 4th finger     Weight Management     Pt wants to discuss poss starting Ozempic         Skin Problem  This is a new problem. The current episode started 1 to 4 weeks ago (ha cyst removed -had stitch removed from the end of wound by the NP). Past Medical History, Past Surgical History, Family history, Social History, and Medications were all reviewed with the patient today and updated as necessary.        Current Outpatient Medications   Medication Sig Dispense Refill    furosemide (LASIX) 20 MG tablet Take 1 tablet by mouth daily 90 tablet 3    buPROPion (WELLBUTRIN) 75 MG tablet TAKE 1 TABLET BY MOUTH 2 TIMES A DAY (Patient taking differently: Take 75 mg by mouth 2 times daily) 180 tablet 3    ADVAIR DISKUS 250-50 MCG/ACT AEPB diskus inhaler INHALE 1 PUFF TWICE A DAY (RINSE MOUTH WELL AFTER USE) (Patient taking differently: Inhale 1 puff into the lungs 2 times daily) 1 each 10    furosemide (LASIX) 20 MG tablet Take 1 tablet by mouth daily as needed (swelling) 30 tablet 1    ELIQUIS 5 MG TABS tablet TAKE 1 TABLET BY MOUTH TWICE A DAY (Patient taking differently: Take 5 mg by mouth 2 times daily) 180 tablet 1    Acetaminophen 325 MG CAPS Take 360 mg by mouth 3 times daily as needed      albuterol sulfate  (90 Base) MCG/ACT inhaler Inhale 2 puffs into the lungs every 6 hours as needed      amLODIPine (NORVASC) 5 MG tablet Take 5 mg by mouth daily      Cetirizine HCl 10 MG CAPS Take 10 capsules by mouth daily      dofetilide (TIKOSYN) 500 MCG capsule Take 500 mcg by mouth every 12 hours      levothyroxine (SYNTHROID) 125 MCG tablet Take 125 mcg by mouth every morning (before breakfast)      losartan (COZAAR) 100 MG tablet Take 100 mg by mouth daily 148/98  Physical Exam  Musculoskeletal:         General: Swelling (2 plus edema) present. Skin:     Findings: Lesion (surgery done on finger for premalignant lesion-is clean) present. Comments: Bleb of skin at end of the axillary wound- ? Stitch under it but surgery saw this           Medical problems and test results were reviewed with the patient today. ASSESSMENT and PLAN     1. Essential (primary) hypertension  -     furosemide (LASIX) 20 MG tablet; Take 1 tablet by mouth daily, Disp-90 tablet, R-3Print  2. Edema, unspecified type   Use lasix at home qod-for edema and BP--thiazide interacts with tikosyn-skin areas are ok  reviewed medications and side effects in detail     Return in about 4 months (around 3/15/2023).

## 2022-11-22 ENCOUNTER — TELEPHONE (OUTPATIENT)
Dept: ENDOCRINOLOGY | Age: 72
End: 2022-11-22

## 2022-11-22 DIAGNOSIS — E89.0 HYPOTHYROIDISM FOLLOWING RADIOIODINE THERAPY: Primary | ICD-10-CM

## 2022-11-22 NOTE — TELEPHONE ENCOUNTER
[facility] She stated that she only has 2 pills left.

## 2022-11-29 RX ORDER — LEVOTHYROXINE SODIUM 125 MCG
125 TABLET ORAL DAILY
Qty: 90 TABLET | Refills: 3 | Status: SHIPPED | OUTPATIENT
Start: 2022-11-29

## 2022-12-16 ENCOUNTER — OFFICE VISIT (OUTPATIENT)
Dept: SURGERY | Age: 72
End: 2022-12-16
Payer: MEDICARE

## 2022-12-16 VITALS — HEIGHT: 63 IN | BODY MASS INDEX: 46.25 KG/M2 | WEIGHT: 261 LBS

## 2022-12-16 DIAGNOSIS — Z79.01 CURRENT USE OF LONG TERM ANTICOAGULATION: ICD-10-CM

## 2022-12-16 DIAGNOSIS — L72.3 SEBACEOUS CYST OF AXILLA: Primary | ICD-10-CM

## 2022-12-16 PROCEDURE — 1036F TOBACCO NON-USER: CPT | Performed by: SURGERY

## 2022-12-16 PROCEDURE — G8399 PT W/DXA RESULTS DOCUMENT: HCPCS | Performed by: SURGERY

## 2022-12-16 PROCEDURE — G8417 CALC BMI ABV UP PARAM F/U: HCPCS | Performed by: SURGERY

## 2022-12-16 PROCEDURE — 1090F PRES/ABSN URINE INCON ASSESS: CPT | Performed by: SURGERY

## 2022-12-16 PROCEDURE — 99213 OFFICE O/P EST LOW 20 MIN: CPT | Performed by: SURGERY

## 2022-12-16 PROCEDURE — G8427 DOCREV CUR MEDS BY ELIG CLIN: HCPCS | Performed by: SURGERY

## 2022-12-16 PROCEDURE — 3017F COLORECTAL CA SCREEN DOC REV: CPT | Performed by: SURGERY

## 2022-12-16 PROCEDURE — G8484 FLU IMMUNIZE NO ADMIN: HCPCS | Performed by: SURGERY

## 2022-12-16 PROCEDURE — 1123F ACP DISCUSS/DSCN MKR DOCD: CPT | Performed by: SURGERY

## 2022-12-16 NOTE — PROGRESS NOTES
[address] [place] [address]  [phone]    Office Note   Elizabeth Goodman   MRN: [id number]     : 1950        HPI: Elizabeth Goodman is a 81 y.o. female who returns to the office 6 weeks since excision of left axillary lesion. Lesion turned out to be ruptured epidermal inclusion cyst.  I believe these became infected from nicking from dirty razor. She was seen in follow-up by Mann Sanchez and had suture knots removed and some hypergranulation tissue cauterized with silver nitrate. She is here primarily to obtain clearance to swim in a pool. She is much improved since her last visit on . She continues to wash with Nizoral shampoo. 10/28/2022 Procedure(s):  AXILLARY LESION BIOPSY EXCISION (4 cm)   Findings: L axilla lesion with chronic drainage present. Elliptical excision. Skin too thin for subcuticular closure and simple running 4-0 Monocryl used for 4 cm defect. On 10/7/2022, she is seen in the office at the request of Dr. Vianca Thomas for evaluation of an infected cyst of the left axilla. Issues began around Labor Day weekend when a previously known cyst in the left axilla became enlarged to the size of half of a golf ball. It was incredibly painful and she saw her dermatologist who performed an I&D and expressed a lot of material.  Cultures were obtained which grew heavy Serratia marcescens. She was originally placed on Cipro but there was some concern about interference with her cardiac medications. She was placed on other antibiotics for a total of approximately 30 days. She had persistent drainage despite improvement with a reculture showing microaerophilic Streptococcus. She has been using Dial soap. She has also been using mupirocin on the area. It has stopped draining and is no longer painful. She feels as if there is a residual lesion. She presents today with her .   On questioning, she likes to bathe in a pool. She removes her underarm hair with a razor that she keeps in her shower without frequent changing of razor blade. They have squeezed the lesion to express contents over the years. Of a side note, she is having no gallbladder issues. She is known to me previously referred by Dr. Sunshine Sifuentes for [facility] following a visit on 6/21/2022. She was evaluated for right back and right flank pain. She had an episode of mild nausea and diarrhea earlier that day. Renal calculus protocol CT was obtained which identified no evidence of renal calculi but cholelithiasis was identified. There was no identification of pericholecystic inflammation. Her lab work was unremarkable and her symptoms abated quickly and she was discharged home with referral to surgery. I reviewed her findings and identified only a mildly elevated alkaline phosphatase and minimally elevated AST. Total bilirubin and ALT were nonelevated. Lipase was not elevated. She had no other symptoms other than pain while in the ER. She denied other abdominal symptoms or postprandial symptoms. She has multiple medical issues including a cardiac issues with known VSD, chronic asthma, sleep apnea, chronic atrial fibrillation with anticoagulation on Eliquis and Tikosyn, and severe obesity with a weight of 265 pounds with a BMI of 46.94 kg/m². She also has a significant abdominal surgical history. In 2010 she was found to have an unusual hepatic flexure mass and underwent right hemicolectomy with anastomosis at [facility] for a benign colon lesion. She developed a ventral hernia from her laparotomy incision that was repaired by Dr. Amanda Marvin in 2012 with a 10 x 14 cm Ventrio composite mesh of ePTFE and polypropylene. I personally reviewed her operative reports and CT scan. There are no prior ultrasounds or MRIs.   CT does show evidence of cholelithiasis without inflammatory changes around the gallbladder. An ileocolic anastomosis is present inferior to the gallbladder. Severe obesity with a wide rectus diastases is identified as well as an off centered anterior abdominal wall prosthetic, consistent with known implanted mesh. She has remained asymptomatic at home. Her original symptoms had an emotional component as her dog passed away and one of her good friends was placed in hospice care. Past Medical History:   Diagnosis Date    Adverse effect of anesthesia     delayed awakening in the past, patient states improved after using CPAP--patient's  states better for patient to wake up on side, not back. Allergic rhinitis     Anxiety     Arthritis     OA    Asthma     Followed by Dr. Rika Leigh, controlled with daily inhaler and PRN Ventolin     Depression     under control with med    Endocarditis 1992    hx 1992    Heart murmur     congenital, asymptomatic--Echo completed 2/18/19 EF 50-55    Hematuria     History of MRSA infection on left breast    5/2016 : treated/ resolved    HLD (hyperlipidemia)      Hypertension     controlled with medication     Hypertension     Hypothyroid     controlled with medication     Hypothyroidism due to acquired atrophy of thyroid 4/28/2015    Intertrigo     Irregular heart beat     Mass of colon     Morbid obesity ([address]     48.7 bmi    Persistent atrial fibrillation ([address]     s/p ablation (2/2019), Tikosyn loading---EKG dated 3/13/19 shows \"sinus rhythm, rate 70. \" Patient is followed by [facility]. Reactive airway disease with status asthmaticus     hx only    Scoliosis 8/4/2016    Sleep apnea     cpap compliant. Varicose veins     VSD (ventricular septal defect) 07/22/2016    per echo (2/18/19) \"small PFO in the baseline state. \" Patient is followed by [facility].       Past Surgical History:   Procedure Laterality Date    ABLATION OF DYSRHYTHMIC FOCUS  2018 & 02/2019    cardioversion / ablation    APPENDECTOMY      AXILLARY SURGERY Left 10/28/2022    AXILLARY LESION BIOPSY EXCISION performed by Nancy Torres MD at [address] Bilateral 2016    BREAST REDUCTION/ bilateral performed by Delmy Pinto MD at [place] [address]      heart cath age 5     SECTION      x2    COLONOSCOPY  2015    DILATION AND CURETTAGE OF UTERUS      multiple    HEENT  1969    jaw surgery due to underbite     HERNIA REPAIR  incisional [id number]    with mesh    LIPOMA RESECTION Right     right foot between toes    HI CARDIAC SURG PROCEDURE UNLIST  2018    cardioversion    HI CARDIAC SURG PROCEDURE UNLIST  2018    ablation    HI CARDIAC SURG PROCEDURE UNLIST  2018    cardioversion    HI CARDIAC SURG PROCEDURE UNLIST  2019    ablation    REFRACTIVE SURGERY      TOTAL COLECTOMY Right 2010 in colon removed    TOTAL KNEE ARTHROPLASTY Right 2018    TOTAL KNEE ARTHROPLASTY Left 2019     Current Outpatient Medications   Medication Sig    SYNTHROID 125 MCG tablet Take 1 tablet by mouth Daily    furosemide (LASIX) 20 MG tablet Take 1 tablet by mouth daily    buPROPion (WELLBUTRIN) 75 MG tablet TAKE 1 TABLET BY MOUTH 2 TIMES A DAY (Patient taking differently: Take 75 mg by mouth 2 times daily)    ADVAIR DISKUS 250-50 MCG/ACT AEPB diskus inhaler INHALE 1 PUFF TWICE A DAY (RINSE MOUTH WELL AFTER USE) (Patient taking differently: Inhale 1 puff into the lungs 2 times daily)    furosemide (LASIX) 20 MG tablet Take 1 tablet by mouth daily as needed (swelling)    ELIQUIS 5 MG TABS tablet TAKE 1 TABLET BY MOUTH TWICE A DAY (Patient taking differently: Take 5 mg by mouth 2 times daily)    Acetaminophen 325 MG CAPS Take 360 mg by mouth 3 times daily as needed    albuterol sulfate  (90 Base) MCG/ACT inhaler Inhale 2 puffs into the lungs every 6 hours as needed    amLODIPine (NORVASC) 5 MG tablet Take 5 mg by mouth daily    Cetirizine HCl 10 MG CAPS Take 10 capsules by mouth daily    dofetilide (TIKOSYN) 500 MCG capsule Take 500 mcg by mouth every 12 hours    losartan (COZAAR) 100 MG tablet Take 100 mg by mouth daily    mometasone (NASONEX) 50 MCG/ACT nasal spray 2 sprays by Nasal route daily    montelukast (SINGULAIR) 10 MG tablet Take 10 mg by mouth nightly     No current facility-administered medications for this visit. ALLERGIES:  Ciprofloxacin, Adhesive tape, Olanzapine-fluoxetine hcl, and Cefaclor    Social History     Socioeconomic History    Marital status:      Spouse name: None    Number of children: None    Years of education: None    Highest education level: None   Tobacco Use    Smoking status: Never    Smokeless tobacco: Never   Substance and Sexual Activity    Alcohol use: No    Drug use: Never     Social History     Tobacco Use   Smoking Status Never   Smokeless Tobacco Never     Family History   Problem Relation Age of Onset    Colon Cancer Maternal Uncle     Thyroid Disease Daughter         Graves' disease    Thyroid Disease Maternal Aunt         Hypothyroidism    Cancer Mother         Lymphoma    Heart Disease Brother     Breast Cancer Mother     Thyroid Disease Mother         Hypothyroidism    Heart Disease Father         s/p MI and CABG    Hypertension Father     Heart Disease Brother     Thyroid Disease Sister         Hypothyroidism    Hypertension Mother        ROS: The patient has no difficulty with chest pain or shortness of breath. No fever or chills. The patient denies any personal or family history of abnormal clotting or bleeding. Comprehensive review of systems was otherwise unremarkable except as noted above. Physical Exam:   Constitutional: Alert oriented cooperative patient in no acute distress. Ht 5' 3\" (1.6 m)   Wt 261 lb (118.4 kg)   BMI 46.23 kg/m²   BP (!) 140/90   Pulse 75   Temp 98.1 °F (36.7 °C)    SpO2 97%    Eyes:Sclera are clear without icterus. ENMT: no obvious neck masses, no ear or lip lesions  CV: RRR.  Normal perfusion  L axilla: (12/16) well-healing excision closure without induration. There is a single pinhead sized area of granulation in the central portion of the wound that is likely secondary to suture absorption. There is no drainage. There is no sign of complication. No signs of fungal dermatitis. Hair is removed. She reports using new disposable razors for each hair removal session. (Pre op) Hair removed and powder deodorant/antiperspirant in place. Mild rash present consistent with tinea fungal overgrowth. 1 cm I&D site that is well-healing with residual nodularity beneath and superior to the incision scar. No other adenopathy. No erythema. No fluctuance. No drainage. No tenderness. Resp: No JVD. Breathing is  non-labored. GI: very obese, soft and non-distended, well-healed incision scar to the left of the umbilicus in the midline that extends several centimeters above the umbilicus. There appears to be a significant large area of the upper abdomen without incision scar. No right upper quadrant tenderness or Wan sign. Musculoskeletal: unremarkable with normal function.    Neuro:  No obvious focal deficits  Psychiatric: normal affect and mood, no memory impairment    Lab Results   Component Value Date/Time    WBC 5.5 07/15/2022 01:11 PM    HGB 13.9 07/15/2022 01:11 PM    HCT 44.4 07/15/2022 01:11 PM     07/15/2022 01:11 PM    MCV 98.0 07/15/2022 01:11 PM     Lab Results   Component Value Date     10/28/2022    K 4.7 10/28/2022     (H) 10/28/2022    CO2 27 10/28/2022    BUN 12 10/28/2022    CREATININE 0.80 10/28/2022    GLUCOSE 91 10/28/2022    CALCIUM 9.6 10/28/2022    PROT 6.6 06/21/2022    LABALBU 4.1 06/21/2022    BILITOT 0.5 06/21/2022    ALKPHOS 169 (H) 06/21/2022    AST 41 (H) 06/21/2022    ALT 45 06/21/2022    LABGLOM >60 10/28/2022    GFRAA >133 06/21/2022    AGRATIO 1.0 (L) 02/15/2022    GLOB 1.6 (L) 06/21/2022     US Result (most recent):  No results found for this or any previous visit from the past 3650 days. CT Result (most recent):  CT ABDOMEN PELVIS RENAL STONE 06/21/2022    Narrative  EXAMINATION: CT ABDOMEN PELVIS RENAL STONE 6/21/2022 8:37 PM    ACCESSION NUMBER: [id number]    COMPARISON: CT abdomen pelvis dated 4/30/2010    INDICATION: R flank pain/R sided abd pain    TECHNIQUE: Contiguous axial computed tomographic images were obtained from the  upper abdomen to the symphysis pubis without intravenous contrast. Coronal  reconstructions were also performed. Please note that the detection of solid organ and vascular abnormalities is  limited in the absence of intravenous contrast.    Radiation dose reduction techniques were used for this study. Our CT scanners  use one or all of the following: Automated exposure control, adjustment of the  mA and/or kV according to patient size, iterative reconstruction. FINDINGS:  LUNG BASES: Bibasilar reticulation in the lung bases. Mitral valvular  calcifications. No sizable pleural effusion. The heart is not enlarged. LIVER: The liver contour is normal. No suspicious liver lesion. BILIARY TREE: Mildly hyperdense gallstone within the gallbladder. The  gallbladder is normal. No gallbladder wall thickening or pericholecystic fluid. No biliary dilation. SPLEEN: Normal.    PANCREAS: No pancreatic mass or ductal dilation. ADRENALS: Normal.    KIDNEYS/BLADDER: The kidneys are symmetric in size. No renal calculus or  hydronephrosis. No renal mass. The urinary bladder is unremarkable. BOWEL: Scattered colonic diverticula without evidence of acute diverticulitis. Postsurgical changes of partial colectomy. Small bowel is normal in caliber. Stomach and duodenum are unremarkable. APPENDIX: The appendix is not visualized and absent. PERITONEUM/RETROPERITONEUM: No ascites or free air. No pelvic or retroperitoneal  lymphadenopathy. VESSELS: No abdominal aortic aneurysm.     ABDOMINAL WALL: Ventral wall hernia repair patch.    REPRODUCTIVE: Uterus is unremarkable. No adnexal mass. BONES: No suspicious osseous lesion. Levocurvature of the thoracolumbar spine. Multilevel degenerative changes of the lumbar spine. Partial fusion of L2 and  L3. Impression  1. Cholelithiasis with mild gallbladder distention. No gallbladder wall  thickening or pericholecystic fluid is evident. Findings are equivocal for acute  cholecystitis. 2.  No renal calculi or hydronephrosis. 3.  Degenerative changes and scoliotic curvature of the lumbar spine. Large abdominal mesh 11 x 14 cm Ventrio in 2012 by Dr. Lesia Reaves (slightly off-center to left)  Implants:   Implant Name Type Inv. Item Serial No.  Lot No. LRB No. Used Action   MESH VENTRIO MED OVAL W/AB RG - [id number] MESH VENTRIO MED OVAL W/AB [id number] [address] [id number] Implanted        MRI Result (most recent):  MRI ABDOMEN W WO CONTRAST 07/14/2022    Narrative  MRI OF THE ABDOMEN    INDICATION: Abdominal pain with history of gallstones. Elevated alkaline  phosphatase. Standard MRI sequences were obtained through the abdomen in multiple planes. Images were obtained before and after intravenous injection of 10 mL ProHance. COMPARISON: CT abdomen pelvis 6/21/2022. FINDINGS:  LOWER CHEST: No pleural effusions. HEPATOBILIARY: There is mild fatty infiltration liver. No abnormal enhancing  liver lesions. MRCP sequences show no evidence of intrahepatic or common bile  duct dilatation. No common bile duct stones are evident. Common bile duct  measures 7 mm on image 69 of series 9 consistent with patient's age. Gallstones  noted in the gallbladder but there is no gallbladder wall thickening or abnormal  pericholecystic inflammation. PANCREAS: Pancreas is normal. No evidence of a pancreatic mass or surrounding  inflammation. Pancreatic duct is normal in caliber and course.     SPLEEN: Normal.    ADRENAL GLANDS: Normal.    KIDNEYS: Tiny subcentimeter right renal cortical cysts are present. No  enhancement. A few parapelvic left renal cysts are also present without  enhancement. No specific follow-up suggested. No enhancing renal mass. No  hydronephrosis. BOWEL: Normal.    LYMPH NODES: No enlarged abdominal lymph nodes. VASCULATURE: Unremarkable. MUSCULOSKELETAL: Normal.    Impression  Cholelithiasis. No significant intrahepatic or extrahepatic bile  duct dilatation. Pancreatic duct is normal in caliber and course. Remainder the  abdomen is unremarkable the exception of a few small renal cysts. No specific  follow-up suggested. Assessment/Plan:     Julia Olvera is a 81 y.o. female who presents to the office for evaluation of infected left axillary cyst.  She most likely has had a longstanding cyst that became infected likely secondary to minor laceration during axillary hair removal using a contaminated razor possibly also contaminated by pool swimming. Serratia marcescens is a pathogen that lives in moist environments such as a soap dish, not sterilized razor, or pet dish. It can also be found in pools or hot tubs. I think the most likely source of the infection of the cyst came from razor burn transmission. The infection is quiescent at this time. I believe there is residual cyst that would be best excised to lessen the risk of recurrence. Given her Eliquis and location within the axilla, I do not recommend excision in the office. We will obtain clearance to have her Eliquis held and perform excision in the operating room. Her 30-day use of antibiotics, use of Dial soap and mupirocin have also resulted in some fungal overgrowth in the axilla. I would like her to discontinue the use of the Dial soap and mupirocin. She has completed the antibiotics. I would like her to obtain Nizoral shampoo as a body wash to help suppress the fungal growth prior to surgery. She will also avoid using the deodorants and this axilla.   She will not bathe in a pool. She will be careful with hair removal and use only new razors for single use. We discussed proceeding with excision of left axillary cyst.  We will do this in the supine position with her left arm elevated above her head and can do it with MAC anesthesia plus local in the OR. She underwent excision of the left axilla lesion which turned out to be a ruptured epidermal inclusion cyst on 10/28/2022. She was seen in follow-up and had the suture knots removed and some hypergranulation cauterized on 11/9/2022. She comes back to see me in the office on 12/16/2022, now 6 weeks postop. She is healing nicely. We did discuss that since this was an epidermal inclusion cyst that she has been squeezing over the years and had evidence of rupture on pathologic evaluation, there is increased risk of recurrence from small particles of cyst material that are not easily excised as an intact cyst.  She is healing well without signs of fungal dermatitis. She is being careful with hair removal using new disposable razors each event. She would like to resume swimming. I think this is acceptable as her wound is nicely healing at this time with only a pinpoint area of granulation centrally that is likely from suture resorption. We will notify us if she has any recurrence of symptoms. Otherwise we will see her back as needed. Patient Active Problem List    Diagnosis Date Noted    Calculus of gallbladder with acute on chronic cholecystitis without obstruction 07/01/2022     Priority: Medium    Current use of long term anticoagulation 07/01/2022     Priority: Medium    History of incisional hernia repair 07/01/2022     Priority: Medium     2012 Dr. Tin Levy -  Ventrio 11 x 14 cm mesh      Sebaceous cyst of axilla 10/07/2022     10/28/22 excision of axillary cyst  Pathology: \"LEFT AXILLA LESION\":             FINDINGS CONSISTENT WITH RUPTURED EPIDERMAL INCLUSION CYST.        Status post left knee replacement 06/03/2021    Left foot pain 02/22/2020    Localized osteoarthritis of shoulder, right 02/22/2020    Primary osteoarthritis of right hand 02/22/2020    Acquired hallux valgus of left foot 02/22/2020    Acquired claw toe, left 02/22/2020    Arthritis of midfoot 02/22/2020    Spinal stenosis of lumbar region with neurogenic claudication 02/22/2020    Tendinitis of foot 02/22/2020    DDD (degenerative disc disease), lumbar 02/22/2020    Hypersomnia, unspecified 05/22/2019    OA (osteoarthritis) of knee 04/17/2019    High risk medications (not anticoagulants) long-term use 03/13/2019    Hypothyroidism following radioiodine therapy 07/09/2018    Morbid obesity with BMI of 45.0-49.9, adult ([facility] 07/09/2018    Mild intermittent asthma without complication 37/49/5032    Atrial fibrillation, persistent ([facility] 07/05/2018    VSD (ventricular septal defect) 07/22/2016    Anxiety     BILLIE (obstructive sleep apnea) 01/25/2016    Essential hypertension 04/28/2015    Major depressive disorder with single episode, in full remission ([facility] 04/28/2015    Mixed hyperlipidemia 04/28/2015        Level of MDM (Based on 2/3 elements below)  Number and Complexity of Problems Addressed Amount and/or Complexity of Data to be Reviewed and Analyzed  *Each unique test, order, or document contributes to the combination of 2 or combination of 3 in Category 1 below. Risk of Complications and/or Morbidity or Mortality of pt Management     64387  21206 SF Minimal  ?1self-limited or minor problem Minimal or none Minimal risk of morbidity from additional diagnostic testing or Rx   78083  87174 Low Low  ? 2or more self-limited or minor problems;    or  ? 1stable chronic illness;    or  ?1acute, uncomplicated illness or injury   Limited  (Must meet the requirements of at least 1 of the 2 categories)  Category 1: Tests and documents   ? Any combination of 2 from the following:  ?Review of prior external note(s) from each unique source*;  ?review of the result(s) of each unique test*;   ?ordering of each unique test*    or   Category 2: Assessment requiring an independent historian(s)  (For the categories of independent interpretation of tests and discussion of management or test interpretation, see moderate or high) Low risk of morbidity from additional diagnostic testing or treatment     39737  71921 Mod Moderate  ? 1or more chronic illnesses with exacerbation, progression, or side effects of treatment;    or  ?2or more stable chronic illnesses;    or      1undiagnosed new problem with uncertain prognosis;    or  ?1acute illness with systemic symptoms;    or  ?1acute complicated injury   Moderate:  (Must meet the requirements of 1 out of 3 categories)    Category 1: Tests, documents, or independent historian(s)  ? Any combination of 3 from the following:   ?Review of prior external note(s) from each unique source*;  ?Review of the result(s) of each unique test*;  ?Ordering of each unique test*;  ?Assessment requiring an independent historian(s)    or  Category 2: Independent interpretation of tests   ? Independent interpretation of a test performed by another physician/other qualified health care professional (not separately reported);     or  Category 3: Discussion of management or test interpretation  ? Discussion of management or test interpretation with external physician/other qualified health care professional/appropriate source (not separately reported)   Moderate risk of morbidity from additional diagnostic testing or treatment  Examples only: ? ? Prescription drug management - including advanced wound care prescription wound care products  (eg Iodosorb, hydrofera, silvadene, collagen, puracol plus, optiloc, biologics - placenta, Theraskin, Apligraf). Note also that if home health care is involved, they will not apply topical therapies without a prescription/order from physician    ? Decision regarding minor surgery with identified patient or procedure risk factors  ? Decision regarding elective major surgery without identified patient or procedure risk factors   ? Diagnosis or treatment significantly limited by social determinants of health       64794  50928 High High  ? 1or more chronic illnesses with severe exacerbation, progression, or side effects of treatment;    or  ?1 acute or chronic illness or injury that poses a threat to life or bodily function   Extensive  (Must meet the requirements of at least 2 out of 3 categories)    Category 1: Tests, documents, or independent historian(s)  ? Any combination of 3 from the following:   ?Review of prior external note(s) from each unique source*;  ?Review of the result(s) of each unique test*;   ?Ordering of each unique test*;   ?Assessment requiring an independent historian(s)    or   Category 2: Independent interpretation of tests   ? Independent interpretation of a test performed by another physician/other qualified health care professional (not separately reported);     or  Category 3: Discussion of management or test interpretation  ? Discussion of management or test interpretation with external physician/other qualified health care professional/appropriate source (not separately reported)   High risk of morbidity from additional diagnostic testing or treatment  Examples only:  ?Drug therapy requiring intensive monitoring for toxicity  ? Decision regarding elective major surgery with identified patient or procedure risk factors  ? Decision regarding emergency major surgery  ? Decision regarding hospitalization  ? Decision not to resuscitate or to de-escalate care because of poor prognosis               Jean Jimenez MD,  FACS

## 2023-01-03 RX ORDER — LOSARTAN POTASSIUM 100 MG/1
100 TABLET ORAL DAILY
Qty: 90 TABLET | Refills: 3 | Status: SHIPPED | OUTPATIENT
Start: 2023-01-03

## 2023-01-03 RX ORDER — DOFETILIDE 0.5 MG/1
CAPSULE ORAL
Qty: 60 CAPSULE | Refills: 11 | Status: SHIPPED | OUTPATIENT
Start: 2023-01-03

## 2023-01-03 RX ORDER — DOFETILIDE 0.5 MG/1
500 CAPSULE ORAL EVERY 12 HOURS
Qty: 180 CAPSULE | Refills: 3 | Status: SHIPPED | OUTPATIENT
Start: 2023-01-03

## 2023-01-03 RX ORDER — LOSARTAN POTASSIUM 100 MG/1
TABLET ORAL
Qty: 30 TABLET | Refills: 11 | Status: SHIPPED | OUTPATIENT
Start: 2023-01-03

## 2023-01-05 ENCOUNTER — APPOINTMENT (RX ONLY)
Dept: URBAN - METROPOLITAN AREA CLINIC 24 | Facility: CLINIC | Age: 73
Setting detail: DERMATOLOGY
End: 2023-01-05

## 2023-01-05 DIAGNOSIS — Z71.89 OTHER SPECIFIED COUNSELING: ICD-10-CM

## 2023-01-05 DIAGNOSIS — L82.1 OTHER SEBORRHEIC KERATOSIS: ICD-10-CM

## 2023-01-05 DIAGNOSIS — L70.8 OTHER ACNE: ICD-10-CM

## 2023-01-05 DIAGNOSIS — L57.8 OTHER SKIN CHANGES DUE TO CHRONIC EXPOSURE TO NONIONIZING RADIATION: ICD-10-CM | Status: STABLE

## 2023-01-05 PROCEDURE — ? SUNSCREEN RECOMMENDATIONS

## 2023-01-05 PROCEDURE — ? COUNSELING

## 2023-01-05 PROCEDURE — ? OTHER

## 2023-01-05 PROCEDURE — 99213 OFFICE O/P EST LOW 20 MIN: CPT

## 2023-01-05 ASSESSMENT — LOCATION DETAILED DESCRIPTION DERM
LOCATION DETAILED: UMBILICUS
LOCATION DETAILED: SUPERIOR THORACIC SPINE
LOCATION DETAILED: UPPER STERNUM
LOCATION DETAILED: RIGHT POSTERIOR SHOULDER

## 2023-01-05 ASSESSMENT — LOCATION SIMPLE DESCRIPTION DERM
LOCATION SIMPLE: RIGHT SHOULDER
LOCATION SIMPLE: ABDOMEN
LOCATION SIMPLE: UPPER BACK
LOCATION SIMPLE: CHEST

## 2023-01-05 ASSESSMENT — LOCATION ZONE DERM
LOCATION ZONE: ARM
LOCATION ZONE: TRUNK

## 2023-01-05 NOTE — PROCEDURE: COUNSELING
Detail Level: Zone
Sunscreen Recommendations: Broad Spectrum
Detail Level: Generalized
Detail Level: Simple
Detail Level: Detailed

## 2023-01-05 NOTE — PROCEDURE: OTHER
Other (Free Text): Attempted to extract. Refer to surgeon if she would like fully removed. She has has multiple surgeries on abdomen and area is very deep within umbilicus.
Note Text (......Xxx Chief Complaint.): This diagnosis correlates with the
Render Risk Assessment In Note?: no
Detail Level: Simple

## 2023-01-06 ENCOUNTER — TELEPHONE (OUTPATIENT)
Dept: SLEEP MEDICINE | Age: 73
End: 2023-01-06

## 2023-01-18 NOTE — PROGRESS NOTES
[address] [place] [address] Court, [address] [facility]  [phone]    Patient Name:  Yumiko Upton  YOB: 1950      Office Visit 1/19/2023    CHIEF COMPLAINT:    Chief Complaint   Patient presents with    Sleep Apnea    Follow-up    CPAP/BiPAP         HISTORY OF PRESENT ILLNESS:        Patient is a 80 yo female seen today for follow up of BILLIE, nocturnal hypoxemia and hypersomnia     She is prescribed cpap therapy with a humidifier set at 11cm with a full face mask. She is using and benefiting from her CPAP on a daily basis. Most recent download reveals AHI on PAP therapy is 0.8/hr, median leak is 6.1/minute and the 95th percentile leak is 24 L per minute. The hourly usage is 7 hours 20 minutes nightly. The patient is compliant with the Pap therapy and is feeling better as a result. Her machine is broken beyond repair and needs replacement. He was having issues with a dry mouth and started using XyliMelts tablet per her dentist recommendation and she had noted definite improvement. It was noted that her humidity is set at 3 and this will be increased to 6 today. She reports she has been feeling well. Takes 1 nap during the day around 230 pm usually because when she visits her daughter she stays up later than normal. Denies feeling tired during the day. States she wakes up one time during the night to use the bathroom. Denies having difficulty falling asleep and bedtime schedule is from 11pm - 6am. Reports that she sleeps on her right side and cannot tolerate sleeping on her left side or back. Drinks coffee in the morning time but not in the afternoon. Denies any discomfort or problems with her mask. Reports that she had Covid in January, 2022 and was having some ringing in her ears. She had known atrial fibrillation and had multiple cardioversion and ablation in the past.  Currently she is maintained on Tikosyn and Eliquis for stroke prophylaxis.   She is followed by Levine, Susan. \[facility].\"" cardiology. The Elwood score today is 6 out of 24. Sleep Medicine 1/19/2023 7/27/2022 7/22/2021   Sitting and reading 1 0 1   Watching TV 1 0 1   Sitting, inactive in a public place (e.g. a theatre or a meeting) 0 0 0   As a passenger in a car for an hour without a break 0 0 0   Lying down to rest in the afternoon when circumstances permit 3 0 2   Sitting and talking to someone 0 0 0   Sitting quietly after a lunch without alcohol 1 0 0   In a car, while stopped for a few minutes in traffic 0 0 0   Elwood Sleepiness Score 6 0 4         Past Medical History:   Diagnosis Date    Adverse effect of anesthesia     delayed awakening in the past, patient states improved after using CPAP--patient's  states better for patient to wake up on side, not back. Allergic rhinitis     Anxiety     Arthritis     OA    Asthma     Followed by Dr. Shaan Jauregui, controlled with daily inhaler and PRN Ventolin     Depression     under control with med    Endocarditis 1992    hx 1992    Heart murmur     congenital, asymptomatic--Echo completed 2/18/19 EF 50-55    Hematuria     History of MRSA infection on left breast    5/2016 : treated/ resolved    HLD (hyperlipidemia)      Hypertension     controlled with medication     Hypertension     Hypothyroid     controlled with medication     Hypothyroidism due to acquired atrophy of thyroid 4/28/2015    Intertrigo     Irregular heart beat     Mass of colon     Morbid obesity ([address]     48.7 bmi    Persistent atrial fibrillation ([address]     s/p ablation (2/2019), Tikosyn loading---EKG dated 3/13/19 shows \"sinus rhythm, rate 70. \" Patient is followed by [facility]. Reactive airway disease with status asthmaticus     hx only    Scoliosis 8/4/2016    Sleep apnea     cpap compliant. Varicose veins     VSD (ventricular septal defect) 07/22/2016    per echo (2/18/19) \"small PFO in the baseline state. \" Patient is followed by [facility].           Patient Active Problem List   Diagnosis    Left foot pain    Localized osteoarthritis of shoulder, right    Essential hypertension    VSD (ventricular septal defect)    Major depressive disorder with single episode, in full remission ([facility]    Mixed hyperlipidemia    Hypersomnia, unspecified    Hypothyroidism following radioiodine therapy    Primary osteoarthritis of right hand    Acquired hallux valgus of left foot    Acquired claw toe, left    Arthritis of midfoot    Spinal stenosis of lumbar region with neurogenic claudication    High risk medications (not anticoagulants) long-term use    Anxiety    Morbid obesity with BMI of 45.0-49.9, adult (HCC)    Atrial fibrillation, persistent (HCC)    OA (osteoarthritis) of knee    BILLIE (obstructive sleep apnea)    Mild intermittent asthma without complication    Status post left knee replacement    Tendinitis of foot    DDD (degenerative disc disease), lumbar    Calculus of gallbladder with acute on chronic cholecystitis without obstruction    Current use of long term anticoagulation    History of incisional hernia repair    Sebaceous cyst of axilla    Nocturnal hypoxemia          Past Surgical History:   Procedure Laterality Date    ABLATION OF DYSRHYTHMIC FOCUS   & 2019    cardioversion / ablation    APPENDECTOMY      AXILLARY SURGERY Left 10/28/2022    AXILLARY LESION BIOPSY EXCISION performed by Stanley Pearce MD at [facility] Bilateral 2016    BREAST REDUCTION/ bilateral performed by Veronika Mullen MD at [place] [address]      heart cath age 5     SECTION      x2    COLONOSCOPY  ,     DILATION AND CURETTAGE OF UTERUS      multiple    HEENT  1969    jaw surgery due to underbite     HERNIA REPAIR  incisional [id number]    with mesh    LIPOMA RESECTION Right     right foot between toes    AK UNLISTED PROCEDURE CARDIAC SURGERY  2018    cardioversion    AK UNLISTED PROCEDURE CARDIAC SURGERY  2018 ablation    CT UNLISTED PROCEDURE CARDIAC SURGERY  12/2018    cardioversion    CT UNLISTED PROCEDURE CARDIAC SURGERY  02/2019    ablation    REFRACTIVE SURGERY      TOTAL COLECTOMY Right 2010 8 in colon removed    TOTAL KNEE ARTHROPLASTY Right 01/29/2018    TOTAL KNEE ARTHROPLASTY Left 04/17/2019       [unfilled]        Social History     Socioeconomic History    Marital status:      Spouse name: Not on file    Number of children: Not on file    Years of education: Not on file    Highest education level: Not on file   Occupational History    Not on file   Tobacco Use    Smoking status: Never    Smokeless tobacco: Never   Substance and Sexual Activity    Alcohol use: No    Drug use: Never    Sexual activity: Not on file   Other Topics Concern    Not on file   Social History Narrative    Not on file     Social Determinants of Health     Financial Resource Strain: Not on file   Food Insecurity: Not on file   Transportation Needs: Not on file   Physical Activity: Not on file   Stress: Not on file   Social Connections: Not on file   Intimate Partner Violence: Not on file   Housing Stability: Not on file         Family History   Problem Relation Age of Onset    Colon Cancer Maternal Uncle     Thyroid Disease Daughter         Sisto Giraldo' disease    Thyroid Disease Maternal Aunt         Hypothyroidism    Cancer Mother         Lymphoma    Heart Disease Brother     Breast Cancer Mother     Thyroid Disease Mother         Hypothyroidism    Heart Disease Father         s/p MI and CABG    Hypertension Father     Heart Disease Brother     Thyroid Disease Sister         Hypothyroidism    Hypertension Mother          Allergies   Allergen Reactions    Ciprofloxacin Other (See Comments)     Reacts with Afib meds    Adhesive Tape Other (See Comments)     redness    Olanzapine-Fluoxetine Hcl Other (See Comments)     Causes Severe pain     Cefaclor Rash         Current Outpatient Medications   Medication Sig    losartan (COZAAR) 100 MG tablet TAKE 1 TABLET BY MOUTH DAILY    dofetilide (TIKOSYN) 500 MCG capsule TAKE 1 CAPSULE BY MOUTH EVERY 12 HOURS    losartan (COZAAR) 100 MG tablet Take 1 tablet by mouth daily    dofetilide (TIKOSYN) 500 MCG capsule Take 1 capsule by mouth in the morning and 1 capsule in the evening. SYNTHROID 125 MCG tablet Take 1 tablet by mouth Daily    furosemide (LASIX) 20 MG tablet Take 1 tablet by mouth daily    buPROPion (WELLBUTRIN) 75 MG tablet TAKE 1 TABLET BY MOUTH 2 TIMES A DAY (Patient taking differently: Take 75 mg by mouth 2 times daily)    ADVAIR DISKUS 250-50 MCG/ACT AEPB diskus inhaler INHALE 1 PUFF TWICE A DAY (RINSE MOUTH WELL AFTER USE) (Patient taking differently: Inhale 1 puff into the lungs 2 times daily)    furosemide (LASIX) 20 MG tablet Take 1 tablet by mouth daily as needed (swelling)    ELIQUIS 5 MG TABS tablet TAKE 1 TABLET BY MOUTH TWICE A DAY (Patient taking differently: Take 5 mg by mouth 2 times daily)    Acetaminophen 325 MG CAPS Take 360 mg by mouth 3 times daily as needed    albuterol sulfate  (90 Base) MCG/ACT inhaler Inhale 2 puffs into the lungs every 6 hours as needed    amLODIPine (NORVASC) 5 MG tablet Take 5 mg by mouth daily    Cetirizine HCl 10 MG CAPS Take 10 capsules by mouth daily    mometasone (NASONEX) 50 MCG/ACT nasal spray 2 sprays by Nasal route daily    montelukast (SINGULAIR) 10 MG tablet Take 10 mg by mouth nightly     No current facility-administered medications for this visit. REVIEW OF SYSTEMS:   CONSTITUTIONAL:   There is no history of fever, chills, night sweats, weight loss, weight gain, persistent fatigue, or lethargy/hypersomnolence. CARDIAC:   No chest pain, pressure, discomfort, palpitations, orthopnea, murmurs, or edema. GI:   No dysphagia, heartburn reflux, nausea/vomiting, diarrhea, abdominal pain, or bleeding.    NEURO:   There is no history of AMS, persistent headache, decreased level of consciousness, seizures, or motor or sensory deficits. PHYSICAL EXAM:    Vitals:    01/19/23 1022   BP: (!) 142/88   Pulse: 75   Resp: 14   Temp: 97 °F (36.1 °C)   SpO2: 97%        GENERAL APPEARANCE:   The patient is normal weight and in no respiratory distress. HEENT:   PERRL. Conjunctivae unremarkable. Nasal mucosa is without epistaxis, exudate, or polyps. Gums and dentition are unremarkable. There is moderately severe oropharyngeal narrowing. TMs are clear. She is Mallampati 3   NECK/LYMPHATIC:   Symmetrical with no elevation of jugular venous pulsation. Trachea midline. No thyroid enlargement. No cervical adenopathy. LUNGS:   Normal respiratory effort with symmetrical lung expansion. Breath sounds are diminished in the bases. HEART:   There is a regular rate and rhythm. No murmur, rub, or gallop. There is no edema in the lower extremities. ABDOMEN:   Soft and non-tender. No hepatosplenomegaly. Bowel sounds are normal.     NEURO:   The patient is alert and oriented to person, place, and time. Memory appears intact and mood is normal.  No gross sensorimotor deficits are present. ASSESSMENT:  (Medical Decision Making)      Diagnosis Orders   1. BILLIE (obstructive sleep apnea) , this was severe and currently being treated with CPAP at 11 cm with excellent compliance. The patient machine is broken beyond repair and needs replacement. DME - DURABLE MEDICAL EQUIPMENT      2. Obesity, Class III, BMI 40-49.9 (morbid obesity) ([address] contributing to complexity of care       3. Hypersomnia, unspecified, improved the CPAP treatment DME - DURABLE MEDICAL EQUIPMENT      4. Nocturnal hypoxemia, improved the CPAP as well DME - DURABLE MEDICAL EQUIPMENT      5.  Atrial fibrillation, persistent ([address] on Tikosyn and Eliquis            PLAN:    The patient will have replacement machine ordered with a setting of 11 cm with EPR 3 and humidity set at 6    She is recommended to follow proper sleep hygiene and positional therapy    She will need mask fitting and CPAP set up from her [address] her follow-up with primary care physician and cardiology    Maintain her medication including Tikosyn, Eliquis, Synthroid    She will return to the sleep center in 4 months or sooner if needed. At that time we will check her compliance and address any problem related to her new machine    Orders Placed This Encounter   Procedures    DME - [facility]  Phone: [phone] [employer] [address] [address]  Dept: [phone]      Patient Name: Caren Brooke  : 1950  Gender: female  Address: [address] [place] [id number]  Patient phone number: [phone] (home)       Primary Insurance: Payor: MEDICARE / Plan: MEDICARE PART A AND B / Product Type: *No Product type* /   Subscriber ID: [id number] - (Medicare)      AMB Supply Order  Order Details     DME Location:     Order Date: 2023   The primary encounter diagnosis was BILLIE (obstructive sleep apnea). Diagnoses of Obesity, Class III, BMI 40-49.9 (morbid obesity) ([address] Hypersomnia, unspecified, Nocturnal hypoxemia, and Atrial fibrillation, persistent ([address] were also pertinent to this visit. (  X   )New Set-Up (replacement machine)       machine   (  x   ) CPAP Unit  (     ) Auto CPAP Unit  (     ) BiLevel Unit  (     ) Auto BiLevel Unit  (     ) ASV   (     ) Bilevel ST    (     ) Oxygen Concentrator         Length of need: 12 months    Pressure: 11  cmH20, please set the humidity on 6.   Thank you  EPR: 3     Starting Ramp Pressure:    cm H20  Ramp Time: min       Patient had a diagnostic Apnea Hypopnea Index (AHI) of :       *SUPPLIES* Replace all as needed, or per coverage guidelines     Masks Type:    (  x   ) -Full Face Mask (1 per 3 mon)  ( x    ) -Full Mask (1 per month) Interface/Cushion      (     ) -Nasal Mask (1 per 3 mon)  (     ) - Nasal Mask (1 per month) Interface/Cushion  (     ) -Pillow (2 per mon)  (     ) -Scdvjgrva (1 per 6 mon)      _________________________________________________________________          Other Supplies:    (  X   )-Kfeoklec (1 per 6 mon)  ( X    )-Jetico Tubing (1 per 3 mon)  (  X   )- Disposable Filter (2 per mon)  (   X  )-Nixlgc Humidifier (1 per year)     (  x   )-Fpaoikemg (sometimes used with Full Face Mask) (1 per 6 mos)  (     )-Tubing-without heat (1 per 3 mos)  ( X   )-Non-Disposable Filter (1 per 6 mos)  (   x  )-Water Chamber (1 per 6 mos)  (     )-Humidifier non-heated (1 per 5 yrs)      Signed Date: 1/19/2023  Electronically Signed By: Lashonda Curry MD        No orders of the defined types were placed in this encounter. Over 50% of today's office visit was spent in face to face time reviewing test results, prognosis, importance of compliance, education about disease process, benefits of medications, instructions for management of acute flare-ups, and follow up plans. Total face to face time spent with patient including charting was 35 minutes.         Lashonda Curry MD  Electronically signed

## 2023-01-19 ENCOUNTER — PATIENT MESSAGE (OUTPATIENT)
Dept: INTERNAL MEDICINE CLINIC | Facility: CLINIC | Age: 73
End: 2023-01-19

## 2023-01-19 ENCOUNTER — OFFICE VISIT (OUTPATIENT)
Dept: SLEEP MEDICINE | Age: 73
End: 2023-01-19
Payer: MEDICARE

## 2023-01-19 VITALS
TEMPERATURE: 97 F | HEIGHT: 63 IN | SYSTOLIC BLOOD PRESSURE: 142 MMHG | RESPIRATION RATE: 14 BRPM | WEIGHT: 253 LBS | BODY MASS INDEX: 44.83 KG/M2 | DIASTOLIC BLOOD PRESSURE: 88 MMHG | OXYGEN SATURATION: 97 % | HEART RATE: 75 BPM

## 2023-01-19 DIAGNOSIS — E66.01 OBESITY, CLASS III, BMI 40-49.9 (MORBID OBESITY) (HCC): ICD-10-CM

## 2023-01-19 DIAGNOSIS — G47.33 OSA (OBSTRUCTIVE SLEEP APNEA): Primary | ICD-10-CM

## 2023-01-19 DIAGNOSIS — G47.10 HYPERSOMNIA, UNSPECIFIED: ICD-10-CM

## 2023-01-19 DIAGNOSIS — I48.19 ATRIAL FIBRILLATION, PERSISTENT (HCC): ICD-10-CM

## 2023-01-19 DIAGNOSIS — G47.34 NOCTURNAL HYPOXEMIA: ICD-10-CM

## 2023-01-19 DIAGNOSIS — J30.9 ALLERGIC RHINITIS, UNSPECIFIED SEASONALITY, UNSPECIFIED TRIGGER: Primary | ICD-10-CM

## 2023-01-19 PROCEDURE — 99214 OFFICE O/P EST MOD 30 MIN: CPT | Performed by: INTERNAL MEDICINE

## 2023-01-19 PROCEDURE — 3017F COLORECTAL CA SCREEN DOC REV: CPT | Performed by: INTERNAL MEDICINE

## 2023-01-19 PROCEDURE — 3077F SYST BP >= 140 MM HG: CPT | Performed by: INTERNAL MEDICINE

## 2023-01-19 PROCEDURE — 3079F DIAST BP 80-89 MM HG: CPT | Performed by: INTERNAL MEDICINE

## 2023-01-19 PROCEDURE — 1090F PRES/ABSN URINE INCON ASSESS: CPT | Performed by: INTERNAL MEDICINE

## 2023-01-19 PROCEDURE — 1036F TOBACCO NON-USER: CPT | Performed by: INTERNAL MEDICINE

## 2023-01-19 PROCEDURE — 1123F ACP DISCUSS/DSCN MKR DOCD: CPT | Performed by: INTERNAL MEDICINE

## 2023-01-19 PROCEDURE — G8427 DOCREV CUR MEDS BY ELIG CLIN: HCPCS | Performed by: INTERNAL MEDICINE

## 2023-01-19 PROCEDURE — G8484 FLU IMMUNIZE NO ADMIN: HCPCS | Performed by: INTERNAL MEDICINE

## 2023-01-19 PROCEDURE — G8417 CALC BMI ABV UP PARAM F/U: HCPCS | Performed by: INTERNAL MEDICINE

## 2023-01-19 PROCEDURE — G8399 PT W/DXA RESULTS DOCUMENT: HCPCS | Performed by: INTERNAL MEDICINE

## 2023-01-19 RX ORDER — MONTELUKAST SODIUM 10 MG/1
10 TABLET ORAL NIGHTLY
Qty: 90 TABLET | Refills: 4 | Status: SHIPPED | OUTPATIENT
Start: 2023-01-19

## 2023-01-19 RX ORDER — MOMETASONE FUROATE 50 UG/1
2 SPRAY, METERED NASAL DAILY
Qty: 17 G | Refills: 4 | Status: SHIPPED | OUTPATIENT
Start: 2023-01-19

## 2023-01-19 ASSESSMENT — SLEEP AND FATIGUE QUESTIONNAIRES
HOW LIKELY ARE YOU TO NOD OFF OR FALL ASLEEP WHILE SITTING QUIETLY AFTER LUNCH WITHOUT ALCOHOL: 1
HOW LIKELY ARE YOU TO NOD OFF OR FALL ASLEEP IN A CAR, WHILE STOPPED FOR A FEW MINUTES IN TRAFFIC: 0
HOW LIKELY ARE YOU TO NOD OFF OR FALL ASLEEP WHILE SITTING INACTIVE IN A PUBLIC PLACE: 0
HOW LIKELY ARE YOU TO NOD OFF OR FALL ASLEEP WHILE WATCHING TV: 1
HOW LIKELY ARE YOU TO NOD OFF OR FALL ASLEEP WHILE SITTING AND READING: 1
HOW LIKELY ARE YOU TO NOD OFF OR FALL ASLEEP WHILE LYING DOWN TO REST IN THE AFTERNOON WHEN CIRCUMSTANCES PERMIT: 3
HOW LIKELY ARE YOU TO NOD OFF OR FALL ASLEEP WHILE SITTING AND TALKING TO SOMEONE: 0
ESS TOTAL SCORE: 6
HOW LIKELY ARE YOU TO NOD OFF OR FALL ASLEEP WHEN YOU ARE A PASSENGER IN A CAR FOR AN HOUR WITHOUT A BREAK: 0

## 2023-01-19 NOTE — TELEPHONE ENCOUNTER
From: aCren Brooke  To: Dr. Nae Villalobosto: 1/19/2023 3:23 PM EST  Subject: Need refills    1. Mometasone 50 ACT nasal spray  2.  Montelukast 10 MG    [address]

## 2023-01-20 RX ORDER — MONTELUKAST SODIUM 10 MG/1
TABLET ORAL
Qty: 90 TABLET | Refills: 4 | OUTPATIENT
Start: 2023-01-20

## 2023-01-20 RX ORDER — MOMETASONE FUROATE 50 UG/1
SPRAY, METERED NASAL
Qty: 17 G | Refills: 12 | OUTPATIENT
Start: 2023-01-20

## 2023-01-25 ENCOUNTER — TELEPHONE (OUTPATIENT)
Dept: SLEEP MEDICINE | Age: 73
End: 2023-01-25

## 2023-01-25 NOTE — TELEPHONE ENCOUNTER
Glenis Randolph told the patient that they have not gotten an order for her new cpap machine .  She ask if someone to check this for her

## 2023-01-30 ENCOUNTER — PATIENT MESSAGE (OUTPATIENT)
Dept: INTERNAL MEDICINE CLINIC | Facility: CLINIC | Age: 73
End: 2023-01-30

## 2023-01-30 DIAGNOSIS — L98.9 SKIN LESION: Primary | ICD-10-CM

## 2023-01-30 DIAGNOSIS — I10 ESSENTIAL (PRIMARY) HYPERTENSION: Primary | ICD-10-CM

## 2023-01-30 RX ORDER — AMLODIPINE BESYLATE 5 MG/1
5 TABLET ORAL DAILY
Qty: 90 TABLET | Refills: 4 | Status: SHIPPED | OUTPATIENT
Start: 2023-01-30

## 2023-01-30 RX ORDER — AMLODIPINE BESYLATE 5 MG/1
5 TABLET ORAL DAILY
Qty: 90 TABLET | Refills: 3 | OUTPATIENT
Start: 2023-01-30

## 2023-01-30 NOTE — TELEPHONE ENCOUNTER
From: Benito Wilkins  To: Dr. Emilie Uribe: 1/30/2023 8:51 AM EST  Subject: Amlodipine Besylate 5mg (Norvasc)    I need a refill for the above medication. Thank you.      [facility]  [phone]

## 2023-01-30 NOTE — TELEPHONE ENCOUNTER
----- Message from Kale Chiu sent at 1/30/2023  8:51 AM EST -----  Regarding: Amlodipine Besylate 5mg  (Norvasc)  I need a refill for the above medication. Thank you.      [address] [place] [address]  [phone]

## 2023-02-06 ENCOUNTER — TELEPHONE (OUTPATIENT)
Dept: SLEEP MEDICINE | Age: 73
End: 2023-02-06

## 2023-02-06 RX ORDER — APIXABAN 5 MG/1
TABLET, FILM COATED ORAL
Qty: 60 TABLET | Refills: 2 | Status: SHIPPED | OUTPATIENT
Start: 2023-02-06 | End: 2023-02-21 | Stop reason: ALTCHOICE

## 2023-02-06 NOTE — TELEPHONE ENCOUNTER
Left msg informing pt that an order for a new machine was processed by Lior Vargas on 1/26/23. No change in condition. Sitter in direct observation of pt.

## 2023-02-21 ENCOUNTER — OFFICE VISIT (OUTPATIENT)
Dept: INTERNAL MEDICINE CLINIC | Facility: CLINIC | Age: 73
End: 2023-02-21
Payer: MEDICARE

## 2023-02-21 VITALS
HEART RATE: 68 BPM | DIASTOLIC BLOOD PRESSURE: 87 MMHG | WEIGHT: 257.9 LBS | BODY MASS INDEX: 45.7 KG/M2 | SYSTOLIC BLOOD PRESSURE: 138 MMHG | OXYGEN SATURATION: 95 % | HEIGHT: 63 IN

## 2023-02-21 DIAGNOSIS — E89.0 HYPOTHYROIDISM FOLLOWING RADIOIODINE THERAPY: ICD-10-CM

## 2023-02-21 DIAGNOSIS — I10 ESSENTIAL HYPERTENSION: ICD-10-CM

## 2023-02-21 DIAGNOSIS — I48.19 ATRIAL FIBRILLATION, PERSISTENT (HCC): ICD-10-CM

## 2023-02-21 DIAGNOSIS — E78.2 MIXED HYPERLIPIDEMIA: ICD-10-CM

## 2023-02-21 DIAGNOSIS — Z00.00 MEDICARE ANNUAL WELLNESS VISIT, SUBSEQUENT: Primary | ICD-10-CM

## 2023-02-21 DIAGNOSIS — F32.5 MAJOR DEPRESSIVE DISORDER, SINGLE EPISODE, IN FULL REMISSION (HCC): ICD-10-CM

## 2023-02-21 LAB
ALBUMIN SERPL-MCNC: 3.7 G/DL (ref 3.2–4.6)
ALBUMIN/GLOB SERPL: 1.3 (ref 0.4–1.6)
ALP SERPL-CCNC: 132 U/L (ref 50–136)
ALT SERPL-CCNC: 30 U/L (ref 12–65)
ANION GAP SERPL CALC-SCNC: 5 MMOL/L (ref 2–11)
AST SERPL-CCNC: 24 U/L (ref 15–37)
BASOPHILS # BLD: 0 K/UL (ref 0–0.2)
BASOPHILS NFR BLD: 1 % (ref 0–2)
BILIRUB SERPL-MCNC: 0.8 MG/DL (ref 0.2–1.1)
BUN SERPL-MCNC: 15 MG/DL (ref 8–23)
CALCIUM SERPL-MCNC: 9.1 MG/DL (ref 8.3–10.4)
CHLORIDE SERPL-SCNC: 110 MMOL/L (ref 101–110)
CHOLEST SERPL-MCNC: 207 MG/DL
CO2 SERPL-SCNC: 24 MMOL/L (ref 21–32)
CREAT SERPL-MCNC: 0.7 MG/DL (ref 0.6–1)
DIFFERENTIAL METHOD BLD: NORMAL
EOSINOPHIL # BLD: 0.2 K/UL (ref 0–0.8)
EOSINOPHIL NFR BLD: 2 % (ref 0.5–7.8)
ERYTHROCYTE [DISTWIDTH] IN BLOOD BY AUTOMATED COUNT: 13.4 % (ref 11.9–14.6)
GLOBULIN SER CALC-MCNC: 2.9 G/DL (ref 2.8–4.5)
GLUCOSE SERPL-MCNC: 99 MG/DL (ref 65–100)
HCT VFR BLD AUTO: 44.3 % (ref 35.8–46.3)
HDLC SERPL-MCNC: 68 MG/DL (ref 40–60)
HDLC SERPL: 3
HGB BLD-MCNC: 14.3 G/DL (ref 11.7–15.4)
IMM GRANULOCYTES # BLD AUTO: 0 K/UL (ref 0–0.5)
IMM GRANULOCYTES NFR BLD AUTO: 1 % (ref 0–5)
LDLC SERPL CALC-MCNC: 103.6 MG/DL
LYMPHOCYTES # BLD: 1.4 K/UL (ref 0.5–4.6)
LYMPHOCYTES NFR BLD: 22 % (ref 13–44)
MCH RBC QN AUTO: 30.8 PG (ref 26.1–32.9)
MCHC RBC AUTO-ENTMCNC: 32.3 G/DL (ref 31.4–35)
MCV RBC AUTO: 95.5 FL (ref 82–102)
MONOCYTES # BLD: 0.6 K/UL (ref 0.1–1.3)
MONOCYTES NFR BLD: 9 % (ref 4–12)
NEUTS SEG # BLD: 4.4 K/UL (ref 1.7–8.2)
NEUTS SEG NFR BLD: 65 % (ref 43–78)
NRBC # BLD: 0 K/UL (ref 0–0.2)
PLATELET # BLD AUTO: 218 K/UL (ref 150–450)
PMV BLD AUTO: 11.7 FL (ref 9.4–12.3)
POTASSIUM SERPL-SCNC: 4 MMOL/L (ref 3.5–5.1)
PROT SERPL-MCNC: 6.6 G/DL (ref 6.3–8.2)
RBC # BLD AUTO: 4.64 M/UL (ref 4.05–5.2)
SODIUM SERPL-SCNC: 139 MMOL/L (ref 133–143)
TRIGL SERPL-MCNC: 177 MG/DL (ref 35–150)
TSH W FREE THYROID IF ABNORMAL: 1.79 UIU/ML (ref 0.36–3.74)
VLDLC SERPL CALC-MCNC: 35.4 MG/DL (ref 6–23)
WBC # BLD AUTO: 6.6 K/UL (ref 4.3–11.1)

## 2023-02-21 PROCEDURE — 3079F DIAST BP 80-89 MM HG: CPT | Performed by: INTERNAL MEDICINE

## 2023-02-21 PROCEDURE — 3075F SYST BP GE 130 - 139MM HG: CPT | Performed by: INTERNAL MEDICINE

## 2023-02-21 PROCEDURE — 3017F COLORECTAL CA SCREEN DOC REV: CPT | Performed by: INTERNAL MEDICINE

## 2023-02-21 PROCEDURE — G0439 PPPS, SUBSEQ VISIT: HCPCS | Performed by: INTERNAL MEDICINE

## 2023-02-21 PROCEDURE — 1123F ACP DISCUSS/DSCN MKR DOCD: CPT | Performed by: INTERNAL MEDICINE

## 2023-02-21 PROCEDURE — G8484 FLU IMMUNIZE NO ADMIN: HCPCS | Performed by: INTERNAL MEDICINE

## 2023-02-21 SDOH — ECONOMIC STABILITY: FOOD INSECURITY: WITHIN THE PAST 12 MONTHS, YOU WORRIED THAT YOUR FOOD WOULD RUN OUT BEFORE YOU GOT MONEY TO BUY MORE.: NEVER TRUE

## 2023-02-21 SDOH — ECONOMIC STABILITY: FOOD INSECURITY: WITHIN THE PAST 12 MONTHS, THE FOOD YOU BOUGHT JUST DIDN'T LAST AND YOU DIDN'T HAVE MONEY TO GET MORE.: NEVER TRUE

## 2023-02-21 SDOH — ECONOMIC STABILITY: HOUSING INSECURITY
IN THE LAST 12 MONTHS, WAS THERE A TIME WHEN YOU DID NOT HAVE A STEADY PLACE TO SLEEP OR SLEPT IN A SHELTER (INCLUDING NOW)?: NO

## 2023-02-21 SDOH — ECONOMIC STABILITY: INCOME INSECURITY: HOW HARD IS IT FOR YOU TO PAY FOR THE VERY BASICS LIKE FOOD, HOUSING, MEDICAL CARE, AND HEATING?: NOT HARD AT ALL

## 2023-02-21 ASSESSMENT — PATIENT HEALTH QUESTIONNAIRE - PHQ9
SUM OF ALL RESPONSES TO PHQ QUESTIONS 1-9: 0
9. THOUGHTS THAT YOU WOULD BE BETTER OFF DEAD, OR OF HURTING YOURSELF: 0
6. FEELING BAD ABOUT YOURSELF - OR THAT YOU ARE A FAILURE OR HAVE LET YOURSELF OR YOUR FAMILY DOWN: 0
10. IF YOU CHECKED OFF ANY PROBLEMS, HOW DIFFICULT HAVE THESE PROBLEMS MADE IT FOR YOU TO DO YOUR WORK, TAKE CARE OF THINGS AT HOME, OR GET ALONG WITH OTHER PEOPLE: 0
2. FEELING DOWN, DEPRESSED OR HOPELESS: 0
SUM OF ALL RESPONSES TO PHQ QUESTIONS 1-9: 0
7. TROUBLE CONCENTRATING ON THINGS, SUCH AS READING THE NEWSPAPER OR WATCHING TELEVISION: 0
8. MOVING OR SPEAKING SO SLOWLY THAT OTHER PEOPLE COULD HAVE NOTICED. OR THE OPPOSITE, BEING SO FIGETY OR RESTLESS THAT YOU HAVE BEEN MOVING AROUND A LOT MORE THAN USUAL: 0
3. TROUBLE FALLING OR STAYING ASLEEP: 0
SUM OF ALL RESPONSES TO PHQ QUESTIONS 1-9: 0
4. FEELING TIRED OR HAVING LITTLE ENERGY: 0
5. POOR APPETITE OR OVEREATING: 0
SUM OF ALL RESPONSES TO PHQ QUESTIONS 1-9: 0

## 2023-02-21 ASSESSMENT — LIFESTYLE VARIABLES
HOW OFTEN DO YOU HAVE A DRINK CONTAINING ALCOHOL: NEVER
HOW MANY STANDARD DRINKS CONTAINING ALCOHOL DO YOU HAVE ON A TYPICAL DAY: PATIENT DOES NOT DRINK

## 2023-02-21 NOTE — PROGRESS NOTES
Medicare Annual Wellness Visit    Jad Page is here for Medicare AWV    Assessment & Plan   Medicare annual wellness visit, subsequent  Atrial fibrillation, persistent ([facility]  Major depressive disorder, single episode, in full remission ([facility]  Hypothyroidism following radioiodine therapy  -     TSH with Reflex; Future  Essential hypertension  -     CBC with Auto Differential; Future  -     Comprehensive Metabolic Panel; Future  Mixed hyperlipidemia  -     Lipid Panel; Future    Recommendations for Preventive Services Due: see orders and patient instructions/AVS.  Recommended screening schedule for the next 5-10 years is provided to the patient in written form: see Patient Instructions/AVS.   Plans mammo-dexa normal 2021 -not needed yet; colon for 2025  Return for Medicare Annual Wellness Visit in 1 year. Subjective   The following acute and/or chronic problems were also addressed today:  Had HTN -on meds ; been controlled ; had cyst removed from axilla -resolved    Patient's complete Health Risk Assessment and screening values have been reviewed and are found in Flowsheets. The following problems were reviewed today and where indicated follow up appointments were made and/or referrals ordered.     Positive Risk Factor Screenings with Interventions:                 Weight and Activity:  Physical Activity: Sufficiently Active    Days of Exercise per Week: 5 days    Minutes of Exercise per Session: 60 min     On average, how many days per week do you engage in moderate to strenuous exercise (like a brisk walk)?: 5 days  Have you lost any weight without trying in the past 3 months?: No  Body mass index: (!) 45.68  Obesity Interventions:  Patient declines any further evaluation or treatment  Plans back to swimming -does better than other exercise          Vision Screen:  Do you have difficulty driving, watching TV, or doing any of your daily activities because of your eyesight?: No  Have you had an eye exam within the past year?: (!) No  No results found. Interventions:   Patient declines any further evaluation or treatment                      Objective   Vitals:    02/21/23 0917 02/21/23 0931 02/21/23 0954   BP: (!) 130/92 (!) 140/94 138/87   Site: Left Upper Arm Left Upper Arm    Position: Sitting Sitting    Pulse: 68     SpO2: 95%     Weight: 257 lb 14.4 oz (117 kg)     Height: 5' 3\" (1.6 m)        Body mass index is 45.68 kg/m². General Appearance: alert and oriented to person, place and time, well-developed and well-nourished, in no acute distress  Pulmonary/Chest: clear to auscultation bilaterally- no wheezes, rales or rhonchi, normal air movement, no respiratory distress  Cardiovascular: normal rate, normal S1 and S2, no gallops, intact distal pulses, no carotid bruits, and systolic murmur present- 2/6 at 2nd right intercostal space and blowing systolic     Up and go normal  Allergies   Allergen Reactions    Ciprofloxacin Other (See Comments)     Reacts with Afib meds    Adhesive Tape Other (See Comments)     redness    Olanzapine-Fluoxetine Hcl Other (See Comments)     Causes Severe pain     Cefaclor Rash     Prior to Visit Medications    Medication Sig Taking? Authorizing Provider   apixaban (ELIQUIS) 5 MG TABS tablet Take 1 tablet by mouth 2 times daily Yes Nieves Parish MD   amLODIPine (NORVASC) 5 MG tablet Take 1 tablet by mouth daily Yes Cody Brown MD   mometasone (NASONEX) 50 MCG/ACT nasal spray 2 sprays by Nasal route daily Yes Cody Brown MD   montelukast (SINGULAIR) 10 MG tablet Take 1 tablet by mouth nightly Yes Cody Brown MD   losartan (COZAAR) 100 MG tablet Take 1 tablet by mouth daily Yes Nieves Parish MD   dofetilide (TIKOSYN) 500 MCG capsule Take 1 capsule by mouth in the morning and 1 capsule in the evening.  Yes Nieves Parish MD   SYNTHROID 125 MCG tablet Take 1 tablet by mouth Daily Yes Patrick Martinez MD   furosemide (LASIX) 20 MG tablet Take 1 tablet by mouth daily  Patient taking differently: Take 20 mg by mouth every other day Yes Ronald Tobar MD   buPROPion (WELLBUTRIN) 75 MG tablet TAKE 1 TABLET BY MOUTH 2 TIMES A DAY  Patient taking differently: Take 75 mg by mouth 2 times daily Yes Ronald Tobar MD   ADVAIR DISKUS 250-50 MCG/ACT AEPB diskus inhaler INHALE 1 PUFF TWICE A DAY (RINSE MOUTH WELL AFTER USE)  Patient taking differently: Inhale 1 puff into the lungs 2 times daily Yes Jerrell Harden MD   furosemide (LASIX) 20 MG tablet Take 1 tablet by mouth daily as needed (swelling) Yes Clarence Yip MD   Acetaminophen 325 MG CAPS Take 360 mg by mouth 3 times daily as needed Yes Ar Automatic Reconciliation   albuterol sulfate  (90 Base) MCG/ACT inhaler Inhale 2 puffs into the lungs every 6 hours as needed Yes Ar Automatic Reconciliation   Cetirizine HCl 10 MG CAPS Take 10 capsules by mouth daily  Ar Automatic Reconciliation       CareTeam (Including outside providers/suppliers regularly involved in providing care):   Patient Care Team:  Ronald Tobar MD as PCP - Solomon Romero MD as PCP - Empaneled Provider  Dr Aric Tobias; Dr Reyes-cardiology; Dr Sadiq Fernandez; Dr Tr Anderson; Dr Esteban Laura -ortho   Reviewed and updated this visit:  Tobacco  Allergies  Meds  Problems  Med Hx  Surg Hx  Soc Hx  Fam Hx               Blank Barrett MD

## 2023-02-21 NOTE — PATIENT INSTRUCTIONS

## 2023-02-24 ENCOUNTER — OFFICE VISIT (OUTPATIENT)
Dept: CARDIOLOGY CLINIC | Age: 73
End: 2023-02-24

## 2023-02-24 VITALS
WEIGHT: 253.2 LBS | HEART RATE: 78 BPM | DIASTOLIC BLOOD PRESSURE: 88 MMHG | HEIGHT: 63 IN | BODY MASS INDEX: 44.86 KG/M2 | SYSTOLIC BLOOD PRESSURE: 138 MMHG

## 2023-02-24 DIAGNOSIS — I48.19 ATRIAL FIBRILLATION, PERSISTENT (HCC): Primary | ICD-10-CM

## 2023-02-24 NOTE — PROGRESS NOTES
[facility] CARDIOLOGY, [address], [address]  [place] [address]  PHONE: [phone]  1950    SUBJECTIVE:   Lata Chiu is a 72 y.o. female seen for a follow up visit regarding the following:     Chief Complaint   Patient presents with    Atrial Fibrillation         HPI:    Lata Chiu is a very pleasant 72 y.o. female with a past medical and cardiac history significant for atrial fibrillation s/p afib ablation and recurrent AF s/p failed DCCV and has been maintained on tikosyn. Pt is doing well, no chest pain, significant palpitations, presyncope or syncope. Exercising and no exertional symptoms. No complaints today.     Cardiac PMH: (Old records have been reviewed and summarized below)    Reviewed office note Dr. Villa 2/21/23     Past Medical History, Past Surgical History, Family history, Social History, and Medications were all reviewed with the patient today and updated as necessary.     Current Outpatient Medications   Medication Sig Dispense Refill    apixaban (ELIQUIS) 5 MG TABS tablet Take 1 tablet by mouth 2 times daily 180 tablet 0    amLODIPine (NORVASC) 5 MG tablet Take 1 tablet by mouth daily 90 tablet 4    mometasone (NASONEX) 50 MCG/ACT nasal spray 2 sprays by Nasal route daily 17 g 4    montelukast (SINGULAIR) 10 MG tablet Take 1 tablet by mouth nightly 90 tablet 4    losartan (COZAAR) 100 MG tablet Take 1 tablet by mouth daily 90 tablet 3    dofetilide (TIKOSYN) 500 MCG capsule Take 1 capsule by mouth in the morning and 1 capsule in the evening. 180 capsule 3    SYNTHROID 125 MCG tablet Take 1 tablet by mouth Daily 90 tablet 3    furosemide (LASIX) 20 MG tablet Take 1 tablet by mouth daily (Patient taking differently: Take 20 mg by mouth every other day) 90 tablet 3    buPROPion (WELLBUTRIN) 75 MG tablet TAKE 1 TABLET BY MOUTH 2 TIMES A DAY (Patient taking differently: Take 75 mg by mouth 2 times daily) 180 tablet 3    ADVAIR DISKUS 250-50 MCG/ACT AEPB diskus  inhaler INHALE 1 PUFF TWICE A DAY (RINSE MOUTH WELL AFTER USE) (Patient taking differently: Inhale 1 puff into the lungs 2 times daily) 1 each 10    Acetaminophen 325 MG CAPS Take 360 mg by mouth 3 times daily as needed      albuterol sulfate  (90 Base) MCG/ACT inhaler Inhale 2 puffs into the lungs every 6 hours as needed      Cetirizine HCl 10 MG CAPS Take 10 capsules by mouth daily       No current facility-administered medications for this visit. Allergies   Allergen Reactions    Ciprofloxacin Other (See Comments)     Reacts with Afib meds    Adhesive Tape Other (See Comments)     redness    Olanzapine-Fluoxetine Hcl Other (See Comments)     Causes Severe pain     Cefaclor Rash     [unfilled]  Past Medical History:   Diagnosis Date    Adverse effect of anesthesia     delayed awakening in the past, patient states improved after using CPAP--patient's  states better for patient to wake up on side, not back. Allergic rhinitis     Anxiety     Arthritis     OA    Asthma     Followed by Dr. Ian Patterson, controlled with daily inhaler and PRN Ventolin     Depression     under control with med    Endocarditis 1992    hx 1992    Heart murmur     congenital, asymptomatic--Echo completed 2/18/19 EF 50-55    Hematuria     History of MRSA infection on left breast    5/2016 : treated/ resolved    HLD (hyperlipidemia)      Hypertension     controlled with medication     Hypertension     Hypothyroid     controlled with medication     Hypothyroidism due to acquired atrophy of thyroid 4/28/2015    Intertrigo     Irregular heart beat     Mass of colon     Morbid obesity ([address]     48.7 bmi    Persistent atrial fibrillation ([address]     s/p ablation (2/2019), Tikosyn loading---EKG dated 3/13/19 shows \"sinus rhythm, rate 70. \" Patient is followed by [facility]. Reactive airway disease with status asthmaticus     hx only    Scoliosis 8/4/2016    Sleep apnea     cpap compliant.     Varicose veins     VSD (ventricular septal defect) 2016    per echo (19) \"small PFO in the baseline state. \" Patient is followed by [facility].       Past Surgical History:   Procedure Laterality Date    ABLATION OF DYSRHYTHMIC FOCUS   & 2019    cardioversion / ablation    APPENDECTOMY      AXILLARY SURGERY Left 10/28/2022    AXILLARY LESION BIOPSY EXCISION performed by Roxana Clark MD at [address] Bilateral 2016    BREAST REDUCTION/ bilateral performed by Beth Rachel MD at [place] [id number]      heart cath age 5     SECTION      x2    COLONOSCOPY  ,     DILATION AND CURETTAGE OF UTERUS      multiple    HEENT  1969    jaw surgery due to underbite     HERNIA REPAIR  incisional [id number]    with mesh    LIPOMA RESECTION Right     right foot between toes    CA UNLISTED PROCEDURE CARDIAC SURGERY  2018    cardioversion    CA UNLISTED PROCEDURE CARDIAC SURGERY  2018    ablation    CA UNLISTED PROCEDURE CARDIAC SURGERY  2018    cardioversion    CA UNLISTED PROCEDURE CARDIAC SURGERY  2019    ablation    REFRACTIVE SURGERY      TOTAL COLECTOMY Right 2010 in colon removed    TOTAL KNEE ARTHROPLASTY Right 2018    TOTAL KNEE ARTHROPLASTY Left 2019     Family History   Problem Relation Age of Onset    Colon Cancer Maternal Uncle     Thyroid Disease Daughter         Graves' disease    Thyroid Disease Maternal Aunt         Hypothyroidism    Cancer Mother         Lymphoma    Heart Disease Brother     Breast Cancer Mother     Thyroid Disease Mother         Hypothyroidism    Heart Disease Father         s/p MI and CABG    Hypertension Father     Heart Disease Brother     Thyroid Disease Sister         Hypothyroidism    Hypertension Mother      Social History     Tobacco Use    Smoking status: Never    Smokeless tobacco: Never   Substance Use Topics    Alcohol use: No       PHYSICAL EXAM:    /88   Pulse 78   Ht 5' 3\" (1.6 m) Wt 253 lb 3.2 oz (114.9 kg)   BMI 44.85 kg/m²      Wt Readings from Last 5 Encounters:   02/24/23 253 lb 3.2 oz (114.9 kg)   02/21/23 257 lb 14.4 oz (117 kg)   01/19/23 253 lb (114.8 kg)   12/16/22 261 lb (118.4 kg)   11/15/22 261 lb (118.4 kg)       BP Readings from Last 5 Encounters:   02/24/23 138/88   02/21/23 138/87   01/19/23 (!) 142/88   11/15/22 (!) 144/84   10/28/22 (!) 142/63       General appearance: Alert, well appearing, and in no distress   CV: RRR, no M/R/G, no JVD, normal distal pulses, no lower extremity edema  Pulm: Clear to auscultation, no wheezes, no crackles, no accessory muscle use  GI: Soft, nontender, nondistended  Neuro: Alert and oriented    Medical problems and test results were reviewed with the patient today. Lab Results   Component Value Date/Time    K 4.0 02/21/2023 10:27 AM     Creatinine   Date Value Ref Range Status   02/21/2023 0.70 0.6 - 1.0 MG/DL Final     Lab Results   Component Value Date/Time    HGB 14.3 02/21/2023 10:27 AM     No results found for: INR, PTMR, PT1    Lab Results   Component Value Date    WBC 6.6 02/21/2023    HGB 14.3 02/21/2023    HCT 44.3 02/21/2023    MCV 95.5 02/21/2023     02/21/2023      Lab Results   Component Value Date/Time     02/21/2023 10:27 AM    K 4.0 02/21/2023 10:27 AM     02/21/2023 10:27 AM    CO2 24 02/21/2023 10:27 AM    BUN 15 02/21/2023 10:27 AM    CREATININE 0.70 02/21/2023 10:27 AM    GLUCOSE 99 02/21/2023 10:27 AM    CALCIUM 9.1 02/21/2023 10:27 AM         EKG: (EKG has been independently visualized by me with interpretation below)  NSR, Short Brenda, H Rate 78   # PACs = 1, QTc 437 msec    Jacinta Hannon was seen today for atrial fibrillation. Diagnoses and all orders for this visit:    Atrial fibrillation, persistent ([address]  -     EKG 12 lead      ASSESSMENT and PLAN   1. Persistent atrial fibrillation: doing well, cont meds      2. CVA protection: cont eliquis 5mg, no bleeding problems      3.  Tikosyn: QTc acceptable, cont tikosyn 500 mcg Q12H, labs stable      4. HTN: controlled, cont losartan 100mg, amlodipine 5mg    Patient has been instructed and agrees to call our office with any issues or other concerns related to their cardiac condition(s) and/or complaint(s). Return in about 6 months (around 8/24/2023). Gurdeep Veras MD, MS  Cardiology/Electrophysiology  2/24/2023  10:04 AM

## 2023-03-09 DIAGNOSIS — E89.0 HYPOTHYROIDISM FOLLOWING RADIOIODINE THERAPY: ICD-10-CM

## 2023-03-09 RX ORDER — LEVOTHYROXINE SODIUM 125 MCG
125 TABLET ORAL DAILY
Qty: 90 TABLET | Refills: 3 | Status: SHIPPED | OUTPATIENT
Start: 2023-03-09

## 2023-03-09 NOTE — TELEPHONE ENCOUNTER
Changed the pharmacy as the patient would like to stay on name brand so she will now get her prescriptions from [facility].

## 2023-04-27 ENCOUNTER — OFFICE VISIT (OUTPATIENT)
Dept: ENDOCRINOLOGY | Age: 73
End: 2023-04-27
Payer: MEDICARE

## 2023-04-27 VITALS — BODY MASS INDEX: 44.29 KG/M2 | SYSTOLIC BLOOD PRESSURE: 110 MMHG | WEIGHT: 250 LBS | DIASTOLIC BLOOD PRESSURE: 80 MMHG

## 2023-04-27 DIAGNOSIS — E89.0 HYPOTHYROIDISM FOLLOWING RADIOIODINE THERAPY: Primary | ICD-10-CM

## 2023-04-27 PROCEDURE — 1123F ACP DISCUSS/DSCN MKR DOCD: CPT | Performed by: INTERNAL MEDICINE

## 2023-04-27 PROCEDURE — G8427 DOCREV CUR MEDS BY ELIG CLIN: HCPCS | Performed by: INTERNAL MEDICINE

## 2023-04-27 PROCEDURE — 3017F COLORECTAL CA SCREEN DOC REV: CPT | Performed by: INTERNAL MEDICINE

## 2023-04-27 PROCEDURE — 99213 OFFICE O/P EST LOW 20 MIN: CPT | Performed by: INTERNAL MEDICINE

## 2023-04-27 PROCEDURE — 3079F DIAST BP 80-89 MM HG: CPT | Performed by: INTERNAL MEDICINE

## 2023-04-27 PROCEDURE — G8417 CALC BMI ABV UP PARAM F/U: HCPCS | Performed by: INTERNAL MEDICINE

## 2023-04-27 PROCEDURE — 1090F PRES/ABSN URINE INCON ASSESS: CPT | Performed by: INTERNAL MEDICINE

## 2023-04-27 PROCEDURE — G8399 PT W/DXA RESULTS DOCUMENT: HCPCS | Performed by: INTERNAL MEDICINE

## 2023-04-27 PROCEDURE — 1036F TOBACCO NON-USER: CPT | Performed by: INTERNAL MEDICINE

## 2023-04-27 PROCEDURE — 3074F SYST BP LT 130 MM HG: CPT | Performed by: INTERNAL MEDICINE

## 2023-04-27 RX ORDER — LEVOTHYROXINE SODIUM 125 MCG
125 TABLET ORAL DAILY
Qty: 90 TABLET | Refills: 3 | Status: SHIPPED | OUTPATIENT
Start: 2023-04-27

## 2023-04-27 ASSESSMENT — ENCOUNTER SYMPTOMS
ROS SKIN COMMENTS: DENIES HAIR LOSS, DRY SKIN.
DIARRHEA: 0
CONSTIPATION: 0

## 2023-04-27 NOTE — PROGRESS NOTES
1 tablet by mouth daily (Patient taking differently: Take 1 tablet by mouth every other day) 90 tablet 3    buPROPion (WELLBUTRIN) 75 MG tablet TAKE 1 TABLET BY MOUTH 2 TIMES A DAY (Patient taking differently: Take 1 tablet by mouth 2 times daily) 180 tablet 3    ADVAIR DISKUS 250-50 MCG/ACT AEPB diskus inhaler INHALE 1 PUFF TWICE A DAY (RINSE MOUTH WELL AFTER USE) (Patient taking differently: Inhale 1 puff into the lungs 2 times daily) 1 each 10    Acetaminophen 325 MG CAPS Take 360 mg by mouth 3 times daily as needed      albuterol sulfate  (90 Base) MCG/ACT inhaler Inhale 2 puffs into the lungs every 6 hours as needed      Cetirizine HCl 10 MG CAPS Take 10 capsules by mouth daily       No current facility-administered medications for this visit.

## 2023-05-22 ENCOUNTER — OFFICE VISIT (OUTPATIENT)
Dept: SLEEP MEDICINE | Age: 73
End: 2023-05-22
Payer: MEDICARE

## 2023-05-22 ENCOUNTER — TELEPHONE (OUTPATIENT)
Dept: SLEEP MEDICINE | Age: 73
End: 2023-05-22

## 2023-05-22 VITALS
SYSTOLIC BLOOD PRESSURE: 142 MMHG | BODY MASS INDEX: 45.71 KG/M2 | HEIGHT: 63 IN | TEMPERATURE: 97.2 F | HEART RATE: 77 BPM | DIASTOLIC BLOOD PRESSURE: 80 MMHG | WEIGHT: 258 LBS | OXYGEN SATURATION: 94 %

## 2023-05-22 DIAGNOSIS — E66.01 CLASS 3 SEVERE OBESITY WITH BODY MASS INDEX (BMI) OF 45.0 TO 49.9 IN ADULT, UNSPECIFIED OBESITY TYPE, UNSPECIFIED WHETHER SERIOUS COMORBIDITY PRESENT (HCC): ICD-10-CM

## 2023-05-22 DIAGNOSIS — G47.33 OSA (OBSTRUCTIVE SLEEP APNEA): Primary | ICD-10-CM

## 2023-05-22 PROBLEM — E66.813 CLASS 3 SEVERE OBESITY WITH BODY MASS INDEX (BMI) OF 45.0 TO 49.9 IN ADULT: Status: ACTIVE | Noted: 2018-07-09

## 2023-05-22 PROCEDURE — 99213 OFFICE O/P EST LOW 20 MIN: CPT | Performed by: NURSE PRACTITIONER

## 2023-05-22 PROCEDURE — G8417 CALC BMI ABV UP PARAM F/U: HCPCS | Performed by: NURSE PRACTITIONER

## 2023-05-22 PROCEDURE — 1036F TOBACCO NON-USER: CPT | Performed by: NURSE PRACTITIONER

## 2023-05-22 PROCEDURE — G8399 PT W/DXA RESULTS DOCUMENT: HCPCS | Performed by: NURSE PRACTITIONER

## 2023-05-22 PROCEDURE — G8427 DOCREV CUR MEDS BY ELIG CLIN: HCPCS | Performed by: NURSE PRACTITIONER

## 2023-05-22 PROCEDURE — 1123F ACP DISCUSS/DSCN MKR DOCD: CPT | Performed by: NURSE PRACTITIONER

## 2023-05-22 PROCEDURE — 3077F SYST BP >= 140 MM HG: CPT | Performed by: NURSE PRACTITIONER

## 2023-05-22 PROCEDURE — 3079F DIAST BP 80-89 MM HG: CPT | Performed by: NURSE PRACTITIONER

## 2023-05-22 PROCEDURE — 3017F COLORECTAL CA SCREEN DOC REV: CPT | Performed by: NURSE PRACTITIONER

## 2023-05-22 PROCEDURE — 1090F PRES/ABSN URINE INCON ASSESS: CPT | Performed by: NURSE PRACTITIONER

## 2023-05-22 ASSESSMENT — SLEEP AND FATIGUE QUESTIONNAIRES
HOW LIKELY ARE YOU TO NOD OFF OR FALL ASLEEP WHEN YOU ARE A PASSENGER IN A CAR FOR AN HOUR WITHOUT A BREAK: 1
ESS TOTAL SCORE: 5
HOW LIKELY ARE YOU TO NOD OFF OR FALL ASLEEP WHILE SITTING INACTIVE IN A PUBLIC PLACE: 0
HOW LIKELY ARE YOU TO NOD OFF OR FALL ASLEEP IN A CAR, WHILE STOPPED FOR A FEW MINUTES IN TRAFFIC: 0
HOW LIKELY ARE YOU TO NOD OFF OR FALL ASLEEP WHILE WATCHING TV: 2
HOW LIKELY ARE YOU TO NOD OFF OR FALL ASLEEP WHILE LYING DOWN TO REST IN THE AFTERNOON WHEN CIRCUMSTANCES PERMIT: 2
HOW LIKELY ARE YOU TO NOD OFF OR FALL ASLEEP WHILE SITTING AND READING: 0
HOW LIKELY ARE YOU TO NOD OFF OR FALL ASLEEP WHILE SITTING QUIETLY AFTER LUNCH WITHOUT ALCOHOL: 0
HOW LIKELY ARE YOU TO NOD OFF OR FALL ASLEEP WHILE SITTING AND TALKING TO SOMEONE: 0

## 2023-05-22 NOTE — PATIENT INSTRUCTIONS
Continue CPAP 11 cm H2O with nightly compliance  New supplies ordered  Recommendations as above  Follow-up in 1 year or sooner if needed

## 2023-06-07 ENCOUNTER — OFFICE VISIT (OUTPATIENT)
Dept: PULMONOLOGY | Age: 73
End: 2023-06-07
Payer: MEDICARE

## 2023-06-07 VITALS
SYSTOLIC BLOOD PRESSURE: 127 MMHG | RESPIRATION RATE: 14 BRPM | DIASTOLIC BLOOD PRESSURE: 83 MMHG | BODY MASS INDEX: 45.18 KG/M2 | WEIGHT: 255 LBS | HEART RATE: 75 BPM | HEIGHT: 63 IN | OXYGEN SATURATION: 94 %

## 2023-06-07 DIAGNOSIS — G47.33 OBSTRUCTIVE SLEEP APNEA (ADULT) (PEDIATRIC): ICD-10-CM

## 2023-06-07 DIAGNOSIS — J45.20 MILD INTERMITTENT ASTHMA, UNCOMPLICATED: Primary | ICD-10-CM

## 2023-06-07 LAB
EXPIRATORY TIME: NORMAL
FEF 25-75% %PRED-PRE: NORMAL
FEF 25-75% PRED: NORMAL
FEF 25-75%-PRE: NORMAL
FEV1 %PRED-PRE: 82 %
FEV1 PRED: NORMAL
FEV1/FVC %PRED-PRE: NORMAL
FEV1/FVC PRED: 87 %
FEV1/FVC: NORMAL
FEV1: 1.78 L
FVC %PRED-PRE: 71 %
FVC PRED: NORMAL
FVC: 2.05 L
PEF %PRED-PRE: NORMAL
PEF PRED: NORMAL
PEF-PRE: NORMAL

## 2023-06-07 PROCEDURE — 94010 BREATHING CAPACITY TEST: CPT | Performed by: INTERNAL MEDICINE

## 2023-06-07 PROCEDURE — 3079F DIAST BP 80-89 MM HG: CPT | Performed by: INTERNAL MEDICINE

## 2023-06-07 PROCEDURE — 99213 OFFICE O/P EST LOW 20 MIN: CPT | Performed by: INTERNAL MEDICINE

## 2023-06-07 PROCEDURE — 1123F ACP DISCUSS/DSCN MKR DOCD: CPT | Performed by: INTERNAL MEDICINE

## 2023-06-07 PROCEDURE — 3017F COLORECTAL CA SCREEN DOC REV: CPT | Performed by: INTERNAL MEDICINE

## 2023-06-07 PROCEDURE — G8417 CALC BMI ABV UP PARAM F/U: HCPCS | Performed by: INTERNAL MEDICINE

## 2023-06-07 PROCEDURE — 1090F PRES/ABSN URINE INCON ASSESS: CPT | Performed by: INTERNAL MEDICINE

## 2023-06-07 PROCEDURE — G8427 DOCREV CUR MEDS BY ELIG CLIN: HCPCS | Performed by: INTERNAL MEDICINE

## 2023-06-07 PROCEDURE — G8399 PT W/DXA RESULTS DOCUMENT: HCPCS | Performed by: INTERNAL MEDICINE

## 2023-06-07 PROCEDURE — 1036F TOBACCO NON-USER: CPT | Performed by: INTERNAL MEDICINE

## 2023-06-07 PROCEDURE — 3074F SYST BP LT 130 MM HG: CPT | Performed by: INTERNAL MEDICINE

## 2023-06-07 ASSESSMENT — PULMONARY FUNCTION TESTS
FEV1/FVC_PREDICTED: 87
FEV1_PERCENT_PREDICTED_PRE: 82
FEV1: 1.78
FVC: 2.05
FVC_PERCENT_PREDICTED_PRE: 71

## 2023-06-07 NOTE — PROGRESS NOTES
History   Problem Relation Age of Onset    Colon Cancer Maternal Uncle     Thyroid Disease Daughter         Thanh Mckeon' disease    Thyroid Disease Maternal Aunt         Hypothyroidism    Cancer Mother         Lymphoma    Heart Disease Brother     Breast Cancer Mother     Thyroid Disease Mother         Hypothyroidism    Heart Disease Father         s/p MI and CABG    Hypertension Father     Heart Disease Brother     Thyroid Disease Sister         Hypothyroidism    Hypertension Mother        Allergies   Allergen Reactions    Ciprofloxacin Other (See Comments)     Reacts with Afib meds    Adhesive Tape Other (See Comments)     redness    Olanzapine-Fluoxetine Hcl Other (See Comments)     Causes Severe pain     Cefaclor Rash           Review of Systems    OBJECTIVE:  Physical Exam:  Vitals:    06/07/23 0912   BP: 127/83   Pulse: 75   Resp: 14   SpO2: 94%        GENERAL APPEARANCE:   The patient is normal weight and in no respiratory distress. HEENT:   PERRL. Conjunctivae unremarkable. Nasal mucosa is without epistaxis, exudate, or polyps. Gums and dentition are unremarkable. There is no oropharyngeal narrowing. TMs are clear. NECK/LYMPHATIC:   Symmetrical with no elevation of jugular venous pulsation. Trachea midline. No thyroid enlargement. No cervical adenopathy. LUNGS:   Normal respiratory effort with symmetrical lung expansion. Breath sounds clear. HEART:   There is a regular rate and rhythm. No murmur, rub, or gallop. There is no edema in the lower extremities. ABDOMEN:   Soft and non-tender. No hepatosplenomegaly. Bowel sounds are normal.       NEURO:   The patient is alert and oriented to person, place, and time. Memory appears intact and mood is normal.  No gross sensorimotor deficits are present.       Current Outpatient Medications   Medication Instructions    Acetaminophen 360 mg, Oral, 3 TIMES DAILY PRN    ADVAIR DISKUS 250-50 MCG/ACT AEPB diskus inhaler INHALE 1 PUFF TWICE A

## 2023-07-10 ENCOUNTER — OFFICE VISIT (OUTPATIENT)
Dept: SURGERY | Age: 73
End: 2023-07-10

## 2023-07-10 VITALS — WEIGHT: 259 LBS | BODY MASS INDEX: 45.89 KG/M2 | HEIGHT: 63 IN

## 2023-07-10 DIAGNOSIS — L72.3 SEBACEOUS CYST OF AXILLA: Primary | ICD-10-CM

## 2023-07-10 DIAGNOSIS — Z79.01 CURRENT USE OF LONG TERM ANTICOAGULATION: ICD-10-CM

## 2023-07-10 NOTE — PROGRESS NOTES
[address], [address] [place] [address] Linh Olsen, [address] [place] [address]  [phone]    Office Note   Renetta Fall   MRN: [id number]     : 1950        HPI: Renetta Fall is a 81 y.o. female who presents to the office on 7/10/2023 for excision of her left axillary nodular cyst lesion. She has history of a ruptured cyst in this area and this is likely a residual portion of that cyst.    On 2022, she returns to the office 6 weeks since excision of left axillary lesion. Lesion turned out to be ruptured epidermal inclusion cyst.  I believe these became infected from nicking from dirty razor. She was seen in follow-up by Koko Hastings and had suture knots removed and some hypergranulation tissue cauterized with silver nitrate. She is here primarily to obtain clearance to swim in a pool. She is much improved since her last visit on . She continues to wash with Nizoral shampoo. 10/28/2022 Procedure(s):  AXILLARY LESION BIOPSY EXCISION (4 cm)   Findings: L axilla lesion with chronic drainage present. Elliptical excision. Skin too thin for subcuticular closure and simple running 4-0 Monocryl used for 4 cm defect. On 10/7/2022, she is seen in the office at the request of Dr. Ashwin Akbar for evaluation of an infected cyst of the left axilla. Issues began around Labor Day weekend when a previously known cyst in the left axilla became enlarged to the size of half of a golf ball. It was incredibly painful and she saw her dermatologist who performed an I&D and expressed a lot of material.  Cultures were obtained which grew heavy Serratia marcescens. She was originally placed on Cipro but there was some concern about interference with her cardiac medications. She was placed on other antibiotics for a total of approximately 30 days. She had persistent drainage despite improvement with a reculture showing microaerophilic Streptococcus.

## 2023-07-11 DIAGNOSIS — L72.3 SEBACEOUS CYST OF AXILLA: Primary | ICD-10-CM

## 2023-07-11 DIAGNOSIS — L72.3 SEBACEOUS CYST OF AXILLA: ICD-10-CM

## 2023-07-17 ENCOUNTER — OFFICE VISIT (OUTPATIENT)
Dept: ORTHOPEDIC SURGERY | Age: 73
End: 2023-07-17
Payer: MEDICARE

## 2023-07-17 DIAGNOSIS — M20.12 HALLUX VALGUS OF LEFT FOOT: ICD-10-CM

## 2023-07-17 DIAGNOSIS — M20.5X2 ACQUIRED CLAW TOE OF LEFT FOOT: ICD-10-CM

## 2023-07-17 DIAGNOSIS — M79.672 LEFT FOOT PAIN: Primary | ICD-10-CM

## 2023-07-17 DIAGNOSIS — M25.571 RIGHT ANKLE PAIN, UNSPECIFIED CHRONICITY: ICD-10-CM

## 2023-07-17 PROCEDURE — 1123F ACP DISCUSS/DSCN MKR DOCD: CPT | Performed by: ORTHOPAEDIC SURGERY

## 2023-07-17 PROCEDURE — G8427 DOCREV CUR MEDS BY ELIG CLIN: HCPCS | Performed by: ORTHOPAEDIC SURGERY

## 2023-07-17 PROCEDURE — 1036F TOBACCO NON-USER: CPT | Performed by: ORTHOPAEDIC SURGERY

## 2023-07-17 PROCEDURE — 99204 OFFICE O/P NEW MOD 45 MIN: CPT | Performed by: ORTHOPAEDIC SURGERY

## 2023-07-17 PROCEDURE — G8417 CALC BMI ABV UP PARAM F/U: HCPCS | Performed by: ORTHOPAEDIC SURGERY

## 2023-07-17 PROCEDURE — 3017F COLORECTAL CA SCREEN DOC REV: CPT | Performed by: ORTHOPAEDIC SURGERY

## 2023-07-17 PROCEDURE — G8399 PT W/DXA RESULTS DOCUMENT: HCPCS | Performed by: ORTHOPAEDIC SURGERY

## 2023-07-17 PROCEDURE — 1090F PRES/ABSN URINE INCON ASSESS: CPT | Performed by: ORTHOPAEDIC SURGERY

## 2023-07-17 NOTE — PROGRESS NOTES
Name: Florentin Bal  YOB: 1950  Gender: female  MRN: [id number]    Summary:   Left hallux valgus and fixed second claw toe deformity and third claw toe deformity       CC: New Patient (Left foot x-rays taken in the office today)       HPI: Florentin Bal is a 81 y.o. female who presents with New Patient (Left foot x-rays taken in the office today)  . This patient presents the office today with some worries about her second toe after rubbing in the shoe. She was worried about a potential infection. Had a longstanding history of a claw toe. She tried a hammertoe loop pad for this in the past.    History was obtained by Patient     ROS/Meds/PSH/PMH/FH/SH: I personally reviewed the patients standard intake form. Below are the pertinents    Tobacco:  reports that she has never smoked. She has never used smokeless tobacco.  Diabetes: None      Physical Examination:  Exam of the left foot and ankle shows a hallux valgus deformity. She has a thick second claw toe deformity with some mild cellulitis which was resolving. There is no sign of active infection noted. She has a flexible third toe deformity. She has palpable pulses. Imaging:   I independently interpreted XR taken today  Foot XR: AP, Lateral, Oblique views     ICD-10-CM    1. Left foot pain  M79.672 XR FOOT LEFT (MIN 3 VIEWS)      2. Right ankle pain, unspecified chronicity  M25.571 CANCELED: XR ANKLE RIGHT (MIN 3 VIEWS)      3. Hallux valgus of left foot  M20.12       4.  Acquired claw toe of left foot  M20.5X2          Report: AP, lateral, oblique x-ray of the left foot demonstrates hallux valgus    Impression: Oh valgus on the   [facility] MD TERRY           Assessment:   Left hallux valgus and second fixed claw toe deformity and flexible third mallet toe deformity    Treatment Plan:   4 This is a chronic illness/condition with exacerbation and progression  Treatment at this time: Elective major surgery with

## 2023-07-25 ENCOUNTER — OFFICE VISIT (OUTPATIENT)
Dept: SURGERY | Age: 73
End: 2023-07-25
Payer: MEDICARE

## 2023-07-25 DIAGNOSIS — L72.3 SEBACEOUS CYST OF AXILLA: Primary | ICD-10-CM

## 2023-07-25 DIAGNOSIS — Z79.01 CURRENT USE OF LONG TERM ANTICOAGULATION: ICD-10-CM

## 2023-07-25 PROCEDURE — G8427 DOCREV CUR MEDS BY ELIG CLIN: HCPCS | Performed by: SURGERY

## 2023-07-25 PROCEDURE — 1090F PRES/ABSN URINE INCON ASSESS: CPT | Performed by: SURGERY

## 2023-07-25 PROCEDURE — 3017F COLORECTAL CA SCREEN DOC REV: CPT | Performed by: SURGERY

## 2023-07-25 PROCEDURE — 99213 OFFICE O/P EST LOW 20 MIN: CPT | Performed by: SURGERY

## 2023-07-25 PROCEDURE — 1036F TOBACCO NON-USER: CPT | Performed by: SURGERY

## 2023-07-25 PROCEDURE — G8417 CALC BMI ABV UP PARAM F/U: HCPCS | Performed by: SURGERY

## 2023-07-25 PROCEDURE — G8399 PT W/DXA RESULTS DOCUMENT: HCPCS | Performed by: SURGERY

## 2023-07-25 PROCEDURE — 1123F ACP DISCUSS/DSCN MKR DOCD: CPT | Performed by: SURGERY

## 2023-07-25 NOTE — PROGRESS NOTES
[address] [place]  [facility], [address]  [facility]  [phone]    Office Note   Henrique Vásquez   MRN: [id number]     : 1950        HPI: Henrique Vásquez is a 81 y.o. female who returns to the office on 2023, for reevaluation of left axilla excision site. Small epidermal inclusion cyst was excised 2 weeks ago. She complains that the area is still a little bit open. Pathology report was provided. She presents to the office on 7/10/2023 for excision of her left axillary nodular cyst lesion. She has history of a ruptured cyst in this area and this is likely a residual portion of that cyst.    On 2022, she returns to the office 6 weeks since excision of left axillary lesion. Lesion turned out to be ruptured epidermal inclusion cyst.  I believe these became infected from nicking from dirty razor. She was seen in follow-up by Kaitlynn Lenz and had suture knots removed and some hypergranulation tissue cauterized with silver nitrate. She is here primarily to obtain clearance to swim in a pool. She is much improved since her last visit on . She continues to wash with Nizoral shampoo. 10/28/2022 Procedure(s):  AXILLARY LESION BIOPSY EXCISION (4 cm)   Findings: L axilla lesion with chronic drainage present. Elliptical excision. Skin too thin for subcuticular closure and simple running 4-0 Monocryl used for 4 cm defect. On 10/7/2022, she is seen in the office at the request of Dr. Trice Adrian for evaluation of an infected cyst of the left axilla. Issues began around Labor Day weekend when a previously known cyst in the left axilla became enlarged to the size of half of a golf ball. It was incredibly painful and she saw her dermatologist who performed an I&D and expressed a lot of material.  Cultures were obtained which grew heavy Serratia marcescens.   She was originally placed on Cipro but there was some

## 2023-08-07 RX ORDER — APIXABAN 5 MG/1
TABLET, FILM COATED ORAL
Qty: 180 TABLET | Refills: 3 | Status: SHIPPED | OUTPATIENT
Start: 2023-08-07

## 2023-08-23 ENCOUNTER — OFFICE VISIT (OUTPATIENT)
Dept: INTERNAL MEDICINE CLINIC | Facility: CLINIC | Age: 73
End: 2023-08-23
Payer: MEDICARE

## 2023-08-23 VITALS
WEIGHT: 260 LBS | SYSTOLIC BLOOD PRESSURE: 126 MMHG | DIASTOLIC BLOOD PRESSURE: 86 MMHG | HEIGHT: 63 IN | BODY MASS INDEX: 46.07 KG/M2

## 2023-08-23 DIAGNOSIS — I48.20 CHRONIC ATRIAL FIBRILLATION, UNSPECIFIED (HCC): ICD-10-CM

## 2023-08-23 DIAGNOSIS — F32.5 MAJOR DEPRESSIVE DISORDER, SINGLE EPISODE, IN FULL REMISSION (HCC): ICD-10-CM

## 2023-08-23 DIAGNOSIS — J30.9 ALLERGIC RHINITIS, UNSPECIFIED SEASONALITY, UNSPECIFIED TRIGGER: ICD-10-CM

## 2023-08-23 DIAGNOSIS — E89.0 HYPOTHYROIDISM FOLLOWING RADIOIODINE THERAPY: ICD-10-CM

## 2023-08-23 DIAGNOSIS — I10 ESSENTIAL (PRIMARY) HYPERTENSION: ICD-10-CM

## 2023-08-23 DIAGNOSIS — I10 ESSENTIAL (PRIMARY) HYPERTENSION: Primary | ICD-10-CM

## 2023-08-23 DIAGNOSIS — J45.20 MILD INTERMITTENT ASTHMA WITHOUT COMPLICATION: ICD-10-CM

## 2023-08-23 DIAGNOSIS — I48.19 ATRIAL FIBRILLATION, PERSISTENT (HCC): ICD-10-CM

## 2023-08-23 LAB
ANION GAP SERPL CALC-SCNC: 3 MMOL/L (ref 2–11)
BUN SERPL-MCNC: 14 MG/DL (ref 8–23)
CALCIUM SERPL-MCNC: 9.2 MG/DL (ref 8.3–10.4)
CHLORIDE SERPL-SCNC: 110 MMOL/L (ref 101–110)
CO2 SERPL-SCNC: 28 MMOL/L (ref 21–32)
CREAT SERPL-MCNC: 0.9 MG/DL (ref 0.6–1)
GLUCOSE SERPL-MCNC: 92 MG/DL (ref 65–100)
MAGNESIUM SERPL-MCNC: 2 MG/DL (ref 1.8–2.4)
POTASSIUM SERPL-SCNC: 4.1 MMOL/L (ref 3.5–5.1)
SODIUM SERPL-SCNC: 141 MMOL/L (ref 133–143)

## 2023-08-23 PROCEDURE — 1090F PRES/ABSN URINE INCON ASSESS: CPT | Performed by: INTERNAL MEDICINE

## 2023-08-23 PROCEDURE — 99214 OFFICE O/P EST MOD 30 MIN: CPT | Performed by: INTERNAL MEDICINE

## 2023-08-23 PROCEDURE — 3074F SYST BP LT 130 MM HG: CPT | Performed by: INTERNAL MEDICINE

## 2023-08-23 PROCEDURE — 1036F TOBACCO NON-USER: CPT | Performed by: INTERNAL MEDICINE

## 2023-08-23 PROCEDURE — 3079F DIAST BP 80-89 MM HG: CPT | Performed by: INTERNAL MEDICINE

## 2023-08-23 PROCEDURE — 1123F ACP DISCUSS/DSCN MKR DOCD: CPT | Performed by: INTERNAL MEDICINE

## 2023-08-23 PROCEDURE — G8399 PT W/DXA RESULTS DOCUMENT: HCPCS | Performed by: INTERNAL MEDICINE

## 2023-08-23 PROCEDURE — G8427 DOCREV CUR MEDS BY ELIG CLIN: HCPCS | Performed by: INTERNAL MEDICINE

## 2023-08-23 PROCEDURE — 3017F COLORECTAL CA SCREEN DOC REV: CPT | Performed by: INTERNAL MEDICINE

## 2023-08-23 PROCEDURE — G8417 CALC BMI ABV UP PARAM F/U: HCPCS | Performed by: INTERNAL MEDICINE

## 2023-08-23 RX ORDER — MOMETASONE FUROATE 50 UG/1
2 SPRAY, METERED NASAL DAILY
Qty: 17 G | Refills: 4 | Status: SHIPPED | OUTPATIENT
Start: 2023-08-23

## 2023-08-23 RX ORDER — MONTELUKAST SODIUM 10 MG/1
10 TABLET ORAL NIGHTLY
Qty: 90 TABLET | Refills: 4 | Status: SHIPPED | OUTPATIENT
Start: 2023-08-23

## 2023-08-23 RX ORDER — BUPROPION HYDROCHLORIDE 150 MG/1
150 TABLET ORAL EVERY MORNING
Qty: 90 TABLET | Refills: 4 | Status: SHIPPED | OUTPATIENT
Start: 2023-08-23

## 2023-08-23 RX ORDER — FUROSEMIDE 20 MG/1
20 TABLET ORAL EVERY OTHER DAY
Qty: 45 TABLET | Refills: 4 | Status: SHIPPED | OUTPATIENT
Start: 2023-08-23

## 2023-08-23 RX ORDER — AMLODIPINE BESYLATE 5 MG/1
5 TABLET ORAL DAILY
Qty: 90 TABLET | Refills: 4 | Status: SHIPPED | OUTPATIENT
Start: 2023-08-23

## 2023-08-23 RX ORDER — BUPROPION HYDROCHLORIDE 75 MG/1
75 TABLET ORAL 2 TIMES DAILY
Qty: 180 TABLET | Refills: 4 | Status: CANCELLED | OUTPATIENT
Start: 2023-08-23

## 2023-08-23 ASSESSMENT — PATIENT HEALTH QUESTIONNAIRE - PHQ9
SUM OF ALL RESPONSES TO PHQ QUESTIONS 1-9: 0
1. LITTLE INTEREST OR PLEASURE IN DOING THINGS: 0
2. FEELING DOWN, DEPRESSED OR HOPELESS: 0
SUM OF ALL RESPONSES TO PHQ QUESTIONS 1-9: 0
SUM OF ALL RESPONSES TO PHQ9 QUESTIONS 1 & 2: 0

## 2023-08-23 ASSESSMENT — ENCOUNTER SYMPTOMS
SHORTNESS OF BREATH: 0
WHEEZING: 0

## 2023-08-23 NOTE — PROGRESS NOTES
complication  7. Chronic atrial fibrillation, unspecified (HCC)  -     Magnesium; Future   BP fine ; thyroid monitored; asthma controlled on inhaler;  a fib stable --can change bid wellbutrin to qd at 150 mg     lab results and schedule for future lab studies reviewed with patient  reviewed medications and side effects in detail     No follow-ups on file.

## 2023-08-30 ENCOUNTER — OFFICE VISIT (OUTPATIENT)
Age: 73
End: 2023-08-30

## 2023-08-30 VITALS
HEART RATE: 86 BPM | BODY MASS INDEX: 45.22 KG/M2 | HEIGHT: 63 IN | DIASTOLIC BLOOD PRESSURE: 86 MMHG | WEIGHT: 255.2 LBS | SYSTOLIC BLOOD PRESSURE: 136 MMHG

## 2023-08-30 DIAGNOSIS — I48.19 ATRIAL FIBRILLATION, PERSISTENT (HCC): Primary | ICD-10-CM

## 2023-08-30 DIAGNOSIS — I10 ESSENTIAL HYPERTENSION: ICD-10-CM

## 2023-08-30 RX ORDER — LIDOCAINE 50 MG/G
PATCH TOPICAL
COMMUNITY
Start: 2023-07-11

## 2023-08-30 NOTE — PROGRESS NOTES
visit:    Atrial fibrillation, persistent ([address])  -     EKG 12 lead    Essential hypertension        ASSESSMENT and PLAN   1. Persistent atrial fibrillation: doing well, cont meds      2. CVA protection: cont eliquis 5mg, no bleeding problems      3. Tikosyn: QTc acceptable, cont tikosyn 500 mcg Q12H, labs stable      4. HTN: controlled, cont losartan 100mg, amlodipine 5mg    Patient has been instructed and agrees to call our office with any issues or other concerns related to their cardiac condition(s) and/or complaint(s). Return in about 6 months (around 2/29/2024). Wilman Garcia MD, MS  Cardiology/Electrophysiology  8/30/2023  2:32 PM

## 2023-08-31 DIAGNOSIS — M51.36 DDD (DEGENERATIVE DISC DISEASE), LUMBAR: Primary | ICD-10-CM

## 2023-08-31 RX ORDER — CELECOXIB 200 MG/1
200 CAPSULE ORAL DAILY PRN
Qty: 30 CAPSULE | Refills: 3 | Status: SHIPPED | OUTPATIENT
Start: 2023-08-31

## 2023-09-23 ENCOUNTER — PATIENT MESSAGE (OUTPATIENT)
Dept: PULMONOLOGY | Age: 73
End: 2023-09-23

## 2023-09-25 RX ORDER — FLUTICASONE PROPIONATE AND SALMETEROL 250; 50 UG/1; UG/1
1 POWDER RESPIRATORY (INHALATION) EVERY 12 HOURS
Qty: 1 EACH | Refills: 11 | Status: SHIPPED | OUTPATIENT
Start: 2023-09-25

## 2023-10-12 RX ORDER — FLUTICASONE PROPIONATE AND SALMETEROL 50; 250 UG/1; UG/1
POWDER RESPIRATORY (INHALATION)
Qty: 1 EACH | Refills: 10 | OUTPATIENT
Start: 2023-10-12

## 2023-10-12 RX ORDER — FLUTICASONE PROPIONATE AND SALMETEROL 250; 50 UG/1; UG/1
1 POWDER RESPIRATORY (INHALATION) 2 TIMES DAILY
Qty: 1 EACH | Refills: 11 | Status: SHIPPED | OUTPATIENT
Start: 2023-10-12

## 2023-10-30 ENCOUNTER — TELEPHONE (OUTPATIENT)
Age: 73
End: 2023-10-30

## 2023-10-30 NOTE — TELEPHONE ENCOUNTER
Cardiac Clearance        Physician or Practice Requesting: Dr Rut Dinero: Ashlee Haynes Phone Number: [phone]  Fax Number: [phone]  Date of Surgery/Procedure: 11/08/23  Type of Surgery or Procedure: Bilateral Lumbar injection  Type of Anesthesia: Local  Type of Clearance Requested: Medication Hold Only  Medication to Hold: Eliquis  Days to Hold: 3 Day Prior

## 2024-01-11 ENCOUNTER — TELEMEDICINE (OUTPATIENT)
Dept: INTERNAL MEDICINE CLINIC | Facility: CLINIC | Age: 74
End: 2024-01-11
Payer: MEDICARE

## 2024-01-11 ENCOUNTER — TELEPHONE (OUTPATIENT)
Age: 74
End: 2024-01-11

## 2024-01-11 DIAGNOSIS — K80.12 CALCULUS OF GALLBLADDER WITH ACUTE ON CHRONIC CHOLECYSTITIS WITHOUT OBSTRUCTION: ICD-10-CM

## 2024-01-11 DIAGNOSIS — F32.5 MAJOR DEPRESSIVE DISORDER, SINGLE EPISODE, IN FULL REMISSION (HCC): ICD-10-CM

## 2024-01-11 DIAGNOSIS — R19.7 DIARRHEA OF PRESUMED INFECTIOUS ORIGIN: ICD-10-CM

## 2024-01-11 DIAGNOSIS — R19.7 DIARRHEA OF PRESUMED INFECTIOUS ORIGIN: Primary | ICD-10-CM

## 2024-01-11 DIAGNOSIS — I48.19 ATRIAL FIBRILLATION, PERSISTENT (HCC): ICD-10-CM

## 2024-01-11 LAB
ALBUMIN SERPL-MCNC: 3.6 G/DL (ref 3.2–4.6)
ALBUMIN/GLOB SERPL: 1.3 (ref 0.4–1.6)
ALP SERPL-CCNC: 94 U/L (ref 50–136)
ALT SERPL-CCNC: 26 U/L (ref 12–65)
ANION GAP SERPL CALC-SCNC: 5 MMOL/L (ref 2–11)
AST SERPL-CCNC: 15 U/L (ref 15–37)
BASOPHILS # BLD: 0 K/UL (ref 0–0.2)
BASOPHILS NFR BLD: 1 % (ref 0–2)
BILIRUB SERPL-MCNC: 1 MG/DL (ref 0.2–1.1)
BUN SERPL-MCNC: 21 MG/DL (ref 8–23)
CALCIUM SERPL-MCNC: 9.4 MG/DL (ref 8.3–10.4)
CHLORIDE SERPL-SCNC: 109 MMOL/L (ref 103–113)
CO2 SERPL-SCNC: 25 MMOL/L (ref 21–32)
CREAT SERPL-MCNC: 0.9 MG/DL (ref 0.6–1)
DIFFERENTIAL METHOD BLD: NORMAL
EOSINOPHIL # BLD: 0.2 K/UL (ref 0–0.8)
EOSINOPHIL NFR BLD: 2 % (ref 0.5–7.8)
ERYTHROCYTE [DISTWIDTH] IN BLOOD BY AUTOMATED COUNT: 13.2 % (ref 11.9–14.6)
GLOBULIN SER CALC-MCNC: 2.7 G/DL (ref 2.8–4.5)
GLUCOSE SERPL-MCNC: 92 MG/DL (ref 65–100)
HCT VFR BLD AUTO: 46.1 % (ref 35.8–46.3)
HGB BLD-MCNC: 14.6 G/DL (ref 11.7–15.4)
IMM GRANULOCYTES # BLD AUTO: 0.1 K/UL (ref 0–0.5)
IMM GRANULOCYTES NFR BLD AUTO: 1 % (ref 0–5)
LYMPHOCYTES # BLD: 1.9 K/UL (ref 0.5–4.6)
LYMPHOCYTES NFR BLD: 23 % (ref 13–44)
MCH RBC QN AUTO: 30 PG (ref 26.1–32.9)
MCHC RBC AUTO-ENTMCNC: 31.7 G/DL (ref 31.4–35)
MCV RBC AUTO: 94.9 FL (ref 82–102)
MONOCYTES # BLD: 0.9 K/UL (ref 0.1–1.3)
MONOCYTES NFR BLD: 12 % (ref 4–12)
NEUTS SEG # BLD: 4.9 K/UL (ref 1.7–8.2)
NEUTS SEG NFR BLD: 61 % (ref 43–78)
NRBC # BLD: 0 K/UL (ref 0–0.2)
PLATELET # BLD AUTO: 300 K/UL (ref 150–450)
PMV BLD AUTO: 10.5 FL (ref 9.4–12.3)
POTASSIUM SERPL-SCNC: 4.3 MMOL/L (ref 3.5–5.1)
PROT SERPL-MCNC: 6.3 G/DL (ref 6.3–8.2)
RBC # BLD AUTO: 4.86 M/UL (ref 4.05–5.2)
SODIUM SERPL-SCNC: 139 MMOL/L (ref 136–146)
WBC # BLD AUTO: 8 K/UL (ref 4.3–11.1)

## 2024-01-11 PROCEDURE — G8427 DOCREV CUR MEDS BY ELIG CLIN: HCPCS | Performed by: PHYSICIAN ASSISTANT

## 2024-01-11 PROCEDURE — G8399 PT W/DXA RESULTS DOCUMENT: HCPCS | Performed by: PHYSICIAN ASSISTANT

## 2024-01-11 PROCEDURE — 3017F COLORECTAL CA SCREEN DOC REV: CPT | Performed by: PHYSICIAN ASSISTANT

## 2024-01-11 PROCEDURE — 99214 OFFICE O/P EST MOD 30 MIN: CPT | Performed by: PHYSICIAN ASSISTANT

## 2024-01-11 PROCEDURE — 1123F ACP DISCUSS/DSCN MKR DOCD: CPT | Performed by: PHYSICIAN ASSISTANT

## 2024-01-11 PROCEDURE — 1090F PRES/ABSN URINE INCON ASSESS: CPT | Performed by: PHYSICIAN ASSISTANT

## 2024-01-11 RX ORDER — HYOSCYAMINE SULFATE 0.12 MG/1
1 TABLET SUBLINGUAL 4 TIMES DAILY PRN
Qty: 30 EACH | Refills: 0 | Status: SHIPPED | OUTPATIENT
Start: 2024-01-11

## 2024-01-11 ASSESSMENT — ENCOUNTER SYMPTOMS
COUGH: 0
NAUSEA: 0
CONSTIPATION: 0
ABDOMINAL PAIN: 0
EYE PAIN: 0
VOICE CHANGE: 0
COLOR CHANGE: 0
CHEST TIGHTNESS: 0
DIARRHEA: 1
SHORTNESS OF BREATH: 0
VOMITING: 0
WHEEZING: 0
SORE THROAT: 0

## 2024-01-11 ASSESSMENT — PATIENT HEALTH QUESTIONNAIRE - PHQ9
SUM OF ALL RESPONSES TO PHQ QUESTIONS 1-9: 0
SUM OF ALL RESPONSES TO PHQ9 QUESTIONS 1 & 2: 0
SUM OF ALL RESPONSES TO PHQ QUESTIONS 1-9: 0
1. LITTLE INTEREST OR PLEASURE IN DOING THINGS: 0
2. FEELING DOWN, DEPRESSED OR HOPELESS: 0

## 2024-01-11 NOTE — PROGRESS NOTES
PROGRESS NOTE    Lata Chiu is a 73 y.o. female seen for a follow up visit regarding   Chief Complaint   Patient presents with    Diarrhea        HPI  Pt was in [place] in 12/30 had got abdominal pain and diarrhea with fever, pt sts on 1/2/2024 went to ER in [place], CT and blood work was slight abn per pt. Pt state everyone form part \"got Sick\". Pt states she was sent home with zofran and imodium. Pt was told she has viral illness. Pt sts she has diarrhea again loose and water with gas. Imodium very little.     Diarrhea   This is a new problem. The current episode started in the past 7 days. Associated symptoms include a fever. Pertinent negatives include no abdominal pain, arthralgias, coughing, headaches or vomiting.       Past Medical History, Past Surgical History, Family history, Social History, and Medications were all reviewed with the patient today and updated as necessary.       Current Outpatient Medications   Medication Sig Dispense Refill    Hyoscyamine Sulfate SL (LEVSIN/SL) 0.125 MG SUBL Place 1 tablet under the tongue 4 times daily as needed (adbomial cramping) 30 each 0    fluticasone-salmeterol (ADVAIR DISKUS) 250-50 MCG/ACT AEPB diskus inhaler Inhale 1 puff into the lungs 2 times daily 1 each 11    celecoxib (CELEBREX) 200 MG capsule Take 1 capsule by mouth daily as needed for Pain (back pain) 30 capsule 3    lidocaine (LIDODERM) 5 % 1 patch remove after 12 hours Externally Once a day for 90 days      furosemide (LASIX) 20 MG tablet Take 1 tablet by mouth every other day 45 tablet 4    amLODIPine (NORVASC) 5 MG tablet Take 1 tablet by mouth daily 90 tablet 4    mometasone (NASONEX) 50 MCG/ACT nasal spray 2 sprays by Nasal route daily 17 g 4    montelukast (SINGULAIR) 10 MG tablet Take 1 tablet by mouth nightly 90 tablet 4    buPROPion (WELLBUTRIN XL) 150 MG extended release tablet Take 1 tablet by mouth every morning 90 tablet 4    ELIQUIS 5 MG TABS tablet TAKE 1 TABLET BY MOUTH TWICE A

## 2024-01-11 NOTE — TELEPHONE ENCOUNTER
Received fax from pt's insurance stating that dofetilide 500 mcg capsule will be covered at a lower copayment tier.

## 2024-01-12 DIAGNOSIS — R19.7 DIARRHEA OF PRESUMED INFECTIOUS ORIGIN: ICD-10-CM

## 2024-01-14 LAB
BACTERIA SPEC CULT: NORMAL
SERVICE CMNT-IMP: NORMAL

## 2024-01-30 RX ORDER — DOFETILIDE 0.5 MG/1
500 CAPSULE ORAL EVERY 12 HOURS
Qty: 60 CAPSULE | Refills: 11 | Status: SHIPPED | OUTPATIENT
Start: 2024-01-30

## 2024-02-13 ENCOUNTER — PATIENT MESSAGE (OUTPATIENT)
Dept: ENDOCRINOLOGY | Age: 74
End: 2024-02-13

## 2024-02-13 DIAGNOSIS — E89.0 HYPOTHYROIDISM FOLLOWING RADIOIODINE THERAPY: Primary | ICD-10-CM

## 2024-02-13 NOTE — TELEPHONE ENCOUNTER
From: Lata Chiu  To: Dr. Dewayne Simpson  Sent: 2/13/2024 12:12 PM EST  Subject: Concerned about symptoms    I have an appointment with Dr. Simpson on 4/20/24.  I am having the following symptoms;  -night sweats  -weight gain  -feeling very cold when others are not & unable to get warm    My daughter, who also had her thyroid radiated & weighs more than 100 pounds less than me, is on 112 mcg of Synthroid, and I am on only 125 mcg of Synthroid.     I would like to schedule an earlier appointment with thyroid blood tests including TSH, T3, & T4.     Thank you,   Lata Chiu   [phone]

## 2024-02-23 DIAGNOSIS — E89.0 HYPOTHYROIDISM FOLLOWING RADIOIODINE THERAPY: ICD-10-CM

## 2024-02-23 LAB
T4 FREE SERPL-MCNC: 1.3 NG/DL (ref 0.78–1.46)
TSH, 3RD GENERATION: 0.46 UIU/ML (ref 0.36–3.74)

## 2024-02-25 LAB — T3FREE SERPL-MCNC: 2.7 PG/ML (ref 2–4.4)

## 2024-02-29 ENCOUNTER — OFFICE VISIT (OUTPATIENT)
Dept: INTERNAL MEDICINE CLINIC | Facility: CLINIC | Age: 74
End: 2024-02-29

## 2024-02-29 ENCOUNTER — OFFICE VISIT (OUTPATIENT)
Age: 74
End: 2024-02-29
Payer: MEDICARE

## 2024-02-29 VITALS
HEIGHT: 63 IN | DIASTOLIC BLOOD PRESSURE: 94 MMHG | BODY MASS INDEX: 47.24 KG/M2 | SYSTOLIC BLOOD PRESSURE: 158 MMHG | WEIGHT: 266.6 LBS | OXYGEN SATURATION: 94 % | HEART RATE: 72 BPM

## 2024-02-29 VITALS
SYSTOLIC BLOOD PRESSURE: 142 MMHG | HEART RATE: 88 BPM | WEIGHT: 266 LBS | BODY MASS INDEX: 47.13 KG/M2 | DIASTOLIC BLOOD PRESSURE: 86 MMHG | HEIGHT: 63 IN

## 2024-02-29 DIAGNOSIS — Z23 ENCOUNTER FOR IMMUNIZATION: ICD-10-CM

## 2024-02-29 DIAGNOSIS — E89.0 HYPOTHYROIDISM FOLLOWING RADIOIODINE THERAPY: ICD-10-CM

## 2024-02-29 DIAGNOSIS — E78.2 MIXED HYPERLIPIDEMIA: ICD-10-CM

## 2024-02-29 DIAGNOSIS — Z11.59 SCREENING FOR VIRAL DISEASE: ICD-10-CM

## 2024-02-29 DIAGNOSIS — Z13.31 SCREENING FOR DEPRESSION: ICD-10-CM

## 2024-02-29 DIAGNOSIS — Z12.11 COLON CANCER SCREENING: ICD-10-CM

## 2024-02-29 DIAGNOSIS — Z23 NEED FOR INFLUENZA VACCINATION: ICD-10-CM

## 2024-02-29 DIAGNOSIS — Z12.31 ENCOUNTER FOR SCREENING MAMMOGRAM FOR BREAST CANCER: ICD-10-CM

## 2024-02-29 DIAGNOSIS — Z13.39 SCREENING FOR ALCOHOLISM: ICD-10-CM

## 2024-02-29 DIAGNOSIS — I48.19 ATRIAL FIBRILLATION, PERSISTENT (HCC): Primary | ICD-10-CM

## 2024-02-29 DIAGNOSIS — M48.062 SPINAL STENOSIS OF LUMBAR REGION WITH NEUROGENIC CLAUDICATION: ICD-10-CM

## 2024-02-29 DIAGNOSIS — I48.19 ATRIAL FIBRILLATION, PERSISTENT (HCC): ICD-10-CM

## 2024-02-29 DIAGNOSIS — Z00.00 MEDICARE ANNUAL WELLNESS VISIT, SUBSEQUENT: Primary | ICD-10-CM

## 2024-02-29 DIAGNOSIS — I10 ESSENTIAL HYPERTENSION: ICD-10-CM

## 2024-02-29 LAB
ALBUMIN SERPL-MCNC: 3.9 G/DL (ref 3.2–4.6)
ALBUMIN/GLOB SERPL: 1.2 (ref 0.4–1.6)
ALP SERPL-CCNC: 115 U/L (ref 50–136)
ALT SERPL-CCNC: 25 U/L (ref 12–65)
ANION GAP SERPL CALC-SCNC: 5 MMOL/L (ref 2–11)
AST SERPL-CCNC: 23 U/L (ref 15–37)
BASOPHILS # BLD: 0 K/UL (ref 0–0.2)
BASOPHILS NFR BLD: 0 % (ref 0–2)
BILIRUB SERPL-MCNC: 0.8 MG/DL (ref 0.2–1.1)
BUN SERPL-MCNC: 17 MG/DL (ref 8–23)
CALCIUM SERPL-MCNC: 9.6 MG/DL (ref 8.3–10.4)
CHLORIDE SERPL-SCNC: 111 MMOL/L (ref 103–113)
CHOLEST SERPL-MCNC: 213 MG/DL
CO2 SERPL-SCNC: 27 MMOL/L (ref 21–32)
CREAT SERPL-MCNC: 0.8 MG/DL (ref 0.6–1)
DIFFERENTIAL METHOD BLD: ABNORMAL
EOSINOPHIL # BLD: 0 K/UL (ref 0–0.8)
EOSINOPHIL NFR BLD: 0 % (ref 0.5–7.8)
ERYTHROCYTE [DISTWIDTH] IN BLOOD BY AUTOMATED COUNT: 13.7 % (ref 11.9–14.6)
GLOBULIN SER CALC-MCNC: 3.2 G/DL (ref 2.8–4.5)
GLUCOSE SERPL-MCNC: 106 MG/DL (ref 65–100)
HCT VFR BLD AUTO: 42.6 % (ref 35.8–46.3)
HDLC SERPL-MCNC: 65 MG/DL (ref 40–60)
HDLC SERPL: 3.3
HGB BLD-MCNC: 13.4 G/DL (ref 11.7–15.4)
IMM GRANULOCYTES # BLD AUTO: 0.1 K/UL (ref 0–0.5)
IMM GRANULOCYTES NFR BLD AUTO: 1 % (ref 0–5)
LDLC SERPL CALC-MCNC: 122.2 MG/DL
LYMPHOCYTES # BLD: 1.1 K/UL (ref 0.5–4.6)
LYMPHOCYTES NFR BLD: 15 % (ref 13–44)
MCH RBC QN AUTO: 29.9 PG (ref 26.1–32.9)
MCHC RBC AUTO-ENTMCNC: 31.5 G/DL (ref 31.4–35)
MCV RBC AUTO: 95.1 FL (ref 82–102)
MONOCYTES # BLD: 0.4 K/UL (ref 0.1–1.3)
MONOCYTES NFR BLD: 6 % (ref 4–12)
NEUTS SEG # BLD: 5.8 K/UL (ref 1.7–8.2)
NEUTS SEG NFR BLD: 78 % (ref 43–78)
NRBC # BLD: 0 K/UL (ref 0–0.2)
PLATELET # BLD AUTO: 254 K/UL (ref 150–450)
PMV BLD AUTO: 11.2 FL (ref 9.4–12.3)
POTASSIUM SERPL-SCNC: 4 MMOL/L (ref 3.5–5.1)
PROT SERPL-MCNC: 7.1 G/DL (ref 6.3–8.2)
RBC # BLD AUTO: 4.48 M/UL (ref 4.05–5.2)
SODIUM SERPL-SCNC: 143 MMOL/L (ref 136–146)
TRIGL SERPL-MCNC: 129 MG/DL (ref 35–150)
VLDLC SERPL CALC-MCNC: 25.8 MG/DL (ref 6–23)
WBC # BLD AUTO: 7.4 K/UL (ref 4.3–11.1)

## 2024-02-29 PROCEDURE — 1036F TOBACCO NON-USER: CPT | Performed by: INTERNAL MEDICINE

## 2024-02-29 PROCEDURE — 3079F DIAST BP 80-89 MM HG: CPT | Performed by: INTERNAL MEDICINE

## 2024-02-29 PROCEDURE — 99214 OFFICE O/P EST MOD 30 MIN: CPT | Performed by: INTERNAL MEDICINE

## 2024-02-29 PROCEDURE — G8399 PT W/DXA RESULTS DOCUMENT: HCPCS | Performed by: INTERNAL MEDICINE

## 2024-02-29 PROCEDURE — 93000 ELECTROCARDIOGRAM COMPLETE: CPT | Performed by: INTERNAL MEDICINE

## 2024-02-29 PROCEDURE — G8427 DOCREV CUR MEDS BY ELIG CLIN: HCPCS | Performed by: INTERNAL MEDICINE

## 2024-02-29 PROCEDURE — G8484 FLU IMMUNIZE NO ADMIN: HCPCS | Performed by: INTERNAL MEDICINE

## 2024-02-29 PROCEDURE — 3017F COLORECTAL CA SCREEN DOC REV: CPT | Performed by: INTERNAL MEDICINE

## 2024-02-29 PROCEDURE — 3077F SYST BP >= 140 MM HG: CPT | Performed by: INTERNAL MEDICINE

## 2024-02-29 PROCEDURE — 1123F ACP DISCUSS/DSCN MKR DOCD: CPT | Performed by: INTERNAL MEDICINE

## 2024-02-29 PROCEDURE — 1090F PRES/ABSN URINE INCON ASSESS: CPT | Performed by: INTERNAL MEDICINE

## 2024-02-29 PROCEDURE — G8417 CALC BMI ABV UP PARAM F/U: HCPCS | Performed by: INTERNAL MEDICINE

## 2024-02-29 SDOH — ECONOMIC STABILITY: FOOD INSECURITY: WITHIN THE PAST 12 MONTHS, THE FOOD YOU BOUGHT JUST DIDN'T LAST AND YOU DIDN'T HAVE MONEY TO GET MORE.: NEVER TRUE

## 2024-02-29 SDOH — ECONOMIC STABILITY: INCOME INSECURITY: HOW HARD IS IT FOR YOU TO PAY FOR THE VERY BASICS LIKE FOOD, HOUSING, MEDICAL CARE, AND HEATING?: NOT HARD AT ALL

## 2024-02-29 SDOH — ECONOMIC STABILITY: FOOD INSECURITY: WITHIN THE PAST 12 MONTHS, YOU WORRIED THAT YOUR FOOD WOULD RUN OUT BEFORE YOU GOT MONEY TO BUY MORE.: NEVER TRUE

## 2024-02-29 ASSESSMENT — PATIENT HEALTH QUESTIONNAIRE - PHQ9
SUM OF ALL RESPONSES TO PHQ QUESTIONS 1-9: 0
10. IF YOU CHECKED OFF ANY PROBLEMS, HOW DIFFICULT HAVE THESE PROBLEMS MADE IT FOR YOU TO DO YOUR WORK, TAKE CARE OF THINGS AT HOME, OR GET ALONG WITH OTHER PEOPLE: 0
9. THOUGHTS THAT YOU WOULD BE BETTER OFF DEAD, OR OF HURTING YOURSELF: 0
1. LITTLE INTEREST OR PLEASURE IN DOING THINGS: 0
7. TROUBLE CONCENTRATING ON THINGS, SUCH AS READING THE NEWSPAPER OR WATCHING TELEVISION: 0
SUM OF ALL RESPONSES TO PHQ QUESTIONS 1-9: 0
SUM OF ALL RESPONSES TO PHQ9 QUESTIONS 1 & 2: 0
6. FEELING BAD ABOUT YOURSELF - OR THAT YOU ARE A FAILURE OR HAVE LET YOURSELF OR YOUR FAMILY DOWN: 0
5. POOR APPETITE OR OVEREATING: 0
SUM OF ALL RESPONSES TO PHQ QUESTIONS 1-9: 0
4. FEELING TIRED OR HAVING LITTLE ENERGY: 0
2. FEELING DOWN, DEPRESSED OR HOPELESS: 0
3. TROUBLE FALLING OR STAYING ASLEEP: 0
8. MOVING OR SPEAKING SO SLOWLY THAT OTHER PEOPLE COULD HAVE NOTICED. OR THE OPPOSITE, BEING SO FIGETY OR RESTLESS THAT YOU HAVE BEEN MOVING AROUND A LOT MORE THAN USUAL: 0
SUM OF ALL RESPONSES TO PHQ QUESTIONS 1-9: 0

## 2024-02-29 ASSESSMENT — LIFESTYLE VARIABLES
HOW MANY STANDARD DRINKS CONTAINING ALCOHOL DO YOU HAVE ON A TYPICAL DAY: PATIENT DOES NOT DRINK
HOW OFTEN DO YOU HAVE A DRINK CONTAINING ALCOHOL: NEVER

## 2024-02-29 NOTE — PATIENT INSTRUCTIONS
are trying to lose weight, and ask them to help. They can help by participating in exercise and having meals with you, even if they may be eating something different.  Find what works best for you. If you do not have time or do not like to cook, a program that offers meal replacement bars or shakes may be better for you. Or if you like to prepare meals, finding a plan that includes daily menus and recipes may be best.  Ask your doctor about other health professionals who can help you achieve your weight loss goals.  A dietitian can help you make healthy changes in your diet.  An exercise specialist or  can help you develop a safe and effective exercise program.  A counselor or psychiatrist can help you cope with issues such as depression, anxiety, or family problems that can make it hard to focus on weight loss.  Consider joining a support group for people who are trying to lose weight. Your doctor can suggest groups in your area.  Where can you learn more?  Go to https://www.[employer]ig[employer]/patientEd and enter U357 to learn more about \"Starting a Weight Loss Plan: Care Instructions.\"  Current as of: September 20, 2023               Content Version: 13.9  © 2429-7855 [employer].   Care instructions adapted under license by [employer]. If you have questions about a medical condition or this instruction, always ask your healthcare professional. [employer] disclaims any warranty or liability for your use of this information.           A Healthy Heart: Care Instructions  Your Care Instructions     Coronary artery disease, also called heart disease, occurs when a substance called plaque builds up in the vessels that supply oxygen-rich blood to your heart muscle. This can narrow the blood vessels and reduce blood flow. A heart attack happens when blood flow is completely blocked. A high-fat diet, smoking, and other factors increase the risk of heart disease.  Your doctor has

## 2024-02-29 NOTE — PROGRESS NOTES
[facility] CARDIOLOGY, [address], [address]  [place] [address]  PHONE: [phone]  1950    SUBJECTIVE:   Lata Chiu is a 73 y.o. female seen for a follow up visit regarding the following:     Chief Complaint   Patient presents with    Atrial Fibrillation         HPI:    Lata Chiu is a very pleasant 73 y.o. female with a past medical and cardiac history significant for atrial fibrillation s/p afib ablation and recurrent AF s/p failed DCCV and has been maintained on tikosyn. Pt is doing well, no chest pain, significant palpitations, presyncope or syncope. Exercising and no exertional symptoms. No complaints today.     Cardiac PMH: (Old records have been reviewed and summarized below)    Reviewed office note Dr. Bowers 2/29/24     Past Medical History, Past Surgical History, Family history, Social History, and Medications were all reviewed with the patient today and updated as necessary.     Current Outpatient Medications   Medication Sig Dispense Refill    dofetilide (TIKOSYN) 500 MCG capsule TAKE 1 CAPSULE BY MOUTH EVERY 12 HOURS 60 capsule 11    Hyoscyamine Sulfate SL (LEVSIN/SL) 0.125 MG SUBL Place 1 tablet under the tongue 4 times daily as needed (adbomial cramping) 30 each 0    fluticasone-salmeterol (ADVAIR DISKUS) 250-50 MCG/ACT AEPB diskus inhaler Inhale 1 puff into the lungs 2 times daily 1 each 11    celecoxib (CELEBREX) 200 MG capsule Take 1 capsule by mouth daily as needed for Pain (back pain) 30 capsule 3    lidocaine (LIDODERM) 5 % 1 patch remove after 12 hours Externally Once a day for 90 days      furosemide (LASIX) 20 MG tablet Take 1 tablet by mouth every other day 45 tablet 4    amLODIPine (NORVASC) 5 MG tablet Take 1 tablet by mouth daily 90 tablet 4    mometasone (NASONEX) 50 MCG/ACT nasal spray 2 sprays by Nasal route daily 17 g 4    montelukast (SINGULAIR) 10 MG tablet Take 1 tablet by mouth nightly 90 tablet 4    buPROPion (WELLBUTRIN XL) 150 MG

## 2024-02-29 NOTE — PROGRESS NOTES
Medicare Annual Wellness Visit    Lata Chiu is here for Medicare AWV    Assessment & Plan   Medicare annual wellness visit, subsequent  Encounter for screening mammogram for breast cancer  -     MELISSA LAUREN DIGITAL SCREEN BILATERAL; Future  Screening for depression  Screening for alcoholism  Encounter for immunization  Colon cancer screening  Screening for viral disease  Atrial fibrillation, persistent (HCC)  Comments:  followed by cardiollogy cont care  Mixed hyperlipidemia  -     CBC with Auto Differential; Future  -     Comprehensive Metabolic Panel; Future  -     Lipid Panel; Future  Hypothyroidism following radioiodine therapy  Comments:  followed by endocrinolgy  Spinal stenosis of lumbar region with neurogenic claudication  Comments:  has LINDSAY yesterday with  cont care with them  Need for influenza vaccination  Essential hypertension  Comments:  pt will check withher cuff at home and bring the log with her    Recommendations for Preventive Services Due: see orders and patient instructions/AVS.  Recommended screening schedule for the next 5-10 years is provided to the patient in written form: see Patient Instructions/AVS.     No follow-ups on file.     Subjective       Patient's complete Health Risk Assessment and screening values have been reviewed and are found in Flowsheets. The following problems were reviewed today and where indicated follow up appointments were made and/or referrals ordered.    Positive Risk Factor Screenings with Interventions:    Fall Risk:  Do you feel unsteady or are you worried about falling? : (!) yes  2 or more falls in past year?: no  Fall with injury in past year?: no     Interventions:    Reviewed medications, home hazards, visual acuity, and co-morbidities that can increase risk for falls             Activity, Diet, and Weight:  On average, how many days per week do you engage in moderate to strenuous exercise (like a brisk walk)?: 4 days  On average, how

## 2024-03-07 ENCOUNTER — OFFICE VISIT (OUTPATIENT)
Dept: INTERNAL MEDICINE CLINIC | Facility: CLINIC | Age: 74
End: 2024-03-07
Payer: MEDICARE

## 2024-03-07 VITALS
HEIGHT: 63 IN | DIASTOLIC BLOOD PRESSURE: 88 MMHG | SYSTOLIC BLOOD PRESSURE: 148 MMHG | BODY MASS INDEX: 46.42 KG/M2 | WEIGHT: 262 LBS

## 2024-03-07 DIAGNOSIS — I10 ESSENTIAL HYPERTENSION: ICD-10-CM

## 2024-03-07 DIAGNOSIS — I48.19 ATRIAL FIBRILLATION, PERSISTENT (HCC): ICD-10-CM

## 2024-03-07 DIAGNOSIS — M48.062 SPINAL STENOSIS OF LUMBAR REGION WITH NEUROGENIC CLAUDICATION: ICD-10-CM

## 2024-03-07 DIAGNOSIS — E78.2 MIXED HYPERLIPIDEMIA: Primary | ICD-10-CM

## 2024-03-07 DIAGNOSIS — E89.0 HYPOTHYROIDISM FOLLOWING RADIOIODINE THERAPY: ICD-10-CM

## 2024-03-07 DIAGNOSIS — Z86.79: ICD-10-CM

## 2024-03-07 PROCEDURE — 1036F TOBACCO NON-USER: CPT | Performed by: PHYSICIAN ASSISTANT

## 2024-03-07 PROCEDURE — G8399 PT W/DXA RESULTS DOCUMENT: HCPCS | Performed by: PHYSICIAN ASSISTANT

## 2024-03-07 PROCEDURE — 1090F PRES/ABSN URINE INCON ASSESS: CPT | Performed by: PHYSICIAN ASSISTANT

## 2024-03-07 PROCEDURE — G8427 DOCREV CUR MEDS BY ELIG CLIN: HCPCS | Performed by: PHYSICIAN ASSISTANT

## 2024-03-07 PROCEDURE — G8417 CALC BMI ABV UP PARAM F/U: HCPCS | Performed by: PHYSICIAN ASSISTANT

## 2024-03-07 PROCEDURE — 3079F DIAST BP 80-89 MM HG: CPT | Performed by: PHYSICIAN ASSISTANT

## 2024-03-07 PROCEDURE — 99215 OFFICE O/P EST HI 40 MIN: CPT | Performed by: PHYSICIAN ASSISTANT

## 2024-03-07 PROCEDURE — 3077F SYST BP >= 140 MM HG: CPT | Performed by: PHYSICIAN ASSISTANT

## 2024-03-07 PROCEDURE — G8484 FLU IMMUNIZE NO ADMIN: HCPCS | Performed by: PHYSICIAN ASSISTANT

## 2024-03-07 PROCEDURE — 3017F COLORECTAL CA SCREEN DOC REV: CPT | Performed by: PHYSICIAN ASSISTANT

## 2024-03-07 PROCEDURE — 1123F ACP DISCUSS/DSCN MKR DOCD: CPT | Performed by: PHYSICIAN ASSISTANT

## 2024-03-07 ASSESSMENT — ENCOUNTER SYMPTOMS
SHORTNESS OF BREATH: 0
CHEST TIGHTNESS: 0
COUGH: 0
VOMITING: 0
NAUSEA: 0
COLOR CHANGE: 0
DIARRHEA: 0
WHEEZING: 0
SORE THROAT: 0
EYE PAIN: 0
VOICE CHANGE: 0
CONSTIPATION: 0
ABDOMINAL PAIN: 0

## 2024-03-07 ASSESSMENT — PATIENT HEALTH QUESTIONNAIRE - PHQ9
SUM OF ALL RESPONSES TO PHQ QUESTIONS 1-9: 0
2. FEELING DOWN, DEPRESSED OR HOPELESS: 0
SUM OF ALL RESPONSES TO PHQ QUESTIONS 1-9: 0
SUM OF ALL RESPONSES TO PHQ QUESTIONS 1-9: 0
SUM OF ALL RESPONSES TO PHQ9 QUESTIONS 1 & 2: 0
SUM OF ALL RESPONSES TO PHQ QUESTIONS 1-9: 0
1. LITTLE INTEREST OR PLEASURE IN DOING THINGS: 0

## 2024-03-07 NOTE — PROGRESS NOTES
7.1 6.3 - 8.2 g/dL    Albumin 3.9 3.2 - 4.6 g/dL    Globulin 3.2 2.8 - 4.5 g/dL    Albumin/Globulin Ratio 1.2 0.4 - 1.6     CBC with Auto Differential    Collection Time: 02/29/24  9:13 AM   Result Value Ref Range    WBC 7.4 4.3 - 11.1 K/uL    RBC 4.48 4.05 - 5.2 M/uL    Hemoglobin 13.4 11.7 - 15.4 g/dL    Hematocrit 42.6 35.8 - 46.3 %    MCV 95.1 82 - 102 FL    MCH 29.9 26.1 - 32.9 PG    MCHC 31.5 31.4 - 35.0 g/dL    RDW 13.7 11.9 - 14.6 %    Platelets 254 150 - 450 K/uL    MPV 11.2 9.4 - 12.3 FL    nRBC 0.00 0.0 - 0.2 K/uL    Differential Type AUTOMATED      Neutrophils % 78 43 - 78 %    Lymphocytes % 15 13 - 44 %    Monocytes % 6 4.0 - 12.0 %    Eosinophils % 0 (L) 0.5 - 7.8 %    Basophils % 0 0.0 - 2.0 %    Immature Granulocytes 1 0.0 - 5.0 %    Neutrophils Absolute 5.8 1.7 - 8.2 K/UL    Lymphocytes Absolute 1.1 0.5 - 4.6 K/UL    Monocytes Absolute 0.4 0.1 - 1.3 K/UL    Eosinophils Absolute 0.0 0.0 - 0.8 K/UL    Basophils Absolute 0.0 0.0 - 0.2 K/UL    Absolute Immature Granulocyte 0.1 0.0 - 0.5 K/UL         ASSESSMENT and PLAN  Lata was seen today for cholesterol problem.    Diagnoses and all orders for this visit:    Mixed hyperlipidemia  Comments:  discssed ldl up to 123, suggested diet and exercise and may need to consider meds, also followed by cardio    Essential hypertension  Comments:  pt will check BP at home nad bring her BP lig and cuff at next OV    Atrial fibrillation, persistent (HCC)  Comments:  ollowed by cardiollogy cont care    Hypothyroidism following radioiodine therapy  Comments:  followed by endocrinolgy    Spinal stenosis of lumbar region with neurogenic claudication  Comments:  has LINDSAY yesterday with  cont care with them    H/O varicose veins of lower extremity  Comments:  plans to see  for eval in right leg, had leg striped in the past, but  may consider again    Discussed BMI and healthy weight and diet, weight bearing exercise, smoking avoidance, sun protection

## 2024-03-16 ENCOUNTER — TRANSCRIBE ORDERS (OUTPATIENT)
Dept: SCHEDULING | Age: 74
End: 2024-03-16

## 2024-03-16 DIAGNOSIS — Z12.31 VISIT FOR SCREENING MAMMOGRAM: Primary | ICD-10-CM

## 2024-03-21 ENCOUNTER — OFFICE VISIT (OUTPATIENT)
Dept: INTERNAL MEDICINE CLINIC | Facility: CLINIC | Age: 74
End: 2024-03-21

## 2024-03-21 VITALS
SYSTOLIC BLOOD PRESSURE: 136 MMHG | OXYGEN SATURATION: 97 % | HEIGHT: 63 IN | WEIGHT: 265.2 LBS | DIASTOLIC BLOOD PRESSURE: 88 MMHG | HEART RATE: 83 BPM | BODY MASS INDEX: 46.99 KG/M2

## 2024-03-21 DIAGNOSIS — I10 ESSENTIAL (PRIMARY) HYPERTENSION: ICD-10-CM

## 2024-03-21 DIAGNOSIS — F41.9 ANXIETY: ICD-10-CM

## 2024-03-21 DIAGNOSIS — I10 ESSENTIAL HYPERTENSION: Primary | ICD-10-CM

## 2024-03-21 RX ORDER — AMLODIPINE BESYLATE 5 MG/1
10 TABLET ORAL DAILY
Qty: 90 TABLET | Refills: 4
Start: 2024-03-21

## 2024-03-21 RX ORDER — LOSARTAN POTASSIUM 100 MG/1
100 TABLET ORAL DAILY
Qty: 90 TABLET | Refills: 3 | Status: SHIPPED | OUTPATIENT
Start: 2024-03-21

## 2024-03-21 ASSESSMENT — PATIENT HEALTH QUESTIONNAIRE - PHQ9
6. FEELING BAD ABOUT YOURSELF - OR THAT YOU ARE A FAILURE OR HAVE LET YOURSELF OR YOUR FAMILY DOWN: NOT AT ALL
SUM OF ALL RESPONSES TO PHQ QUESTIONS 1-9: 1
SUM OF ALL RESPONSES TO PHQ9 QUESTIONS 1 & 2: 0
SUM OF ALL RESPONSES TO PHQ QUESTIONS 1-9: 1
9. THOUGHTS THAT YOU WOULD BE BETTER OFF DEAD, OR OF HURTING YOURSELF: NOT AT ALL
7. TROUBLE CONCENTRATING ON THINGS, SUCH AS READING THE NEWSPAPER OR WATCHING TELEVISION: NOT AT ALL
1. LITTLE INTEREST OR PLEASURE IN DOING THINGS: NOT AT ALL
SUM OF ALL RESPONSES TO PHQ QUESTIONS 1-9: 1
SUM OF ALL RESPONSES TO PHQ QUESTIONS 1-9: 1
3. TROUBLE FALLING OR STAYING ASLEEP: SEVERAL DAYS
10. IF YOU CHECKED OFF ANY PROBLEMS, HOW DIFFICULT HAVE THESE PROBLEMS MADE IT FOR YOU TO DO YOUR WORK, TAKE CARE OF THINGS AT HOME, OR GET ALONG WITH OTHER PEOPLE: NOT DIFFICULT AT ALL
8. MOVING OR SPEAKING SO SLOWLY THAT OTHER PEOPLE COULD HAVE NOTICED. OR THE OPPOSITE, BEING SO FIGETY OR RESTLESS THAT YOU HAVE BEEN MOVING AROUND A LOT MORE THAN USUAL: NOT AT ALL
2. FEELING DOWN, DEPRESSED OR HOPELESS: NOT AT ALL
4. FEELING TIRED OR HAVING LITTLE ENERGY: NOT AT ALL

## 2024-03-21 NOTE — TELEPHONE ENCOUNTER
Requested Prescriptions     Pending Prescriptions Disp Refills    losartan (COZAAR) 100 MG tablet [Pharmacy Med Name: LOSARTAN POTASSIUM 100 MG T 100 Tablet] 90 tablet 3     Sig: TAKE 1 TABLET BY MOUTH DAILY

## 2024-04-18 ENCOUNTER — OFFICE VISIT (OUTPATIENT)
Dept: INTERNAL MEDICINE CLINIC | Facility: CLINIC | Age: 74
End: 2024-04-18

## 2024-04-18 VITALS
BODY MASS INDEX: 46.95 KG/M2 | HEIGHT: 63 IN | DIASTOLIC BLOOD PRESSURE: 86 MMHG | SYSTOLIC BLOOD PRESSURE: 134 MMHG | WEIGHT: 265 LBS

## 2024-04-18 DIAGNOSIS — E89.0 HYPOTHYROIDISM FOLLOWING RADIOIODINE THERAPY: ICD-10-CM

## 2024-04-18 DIAGNOSIS — Z86.79: ICD-10-CM

## 2024-04-18 DIAGNOSIS — M79.671 BILATERAL FOOT PAIN: ICD-10-CM

## 2024-04-18 DIAGNOSIS — M79.672 BILATERAL FOOT PAIN: ICD-10-CM

## 2024-04-18 DIAGNOSIS — I48.19 ATRIAL FIBRILLATION, PERSISTENT (HCC): ICD-10-CM

## 2024-04-18 DIAGNOSIS — I10 ESSENTIAL HYPERTENSION: Primary | ICD-10-CM

## 2024-04-18 DIAGNOSIS — F41.9 ANXIETY: ICD-10-CM

## 2024-04-18 DIAGNOSIS — M48.062 SPINAL STENOSIS OF LUMBAR REGION WITH NEUROGENIC CLAUDICATION: ICD-10-CM

## 2024-04-18 RX ORDER — AMLODIPINE BESYLATE 10 MG/1
10 TABLET ORAL DAILY
Qty: 90 TABLET | Refills: 1 | Status: SHIPPED | OUTPATIENT
Start: 2024-04-18

## 2024-04-18 RX ORDER — BUSPIRONE HYDROCHLORIDE 7.5 MG/1
7.5 TABLET ORAL 2 TIMES DAILY
Qty: 60 TABLET | Refills: 3 | Status: SHIPPED | OUTPATIENT
Start: 2024-04-18 | End: 2024-08-16

## 2024-04-18 ASSESSMENT — ENCOUNTER SYMPTOMS
VOMITING: 0
COUGH: 0
CONSTIPATION: 0
ABDOMINAL PAIN: 0
WHEEZING: 0
VOICE CHANGE: 0
COLOR CHANGE: 0
SORE THROAT: 0
SHORTNESS OF BREATH: 0
EYE PAIN: 0
NAUSEA: 0
CHEST TIGHTNESS: 0
DIARRHEA: 0

## 2024-04-18 ASSESSMENT — PATIENT HEALTH QUESTIONNAIRE - PHQ9
1. LITTLE INTEREST OR PLEASURE IN DOING THINGS: NOT AT ALL
SUM OF ALL RESPONSES TO PHQ QUESTIONS 1-9: 0
SUM OF ALL RESPONSES TO PHQ QUESTIONS 1-9: 0
SUM OF ALL RESPONSES TO PHQ9 QUESTIONS 1 & 2: 0
SUM OF ALL RESPONSES TO PHQ QUESTIONS 1-9: 0
SUM OF ALL RESPONSES TO PHQ QUESTIONS 1-9: 0
2. FEELING DOWN, DEPRESSED OR HOPELESS: NOT AT ALL

## 2024-04-18 NOTE — PROGRESS NOTES
PROGRESS NOTE    Chief Complaint   Patient presents with    Hypertension     1 month f/u        HPI  Hypertension  This is a chronic problem. The problem has been gradually improving since onset. The problem is resistant. Pertinent negatives include no chest pain, headaches, neck pain, palpitations or shortness of breath. The current treatment provides mild improvement. There are no compliance problems.        Past Medical History, Past Surgical History, Family history, Social History, and Medications were all reviewed with the patient today and updated as necessary.       Current Outpatient Medications   Medication Sig Dispense Refill    busPIRone (BUSPAR) 7.5 MG tablet Take 1 tablet by mouth 2 times daily 60 tablet 3    amLODIPine (NORVASC) 10 MG tablet Take 1 tablet by mouth daily 90 tablet 1    losartan (COZAAR) 100 MG tablet TAKE 1 TABLET BY MOUTH DAILY 90 tablet 3    dofetilide (TIKOSYN) 500 MCG capsule TAKE 1 CAPSULE BY MOUTH EVERY 12 HOURS 60 capsule 11    Hyoscyamine Sulfate SL (LEVSIN/SL) 0.125 MG SUBL Place 1 tablet under the tongue 4 times daily as needed (adbomial cramping) 30 each 0    fluticasone-salmeterol (ADVAIR DISKUS) 250-50 MCG/ACT AEPB diskus inhaler Inhale 1 puff into the lungs 2 times daily 1 each 11    celecoxib (CELEBREX) 200 MG capsule Take 1 capsule by mouth daily as needed for Pain (back pain) 30 capsule 3    lidocaine (LIDODERM) 5 % 1 patch remove after 12 hours Externally Once a day for 90 days      furosemide (LASIX) 20 MG tablet Take 1 tablet by mouth every other day 45 tablet 4    mometasone (NASONEX) 50 MCG/ACT nasal spray 2 sprays by Nasal route daily 17 g 4    montelukast (SINGULAIR) 10 MG tablet Take 1 tablet by mouth nightly 90 tablet 4    buPROPion (WELLBUTRIN XL) 150 MG extended release tablet Take 1 tablet by mouth every morning 90 tablet 4    ELIQUIS 5 MG TABS tablet TAKE 1 TABLET BY MOUTH TWICE A  tablet 3    CPAP Machine MISC by Does not apply route      SYNTHROID

## 2024-04-25 ENCOUNTER — PATIENT MESSAGE (OUTPATIENT)
Dept: ENDOCRINOLOGY | Age: 74
End: 2024-04-25

## 2024-04-25 DIAGNOSIS — E89.0 HYPOTHYROIDISM FOLLOWING RADIOIODINE THERAPY: ICD-10-CM

## 2024-04-25 RX ORDER — LEVOTHYROXINE SODIUM 125 MCG
125 TABLET ORAL DAILY
Qty: 90 TABLET | Refills: 0 | Status: SHIPPED | OUTPATIENT
Start: 2024-04-25

## 2024-04-25 NOTE — TELEPHONE ENCOUNTER
From: Lata Chiu  To: Dr. Dewayne Simpson  Sent: 4/25/2024 12:46 PM EDT  Subject: TSH checked again? Need new prescription     Do I need to get bloodwork done again before my appointment on Tuesday since I had it done recently?  I need a new prescription for Synthroid sent to [facility].     Thank you,   Lata Chiu  [phone]

## 2024-04-26 DIAGNOSIS — E89.0 HYPOTHYROIDISM FOLLOWING RADIOIODINE THERAPY: ICD-10-CM

## 2024-04-26 LAB — TSH W FREE THYROID IF ABNORMAL: 0.68 UIU/ML (ref 0.27–4.2)

## 2024-04-30 ENCOUNTER — OFFICE VISIT (OUTPATIENT)
Dept: ENDOCRINOLOGY | Age: 74
End: 2024-04-30
Payer: MEDICARE

## 2024-04-30 VITALS
WEIGHT: 266 LBS | BODY MASS INDEX: 47.12 KG/M2 | DIASTOLIC BLOOD PRESSURE: 86 MMHG | SYSTOLIC BLOOD PRESSURE: 132 MMHG | OXYGEN SATURATION: 95 % | HEART RATE: 79 BPM

## 2024-04-30 DIAGNOSIS — E89.0 HYPOTHYROIDISM FOLLOWING RADIOIODINE THERAPY: Primary | ICD-10-CM

## 2024-04-30 PROCEDURE — 99213 OFFICE O/P EST LOW 20 MIN: CPT | Performed by: INTERNAL MEDICINE

## 2024-04-30 PROCEDURE — 1036F TOBACCO NON-USER: CPT | Performed by: INTERNAL MEDICINE

## 2024-04-30 PROCEDURE — G8427 DOCREV CUR MEDS BY ELIG CLIN: HCPCS | Performed by: INTERNAL MEDICINE

## 2024-04-30 PROCEDURE — G8399 PT W/DXA RESULTS DOCUMENT: HCPCS | Performed by: INTERNAL MEDICINE

## 2024-04-30 PROCEDURE — 1090F PRES/ABSN URINE INCON ASSESS: CPT | Performed by: INTERNAL MEDICINE

## 2024-04-30 PROCEDURE — 3079F DIAST BP 80-89 MM HG: CPT | Performed by: INTERNAL MEDICINE

## 2024-04-30 PROCEDURE — 3075F SYST BP GE 130 - 139MM HG: CPT | Performed by: INTERNAL MEDICINE

## 2024-04-30 PROCEDURE — 3017F COLORECTAL CA SCREEN DOC REV: CPT | Performed by: INTERNAL MEDICINE

## 2024-04-30 PROCEDURE — 1123F ACP DISCUSS/DSCN MKR DOCD: CPT | Performed by: INTERNAL MEDICINE

## 2024-04-30 PROCEDURE — G8417 CALC BMI ABV UP PARAM F/U: HCPCS | Performed by: INTERNAL MEDICINE

## 2024-04-30 RX ORDER — LEVOTHYROXINE SODIUM 125 MCG
125 TABLET ORAL DAILY
Qty: 90 TABLET | Refills: 3 | Status: SHIPPED | OUTPATIENT
Start: 2024-04-30

## 2024-04-30 ASSESSMENT — ENCOUNTER SYMPTOMS
DIARRHEA: 0
ROS SKIN COMMENTS: DENIES HAIR LOSS, DRY SKIN.
CONSTIPATION: 0

## 2024-04-30 NOTE — PROGRESS NOTES
Dewayne Simpson MD, FACE  [facility]  [address] [place] [address], [facility] [address]  [place] [address]  Phone [phone]  Facsimile [phone]          Lata Chiu is a 73 y.o. female seen 4/30/2024 for follow-up of hypothyroidism           ASSESSMENT AND PLAN:    1. Hypothyroidism following radioiodine therapy  She reports that her thyroid function tests have fluctuated requiring frequent dosage adjustments.  She is currently clinically and biochemically euthyroid on a stable dose of Synthroid.  Follow up in 1 year.      - SYNTHROID 125 MCG tablet; Take 1 tablet by mouth Daily  Dispense: 90 tablet; Refill: 3      Follow-up and Dispositions    Return in about 1 year (around 4/30/2025).         HISTORY OF PRESENT ILLNESS:    THYROID DISEASE     Presentation/Diagnosis: Hyperthyroidism status post radioactive iodine in 1998 (previously followed by Dr. Scherer).     Symptoms: See review of systems.       Treatment: She states that her dose has fluctuated frequently over the years.  Takes name brand in AM correctly.     Imaging:  Thyroid ultrasound 5/25/2017 (Dr. Scherer): Right lobe 0.7 x 0.8 x 1.3 cm, heterogeneous echotexture, no nodules.  Left lobe 0.8 x 0.4 x 0.9 cm, heterogeneous echotexture, no nodules.  Isthmus measures 0.2 cm.  No suspicious cervical lymph nodes.     Labs:  11/23/2016: TSH 12.840.  5/25/2017: TSH 1.15, free T4 1.31.  5/30/2018: TSH 0.039 (on Synthroid 171 mcg daily).  7/19/2018: TSH 0.300.  9/10/2018: TSH 0.723.  12/17/2018: TSH 8.810.  1/21/2019: TSH 4.040.  3/19/2019: TSH 3.860.  6/19/2019: TSH 4.240.  8/13/2019: TSH 0.345, free T4 2.04.  10/8/2019: TSH 0.47.  12/9/2019: TSH 0.20, free T4 1.5, total T3 0.92.  1/10/2020: TSH 0.94.  3/13/2020: TSH 0.62.  5/29/2020: TSH 2.600.  12/1/2020: TSH 0.54.  4/12/2021: TSH 0.74.  10/12/2021: TSH 0.94.  4/26/2022: TSH 1.61.  2/21/2023: TSH 1.79.  2/23/2024: TSH 0.462, free T4 1.3, free T3 2.7.  4/27/2024: TSH

## 2024-05-28 NOTE — PROGRESS NOTES
the dictation software.    Orders Placed This Encounter   Procedures    External Referral to Neurosurgery     Referral Priority:   Routine     Referral Type:   Eval and Treat     Referral Reason:   Specialty Services Required     Referred to Provider:   Seema Hodge MD     Requested Specialty:   Orthopaedic Surgery     Number of Visits Requested:   1      No orders of the defined types were placed in this encounter.

## 2024-05-29 ENCOUNTER — OFFICE VISIT (OUTPATIENT)
Dept: SLEEP MEDICINE | Age: 74
End: 2024-05-29
Payer: MEDICARE

## 2024-05-29 VITALS
DIASTOLIC BLOOD PRESSURE: 78 MMHG | HEIGHT: 63 IN | HEART RATE: 79 BPM | RESPIRATION RATE: 14 BRPM | OXYGEN SATURATION: 97 % | SYSTOLIC BLOOD PRESSURE: 126 MMHG | BODY MASS INDEX: 46.6 KG/M2 | WEIGHT: 263 LBS

## 2024-05-29 DIAGNOSIS — G47.33 OSA (OBSTRUCTIVE SLEEP APNEA): Primary | ICD-10-CM

## 2024-05-29 DIAGNOSIS — M54.50 CHRONIC RIGHT-SIDED LOW BACK PAIN, UNSPECIFIED WHETHER SCIATICA PRESENT: ICD-10-CM

## 2024-05-29 DIAGNOSIS — Z87.39 HISTORY OF SCOLIOSIS: ICD-10-CM

## 2024-05-29 DIAGNOSIS — G89.29 CHRONIC RIGHT-SIDED LOW BACK PAIN, UNSPECIFIED WHETHER SCIATICA PRESENT: ICD-10-CM

## 2024-05-29 PROCEDURE — 3017F COLORECTAL CA SCREEN DOC REV: CPT | Performed by: INTERNAL MEDICINE

## 2024-05-29 PROCEDURE — G8427 DOCREV CUR MEDS BY ELIG CLIN: HCPCS | Performed by: INTERNAL MEDICINE

## 2024-05-29 PROCEDURE — 3078F DIAST BP <80 MM HG: CPT | Performed by: INTERNAL MEDICINE

## 2024-05-29 PROCEDURE — 1036F TOBACCO NON-USER: CPT | Performed by: INTERNAL MEDICINE

## 2024-05-29 PROCEDURE — 3074F SYST BP LT 130 MM HG: CPT | Performed by: INTERNAL MEDICINE

## 2024-05-29 PROCEDURE — G8399 PT W/DXA RESULTS DOCUMENT: HCPCS | Performed by: INTERNAL MEDICINE

## 2024-05-29 PROCEDURE — 1123F ACP DISCUSS/DSCN MKR DOCD: CPT | Performed by: INTERNAL MEDICINE

## 2024-05-29 PROCEDURE — 99214 OFFICE O/P EST MOD 30 MIN: CPT | Performed by: INTERNAL MEDICINE

## 2024-05-29 PROCEDURE — 1090F PRES/ABSN URINE INCON ASSESS: CPT | Performed by: INTERNAL MEDICINE

## 2024-05-29 PROCEDURE — G8417 CALC BMI ABV UP PARAM F/U: HCPCS | Performed by: INTERNAL MEDICINE

## 2024-05-29 ASSESSMENT — SLEEP AND FATIGUE QUESTIONNAIRES
HOW LIKELY ARE YOU TO NOD OFF OR FALL ASLEEP WHEN YOU ARE A PASSENGER IN A CAR FOR AN HOUR WITHOUT A BREAK: WOULD NEVER DOZE
HOW LIKELY ARE YOU TO NOD OFF OR FALL ASLEEP WHILE SITTING QUIETLY AFTER LUNCH WITHOUT ALCOHOL: WOULD NEVER DOZE
HOW LIKELY ARE YOU TO NOD OFF OR FALL ASLEEP IN A CAR, WHILE STOPPED FOR A FEW MINUTES IN TRAFFIC: WOULD NEVER DOZE
HOW LIKELY ARE YOU TO NOD OFF OR FALL ASLEEP WHILE SITTING INACTIVE IN A PUBLIC PLACE: WOULD NEVER DOZE
HOW LIKELY ARE YOU TO NOD OFF OR FALL ASLEEP WHILE SITTING AND TALKING TO SOMEONE: WOULD NEVER DOZE
ESS TOTAL SCORE: 2
HOW LIKELY ARE YOU TO NOD OFF OR FALL ASLEEP WHILE WATCHING TV: SLIGHT CHANCE OF DOZING
HOW LIKELY ARE YOU TO NOD OFF OR FALL ASLEEP WHILE LYING DOWN TO REST IN THE AFTERNOON WHEN CIRCUMSTANCES PERMIT: SLIGHT CHANCE OF DOZING
HOW LIKELY ARE YOU TO NOD OFF OR FALL ASLEEP WHILE SITTING AND READING: WOULD NEVER DOZE

## 2024-06-06 ENCOUNTER — OFFICE VISIT (OUTPATIENT)
Dept: INTERNAL MEDICINE CLINIC | Facility: CLINIC | Age: 74
End: 2024-06-06

## 2024-06-06 VITALS
HEIGHT: 63 IN | BODY MASS INDEX: 47.02 KG/M2 | WEIGHT: 265.4 LBS | DIASTOLIC BLOOD PRESSURE: 78 MMHG | SYSTOLIC BLOOD PRESSURE: 122 MMHG

## 2024-06-06 DIAGNOSIS — J45.20 MILD INTERMITTENT ASTHMA WITHOUT COMPLICATION: Primary | ICD-10-CM

## 2024-06-06 DIAGNOSIS — L30.4 INTERTRIGO: ICD-10-CM

## 2024-06-06 DIAGNOSIS — R05.1 ACUTE COUGH: ICD-10-CM

## 2024-06-06 RX ORDER — BENZONATATE 200 MG/1
200 CAPSULE ORAL 3 TIMES DAILY PRN
Qty: 21 CAPSULE | Refills: 0 | Status: SHIPPED | OUTPATIENT
Start: 2024-06-06 | End: 2024-06-13

## 2024-06-06 RX ORDER — CLOTRIMAZOLE AND BETAMETHASONE DIPROPIONATE 10; .64 MG/G; MG/G
CREAM TOPICAL
Qty: 45 G | Refills: 0 | Status: SHIPPED | OUTPATIENT
Start: 2024-06-06

## 2024-06-06 RX ORDER — FLUCONAZOLE 150 MG/1
150 TABLET ORAL
Qty: 2 TABLET | Refills: 1 | Status: SHIPPED | OUTPATIENT
Start: 2024-06-06 | End: 2024-06-18

## 2024-06-06 ASSESSMENT — ENCOUNTER SYMPTOMS
WHEEZING: 0
COUGH: 1
SHORTNESS OF BREATH: 0

## 2024-06-06 NOTE — PROGRESS NOTES
PROGRESS NOTE      Chief Complaint   Patient presents with    Head Congestion     Along with dry cough.    Rash     In groin area. Started Monday night. Treating with OTC cream, but isn't helping.        HPI    Rash: Intertrigo under abdominal fold. Topical antifungal otc without benefit.     Cough: 2 days.Sinus drainage. No wheezing. No fever, chills. Cough mostly dry. Using Advair but not having to use albuterol.        Past Medical History, Past Surgical History, Family history, Social History, and Medications were all reviewed and updated as necessary.     Current Outpatient Medications   Medication Sig Dispense Refill    fluconazole (DIFLUCAN) 150 MG tablet Take 1 tablet by mouth every 72 hours for 12 days 2 tablet 1    clotrimazole-betamethasone (LOTRISONE) 1-0.05 % cream Apply topically 2 times daily. 45 g 0    benzonatate (TESSALON) 200 MG capsule Take 1 capsule by mouth 3 times daily as needed for Cough 21 capsule 0    SYNTHROID 125 MCG tablet Take 1 tablet by mouth Daily 90 tablet 3    busPIRone (BUSPAR) 7.5 MG tablet Take 1 tablet by mouth 2 times daily 60 tablet 3    amLODIPine (NORVASC) 10 MG tablet Take 1 tablet by mouth daily 90 tablet 1    losartan (COZAAR) 100 MG tablet TAKE 1 TABLET BY MOUTH DAILY 90 tablet 3    dofetilide (TIKOSYN) 500 MCG capsule TAKE 1 CAPSULE BY MOUTH EVERY 12 HOURS 60 capsule 11    Hyoscyamine Sulfate SL (LEVSIN/SL) 0.125 MG SUBL Place 1 tablet under the tongue 4 times daily as needed (adbomial cramping) 30 each 0    fluticasone-salmeterol (ADVAIR DISKUS) 250-50 MCG/ACT AEPB diskus inhaler Inhale 1 puff into the lungs 2 times daily 1 each 11    celecoxib (CELEBREX) 200 MG capsule Take 1 capsule by mouth daily as needed for Pain (back pain) 30 capsule 3    lidocaine (LIDODERM) 5 % 1 patch remove after 12 hours Externally Once a day for 90 days      furosemide (LASIX) 20 MG tablet Take 1 tablet by mouth every other day 45 tablet 4    mometasone (NASONEX) 50 MCG/ACT nasal spray 2

## 2024-06-17 RX ORDER — ALBUTEROL SULFATE 90 UG/1
2 AEROSOL, METERED RESPIRATORY (INHALATION) EVERY 6 HOURS PRN
Qty: 18 G | Refills: 2 | Status: SHIPPED | OUTPATIENT
Start: 2024-06-17

## 2024-07-23 ENCOUNTER — OFFICE VISIT (OUTPATIENT)
Dept: INTERNAL MEDICINE CLINIC | Facility: CLINIC | Age: 74
End: 2024-07-23
Payer: MEDICARE

## 2024-07-23 VITALS
BODY MASS INDEX: 47.31 KG/M2 | HEIGHT: 63 IN | WEIGHT: 267 LBS | HEART RATE: 90 BPM | OXYGEN SATURATION: 92 % | DIASTOLIC BLOOD PRESSURE: 89 MMHG | SYSTOLIC BLOOD PRESSURE: 139 MMHG

## 2024-07-23 DIAGNOSIS — E89.0 HYPOTHYROIDISM FOLLOWING RADIOIODINE THERAPY: ICD-10-CM

## 2024-07-23 DIAGNOSIS — N39.0 URINARY TRACT INFECTION WITHOUT HEMATURIA, SITE UNSPECIFIED: ICD-10-CM

## 2024-07-23 DIAGNOSIS — M51.36 DDD (DEGENERATIVE DISC DISEASE), LUMBAR: ICD-10-CM

## 2024-07-23 DIAGNOSIS — J30.9 ALLERGIC RHINITIS, UNSPECIFIED SEASONALITY, UNSPECIFIED TRIGGER: ICD-10-CM

## 2024-07-23 DIAGNOSIS — I10 ESSENTIAL (PRIMARY) HYPERTENSION: ICD-10-CM

## 2024-07-23 DIAGNOSIS — I10 ESSENTIAL HYPERTENSION: Primary | ICD-10-CM

## 2024-07-23 DIAGNOSIS — F32.5 MAJOR DEPRESSIVE DISORDER, SINGLE EPISODE, IN FULL REMISSION (HCC): ICD-10-CM

## 2024-07-23 DIAGNOSIS — E78.2 MIXED HYPERLIPIDEMIA: ICD-10-CM

## 2024-07-23 DIAGNOSIS — R73.01 IMPAIRED FASTING BLOOD SUGAR: ICD-10-CM

## 2024-07-23 DIAGNOSIS — F41.9 ANXIETY: ICD-10-CM

## 2024-07-23 PROCEDURE — 1036F TOBACCO NON-USER: CPT | Performed by: PHYSICIAN ASSISTANT

## 2024-07-23 PROCEDURE — G8399 PT W/DXA RESULTS DOCUMENT: HCPCS | Performed by: PHYSICIAN ASSISTANT

## 2024-07-23 PROCEDURE — 3075F SYST BP GE 130 - 139MM HG: CPT | Performed by: PHYSICIAN ASSISTANT

## 2024-07-23 PROCEDURE — G8417 CALC BMI ABV UP PARAM F/U: HCPCS | Performed by: PHYSICIAN ASSISTANT

## 2024-07-23 PROCEDURE — 1123F ACP DISCUSS/DSCN MKR DOCD: CPT | Performed by: PHYSICIAN ASSISTANT

## 2024-07-23 PROCEDURE — 99214 OFFICE O/P EST MOD 30 MIN: CPT | Performed by: PHYSICIAN ASSISTANT

## 2024-07-23 PROCEDURE — 3079F DIAST BP 80-89 MM HG: CPT | Performed by: PHYSICIAN ASSISTANT

## 2024-07-23 PROCEDURE — G8427 DOCREV CUR MEDS BY ELIG CLIN: HCPCS | Performed by: PHYSICIAN ASSISTANT

## 2024-07-23 PROCEDURE — 3017F COLORECTAL CA SCREEN DOC REV: CPT | Performed by: PHYSICIAN ASSISTANT

## 2024-07-23 PROCEDURE — 1090F PRES/ABSN URINE INCON ASSESS: CPT | Performed by: PHYSICIAN ASSISTANT

## 2024-07-23 RX ORDER — MOMETASONE FUROATE MONOHYDRATE 50 UG/1
2 SPRAY, METERED NASAL DAILY
Qty: 17 G | Refills: 4 | Status: SHIPPED | OUTPATIENT
Start: 2024-07-23

## 2024-07-23 RX ORDER — AMOXICILLIN AND CLAVULANATE POTASSIUM 875; 125 MG/1; MG/1
1 TABLET, FILM COATED ORAL 2 TIMES DAILY
COMMUNITY

## 2024-07-23 RX ORDER — CELECOXIB 200 MG/1
200 CAPSULE ORAL DAILY PRN
Qty: 30 CAPSULE | Refills: 3 | Status: SHIPPED | OUTPATIENT
Start: 2024-07-23

## 2024-07-23 RX ORDER — FUROSEMIDE 20 MG/1
20 TABLET ORAL EVERY OTHER DAY
Qty: 45 TABLET | Refills: 4 | Status: SHIPPED | OUTPATIENT
Start: 2024-07-23

## 2024-07-23 RX ORDER — BUPROPION HYDROCHLORIDE 150 MG/1
150 TABLET ORAL EVERY MORNING
Qty: 90 TABLET | Refills: 4 | Status: SHIPPED | OUTPATIENT
Start: 2024-07-23

## 2024-07-23 RX ORDER — AMLODIPINE BESYLATE 10 MG/1
10 TABLET ORAL DAILY
Qty: 90 TABLET | Refills: 4 | Status: SHIPPED | OUTPATIENT
Start: 2024-07-23

## 2024-07-23 RX ORDER — MONTELUKAST SODIUM 10 MG/1
10 TABLET ORAL NIGHTLY
Qty: 90 TABLET | Refills: 4 | Status: SHIPPED | OUTPATIENT
Start: 2024-07-23

## 2024-07-23 ASSESSMENT — ENCOUNTER SYMPTOMS
DIARRHEA: 0
COUGH: 0
SORE THROAT: 0
COLOR CHANGE: 0
EYE PAIN: 0
VOICE CHANGE: 0
VOMITING: 0
NAUSEA: 0
CHEST TIGHTNESS: 0
CONSTIPATION: 0
WHEEZING: 0
SHORTNESS OF BREATH: 0
ABDOMINAL PAIN: 0

## 2024-07-23 NOTE — PROGRESS NOTES
PROGRESS NOTE    Chief Complaint   Patient presents with    Hypertension    Cholesterol Problem    OTHER     Seen by Urgent Care for UTI 7/8/24.         HPI  Hypertension  This is a chronic problem. The current episode started more than 1 year ago. The problem has been gradually improving since onset. Pertinent negatives include no chest pain, headaches, neck pain, palpitations or shortness of breath.       Past Medical History, Past Surgical History, Family history, Social History, and Medications were all reviewed with the patient today and updated as necessary.       Current Outpatient Medications   Medication Sig Dispense Refill    amoxicillin-clavulanate (AUGMENTIN) 875-125 MG per tablet Take 1 tablet by mouth 2 times daily      amLODIPine (NORVASC) 10 MG tablet Take 1 tablet by mouth daily 90 tablet 4    buPROPion (WELLBUTRIN XL) 150 MG extended release tablet Take 1 tablet by mouth every morning 90 tablet 4    celecoxib (CELEBREX) 200 MG capsule Take 1 capsule by mouth daily as needed for Pain (back pain) 30 capsule 3    furosemide (LASIX) 20 MG tablet Take 1 tablet by mouth every other day 45 tablet 4    mometasone (NASONEX) 50 MCG/ACT nasal spray 2 sprays by Nasal route daily 17 g 4    montelukast (SINGULAIR) 10 MG tablet Take 1 tablet by mouth nightly 90 tablet 4    albuterol sulfate HFA (PROVENTIL;VENTOLIN;PROAIR) 108 (90 Base) MCG/ACT inhaler Inhale 2 puffs into the lungs every 6 hours as needed for Shortness of Breath or Wheezing 18 g 2    clotrimazole-betamethasone (LOTRISONE) 1-0.05 % cream Apply topically 2 times daily. 45 g 0    SYNTHROID 125 MCG tablet Take 1 tablet by mouth Daily 90 tablet 3    busPIRone (BUSPAR) 7.5 MG tablet Take 1 tablet by mouth 2 times daily 60 tablet 3    losartan (COZAAR) 100 MG tablet TAKE 1 TABLET BY MOUTH DAILY 90 tablet 3    dofetilide (TIKOSYN) 500 MCG capsule TAKE 1 CAPSULE BY MOUTH EVERY 12 HOURS 60 capsule 11    Hyoscyamine Sulfate SL (LEVSIN/SL) 0.125 MG SUBL Place

## 2024-08-14 RX ORDER — APIXABAN 5 MG/1
TABLET, FILM COATED ORAL
Qty: 60 TABLET | Refills: 11 | Status: SHIPPED | OUTPATIENT
Start: 2024-08-14

## 2024-08-14 NOTE — TELEPHONE ENCOUNTER
Requested Prescriptions     Pending Prescriptions Disp Refills    ELIQUIS 5 MG TABS tablet [Pharmacy Med Name: ELIQUIS 5 MG TABLET 5 Tablet] 60 tablet 3     Sig: TAKE 1 TABLET BY MOUTH TWICE A DAY    Verified rx in last OV date 2/29/24. Pharmacy confirmed. Erx as requested

## 2024-08-16 ENCOUNTER — PATIENT MESSAGE (OUTPATIENT)
Age: 74
End: 2024-08-16

## 2024-08-16 ENCOUNTER — TELEPHONE (OUTPATIENT)
Age: 74
End: 2024-08-16

## 2024-08-16 ENCOUNTER — OFFICE VISIT (OUTPATIENT)
Dept: PULMONOLOGY | Age: 74
End: 2024-08-16
Payer: MEDICARE

## 2024-08-16 VITALS
WEIGHT: 276 LBS | BODY MASS INDEX: 48.9 KG/M2 | OXYGEN SATURATION: 96 % | RESPIRATION RATE: 18 BRPM | HEIGHT: 63 IN | TEMPERATURE: 97.5 F | DIASTOLIC BLOOD PRESSURE: 82 MMHG | SYSTOLIC BLOOD PRESSURE: 130 MMHG | HEART RATE: 87 BPM

## 2024-08-16 DIAGNOSIS — G47.33 OBSTRUCTIVE SLEEP APNEA (ADULT) (PEDIATRIC): ICD-10-CM

## 2024-08-16 DIAGNOSIS — R06.02 SHORTNESS OF BREATH: ICD-10-CM

## 2024-08-16 DIAGNOSIS — R60.9 EDEMA, UNSPECIFIED TYPE: ICD-10-CM

## 2024-08-16 DIAGNOSIS — J45.20 MILD INTERMITTENT ASTHMA, UNCOMPLICATED: Primary | ICD-10-CM

## 2024-08-16 DIAGNOSIS — R60.0 BILATERAL LOWER EXTREMITY EDEMA: ICD-10-CM

## 2024-08-16 DIAGNOSIS — I48.91 UNSPECIFIED ATRIAL FIBRILLATION (HCC): Primary | ICD-10-CM

## 2024-08-16 PROCEDURE — 3079F DIAST BP 80-89 MM HG: CPT | Performed by: STUDENT IN AN ORGANIZED HEALTH CARE EDUCATION/TRAINING PROGRAM

## 2024-08-16 PROCEDURE — 99214 OFFICE O/P EST MOD 30 MIN: CPT | Performed by: STUDENT IN AN ORGANIZED HEALTH CARE EDUCATION/TRAINING PROGRAM

## 2024-08-16 PROCEDURE — 3075F SYST BP GE 130 - 139MM HG: CPT | Performed by: STUDENT IN AN ORGANIZED HEALTH CARE EDUCATION/TRAINING PROGRAM

## 2024-08-16 PROCEDURE — G8417 CALC BMI ABV UP PARAM F/U: HCPCS | Performed by: STUDENT IN AN ORGANIZED HEALTH CARE EDUCATION/TRAINING PROGRAM

## 2024-08-16 PROCEDURE — G8399 PT W/DXA RESULTS DOCUMENT: HCPCS | Performed by: STUDENT IN AN ORGANIZED HEALTH CARE EDUCATION/TRAINING PROGRAM

## 2024-08-16 PROCEDURE — 1123F ACP DISCUSS/DSCN MKR DOCD: CPT | Performed by: STUDENT IN AN ORGANIZED HEALTH CARE EDUCATION/TRAINING PROGRAM

## 2024-08-16 PROCEDURE — 3017F COLORECTAL CA SCREEN DOC REV: CPT | Performed by: STUDENT IN AN ORGANIZED HEALTH CARE EDUCATION/TRAINING PROGRAM

## 2024-08-16 PROCEDURE — 1036F TOBACCO NON-USER: CPT | Performed by: STUDENT IN AN ORGANIZED HEALTH CARE EDUCATION/TRAINING PROGRAM

## 2024-08-16 PROCEDURE — G8427 DOCREV CUR MEDS BY ELIG CLIN: HCPCS | Performed by: STUDENT IN AN ORGANIZED HEALTH CARE EDUCATION/TRAINING PROGRAM

## 2024-08-16 PROCEDURE — 1090F PRES/ABSN URINE INCON ASSESS: CPT | Performed by: STUDENT IN AN ORGANIZED HEALTH CARE EDUCATION/TRAINING PROGRAM

## 2024-08-16 RX ORDER — FLUTICASONE PROPIONATE AND SALMETEROL 250; 50 UG/1; UG/1
1 POWDER RESPIRATORY (INHALATION) 2 TIMES DAILY
Qty: 1 EACH | Refills: 11 | Status: SHIPPED | OUTPATIENT
Start: 2024-08-16

## 2024-08-16 RX ORDER — CEFDINIR 300 MG/1
300 CAPSULE ORAL 2 TIMES DAILY
COMMUNITY
Start: 2024-08-06 | End: 2024-08-16

## 2024-08-16 RX ORDER — ALBUTEROL SULFATE 90 UG/1
2 AEROSOL, METERED RESPIRATORY (INHALATION) EVERY 6 HOURS PRN
Qty: 18 G | Refills: 5 | Status: SHIPPED | OUTPATIENT
Start: 2024-08-16

## 2024-08-16 NOTE — PATIENT INSTRUCTIONS
Increase your lasix to 2 tablets (40mg) a day until you see Dr. Reyes in 2 weeks. Weigh yourself tomorrow morning to get a baseline weight then again after the weekend to see if you have lost some pounds in fluid. Monitor your swelling as well.

## 2024-08-16 NOTE — TELEPHONE ENCOUNTER
Gained 10 lbs in 3 weeks with water retention.   Increased swelling, tingling, and pain in feet and legs to knees and increased swelling in abdomen.   Has sometimes felt irregular HR when resting in bed like when she is in a fib.  Increased SOB since viral respiratory infection, a few months ago.  Still very weak and tired with no energy.  Seen in [facility] ER on 8/6/24. [facility] doctor suggested echo.   Has been on 3 antibiotics for UTI in last month. Finishes 3rd antibiotic, today.   At today's pulmonology appointment, was advised to increase lasix to 40 mg qd until she sees Dr. Reyes on 8/30/24.  Today's BP-130/82, HR-87, O2-97%.  Taking amlodipine 10 mg qd, Tikosyn 500 mcg BID, Eliquis 5 mg BID, lasix 40 mg qd, and losartan 100 mg qd.    Patient asks if she should see Dr. Reyes before scheduled 8/30/24 appointment. She asks for echo order.     Advised patient that I will notify Dr. Reyes of above when he returns to office on 8/19/24, and call with his response. Patient verbalized understanding.

## 2024-08-16 NOTE — PROGRESS NOTES
History of MRSA infection on left breast    5/2016 : treated/ resolved    HLD (hyperlipidemia)      Hypertension     controlled with medication     Hypertension     Hypothyroid     controlled with medication     Hypothyroidism due to acquired atrophy of thyroid 4/28/2015    Intertrigo     Irregular heart beat     Mass of colon     Morbid obesity (HCC)     48.7 bmi    Persistent atrial fibrillation (HCC)     s/p ablation (2/2019), Tikosyn loading---EKG dated 3/13/19 shows \"sinus rhythm, rate 70.\" Patient is followed by [facility].    Reactive airway disease with status asthmaticus     hx only    Scoliosis 8/4/2016    Sleep apnea     cpap compliant.    Varicose veins     VSD (ventricular septal defect) 07/22/2016    per echo (2/18/19) \"small PFO in the baseline state.\" Patient is followed by [facility].         Tobacco Use      Smoking status: Never      Smokeless tobacco: Never    Allergies   Allergen Reactions    Latex Other (See Comments)     Redness to site    Ciprofloxacin Other (See Comments)     Reacts with Afib meds    Adhesive Tape Other (See Comments)     redness    Cephalosporins Other (See Comments)    Olanzapine-Fluoxetine Hcl Other (See Comments)     Causes Severe pain     Cefaclor Rash     Current Outpatient Medications   Medication Instructions    Acetaminophen 325 mg, Oral, 3 TIMES DAILY PRN    albuterol sulfate HFA (PROVENTIL;VENTOLIN;PROAIR) 108 (90 Base) MCG/ACT inhaler 2 puffs, Inhalation, EVERY 6 HOURS PRN    amLODIPine (NORVASC) 10 mg, Oral, DAILY    buPROPion (WELLBUTRIN XL) 150 mg, Oral, EVERY MORNING    busPIRone (BUSPAR) 7.5 mg, Oral, 2 TIMES DAILY    cefdinir (OMNICEF) 300 mg, Oral, 2 TIMES DAILY    Cetirizine HCl 10 mg, Oral, DAILY    clotrimazole-betamethasone (LOTRISONE) 1-0.05 % cream Apply topically 2 times daily.    CPAP Machine MISC Does not apply    dofetilide (TIKOSYN) 500 mcg, Oral, EVERY 12 HOURS    ELIQUIS 5 MG TABS tablet TAKE 1 TABLET BY MOUTH TWICE A DAY

## 2024-08-16 NOTE — TELEPHONE ENCOUNTER
Jennifer Kohler MA routed conversation to [facility] Triage3 hours ago (11:11 AM)     Lata Chiu \"Jasmine\"  P [facility] Clinical Staff (supporting Nas Reyes MD)4 hours ago (10:51 AM)       I have been on 3 antibiotics during the last month for a UTI. I am finishing my 3rd antibiotic today.   I have gained 10 pounds in the last 3 weeks for water retention especially in my legs and feet.   I am taking LASIK- 20 mg. My pulmonologist advised me today to start taking 40 mg until I see you on the 8/30/24.   Do I need to schedule an appointment with you before 8/30/24?      Thank you,   Lata Chiu  [phone]  09/09/24

## 2024-08-19 ENCOUNTER — TELEMEDICINE (OUTPATIENT)
Dept: INTERNAL MEDICINE CLINIC | Facility: CLINIC | Age: 74
End: 2024-08-19

## 2024-08-19 DIAGNOSIS — I48.19 ATRIAL FIBRILLATION, PERSISTENT (HCC): ICD-10-CM

## 2024-08-19 DIAGNOSIS — R14.0 BLOATING: ICD-10-CM

## 2024-08-19 DIAGNOSIS — I10 ESSENTIAL HYPERTENSION: ICD-10-CM

## 2024-08-19 DIAGNOSIS — R39.9 UTI SYMPTOMS: Primary | ICD-10-CM

## 2024-08-19 DIAGNOSIS — Z87.440 HISTORY OF RECURRENT UTIS: ICD-10-CM

## 2024-08-19 LAB
BILIRUBIN, URINE, POC: NEGATIVE
BLOOD URINE, POC: NEGATIVE
GLUCOSE URINE, POC: NEGATIVE
KETONES, URINE, POC: NEGATIVE
LEUKOCYTE ESTERASE, URINE, POC: NEGATIVE
NITRITE, URINE, POC: NEGATIVE
PH, URINE, POC: 6.5 (ref 4.6–8)
PROTEIN,URINE, POC: NEGATIVE
SPECIFIC GRAVITY, URINE, POC: 1.01 (ref 1–1.03)
URINALYSIS CLARITY, POC: CLEAR
URINALYSIS COLOR, POC: YELLOW
UROBILINOGEN, POC: NORMAL

## 2024-08-19 ASSESSMENT — PATIENT HEALTH QUESTIONNAIRE - PHQ9
SUM OF ALL RESPONSES TO PHQ QUESTIONS 1-9: 4
2. FEELING DOWN, DEPRESSED OR HOPELESS: NOT AT ALL
7. TROUBLE CONCENTRATING ON THINGS, SUCH AS READING THE NEWSPAPER OR WATCHING TELEVISION: NOT AT ALL
10. IF YOU CHECKED OFF ANY PROBLEMS, HOW DIFFICULT HAVE THESE PROBLEMS MADE IT FOR YOU TO DO YOUR WORK, TAKE CARE OF THINGS AT HOME, OR GET ALONG WITH OTHER PEOPLE: SOMEWHAT DIFFICULT
8. MOVING OR SPEAKING SO SLOWLY THAT OTHER PEOPLE COULD HAVE NOTICED. OR THE OPPOSITE, BEING SO FIGETY OR RESTLESS THAT YOU HAVE BEEN MOVING AROUND A LOT MORE THAN USUAL: NOT AT ALL
9. THOUGHTS THAT YOU WOULD BE BETTER OFF DEAD, OR OF HURTING YOURSELF: NOT AT ALL
4. FEELING TIRED OR HAVING LITTLE ENERGY: NEARLY EVERY DAY
SUM OF ALL RESPONSES TO PHQ9 QUESTIONS 1 & 2: 0
SUM OF ALL RESPONSES TO PHQ QUESTIONS 1-9: 4
SUM OF ALL RESPONSES TO PHQ QUESTIONS 1-9: 4
1. LITTLE INTEREST OR PLEASURE IN DOING THINGS: NOT AT ALL
3. TROUBLE FALLING OR STAYING ASLEEP: SEVERAL DAYS
6. FEELING BAD ABOUT YOURSELF - OR THAT YOU ARE A FAILURE OR HAVE LET YOURSELF OR YOUR FAMILY DOWN: NOT AT ALL
SUM OF ALL RESPONSES TO PHQ QUESTIONS 1-9: 4
5. POOR APPETITE OR OVEREATING: NOT AT ALL

## 2024-08-19 ASSESSMENT — ENCOUNTER SYMPTOMS
COLOR CHANGE: 0
VOICE CHANGE: 0
CONSTIPATION: 0
NAUSEA: 0
VOMITING: 0
ABDOMINAL PAIN: 0
SORE THROAT: 0
CHEST TIGHTNESS: 0
DIARRHEA: 0
WHEEZING: 0
COUGH: 0
SHORTNESS OF BREATH: 0
EYE PAIN: 0

## 2024-08-19 NOTE — TELEPHONE ENCOUNTER
Nas Reyes MD Keener, Lynn F, RN  Caller: Unspecified (3 days ago,  3:16 PM)  OK to order echo, can schedule with Jong this week

## 2024-08-19 NOTE — TELEPHONE ENCOUNTER
Advised patient that Jong is out, unexpectedly, today. Cancelled today's appointment with Jong, and rescheduled appointment with Jong on 8/21/24 at 2:30 pm. Patient verbalized understanding. Routed this triage note to scheduling pool.

## 2024-08-19 NOTE — TELEPHONE ENCOUNTER
Advised patient of Dr. Reyes' response. Scheduled MA appointment with Jong today at 10:30 am. Advised patient to stop by MA checkout desk to schedule echo, before she leaves office, today. Patient verbalized understanding. Routed this triage note to scheduling pool with request to schedule echo.

## 2024-08-19 NOTE — PROGRESS NOTES
hours Externally Once a day for 90 days      CPAP Machine MISC by Does not apply route      Acetaminophen 325 MG CAPS Take 325 mg by mouth 3 times daily as needed      Cetirizine HCl 10 MG CAPS Take 10 mg by mouth daily      Hyoscyamine Sulfate SL (LEVSIN/SL) 0.125 MG SUBL Place 1 tablet under the tongue 4 times daily as needed (adbomial cramping) (Patient not taking: Reported on 8/19/2024) 30 each 0     No current facility-administered medications for this visit.     Allergies   Allergen Reactions    Latex Other (See Comments)     Redness to site    Ciprofloxacin Other (See Comments)     Reacts with Afib meds    Adhesive Tape Other (See Comments)     redness    Cephalosporins Other (See Comments)    Olanzapine-Fluoxetine Hcl Other (See Comments)     Causes Severe pain     Cefaclor Rash         ROS  Review of Systems   Constitutional:  Negative for fatigue, fever and unexpected weight change.   HENT:  Negative for congestion, ear pain, hearing loss, sore throat, tinnitus and voice change.    Eyes:  Negative for pain and visual disturbance.   Respiratory:  Negative for cough, chest tightness, shortness of breath and wheezing.    Cardiovascular:  Negative for chest pain, palpitations and leg swelling.   Gastrointestinal:  Negative for abdominal pain, constipation, diarrhea, nausea and vomiting.   Genitourinary:  Negative for dysuria, frequency, hematuria and urgency.   Musculoskeletal:  Negative for arthralgias, gait problem, joint swelling and neck pain.   Skin:  Negative for color change and rash.   Neurological:  Negative for dizziness, syncope, numbness and headaches.   Hematological:  Negative for adenopathy.   Psychiatric/Behavioral:  Negative for decreased concentration, hallucinations, sleep disturbance and suicidal ideas. The patient is not nervous/anxious.          OBJECTIVE:  There were no vitals taken for this visit.     PHYSICAL EXAM  Physical Exam  Constitutional:       Appearance: Normal appearance.

## 2024-08-21 LAB
BACTERIA SPEC CULT: NORMAL
SERVICE CMNT-IMP: NORMAL

## 2024-08-30 ENCOUNTER — OFFICE VISIT (OUTPATIENT)
Age: 74
End: 2024-08-30

## 2024-08-30 VITALS
WEIGHT: 267.1 LBS | HEART RATE: 86 BPM | HEIGHT: 63 IN | BODY MASS INDEX: 47.32 KG/M2 | SYSTOLIC BLOOD PRESSURE: 134 MMHG | DIASTOLIC BLOOD PRESSURE: 82 MMHG

## 2024-08-30 DIAGNOSIS — I48.19 ATRIAL FIBRILLATION, PERSISTENT (HCC): Primary | ICD-10-CM

## 2024-08-30 RX ORDER — BUSPIRONE HYDROCHLORIDE 7.5 MG/1
TABLET ORAL
COMMUNITY
Start: 2024-08-21

## 2024-08-30 NOTE — PROGRESS NOTES
[facility] CARDIOLOGY, [address], [address]  [place] [address]  PHONE: [phone]  1950    SUBJECTIVE:   Lata Chiu is a 73 y.o. female seen for a follow up visit regarding the following:     Chief Complaint   Patient presents with    Atrial Fibrillation    6 Month Follow-Up         HPI:    Lata Chiu is a very pleasant 73 y.o. female with a past medical and cardiac history significant for atrial fibrillation s/p afib ablation and recurrent AF s/p failed DCCV and has been maintained on tikosyn. Pt is doing well, no chest pain, significant palpitations, presyncope or syncope. Exercising and no exertional symptoms. No complaints today.     Cardiac PMH: (Old records have been reviewed and summarized below)    Reviewed office note Dr. Bowers 8/19/24     Past Medical History, Past Surgical History, Family history, Social History, and Medications were all reviewed with the patient today and updated as necessary.     Current Outpatient Medications   Medication Sig Dispense Refill    busPIRone (BUSPAR) 7.5 MG tablet       albuterol sulfate HFA (PROVENTIL;VENTOLIN;PROAIR) 108 (90 Base) MCG/ACT inhaler Inhale 2 puffs into the lungs every 6 hours as needed for Shortness of Breath or Wheezing 18 g 5    fluticasone-salmeterol (ADVAIR DISKUS) 250-50 MCG/ACT AEPB diskus inhaler Inhale 1 puff into the lungs 2 times daily 1 each 11    ELIQUIS 5 MG TABS tablet TAKE 1 TABLET BY MOUTH TWICE A DAY 60 tablet 11    amLODIPine (NORVASC) 10 MG tablet Take 1 tablet by mouth daily 90 tablet 4    buPROPion (WELLBUTRIN XL) 150 MG extended release tablet Take 1 tablet by mouth every morning 90 tablet 4    furosemide (LASIX) 20 MG tablet Take 1 tablet by mouth every other day (Patient taking differently: Take 2 tablets by mouth daily Indications: PRN ONLY) 45 tablet 4    mometasone (NASONEX) 50 MCG/ACT nasal spray 2 sprays by Nasal route daily 17 g 4    montelukast (SINGULAIR) 10 MG tablet Take 1 tablet  DIXON Guido \"Jasmine\" was seen today for atrial fibrillation and 6 month follow-up.    Diagnoses and all orders for this visit:    Atrial fibrillation, persistent (HCC)  -     EKG 12 lead        ASSESSMENT and PLAN  1. Persistent atrial fibrillation: doing well, cont meds     2. CVA protection: cont eliquis 5mg, no bleeding problems     3. Tikosyn: QTc acceptable, cont tikosyn 500 mcg Q12H, labs stable     4. HTN: controlled, cont losartan 100mg, amlodipine 5mg    Patient has been instructed and agrees to call our office with any issues or other concerns related to their cardiac condition(s) and/or complaint(s).    No follow-ups on file.         Nas Reyes MD, MS  Cardiology/Electrophysiology  8/30/2024  11:27 AM

## 2024-10-14 ENCOUNTER — OFFICE VISIT (OUTPATIENT)
Dept: INTERNAL MEDICINE CLINIC | Facility: CLINIC | Age: 74
End: 2024-10-14
Payer: MEDICARE

## 2024-10-14 VITALS
DIASTOLIC BLOOD PRESSURE: 94 MMHG | BODY MASS INDEX: 48.18 KG/M2 | OXYGEN SATURATION: 92 % | HEART RATE: 77 BPM | SYSTOLIC BLOOD PRESSURE: 140 MMHG | WEIGHT: 272 LBS

## 2024-10-14 DIAGNOSIS — Z79.899 HIGH RISK MEDICATIONS (NOT ANTICOAGULANTS) LONG-TERM USE: ICD-10-CM

## 2024-10-14 DIAGNOSIS — R60.0 PERIPHERAL EDEMA: ICD-10-CM

## 2024-10-14 DIAGNOSIS — R06.02 SHORTNESS OF BREATH: ICD-10-CM

## 2024-10-14 DIAGNOSIS — I10 ESSENTIAL HYPERTENSION: ICD-10-CM

## 2024-10-14 DIAGNOSIS — I10 ESSENTIAL (PRIMARY) HYPERTENSION: ICD-10-CM

## 2024-10-14 DIAGNOSIS — I10 ESSENTIAL HYPERTENSION: Primary | ICD-10-CM

## 2024-10-14 LAB
ANION GAP SERPL CALC-SCNC: 13 MMOL/L (ref 9–18)
BASOPHILS # BLD: 0 K/UL (ref 0–0.2)
BASOPHILS NFR BLD: 0 % (ref 0–2)
BUN SERPL-MCNC: 17 MG/DL (ref 8–23)
CALCIUM SERPL-MCNC: 9.6 MG/DL (ref 8.8–10.2)
CHLORIDE SERPL-SCNC: 105 MMOL/L (ref 98–107)
CO2 SERPL-SCNC: 23 MMOL/L (ref 20–28)
CREAT SERPL-MCNC: 0.78 MG/DL (ref 0.6–1.1)
DIFFERENTIAL METHOD BLD: NORMAL
EOSINOPHIL # BLD: 0.3 K/UL (ref 0–0.8)
EOSINOPHIL NFR BLD: 4 % (ref 0.5–7.8)
ERYTHROCYTE [DISTWIDTH] IN BLOOD BY AUTOMATED COUNT: 12.7 % (ref 11.9–14.6)
GLUCOSE SERPL-MCNC: 99 MG/DL (ref 70–99)
HCT VFR BLD AUTO: 44 % (ref 35.8–46.3)
HGB BLD-MCNC: 14.2 G/DL (ref 11.7–15.4)
IMM GRANULOCYTES # BLD AUTO: 0 K/UL (ref 0–0.5)
IMM GRANULOCYTES NFR BLD AUTO: 0 % (ref 0–5)
LYMPHOCYTES # BLD: 1.5 K/UL (ref 0.5–4.6)
LYMPHOCYTES NFR BLD: 20 % (ref 13–44)
MCH RBC QN AUTO: 31 PG (ref 26.1–32.9)
MCHC RBC AUTO-ENTMCNC: 32.3 G/DL (ref 31.4–35)
MCV RBC AUTO: 96.1 FL (ref 82–102)
MONOCYTES # BLD: 0.7 K/UL (ref 0.1–1.3)
MONOCYTES NFR BLD: 9 % (ref 4–12)
NEUTS SEG # BLD: 5.1 K/UL (ref 1.7–8.2)
NEUTS SEG NFR BLD: 67 % (ref 43–78)
NRBC # BLD: 0 K/UL (ref 0–0.2)
NT PRO BNP: 136 PG/ML (ref 0–125)
PLATELET # BLD AUTO: 241 K/UL (ref 150–450)
PMV BLD AUTO: 11.3 FL (ref 9.4–12.3)
POTASSIUM SERPL-SCNC: 4 MMOL/L (ref 3.5–5.1)
RBC # BLD AUTO: 4.58 M/UL (ref 4.05–5.2)
SODIUM SERPL-SCNC: 142 MMOL/L (ref 136–145)
WBC # BLD AUTO: 7.5 K/UL (ref 4.3–11.1)

## 2024-10-14 PROCEDURE — G8399 PT W/DXA RESULTS DOCUMENT: HCPCS | Performed by: NURSE PRACTITIONER

## 2024-10-14 PROCEDURE — 3074F SYST BP LT 130 MM HG: CPT | Performed by: NURSE PRACTITIONER

## 2024-10-14 PROCEDURE — 3079F DIAST BP 80-89 MM HG: CPT | Performed by: NURSE PRACTITIONER

## 2024-10-14 PROCEDURE — 1123F ACP DISCUSS/DSCN MKR DOCD: CPT | Performed by: NURSE PRACTITIONER

## 2024-10-14 PROCEDURE — 1036F TOBACCO NON-USER: CPT | Performed by: NURSE PRACTITIONER

## 2024-10-14 PROCEDURE — G8417 CALC BMI ABV UP PARAM F/U: HCPCS | Performed by: NURSE PRACTITIONER

## 2024-10-14 PROCEDURE — G8427 DOCREV CUR MEDS BY ELIG CLIN: HCPCS | Performed by: NURSE PRACTITIONER

## 2024-10-14 PROCEDURE — 99215 OFFICE O/P EST HI 40 MIN: CPT | Performed by: NURSE PRACTITIONER

## 2024-10-14 PROCEDURE — 1090F PRES/ABSN URINE INCON ASSESS: CPT | Performed by: NURSE PRACTITIONER

## 2024-10-14 PROCEDURE — 3017F COLORECTAL CA SCREEN DOC REV: CPT | Performed by: NURSE PRACTITIONER

## 2024-10-14 PROCEDURE — G8484 FLU IMMUNIZE NO ADMIN: HCPCS | Performed by: NURSE PRACTITIONER

## 2024-10-14 ASSESSMENT — PATIENT HEALTH QUESTIONNAIRE - PHQ9
7. TROUBLE CONCENTRATING ON THINGS, SUCH AS READING THE NEWSPAPER OR WATCHING TELEVISION: NOT AT ALL
2. FEELING DOWN, DEPRESSED OR HOPELESS: NOT AT ALL
1. LITTLE INTEREST OR PLEASURE IN DOING THINGS: NOT AT ALL
8. MOVING OR SPEAKING SO SLOWLY THAT OTHER PEOPLE COULD HAVE NOTICED. OR THE OPPOSITE, BEING SO FIGETY OR RESTLESS THAT YOU HAVE BEEN MOVING AROUND A LOT MORE THAN USUAL: NOT AT ALL
SUM OF ALL RESPONSES TO PHQ QUESTIONS 1-9: 0
SUM OF ALL RESPONSES TO PHQ QUESTIONS 1-9: 0
SUM OF ALL RESPONSES TO PHQ QUESTIONS 1-9: 2
SUM OF ALL RESPONSES TO PHQ QUESTIONS 1-9: 2
SUM OF ALL RESPONSES TO PHQ QUESTIONS 1-9: 0
5. POOR APPETITE OR OVEREATING: NOT AT ALL
SUM OF ALL RESPONSES TO PHQ9 QUESTIONS 1 & 2: 0
4. FEELING TIRED OR HAVING LITTLE ENERGY: SEVERAL DAYS
1. LITTLE INTEREST OR PLEASURE IN DOING THINGS: NOT AT ALL
SUM OF ALL RESPONSES TO PHQ9 QUESTIONS 1 & 2: 0
SUM OF ALL RESPONSES TO PHQ QUESTIONS 1-9: 2
SUM OF ALL RESPONSES TO PHQ QUESTIONS 1-9: 0
3. TROUBLE FALLING OR STAYING ASLEEP: SEVERAL DAYS
6. FEELING BAD ABOUT YOURSELF - OR THAT YOU ARE A FAILURE OR HAVE LET YOURSELF OR YOUR FAMILY DOWN: NOT AT ALL
9. THOUGHTS THAT YOU WOULD BE BETTER OFF DEAD, OR OF HURTING YOURSELF: NOT AT ALL
10. IF YOU CHECKED OFF ANY PROBLEMS, HOW DIFFICULT HAVE THESE PROBLEMS MADE IT FOR YOU TO DO YOUR WORK, TAKE CARE OF THINGS AT HOME, OR GET ALONG WITH OTHER PEOPLE: SOMEWHAT DIFFICULT
2. FEELING DOWN, DEPRESSED OR HOPELESS: NOT AT ALL
SUM OF ALL RESPONSES TO PHQ QUESTIONS 1-9: 2

## 2024-10-14 NOTE — PROGRESS NOTES
provider:  Tobacco  Allergies  Meds  Problems  Med Hx  Surg Hx  Fam Hx         Review of Systems   Constitutional:  Negative for fever.   Respiratory:  Negative for cough, chest tightness, shortness of breath and wheezing.    Cardiovascular:  Positive for leg swelling. Negative for chest pain and palpitations.   Genitourinary:  Positive for frequency and urgency. Negative for dysuria, flank pain and hematuria.        \"Pressure over my bladder.\"        OBJECTIVE:    /86 (Site: Left Upper Arm, Position: Sitting, Cuff Size: Large Adult)   Pulse 77   Wt 123.4 kg (272 lb)   SpO2 92%   BMI 48.18 kg/m²      Physical Exam  Vitals and nursing note reviewed.   Constitutional:       Appearance: Normal appearance.   HENT:      Head: Normocephalic.   Cardiovascular:      Rate and Rhythm: Normal rate and regular rhythm.      Pulses:           Dorsalis pedis pulses are 2+ on the right side and 2+ on the left side.        Posterior tibial pulses are 2+ on the right side and 2+ on the left side.   Pulmonary:      Effort: Pulmonary effort is normal.      Breath sounds: Normal breath sounds.   Musculoskeletal:      Right lower leg: 3+ Edema present.      Left lower leg: 3+ Edema present.   Skin:     General: Skin is warm and dry.   Neurological:      General: No focal deficit present.      Mental Status: She is alert and oriented to person, place, and time.      Motor: No weakness.      Gait: Gait normal.   Psychiatric:         Mood and Affect: Mood normal.         Behavior: Behavior normal.          ASSESSMENT and PLAN    1. Essential hypertension  -     Basic Metabolic Panel; Future  -     CBC with Auto Differential; Future  2. Shortness of breath  -     Basic Metabolic Panel; Future  -     CBC with Auto Differential; Future  -     Brain Natriuretic Peptide; Future  3. Peripheral edema  -     Basic Metabolic Panel; Future  -     CBC with Auto Differential; Future  4. Essential (primary) hypertension  -

## 2024-10-15 RX ORDER — FUROSEMIDE 20 MG
20 TABLET ORAL DAILY
Qty: 90 TABLET | Refills: 4 | Status: SHIPPED | OUTPATIENT
Start: 2024-10-15

## 2024-10-15 ASSESSMENT — ENCOUNTER SYMPTOMS
WHEEZING: 0
SHORTNESS OF BREATH: 0
COUGH: 0
CHEST TIGHTNESS: 0

## 2024-10-28 PROBLEM — G47.30 SLEEP APNEA: Status: ACTIVE | Noted: 2021-06-30

## 2024-10-28 PROBLEM — J45.901 EXACERBATION OF ASTHMA: Status: ACTIVE | Noted: 2018-10-10

## 2024-10-28 PROBLEM — G47.34 NOCTURNAL HYPOXEMIA: Status: RESOLVED | Noted: 2023-01-19 | Resolved: 2024-10-28

## 2024-10-28 PROBLEM — M79.672 LEFT FOOT PAIN: Status: RESOLVED | Noted: 2020-02-22 | Resolved: 2024-10-28

## 2024-10-28 PROBLEM — M19.90 ARTHRITIS: Status: RESOLVED | Noted: 2018-10-10 | Resolved: 2024-10-28

## 2024-10-28 PROBLEM — J45.901 EXACERBATION OF ASTHMA: Status: RESOLVED | Noted: 2018-10-10 | Resolved: 2024-10-28

## 2024-10-28 PROBLEM — J30.9 ALLERGIC RHINITIS, UNSPECIFIED: Status: RESOLVED | Noted: 2022-02-10 | Resolved: 2024-10-28

## 2024-10-28 PROBLEM — Z87.19 HISTORY OF INCISIONAL HERNIA REPAIR: Status: RESOLVED | Noted: 2022-07-01 | Resolved: 2024-10-28

## 2024-10-28 PROBLEM — F32.A DEPRESSION, UNSPECIFIED: Status: ACTIVE | Noted: 2024-10-28

## 2024-10-28 PROBLEM — U07.1 COVID-19: Status: RESOLVED | Noted: 2022-02-01 | Resolved: 2024-10-28

## 2024-10-28 PROBLEM — Z98.890 HISTORY OF INCISIONAL HERNIA REPAIR: Status: RESOLVED | Noted: 2022-07-01 | Resolved: 2024-10-28

## 2024-10-28 PROBLEM — R60.9 EDEMA: Status: ACTIVE | Noted: 2024-10-28

## 2024-10-28 PROBLEM — E07.9 DISORDER OF THYROID, UNSPECIFIED: Status: RESOLVED | Noted: 2024-10-28 | Resolved: 2024-10-28

## 2024-10-28 PROBLEM — L72.3 SEBACEOUS CYST OF AXILLA: Status: RESOLVED | Noted: 2022-10-07 | Resolved: 2024-10-28

## 2024-10-28 PROBLEM — G47.10 HYPERSOMNIA, UNSPECIFIED: Status: RESOLVED | Noted: 2019-05-22 | Resolved: 2024-10-28

## 2024-10-28 PROBLEM — E07.9 DISORDER OF THYROID, UNSPECIFIED: Status: ACTIVE | Noted: 2024-10-28

## 2024-10-28 RX ORDER — TRIMETHOPRIM 100 MG/1
100 TABLET ORAL
COMMUNITY
Start: 2024-07-19

## 2024-10-29 ENCOUNTER — OFFICE VISIT (OUTPATIENT)
Dept: INTERNAL MEDICINE CLINIC | Facility: CLINIC | Age: 74
End: 2024-10-29

## 2024-10-29 VITALS
WEIGHT: 268.6 LBS | SYSTOLIC BLOOD PRESSURE: 142 MMHG | HEART RATE: 81 BPM | DIASTOLIC BLOOD PRESSURE: 82 MMHG | BODY MASS INDEX: 47.58 KG/M2 | OXYGEN SATURATION: 96 %

## 2024-10-29 DIAGNOSIS — E89.0 HYPOTHYROIDISM FOLLOWING RADIOIODINE THERAPY: ICD-10-CM

## 2024-10-29 DIAGNOSIS — R60.9 EDEMA, UNSPECIFIED TYPE: ICD-10-CM

## 2024-10-29 DIAGNOSIS — G47.33 OSA (OBSTRUCTIVE SLEEP APNEA): ICD-10-CM

## 2024-10-29 DIAGNOSIS — I10 ESSENTIAL HYPERTENSION: Primary | ICD-10-CM

## 2024-10-29 NOTE — PROGRESS NOTES
She has not checked her home pressure but notes the swelling has resolved.  She is now taking the Lasix 20 mg every day and her weight is down a bit.  She does have compression hose at home but has not always worn these when she is traveling.  She has been on the amlodipine for about 6 months.    She states she does have anxiety and depression.  Her electrophysiologist has her on Tikosyn and some of the SSRIs       Hypertension  This is a chronic problem. The problem has been gradually improving since onset.       Past Medical History, Past Surgical History, Family history, Social History, and Medications were all reviewed with the patient today and updated as necessary.       Current Outpatient Medications   Medication Sig Dispense Refill    trimethoprim (TRIMPEX) 100 MG tablet 1 tablet by NOT APPLICABLE route daily (before dinner)      furosemide (LASIX) 20 MG tablet Take 1 tablet by mouth daily 90 tablet 4    busPIRone (BUSPAR) 7.5 MG tablet       albuterol sulfate HFA (PROVENTIL;VENTOLIN;PROAIR) 108 (90 Base) MCG/ACT inhaler Inhale 2 puffs into the lungs every 6 hours as needed for Shortness of Breath or Wheezing 18 g 5    fluticasone-salmeterol (ADVAIR DISKUS) 250-50 MCG/ACT AEPB diskus inhaler Inhale 1 puff into the lungs 2 times daily 1 each 11    ELIQUIS 5 MG TABS tablet TAKE 1 TABLET BY MOUTH TWICE A DAY 60 tablet 11    amLODIPine (NORVASC) 10 MG tablet Take 1 tablet by mouth daily 90 tablet 4    buPROPion (WELLBUTRIN XL) 150 MG extended release tablet Take 1 tablet by mouth every morning 90 tablet 4    mometasone (NASONEX) 50 MCG/ACT nasal spray 2 sprays by Nasal route daily 17 g 4    montelukast (SINGULAIR) 10 MG tablet Take 1 tablet by mouth nightly 90 tablet 4    SYNTHROID 125 MCG tablet Take 1 tablet by mouth Daily 90 tablet 3    losartan (COZAAR) 100 MG tablet TAKE 1 TABLET BY MOUTH DAILY 90 tablet 3    dofetilide (TIKOSYN) 500 MCG capsule TAKE 1 CAPSULE BY MOUTH EVERY 12 HOURS 60 capsule 11

## 2024-11-20 DIAGNOSIS — E78.2 MIXED HYPERLIPIDEMIA: ICD-10-CM

## 2024-11-20 DIAGNOSIS — R73.01 IMPAIRED FASTING BLOOD SUGAR: ICD-10-CM

## 2024-11-20 LAB
ALBUMIN SERPL-MCNC: 3.5 G/DL (ref 3.2–4.6)
ALBUMIN/GLOB SERPL: 1.1 (ref 1–1.9)
ALP SERPL-CCNC: 116 U/L (ref 35–104)
ALT SERPL-CCNC: 22 U/L (ref 8–45)
ANION GAP SERPL CALC-SCNC: 10 MMOL/L (ref 7–16)
AST SERPL-CCNC: 28 U/L (ref 15–37)
BILIRUB SERPL-MCNC: 0.7 MG/DL (ref 0–1.2)
BUN SERPL-MCNC: 17 MG/DL (ref 8–23)
CALCIUM SERPL-MCNC: 9.5 MG/DL (ref 8.8–10.2)
CHLORIDE SERPL-SCNC: 104 MMOL/L (ref 98–107)
CHOLEST SERPL-MCNC: 209 MG/DL (ref 0–200)
CO2 SERPL-SCNC: 28 MMOL/L (ref 20–29)
CREAT SERPL-MCNC: 0.85 MG/DL (ref 0.6–1.1)
EST. AVERAGE GLUCOSE BLD GHB EST-MCNC: 116 MG/DL
GLOBULIN SER CALC-MCNC: 3.2 G/DL (ref 2.3–3.5)
GLUCOSE SERPL-MCNC: 85 MG/DL (ref 70–99)
HBA1C MFR BLD: 5.7 % (ref 0–5.6)
HDLC SERPL-MCNC: 62 MG/DL (ref 40–60)
HDLC SERPL: 3.4 (ref 0–5)
LDLC SERPL CALC-MCNC: 117 MG/DL (ref 0–100)
POTASSIUM SERPL-SCNC: 4.4 MMOL/L (ref 3.5–5.1)
PROT SERPL-MCNC: 6.7 G/DL (ref 6.3–8.2)
SODIUM SERPL-SCNC: 142 MMOL/L (ref 136–145)
TRIGL SERPL-MCNC: 149 MG/DL (ref 0–150)
VLDLC SERPL CALC-MCNC: 30 MG/DL (ref 6–23)

## 2024-11-26 ENCOUNTER — OFFICE VISIT (OUTPATIENT)
Dept: INTERNAL MEDICINE CLINIC | Facility: CLINIC | Age: 74
End: 2024-11-26

## 2024-11-26 VITALS
WEIGHT: 269 LBS | BODY MASS INDEX: 47.66 KG/M2 | HEIGHT: 63 IN | DIASTOLIC BLOOD PRESSURE: 78 MMHG | SYSTOLIC BLOOD PRESSURE: 135 MMHG

## 2024-11-26 DIAGNOSIS — I10 ESSENTIAL HYPERTENSION: Primary | ICD-10-CM

## 2024-11-26 DIAGNOSIS — G89.29 CHRONIC LOW BACK PAIN WITHOUT SCIATICA, UNSPECIFIED BACK PAIN LATERALITY: ICD-10-CM

## 2024-11-26 DIAGNOSIS — I48.19 ATRIAL FIBRILLATION, PERSISTENT (HCC): ICD-10-CM

## 2024-11-26 DIAGNOSIS — E89.0 HYPOTHYROIDISM FOLLOWING RADIOIODINE THERAPY: ICD-10-CM

## 2024-11-26 DIAGNOSIS — M54.50 CHRONIC LOW BACK PAIN WITHOUT SCIATICA, UNSPECIFIED BACK PAIN LATERALITY: ICD-10-CM

## 2024-11-26 ASSESSMENT — ENCOUNTER SYMPTOMS
NAUSEA: 0
CHEST TIGHTNESS: 0
WHEEZING: 0
ABDOMINAL PAIN: 0
VOICE CHANGE: 0
COUGH: 0
CONSTIPATION: 0
SORE THROAT: 0
SHORTNESS OF BREATH: 0
VOMITING: 0
EYE PAIN: 0
COLOR CHANGE: 0
DIARRHEA: 0

## 2024-11-26 NOTE — PROGRESS NOTES
Rate 73 >60 ml/min/1.73m2    Calcium 9.5 8.8 - 10.2 MG/DL    Total Bilirubin 0.7 0.0 - 1.2 MG/DL    ALT 22 8 - 45 U/L    AST 28 15 - 37 U/L    Alk Phosphatase 116 (H) 35 - 104 U/L    Total Protein 6.7 6.3 - 8.2 g/dL    Albumin 3.5 3.2 - 4.6 g/dL    Globulin 3.2 2.3 - 3.5 g/dL    Albumin/Globulin Ratio 1.1 1.0 - 1.9     Hemoglobin A1C    Collection Time: 11/20/24  9:22 AM   Result Value Ref Range    Hemoglobin A1C 5.7 (H) 0 - 5.6 %    Estimated Avg Glucose 116 mg/dL         ASSESSMENT and PLAN  Lata \"Jasmine\" was seen today for cholesterol problem.    Diagnoses and all orders for this visit:    Essential hypertension  Comments:  pt swim[facility] 3-4 time a [facility]    Hypothyroidism following radioiodine therapy  Comments:  followed by briana, cont care    Chronic low back pain without sciatica, unspecified back pain laterality  Comments:  seeing Dr. marsh 12/4/2024- to address her MRI and plan    Atrial fibrillation, persistent (HCC)  Comments:  on tikosyn doing welll seeing cardiolgist every 6 month        FOLLOW UP  Return in about 3 months (around 2/26/2025) for AWE and EXTENDED lab and med f/u 1 week later.

## 2024-12-11 ENCOUNTER — PATIENT MESSAGE (OUTPATIENT)
Dept: INTERNAL MEDICINE CLINIC | Facility: CLINIC | Age: 74
End: 2024-12-11

## 2024-12-11 DIAGNOSIS — I10 ESSENTIAL (PRIMARY) HYPERTENSION: ICD-10-CM

## 2024-12-11 DIAGNOSIS — F32.5 MAJOR DEPRESSIVE DISORDER, SINGLE EPISODE, IN FULL REMISSION (HCC): Primary | ICD-10-CM

## 2024-12-11 RX ORDER — BUSPIRONE HYDROCHLORIDE 7.5 MG/1
7.5 TABLET ORAL 2 TIMES DAILY
Qty: 60 TABLET | Refills: 3 | OUTPATIENT
Start: 2024-12-11

## 2024-12-11 RX ORDER — BUSPIRONE HYDROCHLORIDE 7.5 MG/1
7.5 TABLET ORAL DAILY
Qty: 90 TABLET | Refills: 1 | Status: SHIPPED | OUTPATIENT
Start: 2024-12-11

## 2024-12-11 RX ORDER — FUROSEMIDE 20 MG/1
20 TABLET ORAL DAILY
Qty: 90 TABLET | Refills: 1 | Status: SHIPPED | OUTPATIENT
Start: 2024-12-11

## 2025-01-16 ENCOUNTER — HOSPITAL ENCOUNTER (OUTPATIENT)
Dept: PHYSICAL THERAPY | Age: 75
Setting detail: RECURRING SERIES
Discharge: HOME | End: 2025-01-19
Payer: MEDICARE

## 2025-01-16 DIAGNOSIS — M41.85 OTHER FORMS OF SCOLIOSIS, THORACOLUMBAR REGION: Primary | ICD-10-CM

## 2025-01-16 DIAGNOSIS — R53.81 DEBILITY: ICD-10-CM

## 2025-01-16 DIAGNOSIS — R26.2 DIFFICULTY IN WALKING, NOT ELSEWHERE CLASSIFIED: ICD-10-CM

## 2025-01-16 PROCEDURE — 97530 THERAPEUTIC ACTIVITIES: CPT

## 2025-01-16 PROCEDURE — 97163 PT EVAL HIGH COMPLEX 45 MIN: CPT

## 2025-01-16 ASSESSMENT — PAIN SCALES - GENERAL: PAINLEVEL_OUTOF10: 0

## 2025-01-16 NOTE — THERAPY EVALUATION
endurance and decreased risk of falls during daily activities.  []   []   []    Lata Chiu will be able to walk up and down 1 flight of stair (14-16 steps) with reciprocal gait pattern and min to no use of UE for support to increase safety within home and/or community.   []   []   []         Medical Necessity:   Patient is expected to demonstrate progress in strength, range of motion, balance, coordination and functional technique to increase independence with ADLs and IADLs.  Reason For Services/Other Comments:  Patient continues to require modification of therapeutic interventions to increase complexity of exercises.        Regarding Lata Chiu's therapy, I certify that the treatment plan above will be carried out by a therapist or under their direction.  Thank you for this referral,  DARIEN ODEN PT     Referring Physician Signature: Seema Hodge MD _______________________________ Date _____________        Charge Capture  Events  Appt Desk  Attendance Report

## 2025-01-20 ENCOUNTER — HOSPITAL ENCOUNTER (OUTPATIENT)
Dept: PHYSICAL THERAPY | Age: 75
Setting detail: RECURRING SERIES
Discharge: HOME | End: 2025-01-23
Payer: MEDICARE

## 2025-01-20 PROCEDURE — 97110 THERAPEUTIC EXERCISES: CPT

## 2025-01-20 NOTE — PROGRESS NOTES
Lata Chiu  : 1950  Primary: Medicare Part A And B (Medicare)  Secondary: [facility] @ [place] [address] [place] [address]  Phone: [phone]  Fax: [phone] Plan Frequency: 2 x per week for 12 weeks    Plan of Care/Certification Expiration Date: 25        Plan of Care/Certification Expiration Date:  Plan of Care/Certification Expiration Date: 25    Frequency/Duration:   Plan Frequency: 2 x per week for 12 weeks      Time In/Out:   Time In: 1305  Time Out: 1345      PT Visit Info:         Visit Count:  2    OUTPATIENT PHYSICAL THERAPY:   Treatment Note 2025       Episode  (low back pain; debility)               Treatment Diagnosis:    No data found  Medical/Referring Diagnosis:    Other secondary scoliosis, site unspecified    Referring Physician:  Seema Hodge MD MD Orders:  PT Eval and Treat   Return MD Appt:  tbd   Date of Onset:  No data recorded   Allergies:   Latex, Ciprofloxacin, Adhesive tape, Bactrim [sulfamethoxazole-trimethoprim], Cephalosporins, Olanzapine-fluoxetine hcl, and Cefaclor  Restrictions/Precautions:   None      Interventions Planned (Treatment may consist of any combination of the following):     See Assessment Note    Subjective Comments:   Back's hurting on the R side. Attributes it to the scoliosis pulling on her muscles on the R side. Had an injection to her low back and it felt helpful. Having a hard time standing up from low surfaces.   Initial Pain Level:     mod /10  Post Session Pain Level:      mod /10  Medications Last Reviewed: 2025  Updated Objective Findings:  None Today  Treatment     THERAPEUTIC EXERCISE: (40 minutes):    Exercises per grid below to improve mobility, strength, balance, and coordination.   Progressed resistance and repetitions as indicated.     Date:  2025 Date:   Date:     Activity/Exercise Parameters Parameters Parameters   Edu      Sci fit 8

## 2025-01-23 ENCOUNTER — HOSPITAL ENCOUNTER (OUTPATIENT)
Dept: PHYSICAL THERAPY | Age: 75
Setting detail: RECURRING SERIES
Discharge: HOME | End: 2025-01-26
Payer: MEDICARE

## 2025-01-23 PROCEDURE — 97110 THERAPEUTIC EXERCISES: CPT

## 2025-01-23 NOTE — PROGRESS NOTES
Lata Chiu  : 1950  Primary: Medicare Part A And B (Medicare)  Secondary: [facility] @ [place] [address] [place] [address]  Phone: [phone]  Fax: [phone] Plan Frequency: 2 x per week for 12 weeks    Plan of Care/Certification Expiration Date: 25        Plan of Care/Certification Expiration Date:  Plan of Care/Certification Expiration Date: 25    Frequency/Duration:   Plan Frequency: 2 x per week for 12 weeks      Time In/Out:          PT Visit Info:         Visit Count:  3    OUTPATIENT PHYSICAL THERAPY:   Treatment Note 2025       Episode  (low back pain; debility)               Treatment Diagnosis:    No data found  Medical/Referring Diagnosis:    Other secondary scoliosis, site unspecified    Referring Physician:  Seema Hodge MD MD Orders:  PT Eval and Treat   Return MD Appt:  tbd   Date of Onset:  No data recorded   Allergies:   Latex, Ciprofloxacin, Adhesive tape, Bactrim [sulfamethoxazole-trimethoprim], Cephalosporins, Olanzapine-fluoxetine hcl, and Cefaclor  Restrictions/Precautions:   None      Interventions Planned (Treatment may consist of any combination of the following):     See Assessment Note    Subjective Comments:   Pt states her legs and feet swelled after therapy last visit, \"but my knees and hips feel better!\"States that she is trying to work on sit to stands at home   \"My back was really bad after about 20 min of grocery shopping and I had to sit down instead of continuing on the trip.\" Pain resolved quickly.       Initial Pain Level:     0 /10  Post Session Pain Level:      0 /10  Medications Last Reviewed: 2025  Updated Objective Findings:  None Today  Treatment     THERAPEUTIC EXERCISE: (40 minutes):    Exercises per grid below to improve mobility, strength, balance, and coordination.   Progressed resistance and repetitions as indicated.     Date:  2025 Date:  2025

## 2025-01-27 ENCOUNTER — HOSPITAL ENCOUNTER (OUTPATIENT)
Dept: PHYSICAL THERAPY | Age: 75
Setting detail: RECURRING SERIES
Discharge: HOME | End: 2025-01-30
Payer: MEDICARE

## 2025-01-27 PROCEDURE — 97110 THERAPEUTIC EXERCISES: CPT

## 2025-01-27 NOTE — PROGRESS NOTES
Lata Chiu  : 1950  Primary: Medicare Part A And B (Medicare)  Secondary: [facility] @ [place] [address] [place] [address]  Phone: [phone]  Fax: [phone] Plan Frequency: 2 x per week for 12 weeks    Plan of Care/Certification Expiration Date: 25        Plan of Care/Certification Expiration Date:  Plan of Care/Certification Expiration Date: 25    Frequency/Duration:   Plan Frequency: 2 x per week for 12 weeks      Time In/Out:   Time In: 1355  Time Out: 1430      PT Visit Info:         Visit Count:  4    OUTPATIENT PHYSICAL THERAPY:   Treatment Note 2025       Episode  (low back pain; debility)               Treatment Diagnosis:    No data found  Medical/Referring Diagnosis:    Other secondary scoliosis, site unspecified    Referring Physician:  Seema Hodge MD MD Orders:  PT Eval and Treat   Return MD Appt:  tbd   Date of Onset:  No data recorded   Allergies:   Latex, Ciprofloxacin, Adhesive tape, Bactrim [sulfamethoxazole-trimethoprim], Cephalosporins, Olanzapine-fluoxetine hcl, and Cefaclor  Restrictions/Precautions:   None      Interventions Planned (Treatment may consist of any combination of the following):     See Assessment Note    Subjective Comments:   Had some swelling in her feet after last session but it went away.      Initial Pain Level:     0 /10  Post Session Pain Level:      0 /10  Medications Last Reviewed: 2025  Updated Objective Findings:  None Today  Treatment     THERAPEUTIC EXERCISE: (35 minutes):    Exercises per grid below to improve mobility, strength, balance, and coordination.   Progressed resistance and repetitions as indicated.     Date:  2025 Date:  2025  Date:  25   Activity/Exercise Parameters Parameters Parameters   Edu      Sci fit 8 min, level 1 8.5 min level 5 (nustep)  8 min, level 6   L stretch X10 (at beginning and end of session)    + standing

## 2025-01-28 RX ORDER — DOFETILIDE 0.5 MG/1
500 CAPSULE ORAL EVERY 12 HOURS
Qty: 60 CAPSULE | Refills: 11 | Status: SHIPPED | OUTPATIENT
Start: 2025-01-28

## 2025-01-28 NOTE — TELEPHONE ENCOUNTER
Requested Prescriptions     Pending Prescriptions Disp Refills    dofetilide (TIKOSYN) 500 MCG capsule [Pharmacy Med Name: DOFETILIDE 500 MCG CAPS 500 Capsule] 60 capsule 11     Sig: TAKE 1 CAPSULE BY MOUTH EVERY 12 HOURS    Verified rx in last OV date 8/30/24. Pharmacy confirmed. Erx as requested

## 2025-01-29 ENCOUNTER — APPOINTMENT (OUTPATIENT)
Dept: PHYSICAL THERAPY | Age: 75
End: 2025-01-29
Payer: MEDICARE

## 2025-01-30 ENCOUNTER — HOSPITAL ENCOUNTER (OUTPATIENT)
Dept: PHYSICAL THERAPY | Age: 75
Setting detail: RECURRING SERIES
End: 2025-01-30
Payer: MEDICARE

## 2025-01-30 PROCEDURE — 97110 THERAPEUTIC EXERCISES: CPT

## 2025-01-30 NOTE — PROGRESS NOTES
Lata Chiu  : 1950  Primary: Medicare Part A And B (Medicare)  Secondary: [facility] @ [place] [address] [place] [address]  Phone: [phone]  Fax: [phone] Plan Frequency: 2 x per week for 12 weeks    Plan of Care/Certification Expiration Date: 25        Plan of Care/Certification Expiration Date:  Plan of Care/Certification Expiration Date: 25    Frequency/Duration:   Plan Frequency: 2 x per week for 12 weeks      Time In/Out:   Time In: 1115  Time Out: 1200      PT Visit Info:         Visit Count:  5    OUTPATIENT PHYSICAL THERAPY:   Treatment Note 2025       Episode  (low back pain; debility)               Treatment Diagnosis:    No data found  Medical/Referring Diagnosis:    Other secondary scoliosis, site unspecified    Referring Physician:  Seema Hodge MD MD Orders:  PT Eval and Treat   Return MD Appt:  tbd   Date of Onset:  No data recorded   Allergies:   Latex, Ciprofloxacin, Adhesive tape, Bactrim [sulfamethoxazole-trimethoprim], Cephalosporins, Olanzapine-fluoxetine hcl, and Cefaclor  Restrictions/Precautions:   None      Interventions Planned (Treatment may consist of any combination of the following):     See Assessment Note    Subjective Comments:    Pt states she has a lot of personal things going on that is upsetting her stomach but other than that, feeling good and consistent with HEP.    Was able to stand 5-10 min without back pain at a restaurant the other day as well as take a shower without holding on or cutting short due to pain.      Initial Pain Level:     0 /10  Post Session Pain Level:      0 /10  Medications Last Reviewed: 2025  Updated Objective Findings:  None Today  Treatment     THERAPEUTIC EXERCISE: (40 minutes):    Exercises per grid below to improve mobility, strength, balance, and coordination.   Progressed resistance and repetitions as indicated.     Date:  2025    Joint mobilization, Soft tissue mobilization, and Manipulation was utilized and necessary because of the patient's restricted joint motion, painful spasm, loss of articular motion, and restricted motion of soft tissue.              Date  1/30/2025      Technique Used Grade Level # Time(s) Effect while being performed                                                                                         HEP Log Date        2.     3.    4.     5.        POC    Recertification Expiration Date      Plan of Care/Certification Expiration Date: 04/16/25     Visit Count  5    Number of Allowed Visits          Treatment/Session Summary:    Treatment Assessment:   Pt voicing improvements with activity tolerance during functional activities and reports no back pain during treatment sessions.  Continue to encourage pt to wear better footwear.  Cues and encouragement needed throughout for posture as well as decrease rest time. Advised goal  to reach target heart rate during circuits.   Decreased weight with dead lift today due to fatigue level from previous activities. Will continue to progress resist and length of circuit to promote improved functional endurance.   Communication/Consultation:  None today  Equipment provided today:  None  Recommendations/Intent for next treatment session: Next visit will focus on ROM for pain modulation, general core and LE strengthening, conditioning.    >Total Treatment Billable Duration:  40 minutes   Time In: 1115  Time Out: 1200     DARIEN ODEN PT         Charge Capture  Events  [employer] Portal  Appt Desk  Attendance Report     Future Appointments   Date Time Provider Department Center   2/3/2025 11:15 AM Darien Oden PT [facility]   2/5/2025  1:00 PM Darien Oden, PT [facility]   2/11/2025  1:00 PM Darien Oden PT [facility]   2/13/2025  2:30 PM Darien Oden PT [facility]   2/18/2025  2:30 PM Darien Oden, PT [facility]   2/20/2025  2:30 PM

## 2025-02-03 ENCOUNTER — HOSPITAL ENCOUNTER (OUTPATIENT)
Dept: PHYSICAL THERAPY | Age: 75
Setting detail: RECURRING SERIES
Discharge: HOME | End: 2025-02-06
Payer: MEDICARE

## 2025-02-03 PROCEDURE — 97110 THERAPEUTIC EXERCISES: CPT

## 2025-02-03 NOTE — PROGRESS NOTES
Lata Chiu  : 1950  Primary: Medicare Part A And B (Medicare)  Secondary: [facility] @ [place] [address] [place] [address]  Phone: [phone]  Fax: [phone] Plan Frequency: 2 x per week for 12 weeks    Plan of Care/Certification Expiration Date: 25        Plan of Care/Certification Expiration Date:  Plan of Care/Certification Expiration Date: 25    Frequency/Duration:   Plan Frequency: 2 x per week for 12 weeks      Time In/Out:   Time In: 1120  Time Out: 1200      PT Visit Info:         Visit Count:  6    OUTPATIENT PHYSICAL THERAPY:   Treatment Note 2/3/2025       Episode  (low back pain; debility)               Treatment Diagnosis:    No data found  Medical/Referring Diagnosis:    Other secondary scoliosis, site unspecified    Referring Physician:  Seema Hodge MD MD Orders:  PT Eval and Treat   Return MD Appt:  tbd   Date of Onset:  No data recorded   Allergies:   Latex, Ciprofloxacin, Adhesive tape, Bactrim [sulfamethoxazole-trimethoprim], Cephalosporins, Olanzapine-fluoxetine hcl, and Cefaclor  Restrictions/Precautions:   None      Interventions Planned (Treatment may consist of any combination of the following):     See Assessment Note    Subjective Comments:   Pt arrives in lace up sneakers today as encouraged by therapist.  Pt reports doing well at home and after sessions here with minimal back pain       Initial Pain Level:     0 /10  Post Session Pain Level:      0 /10  Medications Last Reviewed: 2/3/2025  Updated Objective Findings:  None Today  Treatment     THERAPEUTIC EXERCISE: (40 minutes):    Exercises per grid below to improve mobility, strength, balance, and coordination.   Progressed resistance and repetitions as indicated.     Date:  2025 Date:  2025  Date:  1/27/25 2025   2/3/25    Activity/Exercise Parameters Parameters Parameters      Edu         Sci fit 8 min, level 1 8.5 min

## 2025-02-05 ENCOUNTER — HOSPITAL ENCOUNTER (OUTPATIENT)
Dept: PHYSICAL THERAPY | Age: 75
Setting detail: RECURRING SERIES
Discharge: HOME | End: 2025-02-08
Payer: MEDICARE

## 2025-02-05 PROCEDURE — 97110 THERAPEUTIC EXERCISES: CPT

## 2025-02-05 NOTE — PROGRESS NOTES
Lata Chiu  : 1950  Primary: Medicare Part A And B (Medicare)  Secondary: [facility] @ [place] [address] [place] [address]  Phone: [phone]  Fax: [phone] Plan Frequency: 2 x per week for 12 weeks    Plan of Care/Certification Expiration Date: 25        Plan of Care/Certification Expiration Date:  Plan of Care/Certification Expiration Date: 25    Frequency/Duration:   Plan Frequency: 2 x per week for 12 weeks      Time In/Out:   Time In: 1300  Time Out: 1345      PT Visit Info:         Visit Count:  7    OUTPATIENT PHYSICAL THERAPY:   Treatment Note 2025       Episode  (low back pain; debility)               Treatment Diagnosis:    No data found  Medical/Referring Diagnosis:    Other secondary scoliosis, site unspecified    Referring Physician:  Seema Hodge MD MD Orders:  PT Eval and Treat   Return MD Appt:  tbd   Date of Onset:  No data recorded   Allergies:   Latex, Ciprofloxacin, Adhesive tape, Bactrim [sulfamethoxazole-trimethoprim], Cephalosporins, Olanzapine-fluoxetine hcl, and Cefaclor  Restrictions/Precautions:   None      Interventions Planned (Treatment may consist of any combination of the following):     See Assessment Note    Subjective Comments:   Pt walked about 20 min at [facility] yesterday and felt good during but back sore.  Trying to figure out plan to return to aquatic exercise         Initial Pain Level:     0 /10  Post Session Pain Level:      0 /10  Medications Last Reviewed: 2025  Updated Objective Findings:  None Today  Treatment     THERAPEUTIC EXERCISE: (40 minutes):    Exercises per grid below to improve mobility, strength, balance, and coordination.   Progressed resistance and repetitions as indicated.     Date:  2025 Date:  2025  Date:  1/27/25 2025   2/3/25 2025    Activity/Exercise Parameters Parameters Parameters      Edu         Sci fit 8 min,

## 2025-02-11 ENCOUNTER — HOSPITAL ENCOUNTER (OUTPATIENT)
Dept: PHYSICAL THERAPY | Age: 75
Setting detail: RECURRING SERIES
Discharge: HOME | End: 2025-02-14
Payer: MEDICARE

## 2025-02-11 PROCEDURE — 97110 THERAPEUTIC EXERCISES: CPT

## 2025-02-11 NOTE — PROGRESS NOTES
Date:  1/27/25 1/30/2025   2/3/25 2/5/2025  2/11/25   Activity/Exercise Parameters Parameters Parameters       Edu          Sci fit 8 min, level 1 8.5 min level 5 (nustep)  8 min, level 6 8 min level 6  8 min level 6   4 8 min level 6 Scifit level 4 x 8 min    L stretch X10 (at beginning and end of session)    + standing rotations Seated with ball   5 x with breathing cycles  X10    X10 standing rotations       STS 3x8, 20 inch no hands    96% O2sat, HR  3 X 8 (circuit)  18\" seat    Attempted  touchdowns x 3              1x8    2x8x8 lbs    20 inch In circuit 10#  3 x 8 total   (Chair + BF) 3 x 8 from elevated seat (chair + BF)     + wt NV    10 lbs x 6   X 3 circuits  X 10    deadlift   [id number] lbs, 5 reps in reserve    [id number] lbs, 5 reps in reserve 2 x 8 with 10 lbs           Sidestepping 1x60 feet    95-96% O2sat, -105 2 x 30 ft     1 x standing rest <30 sec  1x60 feet  1 x 60 feet  3  x 60 ft R<>L  X 2 laps    Seated ball rolls X10 flexion  X10 L          Rows 2x10, green tubing, standing         Standing marching 3x10, BUE on wall         Calf raises   (In circuit)  X 10 @ wall         Breathing   4-4-4 technique  4 x 3 cycles during rests        Farmer's carries   1x15 feet   1x30 feet    10 lbs Circuit 12#   3 laps total   Suitcase carry 10#   X 3 circuit  10# 3 x 1 lap (circuit)    Step tap      6\" step 3 x 30 sec  30 sec 6\" step 3 circuits                 Circuits  Date:  1/23/2025  Date:  1/30/25 Date:  2/3/25 2/5/2025  2/11/2025    Circuit 1 Sit to stand x 8   + walk x 80 ft        02 95% X 8 with 10lbs   Farmer carry x 80ft 12#     HR 96   02 98%  (Slow with rest)    SD step (x 1 lap)  STS (x 8)  Step tap (30 sec)    O2: 96%  O2: 93 bpm  Slow with rest)    SD step (x 1 lap)  STS (x 8)  Step tap (30 sec)      O2: 96%    bpm  STS x 10 with 10#     Side stepping x 1 lap           02: 93%   HR: 110   Circuit 2  Sit to stand x 8   + walk x 80ft  + calf raise x 10 at wall

## 2025-02-13 ENCOUNTER — HOSPITAL ENCOUNTER (OUTPATIENT)
Dept: PHYSICAL THERAPY | Age: 75
Setting detail: RECURRING SERIES
Discharge: HOME | End: 2025-02-16
Payer: MEDICARE

## 2025-02-13 PROCEDURE — 97110 THERAPEUTIC EXERCISES: CPT

## 2025-02-13 NOTE — PROGRESS NOTES
Lata Chiu  : 1950  Primary: Medicare Part A And B (Medicare)  [facility] [place] [address] [place] [address]  Phone: [phone]  Fax: [phone] Plan Frequency: 2 x per week for 12 weeks    Plan of Care/Certification Expiration Date: 25        Plan of Care/Certification Expiration Date:  Plan of Care/Certification Expiration Date: 25    Frequency/Duration:   Plan Frequency: 2 x per week for 12 weeks      Time In/Out:   Time In: 1430  Time Out: 1530      PT Visit Info:         Visit Count:  9    OUTPATIENT PHYSICAL THERAPY:   Treatment Note 2025       Episode  (low back pain; debility)               Treatment Diagnosis:    No data found  Medical/Referring Diagnosis:    Other secondary scoliosis, site unspecified    Referring Physician:  Seema Hodge MD MD Orders:  PT Eval and Treat   Return MD Appt:  tbd   Date of Onset:  No data recorded   Allergies:   Latex, Ciprofloxacin, Adhesive tape, Bactrim [sulfamethoxazole-trimethoprim], Cephalosporins, Olanzapine-fluoxetine hcl, and Cefaclor  Restrictions/Precautions:   None      Interventions Planned (Treatment may consist of any combination of the following):     See Assessment Note    Subjective Comments:   Pt states lumbar pain due to lots of sitting at [employer]. She is planning on going to pool tomorrow to exercise.          Initial Pain Level:     2 /10  Post Session Pain Level:      0 /10  Medications Last Reviewed: 2025  Updated Objective Findings:  None Today  Treatment     THERAPEUTIC EXERCISE: (45 minutes):    Exercises per grid below to improve mobility, strength, balance, and coordination.   Progressed resistance and repetitions as indicated.     Date:  1/27/25 2025   2/3/25 2025  2/11/25 2025     Activity/Exercise Parameters        Edu         Sci fit 8 min, level 6 8 min level 6  8 min level 6   4 8 min level 6 Scifit

## 2025-02-18 ENCOUNTER — HOSPITAL ENCOUNTER (OUTPATIENT)
Dept: PHYSICAL THERAPY | Age: 75
Setting detail: RECURRING SERIES
Discharge: HOME | End: 2025-02-21
Payer: MEDICARE

## 2025-02-18 PROCEDURE — 97110 THERAPEUTIC EXERCISES: CPT

## 2025-02-18 NOTE — PROGRESS NOTES
Lata Chiu  : 1950  Primary: Medicare Part A And B (Medicare)  [facility] [place] [address] [place] [address]  Phone: [phone]  Fax: [phone] Plan Frequency: 2 x per week for 12 weeks    Plan of Care/Certification Expiration Date: 25        Plan of Care/Certification Expiration Date:  Plan of Care/Certification Expiration Date: 25    Frequency/Duration:   Plan Frequency: 2 x per week for 12 weeks      Time In/Out:   Time In: 1430  Time Out: 1520      PT Visit Info:         Visit Count:  10    OUTPATIENT PHYSICAL THERAPY:   Treatment Note 2025       Episode  (low back pain; debility)               Treatment Diagnosis:    No data found  Medical/Referring Diagnosis:    Other secondary scoliosis, site unspecified    Referring Physician:  Seema Hodge MD MD Orders:  PT Eval and Treat   Return MD Appt:  tbd   Date of Onset:  No data recorded   Allergies:   Latex, Ciprofloxacin, Adhesive tape, Bactrim [sulfamethoxazole-trimethoprim], Cephalosporins, Olanzapine-fluoxetine hcl, and Cefaclor  Restrictions/Precautions:   None      Interventions Planned (Treatment may consist of any combination of the following):     See Assessment Note    Subjective Comments:   Pt states that she started back at pool for exercise and is pretty fatigued as well as increased LBP.           Initial Pain Level:     2 /10  Post Session Pain Level:      0 /10  Medications Last Reviewed: 2025  Updated Objective Findings:  None Today  Treatment     THERAPEUTIC EXERCISE: (45 minutes):    Exercises per grid below to improve mobility, strength, balance, and coordination.   Progressed resistance and repetitions as indicated.     Date:  1/27/25 2025   2/3/25 2025  2/11/25 2025   2025    Activity/Exercise Parameters         Edu          Sci fit 8 min, level 6 8 min level 6  8 min level 6   4 8 min level 6 Scifit

## 2025-02-20 ENCOUNTER — HOSPITAL ENCOUNTER (OUTPATIENT)
Dept: PHYSICAL THERAPY | Age: 75
Setting detail: RECURRING SERIES
Discharge: HOME | End: 2025-02-23
Payer: MEDICARE

## 2025-02-20 PROCEDURE — 97110 THERAPEUTIC EXERCISES: CPT

## 2025-02-20 NOTE — PROGRESS NOTES
Lata Chiu  : 1950  Primary: Medicare Part A And B (Medicare)  [facility] [place] [address] [place] [address]  Phone: [phone]  Fax: [phone] Plan Frequency: 2 x per week for 12 weeks    Plan of Care/Certification Expiration Date: 25        Plan of Care/Certification Expiration Date:  Plan of Care/Certification Expiration Date: 25    Frequency/Duration:   Plan Frequency: 2 x per week for 12 weeks      Time In/Out:   Time In: 1430  Time Out: 1420      PT Visit Info:         Visit Count:  11    OUTPATIENT PHYSICAL THERAPY:   Treatment Note 2025       Episode  (low back pain; debility)               Treatment Diagnosis:    No data found  Medical/Referring Diagnosis:    Other secondary scoliosis, site unspecified    Referring Physician:  Seema Hodge MD MD Orders:  PT Eval and Treat   Return MD Appt:  tbd   Date of Onset:  No data recorded   Allergies:   Latex, Ciprofloxacin, Adhesive tape, Bactrim [sulfamethoxazole-trimethoprim], Cephalosporins, Olanzapine-fluoxetine hcl, and Cefaclor  Restrictions/Precautions:   None      Interventions Planned (Treatment may consist of any combination of the following):     See Assessment Note    Subjective Comments:   Pt states she is doing good           Initial Pain Level:     2 /10  Post Session Pain Level:      0 /10  Medications Last Reviewed: 2025  Updated Objective Findings:  None Today  Treatment     THERAPEUTIC EXERCISE: (45 minutes):    Exercises per grid below to improve mobility, strength, balance, and coordination.   Progressed resistance and repetitions as indicated.     Date:  1/27/25 2025   2/3/25 2025  2/11/25 2025   2025  2025     Activity/Exercise Parameters          Edu           Sci fit 8 min, level 6 8 min level 6  8 min level 6   4 8 min level 6 Scifit level 4 x 8 min  Nustep 8 min level 6  506 steps   Nustep 8

## 2025-02-25 ENCOUNTER — HOSPITAL ENCOUNTER (OUTPATIENT)
Dept: PHYSICAL THERAPY | Age: 75
Setting detail: RECURRING SERIES
End: 2025-02-25
Payer: MEDICARE

## 2025-02-26 NOTE — PROGRESS NOTES
[address], [address]  [place] [address]  PHONE: [phone]  1950    SUBJECTIVE:   Lata Chiu is a 74 y.o. female seen for a follow up visit regarding the following:     Chief Complaint   Patient presents with    6 Month Follow-Up    Atrial Fibrillation     HPI:    Lata Chiu is a very pleasant 74 y.o. female with a past medical and cardiac history significant for atrial fibrillation s/p afib ablation 11/26/18 and 2/18/19 and recurrent AF s/p failed DCCV and has been maintained on tikosyn. She presents today for routine follow up visit. Reports doing well, no chest pain, significant palpitations, presyncope or syncope. Exercising and no exertional symptoms. No complaints today.      Cardiac PMH: (Old records have been reviewed and summarized below)  Ablation (11/26/18):  1. Successful pulmonary vein isolation x4.  2. Creation of a line of bidirectional block at the cavotricuspid isthmus     Ablation (2/18/19):   1. Successful pulmonary vein isolation x4.  2. Creation of a line of bidirectional block at the cavotricuspid isthmus.  3. LA posterior wall isolation.  4. Ablation of AT at the CS ostium.     TTE (10/7/24): EF 60-65%, moderate sclerosis AV, LA moderately dilated     Reviewed office note Dr Hodge 12/31/24     Past Medical History, Past Surgical History, Family history, Social History, and Medications were all reviewed with the patient today and updated as necessary.     Current Outpatient Medications   Medication Sig Dispense Refill    methocarbamol (ROBAXIN) 500 MG tablet Take 1 tablet by mouth 3 times daily as needed      dofetilide (TIKOSYN) 500 MCG capsule TAKE 1 CAPSULE BY MOUTH EVERY 12 HOURS 60 capsule 11    furosemide (LASIX) 20 MG tablet Take 1 tablet by mouth daily 90 tablet 1    busPIRone (BUSPAR) 7.5 MG tablet Take 1 tablet by mouth daily 90 tablet 1    albuterol sulfate HFA (PROVENTIL;VENTOLIN;PROAIR) 108 (90 Base) MCG/ACT inhaler

## 2025-02-27 ENCOUNTER — OFFICE VISIT (OUTPATIENT)
Age: 75
End: 2025-02-27
Payer: MEDICARE

## 2025-02-27 ENCOUNTER — HOSPITAL ENCOUNTER (OUTPATIENT)
Dept: PHYSICAL THERAPY | Age: 75
Setting detail: RECURRING SERIES
End: 2025-02-27
Payer: MEDICARE

## 2025-02-27 VITALS
HEIGHT: 63 IN | BODY MASS INDEX: 47.13 KG/M2 | HEART RATE: 83 BPM | SYSTOLIC BLOOD PRESSURE: 130 MMHG | DIASTOLIC BLOOD PRESSURE: 88 MMHG | WEIGHT: 266 LBS

## 2025-02-27 DIAGNOSIS — I48.19 ATRIAL FIBRILLATION, PERSISTENT (HCC): Primary | ICD-10-CM

## 2025-02-27 PROCEDURE — G8427 DOCREV CUR MEDS BY ELIG CLIN: HCPCS | Performed by: PHYSICIAN ASSISTANT

## 2025-02-27 PROCEDURE — G8417 CALC BMI ABV UP PARAM F/U: HCPCS | Performed by: PHYSICIAN ASSISTANT

## 2025-02-27 PROCEDURE — 3075F SYST BP GE 130 - 139MM HG: CPT | Performed by: PHYSICIAN ASSISTANT

## 2025-02-27 PROCEDURE — 1123F ACP DISCUSS/DSCN MKR DOCD: CPT | Performed by: PHYSICIAN ASSISTANT

## 2025-02-27 PROCEDURE — 3079F DIAST BP 80-89 MM HG: CPT | Performed by: PHYSICIAN ASSISTANT

## 2025-02-27 PROCEDURE — 1090F PRES/ABSN URINE INCON ASSESS: CPT | Performed by: PHYSICIAN ASSISTANT

## 2025-02-27 PROCEDURE — 99214 OFFICE O/P EST MOD 30 MIN: CPT | Performed by: PHYSICIAN ASSISTANT

## 2025-02-27 PROCEDURE — 93000 ELECTROCARDIOGRAM COMPLETE: CPT | Performed by: PHYSICIAN ASSISTANT

## 2025-02-27 PROCEDURE — 1159F MED LIST DOCD IN RCRD: CPT | Performed by: PHYSICIAN ASSISTANT

## 2025-02-27 PROCEDURE — 3017F COLORECTAL CA SCREEN DOC REV: CPT | Performed by: PHYSICIAN ASSISTANT

## 2025-02-27 PROCEDURE — 1036F TOBACCO NON-USER: CPT | Performed by: PHYSICIAN ASSISTANT

## 2025-02-27 PROCEDURE — 1126F AMNT PAIN NOTED NONE PRSNT: CPT | Performed by: PHYSICIAN ASSISTANT

## 2025-02-27 PROCEDURE — G8399 PT W/DXA RESULTS DOCUMENT: HCPCS | Performed by: PHYSICIAN ASSISTANT

## 2025-02-27 PROCEDURE — 97110 THERAPEUTIC EXERCISES: CPT

## 2025-02-27 PROCEDURE — 1160F RVW MEDS BY RX/DR IN RCRD: CPT | Performed by: PHYSICIAN ASSISTANT

## 2025-02-27 RX ORDER — METHOCARBAMOL 500 MG/1
500 TABLET, FILM COATED ORAL 3 TIMES DAILY PRN
COMMUNITY
Start: 2024-12-31

## 2025-02-27 NOTE — PROGRESS NOTES
Lata Chiu  : 1950  Primary: Medicare Part A And B (Medicare)  Secondary: BCBS [facility] @ [place] [address] [place] [address]  Phone: [phone]  Fax: [phone] Plan Frequency: 2 x per week for 12 weeks    Plan of Care/Certification Expiration Date: 25        Plan of Care/Certification Expiration Date:  Plan of Care/Certification Expiration Date: 25    Frequency/Duration:   Plan Frequency: 2 x per week for 12 weeks      Time In/Out:   Time In: 1430  Time Out: 1530      PT Visit Info:         Visit Count:  12    OUTPATIENT PHYSICAL THERAPY:   Treatment Note 2025       Episode  (low back pain; debility)               Treatment Diagnosis:    No data found  Medical/Referring Diagnosis:    Other secondary scoliosis, site unspecified    Referring Physician:  Seema Hodge MD MD Orders:  PT Eval and Treat   Return MD Appt:  tbd   Date of Onset:  No data recorded   Allergies:   Latex, Ciprofloxacin, Adhesive tape, Bactrim [sulfamethoxazole-trimethoprim], Cephalosporins, Olanzapine-fluoxetine hcl, and Cefaclor  Restrictions/Precautions:   None      Interventions Planned (Treatment may consist of any combination of the following):     See Assessment Note    Subjective Comments:   Pt states she is doing good           Initial Pain Level:     2 /10  Post Session Pain Level:      0 /10  Medications Last Reviewed: 2025  Updated Objective Findings:  None Today  Treatment     THERAPEUTIC EXERCISE: (40 minutes):    Exercises per grid below to improve mobility, strength, balance, and coordination.   Progressed resistance and repetitions as indicated.     2/3/25 2025  2/11/25 2025   2025  2025   2025    Activity/Exercise          Edu          Sci fit 8 min level 6   4 8 min level 6 Scifit level 4 x 8 min  Nustep 8 min level 6  506 steps   Nustep 8 min level 6   Nustep 8 min level 6  Nustep 8 min   Level 6   Center   3/6/2025  9:30 AM Manjit Bowers PA MLMIM [facility] E[facility] DEP   3/6/2025  2:30 PM Janette High, PT SFOSRPT [facility]   4/1/2025  1:30 PM Manjit Bowers PA MLMIM [facility] ECC DEP   4/30/2025  9:00 AM Dewayne Simpson MD END [place] AMB   5/7/2025  1:00 PM Luis Leblanc MD PSCD [facility] AMB   8/27/2025 11:30 AM Emma Martínez PA UCDG [place] AMB

## 2025-02-27 NOTE — THERAPY EVALUATION
Lata Chiu  : 1950  Primary: Medicare Part A And B (Medicare)  [facility] [place] [address] [place] [address]  Phone: [phone]  Fax: [phone] Plan Frequency: 2 x per week for 12 weeks  Plan of Care/Certification Expiration Date: 25        Plan of Care/Certification Expiration Date:  Plan of Care/Certification Expiration Date: 25    Frequency/Duration: Plan Frequency: 2 x per week for 12 weeks      Time In/Out:   Time In: 1430  Time Out: 1530      PT Visit Info:         Visit Count:  12                OUTPATIENT PHYSICAL THERAPY:             Progress Report 2025               Episode (low back pain; debility)         Treatment Diagnosis:     No data found  Medical/Referring Diagnosis:    Other secondary scoliosis, site unspecified [M41.50]    Referring Physician:  Seema Hodge MD MD Orders:  PT Eval and Treat   Return MD Appt:  3/26/25  Date of Onset:    10 years ago   Allergies:  Latex, Ciprofloxacin, Adhesive tape, Bactrim [sulfamethoxazole-trimethoprim], Cephalosporins, Olanzapine-fluoxetine hcl, and Cefaclor  Restrictions/Precautions:    None      Medications Last Reviewed: 2025          OBJECTIVE   Postural observations:  Standing: rounded shoulders, scoliotic thoracolumbar spine, decreased lumbar lordotic curvature, B clawing of toes        ROM:   Eval Date: 2025   Re-Assess Date:2025    Active ROM RIGHT LEFT RIGHT LEFT   Knee Extension WFL WFL     Knee Flexion WFL WFL     Hip Flexion WFL WFL     Hip Abduction WFL WFL            Lumbar Flexion 50%      Lumbar Extension 0     Lumbar Side-bending 75% 50%      Lumbar Rotation 50% 50%         Strength:     Eval Date: 2025 Re-Assess Date:2025      RIGHT LEFT RIGHT LEFT   Knee Flexion (L5-S2) 4/5 4/5 4+ 4+   Knee Extension (L3, L4) 4/5 4-/5 4/5 4/5   Hip Flexion (L1, L2) 3/5  3/5  4-/5 4-/5   Hip Extension 4/5 4/5

## 2025-03-06 ENCOUNTER — HOSPITAL ENCOUNTER (OUTPATIENT)
Dept: PHYSICAL THERAPY | Age: 75
Setting detail: RECURRING SERIES
Discharge: HOME | End: 2025-03-09
Payer: MEDICARE

## 2025-03-06 PROCEDURE — 97110 THERAPEUTIC EXERCISES: CPT

## 2025-03-06 NOTE — PROGRESS NOTES
Lata Chiu  : 1950  Primary: Medicare Part A And B (Medicare)  [facility] [place] [address] [place] [address]  Phone: [phone]  Fax: [phone] Plan Frequency: 2 x per week for 12 weeks    Plan of Care/Certification Expiration Date: 25        Plan of Care/Certification Expiration Date:  Plan of Care/Certification Expiration Date: 25    Frequency/Duration:   Plan Frequency: 2 x per week for 12 weeks      Time In/Out:   Time In: 1440  Time Out: 1530      PT Visit Info:         Visit Count:  13    OUTPATIENT PHYSICAL THERAPY:   Treatment Note 3/6/2025       Episode  (low back pain; debility)               Treatment Diagnosis:    No data found  Medical/Referring Diagnosis:    Other secondary scoliosis, site unspecified    Referring Physician:  Seema Hodge MD MD Orders:  PT Eval and Treat   Return MD Appt:  tbd   Date of Onset:  No data recorded   Allergies:   Latex, Ciprofloxacin, Adhesive tape, Bactrim [sulfamethoxazole-trimethoprim], Cephalosporins, Olanzapine-fluoxetine hcl, and Cefaclor  Restrictions/Precautions:   None      Interventions Planned (Treatment may consist of any combination of the following):     See Assessment Note    Subjective Comments:   Pt states she is doing good-- already went to pool this AM -- has been going 3 x per week.           Initial Pain Level:     0 /10  Post Session Pain Level:      0 /10  Medications Last Reviewed: 3/6/2025  Updated Objective Findings:  None Today  Treatment     THERAPEUTIC EXERCISE: (40 minutes):    Exercises per grid below to improve mobility, strength, balance, and coordination.   Progressed resistance and repetitions as indicated.     2025  2/11/25 2025   2025  2025   2025  3/6/2025    Activity/Exercise          Edu          Sci fit 8 min level 6 Scifit level 4 x 8 min  Nustep 8 min level 6  506 steps   Nustep 8 min level 6

## 2025-03-11 ENCOUNTER — APPOINTMENT (OUTPATIENT)
Dept: PHYSICAL THERAPY | Age: 75
End: 2025-03-11
Payer: MEDICARE

## 2025-03-17 DIAGNOSIS — I10 ESSENTIAL HYPERTENSION: ICD-10-CM

## 2025-03-17 PROBLEM — R05.2 SUBACUTE COUGH: Status: ACTIVE | Noted: 2025-03-17

## 2025-03-17 RX ORDER — LOSARTAN POTASSIUM 100 MG/1
100 TABLET ORAL DAILY
Qty: 90 TABLET | Refills: 3 | Status: SHIPPED | OUTPATIENT
Start: 2025-03-17

## 2025-03-17 NOTE — TELEPHONE ENCOUNTER
Requested Prescriptions     Pending Prescriptions Disp Refills    losartan (COZAAR) 100 MG tablet [Pharmacy Med Name: LOSARTAN POTASSIUM 100 MG T 100 Tablet] 90 tablet 4     Sig: TAKE 1 TABLET BY MOUTH DAILY    Verified rx in last OV date 2/27/25. Pharmacy confirmed. Erx as requested

## 2025-03-18 ENCOUNTER — OFFICE VISIT (OUTPATIENT)
Dept: INTERNAL MEDICINE CLINIC | Facility: CLINIC | Age: 75
End: 2025-03-18
Payer: MEDICARE

## 2025-03-18 VITALS
SYSTOLIC BLOOD PRESSURE: 140 MMHG | OXYGEN SATURATION: 95 % | TEMPERATURE: 98.5 F | HEART RATE: 87 BPM | BODY MASS INDEX: 47.48 KG/M2 | HEIGHT: 63 IN | WEIGHT: 268 LBS | DIASTOLIC BLOOD PRESSURE: 72 MMHG

## 2025-03-18 DIAGNOSIS — J45.20 MILD INTERMITTENT ASTHMA WITHOUT COMPLICATION: ICD-10-CM

## 2025-03-18 DIAGNOSIS — R05.2 SUBACUTE COUGH: Primary | ICD-10-CM

## 2025-03-18 LAB
EXP DATE SOLUTION: NORMAL
EXP DATE SWAB: NORMAL
EXPIRATION DATE: NORMAL
LOT NUMBER POC: NORMAL
LOT NUMBER SOLUTION: NORMAL
LOT NUMBER SWAB: NORMAL
QUICKVUE INFLUENZA TEST: NEGATIVE
SARS-COV-2 RNA, POC: NEGATIVE
VALID INTERNAL CONTROL, POC: YES

## 2025-03-18 PROCEDURE — 3077F SYST BP >= 140 MM HG: CPT | Performed by: INTERNAL MEDICINE

## 2025-03-18 PROCEDURE — 1036F TOBACCO NON-USER: CPT | Performed by: INTERNAL MEDICINE

## 2025-03-18 PROCEDURE — 1090F PRES/ABSN URINE INCON ASSESS: CPT | Performed by: INTERNAL MEDICINE

## 2025-03-18 PROCEDURE — G8399 PT W/DXA RESULTS DOCUMENT: HCPCS | Performed by: INTERNAL MEDICINE

## 2025-03-18 PROCEDURE — 3078F DIAST BP <80 MM HG: CPT | Performed by: INTERNAL MEDICINE

## 2025-03-18 PROCEDURE — 1159F MED LIST DOCD IN RCRD: CPT | Performed by: INTERNAL MEDICINE

## 2025-03-18 PROCEDURE — G8417 CALC BMI ABV UP PARAM F/U: HCPCS | Performed by: INTERNAL MEDICINE

## 2025-03-18 PROCEDURE — 87635 SARS-COV-2 COVID-19 AMP PRB: CPT | Performed by: INTERNAL MEDICINE

## 2025-03-18 PROCEDURE — 87804 INFLUENZA ASSAY W/OPTIC: CPT | Performed by: INTERNAL MEDICINE

## 2025-03-18 PROCEDURE — 99214 OFFICE O/P EST MOD 30 MIN: CPT | Performed by: INTERNAL MEDICINE

## 2025-03-18 PROCEDURE — G8427 DOCREV CUR MEDS BY ELIG CLIN: HCPCS | Performed by: INTERNAL MEDICINE

## 2025-03-18 PROCEDURE — 1123F ACP DISCUSS/DSCN MKR DOCD: CPT | Performed by: INTERNAL MEDICINE

## 2025-03-18 PROCEDURE — 3017F COLORECTAL CA SCREEN DOC REV: CPT | Performed by: INTERNAL MEDICINE

## 2025-03-18 RX ORDER — AMOXICILLIN AND CLAVULANATE POTASSIUM 600; 42.9 MG/5ML; MG/5ML
POWDER, FOR SUSPENSION ORAL
COMMUNITY
Start: 2025-03-17

## 2025-03-18 RX ORDER — BENZONATATE 200 MG/1
CAPSULE ORAL
COMMUNITY

## 2025-03-18 RX ORDER — PREDNISONE 10 MG/1
TABLET ORAL
Qty: 21 EACH | Refills: 0 | Status: SHIPPED | OUTPATIENT
Start: 2025-03-18

## 2025-03-18 RX ORDER — HYDROCODONE BITARTRATE AND HOMATROPINE METHYLBROMIDE ORAL SOLUTION 5; 1.5 MG/5ML; MG/5ML
2.5 LIQUID ORAL 2 TIMES DAILY
Qty: 150 ML | Refills: 0 | Status: SHIPPED | OUTPATIENT
Start: 2025-03-18 | End: 2025-04-17

## 2025-03-18 RX ORDER — PREDNISONE 10 MG/1
10 TABLET ORAL DAILY
COMMUNITY
End: 2025-03-18

## 2025-03-18 RX ORDER — DOXYCYCLINE 100 MG/1
CAPSULE ORAL
COMMUNITY
Start: 2025-03-14

## 2025-03-18 RX ORDER — ALBUTEROL SULFATE 2.5 MG/.5ML
2.5 SOLUTION RESPIRATORY (INHALATION) ONCE
Status: DISCONTINUED | OUTPATIENT
Start: 2025-03-18 | End: 2025-03-19

## 2025-03-18 RX ADMIN — ALBUTEROL SULFATE 2.5 MG: 0.83 SOLUTION RESPIRATORY (INHALATION) at 15:30

## 2025-03-18 SDOH — ECONOMIC STABILITY: FOOD INSECURITY: WITHIN THE PAST 12 MONTHS, YOU WORRIED THAT YOUR FOOD WOULD RUN OUT BEFORE YOU GOT MONEY TO BUY MORE.: NEVER TRUE

## 2025-03-18 SDOH — ECONOMIC STABILITY: FOOD INSECURITY: WITHIN THE PAST 12 MONTHS, THE FOOD YOU BOUGHT JUST DIDN'T LAST AND YOU DIDN'T HAVE MONEY TO GET MORE.: NEVER TRUE

## 2025-03-18 ASSESSMENT — PATIENT HEALTH QUESTIONNAIRE - PHQ9
3. TROUBLE FALLING OR STAYING ASLEEP: NOT AT ALL
4. FEELING TIRED OR HAVING LITTLE ENERGY: NOT AT ALL
SUM OF ALL RESPONSES TO PHQ QUESTIONS 1-9: 0
SUM OF ALL RESPONSES TO PHQ QUESTIONS 1-9: 0
8. MOVING OR SPEAKING SO SLOWLY THAT OTHER PEOPLE COULD HAVE NOTICED. OR THE OPPOSITE, BEING SO FIGETY OR RESTLESS THAT YOU HAVE BEEN MOVING AROUND A LOT MORE THAN USUAL: NOT AT ALL
6. FEELING BAD ABOUT YOURSELF - OR THAT YOU ARE A FAILURE OR HAVE LET YOURSELF OR YOUR FAMILY DOWN: NOT AT ALL
10. IF YOU CHECKED OFF ANY PROBLEMS, HOW DIFFICULT HAVE THESE PROBLEMS MADE IT FOR YOU TO DO YOUR WORK, TAKE CARE OF THINGS AT HOME, OR GET ALONG WITH OTHER PEOPLE: NOT DIFFICULT AT ALL
SUM OF ALL RESPONSES TO PHQ QUESTIONS 1-9: 0
7. TROUBLE CONCENTRATING ON THINGS, SUCH AS READING THE NEWSPAPER OR WATCHING TELEVISION: NOT AT ALL
5. POOR APPETITE OR OVEREATING: NOT AT ALL
2. FEELING DOWN, DEPRESSED OR HOPELESS: NOT AT ALL
1. LITTLE INTEREST OR PLEASURE IN DOING THINGS: NOT AT ALL
SUM OF ALL RESPONSES TO PHQ QUESTIONS 1-9: 0
9. THOUGHTS THAT YOU WOULD BE BETTER OFF DEAD, OR OF HURTING YOURSELF: NOT AT ALL

## 2025-03-18 ASSESSMENT — ENCOUNTER SYMPTOMS
COUGH: 1
SHORTNESS OF BREATH: 1

## 2025-03-18 NOTE — PROGRESS NOTES
(obstructive sleep apnea)    Mild intermittent asthma without complication    Status post left knee replacement    Tendinitis of foot    DDD (degenerative disc disease), lumbar    Calculus of gallbladder with acute on chronic cholecystitis without obstruction    Current use of long term anticoagulation    Depression, unspecified    Sleep apnea    Edema    Subacute cough         Review of Systems   Constitutional:  Negative for chills and fever.   Respiratory:  Positive for cough and shortness of breath. Negative for wheezing.    Cardiovascular:  Negative for chest pain.         OBJECTIVE:  BP (!) 140/72 (BP Site: Left Upper Arm, Patient Position: Sitting)   Pulse 87   Temp 98.5 °F (36.9 °C) (Oral)   Ht 1.6 m (5' 3\")   Wt 121.6 kg (268 lb)   SpO2 95%   BMI 47.47 kg/m²      Physical Exam  Cardiovascular:      Rate and Rhythm: Normal rate. Rhythm irregularly irregular.   Pulmonary:      Effort: Pulmonary effort is normal.      Breath sounds: Wheezing present.      Comments: Scattered wheezes  Neurological:      Mental Status: She is alert.          Medical problems and test results were reviewed with the patient today.     No results found for this or any previous visit (from the past 4 weeks).      ASSESSMENT and PLAN    1. Subacute cough  The following orders have not been finalized:  -     albuterol (PROVENTIL) nebulizer solution 2.5 mg  2. Mild intermittent asthma without complication  The following orders have not been finalized:  -     albuterol (PROVENTIL) nebulizer solution 2.5 mg     Breathing is better as is cough after treatment. Will see how she is doing tomorrow and check CXR results.   Will try prednisone again,   May need home nebulizer therapy, she will call tomorrow with an update  Try Hycodan for night time cough  No follow-ups on file.

## 2025-03-19 ENCOUNTER — RESULTS FOLLOW-UP (OUTPATIENT)
Dept: INTERNAL MEDICINE CLINIC | Facility: CLINIC | Age: 75
End: 2025-03-19

## 2025-03-19 RX ORDER — ALBUTEROL SULFATE 0.83 MG/ML
2.5 SOLUTION RESPIRATORY (INHALATION) ONCE
Status: COMPLETED | OUTPATIENT
Start: 2025-03-18 | End: 2025-03-18

## 2025-03-19 ASSESSMENT — ENCOUNTER SYMPTOMS: WHEEZING: 0

## 2025-03-20 ENCOUNTER — APPOINTMENT (OUTPATIENT)
Dept: PHYSICAL THERAPY | Age: 75
End: 2025-03-20
Payer: MEDICARE

## 2025-03-24 ENCOUNTER — APPOINTMENT (OUTPATIENT)
Dept: PHYSICAL THERAPY | Age: 75
End: 2025-03-24
Payer: MEDICARE

## 2025-03-25 ENCOUNTER — TELEPHONE (OUTPATIENT)
Dept: INTERNAL MEDICINE CLINIC | Facility: CLINIC | Age: 75
End: 2025-03-25

## 2025-03-25 ENCOUNTER — TELEMEDICINE (OUTPATIENT)
Dept: INTERNAL MEDICINE CLINIC | Facility: CLINIC | Age: 75
End: 2025-03-25
Payer: MEDICARE

## 2025-03-25 ENCOUNTER — TRANSCRIBE ORDERS (OUTPATIENT)
Dept: SCHEDULING | Age: 75
End: 2025-03-25

## 2025-03-25 DIAGNOSIS — E55.9 VITAMIN D DEFICIENCY: ICD-10-CM

## 2025-03-25 DIAGNOSIS — R73.01 IMPAIRED FASTING BLOOD SUGAR: ICD-10-CM

## 2025-03-25 DIAGNOSIS — R05.2 SUBACUTE COUGH: ICD-10-CM

## 2025-03-25 DIAGNOSIS — I10 ESSENTIAL (PRIMARY) HYPERTENSION: ICD-10-CM

## 2025-03-25 DIAGNOSIS — I10 ESSENTIAL HYPERTENSION: ICD-10-CM

## 2025-03-25 DIAGNOSIS — E89.0 HYPOTHYROIDISM FOLLOWING RADIOIODINE THERAPY: ICD-10-CM

## 2025-03-25 DIAGNOSIS — B37.0 ORAL YEAST INFECTION: ICD-10-CM

## 2025-03-25 DIAGNOSIS — Z00.00 MEDICARE ANNUAL WELLNESS VISIT, SUBSEQUENT: Primary | ICD-10-CM

## 2025-03-25 DIAGNOSIS — E78.2 MIXED HYPERLIPIDEMIA: ICD-10-CM

## 2025-03-25 DIAGNOSIS — Z12.31 VISIT FOR SCREENING MAMMOGRAM: Primary | ICD-10-CM

## 2025-03-25 PROBLEM — M54.9 CHRONIC BACK PAIN: Status: ACTIVE | Noted: 2025-03-25

## 2025-03-25 PROBLEM — G89.29 CHRONIC BACK PAIN: Status: ACTIVE | Noted: 2025-03-25

## 2025-03-25 PROBLEM — M79.7 FIBROMYALGIA: Status: ACTIVE | Noted: 2022-03-15

## 2025-03-25 PROBLEM — G89.4 CHRONIC PAIN DISORDER: Status: ACTIVE | Noted: 2022-03-15

## 2025-03-25 PROCEDURE — 1123F ACP DISCUSS/DSCN MKR DOCD: CPT | Performed by: PHYSICIAN ASSISTANT

## 2025-03-25 PROCEDURE — G8417 CALC BMI ABV UP PARAM F/U: HCPCS | Performed by: PHYSICIAN ASSISTANT

## 2025-03-25 PROCEDURE — 99214 OFFICE O/P EST MOD 30 MIN: CPT | Performed by: PHYSICIAN ASSISTANT

## 2025-03-25 PROCEDURE — G0439 PPPS, SUBSEQ VISIT: HCPCS | Performed by: PHYSICIAN ASSISTANT

## 2025-03-25 PROCEDURE — 1090F PRES/ABSN URINE INCON ASSESS: CPT | Performed by: PHYSICIAN ASSISTANT

## 2025-03-25 PROCEDURE — G8399 PT W/DXA RESULTS DOCUMENT: HCPCS | Performed by: PHYSICIAN ASSISTANT

## 2025-03-25 PROCEDURE — 1160F RVW MEDS BY RX/DR IN RCRD: CPT | Performed by: PHYSICIAN ASSISTANT

## 2025-03-25 PROCEDURE — 1159F MED LIST DOCD IN RCRD: CPT | Performed by: PHYSICIAN ASSISTANT

## 2025-03-25 PROCEDURE — 1036F TOBACCO NON-USER: CPT | Performed by: PHYSICIAN ASSISTANT

## 2025-03-25 PROCEDURE — 3017F COLORECTAL CA SCREEN DOC REV: CPT | Performed by: PHYSICIAN ASSISTANT

## 2025-03-25 PROCEDURE — G2211 COMPLEX E/M VISIT ADD ON: HCPCS | Performed by: PHYSICIAN ASSISTANT

## 2025-03-25 PROCEDURE — G8427 DOCREV CUR MEDS BY ELIG CLIN: HCPCS | Performed by: PHYSICIAN ASSISTANT

## 2025-03-25 RX ORDER — FLUCONAZOLE 150 MG/1
150 TABLET ORAL DAILY
Qty: 3 TABLET | Refills: 0 | Status: SHIPPED | OUTPATIENT
Start: 2025-03-25 | End: 2025-03-28

## 2025-03-25 ASSESSMENT — PATIENT HEALTH QUESTIONNAIRE - PHQ9
5. POOR APPETITE OR OVEREATING: NOT AT ALL
SUM OF ALL RESPONSES TO PHQ QUESTIONS 1-9: 0
4. FEELING TIRED OR HAVING LITTLE ENERGY: NOT AT ALL
SUM OF ALL RESPONSES TO PHQ QUESTIONS 1-9: 0
8. MOVING OR SPEAKING SO SLOWLY THAT OTHER PEOPLE COULD HAVE NOTICED. OR THE OPPOSITE, BEING SO FIGETY OR RESTLESS THAT YOU HAVE BEEN MOVING AROUND A LOT MORE THAN USUAL: NOT AT ALL
3. TROUBLE FALLING OR STAYING ASLEEP: NOT AT ALL
SUM OF ALL RESPONSES TO PHQ QUESTIONS 1-9: 0
2. FEELING DOWN, DEPRESSED OR HOPELESS: NOT AT ALL
6. FEELING BAD ABOUT YOURSELF - OR THAT YOU ARE A FAILURE OR HAVE LET YOURSELF OR YOUR FAMILY DOWN: NOT AT ALL
7. TROUBLE CONCENTRATING ON THINGS, SUCH AS READING THE NEWSPAPER OR WATCHING TELEVISION: NOT AT ALL
9. THOUGHTS THAT YOU WOULD BE BETTER OFF DEAD, OR OF HURTING YOURSELF: NOT AT ALL
10. IF YOU CHECKED OFF ANY PROBLEMS, HOW DIFFICULT HAVE THESE PROBLEMS MADE IT FOR YOU TO DO YOUR WORK, TAKE CARE OF THINGS AT HOME, OR GET ALONG WITH OTHER PEOPLE: NOT DIFFICULT AT ALL
SUM OF ALL RESPONSES TO PHQ QUESTIONS 1-9: 0
1. LITTLE INTEREST OR PLEASURE IN DOING THINGS: NOT AT ALL

## 2025-03-25 NOTE — TELEPHONE ENCOUNTER
Per Sital, rx should be for #10 oz and equal parts Lidocaine, benadryl, maalox, and nystatin.    Pharmacy agreed.

## 2025-03-25 NOTE — PROGRESS NOTES
Medicare Annual Wellness Visit    Lata Chiu is here for Medicare AWV (AWV) and Thrush (Oral thrush. Has been on abx and just finished 2 rounds of steroids.)    Assessment & Plan   Medicare annual wellness visit, subsequent  Oral yeast infection  Comments:  Patient has been on 2 rounds of antibiotics and steroids.  Now she is exhibiting thrush in her mouth.  Treat with Diflucan once a day for 3 days  Orders:  -     fluconazole (DIFLUCAN) 150 MG tablet; Take 1 tablet by mouth daily for 3 days, Disp-3 tablet, R-0Normal  -     Magic Mouthwash (MIRACLE MOUTHWASH); Swish and spit 5 mLs 4 times daily as needed for Irritation, Disp-8 mL, R-0Normal  Subacute cough  Essential (primary) hypertension  Essential hypertension  Hypothyroidism following radioiodine therapy  -     TSH; Future  -     T4, Free; Future  Vitamin D deficiency  -     Vitamin D 25 Hydroxy; Future  Mixed hyperlipidemia  -     Comprehensive Metabolic Panel; Future  -     CBC with Auto Differential; Future  -     Lipid Panel; Future  Impaired fasting blood sugar  -     Comprehensive Metabolic Panel; Future  -     CBC with Auto Differential; Future  -     Hemoglobin A1C; Future     No follow-ups on file.     Subjective       Patient's complete Health Risk Assessment and screening values have been reviewed and are found in Flowsheets. The following problems were reviewed today and where indicated follow up appointments were made and/or referrals ordered.    Positive Risk Factor Screenings with Interventions:    Fall Risk:  Do you feel unsteady or are you worried about falling? : (!) yes  2 or more falls in past year?: no  Fall with injury in past year?: no     Interventions:    Reviewed medications, home hazards, visual acuity, and co-morbidities that can increase risk for falls  See AVS for additional education material         Controlled Medication Review:    Today's Pain Level: No data recorded   Opioid Risk: (Low risk score <55) Opioid risk

## 2025-03-27 ENCOUNTER — TELEPHONE (OUTPATIENT)
Dept: INTERNAL MEDICINE CLINIC | Facility: CLINIC | Age: 75
End: 2025-03-27

## 2025-03-28 ENCOUNTER — RESULTS FOLLOW-UP (OUTPATIENT)
Dept: INTERNAL MEDICINE CLINIC | Facility: CLINIC | Age: 75
End: 2025-03-28

## 2025-03-28 ENCOUNTER — HOSPITAL ENCOUNTER (OUTPATIENT)
Dept: PHYSICAL THERAPY | Age: 75
Setting detail: RECURRING SERIES
Discharge: HOME | End: 2025-03-31
Payer: MEDICARE

## 2025-03-28 DIAGNOSIS — R73.01 IMPAIRED FASTING BLOOD SUGAR: ICD-10-CM

## 2025-03-28 DIAGNOSIS — E89.0 HYPOTHYROIDISM FOLLOWING RADIOIODINE THERAPY: ICD-10-CM

## 2025-03-28 DIAGNOSIS — E78.2 MIXED HYPERLIPIDEMIA: ICD-10-CM

## 2025-03-28 DIAGNOSIS — E55.9 VITAMIN D DEFICIENCY: ICD-10-CM

## 2025-03-28 LAB
25(OH)D3 SERPL-MCNC: 13.6 NG/ML (ref 30–100)
ALBUMIN SERPL-MCNC: 2.9 G/DL (ref 3.2–4.6)
ALBUMIN/GLOB SERPL: 0.9 (ref 1–1.9)
ALP SERPL-CCNC: 95 U/L (ref 35–104)
ALT SERPL-CCNC: 24 U/L (ref 8–45)
ANION GAP SERPL CALC-SCNC: 8 MMOL/L (ref 7–16)
AST SERPL-CCNC: 27 U/L (ref 15–37)
BASOPHILS # BLD: 0.02 K/UL (ref 0–0.2)
BASOPHILS NFR BLD: 0.2 % (ref 0–2)
BILIRUB SERPL-MCNC: 0.7 MG/DL (ref 0–1.2)
BUN SERPL-MCNC: 15 MG/DL (ref 8–23)
CALCIUM SERPL-MCNC: 9.2 MG/DL (ref 8.8–10.2)
CHLORIDE SERPL-SCNC: 100 MMOL/L (ref 98–107)
CHOLEST SERPL-MCNC: 217 MG/DL (ref 0–200)
CO2 SERPL-SCNC: 28 MMOL/L (ref 20–29)
CREAT SERPL-MCNC: 0.8 MG/DL (ref 0.6–1.1)
DIFFERENTIAL METHOD BLD: ABNORMAL
EOSINOPHIL # BLD: 0.19 K/UL (ref 0–0.8)
EOSINOPHIL NFR BLD: 1.9 % (ref 0.5–7.8)
ERYTHROCYTE [DISTWIDTH] IN BLOOD BY AUTOMATED COUNT: 14.3 % (ref 11.9–14.6)
EST. AVERAGE GLUCOSE BLD GHB EST-MCNC: 113 MG/DL
GLOBULIN SER CALC-MCNC: 3.2 G/DL (ref 2.3–3.5)
GLUCOSE SERPL-MCNC: 91 MG/DL (ref 70–99)
HBA1C MFR BLD: 5.6 % (ref 0–5.6)
HCT VFR BLD AUTO: 45 % (ref 35.8–46.3)
HDLC SERPL-MCNC: 67 MG/DL (ref 40–60)
HDLC SERPL: 3.2 (ref 0–5)
HGB BLD-MCNC: 14.1 G/DL (ref 11.7–15.4)
IMM GRANULOCYTES # BLD AUTO: 0.07 K/UL (ref 0–0.5)
IMM GRANULOCYTES NFR BLD AUTO: 0.7 % (ref 0–5)
LDLC SERPL CALC-MCNC: 110 MG/DL (ref 0–100)
LYMPHOCYTES # BLD: 1.38 K/UL (ref 0.5–4.6)
LYMPHOCYTES NFR BLD: 14 % (ref 13–44)
MCH RBC QN AUTO: 29.6 PG (ref 26.1–32.9)
MCHC RBC AUTO-ENTMCNC: 31.3 G/DL (ref 31.4–35)
MCV RBC AUTO: 94.3 FL (ref 82–102)
MONOCYTES # BLD: 0.82 K/UL (ref 0.1–1.3)
MONOCYTES NFR BLD: 8.3 % (ref 4–12)
NEUTS SEG # BLD: 7.37 K/UL (ref 1.7–8.2)
NEUTS SEG NFR BLD: 74.9 % (ref 43–78)
NRBC # BLD: 0 K/UL (ref 0–0.2)
PLATELET # BLD AUTO: 221 K/UL (ref 150–450)
PMV BLD AUTO: 10.7 FL (ref 9.4–12.3)
POTASSIUM SERPL-SCNC: 4.4 MMOL/L (ref 3.5–5.1)
PROT SERPL-MCNC: 6.1 G/DL (ref 6.3–8.2)
RBC # BLD AUTO: 4.77 M/UL (ref 4.05–5.2)
SODIUM SERPL-SCNC: 136 MMOL/L (ref 136–145)
T4 FREE SERPL-MCNC: 1.8 NG/DL (ref 0.9–1.7)
TRIGL SERPL-MCNC: 200 MG/DL (ref 0–150)
TSH, 3RD GENERATION: 1.63 UIU/ML (ref 0.27–4.2)
VLDLC SERPL CALC-MCNC: 40 MG/DL (ref 6–23)
WBC # BLD AUTO: 9.9 K/UL (ref 4.3–11.1)

## 2025-03-28 PROCEDURE — 97110 THERAPEUTIC EXERCISES: CPT

## 2025-03-28 NOTE — PROGRESS NOTES
Lata Chiu  : 1950  Primary: Medicare Part A And B (Medicare)  Secondary: [facility] @ [place] [address] [place] [address]  Phone: [phone]  Fax: [phone] Plan Frequency: 2 x per week for 12 weeks    Plan of Care/Certification Expiration Date: 25        Plan of Care/Certification Expiration Date:  Plan of Care/Certification Expiration Date: 25    Frequency/Duration:   Plan Frequency: 2 x per week for 12 weeks      Time In/Out:   Time In: 1120  Time Out: 1205      PT Visit Info:         Visit Count:  14    OUTPATIENT PHYSICAL THERAPY:   Treatment Note 3/28/2025       Episode  (low back pain; debility)               Treatment Diagnosis:    No data found  Medical/Referring Diagnosis:    Other secondary scoliosis, site unspecified    Referring Physician:  Seema Hodge MD MD Orders:  PT Eval and Treat   Return MD Appt:  tbd   Date of Onset:  No data recorded   Allergies:   Latex, Ciprofloxacin, Adhesive tape, Bactrim [sulfamethoxazole-trimethoprim], Cephalosporins, Olanzapine-fluoxetine hcl, and Cefaclor  Restrictions/Precautions:   None      Interventions Planned (Treatment may consist of any combination of the following):     See Assessment Note    Subjective Comments:   Pt states that she was sick with respiratory virus for the last 2 weeks and has not performed her HEP during that time-- today was the first day she has been able to eat regularly.        Saw Dr. Hodge few days ago and she was happy with pts progress            Initial Pain Level:     0 /10  Post Session Pain Level:      0 /10  Medications Last Reviewed: 3/28/2025  Updated Objective Findings:  None Today  Treatment     THERAPEUTIC EXERCISE: (38 minutes):    Exercises per grid below to improve mobility, strength, balance, and coordination.   Progressed resistance and repetitions as indicated.     25

## 2025-03-31 ENCOUNTER — HOSPITAL ENCOUNTER (OUTPATIENT)
Dept: PHYSICAL THERAPY | Age: 75
Setting detail: RECURRING SERIES
Discharge: HOME | End: 2025-04-03
Payer: MEDICARE

## 2025-03-31 DIAGNOSIS — M41.85 OTHER FORMS OF SCOLIOSIS, THORACOLUMBAR REGION: Primary | ICD-10-CM

## 2025-03-31 DIAGNOSIS — R26.2 DIFFICULTY IN WALKING, NOT ELSEWHERE CLASSIFIED: ICD-10-CM

## 2025-03-31 DIAGNOSIS — R53.81 DEBILITY: ICD-10-CM

## 2025-03-31 PROCEDURE — 97110 THERAPEUTIC EXERCISES: CPT

## 2025-03-31 NOTE — PROGRESS NOTES
4/7/2025  1:45 PM Geliza, Janette A, PT SFOSRPT [facility]   4/10/2025  2:30 PM Gebert, Janette A, PT SFOSRPT [facility]   4/14/2025  2:30 PM Gebert, Janette A, PT SFOSRPT [facility]   4/16/2025  2:30 PM Gebert, Janette A, PT SFOSRPT [facility]   4/30/2025  9:00 AM Dewayne Simpson MD END [facility] AMB   5/7/2025  1:00 PM Luis Leblanc MD PSCD [facility] AMB   5/29/2025 10:30 AM SFE [facility] ROOM 3 SFERMAM [facility]   8/27/2025 11:30 AM Emma Martínez PA DG [facility] AMB

## 2025-04-01 ENCOUNTER — OFFICE VISIT (OUTPATIENT)
Dept: INTERNAL MEDICINE CLINIC | Facility: CLINIC | Age: 75
End: 2025-04-01
Payer: MEDICARE

## 2025-04-01 VITALS
HEART RATE: 95 BPM | WEIGHT: 269 LBS | SYSTOLIC BLOOD PRESSURE: 130 MMHG | BODY MASS INDEX: 47.66 KG/M2 | OXYGEN SATURATION: 96 % | DIASTOLIC BLOOD PRESSURE: 86 MMHG | HEIGHT: 63 IN

## 2025-04-01 DIAGNOSIS — G89.29 CHRONIC LOW BACK PAIN WITHOUT SCIATICA, UNSPECIFIED BACK PAIN LATERALITY: ICD-10-CM

## 2025-04-01 DIAGNOSIS — E89.0 HYPOTHYROIDISM FOLLOWING RADIOIODINE THERAPY: ICD-10-CM

## 2025-04-01 DIAGNOSIS — E55.9 VITAMIN D DEFICIENCY: ICD-10-CM

## 2025-04-01 DIAGNOSIS — E78.2 MIXED HYPERLIPIDEMIA: ICD-10-CM

## 2025-04-01 DIAGNOSIS — M54.50 CHRONIC LOW BACK PAIN WITHOUT SCIATICA, UNSPECIFIED BACK PAIN LATERALITY: ICD-10-CM

## 2025-04-01 DIAGNOSIS — I10 ESSENTIAL (PRIMARY) HYPERTENSION: Primary | ICD-10-CM

## 2025-04-01 DIAGNOSIS — J45.20 MILD INTERMITTENT ASTHMA WITHOUT COMPLICATION: ICD-10-CM

## 2025-04-01 DIAGNOSIS — R73.01 IMPAIRED FASTING BLOOD SUGAR: ICD-10-CM

## 2025-04-01 DIAGNOSIS — F32.5 MAJOR DEPRESSIVE DISORDER, SINGLE EPISODE, IN FULL REMISSION: ICD-10-CM

## 2025-04-01 PROCEDURE — 99214 OFFICE O/P EST MOD 30 MIN: CPT | Performed by: PHYSICIAN ASSISTANT

## 2025-04-01 PROCEDURE — G8417 CALC BMI ABV UP PARAM F/U: HCPCS | Performed by: PHYSICIAN ASSISTANT

## 2025-04-01 PROCEDURE — 3079F DIAST BP 80-89 MM HG: CPT | Performed by: PHYSICIAN ASSISTANT

## 2025-04-01 PROCEDURE — 3075F SYST BP GE 130 - 139MM HG: CPT | Performed by: PHYSICIAN ASSISTANT

## 2025-04-01 PROCEDURE — G2211 COMPLEX E/M VISIT ADD ON: HCPCS | Performed by: PHYSICIAN ASSISTANT

## 2025-04-01 PROCEDURE — G8427 DOCREV CUR MEDS BY ELIG CLIN: HCPCS | Performed by: PHYSICIAN ASSISTANT

## 2025-04-01 PROCEDURE — 1036F TOBACCO NON-USER: CPT | Performed by: PHYSICIAN ASSISTANT

## 2025-04-01 PROCEDURE — G8399 PT W/DXA RESULTS DOCUMENT: HCPCS | Performed by: PHYSICIAN ASSISTANT

## 2025-04-01 PROCEDURE — 1090F PRES/ABSN URINE INCON ASSESS: CPT | Performed by: PHYSICIAN ASSISTANT

## 2025-04-01 PROCEDURE — 1123F ACP DISCUSS/DSCN MKR DOCD: CPT | Performed by: PHYSICIAN ASSISTANT

## 2025-04-01 PROCEDURE — 3017F COLORECTAL CA SCREEN DOC REV: CPT | Performed by: PHYSICIAN ASSISTANT

## 2025-04-01 PROCEDURE — 1159F MED LIST DOCD IN RCRD: CPT | Performed by: PHYSICIAN ASSISTANT

## 2025-04-01 RX ORDER — ERGOCALCIFEROL 1.25 MG/1
50000 CAPSULE, LIQUID FILLED ORAL WEEKLY
Qty: 12 CAPSULE | Refills: 1 | Status: SHIPPED | OUTPATIENT
Start: 2025-04-01

## 2025-04-01 ASSESSMENT — ENCOUNTER SYMPTOMS
NAUSEA: 0
COLOR CHANGE: 0
CHEST TIGHTNESS: 0
VOMITING: 0
EYE PAIN: 0
DIARRHEA: 0
CONSTIPATION: 0
VOICE CHANGE: 0
COUGH: 0
ABDOMINAL PAIN: 0
SHORTNESS OF BREATH: 0
WHEEZING: 0
SORE THROAT: 0

## 2025-04-01 NOTE — PROGRESS NOTES
PROGRESS NOTE    Chief Complaint   Patient presents with    Cholesterol Problem     Ext lab f/u    Shortness of Breath     Still having SOB with exertion    Fatigue     No energy. Noticed while sick        HPI  History of Present Illness  The patient is a 74-year-old female who presents for follow-up on her hyperlipidemia, vitamin D levels, blood glucose levels, and thyroid disorder.    Respiratory Issues  - Persistent wheezing and coughing  - Lack of energy  - Under the care of Dr. Medeiros, a pulmonologist  - Uncertain about her next appointment  - Increased susceptibility to illnesses, including viral infections  - Excessive fatigue and desire to sleep, attributed to recent illness  - Two courses of prednisone, believed to have disrupted overall health    Cholesterol Management  - Never been prescribed cholesterol medication  - Unsure of tolerance to cholesterol medication    Physical Therapy and Back Pain  - Undergoing physical therapy for her back  - Focus on improving ability to rise from a chair and walk  - Experiences breathlessness during these activities  - Physical therapist recommended walking for 1 to 2 minutes, followed by balance exercises  - Prescribed muscle relaxers, which are effective  - Scheduled for injections in lower back with pain management  - Delay in receiving injections due to merger with [facility]  - Informed of contact within 2 weeks for an appointment, but it has been a month without communication  - Received another call stating they would contact her soon  - Previously received injections in lower back from Dr. Hernandez  - Diagnosed with scoliosis causing muscle spasms in hip  - Muscle relaxers beneficial in managing spasms    Supplemental information: She performs monthly breast exams and maintains daily hygiene practices. She is up-to-date with her mammogram screenings.    MEDICATIONS  Current: Prednisone      Past Medical History, Past Surgical History, Family history,

## 2025-04-03 ENCOUNTER — HOSPITAL ENCOUNTER (OUTPATIENT)
Dept: PHYSICAL THERAPY | Age: 75
Setting detail: RECURRING SERIES
Discharge: HOME | End: 2025-04-06
Payer: MEDICARE

## 2025-04-03 PROCEDURE — 97530 THERAPEUTIC ACTIVITIES: CPT

## 2025-04-03 PROCEDURE — 97110 THERAPEUTIC EXERCISES: CPT

## 2025-04-03 NOTE — PROGRESS NOTES
provided today:  None  Recommendations/Intent for next treatment session: Next visit will focus on ROM for pain modulation, general core and LE strengthening, conditioning.    >Total Treatment Billable Duration: 45 minutes (10 min for discussion not billed)  Time In: 1430  Time Out: 1525     DARIEN ODEN PT         Charge Capture  Events  [facility] Portal  Appt Desk  Attendance Report     Future Appointments   Date Time Provider Department Center   4/7/2025  1:45 PM Darien Oden, PT [facility]   4/10/2025  2:30 PM Darien Oden, PT [facility]   4/14/2025  2:30 PM Darien Oden, PT [facility]   4/16/2025  2:30 PM Darien Oden, PT [facility]   4/30/2025  9:00 AM Dewayne Simpson MD END [facility]   5/7/2025  1:00 PM Luis Leblanc MD PS[facility]   5/29/2025 10:30 AM [facility] ROOM 3 [facility]   7/3/2025 10:30 AM Manjit Bowers PA [facility]   8/27/2025 11:30 AM Emma Martínez PA [facility]

## 2025-04-07 ENCOUNTER — HOSPITAL ENCOUNTER (OUTPATIENT)
Dept: PHYSICAL THERAPY | Age: 75
Setting detail: RECURRING SERIES
Discharge: HOME | End: 2025-04-10
Payer: MEDICARE

## 2025-04-07 PROCEDURE — 97530 THERAPEUTIC ACTIVITIES: CPT

## 2025-04-07 PROCEDURE — 97110 THERAPEUTIC EXERCISES: CPT

## 2025-04-07 NOTE — PROGRESS NOTES
session: Next visit will focus on ROM for pain modulation, general core and LE strengthening, conditioning.    >Total Treatment Billable Duration: 38 min   Time In: 1350  Time Out: 1430     DARIEN ODEN PT         Charge Capture  Events  [email or web address] Portal  Appt Desk  Attendance Report     Future Appointments   Date Time Provider Department Center   4/10/2025  2:30 PM Darien Oden, PT [facility]   4/14/2025  2:30 PM Darien Oden, PT [facility]   4/16/2025  2:30 PM Darien Oden, PT [facility]   4/30/2025  9:00 AM Dewayne Simpson MD END [facility]   5/7/2025  1:00 PM Luis Leblanc MD [facility]   5/29/2025 10:30 AM [facility] ROOM 3 [facility]   7/3/2025 10:30 AM Manjit Bowers PA [facility]   8/27/2025 11:30 AM Emma Martínez PA UCD[facility]

## 2025-04-10 ENCOUNTER — HOSPITAL ENCOUNTER (OUTPATIENT)
Dept: PHYSICAL THERAPY | Age: 75
Setting detail: RECURRING SERIES
Discharge: HOME | End: 2025-04-13
Payer: MEDICARE

## 2025-04-10 PROCEDURE — 97110 THERAPEUTIC EXERCISES: CPT

## 2025-04-10 NOTE — PROGRESS NOTES
Lata Chiu  : 1950  Primary: Medicare Part A And B (Medicare)  [facility] [place] [address] [place] [address]  Phone: [phone]  Fax: [phone] Plan Frequency: 2 x per week for 12 weeks    Plan of Care/Certification Expiration Date: 25        Plan of Care/Certification Expiration Date:  Plan of Care/Certification Expiration Date: 25    Frequency/Duration:   Plan Frequency: 2 x per week for 12 weeks      Time In/Out:   Time In: 1430  Time Out: 1515      PT Visit Info:         Visit Count:  18    OUTPATIENT PHYSICAL THERAPY:   Treatment Note 4/10/2025       Episode  (low back pain; debility)               Treatment Diagnosis:    Other forms of scoliosis, thoracolumbar region  Difficulty in walking, not elsewhere classified  Debility  Medical/Referring Diagnosis:    Other secondary scoliosis, site unspecified    Referring Physician:  Seema Hodge MD MD Orders:  PT Eval and Treat   Return MD Appt:  tbd   Date of Onset:  No data recorded   Allergies:   Latex, Ciprofloxacin, Adhesive tape, Bactrim [sulfamethoxazole-trimethoprim], Cephalosporins, Olanzapine-fluoxetine hcl, and Cefaclor  Restrictions/Precautions:   None      Interventions Planned (Treatment may consist of any combination of the following):     See Assessment Note    Subjective Comments:   Pt reports significant swelling of lower legs after walking activities in therapy.                   Initial Pain Level:     0 /10  Post Session Pain Level:      0 /10  Medications Last Reviewed: 4/10/2025  Updated Objective Findings:  None Today  Treatment     THERAPEUTIC EXERCISE: (38 minutes):    Exercises per grid below to improve mobility, strength, balance, and coordination.   Progressed resistance and repetitions as indicated.     2025  3/6/2025  3/28/25 2025  4/3/2025  2025   4/10/2025    Activity/Exercise          Edu          Sci fit

## 2025-04-14 ENCOUNTER — APPOINTMENT (OUTPATIENT)
Dept: PHYSICAL THERAPY | Age: 75
End: 2025-04-14
Payer: MEDICARE

## 2025-04-16 ENCOUNTER — APPOINTMENT (OUTPATIENT)
Dept: PHYSICAL THERAPY | Age: 75
End: 2025-04-16
Payer: MEDICARE

## 2025-05-06 ENCOUNTER — OFFICE VISIT (OUTPATIENT)
Dept: ENDOCRINOLOGY | Age: 75
End: 2025-05-06
Payer: MEDICARE

## 2025-05-06 VITALS
WEIGHT: 274 LBS | BODY MASS INDEX: 48.55 KG/M2 | DIASTOLIC BLOOD PRESSURE: 84 MMHG | RESPIRATION RATE: 22 BRPM | HEIGHT: 63 IN | OXYGEN SATURATION: 97 % | HEART RATE: 76 BPM | SYSTOLIC BLOOD PRESSURE: 132 MMHG

## 2025-05-06 DIAGNOSIS — R53.82 CHRONIC FATIGUE: ICD-10-CM

## 2025-05-06 DIAGNOSIS — E89.0 HYPOTHYROIDISM FOLLOWING RADIOIODINE THERAPY: Primary | ICD-10-CM

## 2025-05-06 PROCEDURE — 1090F PRES/ABSN URINE INCON ASSESS: CPT | Performed by: INTERNAL MEDICINE

## 2025-05-06 PROCEDURE — G8417 CALC BMI ABV UP PARAM F/U: HCPCS | Performed by: INTERNAL MEDICINE

## 2025-05-06 PROCEDURE — 3079F DIAST BP 80-89 MM HG: CPT | Performed by: INTERNAL MEDICINE

## 2025-05-06 PROCEDURE — G8427 DOCREV CUR MEDS BY ELIG CLIN: HCPCS | Performed by: INTERNAL MEDICINE

## 2025-05-06 PROCEDURE — 1036F TOBACCO NON-USER: CPT | Performed by: INTERNAL MEDICINE

## 2025-05-06 PROCEDURE — 3017F COLORECTAL CA SCREEN DOC REV: CPT | Performed by: INTERNAL MEDICINE

## 2025-05-06 PROCEDURE — 1159F MED LIST DOCD IN RCRD: CPT | Performed by: INTERNAL MEDICINE

## 2025-05-06 PROCEDURE — G8399 PT W/DXA RESULTS DOCUMENT: HCPCS | Performed by: INTERNAL MEDICINE

## 2025-05-06 PROCEDURE — 1123F ACP DISCUSS/DSCN MKR DOCD: CPT | Performed by: INTERNAL MEDICINE

## 2025-05-06 PROCEDURE — 3075F SYST BP GE 130 - 139MM HG: CPT | Performed by: INTERNAL MEDICINE

## 2025-05-06 PROCEDURE — 99214 OFFICE O/P EST MOD 30 MIN: CPT | Performed by: INTERNAL MEDICINE

## 2025-05-06 RX ORDER — LEVOTHYROXINE SODIUM 125 MCG
125 TABLET ORAL DAILY
Qty: 90 TABLET | Refills: 3 | Status: SHIPPED | OUTPATIENT
Start: 2025-05-06

## 2025-05-06 ASSESSMENT — ENCOUNTER SYMPTOMS
DIARRHEA: 0
ROS SKIN COMMENTS: DENIES HAIR LOSS, DRY SKIN.
CONSTIPATION: 0

## 2025-05-06 NOTE — PROGRESS NOTES
0.462, free T4 1.3, free T3 2.7.  4/26/2024: TSH 0.68.  8/6/2024: TSH 1.09.  3/28/2025: TSH 1.630, free T4 1.8, 25-OH vitamin D 13.6.      Review of Systems   Constitutional:  Positive for diaphoresis (rare night sweats) and fatigue.        Weight increased 8 pounds since last visit.  She has been on prednisone twice.   Cardiovascular:  Negative for palpitations.        She has a history of atrial fibrillation status post cardioversion x 2 and ablation x 2.  She still takes Tikosyn and Eliquis.     Gastrointestinal:  Negative for constipation and diarrhea.   Endocrine: Negative for cold intolerance and heat intolerance.   Skin:         Denies hair loss, dry skin.    Neurological:  Negative for tremors.       Vital Signs:  /84 (BP Site: Left Lower Arm, Patient Position: Sitting, BP Cuff Size: Medium Adult)   Pulse 76   Resp 22   Ht 1.6 m (5' 3\")   Wt 124.3 kg (274 lb)   SpO2 97%   BMI 48.54 kg/m²     Wt Readings from Last 3 Encounters:   05/06/25 124.3 kg (274 lb)   04/01/25 122 kg (269 lb)   03/18/25 121.6 kg (268 lb)       Physical Exam  Constitutional:       General: She is not in acute distress.  Neck:      Thyroid: No thyroid mass or thyromegaly.   Cardiovascular:      Rate and Rhythm: Normal rate and regular rhythm.      Heart sounds: Murmur heard.   Lymphadenopathy:      Cervical: No cervical adenopathy.   Neurological:      Motor: No tremor.         Orders Placed This Encounter   Procedures    Vitamin B12     Standing Status:   Future     Expected Date:   5/6/2025     Expiration Date:   5/6/2026    TSH reflex to FT4     Standing Status:   Future     Expected Date:   5/6/2026     Expiration Date:   11/6/2026       Current Outpatient Medications   Medication Sig Dispense Refill    SYNTHROID 125 MCG tablet Take 1 tablet by mouth Daily 90 tablet 3    vitamin D (ERGOCALCIFEROL) 1.25 MG (77408 UT) CAPS capsule Take 1 capsule by mouth once a week 12 capsule 1    benzonatate (TESSALON) 200 MG capsule

## 2025-05-06 NOTE — PROGRESS NOTES
[facility]  [address]  [place] [address]  [phone]    Patient Name:  Lata Chiu  YOB: 1950      Office Visit 5/7/2025    Chief Complaint   Patient presents with    Sleep Apnea     SPLIT NIGHT 1/30/16, .3, LOWEST SAT 73%    DME-MHM  MASK-FF  PRESSURE-11CM H20     HISTORY OF PRESENT ILLNESS:    This pleasant lady came in today for a follow-up visit.    To recap:  This is a pleasant 73-year-old female who is well-known to me with severe sleep apnea .3 in 2016 and has been on CPAP of 11 with C-Flex of 3 and a fullface mask.  Patient has been compliant with PAP therapy in the past and benefiting.    Patient's weight has been fluctuating and has not been able to lose much weight.    Last time I saw her she was having persistent lower back pain.  I did send her to one of the other neurosurgeons for a second opinion, since she also had scoliosis, etc.    At this time:  Currently she has excellent compliance of use 363 out of 365 days and 363 days of more than 4 hours average sleep is 8 hours and 23 minutes.  She is on CPAP at 11 with C-Flex of 3 AHI is down to 1.1.  95th percentile air leak is 13.8 L/min.    She currently reports she feels refreshed and alert every single day.  She has no problems with the airway pressure, her mask or humidity and her Corona score is 4/24.    She is also quite happy that I send her to Dr. Hodge.  She is taking some Robaxin and has not required surgery.  But she indicates that her back pain is resolved.  She is thankful over and over.    She does report that she has been found to have some vitamin D deficiency and is currently taking some supplement hopefully that will help her.    The only issue that she is still having problems with is weight loss.  She indicates she still has not been able to lose weight.  She indicates her daughter wants her to get started with Zepbound since it is for patients with sleep

## 2025-05-07 ENCOUNTER — OFFICE VISIT (OUTPATIENT)
Dept: SLEEP MEDICINE | Age: 75
End: 2025-05-07
Payer: MEDICARE

## 2025-05-07 VITALS
BODY MASS INDEX: 48.37 KG/M2 | SYSTOLIC BLOOD PRESSURE: 139 MMHG | HEIGHT: 63 IN | WEIGHT: 273 LBS | RESPIRATION RATE: 15 BRPM | OXYGEN SATURATION: 92 % | DIASTOLIC BLOOD PRESSURE: 79 MMHG

## 2025-05-07 DIAGNOSIS — E66.01 MORBID OBESITY WITH BMI OF 40.0-44.9, ADULT (HCC): ICD-10-CM

## 2025-05-07 DIAGNOSIS — G47.33 OSA (OBSTRUCTIVE SLEEP APNEA): Primary | ICD-10-CM

## 2025-05-07 DIAGNOSIS — Z87.39 HISTORY OF SCOLIOSIS: ICD-10-CM

## 2025-05-07 DIAGNOSIS — G89.29 CHRONIC RIGHT-SIDED LOW BACK PAIN, UNSPECIFIED WHETHER SCIATICA PRESENT: ICD-10-CM

## 2025-05-07 DIAGNOSIS — R53.82 CHRONIC FATIGUE: ICD-10-CM

## 2025-05-07 DIAGNOSIS — M54.50 CHRONIC RIGHT-SIDED LOW BACK PAIN, UNSPECIFIED WHETHER SCIATICA PRESENT: ICD-10-CM

## 2025-05-07 LAB — VIT B12 SERPL-MCNC: 284 PG/ML (ref 193–986)

## 2025-05-07 PROCEDURE — G8417 CALC BMI ABV UP PARAM F/U: HCPCS | Performed by: INTERNAL MEDICINE

## 2025-05-07 PROCEDURE — G8399 PT W/DXA RESULTS DOCUMENT: HCPCS | Performed by: INTERNAL MEDICINE

## 2025-05-07 PROCEDURE — G2211 COMPLEX E/M VISIT ADD ON: HCPCS | Performed by: INTERNAL MEDICINE

## 2025-05-07 PROCEDURE — 1036F TOBACCO NON-USER: CPT | Performed by: INTERNAL MEDICINE

## 2025-05-07 PROCEDURE — 1160F RVW MEDS BY RX/DR IN RCRD: CPT | Performed by: INTERNAL MEDICINE

## 2025-05-07 PROCEDURE — 1090F PRES/ABSN URINE INCON ASSESS: CPT | Performed by: INTERNAL MEDICINE

## 2025-05-07 PROCEDURE — 99214 OFFICE O/P EST MOD 30 MIN: CPT | Performed by: INTERNAL MEDICINE

## 2025-05-07 PROCEDURE — 3078F DIAST BP <80 MM HG: CPT | Performed by: INTERNAL MEDICINE

## 2025-05-07 PROCEDURE — 3075F SYST BP GE 130 - 139MM HG: CPT | Performed by: INTERNAL MEDICINE

## 2025-05-07 PROCEDURE — 1159F MED LIST DOCD IN RCRD: CPT | Performed by: INTERNAL MEDICINE

## 2025-05-07 PROCEDURE — 3017F COLORECTAL CA SCREEN DOC REV: CPT | Performed by: INTERNAL MEDICINE

## 2025-05-07 PROCEDURE — 1123F ACP DISCUSS/DSCN MKR DOCD: CPT | Performed by: INTERNAL MEDICINE

## 2025-05-07 PROCEDURE — G8427 DOCREV CUR MEDS BY ELIG CLIN: HCPCS | Performed by: INTERNAL MEDICINE

## 2025-05-07 ASSESSMENT — SLEEP AND FATIGUE QUESTIONNAIRES
HOW LIKELY ARE YOU TO NOD OFF OR FALL ASLEEP WHILE SITTING AND TALKING TO SOMEONE: WOULD NEVER DOZE
HOW LIKELY ARE YOU TO NOD OFF OR FALL ASLEEP IN A CAR, WHILE STOPPED FOR A FEW MINUTES IN TRAFFIC: WOULD NEVER DOZE
ESS TOTAL SCORE: 4
HOW LIKELY ARE YOU TO NOD OFF OR FALL ASLEEP WHILE SITTING QUIETLY AFTER LUNCH WITHOUT ALCOHOL: WOULD NEVER DOZE
HOW LIKELY ARE YOU TO NOD OFF OR FALL ASLEEP WHEN YOU ARE A PASSENGER IN A CAR FOR AN HOUR WITHOUT A BREAK: WOULD NEVER DOZE
HOW LIKELY ARE YOU TO NOD OFF OR FALL ASLEEP WHILE SITTING AND READING: MODERATE CHANCE OF DOZING
HOW LIKELY ARE YOU TO NOD OFF OR FALL ASLEEP WHILE LYING DOWN TO REST IN THE AFTERNOON WHEN CIRCUMSTANCES PERMIT: WOULD NEVER DOZE
HOW LIKELY ARE YOU TO NOD OFF OR FALL ASLEEP WHILE SITTING INACTIVE IN A PUBLIC PLACE: WOULD NEVER DOZE
HOW LIKELY ARE YOU TO NOD OFF OR FALL ASLEEP WHILE WATCHING TV: MODERATE CHANCE OF DOZING

## 2025-05-08 ENCOUNTER — RESULTS FOLLOW-UP (OUTPATIENT)
Dept: ENDOCRINOLOGY | Age: 75
End: 2025-05-08

## 2025-05-27 ENCOUNTER — PATIENT MESSAGE (OUTPATIENT)
Dept: SLEEP MEDICINE | Age: 75
End: 2025-05-27

## 2025-05-27 DIAGNOSIS — E66.01 MORBID OBESITY WITH BMI OF 40.0-44.9, ADULT (HCC): ICD-10-CM

## 2025-05-27 DIAGNOSIS — G47.33 OSA (OBSTRUCTIVE SLEEP APNEA): ICD-10-CM

## 2025-05-29 ENCOUNTER — HOSPITAL ENCOUNTER (OUTPATIENT)
Dept: MAMMOGRAPHY | Age: 75
Discharge: HOME | End: 2025-05-29
Payer: MEDICARE

## 2025-05-29 VITALS — BODY MASS INDEX: 46.94 KG/M2 | WEIGHT: 265 LBS

## 2025-05-29 DIAGNOSIS — Z12.31 VISIT FOR SCREENING MAMMOGRAM: ICD-10-CM

## 2025-05-29 PROCEDURE — 77063 BREAST TOMOSYNTHESIS BI: CPT

## 2025-05-30 NOTE — TELEPHONE ENCOUNTER
I have sent a new prescription for Zepbound to [facility] at pt's request.     JOEL Gan

## 2025-06-02 ENCOUNTER — TELEPHONE (OUTPATIENT)
Dept: SLEEP MEDICINE | Age: 75
End: 2025-06-02

## 2025-07-01 ENCOUNTER — HOSPITAL ENCOUNTER (OUTPATIENT)
Dept: SLEEP MEDICINE | Age: 75
Discharge: HOME | End: 2025-07-04
Payer: MEDICARE

## 2025-07-01 PROCEDURE — 95811 POLYSOM 6/>YRS CPAP 4/> PARM: CPT

## 2025-07-02 DIAGNOSIS — R73.01 IMPAIRED FASTING BLOOD SUGAR: ICD-10-CM

## 2025-07-02 DIAGNOSIS — E55.9 VITAMIN D DEFICIENCY: ICD-10-CM

## 2025-07-02 LAB
25(OH)D3 SERPL-MCNC: 44.2 NG/ML (ref 30–100)
ALBUMIN SERPL-MCNC: 3.9 G/DL (ref 3.2–4.6)
ALBUMIN/GLOB SERPL: 1.6 (ref 1–1.9)
ALP SERPL-CCNC: 126 U/L (ref 35–104)
ALT SERPL-CCNC: 22 U/L (ref 8–45)
ANION GAP SERPL CALC-SCNC: 13 MMOL/L (ref 7–16)
AST SERPL-CCNC: 29 U/L (ref 15–37)
BILIRUB SERPL-MCNC: 0.6 MG/DL (ref 0–1.2)
BUN SERPL-MCNC: 13 MG/DL (ref 8–23)
CALCIUM SERPL-MCNC: 9.9 MG/DL (ref 8.8–10.2)
CHLORIDE SERPL-SCNC: 105 MMOL/L (ref 98–107)
CO2 SERPL-SCNC: 23 MMOL/L (ref 20–29)
CREAT SERPL-MCNC: 0.84 MG/DL (ref 0.6–1.1)
EST. AVERAGE GLUCOSE BLD GHB EST-MCNC: 102 MG/DL
GLOBULIN SER CALC-MCNC: 2.5 G/DL (ref 2.3–3.5)
GLUCOSE SERPL-MCNC: 94 MG/DL (ref 70–99)
HBA1C MFR BLD: 5.2 % (ref 0–5.6)
POTASSIUM SERPL-SCNC: 3.9 MMOL/L (ref 3.5–5.1)
PROT SERPL-MCNC: 6.4 G/DL (ref 6.3–8.2)
SODIUM SERPL-SCNC: 141 MMOL/L (ref 136–145)

## 2025-07-03 ENCOUNTER — RESULTS FOLLOW-UP (OUTPATIENT)
Dept: INTERNAL MEDICINE CLINIC | Facility: CLINIC | Age: 75
End: 2025-07-03

## 2025-07-03 ENCOUNTER — OFFICE VISIT (OUTPATIENT)
Dept: INTERNAL MEDICINE CLINIC | Facility: CLINIC | Age: 75
End: 2025-07-03

## 2025-07-03 VITALS
WEIGHT: 260 LBS | BODY MASS INDEX: 46.07 KG/M2 | DIASTOLIC BLOOD PRESSURE: 76 MMHG | HEIGHT: 63 IN | SYSTOLIC BLOOD PRESSURE: 122 MMHG

## 2025-07-03 DIAGNOSIS — Z86.79: ICD-10-CM

## 2025-07-03 DIAGNOSIS — M54.50 CHRONIC LOW BACK PAIN WITHOUT SCIATICA, UNSPECIFIED BACK PAIN LATERALITY: ICD-10-CM

## 2025-07-03 DIAGNOSIS — G89.29 CHRONIC LOW BACK PAIN WITHOUT SCIATICA, UNSPECIFIED BACK PAIN LATERALITY: ICD-10-CM

## 2025-07-03 DIAGNOSIS — R73.01 IMPAIRED FASTING BLOOD SUGAR: Primary | ICD-10-CM

## 2025-07-03 DIAGNOSIS — I10 ESSENTIAL (PRIMARY) HYPERTENSION: ICD-10-CM

## 2025-07-03 DIAGNOSIS — J30.9 ALLERGIC RHINITIS, UNSPECIFIED SEASONALITY, UNSPECIFIED TRIGGER: ICD-10-CM

## 2025-07-03 DIAGNOSIS — E55.9 VITAMIN D DEFICIENCY: ICD-10-CM

## 2025-07-03 DIAGNOSIS — F32.5 MAJOR DEPRESSIVE DISORDER, SINGLE EPISODE, IN FULL REMISSION: ICD-10-CM

## 2025-07-03 DIAGNOSIS — I48.19 ATRIAL FIBRILLATION, PERSISTENT (HCC): ICD-10-CM

## 2025-07-03 DIAGNOSIS — G47.33 OSA (OBSTRUCTIVE SLEEP APNEA): ICD-10-CM

## 2025-07-03 RX ORDER — ERGOCALCIFEROL 1.25 MG/1
50000 CAPSULE, LIQUID FILLED ORAL WEEKLY
Qty: 12 CAPSULE | Refills: 1 | Status: SHIPPED | OUTPATIENT
Start: 2025-07-03

## 2025-07-03 RX ORDER — AMLODIPINE BESYLATE 5 MG/1
5 TABLET ORAL DAILY
Qty: 90 TABLET | Refills: 1 | Status: SHIPPED | OUTPATIENT
Start: 2025-07-03

## 2025-07-03 RX ORDER — MOMETASONE FUROATE MONOHYDRATE 50 UG/1
2 SPRAY, METERED NASAL DAILY
Qty: 17 G | Refills: 1 | Status: SHIPPED | OUTPATIENT
Start: 2025-07-03

## 2025-07-03 RX ORDER — BUSPIRONE HYDROCHLORIDE 7.5 MG/1
7.5 TABLET ORAL DAILY
Qty: 90 TABLET | Refills: 1 | Status: SHIPPED | OUTPATIENT
Start: 2025-07-03

## 2025-07-03 RX ORDER — BUPROPION HYDROCHLORIDE 150 MG/1
150 TABLET ORAL EVERY MORNING
Qty: 90 TABLET | Refills: 1 | Status: SHIPPED | OUTPATIENT
Start: 2025-07-03

## 2025-07-03 RX ORDER — MONTELUKAST SODIUM 10 MG/1
10 TABLET ORAL NIGHTLY
Qty: 90 TABLET | Refills: 1 | Status: SHIPPED | OUTPATIENT
Start: 2025-07-03

## 2025-07-03 RX ORDER — FUROSEMIDE 20 MG/1
20 TABLET ORAL DAILY
Qty: 90 TABLET | Refills: 1 | Status: SHIPPED | OUTPATIENT
Start: 2025-07-03

## 2025-07-03 ASSESSMENT — ENCOUNTER SYMPTOMS
VOICE CHANGE: 0
NAUSEA: 0
SORE THROAT: 0
EYE PAIN: 0
VOMITING: 0
CONSTIPATION: 0
CHEST TIGHTNESS: 0
COLOR CHANGE: 0
DIARRHEA: 0
WHEEZING: 0
SHORTNESS OF BREATH: 0
COUGH: 0
ABDOMINAL PAIN: 0

## 2025-07-03 ASSESSMENT — PATIENT HEALTH QUESTIONNAIRE - PHQ9
SUM OF ALL RESPONSES TO PHQ QUESTIONS 1-9: 0
2. FEELING DOWN, DEPRESSED OR HOPELESS: NOT AT ALL
SUM OF ALL RESPONSES TO PHQ QUESTIONS 1-9: 0
1. LITTLE INTEREST OR PLEASURE IN DOING THINGS: NOT AT ALL
SUM OF ALL RESPONSES TO PHQ QUESTIONS 1-9: 0
SUM OF ALL RESPONSES TO PHQ QUESTIONS 1-9: 0

## 2025-07-03 NOTE — PROGRESS NOTES
PROGRESS NOTE    Chief Complaint   Patient presents with    Hyperglycemia     3 month f/u. Review labs        HPI  History of Present Illness  The patient is a 74-year-old female here to follow up on her hyperglycemia and blood pressure.    Sleep Apnea  - She has been under the care of Dr. Leblanc, a sleep specialist, who recommended Zepbound for her sleep apnea.  - Her insurance only covers this medication if a sleep study has been conducted within the past year.  - Her last sleep study was 9 years ago, so she underwent another one recently on 07/01/2025 but was unable to sleep during the test.  - She has been using Zepbound for two months, which has resulted in a weight loss of 12 pounds.  - She has been off her CPAP machine for 3 days, which has led to disrupted sleep, coughing, and wheezing.  - She is considering resuming the use of her CPAP machine.    Left Shoulder Pain  - She has been receiving injections in her left shoulder every 2 months.  - Since starting Zepbound, she has not experienced any pain and has discontinued the injections.  - She plans to monitor how long she can go without the injections before needing them again.    Back Pain  - Her last back injection was administered in 12/2024.  - She was referred to Dr. Venu Oquendo at [email or web address] by Dr. Hutton.  - Dr. Oquendo is scheduled to perform a procedure on 07/09/2025 and another on 07/24/2025.  - She is required to maintain a pain journal for 8 to 10 days post-procedure.  - If the procedures are successful, Dr. Oquendo plans to perform a radiofrequency ablation.    Vein Issues  - She underwent vein therapy 30 to 40 years ago, but her veins are now causing issues.  - She consulted Dr. Thierry Chapman in 04/2025, who performed an ultrasound on both legs and a radiofrequency ablation on the back vein in her calf.  - She was informed that while they can manage the pain, they cannot address the swelling.  - She is experiencing similar issues in her left

## 2025-07-10 PROCEDURE — 95811 POLYSOM 6/>YRS CPAP 4/> PARM: CPT

## 2025-07-14 ENCOUNTER — OFFICE VISIT (OUTPATIENT)
Dept: PULMONOLOGY | Age: 75
End: 2025-07-14
Payer: MEDICARE

## 2025-07-14 VITALS
OXYGEN SATURATION: 96 % | HEIGHT: 63 IN | WEIGHT: 259 LBS | BODY MASS INDEX: 45.89 KG/M2 | HEART RATE: 82 BPM | RESPIRATION RATE: 18 BRPM | SYSTOLIC BLOOD PRESSURE: 150 MMHG | DIASTOLIC BLOOD PRESSURE: 90 MMHG

## 2025-07-14 DIAGNOSIS — J45.20 MILD INTERMITTENT ASTHMA, UNCOMPLICATED: Primary | ICD-10-CM

## 2025-07-14 DIAGNOSIS — E66.01 MORBID OBESITY WITH BMI OF 45.0-49.9, ADULT (HCC): ICD-10-CM

## 2025-07-14 DIAGNOSIS — G47.33 OBSTRUCTIVE SLEEP APNEA (ADULT) (PEDIATRIC): ICD-10-CM

## 2025-07-14 PROCEDURE — 99213 OFFICE O/P EST LOW 20 MIN: CPT | Performed by: INTERNAL MEDICINE

## 2025-07-14 PROCEDURE — 1036F TOBACCO NON-USER: CPT | Performed by: INTERNAL MEDICINE

## 2025-07-14 PROCEDURE — 1159F MED LIST DOCD IN RCRD: CPT | Performed by: INTERNAL MEDICINE

## 2025-07-14 PROCEDURE — 3077F SYST BP >= 140 MM HG: CPT | Performed by: INTERNAL MEDICINE

## 2025-07-14 PROCEDURE — 3080F DIAST BP >= 90 MM HG: CPT | Performed by: INTERNAL MEDICINE

## 2025-07-14 PROCEDURE — G8427 DOCREV CUR MEDS BY ELIG CLIN: HCPCS | Performed by: INTERNAL MEDICINE

## 2025-07-14 PROCEDURE — 1090F PRES/ABSN URINE INCON ASSESS: CPT | Performed by: INTERNAL MEDICINE

## 2025-07-14 PROCEDURE — 1126F AMNT PAIN NOTED NONE PRSNT: CPT | Performed by: INTERNAL MEDICINE

## 2025-07-14 PROCEDURE — 3017F COLORECTAL CA SCREEN DOC REV: CPT | Performed by: INTERNAL MEDICINE

## 2025-07-14 PROCEDURE — G8399 PT W/DXA RESULTS DOCUMENT: HCPCS | Performed by: INTERNAL MEDICINE

## 2025-07-14 PROCEDURE — 1123F ACP DISCUSS/DSCN MKR DOCD: CPT | Performed by: INTERNAL MEDICINE

## 2025-07-14 PROCEDURE — G8417 CALC BMI ABV UP PARAM F/U: HCPCS | Performed by: INTERNAL MEDICINE

## 2025-07-14 RX ORDER — ALBUTEROL SULFATE 90 UG/1
2 INHALANT RESPIRATORY (INHALATION) EVERY 6 HOURS PRN
Qty: 18 G | Refills: 5 | Status: SHIPPED | OUTPATIENT
Start: 2025-07-14

## 2025-07-14 RX ORDER — FLUTICASONE PROPIONATE AND SALMETEROL 250; 50 UG/1; UG/1
1 POWDER RESPIRATORY (INHALATION) 2 TIMES DAILY
Qty: 1 EACH | Refills: 11 | Status: SHIPPED | OUTPATIENT
Start: 2025-07-14

## 2025-07-14 NOTE — PROGRESS NOTES
Name:  Lata Chiu  YOB: 1950   MRN: [id number]      Office Visit: 7/14/2025       Assessment & Plan (Medical Decision Making)    Impression: 74 y.o. female here for follow-up    1. Mild intermittent asthma, uncomplicated  Continue Advair and as needed albuterol    2. Obstructive sleep apnea (adult) (pediatric)  Compliant with CPAP    3. Morbid obesity with BMI of 45.0-49.9, adult (HCC)  Weight loss would be beneficial, hopefully Zepbound can be approved    Follow-up in 1 year    No orders of the defined types were placed in this encounter.    No orders of the defined types were placed in this encounter.    Follow-up and Dispositions    Return in about 1 year (around 7/14/2026).       Delfin Chaparro Jr, MD    No specialty comments available.  No problem-specific Assessment & Plan notes found for this encounter.              Total time for encounter on day of encounter was 33 minutes.  This time includes chart prep, review of tests/procedures, review of other provider's notes, documentation and counseling patient regarding disease process and medications.   _________________________________________________________________________    HISTORY OF PRESENT ILLNESS:    Ms. Lata Chiu is a 74 y.o. female who is seen at [facility] for  Follow-up (Mild intermittent asthma) and Asthma  This is a 74-year-old female with history of endocarditis in 1992, hyperlipidemia, hypertension, obesity, hypothyroid, varicose veins, asthma, sleep apnea last seen in August 2024.  At that time she was having some issues with swelling and was given a course of Lasix.  Since that time the swelling is been under better control but she does note she has issues with venous insufficiency and varicose veins and previously had vein stripping years ago in [place].    She has sleep apnea and is followed by Dr. Leblanc.  She was given a course of Zepbound by him to assist with weight loss and she has been

## 2025-08-06 ENCOUNTER — OFFICE VISIT (OUTPATIENT)
Dept: SLEEP MEDICINE | Age: 75
End: 2025-08-06

## 2025-08-06 VITALS
HEIGHT: 63 IN | SYSTOLIC BLOOD PRESSURE: 137 MMHG | OXYGEN SATURATION: 94 % | DIASTOLIC BLOOD PRESSURE: 89 MMHG | WEIGHT: 257 LBS | BODY MASS INDEX: 45.54 KG/M2 | HEART RATE: 76 BPM

## 2025-08-06 DIAGNOSIS — E66.813 CLASS 3 SEVERE OBESITY WITH BODY MASS INDEX (BMI) OF 45.0 TO 49.9 IN ADULT (HCC): ICD-10-CM

## 2025-08-06 DIAGNOSIS — G47.33 OSA (OBSTRUCTIVE SLEEP APNEA): Primary | ICD-10-CM

## 2025-08-15 ENCOUNTER — TELEPHONE (OUTPATIENT)
Age: 75
End: 2025-08-15

## 2025-08-15 ENCOUNTER — OFFICE VISIT (OUTPATIENT)
Dept: INTERNAL MEDICINE CLINIC | Facility: CLINIC | Age: 75
End: 2025-08-15

## 2025-08-15 VITALS
DIASTOLIC BLOOD PRESSURE: 82 MMHG | BODY MASS INDEX: 45.54 KG/M2 | WEIGHT: 257 LBS | HEIGHT: 63 IN | SYSTOLIC BLOOD PRESSURE: 128 MMHG

## 2025-08-15 DIAGNOSIS — I10 ESSENTIAL (PRIMARY) HYPERTENSION: ICD-10-CM

## 2025-08-15 DIAGNOSIS — M54.50 CHRONIC LOW BACK PAIN WITHOUT SCIATICA, UNSPECIFIED BACK PAIN LATERALITY: ICD-10-CM

## 2025-08-15 DIAGNOSIS — I48.19 ATRIAL FIBRILLATION, PERSISTENT (HCC): ICD-10-CM

## 2025-08-15 DIAGNOSIS — G47.33 OSA (OBSTRUCTIVE SLEEP APNEA): Primary | ICD-10-CM

## 2025-08-15 DIAGNOSIS — E78.2 MIXED HYPERLIPIDEMIA: ICD-10-CM

## 2025-08-15 DIAGNOSIS — G89.29 CHRONIC LOW BACK PAIN WITHOUT SCIATICA, UNSPECIFIED BACK PAIN LATERALITY: ICD-10-CM

## 2025-08-15 PROBLEM — M25.552 PAIN IN LEFT HIP: Status: RESOLVED | Noted: 2020-08-27 | Resolved: 2025-08-15

## 2025-08-15 PROBLEM — M47.817 LUMBOSACRAL SPONDYLOSIS WITHOUT MYELOPATHY: Status: RESOLVED | Noted: 2022-03-29 | Resolved: 2025-08-15

## 2025-08-15 PROBLEM — J20.9 ACUTE BRONCHITIS, UNSPECIFIED: Status: RESOLVED | Noted: 2025-01-14 | Resolved: 2025-08-15

## 2025-08-15 RX ORDER — APIXABAN 5 MG/1
5 TABLET, FILM COATED ORAL 2 TIMES DAILY
Qty: 60 TABLET | Refills: 11 | Status: SHIPPED | OUTPATIENT
Start: 2025-08-15

## 2025-08-15 ASSESSMENT — ENCOUNTER SYMPTOMS
ABDOMINAL PAIN: 0
DIARRHEA: 0
NAUSEA: 0
COUGH: 0
WHEEZING: 0
EYE PAIN: 0
CONSTIPATION: 0
SORE THROAT: 0
VOMITING: 0
SHORTNESS OF BREATH: 0
VOICE CHANGE: 0
CHEST TIGHTNESS: 0
COLOR CHANGE: 0

## 2025-08-27 ENCOUNTER — OFFICE VISIT (OUTPATIENT)
Age: 75
End: 2025-08-27
Payer: MEDICARE

## 2025-08-27 VITALS
SYSTOLIC BLOOD PRESSURE: 136 MMHG | DIASTOLIC BLOOD PRESSURE: 74 MMHG | WEIGHT: 255 LBS | HEIGHT: 63 IN | BODY MASS INDEX: 45.18 KG/M2 | HEART RATE: 79 BPM

## 2025-08-27 DIAGNOSIS — I48.19 ATRIAL FIBRILLATION, PERSISTENT (HCC): Primary | ICD-10-CM

## 2025-08-27 DIAGNOSIS — I10 ESSENTIAL HYPERTENSION: ICD-10-CM

## 2025-08-27 PROCEDURE — 3075F SYST BP GE 130 - 139MM HG: CPT | Performed by: PHYSICIAN ASSISTANT

## 2025-08-27 PROCEDURE — 1123F ACP DISCUSS/DSCN MKR DOCD: CPT | Performed by: PHYSICIAN ASSISTANT

## 2025-08-27 PROCEDURE — 1160F RVW MEDS BY RX/DR IN RCRD: CPT | Performed by: PHYSICIAN ASSISTANT

## 2025-08-27 PROCEDURE — G8399 PT W/DXA RESULTS DOCUMENT: HCPCS | Performed by: PHYSICIAN ASSISTANT

## 2025-08-27 PROCEDURE — 1036F TOBACCO NON-USER: CPT | Performed by: PHYSICIAN ASSISTANT

## 2025-08-27 PROCEDURE — G8417 CALC BMI ABV UP PARAM F/U: HCPCS | Performed by: PHYSICIAN ASSISTANT

## 2025-08-27 PROCEDURE — 3017F COLORECTAL CA SCREEN DOC REV: CPT | Performed by: PHYSICIAN ASSISTANT

## 2025-08-27 PROCEDURE — 99214 OFFICE O/P EST MOD 30 MIN: CPT | Performed by: PHYSICIAN ASSISTANT

## 2025-08-27 PROCEDURE — 3078F DIAST BP <80 MM HG: CPT | Performed by: PHYSICIAN ASSISTANT

## 2025-08-27 PROCEDURE — 1090F PRES/ABSN URINE INCON ASSESS: CPT | Performed by: PHYSICIAN ASSISTANT

## 2025-08-27 PROCEDURE — G8427 DOCREV CUR MEDS BY ELIG CLIN: HCPCS | Performed by: PHYSICIAN ASSISTANT

## 2025-08-27 PROCEDURE — 1159F MED LIST DOCD IN RCRD: CPT | Performed by: PHYSICIAN ASSISTANT

## 2025-08-27 PROCEDURE — 93000 ELECTROCARDIOGRAM COMPLETE: CPT | Performed by: PHYSICIAN ASSISTANT

## (undated) DEVICE — MEDI-VAC YANKAUER SUCTION HANDLE W/BULBOUS TIP: Brand: CARDINAL HEALTH

## (undated) DEVICE — 3M™ TEGADERM™ TRANSPARENT FILM DRESSING FRAME STYLE, 1626W, 4 IN X 4-3/4 IN (10 CM X 12 CM), 50/CT 4CT/CASE: Brand: 3M™ TEGADERM™

## (undated) DEVICE — SYR 10ML LUER LOK 1/5ML GRAD --

## (undated) DEVICE — NEEDLE HYPO 21GA L1.5IN INTRAMUSCULAR S STL LATCH BVL UP

## (undated) DEVICE — BUTTON SWITCH PENCIL BLADE ELECTRODE, HOLSTER: Brand: EDGE

## (undated) DEVICE — TRAY PREP DRY W/ PREM GLV 2 APPL 6 SPNG 2 UNDPD 1 OVERWRAP

## (undated) DEVICE — CONTAINER,SPECIMEN,O.R.STRL,4.5OZ: Brand: MEDLINE

## (undated) DEVICE — DRESSING,GAUZE,XEROFORM,CURAD,1"X8",ST: Brand: CURAD

## (undated) DEVICE — GAUZE,SPONGE,2"X2",8PLY,STERILE,LF,2'S: Brand: MEDLINE

## (undated) DEVICE — SUTURE STRATAFIX SPRL SZ 1 L5IN ABSRB VLT CT-1 L36MM 1/2 SXPD2B401

## (undated) DEVICE — SUTURE FIBERWIRE SZ 2 W/ TAPERED NEEDLE BLUE L38IN NONABSORB BLU L26.5MM 1/2 CIRCLE AR7200

## (undated) DEVICE — BIPOLAR SEALER 23-313-1 AQM 9.5XL: Brand: AQUAMANTYS ®

## (undated) DEVICE — STRYKER PERFORMANCE SERIES SAGITTAL BLADE: Brand: STRYKER PERFORMANCE SERIES

## (undated) DEVICE — PACK PROCEDURE SURG TOT KNEE

## (undated) DEVICE — SYS INCISION MANAGEMENT -- PREVENA

## (undated) DEVICE — SYR 50ML LR LCK 1ML GRAD NSAF --

## (undated) DEVICE — 3000CC GUARDIAN II: Brand: GUARDIAN

## (undated) DEVICE — DRAPE,TOP,102X53,STERILE: Brand: MEDLINE

## (undated) DEVICE — HANDPIECE SET WITH COAXIAL HIGH FLOW TIP AND SUCTION TUBE: Brand: INTERPULSE

## (undated) DEVICE — STERILE LATEX POWDER FREE SURGICAL GLOVES WITH HYDROGEL COATING: Brand: PROTEXIS

## (undated) DEVICE — SUTURE MCRYL SZ 2-0 L27IN ABSRB UD CP-1 1 L36MM 1/2 CIR REV Y266H

## (undated) DEVICE — STERILE PRESSURE PROTECTOR PAD® FOR DE MAYO UNIVERSAL DISTRACTOR® (10/CASE): Brand: DE MAYO UNIVERSAL DISTRACTOR®

## (undated) DEVICE — Z DISCONTINUED USE 2423037 APPLICATOR MEDICATED 3 CC CHLORHEXIDINE GLUC 2% CHLORAPREP

## (undated) DEVICE — SYR LR LCK 1ML GRAD NSAF 30ML --

## (undated) DEVICE — (D)PREP SKN CHLRAPRP APPL 26ML -- CONVERT TO ITEM 371833

## (undated) DEVICE — REM POLYHESIVE ADULT PATIENT RETURN ELECTRODE: Brand: VALLEYLAB

## (undated) DEVICE — TRAY CATHETER 16FR F INCLUDE LUB URIN M STATLOK STBL DEV SURSTP

## (undated) DEVICE — FAN SPRAY KIT: Brand: PULSAVAC®

## (undated) DEVICE — T4 HOOD

## (undated) DEVICE — TRAY CATH 16F DRN BG LTX -- CONVERT TO ITEM 363158

## (undated) DEVICE — NEEDLE SPNL 22GA L3.5IN BLK HUB S STL REG WALL FIT STYL W/

## (undated) DEVICE — SOLUTION IV 1000ML 0.9% SOD CHL

## (undated) DEVICE — SUTURE MCRYL SZ 4-0 L27IN ABSRB UD L19MM PS-2 1/2 CIR PRIM Y426H

## (undated) DEVICE — MINOR SPLIT GENERAL: Brand: MEDLINE INDUSTRIES, INC.

## (undated) DEVICE — 2000CC GUARDIAN II: Brand: GUARDIAN

## (undated) DEVICE — NEEDLE HYPO 18GA L1.5IN PNK S STL HUB POLYPR SHLD REG BVL

## (undated) DEVICE — SOLUTION IRRIG 3000ML 0.9% SOD CHL FLX CONT 0797208] ICU MEDICAL INC]

## (undated) DEVICE — SOLUTION IRRIG 1000ML 09% SOD CHL USP PIC PLAS CONTAINER

## (undated) DEVICE — Z DISCONTINUED PER MEDLINE USE 2741944 DRESSING AQUACEL 12 IN SURG W9XL30CM SIL CVR WTRPRF VIR BACT BARR ANTIMIC